# Patient Record
Sex: FEMALE | Race: BLACK OR AFRICAN AMERICAN | Employment: OTHER | URBAN - METROPOLITAN AREA
[De-identification: names, ages, dates, MRNs, and addresses within clinical notes are randomized per-mention and may not be internally consistent; named-entity substitution may affect disease eponyms.]

---

## 2017-04-04 ENCOUNTER — OFFICE VISIT (OUTPATIENT)
Dept: FAMILY MEDICINE CLINIC | Age: 72
End: 2017-04-04

## 2017-04-04 VITALS
SYSTOLIC BLOOD PRESSURE: 119 MMHG | OXYGEN SATURATION: 97 % | TEMPERATURE: 97.9 F | WEIGHT: 229 LBS | HEIGHT: 67 IN | HEART RATE: 65 BPM | BODY MASS INDEX: 35.94 KG/M2 | RESPIRATION RATE: 18 BRPM | DIASTOLIC BLOOD PRESSURE: 77 MMHG

## 2017-04-04 DIAGNOSIS — I25.2 HISTORY OF MI (MYOCARDIAL INFARCTION): ICD-10-CM

## 2017-04-04 DIAGNOSIS — Z87.891 SMOKING HISTORY: ICD-10-CM

## 2017-04-04 DIAGNOSIS — Z00.00 MEDICARE ANNUAL WELLNESS VISIT, SUBSEQUENT: ICD-10-CM

## 2017-04-04 DIAGNOSIS — I10 ESSENTIAL HYPERTENSION: Primary | ICD-10-CM

## 2017-04-04 DIAGNOSIS — E11.9 TYPE 2 DIABETES MELLITUS WITHOUT COMPLICATION, WITHOUT LONG-TERM CURRENT USE OF INSULIN (HCC): ICD-10-CM

## 2017-04-04 PROBLEM — Z71.89 ADVANCED CARE PLANNING/COUNSELING DISCUSSION: Status: ACTIVE | Noted: 2017-04-04

## 2017-04-04 NOTE — PROGRESS NOTES
[address]  [address] [facility] [address]  [phone]             Date of visit: 4/4/2017       This is an Subsequent Medicare Annual Wellness Visit (AWV), (Performed more than 12 months after effective date of Medicare Part B enrollment and 12 months after last preventive visit, Once in a lifetime)    I have reviewed the patient's medical history in detail and updated the computerized patient record. Morelia Coello is a 70 y.o. female   History obtained from: patient    Concerns today   (Patient understands that medical problems addressed today may incur additional cost as this is a preventive visit)  - Blood pressure. Patient has a history of HTN, for which she is on toprol 100mg daily and prinzide 20/12.5mg daily. Her BP today is at goal. Will continue meds as are. Consider cutting back in 6 months if BP still 442O/883M systolics. History     Patient Active Problem List   Diagnosis Code    HTN (hypertension) I10    DM type 2 (diabetes mellitus, type 2) ([address] E11.9    MI (myocardial infarction) ([address] I21.3    Chronic back pain M54.9, G89.29    Obstructive sleep apnea (adult) (pediatric) G47.33    Advanced care planning/counseling discussion Z71.89     Past Medical History:   Diagnosis Date    Chronic back pain 9/8/2010    DM type 2 (diabetes mellitus, type 2) ([address] 5/3/2010    HTN (hypertension) 5/3/2010    MI (myocardial infarction) ([address] 5/3/2010    Obstructive sleep apnea (adult) (pediatric) 12/12/2014      Past Surgical History:   Procedure Laterality Date    HX CORONARY STENT PLACEMENT      HX ORTHOPAEDIC      disc sx    HX TUBAL LIGATION       Allergies   Allergen Reactions    Tetanus Vaccines And Toxoid Swelling     Current Outpatient Prescriptions   Medication Sig Dispense Refill    metoprolol succinate (TOPROL XL) 100 mg tablet Take 1 Tab by mouth daily.  90 Tab 4    lisinopril-hydroCHLOROthiazide (PRINZIDE, ZESTORETIC) 20-12.5 mg per tablet Take 1 Tab by mouth daily. 90 Tab 3    pravastatin (PRAVACHOL) 40 mg tablet Take 1 Tab by mouth nightly. 90 Tab 3    cholecalciferol (VITAMIN D3) 1,000 unit tablet Take 1 Tab by mouth daily. 90 Tab 4    aspirin 81 mg chewable tablet Take 1 Tab by mouth daily. 100 Tab 3    exenatide microspheres (BYDUREON) 2 mg serr 2 mg by SubCUTAneous route every seven (7) days. 1 Each 1    insulin glargine (LANTUS SOLOSTAR) 100 unit/mL (3 mL) pen 52 units at night. 4 Each 3    glucose blood VI test strips (Anaphore BLOOD GLUCOSE SYSTEM) strip Use 3 times per day (fasting and before meals). (Patient taking differently: Once a day) 3 Package 11    Lancets (ONE TOUCH ULTRASOFT LANCETS) Misc 1 Package by Does Not Apply route. Test blood glucose 3-4 time daily 1 Package 11     Family History   Problem Relation Age of Onset    Hypertension Mother       58yo    Diabetes Sister     Breast Cancer Sister     Diabetes Sister     Diabetes Sister     Diabetes Sister      Social History   Substance Use Topics    Smoking status: Former Smoker     Quit date: 2004    Smokeless tobacco: Never Used    Alcohol use No       Specialists/Care Team   Alyx Farris has established care with the following healthcare providers:  Patient Care Team:  Radha Campbell MD as PCP - General (Family Practice)    Health Risk Assessment     Demographics   female  70 y.o. General Health Questions   -During the past 4 weeks:   -how would you rate your health in general? Fair   -how often have you been bothered by feeling dizzy when standing up? never   -how much have you been bothered by bodily pain? mildly   -Have you noticed any hearing difficulties? no   -has your physical and emotional health limited your social activities with family or friends? no    Emotional Health Questions   -Do you have a history of depression, anxiety, or emotional problems? no  -Over the past 2 weeks, have you felt down, depressed or hopeless? no  -Over the past 2 weeks, have you felt little interest or pleasure in doing things? no    Health Habits   Please describe your diet habits: Breakfast usually boiled egg and toast, no juice. Lunch no sandwich, usually soup. [employer] is usually fish and protein. Do you get 5 servings of fruits or vegetables daily? yes  Do you exercise regularly? Not currently, but is trying to incorporate exercising, riding the bike. Activities of Daily Living and Functional Status   -Do you need help with eating, walking, dressing, bathing, toileting, the phone, transportation, shopping, preparing meals, housework, laundry, medications or managing money? no  -In the past four weeks, was someone available to help you if you needed and wanted help with anything? yes  -Are you confident are you that you can control and manage most of your health problems? yes  -Have you been given information to help you keep track of your medications? no  -How often do you have trouble taking your medications as prescribed? never    Fall Risk and Home Safety   Have you fallen 2 or more times in the past year? no  Does your home have rugs in the hallway, lack grab bars in the bathroom, lack handrails on the stairs or have poor lighting? yes  Do you have smoke detectors and check them regularly? yes  Do you have difficulties driving a car? no  Do you always fasten your seat belt when you are in a car? yes    Review of Systems (if indicated for problems addressed today)   No chest pain, palpitations, nor shortness of breath    Physical Examination     Vitals:    04/04/17 1005   BP: 119/77   Pulse: 65   Resp: 18   Temp: 97.9 °F (36.6 °C)   TempSrc: Oral   SpO2: 97%   Weight: 229 lb (103.9 kg)   Height: 5' 7\" (1.702 m)     Body mass index is 35.87 kg/(m^2).    No exam data present  Was the patient's timed Up & Go test unsteady or longer than 30 seconds? no    Evaluation of Cognitive Function   Mood/affect:  happy  Orientation: Person, Place, Time and Situation  Appearance: age appropriate and casually dressed  Family member/caregiver input: none    Additional exam if indicated for problems addressed today:  Heart: Normal rate, regular rhythm. No m/r/r  Lungs: CTAB, no wheeze  Ext: no LE edema. Pulses 2+. Advice/Referrals/Counseling (as indicated)   Education and counseling provided for any problems identified above. Preventive Services     (Preventive care checklist to be included in patient instructions)  Discussed today Done Previously Not Needed     x  Pneumococcal vaccines   X- refused   Flu vaccine      Hepatitis B vaccine (if at risk)    x  Shingles vaccine    x  TDAP vaccine   X- will order   Mammogram     x Pap smear   X- says it was about 3 years ago. Normal.   Colorectal cancer screening   x   Low-dose CT for lung cancer screening   x   Bone density test     x Glaucoma screening   x   X- due september      Cholesterol test   X- due september   Diabetes screening test    x   Diabetes self-management class   x   Nutritionist referral for diabetes or renal disease     Discussion of Advance Directive   Discussed with Katarina Veloz her ability to prepare and advance directive in the case that an injury or illness causes her to be unable to make health care decisions. Patient has discussed with family to be DNR. Assessment/Plan   Z00.00    ICD-10-CM ICD-9-CM    1. Essential hypertension I10 401.9    2. Medicare annual wellness visit, subsequent Z00.00 V70.0 REFERRAL TO CARDIOLOGY      DEXA BONE DENSITY STUDY AXIAL      ANTWAN MAMMO BI SCREENING INCL CAD      CT LOW DOSE LUNG CANCER SCREENING      REFERRAL TO NUTRITION   3. History of MI (myocardial infarction) I25.2 12 REFERRAL TO CARDIOLOGY   4. Smoking history Z87.891 V15.82 CT LOW DOSE LUNG CANCER SCREENING   5.  Type 2 diabetes mellitus without complication, without long-term current use of insulin (HCC) E11.9 250.00 REFERRAL TO NUTRITION       Orders Placed This Encounter    DEXA BONE DENSITY STUDY AXIAL    ANTWAN MAMMO BI SCREENING INCL CAD    CT LOW DOSE LUNG CANCER SCREENING    REFERRAL TO CARDIOLOGY    REFERRAL TO Roldan Mckeon MD   PGY 2

## 2017-04-04 NOTE — PROGRESS NOTES
I saw and evaluated the patient, performing the key elements of the service. I discussed the findings, assessment and plan with the resident and agree with the resident's findings and plan as documented in the resident's note. She denies any lightheadedness with position change.

## 2017-04-04 NOTE — MR AVS SNAPSHOT
Visit Information Date & Time Provider Department Dept. Phone Encounter #  
 4/4/2017  9:55 AM Ny Hood MD [facility] [phone] [id number] Upcoming Health Maintenance Date Due Hepatitis C Screening 1945 MICROALBUMIN Q1 3/30/2016 INFLUENZA AGE 9 TO ADULT 8/1/2016 BREAST CANCER SCRN MAMMOGRAM 9/24/2016 HEMOGLOBIN A1C Q6M 3/1/2017 EYE EXAM RETINAL OR DILATED Q1 4/4/2018* FOOT EXAM Q1 9/1/2017 LIPID PANEL Q1 9/1/2017 MEDICARE YEARLY EXAM 4/5/2018 GLAUCOMA SCREENING Q2Y 9/8/2018 Pneumococcal 65+ Low/Medium Risk (2 of 2 - PPSV23) 9/1/2020 COLONOSCOPY 4/30/2021 DTaP/Tdap/Td series (2 - Td) 4/4/2027 *Topic was postponed. The date shown is not the original due date. Allergies as of 4/4/2017  Review Complete On: 4/4/2017 By: Fernando Jiménez LPN Severity Noted Reaction Type Reactions Tetanus Vaccines And Toxoid  05/04/2011    Swelling Current Immunizations  Reviewed on 4/4/2017 Name Date Pneumococcal Vaccine (Unspecified Type) 9/1/2015 Zoster Vaccine, Live 9/1/2015 Reviewed by Fernando Jiménez LPN on 3/5/0275 at 90:44 AM  
You Were Diagnosed With   
  
 Codes Comments Essential hypertension    -  Primary ICD-10-CM: I10 
ICD-9-CM: 401.9 Medicare annual wellness visit, subsequent     ICD-10-CM: Z00.00 ICD-9-CM: V70.0 History of MI (myocardial infarction)     ICD-10-CM: I25.2 ICD-9-CM: 117 Smoking history     ICD-10-CM: Z87.891 ICD-9-CM: V15.82 Type 2 diabetes mellitus without complication, without long-term current use of insulin (HCC)     ICD-10-CM: E11.9 ICD-9-CM: 250.00 Vitals BP Pulse Temp Resp Height(growth percentile) Weight(growth percentile) 119/77 65 97.9 °F (36.6 °C) (Oral) 18 5' 7\" (1.702 m) 229 lb (103.9 kg) SpO2 BMI OB Status Smoking Status 97% 35.87 kg/m2 Postmenopausal Former Smoker BMI and BSA Data Body Mass Index Body Surface Area  
 35.87 kg/m 2 2.22 m 2 Preferred Pharmacy Pharmacy Name Phone [facility] [address] [phone] Your Updated Medication List  
  
   
This list is accurate as of: 4/4/17 10:31 AM.  Always use your most recent med list.  
  
  
  
  
 aspirin 81 mg chewable tablet Take 1 Tab by mouth daily. cholecalciferol 1,000 unit tablet Commonly known as:  VITAMIN D3 Take 1 Tab by mouth daily. exenatide microspheres 2 mg Serr Commonly known as:  BYDUREON  
2 mg by SubCUTAneous route every seven (7) days. glucose blood VI test strips strip Commonly known as:  [address] Use 3 times per day (fasting and before meals). insulin glargine 100 unit/mL (3 mL) pen Commonly known as:  LANTUS SOLOSTAR  
52 units at night. Lancets Misc Commonly known as:  ONETOUCH ULTRASOFT LANCETS  
1 Package by Does Not Apply route. Test blood glucose 3-4 time daily  
  
 lisinopril-hydroCHLOROthiazide 20-12.5 mg per tablet Commonly known as:  Darcia Candy Take 1 Tab by mouth daily. metoprolol succinate 100 mg tablet Commonly known as:  TOPROL XL Take 1 Tab by mouth daily. pravastatin 40 mg tablet Commonly known as:  PRAVACHOL Take 1 Tab by mouth nightly. We Performed the Following REFERRAL TO CARDIOLOGY [JUQ14 Custom] Comments:  
 Please evaluate patient for hx of MI  
 REFERRAL TO NUTRITION [REF50 Custom] Comments:  
 Please evaluate patient for diet and exercise education. Has T2DM Please schedule and authorize patient for services. To-Do List   
 04/05/2017 Imaging:  CT LOW DOSE LUNG CANCER SCREENING   
  
 04/05/2017 Imaging:  DEXA BONE DENSITY STUDY AXIAL   
  
 09/04/2017 Imaging:  ANTWAN MAMMO BI SCREENING INCL CAD Referral Information Referral ID Referred By Referred To [facility] [address] [id number] [address] Phone: [phone] Fax: [phone] Visits Status Start Date End Date 1 New Request 4/4/17 4/4/18 If your referral has a status of pending review or denied, additional information will be sent to support the outcome of this decision. Referral ID Referred By Referred To  
 [id number] Mariann Cooper Not Available Visits Status Start Date End Date 1 New Request 4/4/17 4/4/18 If your referral has a status of pending review or denied, additional information will be sent to support the outcome of this decision. Introducing [facility]! Dear Dior Porter: Thank you for requesting a [address] account. Our records indicate that you have previously registered for a [address] account but its currently inactive. Please call our [address] support line at [phone]. Additional Information If you have questions, please visit the Frequently Asked Questions section of the [address] website at [email or web address] Remember, [address] is NOT to be used for urgent needs. For medical emergencies, dial 911. Now available from your iPhone and Android! Please provide this summary of care documentation to your next provider. Your primary care clinician is listed as Steffen Zurita. If you have any questions after today's visit, please call [phone].

## 2017-04-04 NOTE — PROGRESS NOTES
Chief Complaint   Patient presents with    Blood Pressure Check     and medicare follow up. . no other concerns. . no refills needed. . pt states had pneu and zoster at [facility] 2yrs ago. eyes checked in sept as well as feet exam.      1. Have you been to the ER, urgent care clinic since your last visit? Hospitalized since your last visit? No    2. Have you seen or consulted any other health care providers outside of the [address] [place] [address] since your last visit? Include any pap smears or colon screening.  No

## 2017-04-07 DIAGNOSIS — Z78.0 POST-MENOPAUSAL: Primary | ICD-10-CM

## 2017-04-07 DIAGNOSIS — Z91.89 AT RISK FOR DECREASED BONE DENSITY: ICD-10-CM

## 2017-04-11 ENCOUNTER — HOSPITAL ENCOUNTER (OUTPATIENT)
Dept: MAMMOGRAPHY | Age: 72
Discharge: HOME | End: 2017-04-11
Attending: FAMILY MEDICINE
Payer: MEDICARE

## 2017-04-11 DIAGNOSIS — Z78.0 POST-MENOPAUSAL: ICD-10-CM

## 2017-04-11 DIAGNOSIS — Z91.89 AT RISK FOR DECREASED BONE DENSITY: ICD-10-CM

## 2017-04-11 PROCEDURE — 77080 DXA BONE DENSITY AXIAL: CPT

## 2017-09-01 ENCOUNTER — OFFICE VISIT (OUTPATIENT)
Dept: FAMILY MEDICINE CLINIC | Age: 72
End: 2017-09-01

## 2017-09-01 ENCOUNTER — HOSPITAL ENCOUNTER (OUTPATIENT)
Dept: LAB | Age: 72
Discharge: HOME | End: 2017-09-01
Payer: MEDICARE

## 2017-09-01 ENCOUNTER — TELEPHONE (OUTPATIENT)
Dept: FAMILY MEDICINE CLINIC | Age: 72
End: 2017-09-01

## 2017-09-01 VITALS
TEMPERATURE: 98.1 F | OXYGEN SATURATION: 98 % | HEART RATE: 70 BPM | WEIGHT: 227 LBS | SYSTOLIC BLOOD PRESSURE: 144 MMHG | DIASTOLIC BLOOD PRESSURE: 84 MMHG | HEIGHT: 67 IN | RESPIRATION RATE: 16 BRPM | BODY MASS INDEX: 35.63 KG/M2

## 2017-09-01 DIAGNOSIS — Z11.1 SCREENING-PULMONARY TB: ICD-10-CM

## 2017-09-01 DIAGNOSIS — Z11.59 NEED FOR HEPATITIS B SCREENING TEST: Primary | ICD-10-CM

## 2017-09-01 PROCEDURE — 36415 COLL VENOUS BLD VENIPUNCTURE: CPT

## 2017-09-01 PROCEDURE — 87340 HEPATITIS B SURFACE AG IA: CPT

## 2017-09-01 NOTE — TELEPHONE ENCOUNTER
Notified patient that her letter for work will be at the front office for . Patient verbalized understanding.

## 2017-09-01 NOTE — PATIENT INSTRUCTIONS
RTC Sunday before between 2P-4P to have yor PPD read. Tuberculin Skin Test: Care Instructions  Your Care Instructions    Tuberculosis (TB) is a bacterial infection that can damage the lungs or other parts of the body. The TB skin test can tell if you have TB bacteria in your body. Many people are exposed to TB and test positive for TB bacteria in their bodies, but they don't get the disease. TB bacteria can stay in your body without making you sick. This is because your immune system can keep TB in check. Your doctor may want you to have a TB skin test if you have been in close contact with someone who has TB. Or you may need the test if you have symptoms that might be caused by TB, such as a cough that does not go away, a fever, or weight loss. You also may get the test if you are a health care worker. During the skin test, part of a TB bacterium is injected under your skin. The test will feel like a skin prick. If you have TB bacteria in your body, a firm red bump will form at the shot site within 2 days. If the test shows that you are infected with TB (positive), your doctor probably will order more tests. A TB-positive skin test can't tell when you became infected with TB. And it can't tell whether the infection can be passed to others. Follow-up care is a key part of your treatment and safety. Be sure to make and go to all appointments, and call your doctor if you are having problems. It's also a good idea to know your test results and keep a list of the medicines you take. How can you care for yourself at home? · Do not scratch the test site. Scratching it may cause redness or swelling. This could affect the test results. · To ease itching, put a cold washcloth on the site. Then pat the site dry. · Do not cover the test site with a bandage or other dressing. · Go back to your doctor's office or hospital to have the test read on the follow-up date.  This must be done between 48 and 72 hours after you get the shot. When should you call for help? Watch closely for changes in your health, and be sure to contact your doctor if:  · You did not have your TB skin test checked by your doctor. · You have a fever or swelling in your arm. · You do not get better as expected. Where can you learn more? Go to http://yfn-wally.info/. Enter ([phone] in the search box to learn more about \"Tuberculin Skin Test: Care Instructions. \"  Current as of: March 3, 2017  Content Version: 11.3  © 7407-8848 [employer]. Care instructions adapted under license by [employer] (which disclaims liability or warranty for this information). If you have questions about a medical condition or this instruction, always ask your healthcare professional. [address] any warranty or liability for your use of this information.

## 2017-09-01 NOTE — PROGRESS NOTES
Chief Complaint   Patient presents with    Complete Physical     1. Have you been to the ER, urgent care clinic since your last visit? Hospitalized since your last visit? No    2. Have you seen or consulted any other health care providers outside of the [address] [place] [address] since your last visit? Include any pap smears or colon screening.  No

## 2017-09-01 NOTE — MR AVS SNAPSHOT
Visit Information Date & Time Provider Department Dept. Phone Encounter #  
 9/1/2017  1:15 PM Tanner Spear MD [address] [place] [address] [phone] [id number] Upcoming Health Maintenance Date Due Hepatitis C Screening 1945 MICROALBUMIN Q1 3/30/2016 BREAST CANCER SCRN MAMMOGRAM 9/24/2016 HEMOGLOBIN A1C Q6M 3/1/2017 INFLUENZA AGE 9 TO ADULT 8/1/2017 FOOT EXAM Q1 9/1/2017 LIPID PANEL Q1 9/1/2017 EYE EXAM RETINAL OR DILATED Q1 4/4/2018* MEDICARE YEARLY EXAM 4/5/2018 GLAUCOMA SCREENING Q2Y 9/8/2018 Pneumococcal 65+ Low/Medium Risk (2 of 2 - PPSV23) 9/1/2020 COLONOSCOPY 4/30/2021 DTaP/Tdap/Td series (2 - Td) 4/4/2027 *Topic was postponed. The date shown is not the original due date. Allergies as of 9/1/2017  Review Complete On: 9/1/2017 By: Ade Foley LPN Severity Noted Reaction Type Reactions Tetanus Vaccines And Toxoid  05/04/2011    Swelling Current Immunizations  Reviewed on 4/4/2017 Name Date Pneumococcal Vaccine (Unspecified Type) 9/1/2015 TB Skin Test (PPD) Intradermal  Incomplete Zoster Vaccine, Live 9/1/2015 Not reviewed this visit You Were Diagnosed With   
  
 Codes Comments Need for hepatitis B screening test    -  Primary ICD-10-CM: Z11.59 
ICD-9-CM: V73.89 Screening-pulmonary TB     ICD-10-CM: Z11.1 ICD-9-CM: V74.1 Vitals BP Pulse Temp Resp Height(growth percentile) Weight(growth percentile) 144/84 70 98.1 °F (36.7 °C) (Oral) 16 5' 7\" (1.702 m) 227 lb (103 kg) SpO2 BMI OB Status Smoking Status 98% 35.55 kg/m2 Postmenopausal Former Smoker Vitals History BMI and BSA Data Body Mass Index Body Surface Area 35.55 kg/m 2 2.21 m 2 Preferred Pharmacy Pharmacy Name Phone [facility] [address] [place] [address] [phone] Your Updated Medication List  
  
   
 This list is accurate as of: 9/1/17  1:38 PM.  Always use your most recent med list.  
  
  
  
  
 aspirin 81 mg chewable tablet Take 1 Tab by mouth daily. cholecalciferol 1,000 unit tablet Commonly known as:  VITAMIN D3 Take 1 Tab by mouth daily. exenatide microspheres 2 mg Serr Commonly known as:  BYDUREON  
2 mg by SubCUTAneous route every seven (7) days. glucose blood VI test strips strip Commonly known as:  [address] Use 3 times per day (fasting and before meals). insulin glargine 100 unit/mL (3 mL) Inpn Commonly known as:  LANTUSBASAGLAR  
52 units at night. Lancets Misc Commonly known as:  ONETOUCH ULTRASOFT LANCETS  
1 Package by Does Not Apply route. Test blood glucose 3-4 time daily  
  
 lisinopril-hydroCHLOROthiazide 20-12.5 mg per tablet Commonly known as:  Pelon Frater Take 1 Tab by mouth daily. metoprolol succinate 100 mg tablet Commonly known as:  TOPROL XL Take 1 Tab by mouth daily. pravastatin 40 mg tablet Commonly known as:  PRAVACHOL Take 1 Tab by mouth nightly. We Performed the Following AMB POC TUBERCULOSIS, INTRADERMAL (SKIN TEST) [83253 CPT(R)] HEP B SURFACE AG [id number] CPT(R)] Patient Instructions RTC Sunday before between 2P-4P to have yor PPD read. Tuberculin Skin Test: Care Instructions Your Care Instructions Tuberculosis (TB) is a bacterial infection that can damage the lungs or other parts of the body. The TB skin test can tell if you have TB bacteria in your body. Many people are exposed to TB and test positive for TB bacteria in their bodies, but they don't get the disease. TB bacteria can stay in your body without making you sick. This is because your immune system can keep TB in check. Your doctor may want you to have a TB skin test if you have been in close contact with someone who has TB.  Or you may need the test if you have symptoms that might be caused by TB, such as a cough that does not go away, a fever, or weight loss. You also may get the test if you are a health care worker. During the skin test, part of a TB bacterium is injected under your skin. The test will feel like a skin prick. If you have TB bacteria in your body, a firm red bump will form at the shot site within 2 days. If the test shows that you are infected with TB (positive), your doctor probably will order more tests. A TB-positive skin test can't tell when you became infected with TB. And it can't tell whether the infection can be passed to others. Follow-up care is a key part of your treatment and safety. Be sure to make and go to all appointments, and call your doctor if you are having problems. It's also a good idea to know your test results and keep a list of the medicines you take. How can you care for yourself at home? · Do not scratch the test site. Scratching it may cause redness or swelling. This could affect the test results. · To ease itching, put a cold washcloth on the site. Then pat the site dry. · Do not cover the test site with a bandage or other dressing. · Go back to your doctor's office or hospital to have the test read on the follow-up date. This must be done between 48 and 72 hours after you get the shot. When should you call for help? Watch closely for changes in your health, and be sure to contact your doctor if: 
· You did not have your TB skin test checked by your doctor. · You have a fever or swelling in your arm. · You do not get better as expected. Where can you learn more? Go to http://yfn-wally.info/. Enter [phone] [phone] in the search box to learn more about \"Tuberculin Skin Test: Care Instructions. \" Current as of: March 3, 2017 Content Version: 11.3 © 4213-8527 [employer].  Care instructions adapted under license by [employer] (which disclaims liability or warranty for this information). If you have questions about a medical condition or this instruction, always ask your healthcare professional. [address] any warranty or liability for your use of this information. Introducing [facility] SERVICES! Blaire Santana introduces [email or web address] patient portal. Now you can access parts of your medical record, email your doctor's office, and request medication refills online. 1. In your internet browser, go to [email or web address] 2. Click on the First Time User? Click Here link in the Sign In box. You will see the New Member Sign Up page. 3. Enter your [email or web address] Access Code exactly as it appears below. You will not need to use this code after youve completed the sign-up process. If you do not sign up before the expiration date, you must request a new code. · [email or web address] Access Code: [email or web address] Expires: 11/30/2017  1:38 PM 
 
4. Enter the last four digits of your Social Security Number (xxxx) and Date of Birth (mm/dd/yyyy) as indicated and click Submit. You will be taken to the next sign-up page. 5. Create a [email or web address] ID. This will be your [email or web address] login ID and cannot be changed, so think of one that is secure and easy to remember. 6. Create a [email or web address] password. You can change your password at any time. 7. Enter your Password Reset Question and Answer. This can be used at a later time if you forget your password. 8. Enter your e-mail address. You will receive e-mail notification when new information is available in [address]. 9. Click Sign Up. You can now view and download portions of your medical record. 10. Click the Download Summary menu link to download a portable copy of your medical information. If you have questions, please visit the Frequently Asked Questions section of the [email or web address] website. Remember, [email or web address] is NOT to be used for urgent needs. For medical emergencies, dial 911. Now available from your iPhone and Android! Please provide this summary of care documentation to your next provider. Your primary care clinician is listed as Estefania Rosen. If you have any questions after today's visit, please call [phone].

## 2017-09-01 NOTE — LETTER
9/1/2017 1:37 PM 
 
Ms. Regina Pulido [address] [facility] [id number] To Whom It May Concern: 
 
Regina Pulido is currently under the care of [address]. She is in generally good health and feels up to continue home health care. If there are questions or concerns please have the patient contact our office.  
 
 
 
Sincerely, 
 
 
Berlin Mane MD

## 2017-09-01 NOTE — PROGRESS NOTES
Subjective  CC: Tonio Brown is an 67 y.o. female presents for Hep B screening. 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 She does not have fevers, chills, cough, hemoptysis. No recent travel or exposures. No drug use, no exposure to bodily fluids. Allergies - reviewed: Allergies   Allergen Reactions    Tetanus Vaccines And Toxoid Swelling         Medications - reviewed:   Current Outpatient Prescriptions   Medication Sig    metoprolol succinate (TOPROL XL) 100 mg tablet Take 1 Tab by mouth daily.  lisinopril-hydroCHLOROthiazide (PRINZIDE, ZESTORETIC) 20-12.5 mg per tablet Take 1 Tab by mouth daily.  pravastatin (PRAVACHOL) 40 mg tablet Take 1 Tab by mouth nightly.  cholecalciferol (VITAMIN D3) 1,000 unit tablet Take 1 Tab by mouth daily.  aspirin 81 mg chewable tablet Take 1 Tab by mouth daily.  exenatide microspheres (BYDUREON) 2 mg serr 2 mg by SubCUTAneous route every seven (7) days.  insulin glargine (LANTUS SOLOSTAR) 100 unit/mL (3 mL) pen 52 units at night.  glucose blood VI test strips ([employer] BLOOD GLUCOSE SYSTEM) strip Use 3 times per day (fasting and before meals). (Patient taking differently: Once a day)    Lancets (ONE TOUCH ULTRASOFT LANCETS) Misc 1 Package by Does Not Apply route. Test blood glucose 3-4 time daily     No current facility-administered medications for this visit.           Past Medical History - reviewed:  Past Medical History:   Diagnosis Date    Chronic back pain 9/8/2010    DM type 2 (diabetes mellitus, type 2) ([facility] 5/3/2010    HTN (hypertension) 5/3/2010    MI (myocardial infarction) ([facility] 5/3/2010    Obstructive sleep apnea (adult) (pediatric) 12/12/2014         Immunizations - reviewed:   Immunization History   Administered Date(s) Administered    Pneumococcal Vaccine (Unspecified Type) 09/01/2015    TB Skin Test (PPD) Intradermal 09/01/2017    Zoster Vaccine, Live 09/01/2015         ROS  Review of Systems : A complete review of systems was performed and is negative except for those mentioned in the HPI. Physical Exam  Visit Vitals    /84    Pulse 70    Temp 98.1 °F (36.7 °C) (Oral)    Resp 16    Ht 5' 7\" (1.702 m)    Wt 227 lb (103 kg)    SpO2 98%    BMI 35.55 kg/m2       General appearance - Alert, NAD. Head: Atraumatic. Normocephalic. No lymphadenopathy  Respiratory - LCTAB. No wheeze/rale/rhonchi  Heart - Normal rate, regular rhythm. No m/r/r  Extremities - No LE edema. Distal pulses intact  Skin - normal coloration and normal turgor. No cyanosis, no rash. Assessment/Plan  1. Need for hepatitis B screening test  - HEP B SURFACE AG    2. Screening-pulmonary TB  - AMB POC TUBERCULOSIS, INTRADERMAL (SKIN TEST)  - Will return Sunday after 1:45PM. Patient is aware that it must be read between 48-72 hours and we are closed Monday. I discussed the aforementioned diagnoses with the patient as well as the plan of care.      Celena Maki MD  Family Medicine Resident  PGY 3

## 2017-09-02 LAB — HBV SURFACE AG SERPL QL IA: NEGATIVE

## 2017-09-03 ENCOUNTER — CLINICAL SUPPORT (OUTPATIENT)
Dept: FAMILY MEDICINE CLINIC | Age: 72
End: 2017-09-03

## 2017-09-03 LAB
MM INDURATION POC: 0 MM (ref 0–5)
PPD POC: NEGATIVE NEGATIVE

## 2017-09-15 ENCOUNTER — OFFICE VISIT (OUTPATIENT)
Dept: CARDIOLOGY CLINIC | Age: 72
End: 2017-09-15

## 2017-09-15 VITALS
HEART RATE: 78 BPM | BODY MASS INDEX: 35.41 KG/M2 | SYSTOLIC BLOOD PRESSURE: 140 MMHG | OXYGEN SATURATION: 97 % | DIASTOLIC BLOOD PRESSURE: 82 MMHG | WEIGHT: 225.6 LBS | HEIGHT: 67 IN | RESPIRATION RATE: 16 BRPM

## 2017-09-15 DIAGNOSIS — R09.89 BRUIT: ICD-10-CM

## 2017-09-15 DIAGNOSIS — E13.9 OTHER SPECIFIED DIABETES MELLITUS: ICD-10-CM

## 2017-09-15 DIAGNOSIS — I25.119 CORONARY ARTERY DISEASE INVOLVING NATIVE HEART WITH ANGINA PECTORIS, UNSPECIFIED VESSEL OR LESION TYPE (HCC): ICD-10-CM

## 2017-09-15 DIAGNOSIS — I10 ESSENTIAL HYPERTENSION: Primary | ICD-10-CM

## 2017-09-15 DIAGNOSIS — R07.9 CHEST PAIN, UNSPECIFIED TYPE: ICD-10-CM

## 2017-09-15 PROBLEM — I20.8 STABLE ANGINA (HCC): Status: ACTIVE | Noted: 2017-09-15

## 2017-09-15 NOTE — PROGRESS NOTES
Reason for Consult: Chest pain. Referring: Sunita Gonzalez MD    HPI: Cesar Walsh is a 67 y.o. female with past medical history significant for CAD, MI, stent, hypertension, diabetes, obstructive sleep apnea. She is being referred here for symptoms of chest pain. The symptoms started a few days ago and have been mostly exertional.  On physical activities she will experience aching retrosternal chest pain nonradiating. No associated shortness of breath or fatigue. The pain improves upon resting. There is no symptoms of lightheadedness, dizziness, presyncope or syncope. EKG performed in the office demonstrated normal sinus rhythm normal EKG without ST-T changes. No prior records are available. 20 years ago the cardiac cath was done in [place] and since then patient has moved twice. Plan:    1. Stable angina/CAD: Symptoms are typical for angina. She is currently taking aspirin and beta-blocker which we will continue. Check a exercise stress nuclear to rule out significance of CAD. She also need a cardiac catheterization for further evaluation and treatment. Continue statins. May need to change Pravachol to Crestor however she has history of myalgias. Repeat a fasting lipid profile along with CBC, and CMP which will be done prior to cardiac catheterization. 2.  Hypertension: Blood pressure is controlled. Continue current medications. 3.  Dyslipidemia: LDL levels are high (2016 readings). Recheck fasting lipid profile. May need to change to a better statin. 4.  Preventive cardiovascular workup: We will check a bilateral carotid ultrasound as well as ABIs.         Past Medical History:   Diagnosis Date    Chronic back pain 9/8/2010    DM type 2 (diabetes mellitus, type 2) ([facility] 5/3/2010    HTN (hypertension) 5/3/2010    MI (myocardial infarction) ([facility] 5/3/2010    Obstructive sleep apnea (adult) (pediatric) 12/12/2014            Past Surgical History:   Procedure Laterality Date  HX CORONARY STENT PLACEMENT      HX ORTHOPAEDIC      disc sx    HX TUBAL LIGATION               Family History   Problem Relation Age of Onset    Hypertension Mother       60yo    Diabetes Sister     Breast Cancer Sister     Diabetes Sister     Diabetes Sister     Diabetes Sister            Social History     Social History    Marital status: SINGLE     Spouse name: N/A    Number of children: N/A    Years of education: N/A     Occupational History    Not on file. Social History Main Topics    Smoking status: Former Smoker     Quit date: 2004    Smokeless tobacco: Never Used    Alcohol use No    Drug use: No    Sexual activity: No     Other Topics Concern    Not on file     Social History Narrative         Allergies   Allergen Reactions    Tetanus Vaccines And Toxoid Swelling            Current Outpatient Prescriptions   Medication Sig Dispense Refill    metoprolol succinate (TOPROL XL) 100 mg tablet Take 1 Tab by mouth daily. 90 Tab 4    lisinopril-hydroCHLOROthiazide (PRINZIDE, ZESTORETIC) 20-12.5 mg per tablet Take 1 Tab by mouth daily. 90 Tab 3    aspirin 81 mg chewable tablet Take 1 Tab by mouth daily. 100 Tab 3    insulin glargine (LANTUS SOLOSTAR) 100 unit/mL (3 mL) pen 52 units at night. 4 Each 3    glucose blood VI test strips ([employer] BLOOD GLUCOSE SYSTEM) strip Use 3 times per day (fasting and before meals). (Patient taking differently: Once a day) 3 Package 11    Lancets (ONE TOUCH ULTRASOFT LANCETS) Misc 1 Package by Does Not Apply route. Test blood glucose 3-4 time daily 1 Package 11    pravastatin (PRAVACHOL) 40 mg tablet Take 1 Tab by mouth nightly. 90 Tab 3        ROS:  12 point review of systems was performed.  All negative except for HPI     Physical Exam:  Visit Vitals    /82 (BP 1 Location: Left arm, BP Patient Position: Sitting)    Pulse 78    Resp 16    Ht 5' 7\" (1.702 m)    Wt 225 lb 9.6 oz (102.3 kg)    SpO2 97%    BMI 35.33 kg/m2 Gen:  Well-developed, well-nourished, in no acute distress  HEENT:  Pink conjunctivae, PERRL, hearing intact to voice, moist mucous membranes  Neck:  Supple, without masses, thyroid non-tender  Resp:  No accessory muscle use, clear breath sounds without wheezes rales or rhonchi  Card:  No murmurs, normal S1, S2 without thrills, bruits or peripheral edema  Abd:  Soft, non-tender, non-distended, normoactive bowel sounds are present, no palpable organomegaly and no detectable hernias  Lymph:  No cervical or inguinal adenopathy  Musc:  No cyanosis or clubbing  Skin:  No rashes or ulcers, skin turgor is good  Neuro:  Cranial nerves are grossly intact, no focal motor weakness, follows commands appropriately  Psych:  Good insight, oriented to person, place and time, alert     Labs:     Lab Results   Component Value Date/Time    WBC 6.0 03/30/2015 12:05 PM    HGB 12.2 03/30/2015 12:05 PM    HCT 36.2 03/30/2015 12:05 PM    PLATELET 301 20/42/8375 12:05 PM    MCV 89 03/30/2015 12:05 PM     Lab Results   Component Value Date/Time    Hemoglobin A1c 9.0 09/01/2016 11:03 AM    Hemoglobin A1c 11.9 08/19/2013 09:30 AM    Hemoglobin A1c 11.3 04/30/2013 11:05 AM    Glucose 92 03/30/2015 12:05 PM    Microalbumin/Creat ratio (mg/g creat) 2 03/04/2010 10:53 AM    Microalb/Creat ratio (ug/mg creat.) 6.0 01/17/2013 03:25 PM    Microalbumin,urine random 0.58 03/04/2010 10:53 AM    LDL,Direct 158 01/17/2013 03:25 PM    LDL, calculated 145 09/01/2016 11:03 AM    Creatinine 1.24 03/30/2015 12:05 PM      Lab Results   Component Value Date/Time    Cholesterol, total 251 09/01/2016 11:03 AM    HDL Cholesterol 81 09/01/2016 11:03 AM    LDL,Direct 158 01/17/2013 03:25 PM    LDL, calculated 145 09/01/2016 11:03 AM    LDL-C, External 162 07/29/2015    Triglyceride 126 09/01/2016 11:03 AM    CHOL/HDL Ratio 2.5 09/08/2010 11:05 AM     Lab Results   Component Value Date/Time    ALT (SGPT) 9 03/30/2015 12:05 PM    AST (SGOT) 15 03/30/2015 12:05 PM Alk. phosphatase 69 03/30/2015 12:05 PM    Bilirubin, total 0.4 03/30/2015 12:05 PM    Albumin 4.2 03/30/2015 12:05 PM    Protein, total 6.7 03/30/2015 12:05 PM    PLATELET 492 46/29/3141 12:05 PM     No results found for: INR, PTMR, PTP, PT1, PT2   Lab Results   Component Value Date/Time    GFR est non-AA 44 03/30/2015 12:05 PM    GFR est AA 51 03/30/2015 12:05 PM    Creatinine 1.24 03/30/2015 12:05 PM    BUN 31 03/30/2015 12:05 PM    Sodium 140 03/30/2015 12:05 PM    Potassium 4.8 03/30/2015 12:05 PM    Chloride 102 03/30/2015 12:05 PM    CO2 23 03/30/2015 12:05 PM     No results found for: PSA, [place], PSAR3, [id number], [id number], PSALT  Lab Results   Component Value Date/Time    TSH 1.670 08/19/2013 09:30 AM      Lab Results   Component Value Date/Time    Glucose 92 03/30/2015 12:05 PM      No results found for: CPK, RCK1, RCK2, RCK3, RCK4, CKMB, CKNDX, CKND1, TROPT, TROIQ, BNPP, BNP   No results found for: BNP, BNPP, BNPPPOC, XBNPT, BNPNT   Lab Results   Component Value Date/Time    Sodium 140 03/30/2015 12:05 PM    Potassium 4.8 03/30/2015 12:05 PM    Chloride 102 03/30/2015 12:05 PM    CO2 23 03/30/2015 12:05 PM    Anion gap 9 09/08/2010 11:05 AM    Glucose 92 03/30/2015 12:05 PM    BUN 31 03/30/2015 12:05 PM    Creatinine 1.24 03/30/2015 12:05 PM    BUN/Creatinine ratio 25 03/30/2015 12:05 PM    GFR est AA 51 03/30/2015 12:05 PM    GFR est non-AA 44 03/30/2015 12:05 PM    Calcium 9.6 03/30/2015 12:05 PM      Lab Results   Component Value Date/Time    Sodium 140 03/30/2015 12:05 PM    Potassium 4.8 03/30/2015 12:05 PM    Chloride 102 03/30/2015 12:05 PM    CO2 23 03/30/2015 12:05 PM    Anion gap 9 09/08/2010 11:05 AM    Glucose 92 03/30/2015 12:05 PM    BUN 31 03/30/2015 12:05 PM    Creatinine 1.24 03/30/2015 12:05 PM    BUN/Creatinine ratio 25 03/30/2015 12:05 PM    GFR est AA 51 03/30/2015 12:05 PM    GFR est non-AA 44 03/30/2015 12:05 PM    Calcium 9.6 03/30/2015 12:05 PM Bilirubin, total 0.4 03/30/2015 12:05 PM    ALT (SGPT) 9 03/30/2015 12:05 PM    AST (SGOT) 15 03/30/2015 12:05 PM    Alk. phosphatase 69 03/30/2015 12:05 PM    Protein, total 6.7 03/30/2015 12:05 PM    Albumin 4.2 03/30/2015 12:05 PM    Globulin 3.6 09/08/2010 11:05 AM    A-G Ratio 1.7 03/30/2015 12:05 PM      Lab Results   Component Value Date/Time    Hemoglobin A1c 9.0 09/01/2016 11:03 AM    Hemoglobin A1c (POC) 7.5 03/30/2015 01:19 PM         No results for input(s): CPK, CKMB, TROIQ in the last 72 hours. No lab exists for component: CKQMB, CPKMB        Problem List:     Problem List  Date Reviewed: 9/15/2017          Codes Class Noted    Advanced care planning/counseling discussion ICD-10-CM: Z71.89  ICD-9-CM: V65.49  4/4/2017    Overview Signed 4/4/2017 10:31 AM by Lucy Cuevas MD     Patient states she is a DNR  04/04/17               Obstructive sleep apnea (adult) (pediatric) ICD-10-CM: G47.33  ICD-9-CM: 327.23  12/12/2014        Chronic back pain ICD-10-CM: M54.9, G89.29  ICD-9-CM: 724.5, 338.29  9/8/2010        HTN (hypertension) ICD-10-CM: I10  ICD-9-CM: 401.9  5/3/2010        DM type 2 (diabetes mellitus, type 2) ([facility] ICD-10-CM: E11.9  ICD-9-CM: 250.00  5/3/2010    Overview Signed 9/12/2014  9:50 AM by Gerson Block MD     On Lantus  FTF for DM supplies 8/26/2014  Forms for [employer] DM supplies 9/12/2014             MI (myocardial infarction) [facility]) ICD-10-CM: I21.3  ICD-9-CM: 410.90  5/3/2010                Doyle Bonilla MD, [facility]

## 2017-09-18 ENCOUNTER — TELEPHONE (OUTPATIENT)
Dept: CARDIOLOGY CLINIC | Age: 72
End: 2017-09-18

## 2017-09-18 ENCOUNTER — CLINICAL SUPPORT (OUTPATIENT)
Dept: CARDIOLOGY CLINIC | Age: 72
End: 2017-09-18

## 2017-09-18 DIAGNOSIS — I25.119 CORONARY ARTERY DISEASE INVOLVING NATIVE HEART WITH ANGINA PECTORIS, UNSPECIFIED VESSEL OR LESION TYPE (HCC): ICD-10-CM

## 2017-09-18 DIAGNOSIS — I10 ESSENTIAL HYPERTENSION: ICD-10-CM

## 2017-09-18 DIAGNOSIS — E11.9 TYPE 2 DIABETES MELLITUS WITHOUT COMPLICATION, WITHOUT LONG-TERM CURRENT USE OF INSULIN (HCC): ICD-10-CM

## 2017-09-18 DIAGNOSIS — I10 ESSENTIAL HYPERTENSION: Primary | ICD-10-CM

## 2017-09-18 DIAGNOSIS — I20.8 STABLE ANGINA (HCC): Primary | ICD-10-CM

## 2017-09-18 NOTE — TELEPHONE ENCOUNTER
Dr. Jessi Pablo called back and spoke to patient about procedure. Pt was given instructions in office today after her stress test. Verbal/Written given.

## 2017-09-18 NOTE — PROGRESS NOTES
Explained procedure to patient, Obtaining IV access, radiation exposure, risks and discomforts (for exercise stress test), waiting between injections and obtaining images. All concerns and questions addressed, prior to obtaining consent. See scanned report. Dr. Matheus Crandall ordered and Dr. Matheus Crandall read study. ID verified per protocol. Pt  reported no symptoms at completion of protocol.

## 2017-09-18 NOTE — TELEPHONE ENCOUNTER
Patient called regarding her upcoming procedure 9/20/2017, she would like to know what time she should arrive and any prep she should know. She can be reached at  [phone].  Thank you

## 2017-09-19 DIAGNOSIS — I25.118 CORONARY ARTERY DISEASE OF NATIVE HEART WITH STABLE ANGINA PECTORIS, UNSPECIFIED VESSEL OR LESION TYPE (HCC): Primary | ICD-10-CM

## 2017-09-19 DIAGNOSIS — R07.9 CHEST PAIN, UNSPECIFIED TYPE: ICD-10-CM

## 2017-09-19 LAB
ALBUMIN SERPL-MCNC: 4.2 G/DL (ref 3.5–4.8)
ALBUMIN/GLOB SERPL: 1.4 {RATIO} (ref 1.2–2.2)
ALP SERPL-CCNC: 89 IU/L (ref 39–117)
ALT SERPL-CCNC: 13 IU/L (ref 0–32)
AST SERPL-CCNC: 16 IU/L (ref 0–40)
BILIRUB SERPL-MCNC: 0.4 MG/DL (ref 0–1.2)
BUN SERPL-MCNC: 20 MG/DL (ref 8–27)
BUN/CREAT SERPL: 14 (ref 12–28)
CALCIUM SERPL-MCNC: 9.6 MG/DL (ref 8.7–10.3)
CHLORIDE SERPL-SCNC: 94 MMOL/L (ref 96–106)
CO2 SERPL-SCNC: 26 MMOL/L (ref 18–29)
CREAT SERPL-MCNC: 1.43 MG/DL (ref 0.57–1)
ERYTHROCYTE [DISTWIDTH] IN BLOOD BY AUTOMATED COUNT: 14.5 % (ref 12.3–15.4)
GLOBULIN SER CALC-MCNC: 3 G/DL (ref 1.5–4.5)
GLUCOSE SERPL-MCNC: 260 MG/DL (ref 65–99)
HCT VFR BLD AUTO: 37.4 % (ref 34–46.6)
HGB BLD-MCNC: 12.1 G/DL (ref 11.1–15.9)
INTERPRETATION: NORMAL
MCH RBC QN AUTO: 29.7 PG (ref 26.6–33)
MCHC RBC AUTO-ENTMCNC: 32.4 G/DL (ref 31.5–35.7)
MCV RBC AUTO: 92 FL (ref 79–97)
PLATELET # BLD AUTO: 231 X10E3/UL (ref 150–379)
POTASSIUM SERPL-SCNC: 5 MMOL/L (ref 3.5–5.2)
PROT SERPL-MCNC: 7.2 G/DL (ref 6–8.5)
RBC # BLD AUTO: 4.07 X10E6/UL (ref 3.77–5.28)
SODIUM SERPL-SCNC: 133 MMOL/L (ref 134–144)
WBC # BLD AUTO: 5.6 X10E3/UL (ref 3.4–10.8)

## 2017-09-19 RX ORDER — SODIUM CHLORIDE 0.9 % (FLUSH) 0.9 %
5-10 SYRINGE (ML) INJECTION EVERY 8 HOURS
Status: CANCELLED | OUTPATIENT
Start: 2017-09-20

## 2017-09-19 RX ORDER — SODIUM CHLORIDE 0.9 % (FLUSH) 0.9 %
5-10 SYRINGE (ML) INJECTION AS NEEDED
Status: CANCELLED | OUTPATIENT
Start: 2017-09-20

## 2017-09-19 NOTE — PROGRESS NOTES
Pt set up for cardiac cath procedure on 9/20/17 at [facility]. Pt instructions given to patient via office verbal/written. Admission orders entered per VO of Dr. Tommy Soto. All forms faxed to [employer].

## 2017-09-20 ENCOUNTER — HOSPITAL ENCOUNTER (OUTPATIENT)
Dept: CARDIAC CATH/INVASIVE PROCEDURES | Age: 72
Setting detail: OBSERVATION
Discharge: HOME | End: 2017-09-21
Attending: INTERNAL MEDICINE | Admitting: INTERNAL MEDICINE
Payer: MEDICARE

## 2017-09-20 DIAGNOSIS — I25.118 CORONARY ARTERY DISEASE OF NATIVE HEART WITH STABLE ANGINA PECTORIS, UNSPECIFIED VESSEL OR LESION TYPE (HCC): ICD-10-CM

## 2017-09-20 DIAGNOSIS — R07.9 CHEST PAIN, UNSPECIFIED TYPE: ICD-10-CM

## 2017-09-20 DIAGNOSIS — I25.10 CORONARY ARTERY DISEASE INVOLVING NATIVE CORONARY ARTERY WITHOUT ANGINA PECTORIS: ICD-10-CM

## 2017-09-20 LAB
GLUCOSE BLD STRIP.AUTO-MCNC: 145 MG/DL (ref 65–100)
SERVICE CMNT-IMP: ABNORMAL

## 2017-09-20 PROCEDURE — 74011000250 HC RX REV CODE- 250: Performed by: INTERNAL MEDICINE

## 2017-09-20 PROCEDURE — 74011250636 HC RX REV CODE- 250/636: Performed by: SPECIALIST

## 2017-09-20 PROCEDURE — C1874 STENT, COATED/COV W/DEL SYS: HCPCS

## 2017-09-20 PROCEDURE — 74011250636 HC RX REV CODE- 250/636: Performed by: INTERNAL MEDICINE

## 2017-09-20 PROCEDURE — 99218 HC RM OBSERVATION: CPT

## 2017-09-20 PROCEDURE — C1769 GUIDE WIRE: HCPCS

## 2017-09-20 PROCEDURE — 77030029065 HC DRSG HEMO QCLOT ZMED -B

## 2017-09-20 PROCEDURE — 74011000258 HC RX REV CODE- 258: Performed by: INTERNAL MEDICINE

## 2017-09-20 PROCEDURE — 77030004532 HC CATH ANGI DX IMP BSC -A

## 2017-09-20 PROCEDURE — 99153 MOD SED SAME PHYS/QHP EA: CPT

## 2017-09-20 PROCEDURE — C1725 CATH, TRANSLUMIN NON-LASER: HCPCS

## 2017-09-20 PROCEDURE — C1894 INTRO/SHEATH, NON-LASER: HCPCS

## 2017-09-20 PROCEDURE — 93005 ELECTROCARDIOGRAM TRACING: CPT

## 2017-09-20 PROCEDURE — 74011250637 HC RX REV CODE- 250/637: Performed by: INTERNAL MEDICINE

## 2017-09-20 PROCEDURE — C1887 CATHETER, GUIDING: HCPCS

## 2017-09-20 PROCEDURE — 77030008543 HC TBNG MON PRSS MRTM -A

## 2017-09-20 PROCEDURE — 74011250637 HC RX REV CODE- 250/637: Performed by: SPECIALIST

## 2017-09-20 PROCEDURE — 74011636320 HC RX REV CODE- 636/320: Performed by: INTERNAL MEDICINE

## 2017-09-20 PROCEDURE — 77030013521 HC DEV INFL BLN BSC -B

## 2017-09-20 PROCEDURE — 82962 GLUCOSE BLOOD TEST: CPT

## 2017-09-20 RX ORDER — CLOPIDOGREL BISULFATE 75 MG/1
75 TABLET ORAL DAILY
Status: DISCONTINUED | OUTPATIENT
Start: 2017-09-21 | End: 2017-09-21 | Stop reason: HOSPADM

## 2017-09-20 RX ORDER — MIDAZOLAM HYDROCHLORIDE 1 MG/ML
.5-1 INJECTION, SOLUTION INTRAMUSCULAR; INTRAVENOUS
Status: DISCONTINUED | OUTPATIENT
Start: 2017-09-20 | End: 2017-09-20 | Stop reason: HOSPADM

## 2017-09-20 RX ORDER — SODIUM CHLORIDE 0.9 % (FLUSH) 0.9 %
5-10 SYRINGE (ML) INJECTION EVERY 8 HOURS
Status: DISCONTINUED | OUTPATIENT
Start: 2017-09-20 | End: 2017-09-20 | Stop reason: HOSPADM

## 2017-09-20 RX ORDER — ATROPINE SULFATE 0.1 MG/ML
.4-1 INJECTION INTRAVENOUS
Status: DISCONTINUED | OUTPATIENT
Start: 2017-09-20 | End: 2017-09-20 | Stop reason: HOSPADM

## 2017-09-20 RX ORDER — HEPARIN SODIUM 200 [USP'U]/100ML
500 INJECTION, SOLUTION INTRAVENOUS ONCE
Status: COMPLETED | OUTPATIENT
Start: 2017-09-20 | End: 2017-09-20

## 2017-09-20 RX ORDER — LISINOPRIL 20 MG/1
20 TABLET ORAL DAILY
Status: DISCONTINUED | OUTPATIENT
Start: 2017-09-21 | End: 2017-09-21 | Stop reason: HOSPADM

## 2017-09-20 RX ORDER — ASPIRIN 325 MG
325 TABLET ORAL DAILY
Status: DISCONTINUED | OUTPATIENT
Start: 2017-09-21 | End: 2017-09-21 | Stop reason: HOSPADM

## 2017-09-20 RX ORDER — SODIUM CHLORIDE 0.9 % (FLUSH) 0.9 %
5-10 SYRINGE (ML) INJECTION AS NEEDED
Status: DISCONTINUED | OUTPATIENT
Start: 2017-09-20 | End: 2017-09-21 | Stop reason: HOSPADM

## 2017-09-20 RX ORDER — LIDOCAINE HYDROCHLORIDE 10 MG/ML
1-20 INJECTION INFILTRATION; PERINEURAL
Status: DISCONTINUED | OUTPATIENT
Start: 2017-09-20 | End: 2017-09-20 | Stop reason: HOSPADM

## 2017-09-20 RX ORDER — SODIUM CHLORIDE 0.9 % (FLUSH) 0.9 %
5-10 SYRINGE (ML) INJECTION AS NEEDED
Status: DISCONTINUED | OUTPATIENT
Start: 2017-09-20 | End: 2017-09-20 | Stop reason: HOSPADM

## 2017-09-20 RX ORDER — METOPROLOL SUCCINATE 50 MG/1
100 TABLET, EXTENDED RELEASE ORAL DAILY
Status: DISCONTINUED | OUTPATIENT
Start: 2017-09-21 | End: 2017-09-21 | Stop reason: HOSPADM

## 2017-09-20 RX ORDER — NITROGLYCERIN 20 MG/100ML
5-200 INJECTION INTRAVENOUS
Status: DISCONTINUED | OUTPATIENT
Start: 2017-09-20 | End: 2017-09-21 | Stop reason: HOSPADM

## 2017-09-20 RX ORDER — HYDROCHLOROTHIAZIDE 25 MG/1
12.5 TABLET ORAL DAILY
Status: DISCONTINUED | OUTPATIENT
Start: 2017-09-21 | End: 2017-09-21 | Stop reason: HOSPADM

## 2017-09-20 RX ORDER — SODIUM CHLORIDE 0.9 % (FLUSH) 0.9 %
5-10 SYRINGE (ML) INJECTION EVERY 8 HOURS
Status: DISCONTINUED | OUTPATIENT
Start: 2017-09-20 | End: 2017-09-21 | Stop reason: HOSPADM

## 2017-09-20 RX ORDER — NITROGLYCERIN 0.4 MG/1
0.4 TABLET SUBLINGUAL
Status: DISCONTINUED | OUTPATIENT
Start: 2017-09-20 | End: 2017-09-21 | Stop reason: HOSPADM

## 2017-09-20 RX ORDER — CLOPIDOGREL BISULFATE 75 MG/1
600 TABLET ORAL ONCE
Status: COMPLETED | OUTPATIENT
Start: 2017-09-20 | End: 2017-09-20

## 2017-09-20 RX ORDER — FENTANYL CITRATE 50 UG/ML
25-200 INJECTION, SOLUTION INTRAMUSCULAR; INTRAVENOUS
Status: DISCONTINUED | OUTPATIENT
Start: 2017-09-20 | End: 2017-09-20 | Stop reason: HOSPADM

## 2017-09-20 RX ORDER — FENTANYL CITRATE 50 UG/ML
25-200 INJECTION, SOLUTION INTRAMUSCULAR; INTRAVENOUS
Status: DISCONTINUED | OUTPATIENT
Start: 2017-09-20 | End: 2017-09-20

## 2017-09-20 RX ORDER — PRAVASTATIN SODIUM 20 MG/1
40 TABLET ORAL
Status: DISCONTINUED | OUTPATIENT
Start: 2017-09-20 | End: 2017-09-21

## 2017-09-20 RX ORDER — HEPARIN SODIUM 200 [USP'U]/100ML
500 INJECTION, SOLUTION INTRAVENOUS
Status: DISCONTINUED | OUTPATIENT
Start: 2017-09-20 | End: 2017-09-20 | Stop reason: HOSPADM

## 2017-09-20 RX ORDER — SODIUM CHLORIDE 9 MG/ML
75 INJECTION, SOLUTION INTRAVENOUS CONTINUOUS
Status: DISPENSED | OUTPATIENT
Start: 2017-09-20 | End: 2017-09-21

## 2017-09-20 RX ORDER — HYDROCORTISONE SODIUM SUCCINATE 100 MG/2ML
100 INJECTION, POWDER, FOR SOLUTION INTRAMUSCULAR; INTRAVENOUS
Status: DISCONTINUED | OUTPATIENT
Start: 2017-09-20 | End: 2017-09-21 | Stop reason: HOSPADM

## 2017-09-20 RX ADMIN — Medication 10 ML: at 21:38

## 2017-09-20 RX ADMIN — MIDAZOLAM HYDROCHLORIDE 1 MG: 1 INJECTION, SOLUTION INTRAMUSCULAR; INTRAVENOUS at 14:30

## 2017-09-20 RX ADMIN — SODIUM CHLORIDE 75 ML/HR: 900 INJECTION, SOLUTION INTRAVENOUS at 18:00

## 2017-09-20 RX ADMIN — MIDAZOLAM HYDROCHLORIDE 1 MG: 1 INJECTION, SOLUTION INTRAMUSCULAR; INTRAVENOUS at 15:19

## 2017-09-20 RX ADMIN — HEPARIN SODIUM IN SODIUM CHLORIDE 1000 UNITS: 200 INJECTION INTRAVENOUS at 14:18

## 2017-09-20 RX ADMIN — LIDOCAINE HYDROCHLORIDE 20 ML: 10 INJECTION, SOLUTION INFILTRATION; PERINEURAL at 14:19

## 2017-09-20 RX ADMIN — MIDAZOLAM HYDROCHLORIDE 1 MG: 1 INJECTION, SOLUTION INTRAMUSCULAR; INTRAVENOUS at 15:18

## 2017-09-20 RX ADMIN — BIVALIRUDIN 1.75 MG/KG/HR: 250 INJECTION, POWDER, LYOPHILIZED, FOR SOLUTION INTRAVENOUS at 14:51

## 2017-09-20 RX ADMIN — MIDAZOLAM HYDROCHLORIDE 2 MG: 1 INJECTION, SOLUTION INTRAMUSCULAR; INTRAVENOUS at 14:19

## 2017-09-20 RX ADMIN — FENTANYL CITRATE 25 MCG: 50 INJECTION, SOLUTION INTRAMUSCULAR; INTRAVENOUS at 14:30

## 2017-09-20 RX ADMIN — MIDAZOLAM HYDROCHLORIDE 1 MG: 1 INJECTION, SOLUTION INTRAMUSCULAR; INTRAVENOUS at 14:28

## 2017-09-20 RX ADMIN — HEPARIN SODIUM IN SODIUM CHLORIDE 1000 UNITS: 200 INJECTION INTRAVENOUS at 15:33

## 2017-09-20 RX ADMIN — FENTANYL CITRATE 25 MCG: 50 INJECTION, SOLUTION INTRAMUSCULAR; INTRAVENOUS at 14:21

## 2017-09-20 RX ADMIN — IOPAMIDOL 300 ML: 755 INJECTION, SOLUTION INTRAVENOUS at 15:37

## 2017-09-20 RX ADMIN — SODIUM CHLORIDE 75 ML/HR: 900 INJECTION, SOLUTION INTRAVENOUS at 17:02

## 2017-09-20 RX ADMIN — MIDAZOLAM HYDROCHLORIDE 1 MG: 1 INJECTION, SOLUTION INTRAMUSCULAR; INTRAVENOUS at 14:51

## 2017-09-20 RX ADMIN — CLOPIDOGREL 600 MG: 75 TABLET, FILM COATED ORAL at 15:28

## 2017-09-20 RX ADMIN — FENTANYL CITRATE 25 MCG: 50 INJECTION, SOLUTION INTRAMUSCULAR; INTRAVENOUS at 15:13

## 2017-09-20 RX ADMIN — IOPAMIDOL 50 ML: 755 INJECTION, SOLUTION INTRAVENOUS at 14:50

## 2017-09-20 RX ADMIN — FENTANYL CITRATE 25 MCG: 50 INJECTION, SOLUTION INTRAMUSCULAR; INTRAVENOUS at 14:19

## 2017-09-20 RX ADMIN — BIVALIRUDIN 1.75 MG/KG/HR: 250 INJECTION, POWDER, LYOPHILIZED, FOR SOLUTION INTRAVENOUS at 15:20

## 2017-09-20 RX ADMIN — NITROGLYCERIN 30 MCG/MIN: 20 INJECTION INTRAVENOUS at 15:13

## 2017-09-20 NOTE — PROGRESS NOTES
TRANSFER - IN REPORT:    Verbal report received from Cath lab RN(name) on Moises Diana  being received from Cath ab for routine progression of care      Report consisted of patients Situation, Background, Assessment and   Recommendations(SBAR). Information from the following report(s) SBAR, Kardex, Procedure Summary and Cardiac Rhythm SR was reviewed with the receiving nurse. Opportunity for questions and clarification was provided. Assessment completed upon patients arrival to unit and care assumed. 1600:Pt received placed on ICU monitor,  Pt  with angiomax infusing,   Sheath to right groin with pressure bag attached site is non tender no brusing or hematoma, plan to remove sheath 2 hours after angiomax is complete per MD order,  Nitroglycerin drip infusing at 60 mcg/min.   1700: anxiomax complete,  Will remove sheath in 2 hour  1900: report given to Wandy  1935: sheath removed by RN Wanda Jackson manual pressure held for 20 min, pulses and vital signs checked every 5 min please see flowsheets

## 2017-09-20 NOTE — LETTER
To Whom it may concern: 
 
Jose Roberto Fernandez has been under the care of Dr Josep Brown for a cardiac condition from 9/20/2017 Zully Sanchez Jose Roberto Fernandez may  return to work on October 2, 2017, as scheduled,  with no restrictions. Please feel free to call me if you have any questions. Thank you, Shahnaz Melvin RN, ACNP-BC, [facility] Josep Brown MD, [place] [address] [facility] [address], [address] 
 [phone]

## 2017-09-20 NOTE — PROGRESS NOTES
12:19 PM  Patient arrived. ID and allergies verified verbally with patient. Pt voices understanding of procedure to be performed. Consent obtained. Pt prepped for procedure. Patient and family oriented to fall prevention protocol. Understanding verbalized. Yellow band and socks applied    TRANSFER - OUT REPORT:    Verbal report given to Annette(name) on Chad Cost  being transferred to cath lab(unit) for ordered procedure       Report consisted of patients Situation, Background, Assessment and   Recommendations(SBAR). Information from the following report(s) SBAR was reviewed with the receiving nurse. Lines:   Peripheral IV 09/20/17 Right Hand (Active)        Opportunity for questions and clarification was provided.       Patient transported with:   Registered Nurse

## 2017-09-20 NOTE — IP AVS SNAPSHOT
[address] [address] 
[phone] Patient: Christi Morin MRN: [id number] You are allergic to the following Allergen Reactions Pravachol (Pravastatin) Myalgia Tetanus Vaccines And Toxoid Swelling Recent Documentation Height Weight Breastfeeding? BMI OB Status Smoking Status 1.702 m 102.7 kg No 35.44 kg/m2 Postmenopausal Former Smoker Unresulted Labs Order Current Status LIPID PANEL In process Emergency Contacts Name Discharge Info Relation Home Work Mobile Dior Hickey DISCHARGE CAREGIVER [3] Child [2] [phone] About your hospitalization You were admitted on:  2017 You last received care in the:  [facility] You were discharged on:  2017 Unit phone number:  [phone] Why you were hospitalized Your primary diagnosis was:  Not on File Your diagnoses also included:  Cad (Coronary Artery Disease), Stable Angina (Hcc), Htn (Hypertension) Providers Seen During Your Hospitalizations Provider Role Specialty Primary office phone Marina Victor MD Attending Provider Cardiology [phone] Your Primary Care Physician (PCP) Primary Care Physician Office Phone Office Fax Cindy Rios [phone] Follow-up Information Follow up With Details Comments Contact Info Marina Victor MD On 2017 4 PM  [address] [place] [address] [facility] 
[phone] Juan A Sweeney MD   [address] [place] [address] 1007 [facility] 
[phone] Your Appointments 2017  4:00 PM EDT HOSPITAL DISCHARGE with Marina Victor MD  
[facility] (Jules Sandhoff) [address] [facility] [place] 1007 [facility]  
[phone] Current Discharge Medication List  
  
START taking these medications Dose & Instructions Dispensing Information Comments Morning Noon Evening Bedtime  
 clopidogrel 75 mg Tab Commonly known as:  PLAVIX Your last dose was: Your next dose is:    
   
   
 Dose:  75 mg Take 1 Tab by mouth daily. Dr Renee Camejo to provide refills  Indications: Thrombosis Prevention after PCI Quantity:  30 Tab Refills:  3  
     
   
   
   
  
 nitroglycerin 0.4 mg SL tablet Commonly known as:  NITROSTAT Your last dose was: Your next dose is:    
   
   
 Dose:  0.4 mg  
1 Tab by SubLINGual route every five (5) minutes as needed for Chest Pain. Dr Renee Camejo to provide refills  Indications: ANGINA Quantity:  1 Bottle Refills:  3  
     
   
   
   
  
 rosuvastatin 5 mg tablet Commonly known as:  CRESTOR Your last dose was: Your next dose is:    
   
   
 Dose:  5 mg Take 1 Tab by mouth nightly. Dr Renee Camejo to provide refills  Indications: atherosclerotic cardiovascular disease, mixed hyperlipidemia Quantity:  30 Tab Refills:  1 CONTINUE these medications which have CHANGED Dose & Instructions Dispensing Information Comments Morning Noon Evening Bedtime  
 aspirin 81 mg chewable tablet What changed:  how much to take Your last dose was: Your next dose is:    
   
   
 Dose:  162 mg Take 2 Tabs by mouth daily. Quantity:  100 Tab Refills:  3  
     
   
   
   
  
 glucose blood VI test strips strip Commonly known as:  [address] What changed:  additional instructions Your last dose was: Your next dose is:    
   
   
 Use 3 times per day (fasting and before meals). Quantity:  3 Package Refills:  11 CONTINUE these medications which have NOT CHANGED Dose & Instructions Dispensing Information Comments Morning Noon Evening Bedtime CO Q-10 100 mg capsule Generic drug:  co-enzyme Q-10 Your last dose was: Your next dose is:    
   
   
 Dose:  100 mg Take 1 Cap by mouth daily. Refills:  0  
     
   
   
   
  
 insulin glargine 100 unit/mL (3 mL) Inpn Commonly known as:  Girma Regulus Your last dose was: Your next dose is:    
   
   
 52 units at night. Quantity:  4 Each Refills:  3 Lancets Misc Commonly known as:  ONETOUCH ULTRASOFT LANCETS Your last dose was: Your next dose is:    
   
   
 Dose:  1 Package 1 Package by Does Not Apply route. Test blood glucose 3-4 time daily Quantity:  1 Package Refills:  11  
     
   
   
   
  
 lisinopril-hydroCHLOROthiazide 20-12.5 mg per tablet Commonly known as:  [place] Your last dose was: Your next dose is:    
   
   
 Dose:  1 Tab Take 1 Tab by mouth daily. Quantity:  90 Tab Refills:  3  
     
   
   
   
  
 metoprolol succinate 100 mg tablet Commonly known as:  TOPROL XL Your last dose was: Your next dose is:    
   
   
 Dose:  100 mg Take 1 Tab by mouth daily. Quantity:  90 Tab Refills:  4 STOP taking these medications   
 pravastatin 40 mg tablet Commonly known as:  PRAVACHOL Where to Get Your Medications Information on where to get these meds will be given to you by the nurse or doctor. ! Ask your nurse or doctor about these medications  
  clopidogrel 75 mg Tab  
 nitroglycerin 0.4 mg SL tablet  
 rosuvastatin 5 mg tablet Discharge Instructions [facility] Office: [address] 
    [phone] [facility] office: [facility]  
                           [phone] Patient Discharge Instructions Cynthia Limon / [id number] : 1945 Admitted 9/20/2017 Discharged: 9/20/2017 Cardiologist: Dr Collin Glass      PCP:  Eamon Ornelas MD 
  
Diagnosis: coronary artery disease Procedures/Test: CATH: chronic blockage right coronary artery with collateral circulation, blockage on the left anterior descending 2 drug stent applied, 50% blockage to the left circumflex Lab Results Component Value Date/Time Hemoglobin A1c 9.0 09/01/2016 11:03 AM  
Hemoglobin A1c 11.9 08/19/2013 09:30 AM  
Hemoglobin A1c 11.3 04/30/2013 11:05 AM  
Glucose 152 09/21/2017 05:25 AM  
Glucose (POC) 254 09/21/2017 12:57 AM  
Microalbumin/Creat ratio (mg/g creat) 2 03/04/2010 10:53 AM  
Microalb/Creat ratio (ug/mg creat.) 6.0 01/17/2013 03:25 PM  
Microalbumin,urine random 0.58 03/04/2010 10:53 AM  
LDL,Direct 158 01/17/2013 03:25 PM  
LDL, calculated 145 09/01/2016 11:03 AM  
Creatinine 1.55 09/21/2017 05:25 AM  
  
Lab Results Component Value Date/Time Cholesterol, total 251 09/01/2016 11:03 AM  
HDL Cholesterol 81 09/01/2016 11:03 AM  
LDL,Direct 158 01/17/2013 03:25 PM  
LDL, calculated 145 09/01/2016 11:03 AM  
LDL-C, External 162 07/29/2015 Triglyceride 126 09/01/2016 11:03 AM  
CHOL/HDL Ratio 2.5 09/08/2010 11:05 AM  
 
Lab Results Component Value Date/Time ALT (SGPT) 18 09/21/2017 05:25 AM  
AST (SGOT) 21 09/21/2017 05:25 AM  
Alk. phosphatase 77 09/21/2017 05:25 AM  
Bilirubin, total 0.4 09/21/2017 05:25 AM  
Albumin 3.2 09/21/2017 05:25 AM  
Protein, total 6.9 09/21/2017 05:25 AM  
PLATELET 849 46/20/4035 12:03 PM  
 
 
 
· It is important that you take the medication exactly as they are prescribed. · Keep your medication in the bottles provided by the pharmacist and keep a list of the medication names, dosages, and times to be taken in your wallet. · Do not take other medications without consulting your doctor. BRING ALL of YOUR MEDICINES TO YOUR OFFICE VISIT with   
 
Cardiac Catheterization  Discharge Instructions *Check the puncture site frequently for swelling or bleeding. If you see any bleeding, lie down and apply pressure over the area with a clean town or washcloth. Notify your doctor for any redness, swelling, drainage or oozing from the puncture site. Notify your doctor for any fever or chills. *If the leg or arm with the puncture becomes cold, numb or painful, call Dr Adri Moore *Activity should be limited for the next 48 hours. Climb stairs as little as possible and avoid any stooping, bending or strenuous activity for 48 hours. No strenuous activity or heavy lifting over 10 lbs. for 7 days. · You may take a shower 24 hours after your cardiac catheterization. Be sure to get the dressing wet and then remove it; gently wash the area with warm soapy water. Pat dry and leave open to air. To help prevent infections, be sure to keep the cath site clean and dry. No lotions, creams, powders, ointments, etc. in the cath site for approximately 1 week. · Do not take a tub bath, get in a hot tub or swimming pool for approximately 5 days or until the cath site is completely healed. · Drink plenty of fluids for 24-48 hours after your cath to flush the contrast dye from your kidneys. No alcoholic beverages for 24 hours. You may resume your previous diet (low fat, low cholesterol) after your cath. · Do not drive or operate heavy machinery for 48 hours. · You may resume your usual diet. Drink more fluids than usual. 
· Have a responsible person drive you home and stay with you for at least 24 hours after your heart catheterization/angiography. · *After your cath, some bruising or discomfort is common during the healing process. Tylenol, 1-2 tablets every 6 hours as needed, is recommended if you experience any discomfort.   If you experience any signs or symptoms of infection such as fever, chills, or poorly healing incision, persistent tenderness or swelling in the groin, redness and/or warmth to the touch, numbness, significant tingling or pain at the groin site or affected extremity, rash, drainage from the cath site, or if the leg feels tight or swollen, call your physician right away. ? If bleeding at the cath site occurs, take a clean gauze pad and apply direct pressure to the groin just above the puncture site. Call 911 immediately, and continue to apply direct pressure until an ambulance gets to your location. ? You may return to work  2  days after your cardiac cath if no groin bleeding. Activity: Resume normal activity letting fatigue be the guide. Do not lift over 10 pounds for 7 days. Diet: American Heart association, low fat, 1500 mg sodium  Diabetic. Call for or return to ER for recurrent or prolonged chest pain, shortness of breath, lightheadedness, dizzy, palpitations, passing out, swelling in the legs or abdomen, pain or swelling  At the cath site. Lifestyle changes IF you smoke, Stop smoking go to : PrimeLetters.ca. aspx for text reminders Consider a Vegan diet- read Reversing and Preventing Heart Disease by Dr. Brandie Rivas. Watch [place] over NIKE a heart-healthy diet that is low in cholesterol, saturated fat, and salt, and is full of fruits, vegetables and whole-grains. Eat at least two servings of fish each week. You may get more details about how to eat healthy, but these tips can help you get started. Www.aha.com When should you call for help? Call 911 anytime you think you may need emergency care. For example, call if: 
You have signs of a heart attack. These may include: 
Chest pain or pressure. Sweating. Shortness of breath. Nausea or vomiting. Pain that spreads from the chest to the neck, jaw, or one or both shoulders or arms. Dizziness or lightheadedness. A fast or irregular pulse. After calling 911, chew 1 adult-strength aspirin. Wait for an ambulance. Do not try to drive yourself. You passed out (lost consciousness). You feel like you are having another heart attack. Call your doctor now or seek immediate medical care if: 
You have had any chest pain, even if it has gone away. You have new or increased shortness of breath. You are dizzy or lightheaded, or you feel like you may faint. Watch closely for changes in your health, and be sure to contact your doctor if you have any problem Information obtained by : 
I understand that if any problems occur once I am at home I am to contact my physician. I understand and acknowledge receipt of the instructions indicated above. R.N.'s Signature                                                                  Date/Time Patient or Representative Signature                                                          Date/Time Discharge Instructions Attachments/References MEFS - CLOPIDOGREL (PLAVIX) - (BY MOUTH) (ENGLISH) COENZYME Q10 (BY MOUTH) (ENGLISH) Discharge Orders None ACO Transitions of Care Introducing [facility] [address] Timi offers a voluntary care coordination program to provide high quality service and care to [facility] fee-for-service beneficiaries. Merry Bumpers was designed to help you enhance your health and well-being through the following services: ? Transitions of Care  support for individuals who are transitioning from one care setting to another (example: Hospital to home). ? Chronic and Complex Care Coordination  support for individuals and caregivers of those with serious or chronic illnesses or with more than one chronic (ongoing) condition and those who take a number of different medications. If you meet specific medical criteria, a [facility] [address] may call you directly to coordinate your care with your primary care physician and your other care providers. For questions about the [facility] programs, please, contact your physicians office. For general questions or additional information about Accountable Care Organizations: 
Please visit www.medicare.gov/acos. html or call 1-800-MEDICARE (6-638.580.4971) TTY users should call 9-360.751.8984. GreatCall Announcement We are excited to announce that we are making your provider's discharge notes available to you in GreatCall. You will see these notes when they are completed and signed by the physician that discharged you from your recent hospital stay. If you have any questions or concerns about any information you see in GreatCall, please call the Health Information Department where you were seen or reach out to your Primary Care Provider for more information about your plan of care. Introducing [facility]! Cesar Whitt introduces GreatCall patient portal. Now you can access parts of your medical record, email your doctor's office, and request medication refills online. 1. In your internet browser, go to [email or web address] 2. Click on the First Time User? Click Here link in the Sign In box. You will see the New Member Sign Up page. 3. Enter your GreatCall Access Code exactly as it appears below. You will not need to use this code after youve completed the sign-up process. If you do not sign up before the expiration date, you must request a new code. · GreatCall Access Code: [email or web address] Expires: 11/30/2017  1:38 PM 
 
 4. Enter the last four digits of your Social Security Number (xxxx) and Date of Birth (mm/dd/yyyy) as indicated and click Submit. You will be taken to the next sign-up page. 5. Create a [employer] ID. This will be your [employer] login ID and cannot be changed, so think of one that is secure and easy to remember. 6. Create a [employer] password. You can change your password at any time. 7. Enter your Password Reset Question and Answer. This can be used at a later time if you forget your password. 8. Enter your e-mail address. You will receive e-mail notification when new information is available in [address] 9. Click Sign Up. You can now view and download portions of your medical record. 10. Click the Download Summary menu link to download a portable copy of your medical information. If you have questions, please visit the Frequently Asked Questions section of the [employer] website. Remember, [employer] is NOT to be used for urgent needs. For medical emergencies, dial 911. Now available from your iPhone and Android! General Information Please provide this summary of care documentation to your next provider. Patient Signature:  ____________________________________________________________ Date:  ____________________________________________________________  
  
Loraine Finger Provider Signature:  ____________________________________________________________ Date:  ____________________________________________________________ More Information Clopidogrel (Plavix) - (By mouth) Why this medicine is used:  
Helps prevent stroke, heart attack, and other heart problems. Contact a nurse or doctor right away if you have: 
· Sudden or severe headache · Bloody vomit or vomit that looks like coffee grounds; bloody or black, tarry stools · Bleeding that does not stop or bruises that do not heal 
· Dark urine or pale stools, nausea, loss of appetite, stomach pain, 
 · Yellow skin or eyes Common side effects: · Minor bleeding or bruising © 2017 [address] Information is for End User's use only and may not be sold, redistributed or otherwise used for commercial purposes. Coenzyme Q10 (By mouth) Coenzyme Q10 (KO-en-zyme Q10) Used as a dietary supplement. Brand Name(s): Good Neighbor Coenzyme Q-10, [facility] Co Q10, Leader Co Q10, Divya Co Q-10, Luis Natural CO Q-10, Nature's Blend Coenzyme Q10, Nature's Blend HiSorb Co-Enzyme Q10, PharmAssure Coenzyme Q-10, PharmAssure Premium Coenzyme Q-10, [employer] Coenzyme Q-10, Sunmark Coenzyme Q-10 There may be other brand names for this medicine. When This Medicine Should Not Be Used: This medicine is not right for everyone. Do not use it if you had an allergic reaction to coenzyme Q10. How to Use This Medicine:  
Chewable Tablet, Capsule · If you are using this medicine without a doctor's order, follow the instructions on the medicine label. · Missed dose: Try not to miss any doses. However, this is a dietary supplement, so do not worry if you miss 1 or 2 doses. · Store the medicine in a closed container at room temperature, away from heat, moisture, and direct light. Drugs and Foods to Avoid: Ask your doctor or pharmacist before using any other medicine, including over-the-counter medicines, vitamins, and herbal products. Warnings While Using This Medicine: · Tell your doctor if you are pregnant or breastfeeding. · Keep all medicine out of the reach of children. Never share your medicine with anyone. Possible Side Effects While Using This Medicine:  
Call your doctor right away if you notice any of these side effects: · Allergic reaction: Itching or hives, swelling in your face or hands, swelling or tingling in your mouth or throat, chest tightness, trouble breathing If you notice other side effects that you think are caused by this medicine, tell your doctor. Call your doctor for medical advice about side effects. You may report side effects to FDA at 7-195-JZI-8010 © 2017 [facility] Information is for End User's use only and may not be sold, redistributed or otherwise used for commercial purposes. The above information is an  only. It is not intended as medical advice for individual conditions or treatments. Talk to your doctor, nurse or pharmacist before following any medical regimen to see if it is safe and effective for you.

## 2017-09-20 NOTE — IP AVS SNAPSHOT
[address] [address] 
[phone] Patient: Cynthia Fraction MRN: [id number] [facility]:[birth year] Current Discharge Medication List  
  
START taking these medications Dose & Instructions Dispensing Information Comments Morning Noon Evening Bedtime  
 clopidogrel 75 mg Tab Commonly known as:  PLAVIX Your last dose was: Your next dose is:    
   
   
 Dose:  75 mg Take 1 Tab by mouth daily. Dr Laury Grijalva to provide refills  Indications: Thrombosis Prevention after PCI Quantity:  30 Tab Refills:  3  
     
   
   
   
  
 nitroglycerin 0.4 mg SL tablet Commonly known as:  NITROSTAT Your last dose was: Your next dose is:    
   
   
 Dose:  0.4 mg  
1 Tab by SubLINGual route every five (5) minutes as needed for Chest Pain. Dr Laury Grijalva to provide refills  Indications: ANGINA Quantity:  1 Bottle Refills:  3  
     
   
   
   
  
 rosuvastatin 5 mg tablet Commonly known as:  CRESTOR Your last dose was: Your next dose is:    
   
   
 Dose:  5 mg Take 1 Tab by mouth nightly. Dr Laury Grijalva to provide refills  Indications: atherosclerotic cardiovascular disease, mixed hyperlipidemia Quantity:  30 Tab Refills:  1 CONTINUE these medications which have CHANGED Dose & Instructions Dispensing Information Comments Morning Noon Evening Bedtime  
 aspirin 81 mg chewable tablet What changed:  how much to take Your last dose was: Your next dose is:    
   
   
 Dose:  162 mg Take 2 Tabs by mouth daily. Quantity:  100 Tab Refills:  3  
     
   
   
   
  
 glucose blood VI test strips strip Commonly known as:  [address] What changed:  additional instructions Your last dose was: Your next dose is:    
   
   
 Use 3 times per day (fasting and before meals). Quantity:  3 Package Refills:  11 CONTINUE these medications which have NOT CHANGED Dose & Instructions Dispensing Information Comments Morning Noon Evening Bedtime CO Q-10 100 mg capsule Generic drug:  co-enzyme Q-10 Your last dose was: Your next dose is:    
   
   
 Dose:  100 mg Take 1 Cap by mouth daily. Refills:  0  
     
   
   
   
  
 insulin glargine 100 unit/mL (3 mL) Inpn Commonly known as:  Tomma Kashif Your last dose was: Your next dose is:    
   
   
 52 units at night. Quantity:  4 Each Refills:  3 Lancets Misc Commonly known as:  ONETOUCH ULTRASOFT LANCETS Your last dose was: Your next dose is:    
   
   
 Dose:  1 Package 1 Package by Does Not Apply route. Test blood glucose 3-4 time daily Quantity:  1 Package Refills:  11  
     
   
   
   
  
 lisinopril-hydroCHLOROthiazide 20-12.5 mg per tablet Commonly known as:  [place]iva [place] Your last dose was: Your next dose is:    
   
   
 Dose:  1 Tab Take 1 Tab by mouth daily. Quantity:  90 Tab Refills:  3  
     
   
   
   
  
 metoprolol succinate 100 mg tablet Commonly known as:  TOPROL XL Your last dose was: Your next dose is:    
   
   
 Dose:  100 mg Take 1 Tab by mouth daily. Quantity:  90 Tab Refills:  4 STOP taking these medications   
 pravastatin 40 mg tablet Commonly known as:  PRAVACHOL Where to Get Your Medications Information on where to get these meds will be given to you by the nurse or doctor. ! Ask your nurse or doctor about these medications  
  clopidogrel 75 mg Tab  
 nitroglycerin 0.4 mg SL tablet  
 rosuvastatin 5 mg tablet

## 2017-09-20 NOTE — PROCEDURES
PCI:  Unsuccessful PCI apical LAD:     Attempted POBA with various balloons without success. Successful PCI mLAD:     3.0x15 Resolute (ELE)     2.75x14 Resolute (ELE) overlapping. RFA manual    ASA/plavix, statin.   Further BP Rx.

## 2017-09-20 NOTE — H&P (VIEW-ONLY)
Reason for Consult: Chest pain. Referring: Rachel Curtis MD    HPI: Daisy Ruiz is a 67 y.o. female with past medical history significant for CAD, MI, stent, hypertension, diabetes, obstructive sleep apnea. She is being referred here for symptoms of chest pain. The symptoms started a few days ago and have been mostly exertional.  On physical activities she will experience aching retrosternal chest pain nonradiating. No associated shortness of breath or fatigue. The pain improves upon resting. There is no symptoms of lightheadedness, dizziness, presyncope or syncope. EKG performed in the office demonstrated normal sinus rhythm normal EKG without ST-T changes. No prior records are available. 20 years ago the cardiac cath was done in [place] and since then patient has moved twice. Plan:    1. Stable angina/CAD: Symptoms are typical for angina. She is currently taking aspirin and beta-blocker which we will continue. Check a exercise stress nuclear to rule out significance of CAD. She also need a cardiac catheterization for further evaluation and treatment. Continue statins. May need to change Pravachol to Crestor however she has history of myalgias. Repeat a fasting lipid profile along with CBC, and CMP which will be done prior to cardiac catheterization. 2.  Hypertension: Blood pressure is controlled. Continue current medications. 3.  Dyslipidemia: LDL levels are high (2016 readings). Recheck fasting lipid profile. May need to change to a better statin. 4.  Preventive cardiovascular workup: We will check a bilateral carotid ultrasound as well as ABIs.         Past Medical History:   Diagnosis Date    Chronic back pain 9/8/2010    DM type 2 (diabetes mellitus, type 2) ([facility] 5/3/2010    HTN (hypertension) 5/3/2010    MI (myocardial infarction) ([facility] [address] 5/3/2010    Obstructive sleep apnea (adult) (pediatric) 12/12/2014            Past Surgical History:   Procedure Laterality Date  HX CORONARY STENT PLACEMENT      HX ORTHOPAEDIC      disc sx    HX TUBAL LIGATION               Family History   Problem Relation Age of Onset    Hypertension Mother       60yo    Diabetes Sister     Breast Cancer Sister     Diabetes Sister     Diabetes Sister     Diabetes Sister            Social History     Social History    Marital status: SINGLE     Spouse name: N/A    Number of children: N/A    Years of education: N/A     Occupational History    Not on file. Social History Main Topics    Smoking status: Former Smoker     Quit date: 2004    Smokeless tobacco: Never Used    Alcohol use No    Drug use: No    Sexual activity: No     Other Topics Concern    Not on file     Social History Narrative         Allergies   Allergen Reactions    Tetanus Vaccines And Toxoid Swelling            Current Outpatient Prescriptions   Medication Sig Dispense Refill    metoprolol succinate (TOPROL XL) 100 mg tablet Take 1 Tab by mouth daily. 90 Tab 4    lisinopril-hydroCHLOROthiazide (PRINZIDE, ZESTORETIC) 20-12.5 mg per tablet Take 1 Tab by mouth daily. 90 Tab 3    aspirin 81 mg chewable tablet Take 1 Tab by mouth daily. 100 Tab 3    insulin glargine (LANTUS SOLOSTAR) 100 unit/mL (3 mL) pen 52 units at night. 4 Each 3    glucose blood VI test strips (Wavesat BLOOD GLUCOSE SYSTEM) strip Use 3 times per day (fasting and before meals). (Patient taking differently: Once a day) 3 Package 11    Lancets (ONE TOUCH ULTRASOFT LANCETS) Misc 1 Package by Does Not Apply route. Test blood glucose 3-4 time daily 1 Package 11    pravastatin (PRAVACHOL) 40 mg tablet Take 1 Tab by mouth nightly. 90 Tab 3        ROS:  12 point review of systems was performed.  All negative except for HPI     Physical Exam:  Visit Vitals    /82 (BP 1 Location: Left arm, BP Patient Position: Sitting)    Pulse 78    Resp 16    Ht 5' 7\" (1.702 m)    Wt 225 lb 9.6 oz (102.3 kg)    SpO2 97%    BMI 35.33 kg/m2 Gen:  Well-developed, well-nourished, in no acute distress  HEENT:  Pink conjunctivae, PERRL, hearing intact to voice, moist mucous membranes  Neck:  Supple, without masses, thyroid non-tender  Resp:  No accessory muscle use, clear breath sounds without wheezes rales or rhonchi  Card:  No murmurs, normal S1, S2 without thrills, bruits or peripheral edema  Abd:  Soft, non-tender, non-distended, normoactive bowel sounds are present, no palpable organomegaly and no detectable hernias  Lymph:  No cervical or inguinal adenopathy  Musc:  No cyanosis or clubbing  Skin:  No rashes or ulcers, skin turgor is good  Neuro:  Cranial nerves are grossly intact, no focal motor weakness, follows commands appropriately  Psych:  Good insight, oriented to person, place and time, alert     Labs:     Lab Results   Component Value Date/Time    WBC 6.0 03/30/2015 12:05 PM    HGB 12.2 03/30/2015 12:05 PM    HCT 36.2 03/30/2015 12:05 PM    PLATELET 671 65/53/7543 12:05 PM    MCV 89 03/30/2015 12:05 PM     Lab Results   Component Value Date/Time    Hemoglobin A1c 9.0 09/01/2016 11:03 AM    Hemoglobin A1c 11.9 08/19/2013 09:30 AM    Hemoglobin A1c 11.3 04/30/2013 11:05 AM    Glucose 92 03/30/2015 12:05 PM    Microalbumin/Creat ratio (mg/g creat) 2 03/04/2010 10:53 AM    Microalb/Creat ratio (ug/mg creat.) 6.0 01/17/2013 03:25 PM    Microalbumin,urine random 0.58 03/04/2010 10:53 AM    LDL,Direct 158 01/17/2013 03:25 PM    LDL, calculated 145 09/01/2016 11:03 AM    Creatinine 1.24 03/30/2015 12:05 PM      Lab Results   Component Value Date/Time    Cholesterol, total 251 09/01/2016 11:03 AM    HDL Cholesterol 81 09/01/2016 11:03 AM    LDL,Direct 158 01/17/2013 03:25 PM    LDL, calculated 145 09/01/2016 11:03 AM    LDL-C, External 162 07/29/2015    Triglyceride 126 09/01/2016 11:03 AM    CHOL/HDL Ratio 2.5 09/08/2010 11:05 AM     Lab Results   Component Value Date/Time    ALT (SGPT) 9 03/30/2015 12:05 PM    AST (SGOT) 15 03/30/2015 12:05 PM Alk. phosphatase 69 03/30/2015 12:05 PM    Bilirubin, total 0.4 03/30/2015 12:05 PM    Albumin 4.2 03/30/2015 12:05 PM    Protein, total 6.7 03/30/2015 12:05 PM    PLATELET 138 68/83/5026 12:05 PM     No results found for: INR, PTMR, PTP, PT1, PT2   Lab Results   Component Value Date/Time    GFR est non-AA 44 03/30/2015 12:05 PM    GFR est AA 51 03/30/2015 12:05 PM    Creatinine 1.24 03/30/2015 12:05 PM    BUN 31 03/30/2015 12:05 PM    Sodium 140 03/30/2015 12:05 PM    Potassium 4.8 03/30/2015 12:05 PM    Chloride 102 03/30/2015 12:05 PM    CO2 23 03/30/2015 12:05 PM     No results found for: PSA, Milinda Viveros, PSAR3, [id number], [id number], PSALT  Lab Results   Component Value Date/Time    TSH 1.670 08/19/2013 09:30 AM      Lab Results   Component Value Date/Time    Glucose 92 03/30/2015 12:05 PM      No results found for: CPK, RCK1, RCK2, RCK3, RCK4, CKMB, CKNDX, CKND1, TROPT, TROIQ, BNPP, BNP   No results found for: BNP, BNPP, BNPPPOC, XBNPT, BNPNT   Lab Results   Component Value Date/Time    Sodium 140 03/30/2015 12:05 PM    Potassium 4.8 03/30/2015 12:05 PM    Chloride 102 03/30/2015 12:05 PM    CO2 23 03/30/2015 12:05 PM    Anion gap 9 09/08/2010 11:05 AM    Glucose 92 03/30/2015 12:05 PM    BUN 31 03/30/2015 12:05 PM    Creatinine 1.24 03/30/2015 12:05 PM    BUN/Creatinine ratio 25 03/30/2015 12:05 PM    GFR est AA 51 03/30/2015 12:05 PM    GFR est non-AA 44 03/30/2015 12:05 PM    Calcium 9.6 03/30/2015 12:05 PM      Lab Results   Component Value Date/Time    Sodium 140 03/30/2015 12:05 PM    Potassium 4.8 03/30/2015 12:05 PM    Chloride 102 03/30/2015 12:05 PM    CO2 23 03/30/2015 12:05 PM    Anion gap 9 09/08/2010 11:05 AM    Glucose 92 03/30/2015 12:05 PM    BUN 31 03/30/2015 12:05 PM    Creatinine 1.24 03/30/2015 12:05 PM    BUN/Creatinine ratio 25 03/30/2015 12:05 PM    GFR est AA 51 03/30/2015 12:05 PM    GFR est non-AA 44 03/30/2015 12:05 PM    Calcium 9.6 03/30/2015 12:05 PM Bilirubin, total 0.4 03/30/2015 12:05 PM    ALT (SGPT) 9 03/30/2015 12:05 PM    AST (SGOT) 15 03/30/2015 12:05 PM    Alk. phosphatase 69 03/30/2015 12:05 PM    Protein, total 6.7 03/30/2015 12:05 PM    Albumin 4.2 03/30/2015 12:05 PM    Globulin 3.6 09/08/2010 11:05 AM    A-G Ratio 1.7 03/30/2015 12:05 PM      Lab Results   Component Value Date/Time    Hemoglobin A1c 9.0 09/01/2016 11:03 AM    Hemoglobin A1c (POC) 7.5 03/30/2015 01:19 PM         No results for input(s): CPK, CKMB, TROIQ in the last 72 hours. No lab exists for component: CKQMB, CPKMB        Problem List:     Problem List  Date Reviewed: 9/15/2017          Codes Class Noted    Advanced care planning/counseling discussion ICD-10-CM: Z71.89  ICD-9-CM: V65.49  4/4/2017    Overview Signed 4/4/2017 10:31 AM by Socorro Reed MD     Patient states she is a DNR  04/04/17               Obstructive sleep apnea (adult) (pediatric) ICD-10-CM: G47.33  ICD-9-CM: 327.23  12/12/2014        Chronic back pain ICD-10-CM: M54.9, G89.29  ICD-9-CM: 724.5, 338.29  9/8/2010        HTN (hypertension) ICD-10-CM: I10  ICD-9-CM: 401.9  5/3/2010        DM type 2 (diabetes mellitus, type 2) [facility] ICD-10-CM: E11.9  ICD-9-CM: 250.00  5/3/2010    Overview Signed 9/12/2014  9:50 AM by Hannah Monge MD     On Lantus  FTF for DM supplies 8/26/2014  Forms for [employer] DM supplies 9/12/2014             MI (myocardial infarction) [facility]) ICD-10-CM: I21.3  ICD-9-CM: 410.90  5/3/2010                Doyle Lozano MD, [facility]

## 2017-09-20 NOTE — PROGRESS NOTES
1:30 PM  TRANSFER - OUT REPORT:    Verbal report given to [facility] DAIANA MCCONNELL RN  on [employer]  being transferred to Angio (unit) for routine progression of care       Report consisted of patients Situation, Background, Assessment and   Recommendations(SBAR). Information from the following report(s) SBAR was reviewed with the receiving nurse. Lines:   Peripheral IV 09/20/17 Right Hand (Active)        Opportunity for questions and clarification was provided.       Patient transported with:   Registered Nurse

## 2017-09-20 NOTE — CARDIO/PULMONARY
Cardiac Rehab: 9/20/2017 @ 1710:  Received cardiac rehab consult for CAD/post stent education. Pt admitted same day for cardiac cath procedure following office visit with Dr. Shauna Swift for chest pain. Pt has cardiac history which includes HTN, CAD/MI/stent (1995). Pt has recently returned to [facility] from post-cath recovery and remains in conscious sedative state. Spoke with staff nurse Junaid Robledo) who reported sheath remains in right femoral and pt is sleeping. Education deferred at this time. Cardiac Meds:   ACE/ARB: lisinopril-HCTZ  BB: metoprolol succinate  Statin: pravastatin  ASA: 81 mg  Smoking history assessed: Former smoker, quit 2004. Cardiac rehab will follow. 9/21/2017 @ 0845:  Pt had cardiac cath yesterday with PCI/ELE to mLAD X2. Met with patient to review post-cath instructions. Explained educational role of Cardiac Rehab RN. CAD education packet given to [facility]. Discussed pt's understanding of procedure, treatment and plan of care. Pt reported awareness of 2 stents placed but not where they are. Explained cardiac cath results. Reviewed post cath instructions via right femoral site, with emphasis on temporary restrictions, signs/symptoms of infection, f/u with MD, what to do if bleeding occurs, and importance of taking all meds as prescribed. Discussed risk factors (HTN, female, DM, age, previous CAD/MI, obesity) and benefit of Outpatient Cardiac Rehab Program [facility]). Patient expressed interest in referral to outpatient cardiac rehab program @ [facility]. Pt signed authorization form. Discussed new meds: Plavix, and co-enzyme Q-10-purpose and side effects. Attached information on new PO meds to AVS.      Patient verbalized understanding of info provided, and all questions were answered. 9/21/2017 @ 4290:  Faxed referral to Dr. Shauna Swift for signature.   Faxed referral information for OPCR at [facility].

## 2017-09-20 NOTE — INTERVAL H&P NOTE
H&P Update:  Jose Roberto Fernandez was seen and examined. History and physical has been reviewed. The patient has been examined.  There have been no significant clinical changes since the completion of the originally dated History and Physical.    Signed By: Matheus Keen MD     September 20, 2017 2:16 PM

## 2017-09-21 VITALS
SYSTOLIC BLOOD PRESSURE: 154 MMHG | BODY MASS INDEX: 35.52 KG/M2 | RESPIRATION RATE: 16 BRPM | HEART RATE: 88 BPM | DIASTOLIC BLOOD PRESSURE: 82 MMHG | WEIGHT: 226.3 LBS | TEMPERATURE: 98.6 F | OXYGEN SATURATION: 95 % | HEIGHT: 67 IN

## 2017-09-21 LAB
ALBUMIN SERPL-MCNC: 3.2 G/DL (ref 3.5–5)
ALBUMIN/GLOB SERPL: 0.9 {RATIO} (ref 1.1–2.2)
ALP SERPL-CCNC: 77 U/L (ref 45–117)
ALT SERPL-CCNC: 18 U/L (ref 12–78)
ANION GAP SERPL CALC-SCNC: 8 MMOL/L (ref 5–15)
AST SERPL-CCNC: 21 U/L (ref 15–37)
ATRIAL RATE: 66 BPM
BILIRUB SERPL-MCNC: 0.4 MG/DL (ref 0.2–1)
BUN SERPL-MCNC: 16 MG/DL (ref 6–20)
BUN/CREAT SERPL: 10 (ref 12–20)
CALCIUM SERPL-MCNC: 8.4 MG/DL (ref 8.5–10.1)
CALCULATED P AXIS, ECG09: 50 DEGREES
CALCULATED R AXIS, ECG10: -31 DEGREES
CALCULATED T AXIS, ECG11: 11 DEGREES
CHLORIDE SERPL-SCNC: 102 MMOL/L (ref 97–108)
CHOLEST SERPL-MCNC: 223 MG/DL
CO2 SERPL-SCNC: 27 MMOL/L (ref 21–32)
CREAT SERPL-MCNC: 1.55 MG/DL (ref 0.55–1.02)
DIAGNOSIS, 93000: NORMAL
GLOBULIN SER CALC-MCNC: 3.7 G/DL (ref 2–4)
GLUCOSE BLD STRIP.AUTO-MCNC: 254 MG/DL (ref 65–100)
GLUCOSE SERPL-MCNC: 152 MG/DL (ref 65–100)
HDLC SERPL-MCNC: 65 MG/DL
HDLC SERPL: 3.4 {RATIO} (ref 0–5)
LDLC SERPL CALC-MCNC: 136 MG/DL (ref 0–100)
LIPID PROFILE,FLP: ABNORMAL
P-R INTERVAL, ECG05: 202 MS
POTASSIUM SERPL-SCNC: 4 MMOL/L (ref 3.5–5.1)
PROT SERPL-MCNC: 6.9 G/DL (ref 6.4–8.2)
Q-T INTERVAL, ECG07: 460 MS
QRS DURATION, ECG06: 88 MS
QTC CALCULATION (BEZET), ECG08: 482 MS
SERVICE CMNT-IMP: ABNORMAL
SODIUM SERPL-SCNC: 137 MMOL/L (ref 136–145)
TRIGL SERPL-MCNC: 110 MG/DL (ref ?–150)
TSH SERPL DL<=0.05 MIU/L-ACNC: 2.09 UIU/ML (ref 0.36–3.74)
VENTRICULAR RATE, ECG03: 66 BPM
VLDLC SERPL CALC-MCNC: 22 MG/DL

## 2017-09-21 PROCEDURE — 80061 LIPID PANEL: CPT | Performed by: NURSE PRACTITIONER

## 2017-09-21 PROCEDURE — 84443 ASSAY THYROID STIM HORMONE: CPT | Performed by: NURSE PRACTITIONER

## 2017-09-21 PROCEDURE — 99218 HC RM OBSERVATION: CPT

## 2017-09-21 PROCEDURE — 36415 COLL VENOUS BLD VENIPUNCTURE: CPT | Performed by: NURSE PRACTITIONER

## 2017-09-21 PROCEDURE — 80053 COMPREHEN METABOLIC PANEL: CPT | Performed by: NURSE PRACTITIONER

## 2017-09-21 PROCEDURE — 74011250637 HC RX REV CODE- 250/637: Performed by: INTERNAL MEDICINE

## 2017-09-21 PROCEDURE — 74011250637 HC RX REV CODE- 250/637: Performed by: SPECIALIST

## 2017-09-21 PROCEDURE — 82962 GLUCOSE BLOOD TEST: CPT

## 2017-09-21 PROCEDURE — 74011636637 HC RX REV CODE- 636/637: Performed by: SPECIALIST

## 2017-09-21 RX ORDER — CHOLECALCIFEROL (VITAMIN D3) 125 MCG
100 CAPSULE ORAL DAILY
Status: SHIPPED | COMMUNITY
Start: 2017-09-21

## 2017-09-21 RX ORDER — GUAIFENESIN 100 MG/5ML
81 LIQUID (ML) ORAL DAILY
Qty: 100 TAB | Refills: 3 | Status: SHIPPED | COMMUNITY
Start: 2017-09-21

## 2017-09-21 RX ORDER — NITROGLYCERIN 0.4 MG/1
0.4 TABLET SUBLINGUAL
Qty: 1 BOTTLE | Refills: 3 | Status: SHIPPED | OUTPATIENT
Start: 2017-09-21 | End: 2017-12-08 | Stop reason: SDUPTHER

## 2017-09-21 RX ORDER — NITROGLYCERIN 0.4 MG/1
0.4 TABLET SUBLINGUAL
Qty: 1 BOTTLE | Refills: 3 | Status: SHIPPED | OUTPATIENT
Start: 2017-09-21 | End: 2017-09-21

## 2017-09-21 RX ORDER — ROSUVASTATIN CALCIUM 5 MG/1
5 TABLET, COATED ORAL
Qty: 30 TAB | Refills: 1 | Status: SHIPPED | OUTPATIENT
Start: 2017-09-21 | End: 2017-10-17 | Stop reason: SDUPTHER

## 2017-09-21 RX ORDER — ROSUVASTATIN CALCIUM 10 MG/1
5 TABLET, COATED ORAL
Status: DISCONTINUED | OUTPATIENT
Start: 2017-09-21 | End: 2017-09-21 | Stop reason: HOSPADM

## 2017-09-21 RX ORDER — INSULIN GLARGINE 100 [IU]/ML
52 INJECTION, SOLUTION SUBCUTANEOUS
Status: DISCONTINUED | OUTPATIENT
Start: 2017-09-21 | End: 2017-09-21 | Stop reason: HOSPADM

## 2017-09-21 RX ORDER — ACETAMINOPHEN 325 MG/1
650 TABLET ORAL
Status: DISCONTINUED | OUTPATIENT
Start: 2017-09-21 | End: 2017-09-21 | Stop reason: HOSPADM

## 2017-09-21 RX ORDER — CLOPIDOGREL BISULFATE 75 MG/1
75 TABLET ORAL DAILY
Qty: 30 TAB | Refills: 3 | Status: SHIPPED | OUTPATIENT
Start: 2017-09-21 | End: 2017-10-17 | Stop reason: SDUPTHER

## 2017-09-21 RX ADMIN — METOPROLOL SUCCINATE 100 MG: 50 TABLET, FILM COATED, EXTENDED RELEASE ORAL at 09:51

## 2017-09-21 RX ADMIN — HYDROCHLOROTHIAZIDE 12.5 MG: 25 TABLET ORAL at 09:51

## 2017-09-21 RX ADMIN — ASPIRIN 325 MG: 325 TABLET, COATED ORAL at 09:52

## 2017-09-21 RX ADMIN — CLOPIDOGREL 75 MG: 75 TABLET, FILM COATED ORAL at 09:51

## 2017-09-21 RX ADMIN — Medication 10 ML: at 05:15

## 2017-09-21 RX ADMIN — LISINOPRIL 20 MG: 20 TABLET ORAL at 09:51

## 2017-09-21 RX ADMIN — ACETAMINOPHEN 650 MG: 325 TABLET ORAL at 00:45

## 2017-09-21 RX ADMIN — INSULIN GLARGINE 52 UNITS: 100 INJECTION, SOLUTION SUBCUTANEOUS at 02:10

## 2017-09-21 NOTE — PROGRESS NOTES
Bedside and Verbal shift change report given to Mirella Mccurdy (oncoming nurse) by Marisela Matthews RN (offgoing nurse).  Report included the following information SBAR, Kardex, Procedure Summary, Intake/Output and Cardiac Rhythm SR.   0815: called to lab as morning labs have not been resulted,  Said they are on the machine and should not be much longer  1121:spoke to Lisandro Díaz from [facility] lab,  Blood for lipid panel was never sent over to [facility] our lab will send it to [facility] lab with next  delivery Lazarus Blue aware,   1122: new order for crestor give to pt  1230:pt discharged to home,  All discharge instructions gone over with pt,  All new prescriptions given and questions answered,  #20 PIV removed, and pt brought to discharge area by RN  In wheelchair where she was picked up by her daughter

## 2017-09-21 NOTE — DISCHARGE INSTRUCTIONS
[facility] Office: [address] [facility], [address] [place] [address] office: [phone]                              [phone]    Patient Discharge Instructions    Lila Baptiste / [id number] : 1945    Admitted 2017 Discharged: 2017   Cardiologist: Dr Mariza Espinoza      PCP:  Tanner Spear MD     Diagnosis: coronary artery disease     Procedures/Test: CATH: chronic blockage right coronary artery with collateral circulation, blockage on the left anterior descending 2 drug stent applied, 50% blockage to the left circumflex   Lab Results  Component Value Date/Time   Hemoglobin A1c 9.0 2016 11:03 AM   Hemoglobin A1c 11.9 2013 09:30 AM   Hemoglobin A1c 11.3 2013 11:05 AM   Glucose 152 2017 05:25 AM   Glucose (POC) 254 2017 12:57 AM   Microalbumin/Creat ratio (mg/g creat) 2 2010 10:53 AM   Microalb/Creat ratio (ug/mg creat.) 6.0 2013 03:25 PM   Microalbumin,urine random 0.58 2010 10:53 AM   LDL,Direct 158 2013 03:25 PM   LDL, calculated 145 2016 11:03 AM   Creatinine 1.55 2017 05:25 AM      Lab Results  Component Value Date/Time   Cholesterol, total 251 2016 11:03 AM   HDL Cholesterol 81 2016 11:03 AM   LDL,Direct 158 2013 03:25 PM   LDL, calculated 145 2016 11:03 AM   LDL-C, External 162 2015   Triglyceride 126 2016 11:03 AM   CHOL/HDL Ratio 2.5 2010 11:05 AM     Lab Results  Component Value Date/Time   ALT (SGPT) 18 2017 05:25 AM   AST (SGOT) 21 2017 05:25 AM   Alk. phosphatase 77 2017 05:25 AM   Bilirubin, total 0.4 2017 05:25 AM   Albumin 3.2 2017 05:25 AM   Protein, total 6.9 2017 05:25 AM   PLATELET 372  12:03 PM         · It is important that you take the medication exactly as they are prescribed.    · Keep your medication in the bottles provided by the pharmacist and keep a list of the medication names, dosages, and times to be taken in your wallet. · Do not take other medications without consulting your doctor. BRING ALL of YOUR MEDICINES TO YOUR OFFICE VISIT with      Cardiac Catheterization  Discharge Instructions  *Check the puncture site frequently for swelling or bleeding. If you see any bleeding, lie down and apply pressure over the area with a clean town or washcloth. Notify your doctor for any redness, swelling, drainage or oozing from the puncture site. Notify your doctor for any fever or chills. *If the leg or arm with the puncture becomes cold, numb or painful, call Dr Akhil Bansal     *Activity should be limited for the next 48 hours. Climb stairs as little as possible and avoid any stooping, bending or strenuous activity for 48 hours. No strenuous activity or heavy lifting over 10 lbs. for 7 days. · You may take a shower 24 hours after your cardiac catheterization. Be sure to get the dressing wet and then remove it; gently wash the area with warm soapy water. Pat dry and leave open to air. To help prevent infections, be sure to keep the cath site clean and dry. No lotions, creams, powders, ointments, etc. in the cath site for approximately 1 week. · Do not take a tub bath, get in a hot tub or swimming pool for approximately 5 days or until the cath site is completely healed. · Drink plenty of fluids for 24-48 hours after your cath to flush the contrast dye from your kidneys. No alcoholic beverages for 24 hours. You may resume your previous diet (low fat, low cholesterol) after your cath. · Do not drive or operate heavy machinery for 48 hours. · You may resume your usual diet. Drink more fluids than usual.  · Have a responsible person drive you home and stay with you for at least 24 hours after your heart catheterization/angiography. · *After your cath, some bruising or discomfort is common during the healing process.   Tylenol, 1-2 tablets every 6 hours as needed, is recommended if you experience any discomfort. If you experience any signs or symptoms of infection such as fever, chills, or poorly healing incision, persistent tenderness or swelling in the groin, redness and/or warmth to the touch, numbness, significant tingling or pain at the groin site or affected extremity, rash, drainage from the cath site, or if the leg feels tight or swollen, call your physician right away.  If bleeding at the cath site occurs, take a clean gauze pad and apply direct pressure to the groin just above the puncture site. Call 911 immediately, and continue to apply direct pressure until an ambulance gets to your location.  You may return to work  2  days after your cardiac cath if no groin bleeding. Activity: Resume normal activity letting fatigue be the guide. Do not lift over 10 pounds for 7 days. Diet: American Heart association, low fat, 1500 mg sodium  Diabetic. Call for or return to ER for recurrent or prolonged chest pain, shortness of breath, lightheadedness, dizzy, palpitations, passing out, swelling in the legs or abdomen, pain or swelling  At the cath site. Lifestyle changes  IF you smoke, Stop smoking go to : [employer]. aspx for text reminders   Consider a Vegan diet- read Reversing and Preventing Heart Disease by Dr. Joey Whitt. Watch [place] over [employer] a heart-healthy diet that is low in cholesterol, saturated fat, and salt, and is full of fruits, vegetables and whole-grains. Eat at least two servings of fish each week. You may get more details about how to eat healthy, but these tips can help you get started. Www.[place].com  When should you call for help? Call 911 anytime you think you may need emergency care.  For example, call if:  You have signs of a heart attack. These may include:  Chest pain or pressure. Sweating. Shortness of breath. Nausea or vomiting. Pain that spreads from the chest to the neck, jaw, or one or both shoulders or arms. Dizziness or lightheadedness. A fast or irregular pulse. After calling 911, chew 1 adult-strength aspirin. Wait for an ambulance. Do not try to drive yourself. You passed out (lost consciousness). You feel like you are having another heart attack. Call your doctor now or seek immediate medical care if:  You have had any chest pain, even if it has gone away. You have new or increased shortness of breath. You are dizzy or lightheaded, or you feel like you may faint. Watch closely for changes in your health, and be sure to contact your doctor if you have any problem      Information obtained by :  I understand that if any problems occur once I am at home I am to contact my physician. I understand and acknowledge receipt of the instructions indicated above.                                                                                                                                            R.N.'s Signature                                                                  Date/Time                                                                                                                                              Patient or Representative Signature                                                          Date/Time

## 2017-09-21 NOTE — PROGRESS NOTES
Cardiology Progress Note         NAME:  Albert Gallego   :   1945   MRN:   [id number]     Assessment/Plan:   CAD- MV  RCA w/ collaterals; s/p POBA apical LAD, ELE x2-> mLAD      - DAPT      - statin, BB       - Refer patient to phase II outpatient cardiac rehab @ [facility]     HTN- BP overall OK       - cont BB and prinizide   XOL- lipids pending       - will change pravachol-> low dose crestor        - add coQ 10       - if unable to tolerate pravachol- would try crestor 3 days per week   DM- A1C pending  DVT px: ambulating  GI px:eating   Care Coordination: 35 minutes spent reviewing data, dx, treatment w/ pt and family, coordinating care with team including care management, and  arranging follow up. Home later today     Cardiology Attending:    Patient personally seen and examined. All the elements of history and examination were personally performed. Assessment and plan was discussed and agree as written above. - ok to DC home today. - Dischargetime >30 min    Doyle Graff MD, [facility] [place]     Future Appointments  Date Time Provider Lc Guajardo   2017 4:00 PM Diego Valdez MD [facility] SCHED       Subjective:   Albert Gallego is a 67y.o. year old female w/ hx:  CAD/MI prior stent,   RCA cx by HTN, DM, XOL, EMILY abnormal stress test now s/p cardaic cath and POBA/ PCI   She saw Dr Vanessa Graff recently for symptoms of chest pain. The symptoms started a few days ago and have been mostly exertional.  On physical activities she will experience aching retrosternal chest pain nonradiating. No associated shortness of breath or fatigue. The pain improves upon resting. There is no symptoms of lightheadedness, dizziness, presyncope or syncope.   EKG performed in the office demonstrated normal sinus rhythm normal EKG without ST-T changes.          Overnight activities reviewed:    - -140 w/ one up to 180 overnight    - Tele SR    - labs pending     - she refused pravachol last pm 2/2 myalgias     Cardiac ROS:Patient denies any exertional chest pain, dyspnea, palpitations, syncope, orthopnea, edema or paroxysmal nocturnal dyspnea. Review of Systems: No nausea, indigestion, vomiting, pain, cough, sputum. No bleeding. Taking po. Appetite good, up in room   CARDIAC EVALUATION   CATH: [address]- MI stents     CATH 2017: LAD: p70% @ D1, dLAD:70%; LCX:m50%, RCA: mRCA:  supplied w/ collaterals from LAD  ---Unsuccessful PCI apical LAD: Attempted POBA with various balloons without success. --- s/p ELE (3x15, 2.75 x14 ELE overlapping) ->mLAD        Objective:     Visit Vitals    /65 (BP 1 Location: Right arm, BP Patient Position: At rest)    Pulse 93    Temp 98.6 °F (37 °C)    Resp 23    Ht 5' 7\" (1.702 m)    Wt 226 lb 4.8 oz (102.7 kg)    SpO2 96%    Breastfeeding No    BMI 35.44 kg/m2      O2 Flow Rate (L/min): 2 l/min O2 Device: Nasal cannula    Temp (24hrs), Av.2 °F (36.8 °C), Min:97.7 °F (36.5 °C), Max:98.6 °F (37 °C)        Intake/Output Summary (Last 24 hours) at 17 0814  Last data filed at 17 0516   Gross per 24 hour   Intake          2117.55 ml   Output             1150 ml   Net           967.55 ml       TELE: SR     General: AAOx3 cooperative, no acute distress. HEENT: Atraumatic. Pink and moist.  Anicteric sclerae. Neck: Supple,     Lungs: CTA bilaterally. No wheezing/rhonchi/crackles. Heart: PMI: diminished  Regular rhythm, no murmur, no S3, no rubs, no gallops. No JVD. No carotid bruits. Abdomen: Soft, non-distended, non-tender. + Bowel sounds. No bruits. : voiding  Extremities: No edema, no clubbing, no cyanosis. No calf tenderness  Groin: soft, non tender, no hematoma, + pulse no bruit  Neurologic: Grossly intact. Alert and oriented X 3. No acute neurological distress. Psych: Good insight. Not anxious nor agitated.       Care Plan discussed with:    Comments   Patient y    Family      RN y    Care Manager                    Consultant:          Data Review:   CMP: No results found for: NA, K, CL, CO2, AGAP, GLU, BUN, CREA, GFRAA, GFRNA, CA, MG, PHOS, ALB, TBIL, TBILI, TP, ALB, GLOB, AGRAT, SGOT, ALT, GPT  CBC: No results found for: WBC, HGB, HCT, PLT, HGBEXT, HCTEXT, PLTEXT, HGBEXT, HCTEXT, PLTEXT  All Cardiac Markers in the last 24 hours: No results found for: CPK, CK, CKMMB, CKMB, RCK3, CKMBT, CKNDX, CKND1, LUCIA, TROPT, TROIQ, SAMIR, TROPT, TNIPOC, BNP, BNPP  Recent Glucose Results:   Lab Results   Component Value Date/Time    GLUCPOC 254 (H) 09/21/2017 12:57 AM    GLUCPOC 145 (H) 09/20/2017 12:54 PM     ABG: No results found for: PH, PHI, PCO2, PCO2I, PO2, PO2I, HCO3, HCO3I, FIO2, FIO2I  LIPIDS:  Cholesterol, total   Date Value Ref Range Status   09/01/2016 251 (H) 100 - 199 mg/dL Final     HDL Cholesterol   Date Value Ref Range Status   09/01/2016 81 >39 mg/dL Final     Comment:     According to ATP-III Guidelines, HDL-C >59 mg/dL is considered a  negative risk factor for CHD.        LDL, calculated   Date Value Ref Range Status   09/01/2016 145 (H) 0 - 99 mg/dL Final     VLDL, calculated   Date Value Ref Range Status   09/01/2016 25 5 - 40 mg/dL Final     CHOL/HDL Ratio   Date Value Ref Range Status   09/08/2010 2.5 0 - 5.0   Final       Medications reviewed  Current Facility-Administered Medications   Medication Dose Route Frequency    acetaminophen (TYLENOL) tablet 650 mg  650 mg Oral Q6H PRN    insulin glargine (LANTUS) injection 52 Units  52 Units SubCUTAneous QHS    nitroglycerin (Tridil) 200 mcg/ml infusion  5-200 mcg/min IntraVENous TITRATE    sodium chloride (NS) flush 5-10 mL  5-10 mL IntraVENous Q8H    sodium chloride (NS) flush 5-10 mL  5-10 mL IntraVENous PRN    hydrocortisone Sod Succ (PF) (SOLU-CORTEF) injection 100 mg  100 mg IntraVENous ONCE PRN    nitroglycerin (NITROSTAT) tablet 0.4 mg  0.4 mg SubLINGual Q5MIN PRN    aspirin (ASPIRIN) tablet 325 mg  325 mg Oral DAILY    clopidogrel (PLAVIX) tablet 75 mg  75 mg Oral DAILY    pravastatin (PRAVACHOL) tablet 40 mg  40 mg Oral QHS    metoprolol succinate (TOPROL-XL) XL tablet 100 mg  100 mg Oral DAILY    lisinopril (PRINIVIL, ZESTRIL) tablet 20 mg  20 mg Oral DAILY    And    hydroCHLOROthiazide (HYDRODIURIL) tablet 12.5 mg  12.5 mg Oral DAILY         Shahnaz Padilla RN, ACNP-BC, [facility]

## 2017-09-21 NOTE — PROGRESS NOTES
19:20 Received report from [facility]. 19:30 Assisted in right femoral sheath removal. Pt. Was doing fine. V/S stable. 20:30 Assessment done as documented. Diner given. 21:45 Due med given. Pt. Refused to take pravastatin, she wants to discussed with her attending in am. Assisted with bedpan, voids clear yellow. 22:00 Neurovascular check in progress, site looks good no bleeding, no hematoma. 00:05 Pt. Complaint of headache 5/10 Dr. Shauna Swift paged. Nitrol drip titration. 00:30 Dr. Shauna Swift called back, order obtained. 00:45 Given Tylenol 2 tablet. 00:52 Nitrol drip held IAX=448's, Pt. Denies chest pain. 01:00 Pt. voids large amount pads changed. 01:30 Follow up call done 2x to pharmacist re: lantus order. 02:15 Due med given. Sleeping. Bed rest and leg alignment completed. Pt. May turn to sides. 03:10 Assessment done. 04:00 Sleeping. 05:40 Blood specimen sent. Pt. Walked to bathroom with standby assist, denies light headed. Right groin site checked upon returning to bed, site remain clean dry and intact, palpable pulses. IVF completed and discontinued. BP trends stable. 07:15 Bedside report given to [facility].

## 2017-09-21 NOTE — PROGRESS NOTES
I met with pt as an introductory visit to discuss discharge needs. Pt is single but raised her granddaughter who is a senior @ [employer] in PT. Granddaughter plans to pursue her doctorate degree in PT in [place]. Pt states her daughter will transport her home when discharged. Pt does not need home health or the hospital to home program . 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 She has next week off but is requesting an okay from Dr Christopher Michelle to return to work on October 1,2017. Dr Christopher Michelle will provide pt with that note. I notified Shaneka Sanders navigator with Dr Jostin Lauren's office regarding pt.'s hospitalization. I reviewed the state & federal observation letters with pt who electronically signed both letters. Thank you.   Juan Jose Carvalho

## 2017-09-21 NOTE — PROGRESS NOTES
Problem: Falls - Risk of  Goal: *Absence of Falls  Document Lencho Fall Risk and appropriate interventions in the flowsheet.    Outcome: Progressing Towards Goal  Fall Risk Interventions:              Medication Interventions: Patient to call before getting OOB     Elimination Interventions: Call light in reach, Patient to call for help with toileting needs

## 2017-09-29 ENCOUNTER — OFFICE VISIT (OUTPATIENT)
Dept: CARDIOLOGY CLINIC | Age: 72
End: 2017-09-29

## 2017-09-29 VITALS
OXYGEN SATURATION: 99 % | HEIGHT: 67 IN | SYSTOLIC BLOOD PRESSURE: 110 MMHG | RESPIRATION RATE: 16 BRPM | DIASTOLIC BLOOD PRESSURE: 69 MMHG | BODY MASS INDEX: 35.47 KG/M2 | HEART RATE: 80 BPM | WEIGHT: 226 LBS

## 2017-09-29 DIAGNOSIS — I25.10 CORONARY ARTERY DISEASE INVOLVING NATIVE HEART WITHOUT ANGINA PECTORIS, UNSPECIFIED VESSEL OR LESION TYPE: Primary | ICD-10-CM

## 2017-09-29 DIAGNOSIS — I10 ESSENTIAL HYPERTENSION: ICD-10-CM

## 2017-09-29 DIAGNOSIS — E78.5 DYSLIPIDEMIA: ICD-10-CM

## 2017-09-29 DIAGNOSIS — I20.8 STABLE ANGINA (HCC): ICD-10-CM

## 2017-09-29 NOTE — PROGRESS NOTES
Office Follow-up    NAME: Cynthia Limon   :  1945  MRM:  [id number]    Date:  2017            Assessment:     Problem List  Date Reviewed: 2017          Codes Class Noted    CAD (coronary artery disease) ICD-10-CM: I25.10  ICD-9-CM: 414.00  2017        Stable angina ([facility] ICD-10-CM: I20.8  ICD-9-CM: 413.9  9/15/2017        Advanced care planning/counseling discussion ICD-10-CM: Z71.89  ICD-9-CM: V65.49  2017    Overview Signed 2017 10:31 AM by Maria Del Rosario Membreno MD     Patient states she is a DNR  17               Obstructive sleep apnea (adult) (pediatric) ICD-10-CM: O86.81  ICD-9-CM: 327.23  2014        Chronic back pain ICD-10-CM: M54.9, G89.29  ICD-9-CM: 724.5, 338.29  2010        HTN (hypertension) ICD-10-CM: I10  ICD-9-CM: 401.9  5/3/2010        DM type 2 (diabetes mellitus, type 2) ([facility] ICD-10-CM: E11.9  ICD-9-CM: 250.00  5/3/2010    Overview Signed 2014  9:50 AM by Lizette Schmid MD     On Lantus  FTF for DM supplies 2014  Forms for [employer] DM supplies 2014             MI (myocardial infarction) [facility]) ICD-10-CM: I21.3  ICD-9-CM: 410.90  5/3/2010                 Plan:     1. CAD: Status post PCI of the proximal LAD lesion. POB A of the distal LAD lesion. Continue aspirin and Plavix. RCA  lesion left alone. 2. Hypertension: Blood pressure controlled. Continue beta blockers and Prinzide. 3. Dyslipidemia: Continue Crestor. 4. Follow-up with me in 3-4 months. Subjective:     Cynthia Limon, a 67y.o. year-old who presents for followup of recent hospital discharge after cardiac catheterization. She underwent PCI of the LAD lesion. She had RCA lesion which was a . On follow-up today she denies any symptoms of chest pain or groin pain. She feels better than before. She will be starting her cardiac rehab on .     Exam:     Physical Exam:  Visit Vitals    /69 (BP 1 Location: Left arm, BP Patient Position: Sitting)    Pulse 80    Resp 16    Ht 5' 7\" (1.702 m)    Wt 226 lb (102.5 kg)    SpO2 99%    BMI 35.4 kg/m2     General appearance - alert, well appearing, and in no distress  Mental status - affect appropriate to mood  Eyes - sclera anicteric, moist mucous membranes  Neck - supple, no significant adenopathy  Chest - clear to auscultation, no wheezes, rales or rhonchi  Heart - normal rate, regular rhythm, normal S1, S2, no murmurs, rubs, clicks or gallops  Abdomen - soft, nontender, nondistended, no masses or organomegaly  Extremities - peripheral pulses normal, no pedal edema  Skin - normal coloration  no rashes    Medications:     Current Outpatient Prescriptions   Medication Sig    aspirin 81 mg chewable tablet Take 2 Tabs by mouth daily.  clopidogrel (PLAVIX) 75 mg tab Take 1 Tab by mouth daily. Dr Laury Grijalva to provide refills  Indications: Thrombosis Prevention after PCI    nitroglycerin (NITROSTAT) 0.4 mg SL tablet 1 Tab by SubLINGual route every five (5) minutes as needed for Chest Pain. Dr Laury Grijalva to provide refills  Indications: ANGINA    co-enzyme Q-10 (CO Q-10) 100 mg capsule Take 1 Cap by mouth daily.  rosuvastatin (CRESTOR) 5 mg tablet Take 1 Tab by mouth nightly. Dr Laury Grijalva to provide refills  Indications: atherosclerotic cardiovascular disease, mixed hyperlipidemia    metoprolol succinate (TOPROL XL) 100 mg tablet Take 1 Tab by mouth daily.  lisinopril-hydroCHLOROthiazide (PRINZIDE, ZESTORETIC) 20-12.5 mg per tablet Take 1 Tab by mouth daily.  insulin glargine (LANTUS SOLOSTAR) 100 unit/mL (3 mL) pen 52 units at night.  glucose blood VI test strips ([employer] BLOOD GLUCOSE SYSTEM) strip Use 3 times per day (fasting and before meals). (Patient taking differently: Once a day)    Lancets (ONE TOUCH ULTRASOFT LANCETS) Misc 1 Package by Does Not Apply route. Test blood glucose 3-4 time daily     No current facility-administered medications for this visit. Diagnostic Data Review:     EK2017: CATH- LAD: p70% @ D1, dLAD:70%; LCX:m50%, RCA: mRCA:  supplied w/ collaterals from LAD  ---Unsuccessful PCI apical LAD: Attempted POBA with various balloons without success. --- s/p ELE (3x15, 2.75 x14 ELE overlapping) ->mLAD    17: STRESS CARDIOLITE- Poor exercise tolerance. Nml stress. Abnormal Exercise gated SPECT MPS: Small apical defect, worse on stress, Likely small apical infarct with mild hamlet-infarct ischemia. LVEF 64%    CATH: [address]- MI stents      Lab Review:     Lab Results   Component Value Date/Time    Cholesterol, total 223 2017 05:25 AM    HDL Cholesterol 65 2017 05:25 AM    LDL,Direct 158 2013 03:25 PM    LDL, calculated 136 2017 05:25 AM    Triglyceride 110 2017 05:25 AM    CHOL/HDL Ratio 3.4 2017 05:25 AM     Lab Results   Component Value Date/Time    Creatinine 1.55 2017 05:25 AM     Lab Results   Component Value Date/Time    BUN 16 2017 05:25 AM     Lab Results   Component Value Date/Time    Potassium 4.0 2017 05:25 AM     Lab Results   Component Value Date/Time    Hemoglobin A1c 9.0 2016 11:03 AM     Lab Results   Component Value Date/Time    HGB 12.1 2017 12:03 PM     Lab Results   Component Value Date/Time    PLATELET 889  12:03 PM     No results for input(s): CPK, CKMB, TROIQ in the last 72 hours. No lab exists for component: CKQMB, CPKMB             ___________________________________________________    Quique Crenshaw.  Alcon Owens MD, [facility] [place]

## 2017-09-29 NOTE — MR AVS SNAPSHOT
Visit Information Date & Time Provider Department Dept. Phone Encounter #  
 9/29/2017  4:00 PM Suha Sinha MD [facility] [place] [phone] [id number] Your Appointments 1/12/2018  1:20 PM  
ESTABLISHED PATIENT with Suha Sinha MD  
[facility] ([address]) Appt Note: 4 mo fu  
 [address] [facility] [address] [facility]  
[address] [facility] [address] [address] Upcoming Health Maintenance Date Due Hepatitis C Screening 1945 MICROALBUMIN Q1 3/30/2016 BREAST CANCER SCRN MAMMOGRAM 9/24/2016 HEMOGLOBIN A1C Q6M 3/1/2017 INFLUENZA AGE 9 TO ADULT 8/1/2017 FOOT EXAM Q1 9/1/2017 EYE EXAM RETINAL OR DILATED Q1 4/4/2018* MEDICARE YEARLY EXAM 4/5/2018 GLAUCOMA SCREENING Q2Y 9/8/2018 LIPID PANEL Q1 9/21/2018 Pneumococcal 65+ Low/Medium Risk (2 of 2 - PPSV23) 9/1/2020 COLONOSCOPY 4/30/2021 DTaP/Tdap/Td series (2 - Td) 4/4/2027 *Topic was postponed. The date shown is not the original due date. Allergies as of 9/29/2017  Review Complete On: 9/29/2017 By: Kuldeep De Oliveira Severity Noted Reaction Type Reactions Pravachol [Pravastatin]  09/21/2017   Intolerance Myalgia Tetanus Vaccines And Toxoid  05/04/2011    Swelling Current Immunizations  Reviewed on 4/4/2017 Name Date Pneumococcal Vaccine (Unspecified Type) 9/1/2015 TB Skin Test (PPD) Intradermal 9/1/2017 Zoster Vaccine, Live 9/1/2015 Not reviewed this visit Vitals BP Pulse Resp Height(growth percentile) Weight(growth percentile) SpO2  
 110/69 (BP 1 Location: Left arm, BP Patient Position: Sitting) 80 16 5' 7\" (1.702 m) 226 lb (102.5 kg) 99% BMI OB Status Smoking Status 35.4 kg/m2 Postmenopausal Former Smoker BMI and BSA Data  Body Mass Index Body Surface Area  
 35.4 kg/m 2 2.2 m 2  
  
  
 Preferred Pharmacy Pharmacy Name Phone [facility] [address], [address] [place] [address] [phone] Your Updated Medication List  
  
   
This list is accurate as of: 9/29/17  4:40 PM.  Always use your most recent med list.  
  
  
  
  
 aspirin 81 mg chewable tablet Take 2 Tabs by mouth daily. clopidogrel 75 mg Tab Commonly known as:  PLAVIX Take 1 Tab by mouth daily. Dr Lupe Quintana to provide refills  Indications: Thrombosis Prevention after PCI  
  
 CO Q-10 100 mg capsule Generic drug:  co-enzyme Q-10 Take 1 Cap by mouth daily. glucose blood VI test strips strip Commonly known as:  [id number] Use 3 times per day (fasting and before meals). insulin glargine 100 unit/mL (3 mL) Inpn Commonly known as:  LANANAYELI LEWISAGLAR  
52 units at night. Lancets Misc Commonly known as:  ONETOUCH ULTRASOFT LANCETS  
1 Package by Does Not Apply route. Test blood glucose 3-4 time daily  
  
 lisinopril-hydroCHLOROthiazide 20-12.5 mg per tablet Commonly known as:  Jose Sinning Take 1 Tab by mouth daily. metoprolol succinate 100 mg tablet Commonly known as:  TOPROL XL Take 1 Tab by mouth daily. nitroglycerin 0.4 mg SL tablet Commonly known as:  NITROSTAT  
1 Tab by SubLINGual route every five (5) minutes as needed for Chest Pain. Dr Lupe Quintana to provide refills  Indications: ANGINA  
  
 rosuvastatin 5 mg tablet Commonly known as:  CRESTOR Take 1 Tab by mouth nightly. Dr Lupe Quintana to provide refills  Indications: atherosclerotic cardiovascular disease, mixed hyperlipidemia Patient Instructions Please schedule a 4 month follow up appointment with Dr. Timo Sorto. Introducing [facility]! Juventino Chamorro introduces sevenload patient portal. Now you can access parts of your medical record, email your doctor's office, and request medication refills online. 1. In your internet browser, go to [email or web address] 2. Click on the First Time User? Click Here link in the Sign In box. You will see the New Member Sign Up page. 3. Enter your [facility] Access Code exactly as it appears below. You will not need to use this code after youve completed the sign-up process. If you do not sign up before the expiration date, you must request a new code. · [facility] Access Code: [email or web address] Expires: 11/30/2017  1:38 PM 
 
4. Enter the last four digits of your Social Security Number (xxxx) and Date of Birth (mm/dd/yyyy) as indicated and click Submit. You will be taken to the next sign-up page. 5. Create a "[employer]t ID. This will be your [facility] login ID and cannot be changed, so think of one that is secure and easy to remember. 6. Create a [facility] password. You can change your password at any time. 7. Enter your Password Reset Question and Answer. This can be used at a later time if you forget your password. 8. Enter your e-mail address. You will receive e-mail notification when new information is available in [address] 9. Click Sign Up. You can now view and download portions of your medical record. 10. Click the Download Summary menu link to download a portable copy of your medical information. If you have questions, please visit the Frequently Asked Questions section of the [facility] website. Remember, [facility] is NOT to be used for urgent needs. For medical emergencies, dial 911. Now available from your iPhone and Android! Please provide this summary of care documentation to your next provider. Your primary care clinician is listed as Maria Del Rosario Membreno. If you have any questions after today's visit, please call [phone].

## 2017-10-17 DIAGNOSIS — I25.118 CORONARY ARTERY DISEASE OF NATIVE HEART WITH STABLE ANGINA PECTORIS, UNSPECIFIED VESSEL OR LESION TYPE (HCC): ICD-10-CM

## 2017-10-17 RX ORDER — CLOPIDOGREL BISULFATE 75 MG/1
75 TABLET ORAL DAILY
Qty: 90 TAB | Refills: 3 | Status: SHIPPED | OUTPATIENT
Start: 2017-10-17 | End: 2019-08-02

## 2017-10-17 RX ORDER — ROSUVASTATIN CALCIUM 5 MG/1
5 TABLET, COATED ORAL
Qty: 90 TAB | Refills: 3 | Status: SHIPPED | OUTPATIENT
Start: 2017-10-17 | End: 2018-09-25 | Stop reason: SDUPTHER

## 2017-10-17 NOTE — TELEPHONE ENCOUNTER
Patient said she needs a 90day supply so pharmacy said it needs to be re-written.  Pharmacy confirmed

## 2017-10-19 ENCOUNTER — TELEPHONE (OUTPATIENT)
Dept: CARDIOLOGY CLINIC | Age: 72
End: 2017-10-19

## 2017-10-19 ENCOUNTER — TELEPHONE (OUTPATIENT)
Dept: FAMILY MEDICINE CLINIC | Age: 72
End: 2017-10-19

## 2017-10-19 NOTE — TELEPHONE ENCOUNTER
Pt called into office concerned about her recent refills. She states that there are different-looking labels on the bottle and she wanted to call and confirm the generic names for her Plavix and Crestor. This nurse confirmed medications and dosing with patient and asked that she contact the pharmacy if the labeling looks different than usual.     Pt expressed understanding. Opportunities for questions, clarifications, and concerns provided.

## 2017-10-19 NOTE — TELEPHONE ENCOUNTER
--[phone]  Patient called to say [address], [phone], sent a fax here three weeks ago for her supplies and also they needed physician to state she is diabetic. She is out of her supplies and needs this faxed back to them. Please call.

## 2017-10-23 NOTE — TELEPHONE ENCOUNTER
/Telephone  Received: 3 days ago       [facility] Pt missed a call from the practice and would like for another attmept to be made.  Her best contact number is [phone]

## 2017-11-03 NOTE — TELEPHONE ENCOUNTER
Called pt and let her know the form is here in the office and I will let  [address] know. Pt waiting for a month already. Please fill ASAP. Thanks!

## 2017-11-03 NOTE — TELEPHONE ENCOUNTER
Patient asking to be contacted today with status of diabetic supply form. Patient states she spoke with [employer] and they told her they faxed form to our office this morning.

## 2017-11-30 DIAGNOSIS — I25.10 CORONARY ARTERY DISEASE INVOLVING NATIVE CORONARY ARTERY WITHOUT ANGINA PECTORIS, UNSPECIFIED WHETHER NATIVE OR TRANSPLANTED HEART: ICD-10-CM

## 2017-11-30 DIAGNOSIS — I10 ESSENTIAL HYPERTENSION: ICD-10-CM

## 2017-12-04 RX ORDER — LISINOPRIL AND HYDROCHLOROTHIAZIDE 12.5; 2 MG/1; MG/1
TABLET ORAL
Qty: 90 TAB | Refills: 3 | Status: SHIPPED | OUTPATIENT
Start: 2017-12-04 | End: 2018-11-21 | Stop reason: SDUPTHER

## 2017-12-08 DIAGNOSIS — I25.118 CORONARY ARTERY DISEASE OF NATIVE HEART WITH STABLE ANGINA PECTORIS, UNSPECIFIED VESSEL OR LESION TYPE (HCC): ICD-10-CM

## 2017-12-08 RX ORDER — NITROGLYCERIN 0.4 MG/1
0.4 TABLET SUBLINGUAL
Qty: 1 BOTTLE | Refills: 3 | Status: SHIPPED | OUTPATIENT
Start: 2017-12-08 | End: 2018-04-11 | Stop reason: SDUPTHER

## 2017-12-08 NOTE — TELEPHONE ENCOUNTER
Requested Prescriptions     Pending Prescriptions Disp Refills    nitroglycerin (NITROSTAT) 0.4 mg SL tablet 1 Bottle 3     Si Tab by SubLINGual route every five (5) minutes as needed for Chest Pain.  Dr Chandler Dandy to provide refills  Indications: Angina     Last OV 17  Next OV 18    Pharmacy verified  90 day supply per patient request    Thank you, AP

## 2017-12-15 RX ORDER — NITROGLYCERIN 0.4 MG/1
0.4 TABLET SUBLINGUAL
Qty: 1 BOTTLE | Refills: 3 | OUTPATIENT
Start: 2017-12-15

## 2018-01-12 ENCOUNTER — OFFICE VISIT (OUTPATIENT)
Dept: CARDIOLOGY CLINIC | Age: 73
End: 2018-01-12

## 2018-01-12 VITALS
WEIGHT: 221.6 LBS | SYSTOLIC BLOOD PRESSURE: 132 MMHG | HEART RATE: 76 BPM | DIASTOLIC BLOOD PRESSURE: 76 MMHG | OXYGEN SATURATION: 97 % | HEIGHT: 67 IN | RESPIRATION RATE: 16 BRPM | BODY MASS INDEX: 34.78 KG/M2

## 2018-01-12 DIAGNOSIS — I25.10 CORONARY ARTERY DISEASE INVOLVING NATIVE HEART WITHOUT ANGINA PECTORIS, UNSPECIFIED VESSEL OR LESION TYPE: Primary | ICD-10-CM

## 2018-01-12 DIAGNOSIS — I20.8 STABLE ANGINA (HCC): ICD-10-CM

## 2018-01-12 DIAGNOSIS — I10 ESSENTIAL HYPERTENSION: ICD-10-CM

## 2018-01-12 PROBLEM — E11.21 TYPE 2 DIABETES MELLITUS WITH NEPHROPATHY (HCC): Status: ACTIVE | Noted: 2018-01-12

## 2018-01-12 NOTE — PROGRESS NOTES
Office Follow-up    NAME: Israel Castano   :  1945  MRM:  [id number]    Date:  2018            Assessment:     Problem List  Date Reviewed: 2018          Codes Class Noted    CAD (coronary artery disease) ICD-10-CM: I25.10  ICD-9-CM: 414.00  2017        Type 2 diabetes mellitus with nephropathy ([facility] ICD-10-CM: E11.21  ICD-9-CM: 250.40, 583.81  2018        Stable angina ([facility] ICD-10-CM: I20.8  ICD-9-CM: 413.9  9/15/2017        Advanced care planning/counseling discussion ICD-10-CM: Z71.89  ICD-9-CM: V65.49  2017    Overview Signed 2017 10:31 AM by Angel Hernandez MD     Patient states she is a DNR  17               Obstructive sleep apnea (adult) (pediatric) ICD-10-CM: T85.50  ICD-9-CM: 327.23  2014        Chronic back pain ICD-10-CM: M54.9, G89.29  ICD-9-CM: 724.5, 338.29  2010        HTN (hypertension) ICD-10-CM: I10  ICD-9-CM: 401.9  5/3/2010        DM type 2 (diabetes mellitus, type 2) ([facility] ICD-10-CM: E11.9  ICD-9-CM: 250.00  5/3/2010    Overview Signed 2014  9:50 AM by Olga Mccauley MD     On Lantus  FTF for DM supplies 2014  Forms for [employer] DM supplies 2014             MI (myocardial infarction) ICD-10-CM: I21.9  ICD-9-CM: 410.90  5/3/2010                 Plan:     1. CAD: Continue current medications. She has a CT of the right coronary artery. Continue aspirin and Plavix. Plavix until 2017. 2. Hypertension: Blood pressure is better controlled. Post exercise blood pressure is also improving in the last few weeks. Increase to continue cardiac rehab exercises as well as per attention to salt and lose weight. 3. Dyslipidemia: Continue Crestor. 4. Follow-up with me in 6 months. Subjective:     Israel Casatno, a 67y.o. year-old who presents for followup. She has known history of hypertension, CAD, PCI of the LAD lesion in 2017. She is here for follow-up.   She denies any symptoms of chest pain or shortness of breath. She continues to go to cardiac rehab where she gets a very minimal retrosternal chest pain 2 out of 10 at peak exercise. This is improving. Her blood pressure post cardiac rehab exercises has also consistently improved. I was personally able to review all the records from [facility] cardiac rehab. Exam:     Physical Exam:  Visit Vitals    /76 (BP 1 Location: Left arm)    Pulse 76    Resp 16    Ht 5' 7\" (1.702 m)    Wt 221 lb 9.6 oz (100.5 kg)    SpO2 97%    BMI 34.71 kg/m2     General appearance - alert, well appearing, and in no distress  Mental status - affect appropriate to mood  Eyes - sclera anicteric, moist mucous membranes  Neck - supple, no significant adenopathy  Chest - clear to auscultation, no wheezes, rales or rhonchi  Heart - normal rate, regular rhythm, normal S1, S2, no murmurs, rubs, clicks or gallops  Abdomen - soft, nontender, nondistended, no masses or organomegaly      Medications:     Current Outpatient Prescriptions   Medication Sig    nitroglycerin (NITROSTAT) 0.4 mg SL tablet 1 Tab by SubLINGual route every five (5) minutes as needed for Chest Pain. Dr Corrine Lawrence to provide refills  Indications: Angina    lisinopril-hydroCHLOROthiazide (PRINZIDE, ZESTORETIC) 20-12.5 mg per tablet TAKE ONE TABLET BY MOUTH ONCE DAILY    clopidogrel (PLAVIX) 75 mg tab Take 1 Tab by mouth daily. Dr Corrine Lawrence to provide refills  Indications: Thrombosis Prevention after PCI    rosuvastatin (CRESTOR) 5 mg tablet Take 1 Tab by mouth nightly. Dr Corrine Lawrence to provide refills  Indications: atherosclerotic cardiovascular disease, mixed hyperlipidemia    aspirin 81 mg chewable tablet Take 81 mg by mouth daily.  co-enzyme Q-10 (CO Q-10) 100 mg capsule Take 1 Cap by mouth daily.  metoprolol succinate (TOPROL XL) 100 mg tablet Take 1 Tab by mouth daily.  insulin glargine (LANTUS SOLOSTAR) 100 unit/mL (3 mL) pen 52 units at night.     glucose blood VI test strips [facility] BLOOD GLUCOSE SYSTEM) strip Use 3 times per day (fasting and before meals). (Patient taking differently: Once a day)    Lancets (ONE TOUCH ULTRASOFT LANCETS) Misc 1 Package by Does Not Apply route. Test blood glucose 3-4 time daily     No current facility-administered medications for this visit. Diagnostic Data Review:       9/20/2017: CATH- LAD: p70% @ D1, dLAD:70%; LCX:m50%, RCA: mRCA:  supplied w/ collaterals from LAD  ---Unsuccessful PCI apical LAD: Attempted POBA with various balloons without success. --- s/p ELE (3x15, 2.75 x14 ELE overlapping) ->mLAD    9/18/17: STRESS CARDIOLITE- Poor exercise tolerance. Nml stress. Abnormal Exercise gated SPECT MPS: Small apical defect, worse on stress, Likely small apical infarct with mild hamlet-infarct ischemia. LVEF 64%    CATH: [address]- MI stents      Lab Review:     Lab Results   Component Value Date/Time    Cholesterol, total 223 09/21/2017 05:25 AM    HDL Cholesterol 65 09/21/2017 05:25 AM    LDL,Direct 158 01/17/2013 03:25 PM    LDL, calculated 136 09/21/2017 05:25 AM    Triglyceride 110 09/21/2017 05:25 AM    CHOL/HDL Ratio 3.4 09/21/2017 05:25 AM     Lab Results   Component Value Date/Time    Creatinine 1.55 09/21/2017 05:25 AM     Lab Results   Component Value Date/Time    BUN 16 09/21/2017 05:25 AM     Lab Results   Component Value Date/Time    Potassium 4.0 09/21/2017 05:25 AM     Lab Results   Component Value Date/Time    Hemoglobin A1c 9.0 09/01/2016 11:03 AM     Lab Results   Component Value Date/Time    HGB 12.1 09/18/2017 12:03 PM     Lab Results   Component Value Date/Time    PLATELET 508 11/75/5033 12:03 PM     No results for input(s): CPK, CKMB, TROIQ in the last 72 hours. No lab exists for component: CKQMB, CPKMB             ___________________________________________________    Brain Sake.  Sophia Morales MD, [facility] [place]

## 2018-02-28 DIAGNOSIS — I25.10 CORONARY ARTERY DISEASE INVOLVING NATIVE CORONARY ARTERY WITHOUT ANGINA PECTORIS, UNSPECIFIED WHETHER NATIVE OR TRANSPLANTED HEART: ICD-10-CM

## 2018-02-28 DIAGNOSIS — I10 ESSENTIAL HYPERTENSION: ICD-10-CM

## 2018-02-28 RX ORDER — METOPROLOL SUCCINATE 100 MG/1
TABLET, EXTENDED RELEASE ORAL
Qty: 90 TAB | Refills: 4 | Status: SHIPPED | OUTPATIENT
Start: 2018-02-28 | End: 2019-05-29 | Stop reason: SDUPTHER

## 2018-03-16 ENCOUNTER — TELEPHONE (OUTPATIENT)
Dept: FAMILY MEDICINE CLINIC | Age: 73
End: 2018-03-16

## 2018-03-16 NOTE — TELEPHONE ENCOUNTER
Dr. Song Sweeney please place an order for screening mammogram.    Called patient, she stated she is due for her mammo this year. Upon order being placed patient should receive a call from scheduling to set up her mammo appointment or she can walk into [facility] to have mammo done once the order is in the system.

## 2018-03-19 DIAGNOSIS — Z12.31 SCREENING MAMMOGRAM, ENCOUNTER FOR: Primary | ICD-10-CM

## 2018-04-06 ENCOUNTER — HOSPITAL ENCOUNTER (OUTPATIENT)
Dept: LAB | Age: 73
Discharge: HOME | End: 2018-04-06
Payer: MEDICARE

## 2018-04-06 ENCOUNTER — OFFICE VISIT (OUTPATIENT)
Dept: FAMILY MEDICINE CLINIC | Age: 73
End: 2018-04-06

## 2018-04-06 VITALS
WEIGHT: 227 LBS | RESPIRATION RATE: 17 BRPM | HEIGHT: 67 IN | DIASTOLIC BLOOD PRESSURE: 84 MMHG | TEMPERATURE: 97.6 F | BODY MASS INDEX: 35.63 KG/M2 | SYSTOLIC BLOOD PRESSURE: 129 MMHG | HEART RATE: 66 BPM | OXYGEN SATURATION: 96 %

## 2018-04-06 DIAGNOSIS — N18.30 CKD (CHRONIC KIDNEY DISEASE) STAGE 3, GFR 30-59 ML/MIN (HCC): ICD-10-CM

## 2018-04-06 DIAGNOSIS — I25.10 CORONARY ARTERY DISEASE INVOLVING NATIVE HEART WITHOUT ANGINA PECTORIS, UNSPECIFIED VESSEL OR LESION TYPE: ICD-10-CM

## 2018-04-06 DIAGNOSIS — E11.21 TYPE 2 DIABETES MELLITUS WITH NEPHROPATHY (HCC): Primary | ICD-10-CM

## 2018-04-06 PROCEDURE — 80061 LIPID PANEL: CPT

## 2018-04-06 PROCEDURE — 80048 BASIC METABOLIC PNL TOTAL CA: CPT

## 2018-04-06 PROCEDURE — 36415 COLL VENOUS BLD VENIPUNCTURE: CPT

## 2018-04-06 NOTE — PROGRESS NOTES
Here for HTN, diabetes, and CKD followup. Sees Dr. Mari Kocher for diabetes -- he recently switched her to trusiba. HTN -  BP controlled    CKD -- needs followup with nephrology. Today:  Needs BMP and lipid panel, non-fasting    I reviewed with the resident the medical history and the resident's findings on the physical examination. I discussed with the resident the patient's diagnosis and concur with the plan.

## 2018-04-06 NOTE — MR AVS SNAPSHOT
[address] [id number] Patient: Milly Solorio MRN: [id number] MLR:3/1/[birth year]96 Visit Information Date & Time Provider Department Dept. Phone Encounter #  
 4/6/2018  2:20 PM Wanda Hampton MD [address] [facility] [phone] [id number] Your Appointments 7/20/2018  1:40 PM  
ESTABLISHED PATIENT with Riri Abbott MD  
[facility] Appt Note: 6 month follow up  
 [address] [facility] [place] [address] [facility]  
[address] [place] [id number] [address] Upcoming Health Maintenance Date Due Hepatitis C Screening 1945 EYE EXAM RETINAL OR DILATED Q1 9/1/2015 MICROALBUMIN Q1 3/30/2016 BREAST CANCER SCRN MAMMOGRAM 9/24/2016 HEMOGLOBIN A1C Q6M 3/1/2017 Influenza Age 5 to Adult 8/1/2017 FOOT EXAM Q1 9/1/2017 MEDICARE YEARLY EXAM 4/5/2018 GLAUCOMA SCREENING Q2Y 9/8/2018 LIPID PANEL Q1 9/21/2018 Pneumococcal 65+ Low/Medium Risk (2 of 2 - PPSV23) 9/1/2020 COLONOSCOPY 4/30/2021 DTaP/Tdap/Td series (2 - Td) 4/4/2027 Allergies as of 4/6/2018  Review Complete On: 4/6/2018 By: Veronica Leiva LPN Severity Noted Reaction Type Reactions Pravachol [Pravastatin]  09/21/2017   Intolerance Myalgia Tetanus Vaccines And Toxoid  05/04/2011    Swelling Current Immunizations  Reviewed on 4/6/2018 Name Date Pneumococcal Vaccine (Unspecified Type) 9/1/2015 TB Skin Test (PPD) Intradermal 9/1/2017 Zoster Vaccine, Live 9/1/2015 Reviewed by Wanda Hampton MD on 4/6/2018 at  2:18 PM  
You Were Diagnosed With   
  
 Codes Comments Type 2 diabetes mellitus with nephropathy ([facility]    -  Primary ICD-10-CM: E11.21 
ICD-9-CM: 250.40, 583.81  Coronary artery disease involving native heart without angina pectoris, unspecified vessel or lesion type     ICD-10-CM: I25.10 ICD-9-CM: 414.01 9/20/2017: CATH- LAD: p70% @ D1, dLAD:70%; LCX:m50%, RCA: mRCA:  supplied w/ collaterals from LAD 
---Unsuccessful PCI apical LAD: Attempted POBA with variou CKD (chronic kidney disease) stage 3, GFR 30-59 ml/min     ICD-10-CM: N18.3 ICD-9-CM: 931. 3 Vitals BP Pulse Temp Resp Height(growth percentile) Weight(growth percentile) 129/84 66 97.6 °F (36.4 °C) (Oral) 17 5' 7\" (1.702 m) 227 lb (103 kg) SpO2 BMI OB Status Smoking Status 96% 35.55 kg/m2 Postmenopausal Former Smoker Vitals History BMI and BSA Data Body Mass Index Body Surface Area 35.55 kg/m 2 2.21 m 2 Preferred Pharmacy Pharmacy Name Phone [address] [phone] Your Updated Medication List  
  
   
This list is accurate as of 4/6/18  2:47 PM.  Always use your most recent med list.  
  
  
  
  
 aspirin 81 mg chewable tablet Take 81 mg by mouth daily. clopidogrel 75 mg Tab Commonly known as:  PLAVIX Take 1 Tab by mouth daily. Dr Jesus Cody to provide refills  Indications: Thrombosis Prevention after PCI  
  
 CO Q-10 100 mg capsule Generic drug:  co-enzyme Q-10 Take 1 Cap by mouth daily. glucose blood VI test strips strip Commonly known as:  [address] Use 3 times per day (fasting and before meals). Lancets Misc Commonly known as:  ONETOUCH ULTRASOFT LANCETS  
1 Package by Does Not Apply route. Test blood glucose 3-4 time daily  
  
 lisinopril-hydroCHLOROthiazide 20-12.5 mg per tablet Commonly known as:  PRINZIDE, ZESTORETIC  
TAKE ONE TABLET BY MOUTH ONCE DAILY  
  
 metoprolol succinate 100 mg tablet Commonly known as:  TOPROL-XL  
TAKE ONE TABLET BY MOUTH ONCE DAILY  
  
 nitroglycerin 0.4 mg SL tablet Commonly known as:  NITROSTAT 1 Tab by SubLINGual route every five (5) minutes as needed for Chest Pain. Dr Galina Gonzales to provide refills  Indications: Angina  
  
 rosuvastatin 5 mg tablet Commonly known as:  CRESTOR Take 1 Tab by mouth nightly. Dr Galina Gonzales to provide refills  Indications: atherosclerotic cardiovascular disease, mixed hyperlipidemia We Performed the Following LIPID PANEL [41888 CPT(R)] METABOLIC PANEL, BASIC [53052 CPT(R)] Patient Instructions Gelacio Field MD   
 
 
Phone: [phone] Chronic Kidney Disease: Care Instructions Your Care Instructions Chronic kidney disease happens when your kidneys don't work as well as they should. Your kidneys have a few important jobs. They remove waste from your blood. This waste leaves your body in your urine. They also balance your body's fluids and chemicals. When your kidneys don't work well, extra waste and fluid can build up. This can poison the body and sometimes cause death. The most common causes of this disease are diabetes and high blood pressure. In some cases, the disease develops in 2 to 3 months. But it usually develops over many years. If you take medicine and make healthy changes to your lifestyle, you may be able to prevent the disease from getting worse. But if your kidney damage gets worse, you may need dialysis or a kidney transplant. Dialysis uses a machine to filter waste from the blood. A transplant is surgery to give you a healthy kidney from another person. Follow-up care is a key part of your treatment and safety. Be sure to make and go to all appointments, and call your doctor if you are having problems. It's also a good idea to know your test results and keep a list of the medicines you take. How can you care for yourself at home? ? Treatments and appointments ? · Be safe with medicines. Take your medicines exactly as prescribed. Call your doctor if you have any problems with your medicine. You also may take medicine to control your blood pressure or to treat diabetes. Many people who have diabetes take blood pressure medicine. ? · If you have diabetes, do your best to keep your blood sugar in your target range. You may do this by eating healthy food and exercising. You may also take medicines. ? · Go to your dialysis appointments if you have this treatment. ? · Do not take ibuprofen (Advil, Motrin), naproxen (Aleve), or similar medicines, unless your doctor tells you to. These may make the disease worse. ? · Do not take any vitamins, over-the-counter medicines, or herbal products without talking to your doctor first.  
? · Do not smoke or use other tobacco products. Smoking can reduce blood flow to the kidneys. If you need help quitting, talk to your doctor about stop-smoking programs and medicines. These can increase your chances of quitting for good. ? · Do not drink alcohol or use illegal drugs. ? · Talk to your doctor about an exercise plan. Exercise helps lower your blood pressure. It also makes you feel better. ? · If you have an advance directive, let your doctor know. It may include a living will and a durable power of  for health care. If you don't have one, you may want to prepare one. It lets your doctor and loved ones know your health care wishes if you become unable to speak for yourself. Diet ? · Talk to a registered dietitian. He or she can help you make a meal plan that is right for you. Most people with kidney disease need to limit salt (sodium), fluids, and protein. Some also have to limit potassium and phosphorus. ? · You may have to give up many foods you like. But try to focus on the fact that this will help you stay healthy for as long as possible. ? · If you have a hard time eating enough, talk to your doctor or dietitian about ways to add calories to your diet. ? · Your diet may change as your disease changes. See your doctor for regular testing. And work with a dietitian to change your diet as needed. When should you call for help? Call 911 anytime you think you may need emergency care. For example, call if: 
? · You passed out (lost consciousness). ?Call your doctor now or seek immediate medical care if: 
? · You have less urine than normal or no urine. ? · You have trouble urinating or can urinate only very small amounts. ? · You are confused or have trouble thinking clearly. ? · You feel weaker or more tired than usual.  
? · You are very thirsty, lightheaded, or dizzy. ? · You have nausea and vomiting. ? · You have new swelling of your arms or feet, or your swelling is worse. ? · You have blood in your urine. ? · You have new or worse trouble breathing. ? Watch closely for changes in your health, and be sure to contact your doctor if: 
? · You have any problems with your medicine or other treatment. Where can you learn more? Go to http://yfn-wally.info/. Enter N276 in the search box to learn more about \"Chronic Kidney Disease: Care Instructions. \" Current as of: May 12, 2017 Content Version: 11.4 © 5379-2316 [employer]. Care instructions adapted under license by TM (which disclaims liability or warranty for this information). If you have questions about a medical condition or this instruction, always ask your healthcare professional. [place] [address] any warranty or liability for your use of this information. Introducing [facility]! Jodie Zuniga introduces Case[employer] patient portal. Now you can access parts of your medical record, email your doctor's office, and request medication refills online. 1. In your internet browser, go to [email or web address] MD 2. Click on the First Time User? Click Here link in the Sign In box.  You will see the New Member Sign Up page. 3. Enter your Arcivr Access Code exactly as it appears below. You will not need to use this code after youve completed the sign-up process. If you do not sign up before the expiration date, you must request a new code. · Arcivr Access Code: [email or web address] Expires: 6/19/2018  3:25 PM 
 
4. Enter the last four digits of your Social Security Number (xxxx) and Date of Birth (mm/dd/yyyy) as indicated and click Submit. You will be taken to the next sign-up page. 5. Create a Arcivr ID. This will be your Arcivr login ID and cannot be changed, so think of one that is secure and easy to remember. 6. Create a Arcivr password. You can change your password at any time. 7. Enter your Password Reset Question and Answer. This can be used at a later time if you forget your password. 8. Enter your e-mail address. You will receive e-mail notification when new information is available in [address] 9. Click Sign Up. You can now view and download portions of your medical record. 10. Click the Download Summary menu link to download a portable copy of your medical information. If you have questions, please visit the Frequently Asked Questions section of the Arcivr website. Remember, Arcivr is NOT to be used for urgent needs. For medical emergencies, dial 911. Now available from your iPhone and Android! Please provide this summary of care documentation to your next provider. Your primary care clinician is listed as Celena Maki. If you have any questions after today's visit, please call [phone].

## 2018-04-06 NOTE — PATIENT INSTRUCTIONS
Deneen Henderson MD        Phone: [phone]         Chronic Kidney Disease: Care Instructions  Your Care Instructions    Chronic kidney disease happens when your kidneys don't work as well as they should. Your kidneys have a few important jobs. They remove waste from your blood. This waste leaves your body in your urine. They also balance your body's fluids and chemicals. When your kidneys don't work well, extra waste and fluid can build up. This can poison the body and sometimes cause death. The most common causes of this disease are diabetes and high blood pressure. In some cases, the disease develops in 2 to 3 months. But it usually develops over many years. If you take medicine and make healthy changes to your lifestyle, you may be able to prevent the disease from getting worse. But if your kidney damage gets worse, you may need dialysis or a kidney transplant. Dialysis uses a machine to filter waste from the blood. A transplant is surgery to give you a healthy kidney from another person. Follow-up care is a key part of your treatment and safety. Be sure to make and go to all appointments, and call your doctor if you are having problems. It's also a good idea to know your test results and keep a list of the medicines you take. How can you care for yourself at home? ? Treatments and appointments  ? · Be safe with medicines. Take your medicines exactly as prescribed. Call your doctor if you have any problems with your medicine. You also may take medicine to control your blood pressure or to treat diabetes. Many people who have diabetes take blood pressure medicine. ? · If you have diabetes, do your best to keep your blood sugar in your target range. You may do this by eating healthy food and exercising. You may also take medicines. ? · Go to your dialysis appointments if you have this treatment.    ? · Do not take ibuprofen (Advil, Motrin), naproxen (Aleve), or similar medicines, unless your doctor tells you to. These may make the disease worse. ? · Do not take any vitamins, over-the-counter medicines, or herbal products without talking to your doctor first.   ? · Do not smoke or use other tobacco products. Smoking can reduce blood flow to the kidneys. If you need help quitting, talk to your doctor about stop-smoking programs and medicines. These can increase your chances of quitting for good. ? · Do not drink alcohol or use illegal drugs. ? · Talk to your doctor about an exercise plan. Exercise helps lower your blood pressure. It also makes you feel better. ? · If you have an advance directive, let your doctor know. It may include a living will and a durable power of  for health care. If you don't have one, you may want to prepare one. It lets your doctor and loved ones know your health care wishes if you become unable to speak for yourself. Diet  ? · Talk to a registered dietitian. He or she can help you make a meal plan that is right for you. Most people with kidney disease need to limit salt (sodium), fluids, and protein. Some also have to limit potassium and phosphorus. ? · You may have to give up many foods you like. But try to focus on the fact that this will help you stay healthy for as long as possible. ? · If you have a hard time eating enough, talk to your doctor or dietitian about ways to add calories to your diet. ? · Your diet may change as your disease changes. See your doctor for regular testing. And work with a dietitian to change your diet as needed. When should you call for help? Call 911 anytime you think you may need emergency care. For example, call if:  ? · You passed out (lost consciousness). ?Call your doctor now or seek immediate medical care if:  ? · You have less urine than normal or no urine. ? · You have trouble urinating or can urinate only very small amounts. ? · You are confused or have trouble thinking clearly.    ? · You feel weaker or more tired than usual.   ? · You are very thirsty, lightheaded, or dizzy. ? · You have nausea and vomiting. ? · You have new swelling of your arms or feet, or your swelling is worse. ? · You have blood in your urine. ? · You have new or worse trouble breathing. ? Watch closely for changes in your health, and be sure to contact your doctor if:  ? · You have any problems with your medicine or other treatment. Where can you learn more? Go to http://yfn-wally.info/. Enter N276 in the search box to learn more about \"Chronic Kidney Disease: Care Instructions. \"  Current as of: May 12, 2017  Content Version: 11.4  © 4877-7735 [employer]. Care instructions adapted under license by [employer] (which disclaims liability or warranty for this information). If you have questions about a medical condition or this instruction, always ask your healthcare professional. [place] [address] any warranty or liability for your use of this information.

## 2018-04-06 NOTE — PROGRESS NOTES
Subjective  CC: Christi Morin is an 67 y.o. female presents for follow up of HTN and Diabetes and CKD. Previous records reviewed. T2DM-insulin dependent    She is now on Trusiba 60ml at night  Her last A1c was 9.0% Sept 2016 with us, she states it was about the same with her endocrinologist. Also sees endocrinology for her diabetes inbetween visits with us,  Dr. Krissy Krishna. She has follow up with him. She had Pneumovax 2015. She is on an ACE. She takes a baby aspirin. HTN  She currently is prinzide 20mg/12.5mg daily and toprol XL 100mg daily. She tolerates this well. Checks BP at home sparingly    CKD stage 3- worsened in previous labs  Presumed due to diabetes. She was diagnosed with CAD in September, sees Dr. Gonzalez Randhawa. Just saw him in January. Now on rosuvastatin and plavix. Had a stent placed. Denies chest pain, palpitations, shortness of breath, no polyuria or polydipsia. Allergies - reviewed: Allergies   Allergen Reactions    Pravachol [Pravastatin] Myalgia    Tetanus Vaccines And Toxoid Swelling         Medications - reviewed:   Current Outpatient Prescriptions   Medication Sig    metoprolol succinate (TOPROL-XL) 100 mg tablet TAKE ONE TABLET BY MOUTH ONCE DAILY    nitroglycerin (NITROSTAT) 0.4 mg SL tablet 1 Tab by SubLINGual route every five (5) minutes as needed for Chest Pain. Dr Timo Layton to provide refills  Indications: Angina    lisinopril-hydroCHLOROthiazide (PRINZIDE, ZESTORETIC) 20-12.5 mg per tablet TAKE ONE TABLET BY MOUTH ONCE DAILY    rosuvastatin (CRESTOR) 5 mg tablet Take 1 Tab by mouth nightly. Dr Timo Layton to provide refills  Indications: atherosclerotic cardiovascular disease, mixed hyperlipidemia    aspirin 81 mg chewable tablet Take 81 mg by mouth daily.  co-enzyme Q-10 (CO Q-10) 100 mg capsule Take 1 Cap by mouth daily.  glucose blood VI test strips ([employer] BLOOD GLUCOSE SYSTEM) strip Use 3 times per day (fasting and before meals).  (Patient taking differently: Once a day)    Lancets (ONE TOUCH ULTRASOFT LANCETS) Misc 1 Package by Does Not Apply route. Test blood glucose 3-4 time daily    clopidogrel (PLAVIX) 75 mg tab Take 1 Tab by mouth daily. Dr Regi Spann to provide refills  Indications: Thrombosis Prevention after PCI     No current facility-administered medications for this visit. Past Medical History - reviewed:  Past Medical History:   Diagnosis Date    Chronic back pain 9/8/2010    DM type 2 (diabetes mellitus, type 2) ([facility] 5/3/2010    HTN (hypertension) 5/3/2010    [facility]    Obstructive sleep apnea (adult) (pediatric) 12/12/2014         Immunizations - reviewed:   Immunization History   Administered Date(s) Administered    Pneumococcal Vaccine (Unspecified Type) 09/01/2015    TB Skin Test (PPD) Intradermal 09/01/2017    Zoster Vaccine, Live 09/01/2015         ROS  Review of Systems : A complete review of systems was performed and is negative except for those mentioned in the HPI. Physical Exam  Visit Vitals    /84    Pulse 66    Temp 97.6 °F (36.4 °C) (Oral)    Resp 17    Ht 5' 7\" (1.702 m)    Wt 227 lb (103 kg)    SpO2 96%    BMI 35.55 kg/m2       General appearance - Alert, NAD. Head: Atraumatic. Normocephalic. Eyes: EOMI. Sclera white. Respiratory - LCTAB. No wheeze/rale/rhonchi  Heart - Normal rate, regular rhythm. No m/r/r  Extremities - No LE edema. Distal pulses intact  Skin - normal coloration and normal turgor. No cyanosis, no rash. Assessment/Plan    1. Type 2 diabetes mellitus with nephropathy ([facility]):  Patient's A1c uncontrolled on last check. Has seen endo in the mean time. Change in meds. Has followed up scheduled. Will monitor her reports to see how A1c improves on new medications  - continue trusiba  - Has had Pneumovax, on ACE-I, eye visit within last year    2.  Coronary artery disease involving native heart without angina pectoris, unspecified vessel or lesion type:   - LIPID PANEL- non fasting. Already on high intensity statin. 3. CKD (chronic kidney disease) stage 3, GFR 30-59 ml/min:- worsening in the past. I will refer for follow up with nephrology given chronicity and history of diabetes Seen Dr. Nj Iqbal before  - METABOLIC PANEL, BASIC      I have discussed the aforementioned diagnoses and plan with the patient in detail. I have provided information in person and/or in AVS. All questions answered prior to discharge.     Maria Del Rosario Membreno MD  Family Medicine Resident  PGY 3

## 2018-04-06 NOTE — PROGRESS NOTES
Chief Complaint   Patient presents with    Follow-up     blood pressure     1. Have you been to the ER, urgent care clinic since your last visit? Hospitalized since your last visit? No    2. Have you seen or consulted any other health care providers outside of the [facility] since your last visit? Include any pap smears or colon screening.  No

## 2018-04-07 LAB
BUN SERPL-MCNC: 31 MG/DL (ref 8–27)
BUN/CREAT SERPL: 20 (ref 12–28)
CALCIUM SERPL-MCNC: 8.8 MG/DL (ref 8.7–10.3)
CHLORIDE SERPL-SCNC: 99 MMOL/L (ref 96–106)
CHOLEST SERPL-MCNC: 176 MG/DL (ref 100–199)
CO2 SERPL-SCNC: 23 MMOL/L (ref 18–29)
CREAT SERPL-MCNC: 1.55 MG/DL (ref 0.57–1)
GFR SERPLBLD CREATININE-BSD FMLA CKD-EPI: 33 ML/MIN/1.73
GFR SERPLBLD CREATININE-BSD FMLA CKD-EPI: 38 ML/MIN/1.73
GLUCOSE SERPL-MCNC: 91 MG/DL (ref 65–99)
HDLC SERPL-MCNC: 76 MG/DL
INTERPRETATION, 910389: NORMAL
INTERPRETATION: NORMAL
LDLC SERPL CALC-MCNC: 82 MG/DL (ref 0–99)
PDF IMAGE, 910387: NORMAL
POTASSIUM SERPL-SCNC: 5 MMOL/L (ref 3.5–5.2)
SODIUM SERPL-SCNC: 138 MMOL/L (ref 134–144)
TRIGL SERPL-MCNC: 90 MG/DL (ref 0–149)
VLDLC SERPL CALC-MCNC: 18 MG/DL (ref 5–40)

## 2018-04-11 DIAGNOSIS — I25.118 CORONARY ARTERY DISEASE OF NATIVE HEART WITH STABLE ANGINA PECTORIS, UNSPECIFIED VESSEL OR LESION TYPE (HCC): ICD-10-CM

## 2018-04-11 RX ORDER — NITROGLYCERIN 0.4 MG/1
TABLET SUBLINGUAL
Qty: 1 BOTTLE | Refills: 1 | Status: SHIPPED | OUTPATIENT
Start: 2018-04-11 | End: 2022-09-02

## 2018-06-08 ENCOUNTER — TELEPHONE (OUTPATIENT)
Dept: FAMILY MEDICINE CLINIC | Age: 73
End: 2018-06-08

## 2018-06-13 ENCOUNTER — TELEPHONE (OUTPATIENT)
Dept: FAMILY MEDICINE CLINIC | Age: 73
End: 2018-06-13

## 2018-06-13 NOTE — TELEPHONE ENCOUNTER
Left a message for this patient to call our office. She is due for her mammogram screening. The order is in the system already. Please let her know to call Judy Johnson or [facility] [phone] to schedule her appointment as soon as possible, so Dr. Bonnie Lee can get the results to review. Please let Stormy Reyes know if she calls back.

## 2018-06-25 NOTE — TELEPHONE ENCOUNTER
I did the third outreach to this patient today. I left a message regarding the patient to call the [facility] an [facility] for her screening mammogram, which is due. The order is already in the system from Dr. Mandie Curtis back on 03/19/18.

## 2018-07-20 ENCOUNTER — OFFICE VISIT (OUTPATIENT)
Dept: CARDIOLOGY CLINIC | Age: 73
End: 2018-07-20

## 2018-07-20 VITALS
RESPIRATION RATE: 19 BRPM | SYSTOLIC BLOOD PRESSURE: 142 MMHG | HEART RATE: 79 BPM | HEIGHT: 67 IN | DIASTOLIC BLOOD PRESSURE: 72 MMHG | WEIGHT: 224.8 LBS | OXYGEN SATURATION: 97 % | BODY MASS INDEX: 35.28 KG/M2

## 2018-07-20 DIAGNOSIS — I25.119 CORONARY ARTERY DISEASE INVOLVING NATIVE HEART WITH ANGINA PECTORIS, UNSPECIFIED VESSEL OR LESION TYPE (HCC): Primary | ICD-10-CM

## 2018-07-20 PROBLEM — E66.01 SEVERE OBESITY (BMI 35.0-39.9): Status: ACTIVE | Noted: 2018-07-20

## 2018-07-20 NOTE — PROGRESS NOTES
Chief Complaint   Patient presents with    Follow-up     6 mths    Hypertension    Coronary Artery Disease     1. Have you been to the ER, urgent care clinic since your last visit? Hospitalized since your last visit? No    2. Have you seen or consulted any other health care providers outside of the [address] [place] [address] since your last visit? Include any pap smears or colon screening.  No    Visit Vitals    /72 (BP 1 Location: Left arm, BP Patient Position: Sitting)    Pulse 79    Resp 19    Ht 5' 7\" (1.702 m)    Wt 224 lb 12.8 oz (102 kg)    SpO2 97%    BMI 35.21 kg/m2

## 2018-07-27 ENCOUNTER — OFFICE VISIT (OUTPATIENT)
Dept: CARDIOLOGY CLINIC | Age: 73
End: 2018-07-27

## 2018-07-27 VITALS
OXYGEN SATURATION: 18 % | SYSTOLIC BLOOD PRESSURE: 130 MMHG | BODY MASS INDEX: 35.22 KG/M2 | DIASTOLIC BLOOD PRESSURE: 90 MMHG | HEIGHT: 67 IN | RESPIRATION RATE: 18 BRPM | WEIGHT: 224.4 LBS | HEART RATE: 83 BPM

## 2018-07-27 DIAGNOSIS — E78.5 DYSLIPIDEMIA: ICD-10-CM

## 2018-07-27 DIAGNOSIS — E11.9 TYPE 2 DIABETES MELLITUS WITHOUT COMPLICATION, WITHOUT LONG-TERM CURRENT USE OF INSULIN (HCC): ICD-10-CM

## 2018-07-27 DIAGNOSIS — I25.119 CORONARY ARTERY DISEASE INVOLVING NATIVE HEART WITH ANGINA PECTORIS, UNSPECIFIED VESSEL OR LESION TYPE (HCC): Primary | ICD-10-CM

## 2018-07-27 DIAGNOSIS — I10 ESSENTIAL HYPERTENSION: ICD-10-CM

## 2018-07-27 RX ORDER — INSULIN DEGLUDEC 100 U/ML
60 INJECTION, SOLUTION SUBCUTANEOUS
COMMUNITY

## 2018-07-27 NOTE — PROGRESS NOTES
Office Follow-up    NAME: Mansi Weinstein   :  1945  MRM:  [id number]    Date:  2018            Assessment:     Problem List  Date Reviewed: 2018          Codes Class Noted    CAD (coronary artery disease) ICD-10-CM: I25.10  ICD-9-CM: 414.00  2017        Severe obesity (BMI 35.0-39.9) ([facility] ICD-10-CM: E66.01  ICD-9-CM: 278.01  2018        Type 2 diabetes mellitus with nephropathy ([facility] ICD-10-CM: E11.21  ICD-9-CM: 250.40, 583.81  2018        Stable angina ([facility] ICD-10-CM: I20.8  ICD-9-CM: 413.9  9/15/2017        Advanced care planning/counseling discussion ICD-10-CM: Z71.89  ICD-9-CM: V65.49  2017    Overview Signed 2017 10:31 AM by Rashel Mcclure     Patient states she is a DNR  17               Obstructive sleep apnea (adult) (pediatric) ICD-10-CM: P17.44  ICD-9-CM: 327.23  2014        Chronic back pain ICD-10-CM: M54.9, G89.29  ICD-9-CM: 724.5, 338.29  2010        HTN (hypertension) ICD-10-CM: I10  ICD-9-CM: 401.9  5/3/2010        DM type 2 (diabetes mellitus, type 2) ([facility] ICD-10-CM: E11.9  ICD-9-CM: 250.00  5/3/2010    Overview Signed 2014  9:50 AM by Vandana Bingham MD     On Lantus  FTF for DM supplies 2014  Forms for [employer] DM supplies 2014             MI (myocardial infarction) [facility]) ICD-10-CM: I21.9  ICD-9-CM: 410.90  5/3/2010                 Plan:     1. CAD/LAD stent/RCA : Continue medical management. Plavix until 2018 thereafter she can discontinue the Plavix. Continue aspirin and Crestor along with metoprolol at current dosages. 2. Hypertension: Blood pressure is controlled. Continue metoprolol and Prinzide. 3. Dyslipidemia: LDL has significantly improved from 136 last year to now most recently in April her LDL was 82. Continue Crestor 5 mg p.o. daily. Strict control of blood sugars. 4. Diabetes: Most recent hemoglobin A1c 3 months ago was 9.5. She is being currently treated by Dr. Brandi Courtney.     5. See Dr. Lam Petties in 1 year. Stop Plavix after September 2018. Subjective:     Mansi Weinstein, a 68y.o. year-old who presents for followup. She has known history of coronary disease with PCI to LAD in September 2017. 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 She underwent cardiac rehab and has done very well since then. She denies any symptoms of chest pain. Her main symptom right now is nocturnal gastric gas and reflux. She is otherwise doing well and tolerating all her medications. Exam:     Physical Exam:  Visit Vitals    /90 (BP 1 Location: Left arm, BP Patient Position: Sitting)    Pulse 83    Resp 18    Ht 5' 7\" (1.702 m)    Wt 224 lb 6.4 oz (101.8 kg)    SpO2 (!) 18%    BMI 35.15 kg/m2     General appearance - alert, well appearing, and in no distress  Mental status - affect appropriate to mood  Eyes - sclera anicteric, moist mucous membranes  Neck - supple, no significant adenopathy  Chest - clear to auscultation, no wheezes, rales or rhonchi  Heart - normal rate, regular rhythm, normal S1, S2, no murmurs, rubs, clicks or gallops  Abdomen - soft, nontender, nondistended, no masses or organomegaly  Extremities - peripheral pulses normal, no pedal edema  Skin - normal coloration  no rashes    Medications:     Current Outpatient Prescriptions   Medication Sig    insulin degludec (TRESIBA FLEXTOUCH U-100) 100 unit/mL (3 mL) inpn by SubCUTAneous route daily.  semaglutide (OZEMPIC) 0.25 mg/0.2 mL (2 mg/1.5 mL) sub-q pen by SubCUTAneous route every seven (7) days.     nitroglycerin (NITROSTAT) 0.4 mg SL tablet PLACE ONE TABLET UNDER THE TONGUE EVERY 5 MINUTES AS NEEDED FOR CHEST PAIN    metoprolol succinate (TOPROL-XL) 100 mg tablet TAKE ONE TABLET BY MOUTH ONCE DAILY    lisinopril-hydroCHLOROthiazide (PRINZIDE, ZESTORETIC) 20-12.5 mg per tablet TAKE ONE TABLET BY MOUTH ONCE DAILY    clopidogrel (PLAVIX) 75 mg tab Take 1 Tab by mouth daily. Dr Roman Husain to provide refills  Indications: Thrombosis Prevention after PCI    rosuvastatin (CRESTOR) 5 mg tablet Take 1 Tab by mouth nightly. Dr Roman Husain to provide refills  Indications: atherosclerotic cardiovascular disease, mixed hyperlipidemia    aspirin 81 mg chewable tablet Take 81 mg by mouth daily.  co-enzyme Q-10 (CO Q-10) 100 mg capsule Take 1 Cap by mouth daily.  glucose blood VI test strips ([employer] BLOOD GLUCOSE SYSTEM) strip Use 3 times per day (fasting and before meals). (Patient taking differently: Once a day)    Lancets (ONE TOUCH ULTRASOFT LANCETS) Misc 1 Package by Does Not Apply route. Test blood glucose 3-4 time daily     No current facility-administered medications for this visit. Diagnostic Data Review:       9/20/17: CATH- LAD: p70% @ D1, dLAD:70%; LCX:m50%, RCA: mRCA:  supplied w/ collaterals from LAD  ---Unsuccessful PCI apical LAD: Attempted POBA with various balloons without success. --- s/p ELE (3x15, 2.75 x14 ELE overlapping) ->mLAD    9/18/17: STRESS CARDIOLITE- Poor exercise tolerance. Nml stress. Abnormal Exercise gated SPECT MPS: Small apical defect, worse on stress, Likely small apical infarct with mild hamlet-infarct ischemia.  LVEF 64%    1997: [address]- MI stents      Lab Review:     Lab Results   Component Value Date/Time    Cholesterol, total 176 04/06/2018 03:21 PM    HDL Cholesterol 76 04/06/2018 03:21 PM    LDL,Direct 158 (H) 01/17/2013 03:25 PM    LDL, calculated 82 04/06/2018 03:21 PM    Triglyceride 90 04/06/2018 03:21 PM    CHOL/HDL Ratio 3.4 09/21/2017 05:25 AM     Lab Results   Component Value Date/Time    Creatinine 1.55 (H) 04/06/2018 03:21 PM     Lab Results   Component Value Date/Time    BUN 31 (H) 04/06/2018 03:21 PM     Lab Results   Component Value Date/Time    Potassium 5.0 04/06/2018 03:21 PM     Lab Results   Component Value Date/Time    Hemoglobin A1c 9.0 (H) 09/01/2016 11:03 AM     Lab Results Component Value Date/Time    HGB 12.1 09/18/2017 12:03 PM     Lab Results   Component Value Date/Time    PLATELET 923 34/58/3389 12:03 PM     No results for input(s): CPK, CKMB, TROIQ in the last 72 hours. No lab exists for component: CKQMB, CPKMB             ___________________________________________________    Jacklyn Moon.  Jaxon Miles MD, [facility]

## 2018-07-27 NOTE — MR AVS SNAPSHOT
[address] [facility] [place] [address] [facility] 
[phone] Patient: Mansi Weinstein MRN: [id number] KMI:[birth year] Visit Information Date & Time Provider Department Dept. Phone Encounter #  
 7/27/2018  2:20 PM Victor Hugo Quiñones MD [facility] Lea Perez [phone] [id number] Follow-up Instructions Follow-up and Disposition History Your Appointments 8/2/2019  2:00 PM  
ESTABLISHED PATIENT with Victor Hugo Quiñones MD  
[address] ([address]) Appt Note: annual  
 [address] [facility] [address] [facility]  
[address] [facility] [place] [address] [address] Upcoming Health Maintenance Date Due Hepatitis C Screening 1945 EYE EXAM RETINAL OR DILATED Q1 9/1/2015 MICROALBUMIN Q1 3/30/2016 BREAST CANCER SCRN MAMMOGRAM 9/24/2016 HEMOGLOBIN A1C Q6M 3/1/2017 FOOT EXAM Q1 9/1/2017 MEDICARE YEARLY EXAM 4/5/2018 GLAUCOMA SCREENING Q2Y 9/8/2018 Influenza Age 5 to Adult 8/1/2018 LIPID PANEL Q1 4/6/2019 Pneumococcal 65+ Low/Medium Risk (2 of 2 - PPSV23) 9/1/2020 COLONOSCOPY 4/30/2021 DTaP/Tdap/Td series (2 - Td) 4/4/2027 Allergies as of 7/27/2018  Review Complete On: 7/27/2018 By: Victor Hugo Quiñones MD  
  
 Severity Noted Reaction Type Reactions Pravachol [Pravastatin]  09/21/2017   Intolerance Myalgia Tetanus Vaccines And Toxoid  05/04/2011    Swelling Current Immunizations  Reviewed on 7/20/2018 Name Date Pneumococcal Vaccine (Unspecified Type) 9/1/2015 TB Skin Test (PPD) Intradermal 9/1/2017 Zoster Vaccine, Live 9/1/2015 Not reviewed this visit You Were Diagnosed With   
  
 Codes Comments Coronary artery disease involving native heart with angina pectoris, unspecified vessel or lesion type ([facility]    -  Primary ICD-10-CM: I25.119 ICD-9-CM: 414.01, 413.9 Essential hypertension     ICD-10-CM: I10 
ICD-9-CM: 401.9 Type 2 diabetes mellitus without complication, without long-term current use of insulin (HCC)     ICD-10-CM: E11.9 ICD-9-CM: 250.00 Dyslipidemia     ICD-10-CM: E78.5 ICD-9-CM: 272.4 Vitals BP Pulse Resp Height(growth percentile) Weight(growth percentile) SpO2  
 130/90 (BP 1 Location: Left arm, BP Patient Position: Sitting) 83 18 5' 7\" (1.702 m) 224 lb 6.4 oz (101.8 kg) (!) 18% BMI OB Status Smoking Status 35.15 kg/m2 Postmenopausal Former Smoker Vitals History BMI and BSA Data Body Mass Index Body Surface Area  
 35.15 kg/m 2 2.19 m 2 Preferred Pharmacy Pharmacy Name Phone [address] [phone] Your Updated Medication List  
  
   
This list is accurate as of 7/27/18  2:44 PM.  Always use your most recent med list.  
  
  
  
  
 aspirin 81 mg chewable tablet Take 81 mg by mouth daily. clopidogrel 75 mg Tab Commonly known as:  PLAVIX Take 1 Tab by mouth daily. Dr Jo Ann Salomn to provide refills  Indications: Thrombosis Prevention after PCI  
  
 CO Q-10 100 mg capsule Generic drug:  co-enzyme Q-10 Take 1 Cap by mouth daily. glucose blood VI test strips strip Commonly known as:  [address] Use 3 times per day (fasting and before meals). Lancets Misc Commonly known as:  ONETOUCH ULTRASOFT LANCETS  
1 Package by Does Not Apply route. Test blood glucose 3-4 time daily  
  
 lisinopril-hydroCHLOROthiazide 20-12.5 mg per tablet Commonly known as:  PRINZIDE, ZESTORETIC  
TAKE ONE TABLET BY MOUTH ONCE DAILY  
  
 metoprolol succinate 100 mg tablet Commonly known as:  TOPROL-XL  
TAKE ONE TABLET BY MOUTH ONCE DAILY  
  
 nitroglycerin 0.4 mg SL tablet Commonly known as:  NITROSTAT PLACE ONE TABLET UNDER THE TONGUE EVERY 5 MINUTES AS NEEDED FOR CHEST PAIN  
  
 OZEMPIC 0.25 mg/0.2 mL (2 mg/1.5 mL) sub-q pen Generic drug:  semaglutide  
by SubCUTAneous route every seven (7) days. rosuvastatin 5 mg tablet Commonly known as:  CRESTOR Take 1 Tab by mouth nightly. Dr Esmer Rhodes to provide refills  Indications: atherosclerotic cardiovascular disease, mixed hyperlipidemia TRESIBA FLEXTOUCH U-100 100 unit/mL (3 mL) Inpn Generic drug:  insulin degludec  
by SubCUTAneous route daily. Patient Instructions See Dr. Lavelle Solis in 1 year. Stop Plavix after August 2018. Introducing [facility]! [employer] introduces [employer] patient portal. Now you can access parts of your medical record, email your doctor's office, and request medication refills online. 1. In your internet browser, go to [email or web address] 2. Click on the First Time User? Click Here link in the Sign In box. You will see the New Member Sign Up page. 3. Enter your [employer] Access Code exactly as it appears below. You will not need to use this code after youve completed the sign-up process. If you do not sign up before the expiration date, you must request a new code. · [employer] Access Code: [email or web address] Expires: 10/25/2018  2:44 PM 
 
4. Enter the last four digits of your Social Security Number (xxxx) and Date of Birth (mm/dd/yyyy) as indicated and click Submit. You will be taken to the next sign-up page. 5. Create a [employer] ID. This will be your [employer] login ID and cannot be changed, so think of one that is secure and easy to remember. 6. Create a [employer] password. You can change your password at any time. 7. Enter your Password Reset Question and Answer. This can be used at a later time if you forget your password. 8. Enter your e-mail address. You will receive e-mail notification when new information is available in [address] 9. Click Sign Up. You can now view and download portions of your medical record. 10. Click the Download Summary menu link to download a portable copy of your medical information. If you have questions, please visit the Frequently Asked Questions section of the [employer] website. Remember, [facility] [facility] is NOT to be used for urgent needs. For medical emergencies, dial 911. Now available from your iPhone and Android! Please provide this summary of care documentation to your next provider. Your primary care clinician is listed as Zenon Rodarte. If you have any questions after today's visit, please call [phone].

## 2018-07-27 NOTE — PROGRESS NOTES
Chief Complaint   Patient presents with    Follow-up     6 mths     1. Have you been to the ER, urgent care clinic since your last visit? Hospitalized since your last visit? No    2. Have you seen or consulted any other health care providers outside of the [address] [place] [address] since your last visit? Include any pap smears or colon screening.  No    Visit Vitals    /90 (BP 1 Location: Left arm, BP Patient Position: Sitting)    Pulse 83    Resp 18    Ht 5' 7\" (1.702 m)    Wt 224 lb 6.4 oz (101.8 kg)    SpO2 (!) 18%    BMI 35.15 kg/m2

## 2018-09-26 RX ORDER — ROSUVASTATIN CALCIUM 5 MG/1
TABLET, COATED ORAL
Qty: 90 TAB | Refills: 3 | Status: SHIPPED | OUTPATIENT
Start: 2018-09-26 | End: 2019-08-02 | Stop reason: SDUPTHER

## 2018-11-02 ENCOUNTER — OFFICE VISIT (OUTPATIENT)
Dept: FAMILY MEDICINE CLINIC | Age: 73
End: 2018-11-02

## 2018-11-02 VITALS
SYSTOLIC BLOOD PRESSURE: 115 MMHG | OXYGEN SATURATION: 97 % | BODY MASS INDEX: 34.21 KG/M2 | DIASTOLIC BLOOD PRESSURE: 73 MMHG | WEIGHT: 218 LBS | HEIGHT: 67 IN | TEMPERATURE: 98 F | HEART RATE: 84 BPM | RESPIRATION RATE: 17 BRPM

## 2018-11-02 DIAGNOSIS — I10 ESSENTIAL HYPERTENSION: ICD-10-CM

## 2018-11-02 DIAGNOSIS — E78.5 HYPERLIPIDEMIA, UNSPECIFIED HYPERLIPIDEMIA TYPE: ICD-10-CM

## 2018-11-02 DIAGNOSIS — E11.21 TYPE 2 DIABETES MELLITUS WITH NEPHROPATHY (HCC): Primary | ICD-10-CM

## 2018-11-02 DIAGNOSIS — N18.30 CKD (CHRONIC KIDNEY DISEASE) STAGE 3, GFR 30-59 ML/MIN (HCC): ICD-10-CM

## 2018-11-02 DIAGNOSIS — H61.23 BILATERAL IMPACTED CERUMEN: ICD-10-CM

## 2018-11-02 DIAGNOSIS — I25.10 CORONARY ARTERY DISEASE INVOLVING NATIVE HEART WITHOUT ANGINA PECTORIS, UNSPECIFIED VESSEL OR LESION TYPE: ICD-10-CM

## 2018-11-02 RX ORDER — DICLOFENAC SODIUM 10 MG/G
GEL TOPICAL 4 TIMES DAILY
Qty: 1 EACH | Refills: 0 | Status: SHIPPED | OUTPATIENT
Start: 2018-11-02 | End: 2020-09-04

## 2018-11-02 RX ORDER — LIDOCAINE 50 MG/G
PATCH TOPICAL
Qty: 1 EACH | Refills: 6 | Status: SHIPPED | OUTPATIENT
Start: 2018-11-02 | End: 2020-09-04

## 2018-11-02 NOTE — PROGRESS NOTES
Chief Complaint   Patient presents with    Blood Pressure Check     1. Have you been to the ER, urgent care clinic since your last visit? Hospitalized since your last visit? No    2. Have you seen or consulted any other health care providers outside of the [address] [place] [address] since your last visit? Include any pap smears or colon screening.  No      Mammogram--this year--normal    Eye exam--normal

## 2018-11-02 NOTE — PROGRESS NOTES
Subjective:   Mary Galvez is an 68 y.o. female who presents for follow up visit on chronic medical conditions. HPI  Chief Complaint   Patient presents with    Blood Pressure Check       1. Hypertension  Stopped Plavix 75 mg     2. Diabetes mellitus  She is followed by endocrinologist ( Dr. Fatmata Fan). She is currently on [place] and Ozempic. Fasting BG were in 220-250s, but since Ozempic was ordered -<200  DIABETIC CARE CHECKLIST   1. Patient on ASA - yes  2. Patient on Statin- yes  3. Patient on ACE- yes  4. Diet- Variable, not following diabetic diet, eats chicken, red meat, sometimes salads and veggies  5. Exercise -No  6. Blood pressure today - 115/73  7. Last eye check - 2017  8. Last cholesterol check - 4/2018  9 . Last HbA1c - 4/2018 - per patient was 9.0 ( at endocrine office)  10. Last urine microalbulmin- 4/2018  11. Last comprehensive foot exam -  4/2018  12. Did patient receive pneumococcal vaccine -yes  13. Does the patient have a nephrologist- no  14. Does the patient have a cardiologist-yes  15. Seasonal influenza vaccine - Declined       3.CAD,hx of MI, s/p Drug eluting stent placement in September 2017. Was of ASA and Plavix, Plavix stopped in September 2018. Followed by cardiologist ( Dr. Mini Torres). No chest pain, no SOB, no exertional dyspnea. 4. Hyperlipidemia. Stable. On statin ( Crestor 5mg). No side effects, no muscle aches. Allergies - reviewed: Allergies   Allergen Reactions    Pravachol [Pravastatin] Myalgia    Tetanus Vaccines And Toxoid Swelling         Medications - reviewed:  Current Outpatient Medications   Medication Sig    lidocaine (LIDODERM) 5 % Apply patch to the affected area for 12 hours a day and remove for 12 hours a day.  diclofenac (VOLTAREN) 1 % gel Apply  to affected area four (4) times daily.     rosuvastatin (CRESTOR) 5 mg tablet TAKE ONE TABLET BY MOUTH NIGHTLY    insulin degludec (TRESIBA FLEXTOUCH U-100) 100 unit/mL (3 mL) inpn by SubCUTAneous route daily.  semaglutide (OZEMPIC) 0.25 mg/0.2 mL (2 mg/1.5 mL) sub-q pen by SubCUTAneous route every seven (7) days.  nitroglycerin (NITROSTAT) 0.4 mg SL tablet PLACE ONE TABLET UNDER THE TONGUE EVERY 5 MINUTES AS NEEDED FOR CHEST PAIN    metoprolol succinate (TOPROL-XL) 100 mg tablet TAKE ONE TABLET BY MOUTH ONCE DAILY    lisinopril-hydroCHLOROthiazide (PRINZIDE, ZESTORETIC) 20-12.5 mg per tablet TAKE ONE TABLET BY MOUTH ONCE DAILY    clopidogrel (PLAVIX) 75 mg tab Take 1 Tab by mouth daily. Dr Rissa Whitehead to provide refills  Indications: Thrombosis Prevention after PCI    aspirin 81 mg chewable tablet Take 81 mg by mouth daily.  co-enzyme Q-10 (CO Q-10) 100 mg capsule Take 1 Cap by mouth daily.  glucose blood VI test strips (INTEGRATED BIOPHARMA BLOOD GLUCOSE SYSTEM) strip Use 3 times per day (fasting and before meals). (Patient taking differently: Once a day)    Lancets (ONE TOUCH ULTRASOFT LANCETS) Misc 1 Package by Does Not Apply route. Test blood glucose 3-4 time daily     No current facility-administered medications for this visit.           Past Medical History - reviewed:  Past Medical History:   Diagnosis Date    Chronic back pain 2010    DM type 2 (diabetes mellitus, type 2) ([facility] 5/3/2010    HTN (hypertension) 5/3/2010    MI (myocardial infarction) ([facility] 5/3/2010    Obstructive sleep apnea (adult) (pediatric) 2014         Past Surgical History - reviewed:  Past Surgical History:   Procedure Laterality Date    HX CORONARY STENT PLACEMENT      HX ORTHOPAEDIC      disc sx    HX TUBAL LIGATION           Family History - reviewed:  Family History   Problem Relation Age of Onset    Hypertension Mother          58yo    Diabetes Sister     Breast Cancer Sister     Diabetes Sister     Diabetes Sister     Diabetes Sister          Social History - reviewed:  Social History     Socioeconomic History    Marital status: SINGLE     Spouse name: Not on file    Number of children: Not on file    Years of education: Not on file    Highest education level: Not on file   Social Needs    Financial resource strain: Not on file    Food insecurity - worry: Not on file    Food insecurity - inability: Not on file    Transportation needs - medical: Not on file   [employer] needs - non-medical: Not on file   Occupational History    Not on file   Tobacco Use    Smoking status: Former Smoker     Last attempt to quit: 2004     Years since quittin.8    Smokeless tobacco: Never Used   Substance and Sexual Activity    Alcohol use: No    Drug use: No    Sexual activity: No   Other Topics Concern    Not on file   Social History Narrative    Not on file         Review of Systems   CONSTITUTIONAL: denies fever. Denies chills. CARDIOVASCULAR: denies chest pain. Denies palpitations  RESPIRATORY: denies shortness of breath  GI: denies abdominal pain. Denies change in stools. Denies hematochezia/melana  NEURO: denies dizziness, denies headaches  MUSCULOSKELETAL: denies joint pain. SKIN: denies rash. Denies easy bruising  PSYCH: denies anxiety.  Denies depression        Objective:     Visit Vitals  /73   Pulse 84   Temp 98 °F (36.7 °C) (Oral)   Resp 17   Ht 5' 7\" (1.702 m)   Wt 218 lb (98.9 kg)   SpO2 97%   BMI 34.14 kg/m²       General appearance - alert, well appearing, and in no distress  Neck - supple, no significant adenopathy  Chest - clear to auscultation, no wheezes, rales or rhonchi, symmetric air entry  Heart - normal rate, regular rhythm, normal S1, S2, no murmurs, rubs, clicks or gallops  Abdomen - soft, nontender, nondistended, no masses or organomegaly  Neurological - alert, oriented, normal speech, no focal findings or movement disorder noted  Musculoskeletal - no joint tenderness, deformity or swelling  Extremities - peripheral pulses normal, no pedal edema, no clubbing or cyanosis  Skin - normal coloration and turgor, no rashes, no suspicious skin lesions noted      Assessment:   69 yo female who is     ICD-10-CM ICD-9-CM    1. Type 2 diabetes mellitus with nephropathy (HCC) E11.21 250.40 CANCELED: HEMOGLOBIN A1C WITH EAG     583.81 CANCELED:  DIABETES FOOT EXAM      CANCELED:  DIABETES EDUCATION   2. Essential hypertension B50 123.6 METABOLIC PANEL, COMPREHENSIVE      CANCELED: CBC W/O DIFF      CANCELED: LIPID PANEL   3. CKD (chronic kidney disease) stage 3, GFR 30-59 ml/min ([facility]) N18.3 585.3    4. Hyperlipidemia, unspecified hyperlipidemia type E78.5 272.4    5. Bilateral impacted cerumen H61.23 380.4    6. Coronary artery disease involving native heart without angina pectoris, unspecified vessel or lesion type I25.10 414.01            Plan:   1. Hypertension. BP is well controlled, at goal. Continue current medication regimen. 2. Diabetes. Refused HgA1C as usually collected at Dr. Kg Gutierrez office. Continue current insulin regimen. F/u with endocrinologist as scheduled. 3. Hyperlipidemia. STtable. Continue statin. 4. CAD, s/p MI. Continue ASA. Plavix stopped in September,2018. F/u with cardiologist as scheduled. 5. Impacted cerumen. Cerumen removed w/o complications. Follow-up Disposition:  Return in about 6 months (around 5/2/2019). I have discussed the diagnosis with the patient and the intended plan as seen in the above orders. The patient has received an after-visit summary and questions were answered concerning future plans. I have discussed medication side effects and warnings with the patient as well. Informed pt to return to the office if new symptoms arise.     The patient was seen and discussed with Dr. Ellis Prado ( the attending physician)     Luis Alfredo Chowdhury MD  Family Medicine Resident, PGY-3

## 2018-11-07 RX ORDER — LIDOCAINE 50 MG/G
PATCH TOPICAL
Qty: 1 EACH | Refills: 6 | OUTPATIENT
Start: 2018-11-07

## 2018-11-14 NOTE — TELEPHONE ENCOUNTER
Vishal message of today at 11:56 AM.    Dr. Kavon Gutierrez telephone   Received: Today   Message Contents   Jose Ibrahim 79             Pt is requesting a call back that Doctor confirms with [address] [place] [address] Street and insurance for medication [place] patches. Pt still has not received.  Best contact [phone]

## 2018-11-21 DIAGNOSIS — I10 ESSENTIAL HYPERTENSION: ICD-10-CM

## 2018-11-21 DIAGNOSIS — I25.10 CORONARY ARTERY DISEASE INVOLVING NATIVE CORONARY ARTERY WITHOUT ANGINA PECTORIS, UNSPECIFIED WHETHER NATIVE OR TRANSPLANTED HEART: ICD-10-CM

## 2018-11-21 NOTE — TELEPHONE ENCOUNTER
Returned call to phone number provided in the mesage and the person who answered said it was the wrong phone number. Called the patient at phone number in chart and left voice mail.  [phone]

## 2018-11-22 RX ORDER — LISINOPRIL AND HYDROCHLOROTHIAZIDE 12.5; 2 MG/1; MG/1
TABLET ORAL
Qty: 90 TAB | Refills: 3 | Status: SHIPPED | OUTPATIENT
Start: 2018-11-22 | End: 2018-11-26 | Stop reason: SDUPTHER

## 2018-11-26 DIAGNOSIS — I25.10 CORONARY ARTERY DISEASE INVOLVING NATIVE CORONARY ARTERY WITHOUT ANGINA PECTORIS, UNSPECIFIED WHETHER NATIVE OR TRANSPLANTED HEART: ICD-10-CM

## 2018-11-26 DIAGNOSIS — I10 ESSENTIAL HYPERTENSION: ICD-10-CM

## 2018-11-27 ENCOUNTER — TELEPHONE (OUTPATIENT)
Dept: FAMILY MEDICINE CLINIC | Age: 73
End: 2018-11-27

## 2018-11-27 NOTE — TELEPHONE ENCOUNTER
----- Message from Leeanna Copeland sent at 11/27/2018  2:01 PM EST -----  Regarding: Duong/Maegan  Pt is requesting a preauthioirzation for Lidocaine patch. Pts number is [phone] and [place]

## 2018-11-28 RX ORDER — LISINOPRIL AND HYDROCHLOROTHIAZIDE 12.5; 2 MG/1; MG/1
TABLET ORAL
Qty: 90 TAB | Refills: 3 | Status: SHIPPED | OUTPATIENT
Start: 2018-11-28 | End: 2019-11-09 | Stop reason: SDUPTHER

## 2019-02-08 ENCOUNTER — CLINICAL SUPPORT (OUTPATIENT)
Dept: FAMILY MEDICINE CLINIC | Age: 74
End: 2019-02-08

## 2019-02-08 VITALS
TEMPERATURE: 98.5 F | DIASTOLIC BLOOD PRESSURE: 75 MMHG | SYSTOLIC BLOOD PRESSURE: 111 MMHG | HEART RATE: 80 BPM | BODY MASS INDEX: 34.21 KG/M2 | HEIGHT: 67 IN | WEIGHT: 218 LBS | RESPIRATION RATE: 16 BRPM | OXYGEN SATURATION: 97 %

## 2019-02-08 DIAGNOSIS — Z11.1 ENCOUNTER FOR PPD TEST: Primary | ICD-10-CM

## 2019-02-08 LAB
MM INDURATION POC: 0 MM (ref 0–5)
PPD POC: NEGATIVE NEGATIVE

## 2019-02-08 RX ORDER — INSULIN GLARGINE 100 [IU]/ML
INJECTION, SOLUTION SUBCUTANEOUS
COMMUNITY
End: 2019-11-19

## 2019-02-08 NOTE — PROGRESS NOTES
1. Have you been to the ER, urgent care clinic since your last visit? Hospitalized since your last visit? No    2. Have you seen or consulted any other health care providers outside of the [address] [place] [address] since your last visit? Include any pap smears or colon screening. No      Chief Complaint   Patient presents with    Other     PPD skin test     Blood pressure 111/75, pulse 80, temperature 98.5 °F (36.9 °C), temperature source Oral, resp. rate 16, height 5' 7\" (1.702 m), weight 218 lb (98.9 kg), SpO2 97 %.

## 2019-02-10 ENCOUNTER — CLINICAL SUPPORT (OUTPATIENT)
Dept: FAMILY MEDICINE CLINIC | Age: 74
End: 2019-02-10

## 2019-02-10 VITALS
RESPIRATION RATE: 16 BRPM | OXYGEN SATURATION: 98 % | HEART RATE: 85 BPM | WEIGHT: 213 LBS | BODY MASS INDEX: 33.43 KG/M2 | TEMPERATURE: 97.5 F | HEIGHT: 67 IN | SYSTOLIC BLOOD PRESSURE: 104 MMHG | DIASTOLIC BLOOD PRESSURE: 62 MMHG

## 2019-02-10 DIAGNOSIS — Z11.1 ENCOUNTER FOR PPD SKIN TEST READING: Primary | ICD-10-CM

## 2019-02-10 NOTE — PROGRESS NOTES
Chief Complaint   Patient presents with    PPD Reading     Blood pressure 104/62, pulse 85, temperature 97.5 °F (36.4 °C), temperature source Oral, resp. rate 16, height 5' 7\" (1.702 m), weight 213 lb (96.6 kg), SpO2 98 %. 1. Have you been to the ER, urgent care clinic since your last visit? Hospitalized since your last visit? No    2. Have you seen or consulted any other health care providers outside of the [address] [place] [address] since your last visit? Include any pap smears or colon screening.  No

## 2019-05-29 DIAGNOSIS — I25.10 CORONARY ARTERY DISEASE INVOLVING NATIVE CORONARY ARTERY WITHOUT ANGINA PECTORIS, UNSPECIFIED WHETHER NATIVE OR TRANSPLANTED HEART: ICD-10-CM

## 2019-05-29 DIAGNOSIS — I10 ESSENTIAL HYPERTENSION: ICD-10-CM

## 2019-05-29 RX ORDER — METOPROLOL SUCCINATE 100 MG/1
TABLET, EXTENDED RELEASE ORAL
Qty: 90 TAB | Refills: 4 | Status: SHIPPED | OUTPATIENT
Start: 2019-05-29 | End: 2019-11-19 | Stop reason: SDUPTHER

## 2019-08-02 ENCOUNTER — OFFICE VISIT (OUTPATIENT)
Dept: CARDIOLOGY CLINIC | Age: 74
End: 2019-08-02

## 2019-08-02 VITALS
HEIGHT: 67 IN | DIASTOLIC BLOOD PRESSURE: 60 MMHG | OXYGEN SATURATION: 97 % | RESPIRATION RATE: 16 BRPM | HEART RATE: 78 BPM | SYSTOLIC BLOOD PRESSURE: 120 MMHG | BODY MASS INDEX: 33.74 KG/M2 | WEIGHT: 215 LBS

## 2019-08-02 DIAGNOSIS — I25.119 CORONARY ARTERY DISEASE INVOLVING NATIVE HEART WITH ANGINA PECTORIS, UNSPECIFIED VESSEL OR LESION TYPE (HCC): Primary | ICD-10-CM

## 2019-08-02 DIAGNOSIS — G47.33 OBSTRUCTIVE SLEEP APNEA (ADULT) (PEDIATRIC): ICD-10-CM

## 2019-08-02 DIAGNOSIS — I10 ESSENTIAL HYPERTENSION: ICD-10-CM

## 2019-08-02 RX ORDER — ROSUVASTATIN CALCIUM 5 MG/1
TABLET, COATED ORAL
Qty: 90 TAB | Refills: 3 | Status: SHIPPED | OUTPATIENT
Start: 2019-08-02 | End: 2019-11-19 | Stop reason: SDUPTHER

## 2019-08-02 NOTE — PROGRESS NOTES
Refill of Crestor 5 mg daily completed per VO of Dr. Casimiro Morris. STOP Plavix as stated in last office note.

## 2019-08-02 NOTE — PROGRESS NOTES
Office Follow-up    NAME: Jyoti Carter   :  1945  MRM:  [id number]    Date:  2019            Assessment:     Problem List  Date Reviewed: 2019          Codes Class Noted    CAD (coronary artery disease) ICD-10-CM: I25.10  ICD-9-CM: 414.00  2017        Severe obesity (BMI 35.0-39. 9) ICD-10-CM: E66.01  ICD-9-CM: 278.01  2018        Type 2 diabetes mellitus with nephropathy ([facility] ICD-10-CM: E11.21  ICD-9-CM: 250.40, 583.81  2018        Stable angina ([facility] ICD-10-CM: I20.8  ICD-9-CM: 413.9  9/15/2017        Advanced care planning/counseling discussion ICD-10-CM: Z71.89  ICD-9-CM: V65.49  2017    Overview Signed 2017 10:31 AM by Anika Choi     Patient states she is a DNR  17               Obstructive sleep apnea (adult) (pediatric) ICD-10-CM: N88.28  ICD-9-CM: 327.23  2014        Chronic back pain ICD-10-CM: M54.9, G89.29  ICD-9-CM: 724.5, 338.29  2010        HTN (hypertension) ICD-10-CM: I10  ICD-9-CM: 401.9  5/3/2010        DM type 2 (diabetes mellitus, type 2) ([facility] ICD-10-CM: E11.9  ICD-9-CM: 250.00  5/3/2010    Overview Signed 2014  9:50 AM by Sharla Poe     On Lantus  FTF for DM supplies 2014  Forms for [employer] DM supplies 2014             MI (myocardial infarction) [facility]) ICD-10-CM: I21.9  ICD-9-CM: 410.90  5/3/2010                 Plan:     1. CAD/LAD stent/RCA : Continue medical management. Continue aspirin and Crestor along with metoprolol at current dosages. 2. Hypertension: Blood pressure is controlled. Continue metoprolol and Prinzide. 3. Dyslipidemia: LDL has significantly improved from 136 last year to now most recently in April her LDL was 82. Continue Crestor 5 mg p.o. daily. Strict control of blood sugars. 4. Diabetes: Most recent hemoglobin A1c is 6.0. She is being currently treated by Dr. Josh Schumacher. 5. EMILY: CPAP. 6. See Dr. Sherry Carlson in 1 year. Subjective:     Jyoti Carter, a 76 y.o. year-old who presents for followup. She has known history of coronary disease with PCI to LAD in September 2017. 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 She underwent cardiac rehab and has done very well since then. Does not have any symptoms of chest pain, shortness of breath, lightheadedness or dizziness. EKG in our office today demonstrated normal sinus rhythm, normal axis, normal intervals, normal ST segment. Exam:     Physical Exam:  Visit Vitals  /60 (BP 1 Location: Left arm, BP Patient Position: Sitting)   Pulse 78   Resp 16   Ht 5' 7\" (1.702 m)   Wt 215 lb (97.5 kg)   SpO2 97%   BMI 33.67 kg/m²     General appearance - alert, well appearing, and in no distress  Mental status - affect appropriate to mood  Eyes - sclera anicteric, moist mucous membranes  Neck - supple, no significant adenopathy  Chest - clear to auscultation, no wheezes, rales or rhonchi  Heart - normal rate, regular rhythm, normal S1, S2, no murmurs, rubs, clicks or gallops  Abdomen - soft, nontender, nondistended, no masses or organomegaly  Extremities - peripheral pulses normal, no pedal edema  Skin - normal coloration  no rashes    Medications:     Current Outpatient Medications   Medication Sig    metoprolol succinate (TOPROL-XL) 100 mg tablet TAKE ONE TABLET BY MOUTH ONCE DAILY    insulin glargine (LANTUS SOLOSTAR U-100 INSULIN) 100 unit/mL (3 mL) inpn by SubCUTAneous route.  lisinopril-hydroCHLOROthiazide (PRINZIDE, ZESTORETIC) 20-12.5 mg per tablet TAKE ONE TABLET BY MOUTH ONCE DAILY    rosuvastatin (CRESTOR) 5 mg tablet TAKE ONE TABLET BY MOUTH NIGHTLY    insulin degludec (TRESIBA FLEXTOUCH U-100) 100 unit/mL (3 mL) inpn by SubCUTAneous route daily.  semaglutide (OZEMPIC) 0.25 mg/0.2 mL (2 mg/1.5 mL) sub-q pen by SubCUTAneous route every seven (7) days.     nitroglycerin (NITROSTAT) 0.4 mg SL tablet PLACE ONE TABLET UNDER THE TONGUE EVERY 5 MINUTES AS NEEDED FOR CHEST PAIN    aspirin 81 mg chewable tablet Take 81 mg by mouth daily.  co-enzyme Q-10 (CO Q-10) 100 mg capsule Take 1 Cap by mouth daily.  glucose blood VI test strips ([employer] BLOOD GLUCOSE SYSTEM) strip Use 3 times per day (fasting and before meals). (Patient taking differently: Once a day)    Lancets (ONE TOUCH ULTRASOFT LANCETS) Misc 1 Package by Does Not Apply route. Test blood glucose 3-4 time daily    lidocaine (LIDODERM) 5 % Apply patch to the affected area for 12 hours a day and remove for 12 hours a day.  diclofenac (VOLTAREN) 1 % gel Apply  to affected area four (4) times daily.  clopidogrel (PLAVIX) 75 mg tab Take 1 Tab by mouth daily. Dr Blane Kc to provide refills  Indications: Thrombosis Prevention after PCI     No current facility-administered medications for this visit. Diagnostic Data Review:       9/20/17: CATH- LAD: p70% @ D1, dLAD:70%; LCX:m50%, RCA: mRCA:  supplied w/ collaterals from LAD  ---Unsuccessful PCI apical LAD: Attempted POBA with various balloons without success. --- s/p ELE (3x15, 2.75 x14 ELE overlapping) ->mLAD    9/18/17: STRESS CARDIOLITE- Poor exercise tolerance. Nml stress. Abnormal Exercise gated SPECT MPS: Small apical defect, worse on stress, Likely small apical infarct with mild hamlet-infarct ischemia.  LVEF 64%    1997: [address]- [place] stents      Lab Review:     Lab Results   Component Value Date/Time    Cholesterol, total 176 04/06/2018 03:21 PM    HDL Cholesterol 76 04/06/2018 03:21 PM    LDL,Direct 158 (H) 01/17/2013 03:25 PM    LDL, calculated 82 04/06/2018 03:21 PM    Triglyceride 90 04/06/2018 03:21 PM    CHOL/HDL Ratio 3.4 09/21/2017 05:25 AM     Lab Results   Component Value Date/Time    Creatinine 1.55 (H) 04/06/2018 03:21 PM     Lab Results   Component Value Date/Time    BUN 31 (H) 04/06/2018 03:21 PM     Lab Results   Component Value Date/Time    Potassium 5.0 04/06/2018 03:21 PM     Lab Results   Component Value Date/Time    Hemoglobin A1c 9.0 (H) 09/01/2016 11:03 AM     Lab Results   Component Value Date/Time    HGB 12.1 09/18/2017 12:03 PM     Lab Results   Component Value Date/Time    PLATELET 507 87/00/8583 12:03 PM     No results for input(s): CPK, CKMB, TROIQ in the last 72 hours. No lab exists for component: CKQMB, CPKMB             ___________________________________________________    Devika Arce.  Lluvia Amezquita MD, [facility]

## 2019-08-02 NOTE — PROGRESS NOTES
Visit Vitals  /60 (BP 1 Location: Left arm, BP Patient Position: Sitting)   Pulse 78   Resp 16   Ht 5' 7\" (1.702 m)   Wt 215 lb (97.5 kg)   SpO2 97%   BMI 33.67 kg/m²

## 2019-08-06 NOTE — PROGRESS NOTES
I reviewed with the resident the medical history and the resident's findings on the physical examination. I discussed with the resident the patient's diagnosis and concur with the plan. Patient states she feels much better after a liter of fluid and states she feels good to go home.

## 2019-11-19 ENCOUNTER — OFFICE VISIT (OUTPATIENT)
Dept: FAMILY MEDICINE CLINIC | Age: 74
End: 2019-11-19

## 2019-11-19 VITALS
DIASTOLIC BLOOD PRESSURE: 76 MMHG | WEIGHT: 215.8 LBS | OXYGEN SATURATION: 98 % | HEIGHT: 67 IN | BODY MASS INDEX: 33.87 KG/M2 | TEMPERATURE: 97.8 F | RESPIRATION RATE: 18 BRPM | SYSTOLIC BLOOD PRESSURE: 138 MMHG | HEART RATE: 79 BPM

## 2019-11-19 DIAGNOSIS — E11.21 TYPE 2 DIABETES MELLITUS WITH NEPHROPATHY (HCC): ICD-10-CM

## 2019-11-19 DIAGNOSIS — E78.5 HYPERLIPIDEMIA, UNSPECIFIED HYPERLIPIDEMIA TYPE: ICD-10-CM

## 2019-11-19 DIAGNOSIS — I10 ESSENTIAL HYPERTENSION: Primary | ICD-10-CM

## 2019-11-19 DIAGNOSIS — I25.10 CORONARY ARTERY DISEASE INVOLVING NATIVE CORONARY ARTERY WITHOUT ANGINA PECTORIS, UNSPECIFIED WHETHER NATIVE OR TRANSPLANTED HEART: ICD-10-CM

## 2019-11-19 DIAGNOSIS — Z12.39 SCREENING FOR BREAST CANCER: ICD-10-CM

## 2019-11-19 RX ORDER — METOPROLOL SUCCINATE 100 MG/1
TABLET, EXTENDED RELEASE ORAL
Qty: 90 TAB | Refills: 3 | Status: SHIPPED | OUTPATIENT
Start: 2019-11-19 | End: 2020-12-01 | Stop reason: SDUPTHER

## 2019-11-19 RX ORDER — LISINOPRIL AND HYDROCHLOROTHIAZIDE 12.5; 2 MG/1; MG/1
TABLET ORAL
Qty: 90 TAB | Refills: 3 | Status: SHIPPED | OUTPATIENT
Start: 2019-11-19 | End: 2020-12-02 | Stop reason: SDUPTHER

## 2019-11-19 RX ORDER — ROSUVASTATIN CALCIUM 5 MG/1
TABLET, COATED ORAL
Qty: 90 TAB | Refills: 3 | Status: SHIPPED | OUTPATIENT
Start: 2019-11-19 | End: 2022-07-22 | Stop reason: SDUPTHER

## 2019-11-19 NOTE — PROGRESS NOTES
Identified pt with two pt identifiers(name and ). Reviewed record in preparation for visit and have obtained necessary documentation. Chief Complaint   Patient presents with    Follow Up Chronic Condition        Health Maintenance Due   Topic    Hepatitis C Screening     Shingrix Vaccine Age 50> (1 of 2)    MICROALBUMIN Q1     EYE EXAM RETINAL OR DILATED     BREAST CANCER SCRN MAMMOGRAM     HEMOGLOBIN A1C Q6M     FOOT EXAM Q1     MEDICARE YEARLY EXAM     GLAUCOMA SCREENING Q2Y     LIPID PANEL Q1     Influenza Age 5 to Adult        Visit Vitals  /76 (BP 1 Location: Right arm, BP Patient Position: Sitting)   Pulse 79   Temp 97.8 °F (36.6 °C) (Oral)   Resp 18   Ht 5' 7\" (1.702 m)   Wt 215 lb 12.8 oz (97.9 kg)   SpO2 98%   BMI 33.80 kg/m²         Coordination of Care Questionnaire:  :   1) Have you been to an emergency room, urgent care, or hospitalized since your last visit? If yes, where when, and reason for visit? no       2. Have seen or consulted any other health care provider since your last visit? If yes, where when, and reason for visit? NO      3) Do you have an Advanced Directive/ Living Will in place? NO  If no, would you like information NO    Patient is accompanied by self I have received verbal consent from Autumn Encarnacion to discuss any/all medical information while they are present in the room.

## 2019-11-19 NOTE — PROGRESS NOTES
Subjective:   Eli Ogden is an 76 y.o. female who presents for follow up on chronic medical conditions. Chief Complaint   Patient presents with    Follow Up Chronic Condition     HPI  1. Hypertension  BP well controlled with current medication regimen. Home medication regimen: Metoprolol 100 mg daily, Prinzide 20-12. 5. Compliant with medications. Side effects:None  Home BP readings: 120-130s/70s  BP today:138/70  Diet:Variable    2. DM   She is followed by endocrinologist (Dr. Clari Espinosa) who usually does labwork. Currently on Tresiba and Oempic, did no tolerate Metformin in the past   DIABETIC CARE CHECKLIST   1. Patient on ASA - Yes  2. Patient on Statin- Yes  3. Patient on ACE- Yes  4. Diet- Well balanced, trying to cut on carbs  5. Exercise -Walking only  6. Blood pressure today - 138/70  7. Last eye check - April, 2019  8. Last cholesterol check - November, 2019 ( at endocrinologist office)  9 . Last HbA1c - 7.0 (per patient, checked at endocrinologist office in November, 2019)  10. Last urine microalbulmin- November, 2019 ( at endocrinologist office)  11. Last comprehensive foot exam -  Does not remember  12. Did patient receive pneumococcal vaccine -Yes  13. Does the patient have a nephrologist- No  14. Does the patient have a cardiologist-Yes  15. Seasonal influenza vaccine - Refused    3. CAD, hx of MI, s/p drug elluting stent placement in September 2017. S/p DAPT, currently on ASA only. Stable. Followed by Dr. Hitesh Donaldson, last visit in August, 2019. 4. Hyperlipidemia. Stable. On statin (Crestor mg). No side effects. Allergies - reviewed:    Allergies   Allergen Reactions    Pravachol [Pravastatin] Myalgia    Tetanus Vaccines And Toxoid Swelling         Medications - reviewed:  Current Outpatient Medications   Medication Sig    lisinopril-hydroCHLOROthiazide (PRINZIDE, ZESTORETIC) 20-12.5 mg per tablet TAKE ONE TABLET BY MOUTH ONCE DAILY    rosuvastatin (CRESTOR) 5 mg tablet TAKE ONE TABLET BY MOUTH NIGHTLY    metoprolol succinate (TOPROL-XL) 100 mg tablet TAKE ONE TABLET BY MOUTH ONCE DAILY    insulin degludec (TRESIBA FLEXTOUCH U-100) 100 unit/mL (3 mL) inpn 60 Units by SubCUTAneous route daily.  semaglutide (OZEMPIC) 0.25 mg/0.2 mL (2 mg/1.5 mL) sub-q pen 0.25 mg by SubCUTAneous route every seven (7) days.  aspirin 81 mg chewable tablet Take 81 mg by mouth daily.  co-enzyme Q-10 (CO Q-10) 100 mg capsule Take 1 Cap by mouth daily.  glucose blood VI test strips ([employer] BLOOD GLUCOSE SYSTEM) strip Use 3 times per day (fasting and before meals). (Patient taking differently: Once a day)    Lancets (ONE TOUCH ULTRASOFT LANCETS) Misc 1 Package by Does Not Apply route. Test blood glucose 3-4 time daily    insulin glargine (LANTUS SOLOSTAR U-100 INSULIN) 100 unit/mL (3 mL) inpn by SubCUTAneous route.  lidocaine (LIDODERM) 5 % Apply patch to the affected area for 12 hours a day and remove for 12 hours a day.  diclofenac (VOLTAREN) 1 % gel Apply  to affected area four (4) times daily.  nitroglycerin (NITROSTAT) 0.4 mg SL tablet PLACE ONE TABLET UNDER THE TONGUE EVERY 5 MINUTES AS NEEDED FOR CHEST PAIN     No current facility-administered medications for this visit. Past Medical History - reviewed:  Past Medical History:   Diagnosis Date    Chronic back pain 9/8/2010    DM type 2 (diabetes mellitus, type 2) ([facility] 5/3/2010    HTN (hypertension) 5/3/2010    MI (myocardial infarction) ([facility] 5/3/2010    Obstructive sleep apnea (adult) (pediatric) 12/12/2014           Review of Systems   CONSTITUTIONAL: denies fever. Denies chills. CARDIOVASCULAR: denies chest pain. Denies palpitations  RESPIRATORY: denies shortness of breath  NEURO: denies dizziness, denies headaches.       Objective:     Visit Vitals  /76 (BP 1 Location: Right arm, BP Patient Position: Sitting)   Pulse 79   Temp 97.8 °F (36.6 °C) (Oral)   Resp 18   Ht 5' 7\" (1.702 m)   Wt 215 lb 12.8 oz (97.9 kg)   SpO2 98%   BMI 33.80 kg/m²       General appearance - alert, well appearing, and in no distress  Chest - clear to auscultation, no wheezes, rales or rhonchi, symmetric air entry  Heart - normal rate, regular rhythm, normal S1, S2, no murmurs, rubs, clicks or gallops  Neurological - alert, oriented, normal speech, no focal findings or movement disorder noted  Extremities - peripheral pulses normal, no pedal edema, no clubbing or cyanosis  Diabetic foot exam, including monofilament sensation and pulses are normal bilaterally. No fungal toenail infection, corns or calluses. Assessment:   Deborah Jeronimo is a 76 y.o. female who was seen today for:    ICD-10-CM ICD-9-CM    1. Essential hypertension I10 401.9 lisinopril-hydroCHLOROthiazide (PRINZIDE, ZESTORETIC) 20-12.5 mg per tablet      metoprolol succinate (TOPROL-XL) 100 mg tablet   2. Type 2 diabetes mellitus with nephropathy (HCC) E11.21 250.40 HM DIABETES FOOT EXAM     583.81 CANCELED: AMB POC URINE, MICROALBUMIN, SEMIQUANT (3 RESULTS)   3. Hyperlipidemia, unspecified hyperlipidemia type E78.5 272.4 CANCELED: LIPID PANEL   4. Screening for breast cancer Z12.39 V76.10    5. Coronary artery disease involving native coronary artery without angina pectoris, unspecified whether native or transplanted heart I25.10 414.01 lisinopril-hydroCHLOROthiazide (PRINZIDE, ZESTORETIC) 20-12.5 mg per tablet      metoprolol succinate (TOPROL-XL) 100 mg tablet         Plan:   1. Hypertension. BP is at goal. Continue current regimen. 2. Diabetes. Well controlled. Continue current regiment. 3. Hyperlipidemia. Stable. Continue Crestor 5mg. 4. CAD. No chest pain. Continue current regimen. F/u with cards as scheduled. Will request the labwork from endocrinologist office. The patient reports having extensive labwork there. If anything missing, the pt will come back to clinic for the labs. Refused shingles and Influenza vaccines. Refused HepC screening.        Follow-up and Dispositions    · Return in about 6 months (around 5/19/2020) for F/u on chornic medical conditions. I have discussed the diagnosis with the patient and the intended plan as seen in the above orders. The patient has received an after-visit summary and questions were answered concerning future plans. I have discussed medication side effects and warnings with the patient as well. Informed pt to return to the office if new symptoms arise.       Evert Foreman MD  Family Medicine Physician

## 2019-12-06 ENCOUNTER — DOCUMENTATION ONLY (OUTPATIENT)
Dept: FAMILY MEDICINE CLINIC | Age: 74
End: 2019-12-06

## 2019-12-06 NOTE — PROGRESS NOTES
Received the paperwork with labwork form patient's endocrinology.  Labs from 11/7/2019.  -HgA1C 6.7  -CBC: Hg 12.4, PLt 239  -CMP with  Na 139, K 4.2, Cr 1.2, ALT 9, AST 15.  -Lipid panel: , , HDL 65,   -Urine/microalbumin 12 (WNL)

## 2020-01-31 ENCOUNTER — APPOINTMENT (OUTPATIENT)
Dept: GENERAL RADIOLOGY | Age: 75
End: 2020-01-31
Attending: STUDENT IN AN ORGANIZED HEALTH CARE EDUCATION/TRAINING PROGRAM
Payer: MEDICARE

## 2020-01-31 ENCOUNTER — HOSPITAL ENCOUNTER (EMERGENCY)
Age: 75
Discharge: HOME | End: 2020-01-31
Attending: STUDENT IN AN ORGANIZED HEALTH CARE EDUCATION/TRAINING PROGRAM
Payer: MEDICARE

## 2020-01-31 VITALS
SYSTOLIC BLOOD PRESSURE: 129 MMHG | OXYGEN SATURATION: 98 % | BODY MASS INDEX: 34.07 KG/M2 | HEIGHT: 66 IN | RESPIRATION RATE: 19 BRPM | WEIGHT: 212 LBS | HEART RATE: 105 BPM | DIASTOLIC BLOOD PRESSURE: 65 MMHG

## 2020-01-31 DIAGNOSIS — J06.9 UPPER RESPIRATORY TRACT INFECTION, UNSPECIFIED TYPE: Primary | ICD-10-CM

## 2020-01-31 LAB
ALBUMIN SERPL-MCNC: 3.7 G/DL (ref 3.5–5)
ALBUMIN/GLOB SERPL: 0.8 {RATIO} (ref 1.1–2.2)
ALP SERPL-CCNC: 83 U/L (ref 45–117)
ALT SERPL-CCNC: 16 U/L (ref 12–78)
ANION GAP SERPL CALC-SCNC: 9 MMOL/L (ref 5–15)
AST SERPL-CCNC: 23 U/L (ref 15–37)
BASOPHILS # BLD: 0 K/UL (ref 0–0.1)
BASOPHILS NFR BLD: 0 % (ref 0–1)
BILIRUB SERPL-MCNC: 0.4 MG/DL (ref 0.2–1)
BUN SERPL-MCNC: 27 MG/DL (ref 6–20)
BUN/CREAT SERPL: 14 (ref 12–20)
CALCIUM SERPL-MCNC: 8.9 MG/DL (ref 8.5–10.1)
CHLORIDE SERPL-SCNC: 100 MMOL/L (ref 97–108)
CO2 SERPL-SCNC: 26 MMOL/L (ref 21–32)
CREAT SERPL-MCNC: 1.9 MG/DL (ref 0.55–1.02)
DIFFERENTIAL METHOD BLD: ABNORMAL
EOSINOPHIL # BLD: 0 K/UL (ref 0–0.4)
EOSINOPHIL NFR BLD: 1 % (ref 0–7)
ERYTHROCYTE [DISTWIDTH] IN BLOOD BY AUTOMATED COUNT: 13.2 % (ref 11.5–14.5)
GLOBULIN SER CALC-MCNC: 4.7 G/DL (ref 2–4)
GLUCOSE SERPL-MCNC: 156 MG/DL (ref 65–100)
HCT VFR BLD AUTO: 38.5 % (ref 35–47)
HGB BLD-MCNC: 12.6 G/DL (ref 11.5–16)
IMM GRANULOCYTES # BLD AUTO: 0 K/UL (ref 0–0.04)
IMM GRANULOCYTES NFR BLD AUTO: 0 % (ref 0–0.5)
LACTATE SERPL-SCNC: 1.8 MMOL/L (ref 0.4–2)
LYMPHOCYTES # BLD: 0.9 K/UL (ref 0.8–3.5)
LYMPHOCYTES NFR BLD: 14 % (ref 12–49)
MCH RBC QN AUTO: 29.7 PG (ref 26–34)
MCHC RBC AUTO-ENTMCNC: 32.7 G/DL (ref 30–36.5)
MCV RBC AUTO: 90.8 FL (ref 80–99)
MONOCYTES # BLD: 0.4 K/UL (ref 0–1)
MONOCYTES NFR BLD: 6 % (ref 5–13)
NEUTS SEG # BLD: 5.2 K/UL (ref 1.8–8)
NEUTS SEG NFR BLD: 79 % (ref 32–75)
NRBC # BLD: 0 K/UL (ref 0–0.01)
NRBC BLD-RTO: 0 PER 100 WBC
PLATELET # BLD AUTO: 204 K/UL (ref 150–400)
PMV BLD AUTO: 11.4 FL (ref 8.9–12.9)
POTASSIUM SERPL-SCNC: 4.1 MMOL/L (ref 3.5–5.1)
PROT SERPL-MCNC: 8.4 G/DL (ref 6.4–8.2)
RBC # BLD AUTO: 4.24 M/UL (ref 3.8–5.2)
SODIUM SERPL-SCNC: 135 MMOL/L (ref 136–145)
TROPONIN I SERPL-MCNC: <0.05 NG/ML
WBC # BLD AUTO: 6.6 K/UL (ref 3.6–11)

## 2020-01-31 PROCEDURE — 80053 COMPREHEN METABOLIC PANEL: CPT

## 2020-01-31 PROCEDURE — 83605 ASSAY OF LACTIC ACID: CPT

## 2020-01-31 PROCEDURE — 85025 COMPLETE CBC W/AUTO DIFF WBC: CPT

## 2020-01-31 PROCEDURE — 99282 EMERGENCY DEPT VISIT SF MDM: CPT

## 2020-01-31 PROCEDURE — 71046 X-RAY EXAM CHEST 2 VIEWS: CPT

## 2020-01-31 PROCEDURE — 87040 BLOOD CULTURE FOR BACTERIA: CPT

## 2020-01-31 PROCEDURE — 84484 ASSAY OF TROPONIN QUANT: CPT

## 2020-01-31 PROCEDURE — 36415 COLL VENOUS BLD VENIPUNCTURE: CPT

## 2020-01-31 NOTE — DISCHARGE INSTRUCTIONS
Patient Education        Upper Respiratory Infection (Cold): Care Instructions  Your Care Instructions    An upper respiratory infection, or URI, is an infection of the nose, sinuses, or throat. URIs are spread by coughs, sneezes, and direct contact. The common cold is the most frequent kind of URI. The flu and sinus infections are other kinds of URIs. Almost all URIs are caused by viruses. Antibiotics won't cure them. But you can treat most infections with home care. This may include drinking lots of fluids and taking over-the-counter pain medicine. You will probably feel better in 4 to 10 days. The doctor has checked you carefully, but problems can develop later. If you notice any problems or new symptoms, get medical treatment right away. Follow-up care is a key part of your treatment and safety. Be sure to make and go to all appointments, and call your doctor if you are having problems. It's also a good idea to know your test results and keep a list of the medicines you take. How can you care for yourself at home? · To prevent dehydration, drink plenty of fluids, enough so that your urine is light yellow or clear like water. Choose water and other caffeine-free clear liquids until you feel better. If you have kidney, heart, or liver disease and have to limit fluids, talk with your doctor before you increase the amount of fluids you drink. · Take an over-the-counter pain medicine, such as acetaminophen (Tylenol), ibuprofen (Advil, Motrin), or naproxen (Aleve). Read and follow all instructions on the label. · Before you use cough and cold medicines, check the label. These medicines may not be safe for young children or for people with certain health problems. · Be careful when taking over-the-counter cold or flu medicines and Tylenol at the same time. Many of these medicines have acetaminophen, which is Tylenol. Read the labels to make sure that you are not taking more than the recommended dose.  Too much acetaminophen (Tylenol) can be harmful. · Get plenty of rest.  · Do not smoke or allow others to smoke around you. If you need help quitting, talk to your doctor about stop-smoking programs and medicines. These can increase your chances of quitting for good. When should you call for help? Call 911 anytime you think you may need emergency care. For example, call if:    · You have severe trouble breathing.    Call your doctor now or seek immediate medical care if:    · You seem to be getting much sicker.     · You have new or worse trouble breathing.     · You have a new or higher fever.     · You have a new rash.    Watch closely for changes in your health, and be sure to contact your doctor if:    · You have a new symptom, such as a sore throat, an earache, or sinus pain.     · You cough more deeply or more often, especially if you notice more mucus or a change in the color of your mucus.     · You do not get better as expected. Where can you learn more? Go to http://yfn-wally.info/. Enter E613 in the search box to learn more about \"Upper Respiratory Infection (Cold): Care Instructions. \"  Current as of: June 9, 2019  Content Version: 12.2  © 2008-6953 [employer]. Care instructions adapted under license by [employer] (which disclaims liability or warranty for this information). If you have questions about a medical condition or this instruction, always ask your healthcare professional. [place] [address] any warranty or liability for your use of this information.

## 2020-01-31 NOTE — ED TRIAGE NOTES
Patient co cough x 3 days states [facility] to [facility] and [facility] blood work and [facility] xray. Patient sent to ER for pneumonia work up.    Patient has paperwork but no disc from [facility]

## 2020-01-31 NOTE — ED PROVIDER NOTES
76 y.o. female with past medical history significant for chronic back pain, T2 DM, HTN, MI, and EMILY who presents via POV, from [facility], with chief complaint of cough. Pt complains of cough, fatigue, sore throat, and body aches for the past 2 and a half days. She was seen at [facility] and diagnosed with pneumonia, but advised to come to ED for further evaluation. She did have a flu vaccine this season, and had a negative flu test at [facility]. She was not prescribed any abx at [facility], and pt was not given a copy of her chest X-ray. There are no other acute medical concerns at this time. Social hx: former tobacco smoker (quit ); denies EtOH use; denies illicit drug use. PCP: Teetee Crowell MD      Note written by Adrian Graham, as dictated by Maru Haley MD 2:17 PM      The history is provided by the patient. No  was used.         Past Medical History:   Diagnosis Date    Chronic back pain 2010    DM type 2 (diabetes mellitus, type 2) ([facility] 5/3/2010    HTN (hypertension) 5/3/2010    MI (myocardial infarction) ([facility] 5/3/2010    Obstructive sleep apnea (adult) (pediatric) 2014       Past Surgical History:   Procedure Laterality Date    HX CORONARY STENT PLACEMENT      HX ORTHOPAEDIC      disc sx    HX TUBAL LIGATION           Family History:   Problem Relation Age of Onset    Hypertension Mother          58yo    Diabetes Sister     Breast Cancer Sister     Diabetes Sister     Diabetes Sister     Diabetes Sister        Social History     Socioeconomic History    Marital status: SINGLE     Spouse name: Not on file    Number of children: Not on file    Years of education: Not on file    Highest education level: Not on file   Occupational History    Not on file   Social Needs    Financial resource strain: Not on file    Food insecurity:     Worry: Not on file     Inability: Not on file    Transportation needs: Medical: Not on file     Non-medical: Not on file   Tobacco Use    Smoking status: Former Smoker     Last attempt to quit: 12/29/2004     Years since quitting: 15.0    Smokeless tobacco: Never Used   Substance and Sexual Activity    Alcohol use: No    Drug use: No    Sexual activity: Never   Lifestyle    Physical activity:     Days per week: Not on file     Minutes per session: Not on file    Stress: Not on file   Relationships    Social connections:     Talks on phone: Not on file     Gets together: Not on file     Attends Protestant service: Not on file     Active member of club or organization: Not on file     Attends meetings of clubs or organizations: Not on file     Relationship status: Not on file    Intimate partner violence:     Fear of current or ex partner: Not on file     Emotionally abused: Not on file     Physically abused: Not on file     Forced sexual activity: Not on file   Other Topics Concern    Not on file   Social History Narrative    Not on file         ALLERGIES: Pravachol [pravastatin] and Tetanus vaccines and toxoid    Review of Systems   Constitutional: Positive for fatigue. Negative for activity change, diaphoresis and fever. HENT: Positive for sore throat. Negative for congestion. Eyes: Negative for photophobia and visual disturbance. Respiratory: Positive for cough. Negative for chest tightness and shortness of breath. Cardiovascular: Negative for chest pain, palpitations and leg swelling. Gastrointestinal: Negative for abdominal pain, blood in stool, constipation, diarrhea, nausea and vomiting. Genitourinary: Negative for difficulty urinating, dysuria, flank pain, frequency and hematuria. Musculoskeletal: Positive for myalgias. Negative for back pain. Neurological: Negative for dizziness, syncope, numbness and headaches. All other systems reviewed and are negative.       Vitals:    01/31/20 1341   BP: 129/65   Pulse: (!) 105   Resp: 19   SpO2: 98% Weight: 96.2 kg (212 lb)   Height: 5' 6\" (1.676 m)            Physical Exam  Vitals signs and nursing note reviewed. Constitutional:       General: She is not in acute distress. Appearance: She is well-developed. She is not diaphoretic. HENT:      Head: Normocephalic and atraumatic. Nose: Nose normal.      Mouth/Throat:      Pharynx: No oropharyngeal exudate. Eyes:      General: No scleral icterus. Right eye: No discharge. Left eye: No discharge. Conjunctiva/sclera: Conjunctivae normal.   Neck:      Musculoskeletal: Normal range of motion and neck supple. Thyroid: No thyromegaly. Vascular: No JVD. Trachea: No tracheal deviation. Cardiovascular:      Rate and Rhythm: Normal rate and regular rhythm. Heart sounds: Normal heart sounds. No murmur. No friction rub. No gallop. Pulmonary:      Effort: Pulmonary effort is normal. No respiratory distress. Breath sounds: Normal breath sounds. No stridor. No wheezing or rales. Chest:      Chest wall: No tenderness. Abdominal:      General: Bowel sounds are normal. There is no distension. Palpations: There is no mass. Tenderness: There is no abdominal tenderness. There is no rebound. Musculoskeletal: Normal range of motion. General: No tenderness. Lymphadenopathy:      Cervical: No cervical adenopathy. Skin:     General: Skin is warm and dry. Coloration: Skin is not pale. Findings: No erythema or rash. Neurological:      Mental Status: She is alert and oriented to person, place, and time. Cranial Nerves: No cranial nerve deficit. Coordination: Coordination normal.   Psychiatric:         Behavior: Behavior normal.         Thought Content:  Thought content normal.         Judgment: Judgment normal.      Note written by Adrian Avila, as dictated by Perry Brar MD 2:22 PM    MDM  Number of Diagnoses or Management Options  Upper respiratory tract infection, unspecified type:   Diagnosis management comments: A/P: URI. 14-year-old female presenting with cough, congestion, body aches [facility] with pneumonia states that that is what showed on her chest x-ray unfortunately patient does not have a chest x-ray with her here labs as well as vital signs were unremarkable at [facility].  Patient also has unremarkable labs and vitals here. Will repeat chest x-ray. Chest x-ray negative for pneumonia discussed with patient patient to be discharged with supportive therapy.        Amount and/or Complexity of Data Reviewed  Clinical lab tests: ordered and reviewed  Tests in the radiology section of CPT®: ordered and reviewed  Independent visualization of images, tracings, or specimens: yes    Risk of Complications, Morbidity, and/or Mortality  Presenting problems: moderate  Diagnostic procedures: moderate  Management options: moderate    Patient Progress  Patient progress: improved         Procedures

## 2020-02-06 LAB
BACTERIA SPEC CULT: NORMAL
SERVICE CMNT-IMP: NORMAL

## 2020-04-20 ENCOUNTER — TELEPHONE (OUTPATIENT)
Dept: FAMILY MEDICINE CLINIC | Age: 75
End: 2020-04-20

## 2020-04-20 NOTE — TELEPHONE ENCOUNTER
I called [facility] to touch base with them in regards to any needs they may have. I left a VM. Alisson Sheth MD

## 2020-04-20 NOTE — TELEPHONE ENCOUNTER
I called patient back and ID x2. She has no need for telemedicine now and would wait until the end of the pandemy to schedule an appointment if she needed one.

## 2020-04-20 NOTE — TELEPHONE ENCOUNTER
Pt is returning a call and is stating she did not understand anything that was left on her voicemail. Best contact number [phone].

## 2020-07-22 ENCOUNTER — TELEPHONE (OUTPATIENT)
Dept: CARDIOLOGY CLINIC | Age: 75
End: 2020-07-22

## 2020-09-04 ENCOUNTER — OFFICE VISIT (OUTPATIENT)
Dept: CARDIOLOGY CLINIC | Age: 75
End: 2020-09-04
Payer: MEDICARE

## 2020-09-04 VITALS
DIASTOLIC BLOOD PRESSURE: 64 MMHG | WEIGHT: 214 LBS | BODY MASS INDEX: 34.39 KG/M2 | SYSTOLIC BLOOD PRESSURE: 118 MMHG | HEIGHT: 66 IN | OXYGEN SATURATION: 98 % | HEART RATE: 80 BPM

## 2020-09-04 DIAGNOSIS — I10 ESSENTIAL HYPERTENSION: ICD-10-CM

## 2020-09-04 DIAGNOSIS — I25.119 CORONARY ARTERY DISEASE INVOLVING NATIVE HEART WITH ANGINA PECTORIS, UNSPECIFIED VESSEL OR LESION TYPE (HCC): Primary | ICD-10-CM

## 2020-09-04 DIAGNOSIS — E11.9 TYPE 2 DIABETES MELLITUS WITHOUT COMPLICATION, WITHOUT LONG-TERM CURRENT USE OF INSULIN (HCC): ICD-10-CM

## 2020-09-04 PROCEDURE — 93005 ELECTROCARDIOGRAM TRACING: CPT | Performed by: INTERNAL MEDICINE

## 2020-09-04 PROCEDURE — 3046F HEMOGLOBIN A1C LEVEL >9.0%: CPT | Performed by: INTERNAL MEDICINE

## 2020-09-04 PROCEDURE — 2022F DILAT RTA XM EVC RTNOPTHY: CPT | Performed by: INTERNAL MEDICINE

## 2020-09-04 PROCEDURE — G8752 SYS BP LESS 140: HCPCS | Performed by: INTERNAL MEDICINE

## 2020-09-04 PROCEDURE — 1101F PT FALLS ASSESS-DOCD LE1/YR: CPT | Performed by: INTERNAL MEDICINE

## 2020-09-04 PROCEDURE — 3017F COLORECTAL CA SCREEN DOC REV: CPT | Performed by: INTERNAL MEDICINE

## 2020-09-04 PROCEDURE — G8510 SCR DEP NEG, NO PLAN REQD: HCPCS | Performed by: INTERNAL MEDICINE

## 2020-09-04 PROCEDURE — G8399 PT W/DXA RESULTS DOCUMENT: HCPCS | Performed by: INTERNAL MEDICINE

## 2020-09-04 PROCEDURE — G8536 NO DOC ELDER MAL SCRN: HCPCS | Performed by: INTERNAL MEDICINE

## 2020-09-04 PROCEDURE — G0463 HOSPITAL OUTPT CLINIC VISIT: HCPCS | Performed by: INTERNAL MEDICINE

## 2020-09-04 PROCEDURE — G8754 DIAS BP LESS 90: HCPCS | Performed by: INTERNAL MEDICINE

## 2020-09-04 PROCEDURE — G0444 DEPRESSION SCREEN ANNUAL: HCPCS | Performed by: INTERNAL MEDICINE

## 2020-09-04 PROCEDURE — G8419 CALC BMI OUT NRM PARAM NOF/U: HCPCS | Performed by: INTERNAL MEDICINE

## 2020-09-04 PROCEDURE — 99214 OFFICE O/P EST MOD 30 MIN: CPT | Performed by: INTERNAL MEDICINE

## 2020-09-04 PROCEDURE — G8427 DOCREV CUR MEDS BY ELIG CLIN: HCPCS | Performed by: INTERNAL MEDICINE

## 2020-09-04 PROCEDURE — 93010 ELECTROCARDIOGRAM REPORT: CPT | Performed by: INTERNAL MEDICINE

## 2020-09-04 PROCEDURE — 1090F PRES/ABSN URINE INCON ASSESS: CPT | Performed by: INTERNAL MEDICINE

## 2020-09-04 NOTE — PROGRESS NOTES
Room 6    Chest pain: no  Shortness of breath: no  Edema: no  Palpitations: no  Dizziness: no    New diagnosis/Surgeries: no    ER/Hospitalizations: no    Refills: NO

## 2020-09-09 ENCOUNTER — TELEPHONE (OUTPATIENT)
Dept: CARDIOLOGY CLINIC | Age: 75
End: 2020-09-09

## 2020-12-01 DIAGNOSIS — I25.10 CORONARY ARTERY DISEASE INVOLVING NATIVE CORONARY ARTERY WITHOUT ANGINA PECTORIS, UNSPECIFIED WHETHER NATIVE OR TRANSPLANTED HEART: ICD-10-CM

## 2020-12-01 DIAGNOSIS — I10 ESSENTIAL HYPERTENSION: ICD-10-CM

## 2020-12-01 RX ORDER — METOPROLOL SUCCINATE 100 MG/1
TABLET, EXTENDED RELEASE ORAL
Qty: 30 TAB | Refills: 0 | Status: SHIPPED | OUTPATIENT
Start: 2020-12-01 | End: 2021-01-08 | Stop reason: SDUPTHER

## 2020-12-01 NOTE — TELEPHONE ENCOUNTER
Will refill the medication for one time only. The patient needs to be seen (in person or virtually ) for future refills as she has not been seen for a year.    Mai Wright MD

## 2020-12-01 NOTE — TELEPHONE ENCOUNTER
I called patient to let her know that  sent her a refill for 1 moth but she needs to follow up virtually or in- person. She is aware but it was hard to hear her due to her background noise. She states she was going to callback to schedule appointment.

## 2020-12-01 NOTE — TELEPHONE ENCOUNTER
Good afternoon ,  I received a message stating that the patient is requesting a refill for:    metoprolol succinate (TOPROL-XL) 100 mg tablet   To be sent to the pharmacy. Can you help me with this?     Thank you

## 2021-01-08 ENCOUNTER — VIRTUAL VISIT (OUTPATIENT)
Dept: FAMILY MEDICINE CLINIC | Age: 76
End: 2021-01-08
Payer: MEDICARE

## 2021-01-08 DIAGNOSIS — H25.9 SENILE CATARACT OF RIGHT EYE, UNSPECIFIED AGE-RELATED CATARACT TYPE: Primary | ICD-10-CM

## 2021-01-08 DIAGNOSIS — Z01.818 PRE-OPERATIVE GENERAL PHYSICAL EXAMINATION: ICD-10-CM

## 2021-01-08 DIAGNOSIS — I10 ESSENTIAL HYPERTENSION: ICD-10-CM

## 2021-01-08 DIAGNOSIS — I25.10 CORONARY ARTERY DISEASE INVOLVING NATIVE CORONARY ARTERY WITHOUT ANGINA PECTORIS, UNSPECIFIED WHETHER NATIVE OR TRANSPLANTED HEART: ICD-10-CM

## 2021-01-08 PROCEDURE — 99213 OFFICE O/P EST LOW 20 MIN: CPT | Performed by: STUDENT IN AN ORGANIZED HEALTH CARE EDUCATION/TRAINING PROGRAM

## 2021-01-08 PROCEDURE — 1101F PT FALLS ASSESS-DOCD LE1/YR: CPT | Performed by: STUDENT IN AN ORGANIZED HEALTH CARE EDUCATION/TRAINING PROGRAM

## 2021-01-08 PROCEDURE — G8432 DEP SCR NOT DOC, RNG: HCPCS | Performed by: STUDENT IN AN ORGANIZED HEALTH CARE EDUCATION/TRAINING PROGRAM

## 2021-01-08 PROCEDURE — G8427 DOCREV CUR MEDS BY ELIG CLIN: HCPCS | Performed by: STUDENT IN AN ORGANIZED HEALTH CARE EDUCATION/TRAINING PROGRAM

## 2021-01-08 PROCEDURE — 1090F PRES/ABSN URINE INCON ASSESS: CPT | Performed by: STUDENT IN AN ORGANIZED HEALTH CARE EDUCATION/TRAINING PROGRAM

## 2021-01-08 PROCEDURE — G8756 NO BP MEASURE DOC: HCPCS | Performed by: STUDENT IN AN ORGANIZED HEALTH CARE EDUCATION/TRAINING PROGRAM

## 2021-01-08 PROCEDURE — G0463 HOSPITAL OUTPT CLINIC VISIT: HCPCS | Performed by: STUDENT IN AN ORGANIZED HEALTH CARE EDUCATION/TRAINING PROGRAM

## 2021-01-08 PROCEDURE — 3017F COLORECTAL CA SCREEN DOC REV: CPT | Performed by: STUDENT IN AN ORGANIZED HEALTH CARE EDUCATION/TRAINING PROGRAM

## 2021-01-08 PROCEDURE — G8399 PT W/DXA RESULTS DOCUMENT: HCPCS | Performed by: STUDENT IN AN ORGANIZED HEALTH CARE EDUCATION/TRAINING PROGRAM

## 2021-01-08 RX ORDER — METOPROLOL SUCCINATE 100 MG/1
100 TABLET, EXTENDED RELEASE ORAL DAILY
Qty: 90 TAB | Refills: 3 | Status: SHIPPED | OUTPATIENT
Start: 2021-01-08 | End: 2021-04-08

## 2021-01-08 RX ORDER — LISINOPRIL AND HYDROCHLOROTHIAZIDE 12.5; 2 MG/1; MG/1
1 TABLET ORAL DAILY
Qty: 90 TAB | Refills: 3 | Status: SHIPPED | OUTPATIENT
Start: 2021-01-08 | End: 2021-12-09

## 2021-01-08 NOTE — PROGRESS NOTES
Cuong Campbell  76 y.o. female  1945  [address] [facility]  [id number]   [address]:    Telemedicine Progress Note  [place] [place]       Encounter Date and Time: January 8, 2021 at 9:05 AM    Consent: Cuong Campbell, who was seen by synchronous (real-time) audio-video technology, and/or her healthcare decision maker, is aware that this patient-initiated, Telehealth encounter on 1/8/2021 is a billable service, with coverage as determined by her insurance carrier. She is aware that she may receive a bill and has provided verbal consent to proceed: Yes. Chief Complaint   Patient presents with    Pre-op Exam     cataract surgery      History of Present Illness   Cuong Campbell is a 76 y.o. female was evaluated by synchronous (real-time) audio-video technology from Pt home, through a secure patient portal.    Pre-Op Physical   Pt with a  known history of coronary disease with PCI to LAD in September 2017. 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 Shey Webber, cardiology), HTN, DM, HLD, EMILY on CPAP. Here for a virtual pre op physical.  Planning for RIGHT EYE Cataract Surgery on 2/4/21 with Dr. Mercedez Jolly at [facility]. Procedure will be done with regional anesthesia. Side effects from anestheia: no  History of malignant hyperthermia: no  Latex allergy: no    Screening for ETOH use:  Does not drink- Done and low risk  Smoking status:  No (quit 15 years ago)     Personal or FH of bleeding problems:  no  Personal or FH of blood clots:  no  Personal or FH of anesthesia problems:  no    Asthma or COPD: no  Yes Obesity:  34.54 BMI   Surgery close to diaphragm:  no  Known EMILY: YES - uses CPAP machine- well controlled       Follows with Dr. Shey Webber- Cardiology - last saw in 9/2020- EKG wnl follow up 1 year.     Follows with Dr. Tammi Renteria- States last visit was 9/2020 and A1c was 6.0. Review of Systems   Review of Systems   Constitutional: Negative for chills, fever, malaise/fatigue and weight loss. HENT: Negative for congestion and sinus pain. Eyes: Negative for double vision. Poor vision at night- known cataracts    Respiratory: Negative for cough, shortness of breath and wheezing. Cardiovascular: Negative for chest pain and palpitations. Gastrointestinal: Negative for abdominal pain, constipation, diarrhea, nausea and vomiting. Genitourinary: Negative for dysuria. Musculoskeletal: Negative for myalgias. Skin: Negative for rash. Neurological: Negative for dizziness and headaches. Endo/Heme/Allergies: Does not bruise/bleed easily. Vitals/Objective:     General: alert, cooperative, no distress   Mental  status: mental status: alert, oriented to person, place, and time, normal mood, behavior, speech, dress, motor activity, and thought processes   Resp: resp: normal effort and no respiratory distress   Neuro: neuro: no gross deficits   Skin: skin: no discoloration or lesions of concern on visible areas   Due to this being a TeleHealth evaluation, many elements of the physical examination are unable to be assessed. Assessment and Plan:   Time-based coding, delete if not needed: I spent at least 15 minutes with this established patient, and >50% of the time was spent counseling and/or coordinating care regarding Pre-op Physical for cataract surgery     1. Senile cataract of right eye, unspecified age-related cataract type  RIGHT EYE Cataract Surgery with Dr. Kiesha Ibarra, [facility]   2/4/21, under regional anesthesia. 2. Pre-operative general physical examination  Jessica Jones is a 76 y.o. female  is scheduled to have noncardiac surgery and has been evaluated for the risk of a cardiovascular perioperative cardiac event.  Her evaluation was limited as it was over a virtual audio-visual encounter so some of the exam findings are limited. This 76year old  Female is stable for a low risk procedure. May proceed with above planned procedure. Revised Cardiac Risk index- Class II risk, 6.0%.    -preop form faxed to Dr. Lenin Verma office and copy mailed to patient.   -see scanned in document in media     Time spent in direct conversation with the patient to include medical condition(s) discussed, assessment and treatment plan: 15 minutes        We discussed the expected course, resolution and complications of the diagnosis(es) in detail. Medication risks, benefits, costs, interactions, and alternatives were discussed as indicated. I advised her to contact the office if her condition worsens, changes or fails to improve as anticipated. She expressed understanding with the diagnosis(es) and plan. Patient understands that this encounter was a temporary measure, and the importance of further follow up and examination was emphasized. Patient verbalized understanding. Patient informed to follow up: After cataract surgery for physical  .  Follow-up and Dispositions  ·   Return in about 2 months (around 3/8/2021) for CPE and labs. Electronically Signed: Ash Reid DO Yariel Quintanilla is a 76 y.o. female who was evaluated by an audio-video encounter for concerns as above. Patient identification was verified prior to start of the visit. A caregiver was present when appropriate. Due to this being a TeleHealth encounter (During [facility] public health emergency), evaluation of the following organ systems was limited: Vitals/Constitutional/EENT/Resp/CV/GI//MS/Neuro/Skin/Heme-Lymph-Imm.   Pursuant to the emergency declaration under the [address] [place] [address], [address] [place] [address] authority and the [employer] Act, this Virtual Visit was conducted, with patient's (and/or legal guardian's) consent, to reduce the patient's risk of exposure to COVID-19 and provide necessary medical care. Services were provided through a synchronous discussion virtually to substitute for in-person clinic visit. I was at home. The patient was in the office. History   Patients past medical, surgical and family histories were reviewed and updated.       Past Medical History:   Diagnosis Date    Chronic back pain 2010    DM type 2 (diabetes mellitus, type 2) ([facility] 5/3/2010    HTN (hypertension) 5/3/2010    [facility] 5/3/2010    Obstructive sleep apnea (adult) (pediatric) 2014     Past Surgical History:   Procedure Laterality Date    HX CORONARY STENT PLACEMENT  2017    Followed by Dr. Luis Smalls      disc sx    HX TUBAL LIGATION       Family History   Problem Relation Age of Onset    Hypertension Mother          58yo    Diabetes Sister     Breast Cancer Sister     Diabetes Sister     Diabetes Sister     Diabetes Sister      Social History     Socioeconomic History    Marital status: SINGLE     Spouse name: Not on file    Number of children: Not on file    Years of education: Not on file    Highest education level: Not on file   Occupational History    Not on file   Social Needs    Financial resource strain: Not on file    Food insecurity     Worry: Not on file     Inability: Not on file    Transportation needs     Medical: Not on file     Non-medical: Not on file   Tobacco Use    Smoking status: Former Smoker     Quit date: 2004     Years since quittin.0    Smokeless tobacco: Never Used   Substance and Sexual Activity    Alcohol use: No    Drug use: No    Sexual activity: Never   Lifestyle    Physical activity     Days per week: Not on file     Minutes per session: Not on file    Stress: Not on file   Relationships    Social connections     Talks on phone: Not on file     Gets together: Not on file     Attends Islam service: Not on file     Active member of club or organization: Not on file     Attends meetings of clubs or organizations: Not on file     Relationship status: Not on file    Intimate partner violence     Fear of current or ex partner: Not on file     Emotionally abused: Not on file     Physically abused: Not on file     Forced sexual activity: Not on file   Other Topics Concern    Not on file   Social History Narrative    Not on file     Patient Active Problem List   Diagnosis Code    HTN (hypertension) I10    DM type 2 (diabetes mellitus, type 2) ([facility] E11.9    MI (myocardial infarction) ([facility] I21.9    Chronic back pain M54.9, G89.29    Obstructive sleep apnea (adult) (pediatric) G47.33    Advanced care planning/counseling discussion Z71.89    Stable angina ([facility] I20.8    CAD (coronary artery disease) I25.10    Type 2 diabetes mellitus with nephropathy ([facility] E11.21    Severe obesity (BMI 35.0-39. 9) E66.01          Current Medications/Allergies   Medications and Allergies reviewed:    Current Outpatient Medications   Medication Sig Dispense Refill    lisinopril-hydroCHLOROthiazide (PRINZIDE, ZESTORETIC) 20-12.5 mg per tablet Take 1 Tab by mouth daily for 90 days. TAKE ONE TABLET BY MOUTH ONCE DAILY 90 Tab 3    metoprolol succinate (TOPROL-XL) 100 mg tablet Take 1 Tab by mouth daily for 90 days. TAKE ONE TABLET BY MOUTH ONCE DAILY 90 Tab 3    rosuvastatin (CRESTOR) 5 mg tablet TAKE ONE TABLET BY MOUTH NIGHTLY 90 Tab 3    insulin degludec (TRESIBA FLEXTOUCH U-100) 100 unit/mL (3 mL) inpn 60 Units by SubCUTAneous route nightly.  semaglutide (OZEMPIC) 0.25 mg/0.2 mL (2 mg/1.5 mL) sub-q pen 0.25 mg by SubCUTAneous route every seven (7) days.  nitroglycerin (NITROSTAT) 0.4 mg SL tablet PLACE ONE TABLET UNDER THE TONGUE EVERY 5 MINUTES AS NEEDED FOR CHEST PAIN 1 Bottle 1    aspirin 81 mg chewable tablet Take 81 mg by mouth daily. 100 Tab 3    co-enzyme Q-10 (CO Q-10) 100 mg capsule Take 1 Cap by mouth daily.       glucose blood VI test strips [facility] BLOOD GLUCOSE SYSTEM) strip Use 3 times per day (fasting and before meals). (Patient taking differently: Once a day) 3 Package 11    Lancets (ONE TOUCH ULTRASOFT LANCETS) Misc 1 Package by Does Not Apply route.  Test blood glucose 3-4 time daily 1 Package 11     Allergies   Allergen Reactions    Pravachol [Pravastatin] Myalgia    Tetanus Vaccines And Toxoid Swelling

## 2021-01-12 NOTE — PROGRESS NOTES
[address] Medicine Residency Attending Addendum:  Dr. Cristobal Munoz DO,  the patient and I were not physically present during this encounter. The resident and I are concurrently monitoring the patient care through appropriate telecommunication technology. I discussed the findings, assessment and plan with the resident and agree with the resident's findings and plan as documented in the resident's note.       Fani Poe MD

## 2021-02-05 ENCOUNTER — TELEPHONE (OUTPATIENT)
Dept: FAMILY MEDICINE CLINIC | Age: 76
End: 2021-02-05

## 2021-02-05 NOTE — TELEPHONE ENCOUNTER
----- Message from Davie Franklin sent at 1/29/2021  5:43 PM EST -----  Regarding: Dr. Amanda Weinberg first and last name:  pt    Reason for call:  Requesting to     Callback required yes/no and why:  yes    Best contact number(s):  [phone]    Details to clarify the request:  Requesting to be added to the list for COVID vaccine    Davie Franklin

## 2021-02-05 NOTE — TELEPHONE ENCOUNTER
Left voice mail.    [facility] facility number given of [phone], to check with all the health departments, and the pharmacies.

## 2021-08-13 ENCOUNTER — VIRTUAL VISIT (OUTPATIENT)
Dept: FAMILY MEDICINE CLINIC | Age: 76
End: 2021-08-13
Payer: MEDICARE

## 2021-08-13 DIAGNOSIS — I10 ESSENTIAL HYPERTENSION: ICD-10-CM

## 2021-08-13 DIAGNOSIS — E78.5 HYPERLIPIDEMIA, UNSPECIFIED HYPERLIPIDEMIA TYPE: ICD-10-CM

## 2021-08-13 DIAGNOSIS — I25.10 CORONARY ARTERY DISEASE INVOLVING NATIVE HEART WITHOUT ANGINA PECTORIS, UNSPECIFIED VESSEL OR LESION TYPE: ICD-10-CM

## 2021-08-13 DIAGNOSIS — G47.33 OBSTRUCTIVE SLEEP APNEA (ADULT) (PEDIATRIC): ICD-10-CM

## 2021-08-13 DIAGNOSIS — L91.8 SKIN TAG: ICD-10-CM

## 2021-08-13 DIAGNOSIS — E11.21 TYPE 2 DIABETES MELLITUS WITH NEPHROPATHY (HCC): Primary | ICD-10-CM

## 2021-08-13 PROCEDURE — 99443 PR PHYS/QHP TELEPHONE EVALUATION 21-30 MIN: CPT | Performed by: STUDENT IN AN ORGANIZED HEALTH CARE EDUCATION/TRAINING PROGRAM

## 2021-08-13 RX ORDER — METOPROLOL SUCCINATE 100 MG/1
TABLET, EXTENDED RELEASE ORAL
COMMUNITY
Start: 2021-06-27 | End: 2021-12-09

## 2021-08-13 RX ORDER — GABAPENTIN 300 MG/1
CAPSULE ORAL
COMMUNITY
Start: 2021-02-19 | End: 2021-08-13
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
## 2021-08-20 NOTE — PROGRESS NOTES
[address] Medicine Residency Attending Addendum:  Dr. Maryellen Rucker, DO,  the patient and I were not physically present during this encounter. The resident and I are concurrently monitoring the patient care through appropriate telecommunication technology. I discussed the findings, assessment and plan with the resident and agree with the resident's findings and plan as documented in the resident's note.       Kathy Hartley MD

## 2021-11-10 ENCOUNTER — TELEPHONE (OUTPATIENT)
Dept: FAMILY MEDICINE CLINIC | Age: 76
End: 2021-11-10

## 2021-11-10 NOTE — TELEPHONE ENCOUNTER
----- Message from Dionne Simmons sent at 11/9/2021  3:45 PM EST -----  Subject: Message to Provider    QUESTIONS  Information for Provider? Patient requests to speak with Dr Penny Sawyer. Patient would like to know who she recommends her to see for an ENT doctor  ---------------------------------------------------------------------------  --------------  [address]  What is the best way for the office to contact you? [place] to leave message on   voicemail  Preferred Call Back Phone Number? [phone]  ---------------------------------------------------------------------------  --------------  SCRIPT ANSWERS  Relationship to Patient?  Self

## 2021-11-11 NOTE — TELEPHONE ENCOUNTER
Received: Robb Vaughn DO Hoggood, April M, LPN  Caller: Unspecified Tomterrence Alvarez,  8:16 AM)  Dr. Jennifer Garcia at [facility]. [facility] and office is right behind [facility] patient and left her a detailed message with Dr. Ambrocio Granados recommendation and gave her his office info so she could call and make her appt.

## 2021-11-16 NOTE — TELEPHONE ENCOUNTER
Spoke with patient who said she does not need the information as she already found a doctor. Message--11/11-2:30 pm    Chelo Ricci [facility] Office  Subject: Message to Provider     QUESTIONS   Information for Provider? pt was returning call we tried contacting the   office but no on answered   ---------------------------------------------------------------------------   --------------   [address]   What is the best way for the office to contact you? [place] to leave message on   voicemail   Preferred Call Back Phone Number? [phone]   ---------------------------------------------------------------------------   --------------   SCRIPT ANSWERS   Relationship to Patient?  Self

## 2022-03-19 PROBLEM — E78.5 HYPERLIPIDEMIA: Status: ACTIVE | Noted: 2021-08-13

## 2022-03-19 PROBLEM — E11.21 TYPE 2 DIABETES MELLITUS WITH NEPHROPATHY (HCC): Status: ACTIVE | Noted: 2018-01-12

## 2022-03-19 PROBLEM — I25.10 CAD (CORONARY ARTERY DISEASE): Status: ACTIVE | Noted: 2017-09-20

## 2022-03-20 PROBLEM — Z71.89 ADVANCED CARE PLANNING/COUNSELING DISCUSSION: Status: ACTIVE | Noted: 2017-04-04

## 2022-03-20 PROBLEM — I20.8 STABLE ANGINA (HCC): Status: ACTIVE | Noted: 2017-09-15

## 2022-03-20 PROBLEM — I20.89 STABLE ANGINA: Status: ACTIVE | Noted: 2017-09-15

## 2022-06-10 DIAGNOSIS — I25.10 CORONARY ARTERY DISEASE INVOLVING NATIVE CORONARY ARTERY WITHOUT ANGINA PECTORIS, UNSPECIFIED WHETHER NATIVE OR TRANSPLANTED HEART: ICD-10-CM

## 2022-06-10 DIAGNOSIS — I10 ESSENTIAL HYPERTENSION: ICD-10-CM

## 2022-06-12 RX ORDER — METOPROLOL SUCCINATE 100 MG/1
TABLET, EXTENDED RELEASE ORAL
Qty: 90 TABLET | Refills: 0 | Status: SHIPPED | OUTPATIENT
Start: 2022-06-12 | End: 2022-09-08

## 2022-06-12 RX ORDER — LISINOPRIL AND HYDROCHLOROTHIAZIDE 12.5; 2 MG/1; MG/1
TABLET ORAL
Qty: 90 TABLET | Refills: 0 | Status: SHIPPED | OUTPATIENT
Start: 2022-06-12 | End: 2022-09-08

## 2022-07-22 ENCOUNTER — OFFICE VISIT (OUTPATIENT)
Dept: CARDIOLOGY CLINIC | Age: 77
End: 2022-07-22
Payer: MEDICARE

## 2022-07-22 VITALS
HEART RATE: 71 BPM | DIASTOLIC BLOOD PRESSURE: 88 MMHG | HEIGHT: 66 IN | OXYGEN SATURATION: 99 % | BODY MASS INDEX: 33.75 KG/M2 | WEIGHT: 210 LBS | SYSTOLIC BLOOD PRESSURE: 138 MMHG

## 2022-07-22 DIAGNOSIS — I25.119 CORONARY ARTERY DISEASE INVOLVING NATIVE HEART WITH ANGINA PECTORIS, UNSPECIFIED VESSEL OR LESION TYPE (HCC): Primary | ICD-10-CM

## 2022-07-22 DIAGNOSIS — I10 ESSENTIAL HYPERTENSION: ICD-10-CM

## 2022-07-22 DIAGNOSIS — E11.9 TYPE 2 DIABETES MELLITUS WITHOUT COMPLICATION, WITHOUT LONG-TERM CURRENT USE OF INSULIN (HCC): ICD-10-CM

## 2022-07-22 PROBLEM — E11.22 TYPE 2 DIABETES MELLITUS WITH CHRONIC KIDNEY DISEASE (HCC): Status: ACTIVE | Noted: 2022-07-22

## 2022-07-22 PROCEDURE — 99214 OFFICE O/P EST MOD 30 MIN: CPT | Performed by: INTERNAL MEDICINE

## 2022-07-22 PROCEDURE — G8536 NO DOC ELDER MAL SCRN: HCPCS | Performed by: INTERNAL MEDICINE

## 2022-07-22 PROCEDURE — 1090F PRES/ABSN URINE INCON ASSESS: CPT | Performed by: INTERNAL MEDICINE

## 2022-07-22 PROCEDURE — 93010 ELECTROCARDIOGRAM REPORT: CPT | Performed by: INTERNAL MEDICINE

## 2022-07-22 PROCEDURE — 93005 ELECTROCARDIOGRAM TRACING: CPT | Performed by: INTERNAL MEDICINE

## 2022-07-22 PROCEDURE — G0463 HOSPITAL OUTPT CLINIC VISIT: HCPCS | Performed by: INTERNAL MEDICINE

## 2022-07-22 PROCEDURE — G8428 CUR MEDS NOT DOCUMENT: HCPCS | Performed by: INTERNAL MEDICINE

## 2022-07-22 PROCEDURE — G8432 DEP SCR NOT DOC, RNG: HCPCS | Performed by: INTERNAL MEDICINE

## 2022-07-22 PROCEDURE — G8754 DIAS BP LESS 90: HCPCS | Performed by: INTERNAL MEDICINE

## 2022-07-22 PROCEDURE — G8417 CALC BMI ABV UP PARAM F/U: HCPCS | Performed by: INTERNAL MEDICINE

## 2022-07-22 PROCEDURE — 1101F PT FALLS ASSESS-DOCD LE1/YR: CPT | Performed by: INTERNAL MEDICINE

## 2022-07-22 PROCEDURE — 1123F ACP DISCUSS/DSCN MKR DOCD: CPT | Performed by: INTERNAL MEDICINE

## 2022-07-22 PROCEDURE — G8752 SYS BP LESS 140: HCPCS | Performed by: INTERNAL MEDICINE

## 2022-07-22 PROCEDURE — G8399 PT W/DXA RESULTS DOCUMENT: HCPCS | Performed by: INTERNAL MEDICINE

## 2022-07-22 RX ORDER — ROSUVASTATIN CALCIUM 5 MG/1
TABLET, COATED ORAL
Qty: 90 TABLET | Refills: 4 | Status: SHIPPED | OUTPATIENT
Start: 2022-07-22

## 2022-07-22 NOTE — PROGRESS NOTES
Office Follow-up    NAME: Beth Ontiveros   :  1945  MRM:  [id number]    Date:  2022            Assessment:     Problem List  Date Reviewed: 2021            Codes Class Noted    CAD (coronary artery disease) ICD-10-CM: I25.10  ICD-9-CM: 414.00  2017        Hyperlipidemia ICD-10-CM: E78.5  ICD-9-CM: 272.4  2021        Severe obesity (BMI 35.0-39. 9) ICD-10-CM: E66.01  ICD-9-CM: 278.01  2018        Type 2 diabetes mellitus with nephropathy ([facility] ICD-10-CM: E11.21  ICD-9-CM: 250.40, 583.81  2018        Stable angina (HCC) ICD-10-CM: I20.8  ICD-9-CM: 413.9  9/15/2017        Advanced care planning/counseling discussion ICD-10-CM: Z71.89  ICD-9-CM: V65.49  2017    Overview Signed 2017 10:31 AM by Alexandra Anglin MD     Patient states she is a DNR  17               Obstructive sleep apnea (adult) (pediatric) ICD-10-CM: E68.08  ICD-9-CM: 327.23  2014        Chronic back pain ICD-10-CM: M54.9, G89.29  ICD-9-CM: 724.5, 338.29  2010        HTN (hypertension) ICD-10-CM: I10  ICD-9-CM: 401.9  5/3/2010        DM type 2 (diabetes mellitus, type 2) ([facility] ICD-10-CM: E11.9  ICD-9-CM: 250.00  5/3/2010    Overview Addendum 2019  9:48 AM by Lisseth Shipman MD     On Lantus  FTF for DM supplies 2014  Forms for [employer] DM supplies 2014  Last HgA1C 6.7 (19) at endocrinology              MI (myocardial infarction) [facility]) ICD-10-CM: I21.9  ICD-9-CM: 410.90  5/3/2010              Plan:     CAD/LAD stent/RCA  (2017): No symptoms of angina. Continue medical management. Continue aspirin and Crestor along with metoprolol at current dosages. Started on Jardiance. Hypertension: Blood pressure is controlled. Continue metoprolol and Prinzide. Dyslipidemia: Most recent fasting lipid profile is not available. Continue Crestor 5 mg p.o. daily. Strict control of blood sugars. Diabetes: Most recent hemoglobin A1c is 6.0.   She is being currently treated by Dr. Dhruv Marinelli. EMILY: CPAP. See Kevin Hyde in 6months. Subjective:     Candice Martin, a 68y.o. year-old who presents for followup. She has known history of coronary disease with PCI to LAD in September 2017. 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 On today's visit she is denying any symptoms of chest pain, shortness of breath, lightheadedness or dizziness. She is active and works. EKG in my office today demonstrated normal sinus rhythm, normal axis, normal intervals, normal ST segment. Exam:     Physical Exam:  Visit Vitals  /88 (BP 1 Location: Left upper arm, BP Patient Position: Sitting, BP Cuff Size: Adult)   Pulse 71   Ht 5' 6\" (1.676 m)   Wt 210 lb (95.3 kg)   SpO2 99%   BMI 33.89 kg/m²     General appearance - alert, well appearing, and in no distress  Mental status - affect appropriate to mood  Eyes - sclera anicteric, moist mucous membranes  Neck - supple, no significant adenopathy  Chest - clear to auscultation, no wheezes, rales or rhonchi  Heart - normal rate, regular rhythm, normal S1, S2, no murmurs, rubs, clicks or gallops  Abdomen - soft, nontender, nondistended, no masses or organomegaly  Extremities - peripheral pulses normal, no pedal edema  Skin - normal coloration  no rashes    Medications:     Current Outpatient Medications   Medication Sig    metoprolol succinate (TOPROL-XL) 100 mg tablet Take 1 tablet by mouth once daily for 90 days    lisinopril-hydroCHLOROthiazide (PRINZIDE, ZESTORETIC) 20-12.5 mg per tablet Take 1 tablet by mouth once daily for 90 days    rosuvastatin (CRESTOR) 5 mg tablet TAKE ONE TABLET BY MOUTH NIGHTLY    insulin degludec 100 unit/mL (3 mL) inpn 60 Units by SubCUTAneous route nightly. semaglutide (OZEMPIC) 0.25 mg or 0.5 mg/dose (2 mg/1.5 ml) subq pen 0.25 mg by SubCUTAneous route every seven (7) days. aspirin 81 mg chewable tablet Take 81 mg by mouth daily. co-enzyme Q-10 (CO Q-10) 100 mg capsule Take 1 Cap by mouth daily. glucose blood VI test strips ([employer] BLOOD GLUCOSE SYSTEM) strip Use 3 times per day (fasting and before meals). (Patient taking differently: Once a day)    Lancets (ONE TOUCH ULTRASOFT LANCETS) Misc 1 Package by Does Not Apply route. Test blood glucose 3-4 time daily    nitroglycerin (NITROSTAT) 0.4 mg SL tablet PLACE ONE TABLET UNDER THE TONGUE EVERY 5 MINUTES AS NEEDED FOR CHEST PAIN (Patient not taking: Reported on 7/22/2022)     No current facility-administered medications for this visit. Diagnostic Data Review:       9/20/17: CATH- LAD: p70% @ D1, dLAD:70%; LCX:m50%, RCA: mRCA:  supplied w/ collaterals from LAD  ---Unsuccessful PCI apical LAD: Attempted POBA with various balloons without success. --- s/p ELE (3x15, 2.75 x14 ELE overlapping) ->mLAD    9/18/17: STRESS CARDIOLITE- Poor exercise tolerance. Nml stress. Abnormal Exercise gated SPECT MPS: Small apical defect, worse on stress, Likely small apical infarct with mild hamlet-infarct ischemia.  LVEF 64%    1997: [address]- [place]      Lab Review:     Lab Results   Component Value Date/Time    Cholesterol, total 176 04/06/2018 03:21 PM    HDL Cholesterol 76 04/06/2018 03:21 PM    LDL,Direct 158 (H) 01/17/2013 03:25 PM    LDL, calculated 82 04/06/2018 03:21 PM    Triglyceride 90 04/06/2018 03:21 PM    CHOL/HDL Ratio 3.4 09/21/2017 05:25 AM     Lab Results   Component Value Date/Time    Creatinine 1.90 (H) 01/31/2020 01:50 PM     Lab Results   Component Value Date/Time    BUN 27 (H) 01/31/2020 01:50 PM     Lab Results   Component Value Date/Time    Potassium 4.1 01/31/2020 01:50 PM     Lab Results   Component Value Date/Time    Hemoglobin A1c 9.0 (H) 09/01/2016 11:03 AM     Lab Results   Component Value Date/Time    HGB 12.6 01/31/2020 01:50 PM     Lab Results   Component Value Date/Time    PLATELET 681 48/92/8649 01:50 PM     No results for input(s): CPK, MITCH, SHAHRIAR in the last 72 hours. No lab exists for component: CKQMB, CPKMB             ___________________________________________________    Figueroa Tavares.  Haley Edward MD, [facility] [place]

## 2022-07-22 NOTE — PROGRESS NOTES
Trip Tian is a 68 y.o. female    Visit Vitals  /88 (BP 1 Location: Left upper arm, BP Patient Position: Sitting, BP Cuff Size: Adult)   Pulse 71   Ht 5' 6\" (1.676 m)   Wt 210 lb (95.3 kg)   SpO2 99%   BMI 33.89 kg/m²       Chief Complaint   Patient presents with    Hypertension    Cholesterol Problem    Coronary Artery Disease       Chest pain NO  SOB NO  Dizziness NO  Swelling NO  Recent hospital visit NO  Refills ROSUVASTATIN  COVID VACCINE STATUS YES  HAD COVID?  NO

## 2022-07-22 NOTE — PROGRESS NOTES
All orders entered per verbal orders of Dr. Donovan Belle. MD [place]      Refill Crestor. See Dr. Manuel Batista in 6 months    Refill per VO of Dr. Bains Proud:    Last appt: 2/14/2022    No future appointments.     Requested Prescriptions     Pending Prescriptions Disp Refills    rosuvastatin (CRESTOR) 5 mg tablet 90 Tablet 4     Sig: TAKE ONE TABLET BY MOUTH NIGHTLY

## 2022-08-11 ENCOUNTER — TELEPHONE (OUTPATIENT)
Dept: FAMILY MEDICINE CLINIC | Age: 77
End: 2022-08-11

## 2022-08-11 NOTE — TELEPHONE ENCOUNTER
----- Message from [employer] sent at 8/9/2022 11:56 AM EDT -----  Subject: Message to Provider    QUESTIONS  Information for Provider? Pt would like a recommendation for a   dermatologist from Dr. Guerita Fuentes. She advised she does not need a referral   for her insurance. She has several moles she would like looked at and just   an overall skin check. Please contact pt to advise.   ---------------------------------------------------------------------------  --------------  Stewart FERNANDO  [phone]; OK to leave message on voicemail  ---------------------------------------------------------------------------  --------------  SCRIPT ANSWERS  Relationship to Patient?  Self

## 2022-08-17 ENCOUNTER — TELEPHONE (OUTPATIENT)
Dept: FAMILY MEDICINE CLINIC | Age: 77
End: 2022-08-17

## 2022-08-17 NOTE — TELEPHONE ENCOUNTER
Attempted to call patient to schedule an appointment so she can get a referral. Unable to leave voicemail. do not raise arms above head or more than 90 degrees. No repetitive motions with your arms. You may shower Friday afternoon. Remove gauze dressings to shower, keep steri strips on, replace abdominal binder and surgical bra once done. Wear surgical bra and abdominal binder at all times. You may take tylenol for mild to moderate pain and percocet for severe pain.

## 2022-08-17 NOTE — TELEPHONE ENCOUNTER
----- Message from Jarad Escalante sent at 8/16/2022  3:35 PM EDT -----  Subject: Referral Request    Reason for referral request? PT would like referral to dermatology for   moles. Provider patient wants to be referred to(if known):     Provider Phone Number(if known):     Additional Information for Provider?   ---------------------------------------------------------------------------  --------------  [address] [facility] [employer] [facility]    [phone]; OK to leave message on voicemail  ---------------------------------------------------------------------------  --------------

## 2022-09-02 ENCOUNTER — OFFICE VISIT (OUTPATIENT)
Dept: FAMILY MEDICINE CLINIC | Age: 77
End: 2022-09-02
Payer: MEDICARE

## 2022-09-02 VITALS
RESPIRATION RATE: 16 BRPM | OXYGEN SATURATION: 97 % | SYSTOLIC BLOOD PRESSURE: 111 MMHG | HEART RATE: 67 BPM | TEMPERATURE: 97.5 F | HEIGHT: 66 IN | BODY MASS INDEX: 34.23 KG/M2 | WEIGHT: 213 LBS | DIASTOLIC BLOOD PRESSURE: 63 MMHG

## 2022-09-02 DIAGNOSIS — L81.9 HYPERPIGMENTATION OF SKIN: Primary | ICD-10-CM

## 2022-09-02 PROCEDURE — G8536 NO DOC ELDER MAL SCRN: HCPCS | Performed by: STUDENT IN AN ORGANIZED HEALTH CARE EDUCATION/TRAINING PROGRAM

## 2022-09-02 PROCEDURE — 1123F ACP DISCUSS/DSCN MKR DOCD: CPT | Performed by: STUDENT IN AN ORGANIZED HEALTH CARE EDUCATION/TRAINING PROGRAM

## 2022-09-02 PROCEDURE — 1090F PRES/ABSN URINE INCON ASSESS: CPT | Performed by: STUDENT IN AN ORGANIZED HEALTH CARE EDUCATION/TRAINING PROGRAM

## 2022-09-02 PROCEDURE — G8432 DEP SCR NOT DOC, RNG: HCPCS | Performed by: STUDENT IN AN ORGANIZED HEALTH CARE EDUCATION/TRAINING PROGRAM

## 2022-09-02 PROCEDURE — 1101F PT FALLS ASSESS-DOCD LE1/YR: CPT | Performed by: STUDENT IN AN ORGANIZED HEALTH CARE EDUCATION/TRAINING PROGRAM

## 2022-09-02 PROCEDURE — G8752 SYS BP LESS 140: HCPCS | Performed by: STUDENT IN AN ORGANIZED HEALTH CARE EDUCATION/TRAINING PROGRAM

## 2022-09-02 PROCEDURE — G8417 CALC BMI ABV UP PARAM F/U: HCPCS | Performed by: STUDENT IN AN ORGANIZED HEALTH CARE EDUCATION/TRAINING PROGRAM

## 2022-09-02 PROCEDURE — G8754 DIAS BP LESS 90: HCPCS | Performed by: STUDENT IN AN ORGANIZED HEALTH CARE EDUCATION/TRAINING PROGRAM

## 2022-09-02 PROCEDURE — G0463 HOSPITAL OUTPT CLINIC VISIT: HCPCS | Performed by: STUDENT IN AN ORGANIZED HEALTH CARE EDUCATION/TRAINING PROGRAM

## 2022-09-02 PROCEDURE — G8427 DOCREV CUR MEDS BY ELIG CLIN: HCPCS | Performed by: STUDENT IN AN ORGANIZED HEALTH CARE EDUCATION/TRAINING PROGRAM

## 2022-09-02 PROCEDURE — 99213 OFFICE O/P EST LOW 20 MIN: CPT | Performed by: STUDENT IN AN ORGANIZED HEALTH CARE EDUCATION/TRAINING PROGRAM

## 2022-09-02 PROCEDURE — G8399 PT W/DXA RESULTS DOCUMENT: HCPCS | Performed by: STUDENT IN AN ORGANIZED HEALTH CARE EDUCATION/TRAINING PROGRAM

## 2022-09-02 NOTE — PROGRESS NOTES
Yariel Quintanilla is a 68 y.o. female   Chief Complaint   Patient presents with    [facility]     Patient states that she is coming in to [facility]. No other concerns. ASSESSMENT AND PLAN:    1. Hyperpigmentation of skin  May be melasma vs solar lentigo. Will refer for further evaluation per patient request.   - REFERRAL TO DERMATOLOGY    Discussed follow up for annual physical and lab evaluation. Reports has labs monitored by endocrinologist and will have labs faxed over for records. SUBJECTIVE:    HPI:  Guzmán Lamp. Yasmani Gomez is a 68 y.o. female who presents for skin problem. Skin hyperpigmentation:   - Onset this year. Primarily notices on the face  - exacerbated with sun exposure  - has trialed skin tone cream without improvement   - no h/o headaches, dizziness, fevers/chills, joint pains, itching, rashes  - no personal or family h/o autoimmune disease   - would like to see a dermatologist for this problem    ROS   General: No fevers. No chills. HENT: No congestion. No rhinorrhea. No sore throat. Eyes: No eye pain. No eye redness. Respiratory: No cough. No shortness of breath. Cardio: No chest pain. No leg swelling. No palpitations. GI: No abd pain. No n/v. No diarrhea. No constipation. : No frequency. No dysuria. No urgency. MSK: No back pain. No neck pain. Neuro: No headaches. No dizziness. Current Outpatient Medications:     empagliflozin (JARDIANCE) 25 mg tablet, Take  by mouth daily. , Disp: , Rfl:     rosuvastatin (CRESTOR) 5 mg tablet, TAKE ONE TABLET BY MOUTH NIGHTLY, Disp: 90 Tablet, Rfl: 4    metoprolol succinate (TOPROL-XL) 100 mg tablet, Take 1 tablet by mouth once daily for 90 days, Disp: 90 Tablet, Rfl: 0    lisinopril-hydroCHLOROthiazide (PRINZIDE, ZESTORETIC) 20-12.5 mg per tablet, Take 1 tablet by mouth once daily for 90 days, Disp: 90 Tablet, Rfl: 0    insulin degludec 100 unit/mL (3 mL) inpn, 60 Units by SubCUTAneous route nightly., Disp: , Rfl: semaglutide (OZEMPIC) 0.25 mg or 0.5 mg/dose (2 mg/1.5 ml) subq pen, 0.25 mg by SubCUTAneous route every seven (7) days. , Disp: , Rfl:     aspirin 81 mg chewable tablet, Take 81 mg by mouth daily. , Disp: 100 Tab, Rfl: 3    co-enzyme Q-10 (CO Q-10) 100 mg capsule, Take 1 Cap by mouth daily. , Disp: , Rfl:     glucose blood VI test strips ([employer] BLOOD GLUCOSE SYSTEM) strip, Use 3 times per day (fasting and before meals). (Patient taking differently: Once a day), Disp: 3 Package, Rfl: 11    Lancets (ONE TOUCH ULTRASOFT LANCETS) Misc, 1 Package by Does Not Apply route.  Test blood glucose 3-4 time daily, Disp: 1 Package, Rfl: 11    nitroglycerin (NITROSTAT) 0.4 mg SL tablet, PLACE ONE TABLET UNDER THE TONGUE EVERY 5 MINUTES AS NEEDED FOR CHEST PAIN (Patient not taking: No sig reported), Disp: 1 Bottle, Rfl: 1    Past Medical History:   Diagnosis Date    Chronic back pain 2010    DM type 2 (diabetes mellitus, type 2) ([facility] 5/3/2010    HTN (hypertension) 5/3/2010    MI (myocardial infarction) ([facility] 5/3/2010    Obstructive sleep apnea (adult) (pediatric) 2014       Past Surgical History:   Procedure Laterality Date    HX CATARACT REMOVAL  2021    bilateral     HX CORONARY STENT PLACEMENT  2017    Followed by Dr. Dionna Fuentes ORTHOPAEDIC      disc sx    HX TUBAL LIGATION         Social History     Socioeconomic History    Marital status: SINGLE     Spouse name: Not on file    Number of children: Not on file    Years of education: Not on file    Highest education level: Not on file   Occupational History    Not on file   Tobacco Use    Smoking status: Former     Packs/day: 0.50     Years: 30.00     Pack years: 15.00     Types: Cigarettes     Quit date: 2004     Years since quittin.6    Smokeless tobacco: Never   Substance and Sexual Activity    Alcohol use: No    Drug use: No    Sexual activity: Never   Other Topics Concern    Not on file   Social History Narrative    Not on file     Social Determinants of Health     Financial Resource Strain: Not on file   Food Insecurity: Not on file   Transportation Needs: Not on file   Physical Activity: Not on file   Stress: Not on file   Social Connections: Not on file   Intimate Partner Violence: Not on file   Housing Stability: Not on file       OBJECTIVE:  /63 (BP 1 Location: Right upper arm, BP Patient Position: Sitting)   Pulse 67   Temp 97.5 °F (36.4 °C) (Oral)   Resp 16   Ht 5' 6\" (1.676 m)   Wt 213 lb (96.6 kg)   SpO2 97%   BMI 34.38 kg/m²     Physical Exam  Vitals and nursing note reviewed. Constitutional:       General: She is not in acute distress. HENT:      Head: Normocephalic and atraumatic. Eyes:      Conjunctiva/sclera: Conjunctivae normal.      Pupils: Pupils are equal, round, and reactive to light. Cardiovascular:      Rate and Rhythm: Normal rate and regular rhythm. Heart sounds: No murmur heard. No friction rub. No gallop. Pulmonary:      Effort: Pulmonary effort is normal. No respiratory distress. Breath sounds: Normal breath sounds. No wheezing, rhonchi or rales. Skin:     General: Skin is warm and dry. Capillary Refill: Capillary refill takes less than 2 seconds. Comments: Areas of hyperpigmentation noted over cheeks bilaterally. Neurological:      General: No focal deficit present. Mental Status: She is alert.

## 2022-09-02 NOTE — PROGRESS NOTES
Nain Reeves is a 68 y.o. female    Chief Complaint   Patient presents with    [facility]     Patient states that she is coming in to [facility]. No other concerns. 1. Have you been to the ER, urgent care clinic since your last visit? Hospitalized since your last visit? No    2. Have you seen or consulted any other health care providers outside of the [address] [place] [address] since your last visit? Include any pap smears or colon screening. No      Visit Vitals  /63 (BP 1 Location: Right upper arm, BP Patient Position: Sitting)   Pulse 67   Temp 97.5 °F (36.4 °C) (Oral)   Resp 16   Ht 5' 6\" (1.676 m)   Wt 213 lb (96.6 kg)   SpO2 97%   BMI 34.38 kg/m²           Health Maintenance Due   Topic Date Due    Hepatitis C Screening  Never done    Depression Screen  Never done    Shingrix Vaccine Age 49> (1 of 2) Never done    Eye Exam Retinal or Dilated  09/01/2016    Medicare Yearly Exam  04/05/2018    A1C test (Diabetic or Prediabetic)  05/04/2020    MICROALBUMIN Q1  11/04/2020    Lipid Screen  11/04/2020    COVID-19 Vaccine (3 - Booster for Pfizer series) 10/14/2021    Foot Exam Q1  07/01/2022    Flu Vaccine (1) Never done         Medication Reconciliation completed, changes noted.   Please  Update medication list.

## 2022-09-02 NOTE — PATIENT INSTRUCTIONS
Please call to schedule your appointment with Dr. Angel Correa at [phone] then call our office back @ #[phone] to leave a message for our referral coordinator with the appointment information (date/time/providers full name) for whom you will be seeing for this referral order so that we can authorize your visit(s) with your insurance if needed and referral tracking purposes of this order.

## 2022-09-07 DIAGNOSIS — I25.10 CORONARY ARTERY DISEASE INVOLVING NATIVE CORONARY ARTERY WITHOUT ANGINA PECTORIS, UNSPECIFIED WHETHER NATIVE OR TRANSPLANTED HEART: ICD-10-CM

## 2022-09-07 DIAGNOSIS — I10 ESSENTIAL HYPERTENSION: ICD-10-CM

## 2022-09-08 RX ORDER — METOPROLOL SUCCINATE 100 MG/1
TABLET, EXTENDED RELEASE ORAL
Qty: 90 TABLET | Refills: 0 | Status: SHIPPED | OUTPATIENT
Start: 2022-09-08

## 2022-09-08 RX ORDER — LISINOPRIL AND HYDROCHLOROTHIAZIDE 12.5; 2 MG/1; MG/1
TABLET ORAL
Qty: 90 TABLET | Refills: 0 | Status: SHIPPED | OUTPATIENT
Start: 2022-09-08

## 2022-12-06 DIAGNOSIS — I10 ESSENTIAL HYPERTENSION: ICD-10-CM

## 2022-12-06 DIAGNOSIS — I25.10 CORONARY ARTERY DISEASE INVOLVING NATIVE CORONARY ARTERY WITHOUT ANGINA PECTORIS, UNSPECIFIED WHETHER NATIVE OR TRANSPLANTED HEART: ICD-10-CM

## 2022-12-06 RX ORDER — LISINOPRIL AND HYDROCHLOROTHIAZIDE 12.5; 2 MG/1; MG/1
TABLET ORAL
Qty: 90 TABLET | Refills: 0 | Status: SHIPPED | OUTPATIENT
Start: 2022-12-06

## 2022-12-06 RX ORDER — METOPROLOL SUCCINATE 100 MG/1
TABLET, EXTENDED RELEASE ORAL
Qty: 90 TABLET | Refills: 0 | Status: SHIPPED | OUTPATIENT
Start: 2022-12-06

## 2023-01-20 ENCOUNTER — OFFICE VISIT (OUTPATIENT)
Dept: CARDIOLOGY CLINIC | Age: 78
End: 2023-01-20
Payer: MEDICARE

## 2023-01-20 VITALS
HEART RATE: 83 BPM | BODY MASS INDEX: 32.47 KG/M2 | DIASTOLIC BLOOD PRESSURE: 68 MMHG | WEIGHT: 202 LBS | OXYGEN SATURATION: 96 % | SYSTOLIC BLOOD PRESSURE: 118 MMHG | HEIGHT: 66 IN

## 2023-01-20 DIAGNOSIS — I25.119 CORONARY ARTERY DISEASE INVOLVING NATIVE HEART WITH ANGINA PECTORIS, UNSPECIFIED VESSEL OR LESION TYPE (HCC): Primary | ICD-10-CM

## 2023-01-20 DIAGNOSIS — I10 ESSENTIAL HYPERTENSION: ICD-10-CM

## 2023-01-20 DIAGNOSIS — E11.9 TYPE 2 DIABETES MELLITUS WITHOUT COMPLICATION, WITHOUT LONG-TERM CURRENT USE OF INSULIN (HCC): ICD-10-CM

## 2023-01-20 NOTE — PROGRESS NOTES
Office Follow-up    NAME: Vanessa Villavicencio   :  1945  MRM:  [id number]    Date:  2023            Assessment:     Problem List  Date Reviewed: 9/3/2022            Codes Class Noted    CAD (coronary artery disease) ICD-10-CM: I25.10  ICD-9-CM: 414.00  2017        Type 2 diabetes mellitus with chronic kidney disease ([facility] ICD-10-CM: E11.22  ICD-9-CM: 250.40, 585.9  2022        Hyperlipidemia ICD-10-CM: E78.5  ICD-9-CM: 272.4  2021        Severe obesity (BMI 35.0-39. 9) ICD-10-CM: E66.01  ICD-9-CM: 278.01  2018        Type 2 diabetes mellitus with nephropathy ([facility] ICD-10-CM: E11.21  ICD-9-CM: 250.40, 583.81  2018        Stable angina (HCC) ICD-10-CM: I20.8  ICD-9-CM: 413.9  9/15/2017        Advanced care planning/counseling discussion ICD-10-CM: Z71.89  ICD-9-CM: V65.49  2017    Overview Signed 2017 10:31 AM by Alberto Crabtree MD     Patient states she is a DNR  17               Obstructive sleep apnea (adult) (pediatric) ICD-10-CM: W07.65  ICD-9-CM: 327.23  2014        Chronic back pain ICD-10-CM: M54.9, G89.29  ICD-9-CM: 724.5, 338.29  2010        HTN (hypertension) ICD-10-CM: I10  ICD-9-CM: 401.9  5/3/2010        DM type 2 (diabetes mellitus, type 2) ([facility] ICD-10-CM: E11.9  ICD-9-CM: 250.00  5/3/2010    Overview Addendum 2019  9:48 AM by Anil Amaya MD     On Lantus  FTF for DM supplies 2014  Forms for [employer] DM supplies 2014  Last HgA1C 6.7 (19) at endocrinology              MI (myocardial infarction) [facility]) ICD-10-CM: I21.9  ICD-9-CM: 410.90  5/3/2010              Plan:     CAD/LAD stent/RCA  (): No symptoms of angina. Continue medical management. Continue aspirin and Crestor along with metoprolol at current dosages. Started on Jardiance. Hypertension: Blood pressure is controlled. Continue metoprolol and Prinzide. Dyslipidemia: Most recent fasting lipid profile is not available. Continue Crestor 5 mg p.o. daily.  Strict control of blood sugars. Diabetes: Most recent hemoglobin A1c is 6.0. She is being currently treated by Dr. Lakhwinder Pro. EMILY: CPAP. See Dr. Brian Armenta in 1 year. Subjective:     Lorre Hatchet, a 68y.o. year-old who presents for followup. She has known history of coronary disease with PCI to LAD in September 2017. 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 On today's visit she is denying any symptoms of chest pain, shortness of breath, lightheadedness or dizziness. She is active and works. Exam:     Physical Exam:  Visit Vitals  /68 (BP 1 Location: Right upper arm, BP Patient Position: Sitting)   Pulse 83   Ht 5' 6\" (1.676 m)   Wt 202 lb (91.6 kg)   SpO2 96%   BMI 32.60 kg/m²     General appearance - alert, well appearing, and in no distress  Mental status - affect appropriate to mood  Eyes - sclera anicteric, moist mucous membranes  Neck - supple, no significant adenopathy  Chest - clear to auscultation, no wheezes, rales or rhonchi  Heart - normal rate, regular rhythm, normal S1, S2, no murmurs, rubs, clicks or gallops  Abdomen - soft, nontender, nondistended, no masses or organomegaly  Extremities - peripheral pulses normal, no pedal edema  Skin - normal coloration  no rashes    Medications:     Current Outpatient Medications   Medication Sig    metoprolol succinate (TOPROL-XL) 100 mg tablet Take 1 tablet by mouth once daily    lisinopril-hydroCHLOROthiazide (PRINZIDE, ZESTORETIC) 20-12.5 mg per tablet Take 1 tablet by mouth once daily    rosuvastatin (CRESTOR) 5 mg tablet TAKE ONE TABLET BY MOUTH NIGHTLY    insulin degludec 100 unit/mL (3 mL) inpn 60 Units by SubCUTAneous route nightly. semaglutide (OZEMPIC) 0.25 mg or 0.5 mg/dose (2 mg/1.5 ml) subq pen 0.25 mg by SubCUTAneous route every seven (7) days. aspirin 81 mg chewable tablet Take 81 mg by mouth daily.     co-enzyme Q-10 (CO Q-10) 100 mg capsule Take 1 Cap by mouth daily. glucose blood VI test strips ([employer] BLOOD GLUCOSE SYSTEM) strip Use 3 times per day (fasting and before meals). (Patient taking differently: Once a day)    Lancets (ONE TOUCH ULTRASOFT LANCETS) Misc 1 Package by Does Not Apply route. Test blood glucose 3-4 time daily    empagliflozin (JARDIANCE) 25 mg tablet Take  by mouth daily. No current facility-administered medications for this visit. Diagnostic Data Review:       9/20/17: CATH- LAD: p70% @ D1, dLAD:70%; LCX:m50%, RCA: mRCA:  supplied w/ collaterals from LAD  ---Unsuccessful PCI apical LAD: Attempted POBA with various balloons without success. --- s/p ELE (3x15, 2.75 x14 ELE overlapping) ->mLAD    9/18/17: STRESS CARDIOLITE- Poor exercise tolerance. Nml stress. Abnormal Exercise gated SPECT MPS: Small apical defect, worse on stress, Likely small apical infarct with mild hamlet-infarct ischemia. LVEF 64%    1997: [address]- [place]      Lab Review:     Lab Results   Component Value Date/Time    Cholesterol, total 176 04/06/2018 03:21 PM    HDL Cholesterol 76 04/06/2018 03:21 PM    LDL,Direct 158 (H) 01/17/2013 03:25 PM    LDL, calculated 82 04/06/2018 03:21 PM    Triglyceride 90 04/06/2018 03:21 PM    CHOL/HDL Ratio 3.4 09/21/2017 05:25 AM     Lab Results   Component Value Date/Time    Creatinine 1.90 (H) 01/31/2020 01:50 PM     Lab Results   Component Value Date/Time    BUN 27 (H) 01/31/2020 01:50 PM     Lab Results   Component Value Date/Time    Potassium 4.1 01/31/2020 01:50 PM     Lab Results   Component Value Date/Time    Hemoglobin A1c 9.0 (H) 09/01/2016 11:03 AM     Lab Results   Component Value Date/Time    HGB 12.6 01/31/2020 01:50 PM     Lab Results   Component Value Date/Time    PLATELET 526 05/87/6768 01:50 PM     No results for input(s): CPK, CKMB, TROIQ in the last 72 hours. No lab exists for component: CKQMB, CPKMB             ___________________________________________________    Elaina Lacy MD [facility] [place]

## 2023-01-20 NOTE — PROGRESS NOTES
Chief Complaint   Patient presents with    Follow-up     6 mo     Coronary Artery Disease    Cholesterol Problem    Hypertension     Vitals:    01/20/23 1027   BP: 118/68   BP 1 Location: Right upper arm   BP Patient Position: Sitting   Pulse: 83   Height: 5' 6\" (1.676 m)   Weight: 202 lb (91.6 kg)   SpO2: 96%       Chest pain denied     SOB denied     Palpitations denied     Swelling in hands/feet denied     Dizziness denied     Recent hospital stays denied     Refills denied

## 2023-03-02 ENCOUNTER — OFFICE VISIT (OUTPATIENT)
Dept: FAMILY MEDICINE CLINIC | Age: 78
End: 2023-03-02
Payer: MEDICARE

## 2023-03-02 VITALS
BODY MASS INDEX: 32.08 KG/M2 | TEMPERATURE: 97.7 F | SYSTOLIC BLOOD PRESSURE: 105 MMHG | OXYGEN SATURATION: 97 % | HEIGHT: 66 IN | RESPIRATION RATE: 18 BRPM | DIASTOLIC BLOOD PRESSURE: 68 MMHG | HEART RATE: 90 BPM | WEIGHT: 199.6 LBS

## 2023-03-02 DIAGNOSIS — E86.0 DEHYDRATION: ICD-10-CM

## 2023-03-02 DIAGNOSIS — I10 PRIMARY HYPERTENSION: ICD-10-CM

## 2023-03-02 DIAGNOSIS — N18.32 TYPE 2 DIABETES MELLITUS WITH STAGE 3B CHRONIC KIDNEY DISEASE, WITH LONG-TERM CURRENT USE OF INSULIN (HCC): ICD-10-CM

## 2023-03-02 DIAGNOSIS — I25.10 CORONARY ARTERY DISEASE INVOLVING NATIVE HEART WITHOUT ANGINA PECTORIS, UNSPECIFIED VESSEL OR LESION TYPE: Chronic | ICD-10-CM

## 2023-03-02 DIAGNOSIS — R55 SYNCOPE, UNSPECIFIED SYNCOPE TYPE: Primary | ICD-10-CM

## 2023-03-02 DIAGNOSIS — Z79.4 TYPE 2 DIABETES MELLITUS WITH STAGE 3B CHRONIC KIDNEY DISEASE, WITH LONG-TERM CURRENT USE OF INSULIN (HCC): ICD-10-CM

## 2023-03-02 DIAGNOSIS — R55 SYNCOPE, UNSPECIFIED SYNCOPE TYPE: ICD-10-CM

## 2023-03-02 DIAGNOSIS — G47.33 OBSTRUCTIVE SLEEP APNEA (ADULT) (PEDIATRIC): Chronic | ICD-10-CM

## 2023-03-02 DIAGNOSIS — E11.22 TYPE 2 DIABETES MELLITUS WITH STAGE 3B CHRONIC KIDNEY DISEASE, WITH LONG-TERM CURRENT USE OF INSULIN (HCC): ICD-10-CM

## 2023-03-02 DIAGNOSIS — G62.9 NEUROPATHY: ICD-10-CM

## 2023-03-02 PROBLEM — E78.2 MIXED HYPERLIPIDEMIA: Status: ACTIVE | Noted: 2021-08-13

## 2023-03-02 PROBLEM — E11.21 TYPE 2 DIABETES MELLITUS WITH NEPHROPATHY (HCC): Status: RESOLVED | Noted: 2018-01-12 | Resolved: 2023-03-02

## 2023-03-02 PROBLEM — E78.5 HYPERLIPIDEMIA: Chronic | Status: ACTIVE | Noted: 2021-08-13

## 2023-03-02 PROBLEM — E78.2 MIXED HYPERLIPIDEMIA: Chronic | Status: ACTIVE | Noted: 2021-08-13

## 2023-03-02 LAB
BILIRUB UR QL STRIP: NEGATIVE
GLUCOSE UR-MCNC: NEGATIVE MG/DL
KETONES P FAST UR STRIP-MCNC: NEGATIVE MG/DL
PH UR STRIP: 5 [PH] (ref 4.6–8)
PROT UR QL STRIP: NEGATIVE
SP GR UR STRIP: 1.01 (ref 1–1.03)
UA UROBILINOGEN AMB POC: NORMAL (ref 0.2–1)
URINALYSIS CLARITY POC: NORMAL
URINALYSIS COLOR POC: YELLOW
URINE BLOOD POC: NEGATIVE
URINE LEUKOCYTES POC: NEGATIVE
URINE NITRITES POC: NEGATIVE

## 2023-03-02 PROCEDURE — G0463 HOSPITAL OUTPT CLINIC VISIT: HCPCS | Performed by: FAMILY MEDICINE

## 2023-03-02 PROCEDURE — 81003 URINALYSIS AUTO W/O SCOPE: CPT | Performed by: FAMILY MEDICINE

## 2023-03-02 PROCEDURE — 93005 ELECTROCARDIOGRAM TRACING: CPT | Performed by: FAMILY MEDICINE

## 2023-03-02 RX ORDER — DULOXETIN HYDROCHLORIDE 30 MG/1
CAPSULE, DELAYED RELEASE ORAL
COMMUNITY
Start: 2023-03-02

## 2023-03-02 NOTE — PROGRESS NOTES
Identified pt with two pt identifiers(name and ). Reviewed record in preparation for visit and have obtained necessary documentation. Chief Complaint   Patient presents with    Follow Up Chronic Condition        Health Maintenance Due   Topic    Hepatitis C Screening     Shingles Vaccine (1 of 2)    Eye Exam Retinal or Dilated     Medicare Yearly Exam     A1C test (Diabetic or Prediabetic)     COVID-19 Vaccine (3 - Booster for [employer] series)    Foot Exam Q1     Flu Vaccine (1)       Visit Vitals  /68 (BP 1 Location: Left upper arm, BP Patient Position: Sitting, BP Cuff Size: Adult)   Pulse 90   Temp 97.7 °F (36.5 °C) (Oral)   Resp 18   Ht 5' 6\" (1.676 m)   Wt 199 lb 9.6 oz (90.5 kg)   SpO2 97%   BMI 32.22 kg/m²         Coordination of Care Questionnaire:  :   1) Have you been to an emergency room, urgent care, or hospitalized since your last visit? If yes, where when, and reason for visit? no       2. Have seen or consulted any other health care provider since your last visit? If yes, where when, and reason for visit? NO      3) Do you have an Advanced Directive/ Living Will in place? NO  If yes, do we have a copy on file NO  If no, would you like information NO    Patient is accompanied by self I have received verbal consent from Shannon Billings to discuss any/all medical information while they are present in the room.

## 2023-03-02 NOTE — PROGRESS NOTES
Ginny Olvera  68 y.o. female  1945  [address] [facility]  [id number]   [address]: Progress Note  David Ballesteros MD       Encounter Date: 3/2/2023    Chief Complaint   Patient presents with    Follow Up Chronic Condition     History of Present Illness   Ginny Olvera is a 68 y.o. female who presents to clinic today for follow-up on her chronic conditions. Is in a lot of pain. Just started treatment for her neuropathy. CAD/HTN: Managed by Dr. Lissy Molina, Cardiology    Diabetes:  Managed by Dr. Sangita Puente, Endocrinology. Dr. Fely Gonzales sent over cymbalta this AM.  Has a podiatrist at [facility]    Neuropathy: Going to [facility] for neuropathy which uses Combined Electro-chemical Treatment (CET). Dehydration:  Low blood pressure yesterday. Felt like she is dehydrated. Results reviewed from Endocrinology (Drawn 1/5/2023)  LFTs normal  Na: 139  Potassium 4.2  Creatinine 1.43  BUN: 28  eGFR 38  A1c% 9.7%  (7.3% in June 2021)  Urine microalb/cr ratio: 6  HDL 67        Had previously been on gabapentin. Dr. Fely Gonzales increased to 300mg BID. Feels like she needs a new CPAP that she can travel with. New problem:   Syncope: Was recently in the kitchen preparing food when she began to feel dizzy. She squatted down and then blacked out. Daughter was in the next room and came immediately. She reports she may have been out for about a minute. No post-ictal symptoms. Denies palpitations, chest pain, dyspnea, focal neurologic deficits or changes in thinking/speaking. Was not hypo- or hyperglycemia. Has been trying to drink more G2 Gatorade to help with hydration which seems to have helped some.     Review of Systems   Review of Systems - Negative except as noted above  Vitals/Objective:     Vitals:    03/02/23 0925   BP: 105/68   Pulse: 90   Resp: 18   Temp: 97.7 °F (36.5 °C)   TempSrc: Oral   SpO2: 97%   Weight: 199 lb 9.6 oz (90.5 kg)   Height: 5' 6\" (1.676 m)   Body mass index is 32.22 kg/m². Vitals:    03/02/23 0925 03/02/23 1000   BP: 105/68    BP 2:  134/80   BP 1 Location: Left upper arm    BP Patient Position: Sitting    BP Cuff Size: Adult    Pulse: 90    Pulse 2:  80   Temp: 97.7 °F (36.5 °C)    TempSrc: Oral    Resp: 18    Height: 5' 6\" (1.676 m)    Weight: 199 lb 9.6 oz (90.5 kg)    SpO2: 97%        General: Patient alert and oriented and in NAD  HEENT: PER/EOMI, no conjunctival pallor or scleral icterus. No thyromegaly or cervical lymphadenopathy  Heart: Regular rate and rhythm, No murmurs, rubs or gallops. 2+ peripheral pulses  Lungs: Clear to auscultation bilaterally, no wheezing, rales or rhonchi  Abd: +BS, non-tender, non-distended  Ext: No edema  Skin: No rashes or lesions noted on exposed skin,  Psych: Appropriate mood and affect    EKG: normal EKG, normal sinus rhythm, unchanged from previous tracings. Assessment and Plan:   1. Syncope, unspecified syncope type  Orthostatic on exam.  EKG NSR. Most likely dehydration. Has been improving with PO hydration. Encouraged continued efforts to hydrate. Getting labs to rule out metabolic causes. - AMB POC EKG ROUTINE W/ 12 LEADS, INTER & REP  - CBC W/O DIFF; Future  - METABOLIC PANEL, BASIC; Future  - LIPID PANEL; Future  - MAGNESIUM; Future  - AMB POC URINALYSIS DIP STICK AUTO W/O MICRO    2. Primary hypertension    - CBC W/O DIFF; Future  - METABOLIC PANEL, BASIC; Future  - LIPID PANEL; Future  - MAGNESIUM; Future    3. Type 2 diabetes mellitus with stage 3b chronic kidney disease, with long-term current use of insulin (HCC)  Currently takes lisinopril-HCTZ and Metoprolol. Continue PO hydration and monitor BP at home. May need to d/c HCTZ if hypotension or dehydration persists. - CBC W/O DIFF; Future  - METABOLIC PANEL, BASIC; Future  - LIPID PANEL; Future  - MAGNESIUM; Future  - HM DIABETES FOOT EXAM    4.  Coronary artery disease involving native heart without angina pectoris, unspecified vessel or lesion type  On ASA, Toprol, Crestor. EKG reassuring today. - CBC W/O DIFF; Future  - METABOLIC PANEL, BASIC; Future  - LIPID PANEL; Future  - MAGNESIUM; Future    5. Obstructive sleep apnea (adult) (pediatric)  Would like evaluation for new CPAP. Referral placed. - SLEEP MEDICINE REFERRAL    6. Neuropathy  Getting treatment as noted in HPI. Had been on gabapentin. I do not see a b12 level in chart. Would consider checking this if neuropathy fails to improve. 7. Dehydration  Push PO fluids.   - METABOLIC PANEL, BASIC; Future  - MAGNESIUM; Future  - AMB POC URINALYSIS DIP STICK AUTO W/O MICRO    I have discussed the diagnosis with the patient and the intended plan as seen in the above orders. she has expressed understanding. The patient has received an after-visit summary and questions were answered concerning future plans. I have discussed medication side effects and warnings with the patient as well. Follow-up and Dispositions    Return in about 6 months (around 9/2/2023) for Follow-up on Chronic Conditions. Patient encounter was at > 40 minutes of total time spend on the date of the encounter: preparing to see the patient(reviewing prior notes and tests), obtaining and/or reviewing separately obtained history, performing a medially appropriate examination and/or evaluation, counseling and educating the patient/family/caregiver, ordering medications, tests or procedures, documenting clinical information in the electronic or other health record, independently interpreting results(not separately reported) and communicating results to the patient/family/caregiver and care coordination(not separately reported     Electronically Signed: Cyndy Topete MD     History   Patients past medical, surgical and family histories were reviewed and updated.     Past Medical History:   Diagnosis Date    Chronic back pain 9/8/2010    DM type 2 (diabetes mellitus, type 2) [facility] 5/3/2010    HTN (hypertension) 5/3/2010    MI (myocardial infarction) ([facility] 5/3/2010    Obstructive sleep apnea (adult) (pediatric) 2014     Past Surgical History:   Procedure Laterality Date    HX CATARACT REMOVAL  2021    bilateral     HX CORONARY STENT PLACEMENT  2017    Followed by Dr. Guy Galvan      disc sx    HX TUBAL LIGATION       Family History   Problem Relation Age of Onset    Hypertension Mother          60yo    Diabetes Sister     Breast Cancer Sister     Diabetes Sister     Diabetes Sister     Diabetes Sister      Social History     Tobacco Use    Smoking status: Former     Packs/day: 0.50     Years: 30.00     Pack years: 15.00     Types: Cigarettes     Quit date: 2004     Years since quittin.1    Smokeless tobacco: Never   Vaping Use    Vaping Use: Never used   Substance Use Topics    Alcohol use: No    Drug use: No              Current Medications/Allergies     Current Outpatient Medications   Medication Sig Dispense Refill    DULoxetine (CYMBALTA) 30 mg capsule       metoprolol succinate (TOPROL-XL) 100 mg tablet Take 1 tablet by mouth once daily 90 Tablet 0    lisinopril-hydroCHLOROthiazide (PRINZIDE, ZESTORETIC) 20-12.5 mg per tablet Take 1 tablet by mouth once daily 90 Tablet 0    empagliflozin (JARDIANCE) 25 mg tablet Take  by mouth daily. rosuvastatin (CRESTOR) 5 mg tablet TAKE ONE TABLET BY MOUTH NIGHTLY 90 Tablet 4    insulin degludec 100 unit/mL (3 mL) inpn 60 Units by SubCUTAneous route nightly. semaglutide (OZEMPIC) 0.25 mg or 0.5 mg/dose (2 mg/1.5 ml) subq pen 0.25 mg by SubCUTAneous route every seven (7) days. aspirin 81 mg chewable tablet Take 81 mg by mouth daily. 100 Tab 3    co-enzyme Q-10 (CO Q-10) 100 mg capsule Take 1 Cap by mouth daily. glucose blood VI test strips (Gravie BLOOD GLUCOSE SYSTEM) strip Use 3 times per day (fasting and before meals).  (Patient taking differently: Once a day) 3 Package 11    Lancets (ONE TOUCH ULTRASOFT LANCETS) Misc 1 Package by Does Not Apply route.  Test blood glucose 3-4 time daily 1 Package 11     Allergies   Allergen Reactions    Pravachol [Pravastatin] Myalgia    Tetanus Vaccines And Toxoid Swelling

## 2023-03-03 LAB
ANION GAP SERPL CALC-SCNC: 7 MMOL/L (ref 5–15)
BUN SERPL-MCNC: 61 MG/DL (ref 6–20)
BUN/CREAT SERPL: 28 (ref 12–20)
CALCIUM SERPL-MCNC: 9.3 MG/DL (ref 8.5–10.1)
CHLORIDE SERPL-SCNC: 99 MMOL/L (ref 97–108)
CHOLEST SERPL-MCNC: 213 MG/DL
CO2 SERPL-SCNC: 28 MMOL/L (ref 21–32)
CREAT SERPL-MCNC: 2.2 MG/DL (ref 0.55–1.02)
ERYTHROCYTE [DISTWIDTH] IN BLOOD BY AUTOMATED COUNT: 12.8 % (ref 11.5–14.5)
GLUCOSE SERPL-MCNC: 184 MG/DL (ref 65–100)
HCT VFR BLD AUTO: 40.5 % (ref 35–47)
HDLC SERPL-MCNC: 58 MG/DL
HDLC SERPL: 3.7 (ref 0–5)
HGB BLD-MCNC: 12.7 G/DL (ref 11.5–16)
LDLC SERPL CALC-MCNC: 122.8 MG/DL (ref 0–100)
MAGNESIUM SERPL-MCNC: 2.3 MG/DL (ref 1.6–2.4)
MCH RBC QN AUTO: 29.3 PG (ref 26–34)
MCHC RBC AUTO-ENTMCNC: 31.4 G/DL (ref 30–36.5)
MCV RBC AUTO: 93.5 FL (ref 80–99)
NRBC # BLD: 0 K/UL (ref 0–0.01)
NRBC BLD-RTO: 0 PER 100 WBC
PLATELET # BLD AUTO: 231 K/UL (ref 150–400)
PMV BLD AUTO: 12 FL (ref 8.9–12.9)
POTASSIUM SERPL-SCNC: 5 MMOL/L (ref 3.5–5.1)
RBC # BLD AUTO: 4.33 M/UL (ref 3.8–5.2)
SODIUM SERPL-SCNC: 134 MMOL/L (ref 136–145)
TRIGL SERPL-MCNC: 161 MG/DL (ref ?–150)
VLDLC SERPL CALC-MCNC: 32.2 MG/DL
WBC # BLD AUTO: 6.7 K/UL (ref 3.6–11)

## 2023-03-06 DIAGNOSIS — I10 ESSENTIAL HYPERTENSION: ICD-10-CM

## 2023-03-06 DIAGNOSIS — I25.10 CORONARY ARTERY DISEASE INVOLVING NATIVE CORONARY ARTERY WITHOUT ANGINA PECTORIS, UNSPECIFIED WHETHER NATIVE OR TRANSPLANTED HEART: ICD-10-CM

## 2023-03-06 RX ORDER — LISINOPRIL AND HYDROCHLOROTHIAZIDE 12.5; 2 MG/1; MG/1
TABLET ORAL
Qty: 90 TABLET | Refills: 0 | Status: ON HOLD | OUTPATIENT
Start: 2023-03-06

## 2023-03-06 RX ORDER — METOPROLOL SUCCINATE 100 MG/1
TABLET, EXTENDED RELEASE ORAL
Qty: 90 TABLET | Refills: 0 | Status: ON HOLD | OUTPATIENT
Start: 2023-03-06

## 2023-03-07 ENCOUNTER — TELEPHONE (OUTPATIENT)
Dept: FAMILY MEDICINE CLINIC | Age: 78
End: 2023-03-07

## 2023-03-07 NOTE — TELEPHONE ENCOUNTER
Patient called wanting to know the results from the labs that she recently had done. Would like to be contacted at your earliest convenience. Thanks!

## 2023-03-09 NOTE — TELEPHONE ENCOUNTER
Pt stated that no one has returned her call. She is requesting that someone returns her call to discuss her results. Thank you.

## 2023-03-10 NOTE — TELEPHONE ENCOUNTER
Pt called again to obtain the results of her lab work. She stated that she also spoke with doctor regarding a Rx for Tylenol with codeine. She wanted to check the status of the Rx as well. Thank you.

## 2023-03-11 ENCOUNTER — APPOINTMENT (OUTPATIENT)
Dept: GENERAL RADIOLOGY | Age: 78
DRG: 312 | End: 2023-03-11
Attending: STUDENT IN AN ORGANIZED HEALTH CARE EDUCATION/TRAINING PROGRAM
Payer: MEDICARE

## 2023-03-11 ENCOUNTER — APPOINTMENT (OUTPATIENT)
Dept: CT IMAGING | Age: 78
DRG: 312 | End: 2023-03-11
Payer: MEDICARE

## 2023-03-11 ENCOUNTER — HOSPITAL ENCOUNTER (INPATIENT)
Age: 78
LOS: 3 days | Discharge: HOME | DRG: 312 | End: 2023-03-14
Attending: STUDENT IN AN ORGANIZED HEALTH CARE EDUCATION/TRAINING PROGRAM | Admitting: FAMILY MEDICINE
Payer: MEDICARE

## 2023-03-11 DIAGNOSIS — R79.89 ELEVATED SERUM CREATININE: ICD-10-CM

## 2023-03-11 DIAGNOSIS — R55 SYNCOPE AND COLLAPSE: Primary | ICD-10-CM

## 2023-03-11 DIAGNOSIS — I95.9 HYPOTENSION, UNSPECIFIED HYPOTENSION TYPE: ICD-10-CM

## 2023-03-11 PROBLEM — N17.9 AKI (ACUTE KIDNEY INJURY) (HCC): Status: ACTIVE | Noted: 2023-03-11

## 2023-03-11 PROBLEM — E66.9 OBESITY: Status: ACTIVE | Noted: 2018-07-20

## 2023-03-11 PROBLEM — R11.10 VOMITING: Status: ACTIVE | Noted: 2023-03-11

## 2023-03-11 PROBLEM — R91.8 OPACITY OF LUNG ON IMAGING STUDY: Status: ACTIVE | Noted: 2023-03-11

## 2023-03-11 LAB
ALBUMIN SERPL-MCNC: 3.8 G/DL (ref 3.5–5)
ALBUMIN/GLOB SERPL: 0.9 (ref 1.1–2.2)
ALP SERPL-CCNC: 86 U/L (ref 45–117)
ALT SERPL-CCNC: 17 U/L (ref 12–78)
ANION GAP SERPL CALC-SCNC: 8 MMOL/L (ref 5–15)
AST SERPL-CCNC: 21 U/L (ref 15–37)
BASOPHILS # BLD: 0 K/UL (ref 0–0.1)
BASOPHILS NFR BLD: 0 % (ref 0–1)
BILIRUB SERPL-MCNC: 0.4 MG/DL (ref 0.2–1)
BUN SERPL-MCNC: 48 MG/DL (ref 6–20)
BUN/CREAT SERPL: 18 (ref 12–20)
CALCIUM SERPL-MCNC: 9.4 MG/DL (ref 8.5–10.1)
CHLORIDE SERPL-SCNC: 100 MMOL/L (ref 97–108)
CO2 SERPL-SCNC: 25 MMOL/L (ref 21–32)
COMMENT, HOLDF: NORMAL
CREAT SERPL-MCNC: 2.61 MG/DL (ref 0.55–1.02)
DIFFERENTIAL METHOD BLD: ABNORMAL
EOSINOPHIL # BLD: 0.1 K/UL (ref 0–0.4)
EOSINOPHIL NFR BLD: 1 % (ref 0–7)
ERYTHROCYTE [DISTWIDTH] IN BLOOD BY AUTOMATED COUNT: 13 % (ref 11.5–14.5)
GLOBULIN SER CALC-MCNC: 4.4 G/DL (ref 2–4)
GLUCOSE BLD STRIP.AUTO-MCNC: 71 MG/DL (ref 65–117)
GLUCOSE SERPL-MCNC: 178 MG/DL (ref 65–100)
HCT VFR BLD AUTO: 41.1 % (ref 35–47)
HGB BLD-MCNC: 13.6 G/DL (ref 11.5–16)
IMM GRANULOCYTES # BLD AUTO: 0 K/UL (ref 0–0.04)
IMM GRANULOCYTES NFR BLD AUTO: 0 % (ref 0–0.5)
LIPASE SERPL-CCNC: 398 U/L (ref 73–393)
LYMPHOCYTES # BLD: 2.1 K/UL (ref 0.8–3.5)
LYMPHOCYTES NFR BLD: 18 % (ref 12–49)
MAGNESIUM SERPL-MCNC: 2.2 MG/DL (ref 1.6–2.4)
MCH RBC QN AUTO: 29.8 PG (ref 26–34)
MCHC RBC AUTO-ENTMCNC: 33.1 G/DL (ref 30–36.5)
MCV RBC AUTO: 89.9 FL (ref 80–99)
MONOCYTES # BLD: 0.6 K/UL (ref 0–1)
MONOCYTES NFR BLD: 5 % (ref 5–13)
NEUTS SEG # BLD: 8.4 K/UL (ref 1.8–8)
NEUTS SEG NFR BLD: 76 % (ref 32–75)
NRBC # BLD: 0 K/UL (ref 0–0.01)
NRBC BLD-RTO: 0 PER 100 WBC
PHOSPHATE SERPL-MCNC: 4.9 MG/DL (ref 2.6–4.7)
PLATELET # BLD AUTO: 317 K/UL (ref 150–400)
PMV BLD AUTO: 11.4 FL (ref 8.9–12.9)
POTASSIUM SERPL-SCNC: 4.3 MMOL/L (ref 3.5–5.1)
PROT SERPL-MCNC: 8.2 G/DL (ref 6.4–8.2)
RBC # BLD AUTO: 4.57 M/UL (ref 3.8–5.2)
SAMPLES BEING HELD,HOLD: NORMAL
SERVICE CMNT-IMP: NORMAL
SODIUM SERPL-SCNC: 133 MMOL/L (ref 136–145)
TSH SERPL DL<=0.05 MIU/L-ACNC: 1.16 UIU/ML (ref 0.36–3.74)
WBC # BLD AUTO: 11.2 K/UL (ref 3.6–11)

## 2023-03-11 PROCEDURE — 83690 ASSAY OF LIPASE: CPT

## 2023-03-11 PROCEDURE — 74011250636 HC RX REV CODE- 250/636

## 2023-03-11 PROCEDURE — 84443 ASSAY THYROID STIM HORMONE: CPT

## 2023-03-11 PROCEDURE — 99285 EMERGENCY DEPT VISIT HI MDM: CPT

## 2023-03-11 PROCEDURE — 71046 X-RAY EXAM CHEST 2 VIEWS: CPT

## 2023-03-11 PROCEDURE — 82962 GLUCOSE BLOOD TEST: CPT

## 2023-03-11 PROCEDURE — 71250 CT THORAX DX C-: CPT

## 2023-03-11 PROCEDURE — 74011250636 HC RX REV CODE- 250/636: Performed by: STUDENT IN AN ORGANIZED HEALTH CARE EDUCATION/TRAINING PROGRAM

## 2023-03-11 PROCEDURE — 83735 ASSAY OF MAGNESIUM: CPT

## 2023-03-11 PROCEDURE — 74011000250 HC RX REV CODE- 250: Performed by: STUDENT IN AN ORGANIZED HEALTH CARE EDUCATION/TRAINING PROGRAM

## 2023-03-11 PROCEDURE — 65270000029 HC RM PRIVATE

## 2023-03-11 PROCEDURE — 65270000046 HC RM TELEMETRY

## 2023-03-11 PROCEDURE — 93005 ELECTROCARDIOGRAM TRACING: CPT

## 2023-03-11 PROCEDURE — C9113 INJ PANTOPRAZOLE SODIUM, VIA: HCPCS | Performed by: STUDENT IN AN ORGANIZED HEALTH CARE EDUCATION/TRAINING PROGRAM

## 2023-03-11 PROCEDURE — 80053 COMPREHEN METABOLIC PANEL: CPT

## 2023-03-11 PROCEDURE — 74011250637 HC RX REV CODE- 250/637

## 2023-03-11 PROCEDURE — 84100 ASSAY OF PHOSPHORUS: CPT

## 2023-03-11 PROCEDURE — 36415 COLL VENOUS BLD VENIPUNCTURE: CPT

## 2023-03-11 PROCEDURE — 85025 COMPLETE CBC W/AUTO DIFF WBC: CPT

## 2023-03-11 RX ORDER — ENOXAPARIN SODIUM 100 MG/ML
30 INJECTION SUBCUTANEOUS DAILY
Status: DISCONTINUED | OUTPATIENT
Start: 2023-03-12 | End: 2023-03-14 | Stop reason: HOSPADM

## 2023-03-11 RX ORDER — IBUPROFEN 200 MG
4 TABLET ORAL AS NEEDED
Status: DISCONTINUED | OUTPATIENT
Start: 2023-03-11 | End: 2023-03-14 | Stop reason: HOSPADM

## 2023-03-11 RX ORDER — DEXTROSE MONOHYDRATE 100 MG/ML
0-250 INJECTION, SOLUTION INTRAVENOUS AS NEEDED
Status: DISCONTINUED | OUTPATIENT
Start: 2023-03-11 | End: 2023-03-14 | Stop reason: HOSPADM

## 2023-03-11 RX ORDER — SODIUM CHLORIDE 0.9 % (FLUSH) 0.9 %
5-40 SYRINGE (ML) INJECTION EVERY 8 HOURS
Status: DISCONTINUED | OUTPATIENT
Start: 2023-03-11 | End: 2023-03-14 | Stop reason: HOSPADM

## 2023-03-11 RX ORDER — ACETAMINOPHEN 650 MG/1
650 SUPPOSITORY RECTAL
Status: DISCONTINUED | OUTPATIENT
Start: 2023-03-11 | End: 2023-03-14 | Stop reason: HOSPADM

## 2023-03-11 RX ORDER — LIDOCAINE 4 G/100G
1 PATCH TOPICAL EVERY 24 HOURS
Status: DISCONTINUED | OUTPATIENT
Start: 2023-03-11 | End: 2023-03-13

## 2023-03-11 RX ORDER — ROSUVASTATIN CALCIUM 5 MG/1
5 TABLET, COATED ORAL
Status: DISCONTINUED | OUTPATIENT
Start: 2023-03-11 | End: 2023-03-14 | Stop reason: HOSPADM

## 2023-03-11 RX ORDER — METOPROLOL SUCCINATE 50 MG/1
100 TABLET, EXTENDED RELEASE ORAL DAILY
Status: DISCONTINUED | OUTPATIENT
Start: 2023-03-11 | End: 2023-03-14 | Stop reason: HOSPADM

## 2023-03-11 RX ORDER — ACETAMINOPHEN 325 MG/1
650 TABLET ORAL
Status: DISCONTINUED | OUTPATIENT
Start: 2023-03-11 | End: 2023-03-14 | Stop reason: HOSPADM

## 2023-03-11 RX ORDER — SODIUM CHLORIDE 9 MG/ML
100 INJECTION, SOLUTION INTRAVENOUS CONTINUOUS
Status: DISCONTINUED | OUTPATIENT
Start: 2023-03-11 | End: 2023-03-14 | Stop reason: HOSPADM

## 2023-03-11 RX ORDER — INSULIN GLARGINE 100 [IU]/ML
30 INJECTION, SOLUTION SUBCUTANEOUS
Status: DISCONTINUED | OUTPATIENT
Start: 2023-03-11 | End: 2023-03-14 | Stop reason: HOSPADM

## 2023-03-11 RX ORDER — GUAIFENESIN 100 MG/5ML
81 LIQUID (ML) ORAL DAILY
Status: DISCONTINUED | OUTPATIENT
Start: 2023-03-11 | End: 2023-03-14 | Stop reason: HOSPADM

## 2023-03-11 RX ORDER — ONDANSETRON 2 MG/ML
4 INJECTION INTRAMUSCULAR; INTRAVENOUS
Status: DISCONTINUED | OUTPATIENT
Start: 2023-03-11 | End: 2023-03-14 | Stop reason: HOSPADM

## 2023-03-11 RX ORDER — INSULIN LISPRO 100 [IU]/ML
INJECTION, SOLUTION INTRAVENOUS; SUBCUTANEOUS
Status: DISCONTINUED | OUTPATIENT
Start: 2023-03-11 | End: 2023-03-14 | Stop reason: HOSPADM

## 2023-03-11 RX ORDER — SODIUM CHLORIDE 0.9 % (FLUSH) 0.9 %
5-40 SYRINGE (ML) INJECTION AS NEEDED
Status: DISCONTINUED | OUTPATIENT
Start: 2023-03-11 | End: 2023-03-14 | Stop reason: HOSPADM

## 2023-03-11 RX ORDER — ONDANSETRON 4 MG/1
4 TABLET, ORALLY DISINTEGRATING ORAL
Status: DISCONTINUED | OUTPATIENT
Start: 2023-03-11 | End: 2023-03-14 | Stop reason: HOSPADM

## 2023-03-11 RX ADMIN — SODIUM CHLORIDE, PRESERVATIVE FREE 40 MG: 5 INJECTION INTRAVENOUS at 21:52

## 2023-03-11 RX ADMIN — SODIUM CHLORIDE 1000 ML: 9 INJECTION, SOLUTION INTRAVENOUS at 19:35

## 2023-03-11 RX ADMIN — ROSUVASTATIN CALCIUM 5 MG: 5 TABLET, FILM COATED ORAL at 21:51

## 2023-03-11 RX ADMIN — ASPIRIN 81 MG: 81 TABLET, CHEWABLE ORAL at 21:51

## 2023-03-11 RX ADMIN — SODIUM CHLORIDE 100 ML/HR: 9 INJECTION, SOLUTION INTRAVENOUS at 20:00

## 2023-03-11 NOTE — H&P
[address] [facility] [address] [place] [address]   Office [phone]  Fax [phone]       Admission H&P     Name: Kaylyn Carlos MRN: [id number]  Sex: Female   YOB: 1945  Age: 68 y.o. PCP: Gerber Crespo MD     Source of Information: patient, medical records    Chief complaint: syncope    History of Present Illness  Kaylyn Carlos is a 68 y.o. female with PMH CAD s/p PCI in 2017, HTN, T2DM w neuropathy, CKD3b, EMILY who presents to the ER after 2nd syncopal episode in about a week and a half. She states the first episode was on 2/28. She had associated dizziness and endorses LOC with episode but did not hit her head. She then began to have episodes of NBNB emesis, about twice daily after meals, which resolved on 3/9. She has had loose stools but not watery diarrhea. She also endorses chills and weight loss, but denies fevers and LAD. She went to her PCP on 3/2 for the same, stating she has had low BP at home with SBP readings in the 70s. She has felt dehydrated and was noted by PCP to be orthostatic. She has been drinking 24oz of gatorade, 1 large pedialyte, and at least 24oz of water daily. She notes decreased urine output. On 3/11 at about 12:30PM, she again felt dizzy and passed out with LOC but did not hit her head. This is what prompted her to come to the ED. These episodes were unwitnessed, but she denies any seizure-like activity, tongue-biting, jerking movements. She denies abdominal pain, dysuria, cough, URI sx, sick contacts, recent travel, new foods. In the ER:      Vitals: Temp 97.7   /55      RR 16   SatO2  98% on RA  Labs: WBC 11.2.  Cr 2.61 (BL ~1.43), BUN 48,   Imaging: CXR with new L suprahilar spiculated opacity (rads rec dedicated CT)  EKG: NSR rate 85, LAD, no ST changes  Treatment: 1L NS    Vitals:  Patient Vitals for the past 8 hrs:   Temp Pulse Resp BP SpO2   03/11/23 1704 97.7 °F (36.5 °C) (!) 102 16 (!) 104/55 98 % Labs:   Recent Labs     03/11/23  1708   WBC 11.2*   HGB 13.6   HCT 41.1        Recent Labs     03/11/23  1708   *   K 4.3      CO2 25   BUN 48*   CREA 2.61*   *   CA 9.4   MG 2.2     Recent Labs     03/11/23  1708   AP 86   TP 8.2   ALB 3.8   GLOB 4.4*     No results for input(s): INR, PTP, APTT, INREXT in the last 72 hours. No results for input(s): PHI, PCO2I, PO2I, FIO2I in the last 72 hours. No results for input(s): CPK, CKMB, TROIQ, BNPP in the last 72 hours. Imaging:   CXR:  CXR Results  (Last 48 hours)                 03/11/23 1716  XR CHEST PA LAT Final result    Impression:  New, left suprahilar spiculated opacity. Recommend dedicated CT of   the chest for further evaluation. 23x. Narrative: Indication:  syncope        Exam: PA and lateral views of the chest.       Direct comparison is made to prior CXR dated January 2020. Findings: There is a new left suprahilar rounded density with peripheral   spiculation. Right lung is clear. There is no pleural fluid. There is no   pneumothorax. Osseous structures are diffusely demineralized. CT:   CT Results  (Last 48 hours)      None            Review of Systems  Review of Systems   Constitutional:  Positive for chills. Negative for fever. HENT:  Negative for congestion and rhinorrhea. Eyes:  Negative for photophobia and visual disturbance. Respiratory:  Negative for cough and shortness of breath. No hemoptysis   Cardiovascular:  Negative for chest pain and palpitations. Gastrointestinal:  Positive for diarrhea and vomiting. Negative for abdominal pain and blood in stool. Endocrine: Negative for polydipsia and polyphagia. Genitourinary:  Positive for decreased urine volume. Negative for difficulty urinating and dysuria. Musculoskeletal:  Negative for myalgias. Skin:  Negative for rash and wound. Neurological:  Positive for dizziness and syncope.    Hematological: Negative for adenopathy. Psychiatric/Behavioral:  Positive for sleep disturbance. Negative for confusion. Home Medications   Prior to Admission medications    Medication Sig Start Date End Date Taking? Authorizing Provider   metoprolol succinate (TOPROL-XL) 100 mg tablet Take 1 tablet by mouth once daily 3/6/23   Nuzhat Soni MD   lisinopril-hydroCHLOROthiazide Itz Morrow, ZESTORETIC) 20-12.5 mg per tablet Take 1 tablet by mouth once daily 3/6/23   Nuzhat Soni MD   DULoxetine (CYMBALTA) 30 mg capsule  3/2/23   Provider, Historical   empagliflozin (JARDIANCE) 25 mg tablet Take  by mouth daily. Provider, Historical   rosuvastatin (CRESTOR) 5 mg tablet TAKE ONE TABLET BY MOUTH NIGHTLY 7/22/22   Doyle Mascorro MD   insulin degludec 100 unit/mL (3 mL) inpn 60 Units by SubCUTAneous route nightly. Provider, Historical   semaglutide (OZEMPIC) 0.25 mg or 0.5 mg/dose (2 mg/1.5 ml) subq pen 0.25 mg by SubCUTAneous route every seven (7) days. Provider, Historical   aspirin 81 mg chewable tablet Take 81 mg by mouth daily. 9/21/17   PINA Arevalo   co-enzyme Q-10 (CO Q-10) 100 mg capsule Take 1 Cap by mouth daily. 9/21/17   PINA Arevalo   glucose blood VI test strips [facility] BLOOD GLUCOSE SYSTEM) strip Use 3 times per day (fasting and before meals). Patient taking differently: Once a day 10/15/14   Jesus Crigler, MD   Lancets (ONE TOUCH ULTRASOFT LANCETS) Misc 1 Package by Does Not Apply route.  Test blood glucose 3-4 time daily 12/29/10   Francisco Sanchez MD       Allergies  Allergies   Allergen Reactions    Tetanus Vaccines And Toxoid Swelling       Past Medical History  Past Medical History:   Diagnosis Date    Chronic back pain 9/8/2010    DM type 2 (diabetes mellitus, type 2) ([facility] 5/3/2010    HTN (hypertension) 5/3/2010    MI (myocardial infarction) ([facility] 5/3/2010    Obstructive sleep apnea (adult) (pediatric) 12/12/2014        Previous Hospitalization(s)  Past Surgical History:   Procedure Laterality Date    HX CATARACT REMOVAL  2021    bilateral     HX CORONARY STENT PLACEMENT  2017    Followed by Dr. Beth Erps      disc sx    HX TUBAL LIGATION         Family History  Family History   Problem Relation Age of Onset    Hypertension Mother          58yo    Diabetes Sister     Breast Cancer Sister     Diabetes Sister     Diabetes Sister     Diabetes Sister         Social History  Social History     Socioeconomic History    Marital status: SINGLE     Spouse name: Not on file    Number of children: Not on file    Years of education: Not on file    Highest education level: Not on file   Occupational History    Not on file   Tobacco Use    Smoking status: Former     Packs/day: 0.50     Years: 30.00     Pack years: 15.00     Types: Cigarettes     Quit date: 2004     Years since quittin.2    Smokeless tobacco: Never   Vaping Use    Vaping Use: Never used   Substance and Sexual Activity    Alcohol use: No    Drug use: No    Sexual activity: Never   Other Topics Concern    Not on file   Social History Narrative    Not on file     Social Determinants of Health     Financial Resource Strain: Low Risk     Difficulty of Paying Living Expenses: Not hard at all   Food Insecurity: No Food Insecurity    Worried About Running Out of Food in the Last Year: Never true    Ran Out of Food in the Last Year: Never true   Transportation Needs: Not on file   Physical Activity: Not on file   Stress: Not on file   Social Connections: Not on file   Intimate Partner Violence: Not on file   Housing Stability: Not on file       Alcohol history: Not at all  Smoking history: Former smoker, smoked 0.5 ppd x 40 years, quit 15 years ago  Illicit drug history: Not at all  Living arrangement: patient lives with their daughter. Ambulates: With cane (x1.5 weeks, was independent prior)    Code status: Full Code    Code status discussed with the patient/caregivers.       Physical Exam  General: No acute distress. Alert. Cooperative. Head: Normocephalic. Atraumatic. Mouth: Mucosa pink. Dry mucous membranes. Respiratory: CTAB. No w/r/r/c.  Cardiovascular: RRR. No m/r/g.   GI: Nondistended.+ bowel sounds. Nontender. No rebound tenderness or guarding. Extremities: No LE edema. Moving all extremities spontaneously. Musculoskeletal: Full ROM in all extremities, no obvious deformities. Skin: Warm, dry. No rashes. Neuro: Alert, normal behavior. No focal deficit. CN II-XII intact; No nystagmus, face symmetric, tongue/palate midline. Strength 5/5 all four extremities. Sensation intact in all extremities. Assessment and Plan     Kaylyn Carlos is a 68 y.o. female with PMH of CAD s/p PCI in 2017, HTN, T2DM w neuropathy, CKD3b, EMILY who presented with syncopal episodes and is admitted for syncope and HARJIT. Syncope in s/o Vomiting: Most likely etiology IVVD with resultant orthostasis. Possible gastroenteritis. Low BP and tachycardia 2/2 vomiting, decreased PO intake. Very low suspicion for infectious etiology given lack of sx and source, but will evaluate for UTI. EKG reassuring. Echo 9/2017 with EF 64%. - Admit to telemetry, VS per unit  - Cardiac monitoring  - mIVF with IBW: 100mL/hr NS  - CBC, CMP, Mag, Phos daily  - Echo  - Carotid dopplers  - TSH  - UA  - Consider GES if vomiting persists  - Hold home BP meds  - CLD, ADAT  - Fall precautions  - PT/OT/CM    HARJIT on CKD3b: BL from 1/5/23 of 1.43 with eGFR 38. POA Cr 2.61 with eGFR 18. Likely 2/2 IVVD. Pt has decreased urine output as well.  - mIVF  - Ulytes (urea, Na, K, Cr)  - UA  - Strict I/O  - Avoid nephrotoxics  - Can consider renal U/S if refractory to tx    New Lung Opacity: Has developed since 2020. L suprahilar spiculated opacity on CXR. Pt has significant smoking hx (0.5 ppd x 40yrs, quit 15 yrs ago). Concern for possible lung cancer.  - CT Chest    T2DM with Neuropathy: A1c 9.7% on 1/5/23.  On home regimen of jardiance 25mg daily, insulin degludec 60U qhs. F/w endo Dr. Ciaran Rendon, and podiatrist at [address] [place] [address]. She receives Combined Electro-chemical Treatment (CET) for her neuropathy.  - Hold home jardiance  - Insulin Sliding Scale normal sensitivity with AC&HS glucose checks. - Lantus 30u QHS, monitor for adjustments as necessary  - Hypoglycemia protocols ordered    Hypotension in s/o Chronic Hypertension: POA BP was 104/55  On home lisinopril-hctz 20-12.5mg daily, toprol XL 100mg daily.    - Hold home meds in s/o hypotension  - Will continue to monitor at this time and readjust as BP's trend    CAD: s/p PCI in 2017. F/w cardiology, Dr. Merrill Montanez. On home ASA 81mg daily, crestor 5mg daily, toprol XL 100mg daily. - Continue home ASA, crestor  - Hold home toprol pending improvement in BP  - Daily CMP, Mg, Phos    Hyperlipidemia: On home crestor. Lipids 1/5/23 were HDL 67, , .  - Continue home crestor    EMILY: Previously on CPAP, but not for 1 year. Has appt 3/21 for sleep study. Obesity: Body mass index is 31.64 kg/m². - Encourage lifestyle modifications and further follow up outpatient      FEN/GI - Clear liquid diet, ADAT to Diabetic diet. NS at 100 mL/hr. Activity - Ambulate with assistance  DVT prophylaxis - Lovenox  GI prophylaxis - Not indicated at this time  Fall prophylaxis - Fall precautions ordered. Disposition - Admit to Telemetry. Plan to d/c to Home. Consulting PT/OT/CM  Code Status - Full, discussed with patient / caregivers. Next of Myron 69 Name and Trudi All (Child)   [phone]       Patient Yanique Gonzalez will be discussed with Dr. Heidi Nagel.     6:55 PM, 03/11/23  Will Belle MD  Family Medicine Resident       For Billing    Chief Complaint   Patient presents with    Syncope       Hospital Problems  Date Reviewed: 3/2/2023            Codes Class Noted POA    CAD (coronary artery disease) (Chronic) ICD-10-CM: I25.10  ICD-9-CM: 414.00  9/20/2017 Yes        * (Principal) Syncope ICD-10-CM: V24  ICD-9-CM: 780.2  3/11/2023 Unknown        HARJIT (acute kidney injury) ([facility] ICD-10-CM: N17.9  ICD-9-CM: 584.9  3/11/2023 Unknown        Opacity of lung on imaging study ICD-10-CM: R91.8  ICD-9-CM: 793.19  3/11/2023 Unknown        Vomiting ICD-10-CM: R11.10  ICD-9-CM: 787.03  3/11/2023 Unknown        Hypotension ICD-10-CM: I95.9  ICD-9-CM: 458.9  3/11/2023 Unknown        Neuropathy ICD-10-CM: G62.9  ICD-9-CM: 355.9  3/2/2023 Yes        Type 2 diabetes mellitus with stage 3b chronic kidney disease, with long-term current use of insulin (HCC) (Chronic) ICD-10-CM: E11.22, N18.32, Z79.4  ICD-9-CM: 250.40, 585.3, V58.67  7/22/2022 Yes        Mixed hyperlipidemia (Chronic) ICD-10-CM: E78.2  ICD-9-CM: 272.2  8/13/2021 Yes        Obesity ICD-10-CM: E66.9  ICD-9-CM: 278.00  7/20/2018 Unknown        Primary hypertension (Chronic) ICD-10-CM: I10  ICD-9-CM: 401.9  5/3/2010 Yes

## 2023-03-11 NOTE — ED TRIAGE NOTES
Pt report \"passing out\" and woke up on floor. Denies blood thinners. Pt reports she was voiding at the time of episode. States she was feeling dizzy a lowered self to ground without hitting head. Pt denies chest pain and sob.      Pt reports low blood pressures on home machine of 08'H systolic

## 2023-03-11 NOTE — ED PROVIDER NOTES
Patient is a 69 yo female presenting after passing out while urinating. Patient denies Head trauma or Blood thinners. Passes out last week and went to PCP and diagnosed with dehydration. BP at home was in the 98P systolic. Blood pressure here in the /55. ABCs intact. Patient awake and alert. I discussed likely need for admission after recurrent syncope and patient was comfortable with this plan.          Past Medical History:   Diagnosis Date    Chronic back pain 2010    DM type 2 (diabetes mellitus, type 2) ([facility] 5/3/2010    HTN (hypertension) 5/3/2010    MI (myocardial infarction) ([facility] 5/3/2010    Obstructive sleep apnea (adult) (pediatric) 2014       Past Surgical History:   Procedure Laterality Date    HX CATARACT REMOVAL  2021    bilateral     HX CORONARY STENT PLACEMENT  2017    Followed by Dr. Deborah Sher      disc sx    HX TUBAL LIGATION           Family History:   Problem Relation Age of Onset    Hypertension Mother          58yo    Diabetes Sister     Breast Cancer Sister     Diabetes Sister     Diabetes Sister     Diabetes Sister        Social History     Socioeconomic History    Marital status: SINGLE     Spouse name: Not on file    Number of children: Not on file    Years of education: Not on file    Highest education level: Not on file   Occupational History    Not on file   Tobacco Use    Smoking status: Former     Packs/day: 0.50     Years: 30.00     Pack years: 15.00     Types: Cigarettes     Quit date: 2004     Years since quittin.2    Smokeless tobacco: Never   Vaping Use    Vaping Use: Never used   Substance and Sexual Activity    Alcohol use: No    Drug use: No    Sexual activity: Never   Other Topics Concern    Not on file   Social History Narrative    Not on file     Social Determinants of Health     Financial Resource Strain: Low Risk     Difficulty of Paying Living Expenses: Not hard at all   Food Insecurity: No Food Insecurity Worried About Running Out of Food in the Last Year: Never true    Ran Out of Food in the Last Year: Never true   Transportation Needs: Not on file   Physical Activity: Not on file   Stress: Not on file   Social Connections: Not on file   Intimate Partner Violence: Not on file   Housing Stability: Not on file         ALLERGIES: Tetanus vaccines and toxoid    Review of Systems   Constitutional:  Positive for activity change and fatigue. Respiratory:  Negative for shortness of breath. Cardiovascular:  Negative for chest pain. Neurological:  Positive for syncope. Vitals:    03/11/23 1704   BP: (!) 104/55   Pulse: (!) 102   Resp: 16   Temp: 97.7 °F (36.5 °C)   SpO2: 98%   Weight: 88.9 kg (196 lb)   Height: 5' 6\" (1.676 m)            Physical Exam  Vitals and nursing note reviewed. Constitutional:       Appearance: Normal appearance. HENT:      Head: Normocephalic and atraumatic. Right Ear: External ear normal.      Left Ear: External ear normal.   Eyes:      Conjunctiva/sclera: Conjunctivae normal.   Cardiovascular:      Rate and Rhythm: Normal rate and regular rhythm. Pulses: Normal pulses. Heart sounds: Normal heart sounds. Pulmonary:      Effort: Pulmonary effort is normal.      Breath sounds: Normal breath sounds. Chest:      Chest wall: No deformity or tenderness. Abdominal:      Palpations: Abdomen is soft. Tenderness: There is no abdominal tenderness. Musculoskeletal:         General: No deformity or signs of injury. Normal range of motion. Skin:     General: Skin is warm and dry. Coloration: Skin is not jaundiced. Neurological:      General: No focal deficit present. Mental Status: She is alert and oriented to person, place, and time. Psychiatric:         Mood and Affect: Mood normal.         Behavior: Behavior normal.        Medical Decision Making  Amount and/or Complexity of Data Reviewed  Labs: ordered.  Decision-making details documented in ED Course. Radiology: ordered. ECG/medicine tests: ordered and independent interpretation performed. Decision-making details documented in ED Course. Risk  Prescription drug management. Decision regarding hospitalization. ED Course as of 03/11/23 1836   Sat Mar 11, 2023   1814 Creatinine(!): 2.61  Worsening creatinine [AL]   1814 Sodium(!): 133  Mild hyponatremia [AL]   1814 Glucose(!): 178 [AL]   1817 EKG interpretation:   Rhythm: normal sinus rhythm; and regular . Rate (approx.): 85; Axis: left axis deviation; Intervals: prolonged; ST/T wave: normal; EKG documented and interpreted by Drea Crow MD, Emergency Medicine.   [AL]      ED Course User Index  [AL] Giovanni Ruiz MD     LABORATORY RESULTS:  Labs Reviewed   CBC WITH AUTOMATED DIFF - Abnormal; Notable for the following components:       Result Value    WBC 11.2 (*)     NEUTROPHILS 76 (*)     ABS. NEUTROPHILS 8.4 (*)     All other components within normal limits   METABOLIC PANEL, COMPREHENSIVE - Abnormal; Notable for the following components:    Sodium 133 (*)     Glucose 178 (*)     BUN 48 (*)     Creatinine 2.61 (*)     eGFR 18 (*)     Globulin 4.4 (*)     A-G Ratio 0.9 (*)     All other components within normal limits   MAGNESIUM   SAMPLES BEING HELD       IMAGING RESULTS:  XR CHEST PA LAT   Final Result   New, left suprahilar spiculated opacity. Recommend dedicated CT of   the chest for further evaluation. 23x. MEDICATIONS GIVEN:  Medications   sodium chloride 0.9 % bolus infusion 1,000 mL (has no administration in time range)       Differential diagnosis: Syncope and collapse, hypotension, HARJIT, dehydration, CKD    ED physician interpretation of imaging: Chest X without focal pneumonia or pneumothorax  ED physician interpretation of EKG: No STEMI. See my interpretation EKG in ED course above. ED physician interpretation of laboratory results: Lab work with worsening serum creatinine.   Hyperglycemia without SERAA.    MDM: Patient is a 80-year-old female presented ED after a syncopal episode after going to the bathroom. Patient went out for an unknown amount of time but was brief. No head trauma. Patient had similar episode of syncope about 1 week ago and was worked up by her primary care doctor with no concerning findings. As patient has worsening symptoms as well as some intermittent hypotension IV fluid was given and hospitalization is indicated for high risk syncope. Hospital service contacted and patient admitted. DISPOSITION: Admitted    Perfect Serve Consult for Admission  6:36 PM    ED Room Number: ER12/12  Patient Name and age:  Dalila Tyler 68 y.o.  female  Working Diagnosis:   1. Syncope and collapse    2. Elevated serum creatinine    3. Hypotension, unspecified hypotension type        COVID-19 Suspicion:  no  Sepsis present:  no  Reassessment needed: yes  Code Status:  Full Code  Readmission: no  Isolation Requirements:  no  Recommended Level of Care:  telemetry  Department: [facility] - [phone]    Other: Patient presenting with second syncopal episode in 1 week. Had work-up at PCP earlier in the week that was unremarkable. However patient repeat episode today. Patient also have intermittent low blood pressures. IV fluids being run. Patient also has worsening kidney function of unknown etiology. Quita Seals.  Dior Go MD      Procedures

## 2023-03-12 ENCOUNTER — APPOINTMENT (OUTPATIENT)
Dept: CT IMAGING | Age: 78
DRG: 312 | End: 2023-03-12
Attending: PHYSICIAN ASSISTANT
Payer: MEDICARE

## 2023-03-12 ENCOUNTER — APPOINTMENT (OUTPATIENT)
Dept: VASCULAR SURGERY | Age: 78
DRG: 312 | End: 2023-03-12
Payer: MEDICARE

## 2023-03-12 ENCOUNTER — APPOINTMENT (OUTPATIENT)
Dept: NON INVASIVE DIAGNOSTICS | Age: 78
DRG: 312 | End: 2023-03-12
Payer: MEDICARE

## 2023-03-12 LAB
ALBUMIN SERPL-MCNC: 3.2 G/DL (ref 3.5–5)
ALBUMIN/GLOB SERPL: 0.9 (ref 1.1–2.2)
ALP SERPL-CCNC: 75 U/L (ref 45–117)
ALT SERPL-CCNC: 16 U/L (ref 12–78)
ANION GAP SERPL CALC-SCNC: 4 MMOL/L (ref 5–15)
ANION GAP SERPL CALC-SCNC: 5 MMOL/L (ref 5–15)
APPEARANCE UR: CLEAR
AST SERPL-CCNC: 21 U/L (ref 15–37)
BACTERIA URNS QL MICRO: NEGATIVE /HPF
BASOPHILS # BLD: 0 K/UL (ref 0–0.1)
BASOPHILS NFR BLD: 1 % (ref 0–1)
BILIRUB SERPL-MCNC: 0.5 MG/DL (ref 0.2–1)
BILIRUB UR QL: NEGATIVE
BUN SERPL-MCNC: 48 MG/DL (ref 6–20)
BUN SERPL-MCNC: 49 MG/DL (ref 6–20)
BUN/CREAT SERPL: 23 (ref 12–20)
BUN/CREAT SERPL: 25 (ref 12–20)
CALCIUM SERPL-MCNC: 8.6 MG/DL (ref 8.5–10.1)
CALCIUM SERPL-MCNC: 8.6 MG/DL (ref 8.5–10.1)
CHLORIDE SERPL-SCNC: 105 MMOL/L (ref 97–108)
CHLORIDE SERPL-SCNC: 107 MMOL/L (ref 97–108)
CO2 SERPL-SCNC: 26 MMOL/L (ref 21–32)
CO2 SERPL-SCNC: 26 MMOL/L (ref 21–32)
COLOR UR: ABNORMAL
CREAT SERPL-MCNC: 1.91 MG/DL (ref 0.55–1.02)
CREAT SERPL-MCNC: 2.16 MG/DL (ref 0.55–1.02)
CREAT UR-MCNC: 151 MG/DL
DIFFERENTIAL METHOD BLD: NORMAL
ECHO AV AREA PEAK VELOCITY: 1.3 CM2
ECHO AV AREA/BSA PEAK VELOCITY: 0.7 CM2/M2
ECHO AV PEAK GRADIENT: 7 MMHG
ECHO AV PEAK VELOCITY: 1.3 M/S
ECHO AV VELOCITY RATIO: 0.85
ECHO EST RA PRESSURE: 3 MMHG
ECHO LA DIAMETER INDEX: 2.02 CM/M2
ECHO LA DIAMETER: 4 CM
ECHO LV INTERNAL DIMENSION SYSTOLIC INDEX: 1.26 CM/M2
ECHO LV INTERNAL DIMENSION SYSTOLIC: 2.5 CM
ECHO LV IVSD: 1.2 CM (ref 0.6–0.9)
ECHO LVOT AREA: 1.5 CM2
ECHO LVOT DIAM: 1.4 CM
ECHO LVOT PEAK GRADIENT: 5 MMHG
ECHO LVOT PEAK VELOCITY: 1.1 M/S
ECHO MV A VELOCITY: 1 M/S
ECHO MV E VELOCITY: 0.57 M/S
ECHO MV E/A RATIO: 0.57
ECHO RIGHT VENTRICULAR SYSTOLIC PRESSURE (RVSP): 17 MMHG
ECHO TV REGURGITANT MAX VELOCITY: 1.89 M/S
ECHO TV REGURGITANT PEAK GRADIENT: 14 MMHG
EOSINOPHIL # BLD: 0.1 K/UL (ref 0–0.4)
EOSINOPHIL NFR BLD: 2 % (ref 0–7)
EPITH CASTS URNS QL MICRO: ABNORMAL /LPF
ERYTHROCYTE [DISTWIDTH] IN BLOOD BY AUTOMATED COUNT: 13.1 % (ref 11.5–14.5)
GLOBULIN SER CALC-MCNC: 3.6 G/DL (ref 2–4)
GLUCOSE BLD STRIP.AUTO-MCNC: 111 MG/DL (ref 65–117)
GLUCOSE BLD STRIP.AUTO-MCNC: 128 MG/DL (ref 65–117)
GLUCOSE BLD STRIP.AUTO-MCNC: 68 MG/DL (ref 65–117)
GLUCOSE BLD STRIP.AUTO-MCNC: 72 MG/DL (ref 65–117)
GLUCOSE BLD STRIP.AUTO-MCNC: 85 MG/DL (ref 65–117)
GLUCOSE SERPL-MCNC: 236 MG/DL (ref 65–100)
GLUCOSE SERPL-MCNC: 98 MG/DL (ref 65–100)
GLUCOSE UR STRIP.AUTO-MCNC: >1000 MG/DL
HCT VFR BLD AUTO: 35.7 % (ref 35–47)
HGB BLD-MCNC: 11.8 G/DL (ref 11.5–16)
HGB UR QL STRIP: NEGATIVE
HYALINE CASTS URNS QL MICRO: ABNORMAL /LPF (ref 0–5)
IMM GRANULOCYTES # BLD AUTO: 0 K/UL (ref 0–0.04)
IMM GRANULOCYTES NFR BLD AUTO: 0 % (ref 0–0.5)
KETONES UR QL STRIP.AUTO: NEGATIVE MG/DL
LEFT CCA DIST DIAS: 12.1 CM/S
LEFT CCA DIST SYS: 54.9 CM/S
LEFT CCA PROX DIAS: 21.7 CM/S
LEFT CCA PROX SYS: 127 CM/S
LEFT ECA DIAS: 6 CM/S
LEFT ECA SYS: 62.7 CM/S
LEFT ICA DIST DIAS: 21.5 CM/S
LEFT ICA DIST SYS: 67.4 CM/S
LEFT ICA MID DIAS: 22 CM/S
LEFT ICA MID SYS: 82.1 CM/S
LEFT ICA PROX DIAS: 18.3 CM/S
LEFT ICA PROX SYS: 71.1 CM/S
LEFT ICA/CCA SYS: 1.5 NO UNITS
LEFT VERTEBRAL DIAS: 6.7 CM/S
LEFT VERTEBRAL SYS: 25.2 CM/S
LEUKOCYTE ESTERASE UR QL STRIP.AUTO: NEGATIVE
LYMPHOCYTES # BLD: 1.7 K/UL (ref 0.8–3.5)
LYMPHOCYTES NFR BLD: 26 % (ref 12–49)
MAGNESIUM SERPL-MCNC: 2.1 MG/DL (ref 1.6–2.4)
MCH RBC QN AUTO: 29.9 PG (ref 26–34)
MCHC RBC AUTO-ENTMCNC: 33.1 G/DL (ref 30–36.5)
MCV RBC AUTO: 90.6 FL (ref 80–99)
MONOCYTES # BLD: 0.4 K/UL (ref 0–1)
MONOCYTES NFR BLD: 6 % (ref 5–13)
NEUTS SEG # BLD: 4.3 K/UL (ref 1.8–8)
NEUTS SEG NFR BLD: 65 % (ref 32–75)
NITRITE UR QL STRIP.AUTO: NEGATIVE
NRBC # BLD: 0 K/UL (ref 0–0.01)
NRBC BLD-RTO: 0 PER 100 WBC
OSMOLALITY UR: 522 MOSM/KG H2O
PH UR STRIP: 5 (ref 5–8)
PHOSPHATE SERPL-MCNC: 2.9 MG/DL (ref 2.6–4.7)
PLATELET # BLD AUTO: 259 K/UL (ref 150–400)
PMV BLD AUTO: 11.9 FL (ref 8.9–12.9)
POTASSIUM SERPL-SCNC: 3.9 MMOL/L (ref 3.5–5.1)
POTASSIUM SERPL-SCNC: 3.9 MMOL/L (ref 3.5–5.1)
POTASSIUM UR-SCNC: 29 MMOL/L
PROT SERPL-MCNC: 6.8 G/DL (ref 6.4–8.2)
PROT UR STRIP-MCNC: NEGATIVE MG/DL
RBC # BLD AUTO: 3.94 M/UL (ref 3.8–5.2)
RBC #/AREA URNS HPF: ABNORMAL /HPF (ref 0–5)
RIGHT CCA DIST DIAS: 13.8 CM/S
RIGHT CCA DIST SYS: 72.5 CM/S
RIGHT CCA PROX DIAS: 16.4 CM/S
RIGHT CCA PROX SYS: 93.4 CM/S
RIGHT ECA DIAS: 7.3 CM/S
RIGHT ECA SYS: 105.1 CM/S
RIGHT ICA DIST DIAS: 19 CM/S
RIGHT ICA DIST SYS: 68.6 CM/S
RIGHT ICA MID DIAS: 25.6 CM/S
RIGHT ICA MID SYS: 75.1 CM/S
RIGHT ICA PROX DIAS: 24.3 CM/S
RIGHT ICA PROX SYS: 76.4 CM/S
RIGHT ICA/CCA SYS: 1.1 NO UNITS
RIGHT VERTEBRAL DIAS: 7.5 CM/S
RIGHT VERTEBRAL SYS: 47.4 CM/S
SERVICE CMNT-IMP: ABNORMAL
SERVICE CMNT-IMP: NORMAL
SODIUM SERPL-SCNC: 136 MMOL/L (ref 136–145)
SODIUM SERPL-SCNC: 137 MMOL/L (ref 136–145)
SODIUM UR-SCNC: 46 MMOL/L
SP GR UR REFRACTOMETRY: 1.02 (ref 1–1.03)
UA: UC IF INDICATED,UAUC: ABNORMAL
UROBILINOGEN UR QL STRIP.AUTO: 0.2 EU/DL (ref 0.2–1)
UUN UR-MCNC: 667 MG/DL
VAS LEFT SUBCLAVIAN PROX EDV: 0 CM/S
VAS LEFT SUBCLAVIAN PROX PSV: 118.3 CM/S
VAS RIGHT SUBCLAVIAN PROX EDV: 0 CM/S
VAS RIGHT SUBCLAVIAN PROX PSV: 93.4 CM/S
WBC # BLD AUTO: 6.5 K/UL (ref 3.6–11)
WBC URNS QL MICRO: ABNORMAL /HPF (ref 0–4)

## 2023-03-12 PROCEDURE — 93306 TTE W/DOPPLER COMPLETE: CPT | Performed by: SPECIALIST

## 2023-03-12 PROCEDURE — 74011250636 HC RX REV CODE- 250/636: Performed by: STUDENT IN AN ORGANIZED HEALTH CARE EDUCATION/TRAINING PROGRAM

## 2023-03-12 PROCEDURE — 74176 CT ABD & PELVIS W/O CONTRAST: CPT

## 2023-03-12 PROCEDURE — 84100 ASSAY OF PHOSPHORUS: CPT

## 2023-03-12 PROCEDURE — 84540 ASSAY OF URINE/UREA-N: CPT

## 2023-03-12 PROCEDURE — 74011250636 HC RX REV CODE- 250/636

## 2023-03-12 PROCEDURE — 84300 ASSAY OF URINE SODIUM: CPT

## 2023-03-12 PROCEDURE — 84133 ASSAY OF URINE POTASSIUM: CPT

## 2023-03-12 PROCEDURE — 83935 ASSAY OF URINE OSMOLALITY: CPT

## 2023-03-12 PROCEDURE — 80053 COMPREHEN METABOLIC PANEL: CPT

## 2023-03-12 PROCEDURE — 82570 ASSAY OF URINE CREATININE: CPT

## 2023-03-12 PROCEDURE — 36415 COLL VENOUS BLD VENIPUNCTURE: CPT

## 2023-03-12 PROCEDURE — 81001 URINALYSIS AUTO W/SCOPE: CPT

## 2023-03-12 PROCEDURE — 74011000250 HC RX REV CODE- 250: Performed by: STUDENT IN AN ORGANIZED HEALTH CARE EDUCATION/TRAINING PROGRAM

## 2023-03-12 PROCEDURE — 74011000250 HC RX REV CODE- 250

## 2023-03-12 PROCEDURE — C9113 INJ PANTOPRAZOLE SODIUM, VIA: HCPCS | Performed by: STUDENT IN AN ORGANIZED HEALTH CARE EDUCATION/TRAINING PROGRAM

## 2023-03-12 PROCEDURE — 93306 TTE W/DOPPLER COMPLETE: CPT

## 2023-03-12 PROCEDURE — 83735 ASSAY OF MAGNESIUM: CPT

## 2023-03-12 PROCEDURE — 74011000636 HC RX REV CODE- 636: Performed by: PHYSICIAN ASSISTANT

## 2023-03-12 PROCEDURE — 65270000046 HC RM TELEMETRY

## 2023-03-12 PROCEDURE — 65270000029 HC RM PRIVATE

## 2023-03-12 PROCEDURE — 93880 EXTRACRANIAL BILAT STUDY: CPT

## 2023-03-12 PROCEDURE — 85025 COMPLETE CBC W/AUTO DIFF WBC: CPT

## 2023-03-12 PROCEDURE — 74011250637 HC RX REV CODE- 250/637

## 2023-03-12 PROCEDURE — 82962 GLUCOSE BLOOD TEST: CPT

## 2023-03-12 RX ADMIN — SODIUM CHLORIDE 100 ML/HR: 9 INJECTION, SOLUTION INTRAVENOUS at 09:13

## 2023-03-12 RX ADMIN — SODIUM CHLORIDE, PRESERVATIVE FREE 10 ML: 5 INJECTION INTRAVENOUS at 15:59

## 2023-03-12 RX ADMIN — DIATRIZOATE MEGLUMINE AND DIATRIZOATE SODIUM 30 ML: 660; 100 LIQUID ORAL; RECTAL at 09:45

## 2023-03-12 RX ADMIN — SODIUM CHLORIDE, PRESERVATIVE FREE 10 ML: 5 INJECTION INTRAVENOUS at 22:15

## 2023-03-12 RX ADMIN — ROSUVASTATIN CALCIUM 5 MG: 5 TABLET, FILM COATED ORAL at 22:15

## 2023-03-12 RX ADMIN — ENOXAPARIN SODIUM 30 MG: 100 INJECTION SUBCUTANEOUS at 09:13

## 2023-03-12 RX ADMIN — SODIUM CHLORIDE, PRESERVATIVE FREE 10 ML: 5 INJECTION INTRAVENOUS at 09:14

## 2023-03-12 RX ADMIN — SODIUM CHLORIDE, PRESERVATIVE FREE 40 MG: 5 INJECTION INTRAVENOUS at 17:57

## 2023-03-12 NOTE — ED NOTES
TRANSFER - OUT REPORT:    Verbal report given to 5th floor RN (name) on Kasey Schumacher  being transferred to  (unit) for routine progression of care       Report consisted of patients Situation, Background, Assessment and   Recommendations(SBAR). Information from the following report(s) SBAR, ED Summary, [facility] and Recent Results was reviewed with the receiving nurse. Lines:   Peripheral IV 03/11/23 Right Antecubital (Active)   Site Assessment Clean, dry, & intact 03/11/23 1935   Phlebitis Assessment 0 03/11/23 1935   Infiltration Assessment 0 03/11/23 1935   Dressing Status Clean, dry, & intact 03/11/23 1935        Opportunity for questions and clarification was provided.       Patient transported with:   [employer]

## 2023-03-12 NOTE — ED NOTES
CT called at this time pt EGFR over threshold for IV contrast; spoke to patient denies an nausea and will be able to tolerate oral contrast.

## 2023-03-12 NOTE — ED NOTES
System downtime was enacted for one hour to accomodate the ending of Daylight Saving Time at 2:00 a.m. Clinical documentation has been captured on paper to be scanned into the system. Medications administered during this time have been entered when the system became available.   \

## 2023-03-12 NOTE — ED NOTES
Bedside and Verbal shift change report given to Avery Montanez (oncoming nurse) by Nati Najera  (offgoing nurse). Report included the following information SBAR, Kardex, ED Summary and Recent Results.

## 2023-03-12 NOTE — PROGRESS NOTES
Physical Therapy    Orders received, chart reviewed. Echo in process, will defer eval at this time.      Thank you,     Chiquita Steiner, PT, DPT

## 2023-03-12 NOTE — PROGRESS NOTES
Subjective / Objective     Subjective: Patient seen and examined at bedside. Denies CP, SOB, n/v, abdominal pain. She ate some dinner and tolerated it well without nausea or vomiting. No complaints at this time. Temp (24hrs), Av.7 °F (36.5 °C), Min:97.7 °F (36.5 °C), Max:97.7 °F (36.5 °C)       Physical Exam  General: No acute distress. Alert. Cooperative. Head: Normocephalic. Atraumatic. Mouth: Mucosa pink. Moist mucous membranes. Respiratory: No increased work of breathing. Symmetric chest rise b/l. Cardiovascular: RRR. No clubbing or cyanosis  GI: Nondistended. No masses. Extremities: No LE edema. Moving all extremities spontaneously. Musculoskeletal: Full ROM in all extremities, no obvious deformities. Skin: Warm, dry. No rashes. Neuro: Alert, normal behavior. No focal deficit.      Respiratory:   O2 Device: None (Room air)     I/O:      Inpatient Medications  Current Facility-Administered Medications   Medication Dose Route Frequency    sodium chloride (NS) flush 5-40 mL  5-40 mL IntraVENous Q8H    sodium chloride (NS) flush 5-40 mL  5-40 mL IntraVENous PRN    acetaminophen (TYLENOL) tablet 650 mg  650 mg Oral Q6H PRN    Or    acetaminophen (TYLENOL) suppository 650 mg  650 mg Rectal Q6H PRN    ondansetron (ZOFRAN ODT) tablet 4 mg  4 mg Oral Q8H PRN    Or    ondansetron (ZOFRAN) injection 4 mg  4 mg IntraVENous Q6H PRN    enoxaparin (LOVENOX) injection 30 mg  30 mg SubCUTAneous DAILY    lidocaine 4 % patch 1 Patch  1 Patch TransDERmal Q24H    0.9% sodium chloride infusion  100 mL/hr IntraVENous CONTINUOUS    glucose chewable tablet 16 g  4 Tablet Oral PRN    glucagon (GLUCAGEN) injection 1 mg  1 mg IntraMUSCular PRN    dextrose 10% infusion 0-250 mL  0-250 mL IntraVENous PRN    insulin lispro (HUMALOG) injection   SubCUTAneous QID WITH MEALS    aspirin chewable tablet 81 mg  81 mg Oral DAILY    insulin glargine (LANTUS) injection 30 Units  30 Units SubCUTAneous QHS    [Held by provider] metoprolol succinate (TOPROL-XL) XL tablet 100 mg  100 mg Oral DAILY    rosuvastatin (CRESTOR) tablet 5 mg  5 mg Oral QHS    pantoprazole (PROTONIX) 40 mg in 0.9% sodium chloride 10 mL injection  40 mg IntraVENous DAILY     Current Outpatient Medications   Medication Sig    metoprolol succinate (TOPROL-XL) 100 mg tablet Take 1 tablet by mouth once daily    lisinopril-hydroCHLOROthiazide (PRINZIDE, ZESTORETIC) 20-12.5 mg per tablet Take 1 tablet by mouth once daily    empagliflozin (JARDIANCE) 25 mg tablet Take  by mouth daily. rosuvastatin (CRESTOR) 5 mg tablet TAKE ONE TABLET BY MOUTH NIGHTLY    insulin degludec 100 unit/mL (3 mL) inpn 60 Units by SubCUTAneous route nightly. semaglutide (OZEMPIC) 0.25 mg or 0.5 mg/dose (2 mg/1.5 ml) subq pen 0.25 mg by SubCUTAneous route every seven (7) days. aspirin 81 mg chewable tablet Take 81 mg by mouth daily. co-enzyme Q-10 (CO Q-10) 100 mg capsule Take 1 Cap by mouth daily. glucose blood VI test strips ([employer] BLOOD GLUCOSE SYSTEM) strip Use 3 times per day (fasting and before meals). (Patient taking differently: Once a day)    Lancets (ONE TOUCH ULTRASOFT LANCETS) Misc 1 Package by Does Not Apply route. Test blood glucose 3-4 time daily         Allergies  Allergies   Allergen Reactions    Tetanus Vaccines And Toxoid Swelling         CBC:  Recent Labs     03/11/23  1708   WBC 11.2*   HGB 13.6   HCT 41.1          Metabolic Panel:  Recent Labs     03/11/23  1708   *   K 4.3      CO2 25   BUN 48*   CREA 2.61*   *   CA 9.4   MG 2.2   PHOS 4.9*   ALB 3.8   ALT 17            Assessment and Plan   Maggie Das is a 68 y.o. female with PMH of CAD s/p PCI in 2017, HTN, T2DM w neuropathy, CKD3b, EMILY who presented with syncopal episodes and is admitted for syncope and HARJIT. Syncope in s/o Vomiting: Most likely etiology IVVD with resultant orthostasis. Low BP and tachycardia 2/2 vomiting, decreased PO intake.  Very low suspicion for infectious etiology given lack of sx and source, but will evaluate for UTI. EKG reassuring. TSH normal. Echo 9/2017 with EF 64%. - Cardiac monitoring  - mIVF with IBW: 100mL/hr NS  - CBC, CMP, Mag, Phos daily  - Echo  - Carotid dopplers  - UA  - Consider GES if vomiting persists  - Hold home BP meds  - CLD, ADAT  - Protonix 40mg IV daily     HARJIT on CKD3b: BL from 1/5/23 of 1.43 with eGFR 38. POA Cr 2.61 with eGFR 18. Likely 2/2 IVVD. Pt has decreased urine output as well.  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 L suprahilar spiculated opacity on CXR. Pt has significant smoking hx (0.5 ppd x 40yrs, quit 15 yrs ago). CT Chest with 27 x 24 mm cavitary lesion in MJ w mild pleural thickening, w spiculated margin, lesion is infectious vs neoplastic. 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 Neg PPD in 2019.  - Pulmonology consulted  - Consider TB workup      T2DM with Neuropathy: A1c 9.7% on 1/5/23. On home regimen of jardiance 25mg daily, insulin degludec 60U qhs. F/w endo Dr. Keisha Hunt, and podiatrist at [facility]. She receives Combined Electro-chemical Treatment (CET) for her neuropathy.  - Hold home jardiance  - Insulin Sliding Scale normal sensitivity with AC&HS glucose checks. - Lantus 30u QHS, monitor for adjustments as necessary  - Hypoglycemia protocols ordered     Hypotension in s/o Chronic Hypertension: POA BP was 104/55. On home lisinopril-hctz 20-12.5mg daily, toprol XL 100mg daily.    - Hold home meds in s/o hypotension  - Will continue to monitor at this time and readjust as BP's trend     CAD: F/w cardiology, Dr. Uriel Pal. On home ASA 81mg daily, crestor 5mg daily, toprol XL 100mg daily. - Continue home ASA, crestor  - Hold home toprol pending improvement in BP  - Daily CMP, Mg, Phos     Hyperlipidemia: On home crestor.  Lipids 1/5/23 were HDL 67, , .  - Continue home crestor     EMILY: Previously on CPAP, but not for 1 year. Has appt 3/21 for sleep study. Obesity: Body mass index is 31.64 kg/m². - Encourage lifestyle modifications and further follow up outpatient        FEN/GI - Clear liquid diet, ADAT to Diabetic diet. NS at 100 mL/hr. Activity - Ambulate with assistance  DVT prophylaxis - Lovenox  GI prophylaxis - Not indicated at this time  Fall prophylaxis - Fall precautions ordered. Disposition - Plan to d/c to Home. Consulting PT/OT/CM  Code Status - Full, discussed with patient / caregivers.   Next of [address])   [phone]     I appreciate the opportunity to participate in the care of this patient,  Maryam Velarde MD  Family Medicine Resident       For Billing    Chief Complaint   Patient presents with    Syncope       Hospital Problems  Date Reviewed: 3/2/2023            Codes Class Noted POA    CAD (coronary artery disease) (Chronic) ICD-10-CM: I25.10  ICD-9-CM: 414.00  9/20/2017 Yes        * (Principal) Syncope ICD-10-CM: R55  ICD-9-CM: 780.2  3/11/2023 Unknown        HARJIT (acute kidney injury) ([facility] ICD-10-CM: N17.9  ICD-9-CM: 584.9  3/11/2023 Unknown        Opacity of lung on imaging study ICD-10-CM: R91.8  ICD-9-CM: 793.19  3/11/2023 Unknown        Vomiting ICD-10-CM: R11.10  ICD-9-CM: 787.03  3/11/2023 Unknown        Hypotension ICD-10-CM: I95.9  ICD-9-CM: 458.9  3/11/2023 Unknown        Neuropathy ICD-10-CM: G62.9  ICD-9-CM: 355.9  3/2/2023 Yes        Type 2 diabetes mellitus with stage 3b chronic kidney disease, with long-term current use of insulin (HCC) (Chronic) ICD-10-CM: E11.22, N18.32, Z79.4  ICD-9-CM: 250.40, 585.3, V58.67  7/22/2022 Yes        Mixed hyperlipidemia (Chronic) ICD-10-CM: E78.2  ICD-9-CM: 272.2  8/13/2021 Yes        Obesity ICD-10-CM: E66.9  ICD-9-CM: 278.00  7/20/2018 Unknown        Primary hypertension (Chronic) ICD-10-CM: I10  ICD-9-CM: 401.9  5/3/2010 Yes

## 2023-03-12 NOTE — ED NOTES
"[facility]  Prenatal Visit    MARTI Loyd reports she has been doing well this pregnancy. She has questions today about her history of pre-eclampsia and how it affects this pregnancy. No contractions, no vaginal bleeding, no leakage of fluid, normal fetal movement. No headache, no vision changes, no RUQ pain. She has decided to transfer to MD care given her history of pre-eclampsia.    OBJECTIVE  /68   Pulse 97   Ht 1.778 m (5' 10\")   Wt 70.3 kg (155 lb)   LMP 2021 (Exact Date)   BMI 22.24 kg/m      See OB Flowsheet    ASSESSMENT/PLAN  Rach Peterson is a 29 year old  who is 22w0d, here for CHAPARRO.    Problem   Gbs Bacteriuria    GBS in urine. PCN for prophylaxis at time of labor.     High-Risk Pregnancy, Unspecified Trimester    [facility] MD Patient  Partner: Tim  Due date: 21 by LMP c/w 10wk US  ABO/Rh: A POS  Antibody screen: NEG  H/H: 13.0/38.7  Last Pap Smear: NIL , repeat   Rubella: immune  HepB Surface Antigen: NR  HepB Antibody: immune  HepC Antibody: NR  HIV: NR  Treponemal Antibody: NR  GC/CT: neg/neg  Urine Culture: 10-50K CFU GBS  GCT: ____  Tdap:____  GBS: ____  Flu vaccine: ____  Aneuploidy screening: normal NT, normal NIPT  Anatomy scan: normal, posterior placenta  It's a: GIRL! Baby's name: ____  Pediatrician: ____  Infant feeding plan: ____  Contraceptive plan: ____  Birthplan Notes: ____  Utox in labor: no  Over-the-counter postpartum meds: ____     History of Severe Pre-Eclampsia    Taking baby aspirin daily. Baseline labs normal (UPC < 0.05, AST/ALT , plts 265K). Will check baseline creatinine with glucola. Symptoms of pre-eclampsia and risk given history in this pregnancy reviewed. Consider 39wk IOL.      Hsv (Herpes Simplex Virus) Infection    Taking daily suppression with valtrex. Will need bright light exam at time of labor.        Return to clinic in 4 weeks for EOB visit.    Cele Dennis MD  OB/GYN Resident, PGY-1    OBGYN " Bedside and Verbal shift change report given to Bertin Metz RN (oncoming nurse) by Abena Parker RN (offgoing nurse). Report included the following information SBAR, ED Summary, and MAR. Attending Addendum     I, Nikkie Hernandez, saw Rach Jessica Peterson with the resident, Dr. Dennis, and agree with their findings and plan of care as documented in the above note.    I personally reviewed vitals, meds. I was present to supervise the physical exam.     Key findings: [id number] 22w0d here for prenatal care. Previous pregnancy c/b pre-e with severe features.      I agree with the plan for return visit in 4 weeks with glucola, CBC, and creatinine at that visit.    Nikkie Hernandez MD, MSCI  Date of Service: 2021

## 2023-03-12 NOTE — CONSULTS
Name: [facility]: [address]   : 1945 Admit Date: 3/11/2023   Phone: [phone]  Room: [place] [address]   PCP: Nelda Sue MD  MRN: [id number]   Date: 3/12/2023  Code: Full Code          Chart and notes reviewed. Data reviewed. I review the patient's current medications in the medical record at each encounter. I have evaluated and examined the patient. HPI:    8:35 AM       History was obtained from patient and chart. I was asked by Susanna Blackman MD to see Gwendolyn Hi in consultation for a chief complaint of cavitary lung lesion. Ms. Lorna Edwards is a pleasant 68year old female who presented to the [address] [place] [address] ED with recurrent recurrent syncopal episodes with associated dizziness over the last couple weeks. There was an associated vomiting episode, decreased po intake, and she reports loose stools. This she has lost a few pounds. Denies fever or chills. Denies coughing or hemoptysis. Denies SOB or CP. Denies known sick contacts. 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 Denies any occupational or environmental exposure. Denies any other TB exposure risk factors. She is a 1/2 PPD smoker for 30 years - quitting in . Denies personal history of cancer. Does have family history of malignancy - one sister with breast cancer and one sister with lung cancer. Images reviewed:    WBC 6.5  Hgb 11.8  Na 137  Creat 1.91 - has CKD at baseline  TSH 1.16    Chest CT and CXR personally visualized and report reviewed. There is 27 x 24 mm cavitary lesion in the left upper lobe is associated with mild pleural thickening. Cavitary lesion with a spiculated margin. No lymphadenopathy. Centrilobular emphysematous change also noted. Lesion not present on 2020 CXR. Patient denies any other recent chest imaging.       Past Medical History:   Diagnosis Date Chronic back pain 2010    DM type 2 (diabetes mellitus, type 2) ([facility] 5/3/2010    HTN (hypertension) 5/3/2010    MI (myocardial infarction) ([facility] 5/3/2010    Obstructive sleep apnea (adult) (pediatric) 2014       Past Surgical History:   Procedure Laterality Date    HX CATARACT REMOVAL  2021    bilateral     HX CORONARY STENT PLACEMENT  2017    Followed by Dr. Paresh Gloria      disc sx    HX TUBAL LIGATION         Family History   Problem Relation Age of Onset    Hypertension Mother          58yo    Diabetes Sister     Breast Cancer Sister     Diabetes Sister     Diabetes Sister     Diabetes Sister        Social History     Tobacco Use    Smoking status: Former     Packs/day: 0.50     Years: 30.00     Pack years: 15.00     Types: Cigarettes     Quit date: 2004     Years since quittin.2    Smokeless tobacco: Never   Substance Use Topics    Alcohol use: No       Allergies   Allergen Reactions    Tetanus Vaccines And Toxoid Swelling       Current Facility-Administered Medications   Medication Dose Route Frequency    sodium chloride (NS) flush 5-40 mL  5-40 mL IntraVENous Q8H    sodium chloride (NS) flush 5-40 mL  5-40 mL IntraVENous PRN    acetaminophen (TYLENOL) tablet 650 mg  650 mg Oral Q6H PRN    Or    acetaminophen (TYLENOL) suppository 650 mg  650 mg Rectal Q6H PRN    ondansetron (ZOFRAN ODT) tablet 4 mg  4 mg Oral Q8H PRN    Or    ondansetron (ZOFRAN) injection 4 mg  4 mg IntraVENous Q6H PRN    enoxaparin (LOVENOX) injection 30 mg  30 mg SubCUTAneous DAILY    lidocaine 4 % patch 1 Patch  1 Patch TransDERmal Q24H    0.9% sodium chloride infusion  100 mL/hr IntraVENous CONTINUOUS    glucose chewable tablet 16 g  4 Tablet Oral PRN    glucagon (GLUCAGEN) injection 1 mg  1 mg IntraMUSCular PRN    dextrose 10% infusion 0-250 mL  0-250 mL IntraVENous PRN    insulin lispro (HUMALOG) injection   SubCUTAneous QID WITH MEALS    aspirin chewable tablet 81 mg  81 mg Oral DAILY insulin glargine (LANTUS) injection 30 Units  30 Units SubCUTAneous QHS    [Held by provider] metoprolol succinate (TOPROL-XL) XL tablet 100 mg  100 mg Oral DAILY    rosuvastatin (CRESTOR) tablet 5 mg  5 mg Oral QHS    pantoprazole (PROTONIX) 40 mg in 0.9% sodium chloride 10 mL injection  40 mg IntraVENous DAILY     Current Outpatient Medications   Medication Sig    metoprolol succinate (TOPROL-XL) 100 mg tablet Take 1 tablet by mouth once daily    lisinopril-hydroCHLOROthiazide (PRINZIDE, ZESTORETIC) 20-12.5 mg per tablet Take 1 tablet by mouth once daily    empagliflozin (JARDIANCE) 25 mg tablet Take  by mouth daily. rosuvastatin (CRESTOR) 5 mg tablet TAKE ONE TABLET BY MOUTH NIGHTLY    insulin degludec 100 unit/mL (3 mL) inpn 60 Units by SubCUTAneous route nightly. semaglutide (OZEMPIC) 0.25 mg or 0.5 mg/dose (2 mg/1.5 ml) subq pen 0.25 mg by SubCUTAneous route every seven (7) days. aspirin 81 mg chewable tablet Take 81 mg by mouth daily. co-enzyme Q-10 (CO Q-10) 100 mg capsule Take 1 Cap by mouth daily. glucose blood VI test strips ([employer] BLOOD GLUCOSE SYSTEM) strip Use 3 times per day (fasting and before meals). (Patient taking differently: Once a day)    Lancets (ONE TOUCH ULTRASOFT LANCETS) Misc 1 Package by Does Not Apply route. Test blood glucose 3-4 time daily         REVIEW OF SYSTEMS   12 point ROS negative except as stated in the HPI. Physical Exam:   Visit Vitals  /62   Pulse 75   Temp 98 °F (36.7 °C)   Resp 16   Ht 5' 6\" (1.676 m)   Wt 88.9 kg (196 lb)   SpO2 100%   BMI 31.64 kg/m²       General:  Alert, cooperative, no distress, appears stated age. Head:  Normocephalic, without obvious abnormality, atraumatic. Eyes:  Conjunctivae/corneas clear. Nose: Nares normal. Septum midline. Mucosa normal.    Throat: Lips, mucosa, and tongue normal.    Neck: Supple, symmetrical, trachea midline, no adenopathy. Lungs:   Clear to auscultation bilaterally.    Chest wall:  No tenderness or deformity. Heart:  Regular rate and rhythm, S1, S2 normal, no murmur, click, rub or gallop. Abdomen:   Soft, non-tender. Bowel sounds normal. No masses,  No organomegaly. Extremities: Extremities normal, atraumatic, no cyanosis or edema. Pulses: 2+ and symmetric all extremities. Skin: Skin color, texture, turgor normal. No rashes or lesions   Lymph nodes: Cervical, supraclavicular nodes normal.   Neurologic: Grossly nonfocal       Lab Results   Component Value Date/Time    Sodium 137 03/12/2023 06:26 AM    Potassium 3.9 03/12/2023 06:26 AM    Chloride 107 03/12/2023 06:26 AM    CO2 26 03/12/2023 06:26 AM    BUN 48 (H) 03/12/2023 06:26 AM    Creatinine 1.91 (H) 03/12/2023 06:26 AM    Glucose 98 03/12/2023 06:26 AM    Calcium 8.6 03/12/2023 06:26 AM    Magnesium 2.1 03/12/2023 06:26 AM    Phosphorus 2.9 03/12/2023 06:26 AM    Lactic acid 1.8 01/31/2020 01:50 PM       Lab Results   Component Value Date/Time    WBC 6.5 03/12/2023 06:26 AM    HGB 11.8 03/12/2023 06:26 AM    PLATELET 903 78/64/7760 06:26 AM    MCV 90.6 03/12/2023 06:26 AM       Lab Results   Component Value Date/Time    Alk.  phosphatase 75 03/12/2023 06:26 AM    Protein, total 6.8 03/12/2023 06:26 AM    Albumin 3.2 (L) 03/12/2023 06:26 AM    Globulin 3.6 03/12/2023 06:26 AM       No results found for: IRON, FE, TIBC, IBCT, PSAT, FERR    Lab Results   Component Value Date/Time    TSH 1.16 03/11/2023 05:08 PM        No results found for: PH, PHI, PCO2, PCO2I, PO2, PO2I, HCO3, HCO3I, FIO2, FIO2I    Lab Results   Component Value Date/Time    Troponin-I, Qt. <0.05 01/31/2020 01:50 PM        Lab Results   Component Value Date/Time    Culture result: NO GROWTH 6 DAYS 01/31/2020 01:50 PM       No results found for: TOXA1, RPR, HBCM, HBSAG, HAAB, HCAB1, HAAT, G6PD, CRYAC, HIVGT, HIVR, HIV1, HIV12, HIVPC, HIVRPI    No results found for: VANCT, CPK    Lab Results   Component Value Date/Time    Color YELLOW/STRAW 03/12/2023 06:26 AM Appearance CLEAR 03/12/2023 06:26 AM    pH (UA) 5.0 03/12/2023 06:26 AM    Protein Negative 03/12/2023 06:26 AM    Glucose >1,000 (A) 03/12/2023 06:26 AM    Ketone Negative 03/12/2023 06:26 AM    Bilirubin Negative 03/12/2023 06:26 AM    Blood Negative 03/12/2023 06:26 AM    Urobilinogen 0.2 03/12/2023 06:26 AM    Nitrites Negative 03/12/2023 06:26 AM    Leukocyte Esterase Negative 03/12/2023 06:26 AM    WBC 0-4 03/12/2023 06:26 AM    RBC 0-5 03/12/2023 06:26 AM    Bacteria Negative 03/12/2023 06:26 AM       IMPRESSION  MJ cavitary lung lesion  Syncopal episode  N/V/loose stools and decreased appetite with associated weight loss  HARJIT/CKD  Hx of HTN - was hypotensive at presentation  DM  Hx of CAD  Former smoker    PLAN  Obtain abd/pelvis CT  Discussed with radiology. Will plan for image guided bx with IR, potentially tomorrow. Hold off on abx at this time. Low index of suspicion for infectious process given afebrile, normal WBC, and no respiratory symptoms. Syncopal work up per primary team - fu dopplers and ECHO  Gentle IVF      Thank you for allowing us to participate in the care of this patient. We will be happy to follow along in her progress with you.     Tadeo Moore

## 2023-03-13 ENCOUNTER — APPOINTMENT (OUTPATIENT)
Dept: CT IMAGING | Age: 78
DRG: 312 | End: 2023-03-13
Attending: PHYSICIAN ASSISTANT
Payer: MEDICARE

## 2023-03-13 LAB
ALBUMIN SERPL-MCNC: 3.1 G/DL (ref 3.5–5)
ALBUMIN/GLOB SERPL: 0.9 (ref 1.1–2.2)
ALP SERPL-CCNC: 73 U/L (ref 45–117)
ALT SERPL-CCNC: 16 U/L (ref 12–78)
ANION GAP SERPL CALC-SCNC: 3 MMOL/L (ref 5–15)
AST SERPL-CCNC: 17 U/L (ref 15–37)
ATRIAL RATE: 85 BPM
BASOPHILS # BLD: 0 K/UL (ref 0–0.1)
BASOPHILS NFR BLD: 1 % (ref 0–1)
BILIRUB SERPL-MCNC: 0.7 MG/DL (ref 0.2–1)
BUN SERPL-MCNC: 32 MG/DL (ref 6–20)
BUN/CREAT SERPL: 25 (ref 12–20)
CALCIUM SERPL-MCNC: 8.7 MG/DL (ref 8.5–10.1)
CALCULATED P AXIS, ECG09: 28 DEGREES
CALCULATED R AXIS, ECG10: -43 DEGREES
CALCULATED T AXIS, ECG11: 20 DEGREES
CHLORIDE SERPL-SCNC: 110 MMOL/L (ref 97–108)
CO2 SERPL-SCNC: 25 MMOL/L (ref 21–32)
CREAT SERPL-MCNC: 1.28 MG/DL (ref 0.55–1.02)
DIAGNOSIS, 93000: NORMAL
DIFFERENTIAL METHOD BLD: ABNORMAL
EOSINOPHIL # BLD: 0.2 K/UL (ref 0–0.4)
EOSINOPHIL NFR BLD: 3 % (ref 0–7)
ERYTHROCYTE [DISTWIDTH] IN BLOOD BY AUTOMATED COUNT: 12.9 % (ref 11.5–14.5)
GLOBULIN SER CALC-MCNC: 3.4 G/DL (ref 2–4)
GLUCOSE BLD STRIP.AUTO-MCNC: 101 MG/DL (ref 65–117)
GLUCOSE BLD STRIP.AUTO-MCNC: 134 MG/DL (ref 65–117)
GLUCOSE BLD STRIP.AUTO-MCNC: 146 MG/DL (ref 65–117)
GLUCOSE BLD STRIP.AUTO-MCNC: 77 MG/DL (ref 65–117)
GLUCOSE BLD STRIP.AUTO-MCNC: 90 MG/DL (ref 65–117)
GLUCOSE SERPL-MCNC: 92 MG/DL (ref 65–100)
HCT VFR BLD AUTO: 34.3 % (ref 35–47)
HGB BLD-MCNC: 11.3 G/DL (ref 11.5–16)
IMM GRANULOCYTES # BLD AUTO: 0 K/UL (ref 0–0.04)
IMM GRANULOCYTES NFR BLD AUTO: 0 % (ref 0–0.5)
LYMPHOCYTES # BLD: 1.4 K/UL (ref 0.8–3.5)
LYMPHOCYTES NFR BLD: 25 % (ref 12–49)
MAGNESIUM SERPL-MCNC: 2 MG/DL (ref 1.6–2.4)
MCH RBC QN AUTO: 29.5 PG (ref 26–34)
MCHC RBC AUTO-ENTMCNC: 32.9 G/DL (ref 30–36.5)
MCV RBC AUTO: 89.6 FL (ref 80–99)
MONOCYTES # BLD: 0.4 K/UL (ref 0–1)
MONOCYTES NFR BLD: 7 % (ref 5–13)
NEUTS SEG # BLD: 3.7 K/UL (ref 1.8–8)
NEUTS SEG NFR BLD: 64 % (ref 32–75)
NRBC # BLD: 0 K/UL (ref 0–0.01)
NRBC BLD-RTO: 0 PER 100 WBC
P-R INTERVAL, ECG05: 164 MS
PHOSPHATE SERPL-MCNC: 2.2 MG/DL (ref 2.6–4.7)
PLATELET # BLD AUTO: 198 K/UL (ref 150–400)
PMV BLD AUTO: 11.9 FL (ref 8.9–12.9)
POTASSIUM SERPL-SCNC: 4 MMOL/L (ref 3.5–5.1)
PROT SERPL-MCNC: 6.5 G/DL (ref 6.4–8.2)
Q-T INTERVAL, ECG07: 430 MS
QRS DURATION, ECG06: 82 MS
QTC CALCULATION (BEZET), ECG08: 511 MS
RBC # BLD AUTO: 3.83 M/UL (ref 3.8–5.2)
SERVICE CMNT-IMP: ABNORMAL
SERVICE CMNT-IMP: ABNORMAL
SERVICE CMNT-IMP: NORMAL
SODIUM SERPL-SCNC: 138 MMOL/L (ref 136–145)
VENTRICULAR RATE, ECG03: 85 BPM
WBC # BLD AUTO: 5.8 K/UL (ref 3.6–11)

## 2023-03-13 PROCEDURE — 80053 COMPREHEN METABOLIC PANEL: CPT

## 2023-03-13 PROCEDURE — 83735 ASSAY OF MAGNESIUM: CPT

## 2023-03-13 PROCEDURE — 74011250637 HC RX REV CODE- 250/637: Performed by: STUDENT IN AN ORGANIZED HEALTH CARE EDUCATION/TRAINING PROGRAM

## 2023-03-13 PROCEDURE — 36415 COLL VENOUS BLD VENIPUNCTURE: CPT

## 2023-03-13 PROCEDURE — 74011000250 HC RX REV CODE- 250: Performed by: STUDENT IN AN ORGANIZED HEALTH CARE EDUCATION/TRAINING PROGRAM

## 2023-03-13 PROCEDURE — 65270000046 HC RM TELEMETRY

## 2023-03-13 PROCEDURE — 74011250636 HC RX REV CODE- 250/636: Performed by: STUDENT IN AN ORGANIZED HEALTH CARE EDUCATION/TRAINING PROGRAM

## 2023-03-13 PROCEDURE — 97116 GAIT TRAINING THERAPY: CPT

## 2023-03-13 PROCEDURE — 85025 COMPLETE CBC W/AUTO DIFF WBC: CPT

## 2023-03-13 PROCEDURE — 74011636637 HC RX REV CODE- 636/637

## 2023-03-13 PROCEDURE — 82962 GLUCOSE BLOOD TEST: CPT

## 2023-03-13 PROCEDURE — 74011250637 HC RX REV CODE- 250/637

## 2023-03-13 PROCEDURE — 84100 ASSAY OF PHOSPHORUS: CPT

## 2023-03-13 PROCEDURE — 74011250636 HC RX REV CODE- 250/636

## 2023-03-13 PROCEDURE — 65270000029 HC RM PRIVATE

## 2023-03-13 PROCEDURE — C9113 INJ PANTOPRAZOLE SODIUM, VIA: HCPCS | Performed by: STUDENT IN AN ORGANIZED HEALTH CARE EDUCATION/TRAINING PROGRAM

## 2023-03-13 PROCEDURE — 97161 PT EVAL LOW COMPLEX 20 MIN: CPT

## 2023-03-13 PROCEDURE — 74011000250 HC RX REV CODE- 250

## 2023-03-13 RX ORDER — FENTANYL CITRATE 50 UG/ML
100 INJECTION, SOLUTION INTRAMUSCULAR; INTRAVENOUS
Status: DISCONTINUED | OUTPATIENT
Start: 2023-03-13 | End: 2023-03-13

## 2023-03-13 RX ORDER — LIDOCAINE 4 G/100G
1 PATCH TOPICAL DAILY PRN
Status: DISCONTINUED | OUTPATIENT
Start: 2023-03-13 | End: 2023-03-14 | Stop reason: HOSPADM

## 2023-03-13 RX ORDER — LISINOPRIL 20 MG/1
20 TABLET ORAL DAILY
Status: DISCONTINUED | OUTPATIENT
Start: 2023-03-13 | End: 2023-03-14 | Stop reason: HOSPADM

## 2023-03-13 RX ORDER — HYDROCHLOROTHIAZIDE 25 MG/1
12.5 TABLET ORAL DAILY
Status: DISCONTINUED | OUTPATIENT
Start: 2023-03-13 | End: 2023-03-14 | Stop reason: HOSPADM

## 2023-03-13 RX ORDER — MIDAZOLAM HYDROCHLORIDE 1 MG/ML
5 INJECTION, SOLUTION INTRAMUSCULAR; INTRAVENOUS
Status: DISCONTINUED | OUTPATIENT
Start: 2023-03-13 | End: 2023-03-13

## 2023-03-13 RX ADMIN — SODIUM CHLORIDE, PRESERVATIVE FREE 10 ML: 5 INJECTION INTRAVENOUS at 06:55

## 2023-03-13 RX ADMIN — SODIUM CHLORIDE, PRESERVATIVE FREE 10 ML: 5 INJECTION INTRAVENOUS at 13:35

## 2023-03-13 RX ADMIN — SODIUM CHLORIDE 100 ML/HR: 9 INJECTION, SOLUTION INTRAVENOUS at 13:35

## 2023-03-13 RX ADMIN — SODIUM CHLORIDE 100 ML/HR: 9 INJECTION, SOLUTION INTRAVENOUS at 01:50

## 2023-03-13 RX ADMIN — INSULIN GLARGINE 30 UNITS: 100 INJECTION, SOLUTION SUBCUTANEOUS at 21:05

## 2023-03-13 RX ADMIN — ROSUVASTATIN CALCIUM 5 MG: 5 TABLET, FILM COATED ORAL at 21:05

## 2023-03-13 RX ADMIN — HYDROCHLOROTHIAZIDE 12.5 MG: 25 TABLET ORAL at 10:19

## 2023-03-13 RX ADMIN — SODIUM CHLORIDE, PRESERVATIVE FREE 10 ML: 5 INJECTION INTRAVENOUS at 21:05

## 2023-03-13 RX ADMIN — LISINOPRIL 20 MG: 20 TABLET ORAL at 10:19

## 2023-03-13 RX ADMIN — METOPROLOL SUCCINATE 100 MG: 50 TABLET, EXTENDED RELEASE ORAL at 10:19

## 2023-03-13 RX ADMIN — SODIUM CHLORIDE, PRESERVATIVE FREE 40 MG: 5 INJECTION INTRAVENOUS at 17:10

## 2023-03-13 NOTE — PROGRESS NOTES
PHYSICAL THERAPY EVALUATION/DISCHARGE  Patient: Anna Odonnell (2 y.o. female)  Date: 3/13/2023  Primary Diagnosis: Syncope [R55]       Precautions: universal         ASSESSMENT  Based on the objective data described below, the patient presents with independent with ambulation without assistive device and independent with all functional mobility. Patient ad ciro in the room occasionally holding on the IV pole just waiting for biopsy procedure. Reviewed all safety precaution and home exercise program with the patient, verbalized understanding, clear to go home per Physical Therapy perspective. Skilled physical therapy is not indicated at this time. .    Functional Outcome Measure: The patient scored 24/24 on the AM-PAC outcome measure    Other factors to consider for discharge: none     Further skilled acute physical therapy is not indicated at this time. PLAN :  Recommendation for discharge: (in order for the patient to meet his/her long term goals)  No skilled physical therapy/ follow up rehabilitation needs identified at this time. This discharge recommendation:  Has been made in collaboration with the attending provider and/or case management    IF patient discharges home will need the following DME: none       SUBJECTIVE:   Patient stated I have been able to walk to the bathroom holding on the IV pole.     OBJECTIVE DATA SUMMARY:   HISTORY:    Past Medical History:   Diagnosis Date    Chronic back pain 9/8/2010    DM type 2 (diabetes mellitus, type 2) ([facility] 5/3/2010    HTN (hypertension) 5/3/2010    MI (myocardial infarction) ([facility] 5/3/2010    Obstructive sleep apnea (adult) (pediatric) 12/12/2014     Past Surgical History:   Procedure Laterality Date    HX CATARACT REMOVAL  01/2021    bilateral     HX CORONARY STENT PLACEMENT  09/17/2017    Followed by Dr. Warnell Castleman      disc sx    HX TUBAL LIGATION         Prior level of function: Independent community ambulator without assistive device. Personal factors and/or comorbidities impacting plan of care:     Home Situation  Support Systems: Child(mo)  Patient Expects to be Discharged to[de-identified] Home with family assistance    EXAMINATION/PRESENTATION/DECISION MAKING:   Critical Behavior:  Neurologic State: Alert, Appropriate for age  Orientation Level: Appropriate for age, Oriented X4  Cognition: Appropriate decision making, Appropriate for age attention/concentration, Appropriate safety awareness, Follows commands     Hearing:       Range Of Motion:  AROM: Within functional limits           PROM: Within functional limits           Strength:    Strength: Within functional limits                    Tone & Sensation:                                  Coordination:  Coordination: Within functional limits  Vision:      Functional Mobility:  Bed Mobility:  Rolling: Independent  Supine to Sit: Independent  Sit to Supine: Independent  Scooting: Independent  Transfers:  Sit to Stand: Independent  Stand to Sit: Independent  Stand Pivot Transfers: Independent     Bed to Chair: Independent              Balance:   Sitting: Intact  Standing: Intact; Without support  Ambulation/Gait Training:  Distance (ft): 50 Feet (ft) (ad ciro holding on the IV pole)     Ambulation - Level of Assistance: Modified independent     Gait Description (WDL): Within defined limits                                            Therapeutic Exercises:   Educate and instructed patient to continue active range of motion exercise on both legs while up on chair or on bed multiple times. Recommend patient to be up on the chair at least 3 times a day every meal times as tolerated. Functional Measure:  [employer] AM-PAC®      Basic Mobility Inpatient Short Form (6-Clicks) Version 2  How much HELP from another person do you currently need. .. (If the patient hasn't done an activity recently, how much help from another person do you think they would need if they tried?) Total A Lot A Little None   1. Turning from your back to your side while in a flat bed without using bedrails? []  1 []  2 []  3  [x]  4   2. Moving from lying on your back to sitting on the side of a flat bed without using bedrails? []  1 []  2 []  3  [x]  4   3. Moving to and from a bed to a chair (including a wheelchair)? []  1 []  2 []  3  [x]  4   4. Standing up from a chair using your arms (e.g. wheelchair or bedside chair)? []  1 []  2 []  3  [x]  4   5. Walking in hospital room? []  1 []  2 []  3  [x]  4   6. Climbing 3-5 steps with a railing? []  1 []  2 []  3  [x]  4     Raw Score: 24/24                            Cutoff score ?171,2,3 had higher odds of discharging home with home health or need of SNF/IPR. [address] Wanda Carr [address] Buster Kohut Salena Baston. Validity of the AM-PAC 6-Clicks Inpatient Daily Activity and Basic Mobility Short Forms. Physical Therapy Mar 2014, [phone]) 379-391; DOI: 10.2522/ptj.51240813  2. Mervat Miller. Association of AM-PAC \"6-Clicks\" Basic Mobility and Daily Activity Scores With Discharge Destination. Phys Ther. 2021 Apr 4;101(4):svsr285. doi: 10.1093/ptj/fyli919. PMID: 53462580. V Alisha Avelar, [place] PEARSON, Andrew Castillo, Linda S. Activity Measure for Post-Acute Care \"6-Clicks\" Basic Mobility Scores Predict Discharge Destination After Acute Care Hospitalization in Select Patient Groups: A Retrospective, Observational Study. Arch Rehabil Res Clin Transl. 2022 Jul 16;4(3):151080. doi: 10.1016/j.arrct. 9628.911067. PMID: 64886459; PMCID: [id number]. 4. Светлана Sarmiento, Princess W, Kelsea P. AM-PAC Short Forms Manual 4.0. Revised 2/2020.         Physical Therapy Evaluation Charge Determination   History Examination Presentation Decision-Making   LOW Complexity : Zero comorbidities / personal factors that will impact the outcome / POC LOW Complexity : 1-2 Standardized tests and measures addressing body structure, function, activity limitation and / or participation in recreation  LOW Complexity : Stable, uncomplicated  Other outcome measures AM-PAC  LOW       Based on the above components, the patient evaluation is determined to be of the following complexity level: LOW     Pain Ratin/10    Activity Tolerance:   Good      After treatment patient left in no apparent distress:   Supine in bed, Heels elevated for pressure relief, and Call bell within reach    COMMUNICATION/EDUCATION:   The patients plan of care was discussed with: Registered nurse. Fall prevention education was provided and the patient/caregiver indicated understanding. Thank you for this referral.  Taryn Sales, PT,WCC.    Time Calculation: 28 mins

## 2023-03-13 NOTE — DISCHARGE INSTRUCTIONS
HOME DISCHARGE INSTRUCTIONS    Sandra Jensen / [id number] : 1945    Admission date: 3/11/2023 Discharge date: 3/14/2023     Please bring this form with you to show your care provider at your follow-up appointment. Primary care provider:  Starla Robles    Discharging provider:  Ben Birch DO  - Family Medicine Resident  Ishaan Sanches MD - Attending, Family Medicine     You have been admitted to the hospital with the following diagnoses:    ACUTE DIAGNOSES:  Syncope [R55]      . . . . . . . . . . . . . . . . . . . . . . . . . . . . . . . . . . . . . . . . . . . . . . . . . . . . . . . . . . . . . . . . . . . . . . . Madiosn Victor FOLLOW-UP CARE RECOMMENDATIONS:  - Follow up with your PCP about the mass in your thyroid. You may need a biopsy. Also have your labs checked to monitor your kidney function and electrolytes. - Follow up with Pulmonology about the mass in your lung. Discuss biopsy results. Appointments  Future Appointments   Date Time Provider Lc Guajardo   3/21/2023  2:30 PM Jannie Prather NP [employer] [facility] BS AMB   3/31/2023  3:00 PM Ishaan Connors MD [facility] BS AMB   2024  2:40 PM Emanuel Mascorro MD [facility] BS AMB        Follow-up tests needed: BMP, possible thyroid biopsy    Pending test results: At the time of your discharge the following test results are still pending: lung biopsy results. Please make sure you review these results with your outpatient follow-up provider(s). Specific symptoms to watch for: chest pain, shortness of breath, fever, chills, nausea, vomiting, diarrhea, change in mentation, falling, weakness, bleeding. DIET/what to eat:  Diabetic Diet    ACTIVITY:  Activity as tolerated    What to do if new or unexpected symptoms occur? If you experience any of the above symptoms (or should other concerns or questions arise after discharge) please call your primary care physician.  Return to the emergency room if you cannot get hold of your doctor. It is very important that you keep your follow-up appointment(s). Please bring discharge papers, medication list (and/or medication bottles) to your follow-up appointments for review by your outpatient provider(s). Please check the list of medications and be sure it includes every medication (even non-prescription medications) that your provider wants you to take. It is important that you take the medication exactly as they are prescribed. Keep your medication in the bottles provided by the pharmacist and keep a list of the medication names, dosages, and times to be taken in your wallet. Do not take other medications without consulting your doctor. If you have any questions about your medications or other instructions, please talk to your nurse or care provider before you leave the hospital.     Information obtained by:     I understand that if any problems occur once I am at home I am to contact my physician. These instructions were explained to me and I had the opportunity to ask questions. I understand and acknowledge receipt of the instructions indicated above.                                                                                                                                                Physician's or R.N.'s Signature                                                                  Date/Time                                                                                                                                              Patient or Representative Signature                                                          Date/Time

## 2023-03-13 NOTE — PROGRESS NOTES
1300 Reviewed patient blood thinner status with Dr Faby Thompson, noted last dose on ASA 3/11/23 at 2151, last dose of Lovenox 3/12/23 at 0913. Dr Faby Thompson confirmed George Dewey to perform procedure, no additional blood test ordered. Contacted 5th floor to inform patient will be pulled to Radiology holding.

## 2023-03-13 NOTE — PROGRESS NOTES
Problem: Falls - Risk of  Goal: *Absence of Falls  Description: Document Topher Christians Fall Risk and appropriate interventions in the flowsheet.   Outcome: Progressing Towards Goal  Note: Fall Risk Interventions:                                Problem: Patient Education: Go to Patient Education Activity  Goal: Patient/Family Education  Outcome: Progressing Towards Goal

## 2023-03-13 NOTE — PROGRESS NOTES
1430 patient arrived via stretcher from 511. Confirmed patient identity with two identifiers. Patient denies any distress, VSS, S1, S2, LCTA, PIV in R hand has great blood return, and flushed with out pain. Reviewed procedure with patient, Debarah Goodpasture PA present at the bedside to consent for CT guided lung BX, all patient questions reviewed. 1508 Procedure delayed due to Code Stroke. Patient advised. 1 is Red Rico present at the bedside to inform patient procedure will be postponed until tomorrow. Patient verbalized understanding. Called reports to Santiago RN informed patient returning to the floor will post Lung BX for tomorrow ok for patient to eat dinner tonight. NPO post midnight 3/14/23.    1525 patient transported back to room via stretcher with transported.

## 2023-03-13 NOTE — PROGRESS NOTES
Bedside and Verbal shift change report given to TIMUR Henderson (oncoming nurse) by Iftikhar Westbrook LPN (offgoing nurse). Report included the following information SBAR, Kardex, Intake/Output, MAR, and Recent Results.

## 2023-03-13 NOTE — PROGRESS NOTES
Name: [facility]: [address]   : 1945 Admit Date: 3/11/2023   Phone: [phone]  Room: [facility]   PCP: Elizabeth Langley MD  MRN: [id number]   Date: 3/13/2023  Code: Full Code          Chart and notes reviewed. Data reviewed. I review the patient's current medications in the medical record at each encounter. I have evaluated and examined the patient. Ms. Ailyn Velasquez is a pleasant 68year old female who presented to the [facility] ED with recurrent recurrent syncopal episodes with associated dizziness over the last couple weeks. There was an associated vomiting episode, decreased po intake, and she reports loose stools. This she has lost a few pounds. Denies fever or chills. Denies coughing or hemoptysis. Denies SOB or CP. Denies known sick contacts. 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 Denies any occupational or environmental exposure. Denies any other TB exposure risk factors. She is a 1/2 PPD smoker for 30 years - quitting in . Denies personal history of cancer. Does have family history of malignancy - one sister with breast cancer and one sister with lung cancer. Images reviewed:    WBC 6.5  Hgb 11.8  Na 137  Creat 1.91 - has CKD at baseline  TSH 1.16    Chest CT and CXR personally visualized and report reviewed. There is 27 x 24 mm cavitary lesion in the left upper lobe is associated with mild pleural thickening. Cavitary lesion with a spiculated margin. No lymphadenopathy. Centrilobular emphysematous change also noted. Lesion not present on 2020 CXR. Patient denies any other recent chest imaging.     Interval history  Afebrile  SBP elevated  Sats 94% on RA  Hgb 11.3  Creat 1.28 - better    CT abd/pelvis: no acute process    ECHO: EF 55-60%; tricuspid AV; trace MR; mild NC    Carotid dopplers: unremarkable; 0-49% stenosis; incidental findings of vascularized mass in right lobe of thyroid measuring 1.51cm x 1.18cm    ROS: Denies SOB. Denies fever, chills, or cough. Denies CP. Only complaint is LE neuropathy.     Past Medical History:   Diagnosis Date    Chronic back pain 2010    DM type 2 (diabetes mellitus, type 2) ([facility] [address] 5/3/2010    HTN (hypertension) 5/3/2010    MI (myocardial infarction) ([facility] 5/3/2010    Obstructive sleep apnea (adult) (pediatric) 2014       Past Surgical History:   Procedure Laterality Date    HX CATARACT REMOVAL  2021    bilateral     HX CORONARY STENT PLACEMENT  2017    Followed by Dr. Slaughter Hair      disc sx    HX TUBAL LIGATION         Family History   Problem Relation Age of Onset    Hypertension Mother          60yo    Diabetes Sister     Breast Cancer Sister     Diabetes Sister     Diabetes Sister     Diabetes Sister        Social History     Tobacco Use    Smoking status: Former     Packs/day: 0.50     Years: 30.00     Pack years: 15.00     Types: Cigarettes     Quit date: 2004     Years since quittin.2    Smokeless tobacco: Never   Substance Use Topics    Alcohol use: No       Allergies   Allergen Reactions    Tetanus Vaccines And Toxoid Swelling       Current Facility-Administered Medications   Medication Dose Route Frequency    lidocaine 4 % patch 1 Patch  1 Patch TransDERmal DAILY PRN    lisinopriL (PRINIVIL, ZESTRIL) tablet 20 mg  20 mg Oral DAILY    hydroCHLOROthiazide (HYDRODIURIL) tablet 12.5 mg  12.5 mg Oral DAILY    sodium chloride (NS) flush 5-40 mL  5-40 mL IntraVENous Q8H    sodium chloride (NS) flush 5-40 mL  5-40 mL IntraVENous PRN    acetaminophen (TYLENOL) tablet 650 mg  650 mg Oral Q6H PRN    Or    acetaminophen (TYLENOL) suppository 650 mg  650 mg Rectal Q6H PRN    ondansetron (ZOFRAN ODT) tablet 4 mg  4 mg Oral Q8H PRN    Or    ondansetron (ZOFRAN) injection 4 mg  4 mg IntraVENous Q6H PRN    [Held by provider] enoxaparin (LOVENOX) injection 30 mg 30 mg SubCUTAneous DAILY    0.9% sodium chloride infusion  100 mL/hr IntraVENous CONTINUOUS    glucose chewable tablet 16 g  4 Tablet Oral PRN    glucagon (GLUCAGEN) injection 1 mg  1 mg IntraMUSCular PRN    dextrose 10% infusion 0-250 mL  0-250 mL IntraVENous PRN    insulin lispro (HUMALOG) injection   SubCUTAneous QID WITH MEALS    [Held by provider] aspirin chewable tablet 81 mg  81 mg Oral DAILY    insulin glargine (LANTUS) injection 30 Units  30 Units SubCUTAneous QHS    metoprolol succinate (TOPROL-XL) XL tablet 100 mg  100 mg Oral DAILY    rosuvastatin (CRESTOR) tablet 5 mg  5 mg Oral QHS    pantoprazole (PROTONIX) 40 mg in 0.9% sodium chloride 10 mL injection  40 mg IntraVENous DAILY         REVIEW OF SYSTEMS   12 point ROS negative except as stated in the HPI. Physical Exam:   Visit Vitals  BP (!) 174/72 (BP 1 Location: Left upper arm, BP Patient Position: Lying)   Pulse 85   Temp 98.5 °F (36.9 °C)   Resp 14   Ht 5' 6\" (1.676 m)   Wt 88.9 kg (195 lb 15.8 oz)   SpO2 94%   BMI 31.63 kg/m²       General:  Alert, cooperative, no distress, appears stated age. Head:  Normocephalic, without obvious abnormality, atraumatic. Eyes:  Conjunctivae/corneas clear. Nose: Nares normal. Septum midline. Mucosa normal.    Throat: Lips, mucosa, and tongue normal.    Neck: Supple, symmetrical, trachea midline, no adenopathy. Lungs:   Clear to auscultation bilaterally. Chest wall:  No tenderness or deformity. Heart:  Regular rate and rhythm, S1, S2 normal, no murmur, click, rub or gallop. Abdomen:   Soft, non-tender. Bowel sounds normal. No masses,  No organomegaly. Extremities: Extremities normal, atraumatic, no cyanosis or edema. Pulses: 2+ and symmetric all extremities.    Skin: Skin color, texture, turgor normal. No rashes or lesions   Lymph nodes: Cervical, supraclavicular nodes normal.   Neurologic: Grossly nonfocal       Lab Results   Component Value Date/Time    Sodium 138 03/13/2023 02:19 AM Potassium 4.0 03/13/2023 02:19 AM    Chloride 110 (H) 03/13/2023 02:19 AM    CO2 25 03/13/2023 02:19 AM    BUN 32 (H) 03/13/2023 02:19 AM    Creatinine 1.28 (H) 03/13/2023 02:19 AM    Glucose 92 03/13/2023 02:19 AM    Calcium 8.7 03/13/2023 02:19 AM    Magnesium 2.0 03/13/2023 02:19 AM    Phosphorus 2.2 (L) 03/13/2023 02:19 AM    Lactic acid 1.8 01/31/2020 01:50 PM       Lab Results   Component Value Date/Time    WBC 5.8 03/13/2023 02:19 AM    HGB 11.3 (L) 03/13/2023 02:19 AM    PLATELET 419 64/69/4611 02:19 AM    MCV 89.6 03/13/2023 02:19 AM       Lab Results   Component Value Date/Time    Alk.  phosphatase 73 03/13/2023 02:19 AM    Protein, total 6.5 03/13/2023 02:19 AM    Albumin 3.1 (L) 03/13/2023 02:19 AM    Globulin 3.4 03/13/2023 02:19 AM       No results found for: IRON, FE, TIBC, IBCT, PSAT, FERR    Lab Results   Component Value Date/Time    TSH 1.16 03/11/2023 05:08 PM        No results found for: PH, PHI, PCO2, PCO2I, PO2, PO2I, HCO3, HCO3I, FIO2, FIO2I    Lab Results   Component Value Date/Time    Troponin-I, Qt. <0.05 01/31/2020 01:50 PM        Lab Results   Component Value Date/Time    Culture result: NO GROWTH 6 DAYS 01/31/2020 01:50 PM       No results found for: TOXA1, RPR, HBCM, HBSAG, HAAB, HCAB1, HAAT, G6PD, CRYAC, HIVGT, HIVR, HIV1, HIV12, HIVPC, HIVRPI    No results found for: VANCT, CPK    Lab Results   Component Value Date/Time    Color YELLOW/STRAW 03/12/2023 06:26 AM    Appearance CLEAR 03/12/2023 06:26 AM    pH (UA) 5.0 03/12/2023 06:26 AM    Protein Negative 03/12/2023 06:26 AM    Glucose >1,000 (A) 03/12/2023 06:26 AM    Ketone Negative 03/12/2023 06:26 AM    Bilirubin Negative 03/12/2023 06:26 AM    Blood Negative 03/12/2023 06:26 AM    Urobilinogen 0.2 03/12/2023 06:26 AM    Nitrites Negative 03/12/2023 06:26 AM    Leukocyte Esterase Negative 03/12/2023 06:26 AM    WBC 0-4 03/12/2023 06:26 AM    RBC 0-5 03/12/2023 06:26 AM    Bacteria Negative 03/12/2023 06:26 AM       IMPRESSION  MJ cavitary lung lesion  Syncopal episode  RLL thyroid mass noted on carotid dopplers  N/V/loose stools and decreased appetite with associated weight loss  HARJIT/CKD  Hx of HTN - was hypotensive at presentation  DM  Hx of CAD  Former smoker    PLAN  Discussed with radiology. Will plan for image guided bx with IR today. Remain NPO and holding Lovenox and ASA. Hold off on abx at this time. Low index of suspicion for infectious process given afebrile, normal WBC, and no respiratory symptoms.   Work up of thyroid mass per primary team  Gentle IVF      Tadeo Cazares

## 2023-03-13 NOTE — PROGRESS NOTES
Problem: Falls - Risk of  Goal: *Absence of Falls  Description: Document Enma Matos Fall Risk and appropriate interventions in the flowsheet.   Outcome: Progressing Towards Goal  Note: Fall Risk Interventions:                                Problem: Patient Education: Go to Patient Education Activity  Goal: Patient/Family Education  Outcome: Progressing Towards Goal

## 2023-03-13 NOTE — PROGRESS NOTES
Subjective / Objective     Subjective: Patient seen and examined at bedside. Denies CP, SOB, n/v, abdominal pain. No complaints at this time. Temp (24hrs), Av.2 °F (36.8 °C), Min:97.8 °F (36.6 °C), Max:98.7 °F (37.1 °C)     Physical Exam  General: No acute distress. Cooperative. Head: Normocephalic. Atraumatic. Respiratory: No increased work of breathing. Symmetric chest rise b/l. Cardiovascular: RRR. No clubbing or cyanosis  GI: Nondistended. Extremities: No LE edema. Moving all extremities spontaneously. Skin: Warm, dry. No rashes. Neuro: Alert, normal behavior. No focal deficit. Respiratory:   O2 Device: None (Room air)     I/O:  Date 23 07 - 23 0659 23 07 - 23 0659   Shift 2389-9744 7965-8345 24 Hour Total 8728-3087 8593-1214 24 Hour Total   INTAKE   P.O.  0 0        P. O.  0 0      I. V.(mL/kg/hr)  0632.6(6.0) 2983.3(1.4)        Volume (0.9% sodium chloride infusion)  2983. 3 2983. 3      Shift Total(mL/kg)  2983. 3(33.6) 2983. 3(33.6)      OUTPUT   Urine(mL/kg/hr)           Urine Occurrence(s)  1 x 1 x      Shift Total(mL/kg)         NET  2983. 3 2983. 3      Weight (kg) 88.9 88.9 88.9 88.9 88.9 88.9       Inpatient Medications  Current Facility-Administered Medications   Medication Dose Route Frequency    lidocaine 4 % patch 1 Patch  1 Patch TransDERmal DAILY PRN    lisinopriL (PRINIVIL, ZESTRIL) tablet 20 mg  20 mg Oral DAILY    hydroCHLOROthiazide (HYDRODIURIL) tablet 12.5 mg  12.5 mg Oral DAILY    sodium chloride (NS) flush 5-40 mL  5-40 mL IntraVENous Q8H    sodium chloride (NS) flush 5-40 mL  5-40 mL IntraVENous PRN    acetaminophen (TYLENOL) tablet 650 mg  650 mg Oral Q6H PRN    Or    acetaminophen (TYLENOL) suppository 650 mg  650 mg Rectal Q6H PRN    ondansetron (ZOFRAN ODT) tablet 4 mg  4 mg Oral Q8H PRN    Or    ondansetron (ZOFRAN) injection 4 mg  4 mg IntraVENous Q6H PRN    enoxaparin (LOVENOX) injection 30 mg  30 mg SubCUTAneous DAILY    0.9% sodium chloride infusion  100 mL/hr IntraVENous CONTINUOUS    glucose chewable tablet 16 g  4 Tablet Oral PRN    glucagon (GLUCAGEN) injection 1 mg  1 mg IntraMUSCular PRN    dextrose 10% infusion 0-250 mL  0-250 mL IntraVENous PRN    insulin lispro (HUMALOG) injection   SubCUTAneous QID WITH MEALS    aspirin chewable tablet 81 mg  81 mg Oral DAILY    insulin glargine (LANTUS) injection 30 Units  30 Units SubCUTAneous QHS    metoprolol succinate (TOPROL-XL) XL tablet 100 mg  100 mg Oral DAILY    rosuvastatin (CRESTOR) tablet 5 mg  5 mg Oral QHS    pantoprazole (PROTONIX) 40 mg in 0.9% sodium chloride 10 mL injection  40 mg IntraVENous DAILY     Allergies  Allergies   Allergen Reactions    Tetanus Vaccines And Toxoid Swelling     CBC:  Recent Labs     03/13/23 0219 03/12/23 0626 03/11/23  1708   WBC 5.8 6.5 11.2*   HGB 11.3* 11.8 13.6   HCT 34.3* 35.7 41.1    564 910     Metabolic Panel:  Recent Labs     03/13/23 0219 03/12/23 0626 03/12/23  0038 03/11/23  1708    137 136 133*   K 4.0 3.9 3.9 4.3   * 107 105 100   CO2 25 26 26 25   BUN 32* 48* 49* 48*   CREA 1.28* 1.91* 2.16* 2.61*   GLU 92 98 236* 178*   CA 8.7 8.6 8.6 9.4   MG 2.0 2.1  --  2.2   PHOS 2.2* 2.9  --  4.9*   ALB 3.1* 3.2*  --  3.8   ALT 16 16  --  17            Assessment and Plan   Gwendolyn Hi is a 68 y.o. female with PMH of CAD s/p PCI in 2017, HTN, T2DM w neuropathy, CKD3b, EMILY who presented with syncopal episodes and is admitted for syncope and HARJIT. Syncope in s/o Vomiting: Etiology IVVD with resultant orthostasis. Low BP and tachycardia 2/2 vomiting, decreased PO intake. EKG reassuring. TSH normal. Echo EF55-60%, carotid dopplers wnl.   - Cardiac monitoring  - mIVF with IBW: 100mL/hr NS  - Protonix 40mg IV daily     HARJIT on CKD3b: Improved back to baseline with fluids.  - Continue to monitor BMP    Thyroid mass: Vascularized mass noted in the right lobe of the thyroid measuring 1.51 cm x 1.18 cm.  TSH 1.16.  - Follow up outpatient for likely FNA      New Lung Opacity: Has developed since 2020. L suprahilar spiculated opacity on CXR. Pt has significant smoking hx (0.5 ppd x 40yrs, quit 15 yrs ago). CT Chest with 27 x 24 mm cavitary lesion in MJ w mild pleural thickening, w spiculated margin, lesion is infectious vs neoplastic. 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 Neg PPD in 2019.  - Pulmonology consulted, plan for biopsy     T2DM with Neuropathy: A1c 9.7% on 1/5/23. On home regimen of jardiance 25mg daily, insulin degludec 60U qhs. F/w endo Dr. Minor Olson, and podiatrist at [facility]. She receives Combined Electro-chemical Treatment (CET) for her neuropathy.  - Hold home jardiance  - Insulin Sliding Scale normal sensitivity with AC&HS glucose checks. - Lantus 30u QHS, monitor for adjustments as necessary  - Hypoglycemia protocols ordered     Hypertension: POA BP was 104/55. On home lisinopril-hctz 20-12.5mg daily, toprol XL 100mg daily. - Continue home meds  - Will continue to monitor at this time and readjust as BP's trend     CAD: F/w cardiology, Dr. Iwona Jasso. On home ASA 81mg daily, crestor 5mg daily, toprol XL 100mg daily. - Continue home meds  - Daily CMP, Mg, Phos     Hyperlipidemia: On home crestor. Lipids 1/5/23 were HDL 67, , .  - Continue home crestor     EMILY: Previously on CPAP, but not for 1 year. Has appt 3/21 for sleep study. Obesity: Body mass index is 31.64 kg/m². - Encourage lifestyle modifications and further follow up outpatient     FEN/GI - Diabetic diet. NS at 100 mL/hr. Activity - Ambulate with assistance  DVT prophylaxis - Lovenox  GI prophylaxis - Not indicated at this time  Fall prophylaxis - Fall precautions ordered. Disposition - Plan to d/c to Home. Consulting PT/OT/CM  Code Status - Full, discussed with patient / caregivers.   Next of Kin Name and [address] (Child)   [address] [facility] Resident       For Billing    Chief Complaint   Patient presents with    Syncope       Hospital Problems  Date Reviewed: 3/2/2023            Codes Class Noted POA    CAD (coronary artery disease) (Chronic) ICD-10-CM: I25.10  ICD-9-CM: 414.00  9/20/2017 Yes        * (Principal) Syncope ICD-10-CM: R55  ICD-9-CM: 780.2  3/11/2023 Unknown        HARJIT (acute kidney injury) ([facility] [address] ICD-10-CM: N17.9  ICD-9-CM: 584.9  3/11/2023 Unknown        Opacity of lung on imaging study ICD-10-CM: R91.8  ICD-9-CM: 793.19  3/11/2023 Unknown        Vomiting ICD-10-CM: R11.10  ICD-9-CM: 787.03  3/11/2023 Unknown        Hypotension ICD-10-CM: I95.9  ICD-9-CM: 458.9  3/11/2023 Unknown        Neuropathy ICD-10-CM: G62.9  ICD-9-CM: 355.9  3/2/2023 Yes        Type 2 diabetes mellitus with stage 3b chronic kidney disease, with long-term current use of insulin (HCC) (Chronic) ICD-10-CM: E11.22, N18.32, Z79.4  ICD-9-CM: 250.40, 585.3, V58.67  7/22/2022 Yes        Mixed hyperlipidemia (Chronic) ICD-10-CM: E78.2  ICD-9-CM: 272.2  8/13/2021 Yes        Obesity ICD-10-CM: E66.9  ICD-9-CM: 278.00  7/20/2018 Unknown        Primary hypertension (Chronic) ICD-10-CM: I10  ICD-9-CM: 401.9  5/3/2010 Yes

## 2023-03-13 NOTE — PROGRESS NOTES
Occupational Therapy Note  3/13/2023    OT eval order received and acknowledged. Patient stating no OT needs at this time as patient reports independently transferring to and from the bathroom for self care tasks and demonstrating ability to safely manage own lines (patient exiting bathroom as OT entered room). Pt with no acute OT needs at this time thus will D/C from skilled OT services after screen. Recommend discharge to home independently when medically appropriate Please re-consult if pt status changes.      Thank you,  Bhavani Cosby OTR/L

## 2023-03-13 NOTE — H&P
Radiology History and Physical    Patient: Derick May 68 y.o. female       Chief Complaint: Syncope      History of Present Illness: 68year old woman with a left lung mass presents for biopsy    History:    Past Medical History:   Diagnosis Date    Chronic back pain 2010    DM type 2 (diabetes mellitus, type 2) ([facility] 5/3/2010    HTN (hypertension) 5/3/2010    MI (myocardial infarction) ([facility] 5/3/2010    Obstructive sleep apnea (adult) (pediatric) 2014     Family History   Problem Relation Age of Onset    Hypertension Mother          58yo    Diabetes Sister     Breast Cancer Sister     Diabetes Sister     Diabetes Sister     Diabetes Sister      Social History     Socioeconomic History    Marital status: SINGLE     Spouse name: Not on file    Number of children: Not on file    Years of education: Not on file    Highest education level: Not on file   Occupational History    Not on file   Tobacco Use    Smoking status: Former     Packs/day: 0.50     Years: 30.00     Pack years: 15.00     Types: Cigarettes     Quit date: 2004     Years since quittin.2    Smokeless tobacco: Never   Vaping Use    Vaping Use: Never used   Substance and Sexual Activity    Alcohol use: No    Drug use: No    Sexual activity: Never   Other Topics Concern    Not on file   Social History Narrative    Not on file     Social Determinants of Health     Financial Resource Strain: Low Risk     Difficulty of Paying Living Expenses: Not hard at all   Food Insecurity: No Food Insecurity    Worried About Running Out of Food in the Last Year: Never true    Ran Out of Food in the Last Year: Never true   Transportation Needs: Not on file   Physical Activity: Not on file   Stress: Not on file   Social Connections: Not on file   Intimate Partner Violence: Not on file   Housing Stability: Not on file       Allergies:    Allergies   Allergen Reactions    Tetanus Vaccines And Toxoid Swelling       Current Medications:  Current Facility-Administered Medications   Medication Dose Route Frequency    lidocaine 4 % patch 1 Patch  1 Patch TransDERmal DAILY PRN    lisinopriL (PRINIVIL, ZESTRIL) tablet 20 mg  20 mg Oral DAILY    hydroCHLOROthiazide (HYDRODIURIL) tablet 12.5 mg  12.5 mg Oral DAILY    fentaNYL citrate (PF) injection 100 mcg  100 mcg IntraVENous Rad Multiple    midazolam (VERSED) injection 5 mg  5 mg IntraVENous Rad Multiple    sodium chloride (NS) flush 5-40 mL  5-40 mL IntraVENous Q8H    sodium chloride (NS) flush 5-40 mL  5-40 mL IntraVENous PRN    acetaminophen (TYLENOL) tablet 650 mg  650 mg Oral Q6H PRN    Or    acetaminophen (TYLENOL) suppository 650 mg  650 mg Rectal Q6H PRN    ondansetron (ZOFRAN ODT) tablet 4 mg  4 mg Oral Q8H PRN    Or    ondansetron (ZOFRAN) injection 4 mg  4 mg IntraVENous Q6H PRN    [Held by provider] enoxaparin (LOVENOX) injection 30 mg  30 mg SubCUTAneous DAILY    0.9% sodium chloride infusion  100 mL/hr IntraVENous CONTINUOUS    glucose chewable tablet 16 g  4 Tablet Oral PRN    glucagon (GLUCAGEN) injection 1 mg  1 mg IntraMUSCular PRN    dextrose 10% infusion 0-250 mL  0-250 mL IntraVENous PRN    insulin lispro (HUMALOG) injection   SubCUTAneous QID WITH MEALS    [Held by provider] aspirin chewable tablet 81 mg  81 mg Oral DAILY    insulin glargine (LANTUS) injection 30 Units  30 Units SubCUTAneous QHS    metoprolol succinate (TOPROL-XL) XL tablet 100 mg  100 mg Oral DAILY    rosuvastatin (CRESTOR) tablet 5 mg  5 mg Oral QHS    pantoprazole (PROTONIX) 40 mg in 0.9% sodium chloride 10 mL injection  40 mg IntraVENous DAILY        Physical Exam:  Blood pressure (!) 138/53, pulse 78, temperature 98.4 °F (36.9 °C), resp. rate 15, height 5' 6\" (1.676 m), weight 88.9 kg (195 lb 15.8 oz), SpO2 95 %.   GENERAL: alert, cooperative, no distress, appears stated age,   LUNG: Nonlabored respiration on room air  HEART: regular rate and rhythm    Alerts:    Hospital Problems  Date Reviewed: 3/2/2023 Codes Class Noted POA    CAD (coronary artery disease) (Chronic) ICD-10-CM: I25.10  ICD-9-CM: 414.00  9/20/2017 Yes        * (Principal) Syncope ICD-10-CM: R55  ICD-9-CM: 780.2  3/11/2023 Unknown        HARJIT (acute kidney injury) ([facility] [address] ICD-10-CM: N17.9  ICD-9-CM: 584.9  3/11/2023 Unknown        Opacity of lung on imaging study ICD-10-CM: R91.8  ICD-9-CM: 793.19  3/11/2023 Unknown        Vomiting ICD-10-CM: R11.10  ICD-9-CM: 787.03  3/11/2023 Unknown        Hypotension ICD-10-CM: I95.9  ICD-9-CM: 458.9  3/11/2023 Unknown        Neuropathy ICD-10-CM: G62.9  ICD-9-CM: 355.9  3/2/2023 Yes        Type 2 diabetes mellitus with stage 3b chronic kidney disease, with long-term current use of insulin (HCC) (Chronic) ICD-10-CM: E11.22, N18.32, Z79.4  ICD-9-CM: 250.40, 585.3, V58.67  7/22/2022 Yes        Mixed hyperlipidemia (Chronic) ICD-10-CM: E78.2  ICD-9-CM: 272.2  8/13/2021 Yes        Obesity ICD-10-CM: E66.9  ICD-9-CM: 278.00  7/20/2018 Unknown        Primary hypertension (Chronic) ICD-10-CM: I10  ICD-9-CM: 401.9  5/3/2010 Yes           Laboratory:      Recent Labs     03/13/23  0219   HGB 11.3*   HCT 34.3*   WBC 5.8      BUN 32*   CREA 1.28*   K 4.0         Plan of Care/Planned Procedure:  Risks, benefits, and alternatives reviewed with patient and she agrees to proceed with the procedure. CT guided biopsy of left upper lobe mass    Deemed appropriate for moderate sedation with versed and fentanyl.     Sarabjit Garcia MD  Interventional Radiology  [facility]  2:38 PM, 3/13/2023

## 2023-03-13 NOTE — PROGRESS NOTES
3/13/2023  9:35 AM  CM completed assessment w/ pt in person, charted demographics verified. Pt lives w/ her daughter Sha Reagan (RENE) [phone] in a 2 story home , there are 4 ANJALI and 14 interior steps to her bedroom.  At baseline pt reports to be ambulatory, independent w/ her ADLs, and drives,  pt had 2 recent events where she passed out briefly and\"slid\" down to the floor but denies frequent falls    Reason for Admission:  Emergent for Syncope  Pt is a 67 yo female who presents to [facility] c/o 2 syncopal episodes in the last 10 days with low BP  Past Medical History:   Diagnosis Date    Chronic back pain 9/8/2010    DM type 2 (diabetes mellitus, type 2) ([facility] 5/3/2010    HTN (hypertension) 5/3/2010    MI (myocardial infarction) ([facility] 5/3/2010    Obstructive sleep apnea (adult) (pediatric) 12/12/2014                              RUR Score:     7% Low Risk of Readmission/Green              Plan for utilizing home health:      PT, OT paul pending, pt states she has not had HH or Rehab in the past, currently goes to  [facility] for [facility] neuropathy but is not receiving PT  DME: None, pt has old CPaP, sleep study scheduled 3/21  Rx: [employer], pt uses [facility] and is usually covered fo r her medications  PCP: First and Last name:  Mita Thomas MD     Name of Practice: [facility]   Are you a current patient: Yes/No: Yes    Approximate date of last visit: 3/2/23   Can you participate in a virtual visit with your PCP: yes                     Current Advanced Directive/Advance Care Plan: Full Code  HCDM/NOK daughter Sha Reagan [phone]    Healthcare Decision Maker:   Click here to complete [address] including selection of the Healthcare Decision Maker Relationship (ie \"Primary\")                             Transition of Care Plan:       RUR 7 % Low Risk of Readmission/Green    LOS 2 Days           Hospital admission for medical management  Pulmonary consult  PT, OT evals pending to determine skilled needs at DC   CM to follow through for treatment/response  DC when stable to home w/ family assistance and HH vs None  Outpatient follow up PCP, specialists  Daughter Agustina Borja will transport at [address] Management Interventions  PCP Verified by CM:  Yes Alberto Khan MD )  Palliative Care Criteria Met (RRAT>21 & CHF Dx)?: No  Mode of Transport at Discharge: Self (daughter)  Transition of Care Consult (CM Consult): Discharge Planning  Physical Therapy Consult: Yes  Occupational Therapy Consult: Yes  Speech Therapy Consult: No  Support Systems: Child(mo)  Confirm Follow Up Transport: Family  Discharge Location  Patient Expects to be Discharged to[de-identified] Home with family assistance  CALISTA Oconnell

## 2023-03-14 ENCOUNTER — APPOINTMENT (OUTPATIENT)
Dept: GENERAL RADIOLOGY | Age: 78
DRG: 312 | End: 2023-03-14
Attending: PHYSICIAN ASSISTANT
Payer: MEDICARE

## 2023-03-14 ENCOUNTER — APPOINTMENT (OUTPATIENT)
Dept: CT IMAGING | Age: 78
DRG: 312 | End: 2023-03-14
Attending: PHYSICIAN ASSISTANT
Payer: MEDICARE

## 2023-03-14 VITALS
OXYGEN SATURATION: 100 % | HEIGHT: 66 IN | TEMPERATURE: 97.8 F | WEIGHT: 195.99 LBS | HEART RATE: 67 BPM | DIASTOLIC BLOOD PRESSURE: 89 MMHG | SYSTOLIC BLOOD PRESSURE: 147 MMHG | BODY MASS INDEX: 31.5 KG/M2 | RESPIRATION RATE: 18 BRPM

## 2023-03-14 LAB
ALBUMIN SERPL-MCNC: 3 G/DL (ref 3.5–5)
ALBUMIN/GLOB SERPL: 0.9 (ref 1.1–2.2)
ALP SERPL-CCNC: 69 U/L (ref 45–117)
ALT SERPL-CCNC: 12 U/L (ref 12–78)
ANION GAP SERPL CALC-SCNC: 6 MMOL/L (ref 5–15)
AST SERPL-CCNC: 14 U/L (ref 15–37)
BASOPHILS # BLD: 0 K/UL (ref 0–0.1)
BASOPHILS NFR BLD: 1 % (ref 0–1)
BILIRUB SERPL-MCNC: 0.4 MG/DL (ref 0.2–1)
BUN SERPL-MCNC: 23 MG/DL (ref 6–20)
BUN/CREAT SERPL: 19 (ref 12–20)
CALCIUM SERPL-MCNC: 8.9 MG/DL (ref 8.5–10.1)
CHLORIDE SERPL-SCNC: 111 MMOL/L (ref 97–108)
CO2 SERPL-SCNC: 20 MMOL/L (ref 21–32)
CREAT SERPL-MCNC: 1.18 MG/DL (ref 0.55–1.02)
DIFFERENTIAL METHOD BLD: NORMAL
EOSINOPHIL # BLD: 0.2 K/UL (ref 0–0.4)
EOSINOPHIL NFR BLD: 3 % (ref 0–7)
ERYTHROCYTE [DISTWIDTH] IN BLOOD BY AUTOMATED COUNT: 12.8 % (ref 11.5–14.5)
GLOBULIN SER CALC-MCNC: 3.4 G/DL (ref 2–4)
GLUCOSE BLD STRIP.AUTO-MCNC: 102 MG/DL (ref 65–117)
GLUCOSE BLD STRIP.AUTO-MCNC: 107 MG/DL (ref 65–117)
GLUCOSE BLD STRIP.AUTO-MCNC: 50 MG/DL (ref 65–117)
GLUCOSE BLD STRIP.AUTO-MCNC: 58 MG/DL (ref 65–117)
GLUCOSE BLD STRIP.AUTO-MCNC: 63 MG/DL (ref 65–117)
GLUCOSE SERPL-MCNC: 90 MG/DL (ref 65–100)
HCT VFR BLD AUTO: 37.4 % (ref 35–47)
HGB BLD-MCNC: 11.5 G/DL (ref 11.5–16)
IMM GRANULOCYTES # BLD AUTO: 0 K/UL (ref 0–0.04)
IMM GRANULOCYTES NFR BLD AUTO: 0 % (ref 0–0.5)
LYMPHOCYTES # BLD: 1.4 K/UL (ref 0.8–3.5)
LYMPHOCYTES NFR BLD: 26 % (ref 12–49)
MAGNESIUM SERPL-MCNC: 1.8 MG/DL (ref 1.6–2.4)
MCH RBC QN AUTO: 29.6 PG (ref 26–34)
MCHC RBC AUTO-ENTMCNC: 30.7 G/DL (ref 30–36.5)
MCV RBC AUTO: 96.1 FL (ref 80–99)
MONOCYTES # BLD: 0.3 K/UL (ref 0–1)
MONOCYTES NFR BLD: 6 % (ref 5–13)
NEUTS SEG # BLD: 3.3 K/UL (ref 1.8–8)
NEUTS SEG NFR BLD: 63 % (ref 32–75)
NRBC # BLD: 0 K/UL (ref 0–0.01)
NRBC BLD-RTO: 0 PER 100 WBC
PHOSPHATE SERPL-MCNC: 2.5 MG/DL (ref 2.6–4.7)
PLATELET # BLD AUTO: 190 K/UL (ref 150–400)
PMV BLD AUTO: 12 FL (ref 8.9–12.9)
POTASSIUM SERPL-SCNC: 3.9 MMOL/L (ref 3.5–5.1)
PROT SERPL-MCNC: 6.4 G/DL (ref 6.4–8.2)
RBC # BLD AUTO: 3.89 M/UL (ref 3.8–5.2)
SERVICE CMNT-IMP: ABNORMAL
SERVICE CMNT-IMP: NORMAL
SERVICE CMNT-IMP: NORMAL
SODIUM SERPL-SCNC: 137 MMOL/L (ref 136–145)
WBC # BLD AUTO: 5.2 K/UL (ref 3.6–11)

## 2023-03-14 PROCEDURE — 77012 CT SCAN FOR NEEDLE BIOPSY: CPT

## 2023-03-14 PROCEDURE — 99153 MOD SED SAME PHYS/QHP EA: CPT

## 2023-03-14 PROCEDURE — 71045 X-RAY EXAM CHEST 1 VIEW: CPT

## 2023-03-14 PROCEDURE — 74011250637 HC RX REV CODE- 250/637

## 2023-03-14 PROCEDURE — 88341 IMHCHEM/IMCYTCHM EA ADD ANTB: CPT

## 2023-03-14 PROCEDURE — 99152 MOD SED SAME PHYS/QHP 5/>YRS: CPT

## 2023-03-14 PROCEDURE — 74011250637 HC RX REV CODE- 250/637: Performed by: STUDENT IN AN ORGANIZED HEALTH CARE EDUCATION/TRAINING PROGRAM

## 2023-03-14 PROCEDURE — 88342 IMHCHEM/IMCYTCHM 1ST ANTB: CPT

## 2023-03-14 PROCEDURE — 74011000250 HC RX REV CODE- 250

## 2023-03-14 PROCEDURE — 82962 GLUCOSE BLOOD TEST: CPT

## 2023-03-14 PROCEDURE — 83735 ASSAY OF MAGNESIUM: CPT

## 2023-03-14 PROCEDURE — 88305 TISSUE EXAM BY PATHOLOGIST: CPT

## 2023-03-14 PROCEDURE — 88333 PATH CONSLTJ SURG CYTO XM 1: CPT

## 2023-03-14 PROCEDURE — 74011250636 HC RX REV CODE- 250/636

## 2023-03-14 PROCEDURE — 84100 ASSAY OF PHOSPHORUS: CPT

## 2023-03-14 PROCEDURE — 85025 COMPLETE CBC W/AUTO DIFF WBC: CPT

## 2023-03-14 PROCEDURE — 36415 COLL VENOUS BLD VENIPUNCTURE: CPT

## 2023-03-14 PROCEDURE — 0BBG3ZX EXCISION OF LEFT UPPER LUNG LOBE, PERCUTANEOUS APPROACH, DIAGNOSTIC: ICD-10-PCS | Performed by: RADIOLOGY

## 2023-03-14 PROCEDURE — 74011250636 HC RX REV CODE- 250/636: Performed by: RADIOLOGY

## 2023-03-14 PROCEDURE — 80053 COMPREHEN METABOLIC PANEL: CPT

## 2023-03-14 RX ORDER — MIDAZOLAM HYDROCHLORIDE 1 MG/ML
.5-5 INJECTION, SOLUTION INTRAMUSCULAR; INTRAVENOUS
Status: DISCONTINUED | OUTPATIENT
Start: 2023-03-14 | End: 2023-03-14

## 2023-03-14 RX ORDER — FENTANYL CITRATE 50 UG/ML
25-100 INJECTION, SOLUTION INTRAMUSCULAR; INTRAVENOUS
Status: DISCONTINUED | OUTPATIENT
Start: 2023-03-14 | End: 2023-03-14

## 2023-03-14 RX ADMIN — FENTANYL CITRATE 25 MCG: 50 INJECTION, SOLUTION INTRAMUSCULAR; INTRAVENOUS at 10:11

## 2023-03-14 RX ADMIN — SODIUM CHLORIDE, PRESERVATIVE FREE 10 ML: 5 INJECTION INTRAVENOUS at 14:19

## 2023-03-14 RX ADMIN — Medication 16 G: at 13:49

## 2023-03-14 RX ADMIN — HYDROCHLOROTHIAZIDE 12.5 MG: 25 TABLET ORAL at 08:43

## 2023-03-14 RX ADMIN — MIDAZOLAM 1 MG: 1 INJECTION INTRAMUSCULAR; INTRAVENOUS at 10:14

## 2023-03-14 RX ADMIN — SODIUM CHLORIDE, PRESERVATIVE FREE 10 ML: 5 INJECTION INTRAVENOUS at 05:53

## 2023-03-14 RX ADMIN — METOPROLOL SUCCINATE 100 MG: 50 TABLET, EXTENDED RELEASE ORAL at 08:44

## 2023-03-14 RX ADMIN — MIDAZOLAM 1 MG: 1 INJECTION INTRAMUSCULAR; INTRAVENOUS at 10:11

## 2023-03-14 RX ADMIN — FENTANYL CITRATE 25 MCG: 50 INJECTION, SOLUTION INTRAMUSCULAR; INTRAVENOUS at 10:14

## 2023-03-14 RX ADMIN — DEXTROSE MONOHYDRATE 125 ML: 100 INJECTION, SOLUTION INTRAVENOUS at 08:01

## 2023-03-14 RX ADMIN — SODIUM CHLORIDE 100 ML/HR: 9 INJECTION, SOLUTION INTRAVENOUS at 02:52

## 2023-03-14 NOTE — PROGRESS NOTES
Subjective / Objective     Subjective: Patient seen and examined at bedside. Denies CP, SOB, n/v, abdominal pain. No complaints at this time. Temp (24hrs), Av.9 °F (36.6 °C), Min:97.4 °F (36.3 °C), Max:98.5 °F (36.9 °C)     Physical Exam  General: No acute distress. Cooperative. Head: Normocephalic. Atraumatic. Respiratory: No increased work of breathing. Symmetric chest rise b/l. Cardiovascular: RRR. No clubbing or cyanosis  GI: Nondistended. Extremities: No LE edema. Moving all extremities spontaneously. Skin: Warm, dry. No rashes. Neuro: Alert, normal behavior. No focal deficit. Respiratory:   O2 Device: None (Room air)     I/O:  Date 23 0700 - 23 0659 23 0700 - 03/15/23 0659   Shift 1158-5609 9554-7080 24 Hour Total 3260-8804 1562-0487 24 Hour Total   INTAKE   P.O. 240  240        P. O. 240  240      I. V.(mL/kg/hr)  828(0.8) 828(0.4) 606.7  606.7     Volume (0.9% sodium chloride infusion)  828 828 481.7  481.7     Volume (dextrose 10% infusion 0-250 mL)    125  125   Shift Total(mL/kg) 240(2.7) 828(9.3) 7539(32) 606. 7(6.8)  606. 7(6.8)   OUTPUT   Urine(mL/kg/hr)           Urine Occurrence(s)  3 x 3 x 1 x  1 x   Emesis/NG output  0 0        Emesis  0 0        Emesis Occurrence(s)  0 x 0 x 0 x  0 x   Other  0 0        Other Output  0 0      Stool  0 0        Stool Occurrence(s)  0 x 0 x 0 x  0 x     Stool  0 0      Blood  0 0        Quantitative Blood Loss  0 0        Blood  0 0      Shift Total(mL/kg)  0(0) 0(0)       733 4197 606.7  606.7   Weight (kg) 88.9 88.9 88.9 88.9 88.9 88.9         Inpatient Medications  Current Facility-Administered Medications   Medication Dose Route Frequency    lidocaine 4 % patch 1 Patch  1 Patch TransDERmal DAILY PRN    lisinopriL (PRINIVIL, ZESTRIL) tablet 20 mg  20 mg Oral DAILY    hydroCHLOROthiazide (HYDRODIURIL) tablet 12.5 mg  12.5 mg Oral DAILY    sodium chloride (NS) flush 5-40 mL  5-40 mL IntraVENous Q8H    sodium chloride (NS) flush 5-40 mL  5-40 mL IntraVENous PRN    acetaminophen (TYLENOL) tablet 650 mg  650 mg Oral Q6H PRN    Or    acetaminophen (TYLENOL) suppository 650 mg  650 mg Rectal Q6H PRN    ondansetron (ZOFRAN ODT) tablet 4 mg  4 mg Oral Q8H PRN    Or    ondansetron (ZOFRAN) injection 4 mg  4 mg IntraVENous Q6H PRN    [Held by provider] enoxaparin (LOVENOX) injection 30 mg  30 mg SubCUTAneous DAILY    0.9% sodium chloride infusion  100 mL/hr IntraVENous CONTINUOUS    glucose chewable tablet 16 g  4 Tablet Oral PRN    glucagon (GLUCAGEN) injection 1 mg  1 mg IntraMUSCular PRN    dextrose 10% infusion 0-250 mL  0-250 mL IntraVENous PRN    insulin lispro (HUMALOG) injection   SubCUTAneous QID WITH MEALS    [Held by provider] aspirin chewable tablet 81 mg  81 mg Oral DAILY    insulin glargine (LANTUS) injection 30 Units  30 Units SubCUTAneous QHS    metoprolol succinate (TOPROL-XL) XL tablet 100 mg  100 mg Oral DAILY    rosuvastatin (CRESTOR) tablet 5 mg  5 mg Oral QHS    pantoprazole (PROTONIX) 40 mg in 0.9% sodium chloride 10 mL injection  40 mg IntraVENous DAILY     Allergies  Allergies   Allergen Reactions    Tetanus Vaccines And Toxoid Swelling     CBC:  Recent Labs     03/14/23  0204 03/13/23  0219 03/12/23  0626   WBC 5.2 5.8 6.5   HGB 11.5 11.3* 11.8   HCT 37.4 34.3* 35.7    198 328       Metabolic Panel:  Recent Labs     03/14/23  0204 03/13/23  0219 03/12/23  0626    138 137   K 3.9 4.0 3.9   * 110* 107   CO2 20* 25 26   BUN 23* 32* 48*   CREA 1.18* 1.28* 1.91*   GLU 90 92 98   CA 8.9 8.7 8.6   MG 1.8 2.0 2.1   PHOS 2.5* 2.2* 2.9   ALB 3.0* 3.1* 3.2*   ALT 12 16 16              Assessment and Plan   Sabrina Ocasio is a 68 y.o. female with PMH of CAD s/p PCI in 2017, HTN, T2DM w neuropathy, CKD3b, EMILY who presented with syncopal episodes and is admitted for syncope and HARJIT. Syncope in s/o Vomiting: Etiology IVVD with resultant orthostasis.  Low BP and tachycardia 2/2 vomiting, decreased PO intake. EKG reassuring. TSH normal. Echo EF55-60%, carotid dopplers wnl.   - Cardiac monitoring  - mIVF with IBW: 100mL/hr NS  - Protonix 40mg IV daily     HARJIT on CKD3b: Improved back to baseline with fluids.  - Continue to monitor BMP    Thyroid mass: Vascularized mass noted in the right lobe of the thyroid measuring 1.51 cm x 1.18 cm. TSH 1.16.  - Follow up outpatient for likely FNA      New Lung Opacity: Has developed since 2020. L suprahilar spiculated opacity on CXR. Pt has significant smoking hx (0.5 ppd x 40yrs, quit 15 yrs ago). CT Chest with 27 x 24 mm cavitary lesion in MJ w mild pleural thickening, w spiculated margin.  - Pulmonology consulted, plan for biopsy today     T2DM with Neuropathy: A1c 9.7% on 1/5/23. On home regimen of jardiance 25mg daily, insulin degludec 60U qhs. F/w endo Dr. Tara Morrow, and podiatrist at [facility]. She receives Combined Electro-chemical Treatment (CET) for her neuropathy.  - Hold home jardiance  - Insulin Sliding Scale normal sensitivity with AC&HS glucose checks. - Lantus 30u QHS, monitor for adjustments as necessary     Hypertension: POA BP was 104/55. On home lisinopril-hctz 20-12.5mg daily, toprol XL 100mg daily. - Continue home meds  - Will continue to monitor at this time and readjust as BP's trend     CAD: F/w cardiology, Dr. Angela Herman. On home ASA 81mg daily, crestor 5mg daily, toprol XL 100mg daily. - Continue home meds  - Daily CMP, Mg, Phos     Hyperlipidemia: On home crestor. Lipids 1/5/23 were HDL 67, , .  - Continue home crestor     EMILY: Previously on CPAP, but not for 1 year. Has appt 3/21 for sleep study. Obesity: Body mass index is 31.64 kg/m². - Encourage lifestyle modifications and further follow up outpatient     FEN/GI - Diabetic diet. NS at 100 mL/hr. Activity - Ambulate with assistance  DVT prophylaxis - Lovenox  GI prophylaxis - Not indicated at this time  Fall prophylaxis - Fall precautions ordered.   Disposition - Plan to d/c to Home. Consulting PT/OT/CM  Code Status - Full, discussed with patient / caregivers.   Next of Kin Name and Lolly Gamboa (Child)   [phone]     Deandre Lazar DO  Family Medicine Resident       For Billing    Chief Complaint   Patient presents with    Syncope       Hospital Problems  Date Reviewed: 3/2/2023            Codes Class Noted POA    CAD (coronary artery disease) (Chronic) ICD-10-CM: I25.10  ICD-9-CM: 414.00  9/20/2017 Yes        * (Principal) Syncope ICD-10-CM: R55  ICD-9-CM: 780.2  3/11/2023 Unknown        HARJIT (acute kidney injury) ([facility] [address] ICD-10-CM: N17.9  ICD-9-CM: 584.9  3/11/2023 Unknown        Opacity of lung on imaging study ICD-10-CM: R91.8  ICD-9-CM: 793.19  3/11/2023 Unknown        Vomiting ICD-10-CM: R11.10  ICD-9-CM: 787.03  3/11/2023 Unknown        Hypotension ICD-10-CM: I95.9  ICD-9-CM: 458.9  3/11/2023 Unknown        Neuropathy ICD-10-CM: G62.9  ICD-9-CM: 355.9  3/2/2023 Yes        Type 2 diabetes mellitus with stage 3b chronic kidney disease, with long-term current use of insulin (HCC) (Chronic) ICD-10-CM: E11.22, N18.32, Z79.4  ICD-9-CM: 250.40, 585.3, V58.67  7/22/2022 Yes        Mixed hyperlipidemia (Chronic) ICD-10-CM: E78.2  ICD-9-CM: 272.2  8/13/2021 Yes        Obesity ICD-10-CM: E66.9  ICD-9-CM: 278.00  7/20/2018 Unknown        Primary hypertension (Chronic) ICD-10-CM: I10  ICD-9-CM: 401.9  5/3/2010 Yes

## 2023-03-14 NOTE — PROGRESS NOTES
0899 patient CXR read by Roldan DAMON, she OK for patient to return to the floor. Patient transported back to room via stretcher with transporter mara Leyva

## 2023-03-14 NOTE — DISCHARGE SUMMARY
[address] [facility] [address] [place] [address]   Office [phone]  Fax [phone]       Discharge / Transfer / Off-Service Note     Name: Elaina Gutierrez MRN: [id number]  Sex: Female   YOB: 1945  Age: 68 y.o. PCP: Cyril Trujillo MD     Date of admission: 3/11/2023  Date of discharge/transfer: 3/14/2023    Attending physician at admission: Darryl Healy MD     Attending physician at discharge/transfer: Darryl Healy MD     Resident physician at discharge/transfer: Amadeo Fox DO     Consultants during hospitalization  IP CONSULT TO PULMONOLOGY     Admission diagnoses   Syncope [R55]    Recommended follow-up after discharge  1. PCP: Cyril Trujillo MD    Things to follow up on with PCP:  - Vascularized thyroid mass noted on carotid dopplers. TSH 1.16. Consider FNA outpatient. - Cavitary, spiculated MJ lung lesion noted on imaging. Had CT guided biopsy with Pulmonology, follow up biopsy results. - Check BMP to monitor renal function.   - Ozempic refilled. Consider discontinuation pending thyroid mass evaluation. History of Present Illness  Per admitting provider, Aixa Penn is a 68 y.o. female with PMH CAD s/p PCI in 2017, HTN, T2DM w neuropathy, CKD3b, EMILY who presents to the ER after 2nd syncopal episode in about a week and a half. She states the first episode was on 2/28. She had associated dizziness and endorses LOC with episode but did not hit her head. She then began to have episodes of NBNB emesis, about twice daily after meals, which resolved on 3/9. She has had loose stools but not watery diarrhea. She also endorses chills and weight loss, but denies fevers and LAD. She went to her PCP on 3/2 for the same, stating she has had low BP at home with SBP readings in the 70s. She has felt dehydrated and was noted by PCP to be orthostatic. She has been drinking 24oz of gatorade, 1 large pedialyte, and at least 24oz of water daily.  She notes decreased urine output. On 3/11 at about 12:30PM, she again felt dizzy and passed out with LOC but did not hit her head. This is what prompted her to come to the ED. These episodes were unwitnessed, but she denies any seizure-like activity, tongue-biting, jerking movements. She denies abdominal pain, dysuria, cough, URI sx, sick contacts, recent travel, new foods. \"    [address] Frist Eula is a 68 y.o. female with PMH of CAD s/p PCI in 2017, HTN, T2DM w neuropathy, CKD3b, EMILY who presented with syncopal episodes and was admitted for syncope and HARJIT. Incidentally found cavitary, spiculated MJ lung lesion and vascularized thyroid mass. Syncope in s/o Vomiting: Etiology IVVD with resultant orthostasis. Low BP and tachycardia due to vomiting, decreased PO intake. EKG reassuring. TSH normal. Echo EF55-60%, carotid dopplers wnl. New Thyroid mass: Vascularized mass noted in the right lobe of the thyroid measuring 1.51 cm x 1.18 cm. TSH 1.16. Follow up outpatient for likely FNA. New Lung Opacity: Significant smoking history (0.5 ppd x 40yrs, quit 15 yrs ago). CT Chest with 27 x 24 mm cavitary lesion in MJ w mild pleural thickening, w spiculated margin. S/p CT guided biopsy with Pulmonology. HARJIT on CKD3b: Improved back to baseline with fluids. T2DM w/ Neuropathy: On home regimen of jardiance 25mg daily, insulin degludec 60U qhs. F/w endo Dr. Daniela Modi, and podiatrist at [facility]. She receives Combined Electro-chemical Treatment (CET) for her neuropathy. Hypertension: On home lisinopril-hctz 20-12.5mg daily, toprol XL 100mg daily. CAD: F/w cardiology, Dr. Ceasar Santamaria. On home ASA 81mg daily, crestor 5mg daily, toprol XL 100mg daily. Hyperlipidemia: On home crestor. EMILY: Previously on CPAP, but not for 1 year. Has appt 3/21 for sleep study. Obesity: Body mass index is 31.64 kg/m². Follow up PCP. Physical exam at discharge:    Vitals Reviewed.   Visit Vitals  BP (!) 147/89 (BP 1 Location: Left upper arm, BP Patient Position: At rest)   Pulse 67   Temp 97.8 °F (36.6 °C)   Resp 18   Ht 5' 6\" (1.676 m)   Wt 195 lb 15.8 oz (88.9 kg)   SpO2 100%   BMI 31.63 kg/m²        General Oriented to person, place, and time and well-developed. Appears well-nourished, no distress. Not diaphoretic. Cardio Normal rate, regular rhythm. Pulmonary Effort normal and breath sounds normal. No respiratory distress. No wheezes, no rales. Abdominal Soft. Bowel sounds normal. No distension. No tenderness. Extremities No edema of lower extremities. No tenderness. Distal pulses intact. Neurological Alert and oriented to person, place, and time. Dermatology Skin is warm and dry. No rash noted. No erythema or pallor. Psychiatric Affect and judgment normal.        Condition at discharge: Stable    Labs  Recent Labs     03/14/23  0204 03/13/23 0219   WBC 5.2 5.8   HGB 11.5 11.3*   HCT 37.4 34.3*    198     Recent Labs     03/14/23  0204 03/13/23  0219    138   K 3.9 4.0   * 110*   CO2 20* 25   BUN 23* 32*   CREA 1.18* 1.28*   GLU 90 92   CA 8.9 8.7   MG 1.8 2.0   PHOS 2.5* 2.2*     Recent Labs     03/14/23  0204 03/13/23  0219   ALT 12 16   AP 69 73   TBILI 0.4 0.7   TP 6.4 6.5   ALB 3.0* 3.1*   GLOB 3.4 3.4     Recent Labs     03/14/23  1438 03/14/23  1400 03/14/23  1345 03/14/23  0836 03/14/23  0753   GLUCPOC 102 58* 50* 107 63*       03/11/23    ECHO ADULT COMPLETE 03/12/2023 3/12/2023    Interpretation Summary    Left Ventricle: Normal left ventricular systolic function with a visually estimated EF of 55 - 60%. Left ventricle size is normal. Normal wall thickness. Normal wall motion. Aortic Valve: Tricuspid valve. Signed by: Teressa Driver MD on 3/12/2023 12:48 PM    Imaging  XR CHEST PA LAT    Result Date: 3/11/2023  New, left suprahilar spiculated opacity. Recommend dedicated CT of the chest for further evaluation. 23x.       CT BX LUNG NDL PERC W IMAGING    Result Date: 3/14/2023  Technically successful CT-guided MJ lung mass biopsy     CT CHEST WO CONT    Result Date: 3/11/2023  27 x 24 mm cavitary lesion in the left upper lobe is associated with mild pleural thickening. Cavitary lesion is a spiculated margin. Lesion is infectious or neoplastic etiology. Coronary artery disease. Small hiatal hernia. Age indeterminate T11 compression fracture. CT ABD PELV WO CONT    Result Date: 3/12/2023  No acute process.      XR CHEST PORT    Result Date: 3/14/2023  No pneumothorax following CT       Chronic diagnoses   Problem List as of 3/14/2023 Date Reviewed: 3/2/2023            Codes Class Noted - Resolved    * (Principal) Syncope ICD-10-CM: R55  ICD-9-CM: 780.2  3/11/2023 - Present        HARJIT (acute kidney injury) ([facility] ICD-10-CM: N17.9  ICD-9-CM: 584.9  3/11/2023 - Present        Opacity of lung on imaging study ICD-10-CM: R91.8  ICD-9-CM: 793.19  3/11/2023 - Present        Vomiting ICD-10-CM: R11.10  ICD-9-CM: 787.03  3/11/2023 - Present        Hypotension ICD-10-CM: I95.9  ICD-9-CM: 458.9  3/11/2023 - Present        Neuropathy ICD-10-CM: G62.9  ICD-9-CM: 355.9  3/2/2023 - Present        Type 2 diabetes mellitus with stage 3b chronic kidney disease, with long-term current use of insulin (HCC) (Chronic) ICD-10-CM: E11.22, N18.32, Z79.4  ICD-9-CM: 250.40, 585.3, V58.67  7/22/2022 - Present        Mixed hyperlipidemia (Chronic) ICD-10-CM: E78.2  ICD-9-CM: 272.2  8/13/2021 - Present        Obesity ICD-10-CM: E66.9  ICD-9-CM: 278.00  7/20/2018 - Present        Stable angina ([facility] ICD-10-CM: I20.8  ICD-9-CM: 413.9  9/15/2017 - Present        Advanced care planning/counseling discussion ICD-10-CM: Z71.89  ICD-9-CM: V65.49  4/4/2017 - Present    Overview Signed 4/4/2017 10:31 AM by Mathew Burnett MD     Patient states she is a DNR  04/04/17               Obstructive sleep apnea (adult) (pediatric) (Chronic) ICD-10-CM: H37.16  ICD-9-CM: 327.23  12/12/2014 - Present        Chronic back pain ICD-10-CM: M54.9, G89.29  ICD-9-CM: 724.5, 338.29  9/8/2010 - Present        Primary hypertension (Chronic) ICD-10-CM: I10  ICD-9-CM: 401.9  5/3/2010 - Present        RESOLVED: Type 2 diabetes mellitus with nephropathy ([facility] ICD-10-CM: E11.21  ICD-9-CM: 250.40, 583.81  1/12/2018 - 3/2/2023        RESOLVED: DM type 2 (diabetes mellitus, type 2) ([facility] ICD-10-CM: E11.9  ICD-9-CM: 250.00  5/3/2010 - 3/2/2023    Overview Addendum 12/6/2019  9:48 AM by Marian Cuevas MD     On [employer]  FTF for DM supplies 8/26/2014  Forms for [employer] DM supplies 9/12/2014  Last HgA1C 6.7 (11/7/19) at endocrinology              RESOLVED: MI (myocardial infarction) [facility]) ICD-10-CM: I21.9  ICD-9-CM: 410.90  5/3/2010 - 3/2/2023        CAD (coronary artery disease) (Chronic) ICD-10-CM: I25.10  ICD-9-CM: 414.00  9/20/2017 - Present           Discharge/Transfer Medications  Discharge Medication List as of 3/14/2023  3:47 PM        CONTINUE these medications which have CHANGED    Details   semaglutide (OZEMPIC) 0.25 mg or 0.5 mg/dose (2 mg/1.5 ml) subq pen 0.25 mg by SubCUTAneous route every seven (7) days. , Normal, Disp-1 Box, R-0           CONTINUE these medications which have NOT CHANGED    Details   metoprolol succinate (TOPROL-XL) 100 mg tablet Take 1 tablet by mouth once daily, Normal, Disp-90 Tablet, R-0      empagliflozin (JARDIANCE) 25 mg tablet Take  by mouth daily. , Historical Med      rosuvastatin (CRESTOR) 5 mg tablet TAKE ONE TABLET BY MOUTH NIGHTLY, Normal, Disp-90 Tablet, R-4      insulin degludec 100 unit/mL (3 mL) inpn 60 Units by SubCUTAneous route nightly., Historical Med      aspirin 81 mg chewable tablet Take 81 mg by mouth daily. , OTC, Disp-100 Tab, R-3      co-enzyme Q-10 (CO Q-10) 100 mg capsule Take 1 Cap by mouth daily. , OTC      glucose blood VI test strips (One-Song BLOOD GLUCOSE SYSTEM) strip Use 3 times per day (fasting and before meals). , Normal, Disp-3 Package, R-11      Lancets (ONE TOUCH ULTRASOFT LANCETS) Misc 1 Package by Does Not Apply route. Test blood glucose 3-4 time daily, Normal, Disp-1 Package, R-11      lisinopril-hydroCHLOROthiazide (PRINZIDE, ZESTORETIC) 20-12.5 mg per tablet Take 1 tablet by mouth once daily, Normal, Disp-90 Tablet, R-0              Diet:  Diabetic diet. Activity:  As tolerated    Disposition: Stable for discharge home with outpatient follow up. Discharge instructions to patient/family  Please seek medical attention for any new or worsening symptoms particularly fever, chest pain, shortness of breath, abdominal pain, nausea, vomiting    Follow up plans/appointments  Follow-up Information       Follow up With Specialties Details Why Contact Info    Bonny Jaffe MD Family Medicine Go on 3/31/2023 To follow up on your hospital admission at 3:00pm. [address]  [phone]      [facility] [place] [address].  Schedule an appointment as soon as possible for a visit To follow up on your hospital admission.  [address] [facility]    [facility] [address] [place] [address]  [phone]               Tremayne Juarez DO  Family Medicine Resident       For Billing    Chief Complaint   Patient presents with    Syncope       Hospital Problems  Date Reviewed: 3/2/2023            Codes Class Noted POA    CAD (coronary artery disease) (Chronic) ICD-10-CM: I25.10  ICD-9-CM: 414.00  9/20/2017 Yes        * (Principal) Syncope ICD-10-CM: R55  ICD-9-CM: 780.2  3/11/2023 Unknown        HARJIT (acute kidney injury) ([facility] ICD-10-CM: N17.9  ICD-9-CM: 584.9  3/11/2023 Unknown        Opacity of lung on imaging study ICD-10-CM: R91.8  ICD-9-CM: 793.19  3/11/2023 Unknown        Vomiting ICD-10-CM: R11.10  ICD-9-CM: 787.03  3/11/2023 Unknown        Hypotension ICD-10-CM: I95.9  ICD-9-CM: 458.9  3/11/2023 Unknown        Neuropathy ICD-10-CM: G62.9  ICD-9-CM: 355.9  3/2/2023 Yes Type 2 diabetes mellitus with stage 3b chronic kidney disease, with long-term current use of insulin (HCC) (Chronic) ICD-10-CM: E11.22, N18.32, Z79.4  ICD-9-CM: 250.40, 585.3, V58.67  7/22/2022 Yes        Mixed hyperlipidemia (Chronic) ICD-10-CM: E78.2  ICD-9-CM: 272.2  8/13/2021 Yes        Obesity ICD-10-CM: E66.9  ICD-9-CM: 278.00  7/20/2018 Unknown        Primary hypertension (Chronic) ICD-10-CM: I10  ICD-9-CM: 401.9  5/3/2010 Yes

## 2023-03-14 NOTE — PROGRESS NOTES
Bedside and Verbal shift change report given to Ervin Alicia (oncoming nurse) by Lesia ZAVALA RN (offgoing nurse). Report included the following information SBAR, Kardex, Intake/Output, MAR, and Recent Results.

## 2023-03-14 NOTE — PROGRESS NOTES
0930 Assumed patient care from [facility], Confirmed patient identity with two identifiers. Reviewed with patient procedure she was consented for on 03/13/23. Same providers will perform procedure today. Patient denies any questions. 1005 Patient to CT via stretcher, placed on CT table applied O2, IVF's and heart monitor, NIBP, SaO2, and Co2 monitoring. Time out 1011, Start time 1011, Stop time 1055. Gave Fentanyl 50 mcg and Versed 2 mg total, patient tolerated procedure well. Clean dry dressing applied to Left upper chest.    1100 VORB post procedure PCXR at 4394, sIamar Fuller NP.     1105 Patient transported back to [facility], patient denies any distress conversing with staff, taking fluids, VSS. Dressing to upper left Chest clean and dry. [address] 5th floor gave reports to Nataly Ortega, updated on patient status,  patient will return to floor post PCXR and review by MD.    1195 [facility] to be performed, patient OOB without difficulty to bathroom. .    6627 PCXR performed, patient denies any distress, dressing clean and dry. VSS.

## 2023-03-14 NOTE — PROGRESS NOTES
Patient's BG is 63. MD notified. Per Dr. Reina Alberto initiate the IV dextrose 10% and recheck BG after 15 minutes. Dextrose 10% started at 0801. Per order: If BG is between 60 and 70 mg/dL: give 125 mL D10% IV over 10 minutes. Patient's BG now 107    Patient's  BP is 117/70 HR 72 she has metoprolol, lisinopril and Hydrochlorothiazide this morning,     Per MD, ok to give patient's PO metoprolol and HCTZ this morning and HOLD lisinopril.    0912  Patient went down to procedure holding area. 1345  Patient's BG is 50 2 cups of orange juice given to patient as well as 16 g of glucose tablets. Lunch tray on its way to patient's room per dietary services. 1400  Rechecked BG 58. Still waiting on lunch tray. Offered boxed dinner to patient, patient refused and would rather wait for her tray. Regular ginger ale given to patient. Patient not in distress at this time still A&O x 4 denies pain, dizziness. 1406  Patient's lunch tray just got in the room. Will let patient eat. 1438  Patient's BG is 102.    1608  I have reviewed discharge instructions with the patient. The patient verbalized understanding. Peripheral IV removed, catheter tip intact. Opportunities to ask questions given. Patient not in distress. Family to transport patient home. Provided gauze and tegaderm and cleanser to patient. Educated patient on how to take care of biopsy site to prevent infection. Patient verbalized understanding.

## 2023-03-14 NOTE — PROGRESS NOTES
09:50 AM Arrives via stretcher from 511 to radiology department for CT guided lung biopsy with conscious sedation by Tc Navarro NP/ Noel Brown MD. NPO status confirmed. Assessment and pre-procedural assessment completed. Allergies and medications reviewed. ASA and lovenox on hold 72 and 48 hours. Consent reviewed for correctness. Procedure, sedation plan and medication education provided. Patient voices good understanding of all. Preprocedural care complete. 09:55 AM Bedside handoff to Jevon Powell RN for intra-procedural and post procedural  care. Juan M Rendon

## 2023-03-14 NOTE — PROGRESS NOTES
Spiritual Care Partner Volunteer visited patient at [address] [place] [address] in [address] [place] [address] 1 on 3/14/2023  Documented by:  Cuba Baca 87, Dana [address], [facility]  Staff [address]  Paging service: [phone] (DESIRAE)

## 2023-03-14 NOTE — PROGRESS NOTES
TRANSFER - IN REPORT:    Verbal report called for from [address] [place] [address] RN(name) on Quintero Pages  being received from med surg  (unit) for ordered procedure      Report request consisted of patients Situation, Background, Assessment and   Recommendations(SBAR). MAR review shows ASA and lovenox remain on hold. Patient NPO. Information from the following report(s) SBAR, Intake/Output, and MAR was reviewed with the receiving nurse. Opportunity for questions and clarification was provided. Patient placed in transport to be received in radiology holding.

## 2023-03-14 NOTE — PROGRESS NOTES
Problem: Falls - Risk of  Goal: *Absence of Falls  Description: Document Guido Hy Fall Risk and appropriate interventions in the flowsheet.   Outcome: Progressing Towards Goal  Note: Fall Risk Interventions:                                Problem: Patient Education: Go to Patient Education Activity  Goal: Patient/Family Education  Outcome: Progressing Towards Goal

## 2023-03-14 NOTE — PROGRESS NOTES
3/14/2023  Case Management Progress Note    1:11 PM  Patient is 68year old female admitted 3/11 with syncope  Patient's RUR is 9% green/low risk for readmission  Covid test: none this admission  Chart reviewed--patient discussed at IDR rounds  Per rounds patient is having lung biopsy today. She is not quite ready for discharge at this time but did clear therapy yesterday. No identified needs at this time; plan remains for patient to return home with family assistance. CM will continue to follow and assist with discharge planning.      Transition of Care Plan   Continue medical management/treatment  Home with family assistance when ready   Follow up outpatient as indicated  Family will transport at discharge  CM will continue to follow    EDGARDO Lopez

## 2023-03-14 NOTE — PROGRESS NOTES
Name: [facility]: [address]   : 1945 Admit Date: 3/11/2023   Phone: [phone]  Room: [facility]   PCP: Mariya Egan MD  MRN: [id number]   Date: 3/14/2023  Code: Full Code          Chart and notes reviewed. Data reviewed. I review the patient's current medications in the medical record at each encounter. I have evaluated and examined the patient. Ms. Fallon Godinez is a pleasant 68year old female who presented to the [facility] ED with recurrent recurrent syncopal episodes with associated dizziness over the last couple weeks. There was an associated vomiting episode, decreased po intake, and she reports loose stools. This she has lost a few pounds. Denies fever or chills. Denies coughing or hemoptysis. Denies SOB or CP. Denies known sick contacts. 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 Denies any occupational or environmental exposure. Denies any other TB exposure risk factors. She is a 1/2 PPD smoker for 30 years - quitting in . Denies personal history of cancer. Does have family history of malignancy - one sister with breast cancer and one sister with lung cancer. WBC 6.5  Hgb 11.8  Na 137  Creat 1.91 - has CKD at baseline  TSH 1.16    Chest CT and CXR personally visualized and report reviewed. There is 27 x 24 mm cavitary lesion in the left upper lobe is associated with mild pleural thickening. Cavitary lesion with a spiculated margin. No lymphadenopathy. Centrilobular emphysematous change also noted. Lesion not present on 2020 CXR. Patient denies any other recent chest imaging.     Interval history  Afebrile  BP stable  Sats 98% on RA  Hgb 11.5  Creat 1.18 - better    CT abd/pelvis: no acute process    ECHO: EF 55-60%; tricuspid AV; trace MR; mild TN    Carotid dopplers: unremarkable; 0-49% stenosis; incidental findings of vascularized mass in right lobe of thyroid measuring 1.51cm x 1.18cm    ROS: Denies SOB. Denies fever, chills, or cough. Denies CP. Had some dizziness/lightheadedness this morning which she attributes to low blood glucose.     Past Medical History:   Diagnosis Date    Chronic back pain 2010    DM type 2 (diabetes mellitus, type 2) ([facility] [address] 5/3/2010    HTN (hypertension) 5/3/2010    MI (myocardial infarction) ([facility] 5/3/2010    Obstructive sleep apnea (adult) (pediatric) 2014       Past Surgical History:   Procedure Laterality Date    HX CATARACT REMOVAL  2021    bilateral     HX CORONARY STENT PLACEMENT  2017    Followed by Dr. Robe Collins      disc sx    HX TUBAL LIGATION         Family History   Problem Relation Age of Onset    Hypertension Mother          60yo    Diabetes Sister     Breast Cancer Sister     Diabetes Sister     Diabetes Sister     Diabetes Sister        Social History     Tobacco Use    Smoking status: Former     Packs/day: 0.50     Years: 30.00     Pack years: 15.00     Types: Cigarettes     Quit date: 2004     Years since quittin.2    Smokeless tobacco: Never   Substance Use Topics    Alcohol use: No       Allergies   Allergen Reactions    Tetanus Vaccines And Toxoid Swelling       Current Facility-Administered Medications   Medication Dose Route Frequency    lidocaine 4 % patch 1 Patch  1 Patch TransDERmal DAILY PRN    [Held by provider] lisinopriL (PRINIVIL, ZESTRIL) tablet 20 mg  20 mg Oral DAILY    hydroCHLOROthiazide (HYDRODIURIL) tablet 12.5 mg  12.5 mg Oral DAILY    sodium chloride (NS) flush 5-40 mL  5-40 mL IntraVENous Q8H    sodium chloride (NS) flush 5-40 mL  5-40 mL IntraVENous PRN    acetaminophen (TYLENOL) tablet 650 mg  650 mg Oral Q6H PRN    Or    acetaminophen (TYLENOL) suppository 650 mg  650 mg Rectal Q6H PRN    ondansetron (ZOFRAN ODT) tablet 4 mg  4 mg Oral Q8H PRN    Or    ondansetron (ZOFRAN) injection 4 mg  4 mg IntraVENous Q6H PRN    [Held by provider] enoxaparin (LOVENOX) injection 30 mg  30 mg SubCUTAneous DAILY    0.9% sodium chloride infusion  100 mL/hr IntraVENous CONTINUOUS    glucose chewable tablet 16 g  4 Tablet Oral PRN    glucagon (GLUCAGEN) injection 1 mg  1 mg IntraMUSCular PRN    dextrose 10% infusion 0-250 mL  0-250 mL IntraVENous PRN    insulin lispro (HUMALOG) injection   SubCUTAneous QID WITH MEALS    [Held by provider] aspirin chewable tablet 81 mg  81 mg Oral DAILY    insulin glargine (LANTUS) injection 30 Units  30 Units SubCUTAneous QHS    metoprolol succinate (TOPROL-XL) XL tablet 100 mg  100 mg Oral DAILY    rosuvastatin (CRESTOR) tablet 5 mg  5 mg Oral QHS    pantoprazole (PROTONIX) 40 mg in 0.9% sodium chloride 10 mL injection  40 mg IntraVENous DAILY         REVIEW OF SYSTEMS   12 point ROS negative except as stated in the HPI. Physical Exam:   Visit Vitals  /70 (BP 1 Location: Left upper arm, BP Patient Position: At rest)   Pulse 72   Temp 97.5 °F (36.4 °C)   Resp 16   Ht 5' 6\" (1.676 m)   Wt 88.9 kg (195 lb 15.8 oz)   SpO2 98%   BMI 31.63 kg/m²       General:  Alert, cooperative, no distress, appears stated age. Head:  Normocephalic, without obvious abnormality, atraumatic. Eyes:  Conjunctivae/corneas clear. Nose: Nares normal. Septum midline. Mucosa normal.    Throat: Lips, mucosa, and tongue normal.    Neck: Supple, symmetrical, trachea midline, no adenopathy. Lungs:   Clear to auscultation bilaterally. Chest wall:  No tenderness or deformity. Heart:  Regular rate and rhythm, S1, S2 normal, no murmur, click, rub or gallop. Abdomen:   Soft, non-tender. Bowel sounds normal. No masses,  No organomegaly. Extremities: Extremities normal, atraumatic, no cyanosis or edema. Pulses: 2+ and symmetric all extremities.    Skin: Skin color, texture, turgor normal. No rashes or lesions   Lymph nodes: Cervical, supraclavicular nodes normal.   Neurologic: Grossly nonfocal       Lab Results   Component Value Date/Time    Sodium 137 03/14/2023 02:04 AM    Potassium 3.9 03/14/2023 02:04 AM    Chloride 111 (H) 03/14/2023 02:04 AM    CO2 20 (L) 03/14/2023 02:04 AM    BUN 23 (H) 03/14/2023 02:04 AM    Creatinine 1.18 (H) 03/14/2023 02:04 AM    Glucose 90 03/14/2023 02:04 AM    Calcium 8.9 03/14/2023 02:04 AM    Magnesium 1.8 03/14/2023 02:04 AM    Phosphorus 2.5 (L) 03/14/2023 02:04 AM    Lactic acid 1.8 01/31/2020 01:50 PM       Lab Results   Component Value Date/Time    WBC 5.2 03/14/2023 02:04 AM    HGB 11.5 03/14/2023 02:04 AM    PLATELET 042 43/11/6126 02:04 AM    MCV 96.1 03/14/2023 02:04 AM       Lab Results   Component Value Date/Time    Alk.  phosphatase 69 03/14/2023 02:04 AM    Protein, total 6.4 03/14/2023 02:04 AM    Albumin 3.0 (L) 03/14/2023 02:04 AM    Globulin 3.4 03/14/2023 02:04 AM       No results found for: IRON, FE, TIBC, IBCT, PSAT, FERR    Lab Results   Component Value Date/Time    TSH 1.16 03/11/2023 05:08 PM        No results found for: PH, PHI, PCO2, PCO2I, PO2, PO2I, HCO3, HCO3I, FIO2, FIO2I    Lab Results   Component Value Date/Time    Troponin-I, Qt. <0.05 01/31/2020 01:50 PM        Lab Results   Component Value Date/Time    Culture result: NO GROWTH 6 DAYS 01/31/2020 01:50 PM       No results found for: TOXA1, RPR, HBCM, HBSAG, HAAB, HCAB1, HAAT, G6PD, CRYAC, HIVGT, HIVR, HIV1, HIV12, HIVPC, HIVRPI    No results found for: VANCT, CPK    Lab Results   Component Value Date/Time    Color YELLOW/STRAW 03/12/2023 06:26 AM    Appearance CLEAR 03/12/2023 06:26 AM    pH (UA) 5.0 03/12/2023 06:26 AM    Protein Negative 03/12/2023 06:26 AM    Glucose >1,000 (A) 03/12/2023 06:26 AM    Ketone Negative 03/12/2023 06:26 AM    Bilirubin Negative 03/12/2023 06:26 AM    Blood Negative 03/12/2023 06:26 AM    Urobilinogen 0.2 03/12/2023 06:26 AM    Nitrites Negative 03/12/2023 06:26 AM    Leukocyte Esterase Negative 03/12/2023 06:26 AM    WBC 0-4 03/12/2023 06:26 AM    RBC 0-5 03/12/2023 06:26 AM    Bacteria Negative 03/12/2023 06:26 AM       IMPRESSION  MJ cavitary lung lesion  Syncopal episode  RLL thyroid mass noted on carotid dopplers  N/V/loose stools and decreased appetite with associated weight loss  HARJIT/CKD  Hx of HTN - was hypotensive at presentation  DM  Hx of CAD  Former smoker    PLAN  Discussed with radiology. Image guided bx with IR postponed yesterday due to code stroke. Plan for for procedure this morning. Remain NPO and holding Lovenox and ASA. Hold off on abx at this time. Low index of suspicion for infectious process given afebrile, normal WBC, and no respiratory symptoms.   Work up of thyroid mass per primary team  Gentle MARIYA      [place], [place] Dose / Tx In Cgy (Optional): 599.76

## 2023-03-14 NOTE — PROGRESS NOTES
End of Shift Note    Bedside shift change report given to Javi Correa (oncoming nurse) by Cassy Tobar RN (offgoing nurse). Report included the following information SBAR, Kardex, Intake/Output, MAR, Recent Results, Med Rec Status, and Cardiac Rhythm normal sinus rhythm. Shift worked:  [id number]     Shift summary and any significant changes:     Patient went down for biopsy at 1330, pt returned at 1600 unable to have procedure done due to emergency patient in interventional radiology. Pt will be NPO after midnight to attempt biopsy again tomorrow. Pt and night nurse aware of plan.       Concerns for physician to address:  none     Zone phone for oncoming shift:   N/A         Cassy Tobar RN

## 2023-03-15 ENCOUNTER — PATIENT OUTREACH (OUTPATIENT)
Dept: CASE MANAGEMENT | Age: 78
End: 2023-03-15

## 2023-03-15 NOTE — Clinical Note
Good Morning Norbert Pérez,   I spoke to Ms. Anaya regarding [facility] follow up, she states,will contact [facility] to scheduled follow up appointment. No other concerns voiced.    Thank you,  Jesus Garner, [address] [phone]

## 2023-03-16 NOTE — PROGRESS NOTES
Care Transitions Initial Call    Call within 2 business days of discharge: Yes     Patient Current Location: [place]    Patient: Lolly Jamison Patient : 1945 MRN: [id number]    Last Discharge [address]       Date Complaint Diagnosis Description Type Department Provider    3/11/23 Syncope Syncope and collapse . .. ED to Hosp-Admission (Discharged) (ADMIT) [id number] Solis Nails MD; Wali Alvarez, ... Was this an external facility discharge? No Discharge Facility: N/A    Challenges to be reviewed by the provider   Additional needs identified to be addressed with provider: yes  Patient states she is attempting to schedule follow up appointment w/ [facility]. Method of communication with provider : staff message        Advance Care Planning:   Does patient have an Advance Directive: patient declined education. Inpatient Readmission Risk score: Unplanned Readmit Risk Score: 8.9    Was this a readmission? no   Patient stated reason for the admission: n/a     Patients top risk factors for readmission: medical condition-lesion noted in left upper lobe    Interventions to address risk factors: Obtained and reviewed discharge summary and/or continuity of care documents and contact information to Vivek ([facility]) contacted the patient by telephone to perform post hospital discharge assessment. Verified name and  with patient as identifiers. Provided introduction to self, and explanation of the LPN CC role. LPN CC reviewed discharge instructions, medical action plan and red flags with patient who verbalized understanding. Were discharge instructions available to patient? yes. Reviewed appropriate site of care based on symptoms and resources available to patient including: PCP, Specialist, and [facility] . Patient given an opportunity to ask questions and does not have any further questions or concerns at this time. The patient agrees to contact the PCP office for questions related to their healthcare. Medication reconciliation was performed with patient, who verbalizes understanding of administration of home medications. Advised obtaining a 90-day supply of all daily and as-needed medications. Referral to Pharm D needed: yes     Home Health/Outpatient orders at discharge: [address]: n/a   Date of initial visit: [facility] ordered at discharge: None  [address] received: n/a    Patient reports she has a cane    Was patient discharged with a pulse oximeter? no    Discussed follow-up appointments. If no appointment was previously scheduled, appointment scheduling offered:  n/a . Is follow up appointment scheduled within 7 days of discharge? no.   [address] follow up appointment(s):   Future Appointments   Date Time Provider Lc Guajardo   3/21/2023  2:30 PM IGNACIO HighTL BS AMB   3/31/2023  3:00 PM Blessing Haque MD SF BS AMB   1/22/2024  2:40 PM Bharath Mascorro MD CAVSF BS [facility]     Non-[facility] follow up appointment(s): Nga ALEXANDER NP (pulmonology) [facility] TBD    Plan for follow-up call in 5-7 days based on severity of symptoms and risk factors. Plan for next call: follow up appointment-scheduled w/ pulmonology   LPN CC  provided contact information for future needs. Goals Addressed                   This Visit's Progress       Post Hospitalization     Prevent complications post hospitalization. 3/16/2023  Patient to schedule follow up appointment with [facility].   Patient to attend follow up appointment  Dr. Jesika Dhillon 3/16/2023.-SGP

## 2023-03-17 ENCOUNTER — TRANSCRIBE ORDER (OUTPATIENT)
Dept: SCHEDULING | Age: 78
End: 2023-03-17

## 2023-03-17 DIAGNOSIS — C34.90 MALIGNANT NEOPLASM OF BRONCHUS AND LUNG (HCC): Primary | ICD-10-CM

## 2023-03-19 NOTE — PROGRESS NOTES
[address] [facility] Oncology at [address] [place] [address]  [phone]    Hematology / Oncology Consult    Reason for Visit:   Petty Cuba is a 68 y.o. female who is seen in consultation at the request of NELSON Armstrong or evaluation of lung cancer. Hematology Oncology Treatment History:     Diagnosis: Squamous cell carcinoma of lung    Stage: Pending    Pathology:   3/14/23 Lung, left upper lobe, Core biopsy: Invasive poorly differentiated squamous cell carcinoma. Comment: Immunohistochemical stains show the malignant cells are positive   for cytokeratin 5/6 and negative for cytokeratin 7, cytokeratin 20 and   TTF-1 supporting the diagnosis. PD-L1 by immunohistochemistry pending. Prior Treatment: None    Current Treatment: pending  Treatment duration pending  Frequency of visits pending    History of Present Illness:   Petty Cuba is a 68 y.o. female with CAD, CKD, HTN, DM II, EMILY who is referred for evaluation and management of lung cancer. Pt was recently hospitalized in early March 2023 after 2 syncopal episodes at home. She also noted some loose stools and vomiting. She was diagnosed with IVVD and HARJIT, treated with IVF. She was found to have a new 27 x 24mm cavitary lesion in MJ and underwent CT-guided biopsy. She was also found to have a vascularized mass in R light of thyroid, 1.5cm and referred for outpatient evaluation. Denies any chronic SOB, JUAREZ. Walks slow on stairs. No h/o VTE. She lives with her daughter, bathes herself, cooks and cleans for herself, drives.      Past Medical History:   Diagnosis Date    Chronic back pain 9/8/2010    DM type 2 (diabetes mellitus, type 2) ([facility] 5/3/2010    HTN (hypertension) 5/3/2010    MI (myocardial infarction) ([facility] 5/3/2010    Obstructive sleep apnea (adult) (pediatric) 12/12/2014      Past Surgical History:   Procedure Laterality Date    HX CATARACT REMOVAL  01/2021    bilateral     HX CORONARY STENT PLACEMENT  09/17/2017 Followed by Dr. Qasim Romano ORTHOPAEDIC      disc sx    HX TUBAL LIGATION        Social History     Tobacco Use    Smoking status: Former     Packs/day: 0.50     Years: 30.00     Pack years: 15.00     Types: Cigarettes     Quit date: 2004     Years since quittin.2    Smokeless tobacco: Never   Substance Use Topics    Alcohol use: No      Family History   Problem Relation Age of Onset    Hypertension Mother          58yo    Diabetes Sister     Breast Cancer Sister     Diabetes Sister     Diabetes Sister     Diabetes Sister    1 sister had breast cancer, another sister had lung cancer. Current Outpatient Medications   Medication Sig    semaglutide (OZEMPIC) 0.25 mg or 0.5 mg/dose (2 mg/1.5 ml) subq pen 0.25 mg by SubCUTAneous route every seven (7) days. metoprolol succinate (TOPROL-XL) 100 mg tablet Take 1 tablet by mouth once daily    lisinopril-hydroCHLOROthiazide (PRINZIDE, ZESTORETIC) 20-12.5 mg per tablet Take 1 tablet by mouth once daily    empagliflozin (JARDIANCE) 25 mg tablet Take  by mouth daily. rosuvastatin (CRESTOR) 5 mg tablet TAKE ONE TABLET BY MOUTH NIGHTLY    insulin degludec 100 unit/mL (3 mL) inpn 60 Units by SubCUTAneous route nightly. aspirin 81 mg chewable tablet Take 81 mg by mouth daily. co-enzyme Q-10 (CO Q-10) 100 mg capsule Take 1 Cap by mouth daily. glucose blood VI test strips (Tap.Me BLOOD GLUCOSE SYSTEM) strip Use 3 times per day (fasting and before meals). (Patient taking differently: Once a day)    Lancets (ONE TOUCH ULTRASOFT LANCETS) Misc 1 Package by Does Not Apply route. Test blood glucose 3-4 time daily     No current facility-administered medications for this visit. Allergies   Allergen Reactions    Tetanus Vaccines And Toxoid Swelling        Review of Systems: A complete review of systems was obtained, negative except as described above.     Physical Exam:   Blood pressure 131/69, pulse 78, temperature 97.2 °F (36.2 °C), temperature source Temporal, height 5' 6\" (1.676 m), weight 202 lb 9.6 oz (91.9 kg), SpO2 98 %. ECOG PS: 2  General: Well developed, no acute distress  Eyes: PERRLA, EOMI, anicteric sclerae  HENT: Atraumatic, OP clear  Neck: Supple, no JVD or thyromegaly  Lymphatic: No cervical, supraclavicular, axillary adenopathy  Respiratory: CTAB, normal respiratory effort  CV: Normal rate, regular rhythm, no murmurs, no peripheral edema  GI: Soft, nontender, nondistended, no masses, no hepatomegaly, no splenomegaly  MS: Normal gait and station. Digits without clubbing or cyanosis. Skin: No rashes, ecchymoses, or petechiae. Normal temperature, turgor, and texture. Neuro/Psych: Alert, oriented. 5/5 strength in all 4 extremities. Appropriate affect, normal judgment/insight. Results:     Lab Results   Component Value Date/Time    WBC 5.2 03/14/2023 02:04 AM    HGB 11.5 03/14/2023 02:04 AM    HCT 37.4 03/14/2023 02:04 AM    PLATELET 880 89/96/9800 02:04 AM    MCV 96.1 03/14/2023 02:04 AM    ABS. NEUTROPHILS 3.3 03/14/2023 02:04 AM     Lab Results   Component Value Date/Time    Sodium 137 03/14/2023 02:04 AM    Potassium 3.9 03/14/2023 02:04 AM    Chloride 111 (H) 03/14/2023 02:04 AM    CO2 20 (L) 03/14/2023 02:04 AM    Glucose 90 03/14/2023 02:04 AM    BUN 23 (H) 03/14/2023 02:04 AM    Creatinine 1.18 (H) 03/14/2023 02:04 AM    GFR est AA 31 (L) 01/31/2020 01:50 PM    GFR est non-AA 26 (L) 01/31/2020 01:50 PM    Calcium 8.9 03/14/2023 02:04 AM    Glucose (POC) 102 03/14/2023 02:38 PM     Lab Results   Component Value Date/Time    Bilirubin, total 0.4 03/14/2023 02:04 AM    ALT (SGPT) 12 03/14/2023 02:04 AM    Alk.  phosphatase 69 03/14/2023 02:04 AM    Protein, total 6.4 03/14/2023 02:04 AM    Albumin 3.0 (L) 03/14/2023 02:04 AM    Globulin 3.4 03/14/2023 02:04 AM     No results found for: IRON, FE, TIBC, IBCT, PSAT, FERR    No results found for: B12LT, FOL, RBCF  Lab Results   Component Value Date/Time    TSH 1.16 03/11/2023 05:08 PM     Lab Results   Component Value Date/Time    Hep B surface Ag screen Negative 09/01/2017 01:58 PM         Imaging:     3/11/23 CT chest:  FINDINGS:   SUPRACLAVICULAR REGION: No supraclavicular adenopathy. MEDIASTINUM: Aortic atherosclerotic change and coronary artery disease. Small  hiatal hernia. No hilar or mediastinal lymphadenopathy. No endotracheal or endobronchial mass. Coronary artery calcifications: present. PLEURA/LUNGS[de-identified] Left upper lobe cavitary focus 27 x 24 mm in size with associated  pleural thickening there is centrilobular emphysematous change. There is no  right lung nodule. SOFT TISSUE/ AXILLA: No axillary adenopathy. No chest wall mass. UPPER ABDOMEN: There is no intrahepatic duct dilatation. The CBD is not dilated. BONES: T11 compression fracture is age indeterminate. . No lytic or blastic lesions. IMPRESSION  27 x 24 mm cavitary lesion in the left upper lobe is associated with mild  pleural thickening. Cavitary lesion is a spiculated margin. Lesion is infectious  or neoplastic etiology. Coronary artery disease. Small hiatal hernia. Age indeterminate T11 compression fracture. 3/12/23 CT abd/pelvis:  IMPRESSION  No acute process. 3/20/23 Brain MRI:  pending    Assessment & Plan:   Du Nava is a 68 y.o. female is seen for lung cancer. 1. Squamous cell carcinoma of left upper lobe:  No mediastinal adenopathy or distant metastases noted on CT imaging, but pleural thickening is present. I recommend PET scan for further evaluation as well as evaluation by CT surgery. Patient will need PFTs given mention of centrilobular emphysema on CT imaging. I discussed with patient that if she has localized disease, she may be a candidate for surgery or radiation. Supportive care medications include: pending  -- PET scan for staging  -- PFTs  -- Refer to Dr. Jacob Purvis at [facility]  -- Will plan to present to thoracic tumor board after PET results available  -- Return in 6 weeks    2.  Type II DM with neuropathy:  On Jardiance, Insulin and followed with Dr. Tomeka Balderas in Endocrinology. 3. Stage III CKD:  Likely 2/2 diabetes. 4. CAD / HTN:  On ASA, BB, statin as well as Lisinopril-HCTZ and follows with Dr. Deepali Costello. Emotional well being: Pt is coping well with his/her disease and has excellent support. I appreciate the opportunity to participate in Ms. Ho tuttle.     Signed By: Lamar Diaz MD     March 21, 2023

## 2023-03-20 ENCOUNTER — HOSPITAL ENCOUNTER (OUTPATIENT)
Dept: MRI IMAGING | Age: 78
Discharge: HOME | End: 2023-03-20
Attending: PHYSICIAN ASSISTANT
Payer: MEDICARE

## 2023-03-20 DIAGNOSIS — C34.90 MALIGNANT NEOPLASM OF BRONCHUS AND LUNG (HCC): ICD-10-CM

## 2023-03-20 PROCEDURE — 70551 MRI BRAIN STEM W/O DYE: CPT

## 2023-03-21 ENCOUNTER — OFFICE VISIT (OUTPATIENT)
Dept: ONCOLOGY | Age: 78
End: 2023-03-21
Payer: MEDICARE

## 2023-03-21 VITALS
DIASTOLIC BLOOD PRESSURE: 69 MMHG | TEMPERATURE: 97.2 F | HEART RATE: 78 BPM | WEIGHT: 202.6 LBS | HEIGHT: 66 IN | SYSTOLIC BLOOD PRESSURE: 131 MMHG | BODY MASS INDEX: 32.56 KG/M2 | OXYGEN SATURATION: 98 %

## 2023-03-21 DIAGNOSIS — Z79.4 TYPE 2 DIABETES MELLITUS WITH STAGE 3A CHRONIC KIDNEY DISEASE, WITH LONG-TERM CURRENT USE OF INSULIN (HCC): ICD-10-CM

## 2023-03-21 DIAGNOSIS — C34.12 MALIGNANT NEOPLASM OF UPPER LOBE, LEFT BRONCHUS OR LUNG (HCC): ICD-10-CM

## 2023-03-21 DIAGNOSIS — R93.89 ABNORMAL CT OF THE CHEST: ICD-10-CM

## 2023-03-21 DIAGNOSIS — J43.2 CENTRILOBULAR EMPHYSEMA (HCC): ICD-10-CM

## 2023-03-21 DIAGNOSIS — J98.4 CAVITATING MASS OF UPPER LOBE OF LEFT LUNG: ICD-10-CM

## 2023-03-21 DIAGNOSIS — N18.31 STAGE 3A CHRONIC KIDNEY DISEASE (HCC): ICD-10-CM

## 2023-03-21 DIAGNOSIS — N18.31 TYPE 2 DIABETES MELLITUS WITH STAGE 3A CHRONIC KIDNEY DISEASE, WITH LONG-TERM CURRENT USE OF INSULIN (HCC): ICD-10-CM

## 2023-03-21 DIAGNOSIS — E11.22 TYPE 2 DIABETES MELLITUS WITH STAGE 3A CHRONIC KIDNEY DISEASE, WITH LONG-TERM CURRENT USE OF INSULIN (HCC): ICD-10-CM

## 2023-03-21 DIAGNOSIS — C34.92 SQUAMOUS CELL CARCINOMA LUNG, LEFT (HCC): Primary | ICD-10-CM

## 2023-03-21 PROCEDURE — G8417 CALC BMI ABV UP PARAM F/U: HCPCS | Performed by: INTERNAL MEDICINE

## 2023-03-21 PROCEDURE — 3078F DIAST BP <80 MM HG: CPT | Performed by: INTERNAL MEDICINE

## 2023-03-21 PROCEDURE — 1101F PT FALLS ASSESS-DOCD LE1/YR: CPT | Performed by: INTERNAL MEDICINE

## 2023-03-21 PROCEDURE — 1090F PRES/ABSN URINE INCON ASSESS: CPT | Performed by: INTERNAL MEDICINE

## 2023-03-21 PROCEDURE — 99204 OFFICE O/P NEW MOD 45 MIN: CPT | Performed by: INTERNAL MEDICINE

## 2023-03-21 PROCEDURE — G0463 HOSPITAL OUTPT CLINIC VISIT: HCPCS | Performed by: INTERNAL MEDICINE

## 2023-03-21 PROCEDURE — G8399 PT W/DXA RESULTS DOCUMENT: HCPCS | Performed by: INTERNAL MEDICINE

## 2023-03-21 PROCEDURE — 1123F ACP DISCUSS/DSCN MKR DOCD: CPT | Performed by: INTERNAL MEDICINE

## 2023-03-21 PROCEDURE — 3075F SYST BP GE 130 - 139MM HG: CPT | Performed by: INTERNAL MEDICINE

## 2023-03-21 PROCEDURE — G8427 DOCREV CUR MEDS BY ELIG CLIN: HCPCS | Performed by: INTERNAL MEDICINE

## 2023-03-21 PROCEDURE — G8536 NO DOC ELDER MAL SCRN: HCPCS | Performed by: INTERNAL MEDICINE

## 2023-03-21 PROCEDURE — 1111F DSCHRG MED/CURRENT MED MERGE: CPT | Performed by: INTERNAL MEDICINE

## 2023-03-21 PROCEDURE — G8432 DEP SCR NOT DOC, RNG: HCPCS | Performed by: INTERNAL MEDICINE

## 2023-03-21 NOTE — LETTER
3/21/2023    Patient: Tessie Blanco   YOB: 1945   Date of Visit: 3/21/2023     [address]  [address] [facility] [address]    Dear Kaya Walker MD,      Thank you for referring Ms. Burt Tse to [employer] for evaluation. My notes for this consultation are attached. If you have questions, please do not hesitate to call me. I look forward to following your patient along with you.       Sincerely,    Dow Felty, MD

## 2023-03-21 NOTE — PROGRESS NOTES
Joanne Mathews is a 68 y.o. female here for evaluation of lung cancer. Patient with no complaints of pain at this time.

## 2023-03-22 ENCOUNTER — PATIENT OUTREACH (OUTPATIENT)
Dept: CASE MANAGEMENT | Age: 78
End: 2023-03-22

## 2023-03-23 NOTE — PROGRESS NOTES
Care Transitions Outreach Attempt    Call within 2 business days of discharge: Yes   Attempted to reach patient for transitions of care follow up. Unable to reach patient. Patient: Che Modi Patient : 1945 MRN: [id number]    Last Discharge  Street       Date Complaint Diagnosis Description Type Department Provider    3/11/23 Syncope Syncope and collapse . .. ED to Hosp-Admission (Discharged) (ADMIT) [id number] Bowen Colon MD; Shyanne Espino, . .. Was this an external facility discharge? No Discharge Facility: n/a      Noted following upcoming appointments from discharge chart review:   [facility] follow up appointment(s):   Future Appointments   Date Time Provider Lc Guajardo   3/28/2023  1:00 PM PULMONARY LAB [facility]   3/31/2023  3:00 PM Vivian Oliveira MD SF BS AMB   2023 10:00 AM [facility] RCR PET DOSE [address] LU   2023 11:00 AM [facility] RCR PET 1 [address] LU   2023  9:30 AM IGNACIO [address] BS AMB   2023  1:45 PM Tiera MCPHERSON MD ONCSF BS AMB   2024  2:40 PM Alisa Mascorro MD CAVSF BS AMB     Non-[facility] follow up appointment(s): n/a    Will attempt contact next week.

## 2023-03-28 ENCOUNTER — HOSPITAL ENCOUNTER (OUTPATIENT)
Dept: PULMONOLOGY | Age: 78
Discharge: HOME | End: 2023-03-28
Attending: INTERNAL MEDICINE
Payer: MEDICARE

## 2023-03-28 VITALS — OXYGEN SATURATION: 96 %

## 2023-03-28 DIAGNOSIS — J43.2 CENTRILOBULAR EMPHYSEMA (HCC): ICD-10-CM

## 2023-03-28 DIAGNOSIS — C34.92 SQUAMOUS CELL CARCINOMA LUNG, LEFT (HCC): ICD-10-CM

## 2023-03-28 PROCEDURE — 94726 PLETHYSMOGRAPHY LUNG VOLUMES: CPT

## 2023-03-28 PROCEDURE — 94375 RESPIRATORY FLOW VOLUME LOOP: CPT

## 2023-03-28 PROCEDURE — 94729 DIFFUSING CAPACITY: CPT

## 2023-03-29 ENCOUNTER — PATIENT OUTREACH (OUTPATIENT)
Dept: CASE MANAGEMENT | Age: 78
End: 2023-03-29

## 2023-03-29 NOTE — PROGRESS NOTES
Care Transitions Follow Up Call    Patient Current Location: [place]    Challenges to be reviewed by the provider   Additional needs identified to be addressed with provider: no  none           Method of communication with provider : none    LPN Care Coordinator (LPN CC)  contacted the patient by telephone to follow up after admission on 3/11/2023. Verified name and  with patient as identifiers. Patient stated she would not be able to speak with this writer at this time. Will attempt to contact patient 3/30/2023    [facility] follow up appointment(s):   Future Appointments   Date Time Provider Lc Guajardo   3/31/2023  3:00 PM Alan Edmondson MD SFFP BS AMB   2023 10:00 AM [facility] RCR PET DOSE 1 SMHRCHPET POST LU   2023 11:00 AM [facility] RCR PET 1 SMHRCHPET POST LU   2023  9:30 AM IGNACIO [address]ilar 39 BS AMB   2023  1:45 PM Angelique MCPHERSON MD ONCSF BS AMB   2024  2:40 PM Camilo Mascorro MD CAVSF BS AMB     Non-[facility] follow up appointment(s): n/a    LPN CCprovided contact information for future needs. Will attempt to contact patient week 2 follow up.   Plan for next call: follow up appointment-PCP

## 2023-03-30 ENCOUNTER — PATIENT OUTREACH (OUTPATIENT)
Dept: CASE MANAGEMENT | Age: 78
End: 2023-03-30

## 2023-03-30 NOTE — PROGRESS NOTES
Care Transitions Follow Up Call    Patient Current Location: [place]    Challenges to be reviewed by the provider   Additional needs identified to be addressed with provider: no  none           Method of communication with provider : none    LPN Care Coordinator (LPN CC) contacted the patient by telephone to follow up after admission on 3/11/2023. Verified name and  with patient as identifiers. Addressed changes since last contact:  Patient attend  Pulmona Function Test on 3/28/2023  Follow up appointment completed? yes. Was follow up appointment scheduled within 7 days of discharge? yes. Patients top risk factors for readmission: medical condition-Squamous cell carcinoma lung. Interventions to address risk factors:  patient to follow up with her providers    [address] follow up appointment(s):   Future Appointments   Date Time Provider Lc Guajardo   3/31/2023  3:00 PM Albania Wolff MD SFFP BS AMB   2023 10:00 AM [address] [place] [address] RCR PET DOSE 1 [address] LU   2023 11:00 AM [address] [place] [address] RCR PET 1 [address] POST LU   2023  9:30 AM Victor Huog Cates NP [address] 39 BS AMB   2023  1:45 PM Kusum Hodge MD ONCSF BS AMB   2024  2:40 PM Jose Daniel Mascorro MD CAVSF BS AMB     Non-[facility] follow up appointment(s): n/a    LPN CC provided contact information for future needs. Plan for follow-up call in 7-10 days based on severity of symptoms and risk factors. Plan for next call: symptom management-any episodes       Goals Addressed                   This Visit's Progress       Post Hospitalization     Prevent complications post hospitalization. 3/30/2023  Patient to attend PET scan 2023  Will follow up with patient 7-10 days-[facility]  3/16/2023  Patient to schedule follow up appointment with [facility].   Patient to attend follow up appointment  Dr. Analy Maldonado 3/16/2023.-[facility]

## 2023-03-31 ENCOUNTER — OFFICE VISIT (OUTPATIENT)
Dept: FAMILY MEDICINE CLINIC | Age: 78
End: 2023-03-31

## 2023-03-31 VITALS
SYSTOLIC BLOOD PRESSURE: 145 MMHG | BODY MASS INDEX: 33.11 KG/M2 | RESPIRATION RATE: 16 BRPM | DIASTOLIC BLOOD PRESSURE: 73 MMHG | HEIGHT: 66 IN | OXYGEN SATURATION: 99 % | WEIGHT: 206 LBS | TEMPERATURE: 97.7 F | HEART RATE: 69 BPM

## 2023-03-31 DIAGNOSIS — I95.1 SYNCOPE DUE TO ORTHOSTATIC HYPOTENSION: Primary | ICD-10-CM

## 2023-03-31 DIAGNOSIS — N18.32 TYPE 2 DIABETES MELLITUS WITH STAGE 3B CHRONIC KIDNEY DISEASE, WITH LONG-TERM CURRENT USE OF INSULIN (HCC): ICD-10-CM

## 2023-03-31 DIAGNOSIS — Z79.4 TYPE 2 DIABETES MELLITUS WITH STAGE 3B CHRONIC KIDNEY DISEASE, WITH LONG-TERM CURRENT USE OF INSULIN (HCC): ICD-10-CM

## 2023-03-31 DIAGNOSIS — Z09 HOSPITAL DISCHARGE FOLLOW-UP: ICD-10-CM

## 2023-03-31 DIAGNOSIS — E07.89 THYROID MASS OF UNCLEAR ETIOLOGY: ICD-10-CM

## 2023-03-31 DIAGNOSIS — E11.22 TYPE 2 DIABETES MELLITUS WITH STAGE 3B CHRONIC KIDNEY DISEASE, WITH LONG-TERM CURRENT USE OF INSULIN (HCC): ICD-10-CM

## 2023-03-31 DIAGNOSIS — N18.9 ACUTE KIDNEY INJURY SUPERIMPOSED ON CHRONIC KIDNEY DISEASE (HCC): ICD-10-CM

## 2023-03-31 DIAGNOSIS — C34.92 SQUAMOUS CELL CARCINOMA OF LUNG, LEFT (HCC): ICD-10-CM

## 2023-03-31 DIAGNOSIS — E78.5 HYPERLIPIDEMIA LDL GOAL <70: ICD-10-CM

## 2023-03-31 DIAGNOSIS — N17.9 ACUTE KIDNEY INJURY SUPERIMPOSED ON CHRONIC KIDNEY DISEASE (HCC): ICD-10-CM

## 2023-03-31 PROBLEM — I95.9 HYPOTENSION: Status: RESOLVED | Noted: 2023-03-11 | Resolved: 2023-03-31

## 2023-03-31 PROBLEM — R55 SYNCOPE: Status: RESOLVED | Noted: 2023-03-11 | Resolved: 2023-03-31

## 2023-03-31 PROBLEM — R91.8 OPACITY OF LUNG ON IMAGING STUDY: Status: RESOLVED | Noted: 2023-03-11 | Resolved: 2023-03-31

## 2023-03-31 PROBLEM — R11.10 VOMITING: Status: RESOLVED | Noted: 2023-03-11 | Resolved: 2023-03-31

## 2023-03-31 NOTE — PROGRESS NOTES
Identified Patient with two Patient identifiers (Name and ). Two Patient Identifiers confirmed. Reviewed record in preparation for visit and have obtained necessary documentation. Chief Complaint   Patient presents with    Hospital Follow Up     Patient admitted to [address] [place] [address] 3/11/23 for syncope and found to have cancer. Vitals:    23 1502 23 1506   BP: (!) 153/74 (!) 145/73   BP 1 Location: Right arm Right arm   BP Patient Position: Sitting Sitting   BP Cuff Size: Large adult Large adult   Pulse: 69    Temp: 97.7 °F (36.5 °C)    TempSrc: Oral    Resp: 16    Height: 5' 6\" (1.676 m)    Weight: 206 lb (93.4 kg)    SpO2: 99%      1. Have you been to the ER, urgent care clinic since your last visit? Hospitalized since your last visit? Yes, see CC    2. Have you seen or consulted any other health care providers outside of the [address] [place] [address] since your last visit? Include any pap smears or colon screening.  No

## 2023-03-31 NOTE — PROGRESS NOTES
[address] [facility] [address] [place] [address]   Office [phone]  Fax [phone]     Subjective   CC:  Saul Berman is a 68 y.o. female with hx of HTN, HLD, DM2, CAD, EMILY, CKD3b, obesity who presents for hospital follow up. Admitted at [facility] from 3/11-3/14/2023 for syncope and HARJIT. Incidental findings of 27 x 24mm cavitary, spiculated MJ lung lesion and 1.5 cm vascularized thyroid mass were found during admission. Has since followed up with oncology and pathology was positive for squamous cell carcinoma of the lung. Had MRI brain and is scheduled for PET/CT scan for staging. Patient states that her mood is \"fine\" and she has been handling her new diagnosis okay. She denies feeling depressed or anxious. She lives with her daughter. She has been eating/drinking normally since her discharge and denies any vomiting or diarrhea. No lightheadedness, dizziness, or further syncopal episodes since discharge. Followed by Dr. Erika Mane (endocrinology) for diabetes and reports he manages her medications. Per patient, last A1c was ~9 in January. Next appointment is scheduled for June. States her fasting BG have been in the high 80s to 120s. Followed by Dr. Aaron Salvador for hx of CAD. Next appointment is in 1 year. Last seen in February and denies any changes to her medications at that time. ROS otherwise negative except as documented in HPI. Past Medical History  Past Medical History:   Diagnosis Date    CAD (coronary artery disease)     Chronic back pain 09/08/2010    Chronic kidney disease (CKD)     DM type 2 (diabetes mellitus, type 2) ([facility] 05/03/2010    HTN (hypertension) 05/03/2010    [facility]     MI (myocardial infarction) ([facility] 05/03/2010    Obesity (BMI 30-39. 9)     Obstructive sleep apnea (adult) (pediatric) 12/12/2014    Thyroid mass        Current Medications  Current Outpatient Medications   Medication Sig Dispense Refill    semaglutide (OZEMPIC) 0.25 mg or 0.5 mg/dose (2 mg/1.5 ml) subq pen 0.25 mg by SubCUTAneous route every seven (7) days. 1 Box 0    metoprolol succinate (TOPROL-XL) 100 mg tablet Take 1 tablet by mouth once daily 90 Tablet 0    lisinopril-hydroCHLOROthiazide (PRINZIDE, ZESTORETIC) 20-12.5 mg per tablet Take 1 tablet by mouth once daily 90 Tablet 0    empagliflozin (JARDIANCE) 25 mg tablet Take  by mouth daily. rosuvastatin (CRESTOR) 5 mg tablet TAKE ONE TABLET BY MOUTH NIGHTLY 90 Tablet 4    insulin degludec 100 unit/mL (3 mL) inpn 60 Units by SubCUTAneous route nightly. aspirin 81 mg chewable tablet Take 81 mg by mouth daily. 100 Tab 3    co-enzyme Q-10 (CO Q-10) 100 mg capsule Take 1 Cap by mouth daily.          Allergies  Allergies   Allergen Reactions    Tetanus Vaccines And Toxoid Swelling       Social History   Social History     Socioeconomic History    Marital status: SINGLE     Spouse name: Not on file    Number of children: Not on file    Years of education: Not on file    Highest education level: Not on file   Occupational History    Not on file   Tobacco Use    Smoking status: Former     Packs/day: 0.50     Years: 30.00     Pack years: 15.00     Types: Cigarettes     Quit date: 2004     Years since quittin.2    Smokeless tobacco: Never   Vaping Use    Vaping Use: Never used   Substance and Sexual Activity    Alcohol use: No    Drug use: No    Sexual activity: Never   Other Topics Concern    Not on file   Social History Narrative    Not on file     Social Determinants of Health     Financial Resource Strain: Low Risk     Difficulty of Paying Living Expenses: Not hard at all   Food Insecurity: No Food Insecurity    Worried About Running Out of Food in the Last Year: Never true    Ran Out of Food in the Last Year: Never true   Transportation Needs: Not on file   Physical Activity: Not on file   Stress: Not on file   Social Connections: Not on file   Intimate Partner Violence: Not on file   Housing Stability: Not on file       Family History  Family History   Problem Relation Age of Onset    Hypertension Mother          58yo    Diabetes Sister     Breast Cancer Sister     Diabetes Sister     Diabetes Sister     Diabetes Sister        Objective   Vital Signs  Visit Vitals  BP (!) 145/73 (BP 1 Location: Right arm, BP Patient Position: Sitting, BP Cuff Size: Large adult)   Pulse 69   Temp 97.7 °F (36.5 °C) (Oral)   Resp 16   Ht 5' 6\" (1.676 m)   Wt 206 lb (93.4 kg)   SpO2 99%   BMI 33.25 kg/m²       Physical Exam  Vitals reviewed. Constitutional:       General: She is not in acute distress. Appearance: She is obese. Cardiovascular:      Rate and Rhythm: Normal rate and regular rhythm. Heart sounds: Normal heart sounds. No murmur heard. Pulmonary:      Effort: Pulmonary effort is normal. No respiratory distress. Breath sounds: Normal breath sounds. No wheezing, rhonchi or rales. Musculoskeletal:      Right lower leg: No edema. Left lower leg: No edema. Skin:     General: Skin is warm and dry. Neurological:      General: No focal deficit present. Mental Status: She is alert and oriented to person, place, and time. Psychiatric:         Mood and Affect: Affect is blunt and flat. Assessment and Plan   Linh Blum is a 68 y.o. who is here for hospital follow up. I have personally reviewed the following encounter notes, labs, and/or imaging/diagnostic studies: hospital encounter summary and available imaging/lab results, oncology office visit on 3/21. 1. Syncope due to orthostatic hypotension - Admitted at [facility] from 3/11-3/14 for syncope in the setting of HARJIT. Etiology IVVD with resultant orthostasis. Low BP and tachycardia due to vomiting, decreased PO intake. EKG reassuring. TSH normal. Echo EF55-60%, carotid dopplers wnl. Symptoms have resolved. 2. Acute kidney injury superimposed on chronic kidney disease ([facility] - Cr 2.61 on arrival (BL~1.3), improved to 1.18 with IV hydration.  Patient refused repeat labs today. Lab Results   Component Value Date/Time    Sodium 137 03/14/2023 02:04 AM    Potassium 3.9 03/14/2023 02:04 AM    Chloride 111 (H) 03/14/2023 02:04 AM    CO2 20 (L) 03/14/2023 02:04 AM    Anion gap 6 03/14/2023 02:04 AM    Glucose 90 03/14/2023 02:04 AM    BUN 23 (H) 03/14/2023 02:04 AM    Creatinine 1.18 (H) 03/14/2023 02:04 AM    BUN/Creatinine ratio 19 03/14/2023 02:04 AM    GFR est AA 31 (L) 01/31/2020 01:50 PM    GFR est non-AA 26 (L) 01/31/2020 01:50 PM    eGFR 48 (L) 03/14/2023 02:04 AM    Calcium 8.9 03/14/2023 02:04 AM     3. Squamous cell carcinoma of lung, left (HCC) - Incidental finding of 27 x 24mm cavitary, spiculated MJ lung lesion during admission. Pathology positive for squamous cell carcinoma. Has established with Dr. Jimmie Rosario and is undergoing work up outpatient. Scheduled for PET scan on 4/20. Patient has very flat affect on exam today but reports she has been coping fine and has support at home. 4. Thyroid mass of unclear etiology - Incidental finding of 1.51 cm x 1.18 cm vascularized mass in right thyroid lobe found during admission. Patient wants to defer management to her oncologist.     5. Type 2 diabetes mellitus with stage 3b chronic kidney disease, with long-term current use of insulin ([facility] - Followed by Dr. Renetta Brown (endocrinology). Last A1c reportedly ~9 in January 2023 per patient, however she reports recent fasting BG ~80s-120s. Currently managed with Degludec 60 U daily, Ozempic 0.25 mg SQ weekly, Jardiance 25 mg daily, May want to stop Ozempic until thyroid mass is further evaluated, however patient does not want to change any medications today, so will defer to endocrinology. 6. Hyperlipidemia LDL goal <70 - Lipid panel collected while admitted with .8. Discussed with patient that goal LDL is <70 given hx of CAD and DM2. Offered changing medication to help lower this level and she refused.  She only wants her cardiologist (Dr. Rupa Salas) to make changes to her medications. Lab Results   Component Value Date/Time    Cholesterol, total 213 (H) 03/02/2023 10:45 AM    HDL Cholesterol 58 03/02/2023 10:45 AM    LDL,Direct 158 (H) 01/17/2013 03:25 PM    LDL-C, External 162 07/29/2015 12:00 AM    LDL, calculated 122.8 (H) 03/02/2023 10:45 AM    VLDL, calculated 32.2 03/02/2023 10:45 AM    Triglyceride 161 (H) 03/02/2023 10:45 AM    CHOL/HDL Ratio 3.7 03/02/2023 10:45 AM     7. Hospital discharge follow-up - Medication reconciliation performed today. Follow up as scheduled with oncology for further evaluation/management of lung cancer and thyroid mass. Follow up in 3 months for chronic conditions or sooner if needed. Patient is counseled to return to the office if symptoms do not improve as expected. Urgent consultation with the nearest Emergency Department is strongly recommended if condition worsens. Patient is counseled to follow up as recommended and to inform the office if any changes in treatment are recommended.       I discussed this patient with Dr. Benigno Burks (Attending Physician)       Signed By:  Felice Crump DO  Family Medicine Resident

## 2023-03-31 NOTE — PROCEDURES
[facility] 79  PULMONARY FUNCTION TEST    Name:  Enoc Chacon  MR#:  [id number]  :  1945  ACCOUNT #:  [de-identified]  DATE OF SERVICE:  2023    FEV1-to-FVC ratio of 64 with an FEV1 of 101% predicted. Total lung capacity of 69%. The patient is meeting criteria for obstructive as well as restrictive lung disease. They have a mixed pattern of COPD and diminished lung volumes combined.       Prince Loredo MD CB/SASCHA_JOSE_I/SASCHA_SHYAM_P  D:  2023 9:31  T:  2023 14:33  JOB #:  [id number]

## 2023-04-07 ENCOUNTER — PATIENT OUTREACH (OUTPATIENT)
Dept: CASE MANAGEMENT | Age: 78
End: 2023-04-07

## 2023-04-20 ENCOUNTER — HOSPITAL ENCOUNTER (OUTPATIENT)
Dept: PET IMAGING | Age: 78
Discharge: HOME | End: 2023-04-20
Attending: INTERNAL MEDICINE
Payer: MEDICARE

## 2023-04-20 VITALS — BODY MASS INDEX: 33.25 KG/M2 | WEIGHT: 206 LBS

## 2023-04-20 DIAGNOSIS — J98.4 CAVITATING MASS OF UPPER LOBE OF LEFT LUNG: ICD-10-CM

## 2023-04-20 DIAGNOSIS — C34.12 MALIGNANT NEOPLASM OF UPPER LOBE, LEFT BRONCHUS OR LUNG (HCC): ICD-10-CM

## 2023-04-20 DIAGNOSIS — C34.92 SQUAMOUS CELL CARCINOMA LUNG, LEFT (HCC): ICD-10-CM

## 2023-04-20 DIAGNOSIS — R93.89 ABNORMAL CT OF THE CHEST: ICD-10-CM

## 2023-04-20 DIAGNOSIS — N18.31 STAGE 3A CHRONIC KIDNEY DISEASE (HCC): ICD-10-CM

## 2023-04-20 DIAGNOSIS — J43.2 CENTRILOBULAR EMPHYSEMA (HCC): ICD-10-CM

## 2023-04-20 LAB
GLUCOSE BLD STRIP.AUTO-MCNC: 78 MG/DL (ref 65–117)
SERVICE CMNT-IMP: NORMAL

## 2023-04-20 PROCEDURE — A9552 F18 FDG: HCPCS

## 2023-04-20 RX ORDER — FLUDEOXYGLUCOSE F-18 200 MCI/ML
10 INJECTION INTRAVENOUS ONCE
Status: COMPLETED | OUTPATIENT
Start: 2023-04-20 | End: 2023-04-20

## 2023-04-20 RX ADMIN — FLUDEOXYGLUCOSE F-18 10 MILLICURIE: 200 INJECTION INTRAVENOUS at 09:48

## 2023-04-25 ENCOUNTER — VIRTUAL VISIT (OUTPATIENT)
Dept: SLEEP MEDICINE | Age: 78
End: 2023-04-25
Payer: MEDICARE

## 2023-04-25 DIAGNOSIS — C34.92 SQUAMOUS CELL CARCINOMA OF LUNG, LEFT (HCC): ICD-10-CM

## 2023-04-25 DIAGNOSIS — G47.33 OSA (OBSTRUCTIVE SLEEP APNEA): Primary | ICD-10-CM

## 2023-04-25 PROCEDURE — 1101F PT FALLS ASSESS-DOCD LE1/YR: CPT | Performed by: NURSE PRACTITIONER

## 2023-04-25 PROCEDURE — G8417 CALC BMI ABV UP PARAM F/U: HCPCS | Performed by: NURSE PRACTITIONER

## 2023-04-25 PROCEDURE — 99203 OFFICE O/P NEW LOW 30 MIN: CPT | Performed by: NURSE PRACTITIONER

## 2023-04-25 PROCEDURE — G8536 NO DOC ELDER MAL SCRN: HCPCS | Performed by: NURSE PRACTITIONER

## 2023-04-25 PROCEDURE — 1090F PRES/ABSN URINE INCON ASSESS: CPT | Performed by: NURSE PRACTITIONER

## 2023-04-25 PROCEDURE — G8432 DEP SCR NOT DOC, RNG: HCPCS | Performed by: NURSE PRACTITIONER

## 2023-04-25 PROCEDURE — 1123F ACP DISCUSS/DSCN MKR DOCD: CPT | Performed by: NURSE PRACTITIONER

## 2023-04-25 PROCEDURE — G8427 DOCREV CUR MEDS BY ELIG CLIN: HCPCS | Performed by: NURSE PRACTITIONER

## 2023-04-25 PROCEDURE — G8399 PT W/DXA RESULTS DOCUMENT: HCPCS | Performed by: NURSE PRACTITIONER

## 2023-04-25 NOTE — PATIENT INSTRUCTIONS
[address] [facility] [place], [address]  Tel.  [phone]  Fax. 100 [facility] 60  [place], [address] [facility]  Tel.  [phone]  Fax. [phone] [address]  Tel.  [phone]  Fax. [phone]     Learning About CPAP for Sleep Apnea  What is CPAP? CPAP is a small machine that you use at home every night while you sleep. It increases air pressure in your throat to keep your airway open. When you have sleep apnea, this can help you sleep better so you feel much better. CPAP stands for \"continuous positive airway pressure. \"  The CPAP machine will have one of the following:  A mask that covers your nose and mouth  Prongs that fit into your nose  A mask that covers your nose only, the most common type. This type is called NCPAP. The N stands for \"nasal.\"  Why is it done? CPAP is usually the best treatment for obstructive sleep apnea. It is the first treatment choice and the most widely used. Your doctor may suggest CPAP if you have: Moderate to severe sleep apnea. Sleep apnea and coronary artery disease (CAD) or heart failure. How does it help? CPAP can help you have more normal sleep, so you feel less sleepy and more alert during the daytime. CPAP may help keep heart failure or other heart problems from getting worse. NCPAP may help lower your blood pressure. If you use CPAP, your bed partner may also sleep better because you are not snoring or restless. What are the side effects? Some people who use CPAP have:  A dry or stuffy nose and a sore throat. Irritated skin on the face. Sore eyes. Bloating. If you have any of these problems, work with your doctor to fix them. Here are some things you can try:  Be sure the mask or nasal prongs fit well. See if your doctor can adjust the pressure of your CPAP. If your nose is dry, try a humidifier.   If your nose is runny or stuffy, try decongestant medicine or a steroid nasal spray. If these things do not help, you might try a different type of machine. Some machines have air pressure that adjusts on its own. Others have air pressures that are different when you breathe in than when you breathe out. This may reduce discomfort caused by too much pressure in your nose. Where can you learn more? Go to [employer]  Enter Aldair Chavarria in the search box to learn more about \"Learning About CPAP for Sleep Apnea. \"   © 9231-6077 Healthwise, Incorporated. Care instructions adapted under license by [facility] (which disclaims liability or warranty for this information). This care instruction is for use with your licensed healthcare professional. If you have questions about a medical condition or this instruction, always ask your healthcare professional. [address] any warranty or liability for your use of this information. Content Version: 1.6.82336; Last Revised: January 11, 2010  PROPER SLEEP HYGIENE    What to avoid  Do not have drinks with caffeine, such as coffee or black tea, for 8 hours before bed. Do not smoke or use other types of tobacco near bedtime. Nicotine is a stimulant and can keep you awake. Avoid drinking alcohol late in the evening, because it can cause you to wake in the middle of the night. Do not eat a big meal close to bedtime. If you are hungry, eat a light snack. Do not drink a lot of water close to bedtime, because the need to urinate may wake you up during the night. Do not read or watch TV in bed. Use the bed only for sleeping and sexual activity. What to try  Go to bed at the same time every night, and wake up at the same time every morning. Do not take naps during the day. Keep your bedroom quiet, dark, and cool. Get regular exercise, but not within 3 to 4 hours of your bedtime. .  Sleep on a comfortable pillow and mattress.   If watching the clock makes you anxious, turn it facing away from you so you cannot see the time. If you worry when you lie down, start a worry book. Well before bedtime, write down your worries, and then set the book and your concerns aside. Try meditation or other relaxation techniques before you go to bed. If you cannot fall asleep, get up and go to another room until you feel sleepy. Do something relaxing. Repeat your bedtime routine before you go to bed again. Make your house quiet and calm about an hour before bedtime. Turn down the lights, turn off the TV, log off the computer, and turn down the volume on music. This can help you relax after a busy day. Drowsy Driving: The [facility] 54 cites drowsiness as a causing factor in more than 474,260 police reported crashes annually, resulting in 76,000 injuries and 1,500 deaths. Other surveys suggest 55% of people polled have driven while drowsy in the past year, 23% had fallen asleep but not crashed, 3% crashed, and 2% had and accident due to drowsy driving. Who is at risk? Young Drivers: One study of drowsy driving accidents states that 55% of the drivers were under 25 years. Of those, 75% were male. Shift Workers and Travelers: People who work overnight or travel across time zones frequently are at higher risk of experiencing Circadian Rhythm Disorders. They are trying to work and function when their body is programed to sleep. Sleep Deprived: Lack of sleep has a serious impact on your ability to pay attention or focus on a task. Consistently getting less than the average of 8 hours your body needs creates partial or cumulative sleep deprivation. Untreated Sleep Disorders: Sleep Apnea, Narcolepsy, R.L.S., and other sleep disorders (untreated) prevent a person from getting enough restful sleep. This leads to excessive daytime sleepiness and increases the risk for drowsy driving accidents by up to 7 times.   Medications / Alcohol: Even over the counter medications can cause drowsiness. Medications that impair a drivers attention should have a warning label. Alcohol naturally makes you sleepy and on its own can cause accidents. Combined with excessive drowsiness its effects are amplified. Signs of Drowsy Driving:   * You don't remember driving the last few miles   * You may drift out of your vijay   * You are unable to focus and your thoughts wander   * You may yawn more often than normal   * You have difficulty keeping your eyes open / nodding off   * Missing traffic signs, speeding, or tailgating  Prevention-   Good sleep hygiene, lifestyle and behavioral choices have the most impact on drowsy driving. There is no substitute for sleep and the average person requires 8 hours nightly. If you find yourself driving drowsy, stop and sleep. Consider the sleep hygiene tips provided during your visit as well. Medication Refill Policy: Refills for all medications require 1 week advance notice. Please have your pharmacy fax a refill request. We are unable to fax, or call in \"controled substance\" medications and you will need to pick these prescriptions up from our office. [employer] Activation    Thank you for requesting access to [employer]. Please follow the instructions below to securely access and download your online medical record. [employer] allows you to send messages to your doctor, view your test results, renew your prescriptions, schedule appointments, and more. How Do I Sign Up? In your internet browser, go to [email or web address]. Click on the First Time User? Click Here link in the Sign In box. You will see the New Member Sign Up page. Enter your [employer] Access Code exactly as it appears below. You will not need to use this code after youve completed the sign-up process. If you do not sign up before the expiration date, you must request a new code. [employer] Access Code:  Activation code not generated  Current [employer] Vari[employer] Status: Active (This is the date your [employer] access code will )    Enter the last four digits of your Social Security Number (xxxx) and Date of Birth (mm/dd/yyyy) as indicated and click Submit. You will be taken to the next sign-up page. Create a [employer] ID. This will be your [employer] login ID and cannot be changed, so think of one that is secure and easy to remember. Create a [employer] password. You can change your password at any time. Enter your Password Reset Question and Answer. This can be used at a later time if you forget your password. Enter your e-mail address. You will receive e-mail notification when new information is available in [address] Click Sign Up. You can now view and download portions of your medical record. Click the [employer] link to download a portable copy of your medical information. Additional Information    If you have questions, please call [phone]. Remember, [employer] is NOT to be used for urgent needs. For medical emergencies, dial 911.

## 2023-04-25 NOTE — PROGRESS NOTES
Rosenda Sadler (: 1945) is a 68 y.o. female, new patient, seen for positive airway pressure follow-up, she was last seen by Dr. eZus Salas on 2014, prior notes and sleep testing reviewed in detail. Home sleep test 2014 showed AHI of 33.2/hr with a lowest SpO2 of 77%, duration of SpO2 < 88% 16 min. Weight at time of sleep testing 226 pounds. ASSESSMENT/PLAN:    ICD-10-CM ICD-9-CM    1. EMILY (obstructive sleep apnea)  G47.33 327.23 SLEEP LAB (PAP TITRATION)      2. Squamous cell carcinoma of lung, left (HCC)  C34.92 162.9       3. Adult BMI 33.0-33.9 kg/sq m  Z68.33 V85.33           AHI = 33.2(2014). On ResMed APAP :  5-12 cmH2O. Set up 2014. She is adherent with PAP therapy and PAP continues to benefit patient and remains necessary for control of her sleep apnea. Her device is eligible for replacement based on age, she is scheduled for lung resection, in lab titration post surgery to determine optimal PAP pressures. Follow-up and Dispositions    Return in about 3 months (around 2023), or if symptoms worsen or fail to improve. Sleep Apnea -     In lab sleep study titration to determine optimal PAP device pressure - CPAP to BiPAP as needed for tolerance -patient is undergoing lung cancer treatment with surgery scheduled 5/3/2023. Orders Placed This Encounter    SLEEP LAB (PAP TITRATION)     Lung cancer - lobectomy scheduled for 5/3/23      CPAP/BiPAP titration dependant on lung capacity and patient comfort. Standing Status:   Future     Standing Expiration Date:   10/25/2023     Scheduling Instructions:      Please route to Dr. Jamil Cedeño for interp. Order Specific Question:   Reason for Exam     Answer:   EMILY       * Counseling was provided regarding the importance of regular PAP use with emphasis on ensuring sufficient total sleep time, proper sleep hygiene, and safe driving.     * She was asked to contact our office for any problems regarding PAP therapy. 2. Squamous cell carcinoma of lung, left - she is scheduled for removal of lung nodule 5/3/2023 and will follow up with oncology    3. Encouraged continued weight management loss program through appropriate diet and exercise regimen as significant weight reduction has been shown to reduce severity of obstructive sleep apnea. SUBJECTIVE/OBJECTIVE:    She  is seen today for follow up on PAP device and reports no problems using the device. The following concerns reviewed:    Drowsiness no Problems exhaling no   Snoring no Forget to put on no   Mask Comfortable yes Can't fall asleep no   Dry Mouth no Mask falls off no   Air Leaking no Frequent awakenings no       She admits that her sleep has improved on PAP therapy using nasal pillows mask and heated tubing. She reports she has lung cancer and is scheduled for surgery next week. She would like to continue with PAP therapy and to get a new device, current device was set up in 2014. Pulmonary function test 3/2023 results:  FEV1-to-FVC ratio of 64 with an FEV1 of 101% predicted. Total lung capacity of 69%. The patient is meeting criteria for obstructive as well as restrictive lung disease. They have a mixed pattern of COPD and diminished lung volumes combined. Review of device download not available. Most recent data from 7/2020 shows APAP running at pressure of 12 cm H2O, AHI 1.1/hr. Patient report she is still using device and it is helping with her breathing at night and daytime sleepiness. [place] Sleepiness Score: (P) 3 and Modified F.O.S.Q. Score Total / 2: (P) 18 which reflects improved sleep quality over therapy time    Sleep Review of Systems: notable for Negative difficulty falling asleep; Positive awakenings at night; Negative early morning headaches; Negative memory problems; Negative concentration issues;  Negative chest pain; Negative shortness of breath; Negative significant joint pain at night; Negative rashes or itching; Negative heartburn; Negative significant mood issues; occasional afternoon naps     Review of Systems:  Constitutional:  No significant weight loss or weight gain. Eyes:  No blurred vision, recent cataract surgery, she still has floaters. CVS:  No significant chest pain  Pulm:  + significant shortness of breath  GI:  No significant nausea or vomiting  :  No significant nocturia  Musculoskeletal:  No significant joint pain at night  Skin:  No significant rashes  Neuro:  No significant dizziness   Psych:  No active mood issues    Vitals reported by patient   Patient-Reported Vitals 3/21/2023   Patient-Reported Weight 201   Patient-Reported Pulse 88   Patient-Reported Temperature 98.5   Patient-Reported Systolic  622   Patient-Reported Diastolic 75      Calculated BMI 33    Physical Exam completed by visual and auditory observation of patient with verbal input from patient. General:   Alert, oriented, not in acute distress   Eyes:  Anicteric Sclerae; no obvious strabismus   Nose:  No obvious nasal septum deviation    Neck:   Midline trachea, no visible mass   Chest/Lungs:  Respiratory effort normal, no visualized signs of difficulty breathing or respiratory distress   CVS:  No JVD   Extremities:  No obvious rashes noted on face, neck, or hands   Neuro:  No facial asymmetry, no focal deficits; no obvious tremor    Psych:  Normal affect,  normal countenance       Jose Canales, was evaluated through a synchronous (real-time) audio-video encounter. The patient (or guardian if applicable) is aware that this is a billable service, which includes applicable co-pays. This Virtual Visit was conducted with patient's (and/or legal guardian's) consent. The visit was conducted pursuant to the emergency declaration under the [address] [place] [address] authority and the [employer] and [employer] General Act.   Patient identification was verified, and a caregiver was present when appropriate. The patient was located at: Home: [address] [facility] [address]  The provider was located at: Facility (Appt Department): [address] [place] [address]  [address] [id number]      Patient's phone number [phone] (home)  was confirmed for accuracy. She gives permission for messages regarding results and appointments to be left at that number. An electronic signature was used to authenticate this note.     -- Rosalinda Fatima NP, [facility]  04/25/23

## 2023-04-26 ENCOUNTER — TELEPHONE (OUTPATIENT)
Dept: ONCOLOGY | Age: 78
End: 2023-04-26

## 2023-04-26 NOTE — TELEPHONE ENCOUNTER
[address] at [facility]  [phone]        04/26/23 12:08 PM Faxed PFT report per request.

## 2023-04-26 NOTE — TELEPHONE ENCOUNTER
Latisha young and NICK from dr. Giovanna Farnsworth office they need us to fax over the pulmonary test with all the numbers  Fax [phone]  Cb [phone]

## 2023-06-06 ENCOUNTER — CLINICAL DOCUMENTATION (OUTPATIENT)
Facility: HOSPITAL | Age: 78
End: 2023-06-06

## 2023-06-06 ENCOUNTER — INITIAL CONSULT (OUTPATIENT)
Age: 78
End: 2023-06-06
Payer: MEDICARE

## 2023-06-06 VITALS
HEIGHT: 66 IN | SYSTOLIC BLOOD PRESSURE: 128 MMHG | HEART RATE: 86 BPM | OXYGEN SATURATION: 98 % | TEMPERATURE: 98.1 F | RESPIRATION RATE: 16 BRPM | DIASTOLIC BLOOD PRESSURE: 79 MMHG | BODY MASS INDEX: 32.08 KG/M2 | WEIGHT: 199.6 LBS

## 2023-06-06 DIAGNOSIS — Z79.4 TYPE 2 DIABETES MELLITUS WITH STAGE 3A CHRONIC KIDNEY DISEASE, WITH LONG-TERM CURRENT USE OF INSULIN (HCC): ICD-10-CM

## 2023-06-06 DIAGNOSIS — C34.92 MALIGNANT NEOPLASM OF UNSPECIFIED PART OF LEFT BRONCHUS OR LUNG (HCC): ICD-10-CM

## 2023-06-06 DIAGNOSIS — N18.31 TYPE 2 DIABETES MELLITUS WITH STAGE 3A CHRONIC KIDNEY DISEASE, WITH LONG-TERM CURRENT USE OF INSULIN (HCC): ICD-10-CM

## 2023-06-06 DIAGNOSIS — E11.22 TYPE 2 DIABETES MELLITUS WITH STAGE 3A CHRONIC KIDNEY DISEASE, WITH LONG-TERM CURRENT USE OF INSULIN (HCC): ICD-10-CM

## 2023-06-06 DIAGNOSIS — N18.31 STAGE 3A CHRONIC KIDNEY DISEASE (HCC): ICD-10-CM

## 2023-06-06 DIAGNOSIS — C34.92 SQUAMOUS CELL CARCINOMA OF LUNG, LEFT (HCC): Primary | ICD-10-CM

## 2023-06-06 PROCEDURE — G8417 CALC BMI ABV UP PARAM F/U: HCPCS | Performed by: INTERNAL MEDICINE

## 2023-06-06 PROCEDURE — 99214 OFFICE O/P EST MOD 30 MIN: CPT | Performed by: INTERNAL MEDICINE

## 2023-06-06 PROCEDURE — 3074F SYST BP LT 130 MM HG: CPT | Performed by: INTERNAL MEDICINE

## 2023-06-06 PROCEDURE — G8399 PT W/DXA RESULTS DOCUMENT: HCPCS | Performed by: INTERNAL MEDICINE

## 2023-06-06 PROCEDURE — 1123F ACP DISCUSS/DSCN MKR DOCD: CPT | Performed by: INTERNAL MEDICINE

## 2023-06-06 PROCEDURE — 1036F TOBACCO NON-USER: CPT | Performed by: INTERNAL MEDICINE

## 2023-06-06 PROCEDURE — G8427 DOCREV CUR MEDS BY ELIG CLIN: HCPCS | Performed by: INTERNAL MEDICINE

## 2023-06-06 PROCEDURE — 1090F PRES/ABSN URINE INCON ASSESS: CPT | Performed by: INTERNAL MEDICINE

## 2023-06-06 PROCEDURE — 3078F DIAST BP <80 MM HG: CPT | Performed by: INTERNAL MEDICINE

## 2023-06-06 RX ORDER — ALBUTEROL SULFATE 90 UG/1
4 AEROSOL, METERED RESPIRATORY (INHALATION) PRN
OUTPATIENT
Start: 2023-06-20

## 2023-06-06 RX ORDER — HEPARIN SODIUM (PORCINE) LOCK FLUSH IV SOLN 100 UNIT/ML 100 UNIT/ML
500 SOLUTION INTRAVENOUS PRN
Status: CANCELLED | OUTPATIENT
Start: 2023-06-27

## 2023-06-06 RX ORDER — SODIUM CHLORIDE 9 MG/ML
5-250 INJECTION, SOLUTION INTRAVENOUS PRN
OUTPATIENT
Start: 2023-06-27

## 2023-06-06 RX ORDER — ONDANSETRON 2 MG/ML
8 INJECTION INTRAMUSCULAR; INTRAVENOUS ONCE
OUTPATIENT
Start: 2023-06-27 | End: 2023-06-27

## 2023-06-06 RX ORDER — ACETAMINOPHEN 325 MG/1
650 TABLET ORAL
OUTPATIENT
Start: 2023-06-20

## 2023-06-06 RX ORDER — ALBUTEROL SULFATE 90 UG/1
4 AEROSOL, METERED RESPIRATORY (INHALATION) PRN
OUTPATIENT
Start: 2023-06-27

## 2023-06-06 RX ORDER — PALONOSETRON 0.05 MG/ML
0.25 INJECTION, SOLUTION INTRAVENOUS ONCE
OUTPATIENT
Start: 2023-06-20 | End: 2023-06-20

## 2023-06-06 RX ORDER — SODIUM CHLORIDE 0.9 % (FLUSH) 0.9 %
5-40 SYRINGE (ML) INJECTION PRN
OUTPATIENT
Start: 2023-06-27

## 2023-06-06 RX ORDER — DIPHENHYDRAMINE HYDROCHLORIDE 50 MG/ML
50 INJECTION INTRAMUSCULAR; INTRAVENOUS
OUTPATIENT
Start: 2023-06-20

## 2023-06-06 RX ORDER — SODIUM CHLORIDE 9 MG/ML
5-250 INJECTION, SOLUTION INTRAVENOUS PRN
OUTPATIENT
Start: 2023-06-20

## 2023-06-06 RX ORDER — SODIUM CHLORIDE 0.9 % (FLUSH) 0.9 %
5-40 SYRINGE (ML) INJECTION PRN
OUTPATIENT
Start: 2023-06-20

## 2023-06-06 RX ORDER — ONDANSETRON 2 MG/ML
8 INJECTION INTRAMUSCULAR; INTRAVENOUS
OUTPATIENT
Start: 2023-06-20

## 2023-06-06 RX ORDER — ACETAMINOPHEN 325 MG/1
650 TABLET ORAL
OUTPATIENT
Start: 2023-06-27

## 2023-06-06 RX ORDER — DIPHENHYDRAMINE HYDROCHLORIDE 50 MG/ML
50 INJECTION INTRAMUSCULAR; INTRAVENOUS
OUTPATIENT
Start: 2023-06-27

## 2023-06-06 RX ORDER — SODIUM CHLORIDE 9 MG/ML
INJECTION, SOLUTION INTRAVENOUS CONTINUOUS
OUTPATIENT
Start: 2023-06-20

## 2023-06-06 RX ORDER — EPINEPHRINE 1 MG/ML
0.3 INJECTION, SOLUTION, CONCENTRATE INTRAVENOUS PRN
OUTPATIENT
Start: 2023-06-27

## 2023-06-06 RX ORDER — MEPERIDINE HYDROCHLORIDE 50 MG/ML
12.5 INJECTION INTRAMUSCULAR; INTRAVENOUS; SUBCUTANEOUS PRN
OUTPATIENT
Start: 2023-06-27

## 2023-06-06 RX ORDER — MEPERIDINE HYDROCHLORIDE 50 MG/ML
12.5 INJECTION INTRAMUSCULAR; INTRAVENOUS; SUBCUTANEOUS PRN
OUTPATIENT
Start: 2023-06-20

## 2023-06-06 RX ORDER — EPINEPHRINE 1 MG/ML
0.3 INJECTION, SOLUTION, CONCENTRATE INTRAVENOUS PRN
OUTPATIENT
Start: 2023-06-20

## 2023-06-06 RX ORDER — HEPARIN SODIUM (PORCINE) LOCK FLUSH IV SOLN 100 UNIT/ML 100 UNIT/ML
500 SOLUTION INTRAVENOUS PRN
Status: CANCELLED | OUTPATIENT
Start: 2023-06-20

## 2023-06-06 RX ORDER — ONDANSETRON 2 MG/ML
8 INJECTION INTRAMUSCULAR; INTRAVENOUS
OUTPATIENT
Start: 2023-06-27

## 2023-06-06 RX ORDER — FAMOTIDINE 10 MG/ML
20 INJECTION, SOLUTION INTRAVENOUS
OUTPATIENT
Start: 2023-06-20

## 2023-06-06 RX ORDER — SODIUM CHLORIDE 9 MG/ML
INJECTION, SOLUTION INTRAVENOUS CONTINUOUS
OUTPATIENT
Start: 2023-06-27

## 2023-06-06 RX ORDER — FAMOTIDINE 10 MG/ML
20 INJECTION, SOLUTION INTRAVENOUS
OUTPATIENT
Start: 2023-06-27

## 2023-06-06 ASSESSMENT — PATIENT HEALTH QUESTIONNAIRE - PHQ9
SUM OF ALL RESPONSES TO PHQ QUESTIONS 1-9: 0
SUM OF ALL RESPONSES TO PHQ QUESTIONS 1-9: 0
2. FEELING DOWN, DEPRESSED OR HOPELESS: 0
SUM OF ALL RESPONSES TO PHQ9 QUESTIONS 1 & 2: 0
1. LITTLE INTEREST OR PLEASURE IN DOING THINGS: 0
SUM OF ALL RESPONSES TO PHQ QUESTIONS 1-9: 0
SUM OF ALL RESPONSES TO PHQ QUESTIONS 1-9: 0

## 2023-06-06 NOTE — PROGRESS NOTES
Pharmacy Note- Chemotherapy Education    Sarah Morejon is a  68 y. o.female  diagnosed with lung cancer here today for chemotherapy counseling. Ms. Angeles Meeks is being treated with carboplatin, gemcitabine and possibly followed by atezolizumab. Provided education on side effects and premedications. Side effects of chemotherapy reviewed included s/s infection, anemia, appetite changes, thrombocytopenia, nausea/vomiting, fatigue, hair loss/alopecia, skin and nail changes, diarrhea/constipation, rash, sore mouth, peripheral neuropathy and rare but serious side effect of lung changes. Patient given ways to manage these side effects and when to contact office. Drug interactions  - Hyperglycemia with corticosteroids which may exacerbate diabetes management. Patient was encouraged to reach out to managing provider now to include them in the plan for more intensive diabetes management. We reviewed taking BG measurements more frequently in the days following each infusion and sending those results to the managing provider. [employer] Handout of medications provided to patient. Ms. Angeles Meeks verbalized understanding of the information presented and all of the patient's questions were answered. Chemotherapy/Immunotherapy education and consent discussed with the patient and the patient denied any questions or concerns. A hard copy of the chemotherapy consent was offered to the patient and the patient accepted.     Antonia Leal, PharmD, BCPS    For Pharmacy Admin Tracking Only    Program: Medical Group  CPA in place:  Yes  Recommendation Provided To: Patient/Caregiver: 1 via In person  Intervention Detail: New Rx: 3, reason: Improve Adherence, Interaction, Needs Additional Therapy  Intervention Accepted By: Patient/Caregiver: 1  Time Spent (min): 60

## 2023-06-06 NOTE — PROGRESS NOTES
Chief Complaint   Patient presents with    Follow-up           Vitals:    06/06/23 1404   BP: 128/79   Pulse: 86   Resp: 16   Temp: 98.1 °F (36.7 °C)   SpO2: 98%            1. Have you been to the ER, urgent care clinic since your last visit? Hospitalized since your last visit? Yes [facility] for surgery   2. Have you seen or consulted any other health care providers outside of the [address] [place] [address] since your last visit? Include any pap smears or colon screening.  No
artery disease. Small  hiatal hernia. No hilar or mediastinal lymphadenopathy. No endotracheal or endobronchial mass. Coronary artery calcifications: present. PLEURA/LUNGS[de-identified] Left upper lobe cavitary focus 27 x 24 mm in size with associated  pleural thickening there is centrilobular emphysematous change. There is no  right lung nodule. SOFT TISSUE/ AXILLA: No axillary adenopathy. No chest wall mass. UPPER ABDOMEN: There is no intrahepatic duct dilatation. The CBD is not dilated. BONES: T11 compression fracture is age indeterminate. . No lytic or blastic lesions. IMPRESSION  27 x 24 mm cavitary lesion in the left upper lobe is associated with mild  pleural thickening. Cavitary lesion is a spiculated margin. Lesion is infectious  or neoplastic etiology. Coronary artery disease. Small hiatal hernia. Age indeterminate T11 compression fracture. 3/12/23 CT abd/pelvis:  IMPRESSION  No acute process. 3/20/23 Brain MRI:  IMPRESSION:  No acute infarct or intracranial hemorrhage. Chronic right gangliocapsular lacunar infarcts. Mild to moderate chronic  microangiopathic white matter disease. No obvious intracranial mass lesion aligned for lack of contrast administration. A brain MRI with and without IV contrast is advised for subsequent follow-up  evaluations as clinically indicated. 3/28/23 PFTs:  FEV1-to-FVC ratio of 64 with an FEV1 of 101% predicted. Total lung capacity of 69%. The patient is meeting criteria for obstructive as well as restrictive lung disease. They have a mixed pattern of COPD and diminished lung volumes combined. 4/20/23 PET:  FINDINGS:  HEAD/NECK: No apparent foci of abnormal hypermetabolism. Cerebral evaluation is  limited by normal intense activity. CHEST: Mass lesion left upper lobe is hypermetabolic, maximum SUV 9.1. Central  photopenia may represent underlying necrosis. ABDOMEN/PELVIS: No foci of abnormal hypermetabolism.   SKELETON: No foci of abnormal hypermetabolism

## 2023-06-07 RX ORDER — PROCHLORPERAZINE MALEATE 10 MG
10 TABLET ORAL EVERY 6 HOURS PRN
Qty: 120 TABLET | Refills: 3 | Status: SHIPPED | OUTPATIENT
Start: 2023-06-07

## 2023-06-07 RX ORDER — ONDANSETRON HYDROCHLORIDE 8 MG/1
8 TABLET, FILM COATED ORAL EVERY 8 HOURS PRN
Qty: 21 TABLET | Refills: 3 | Status: SHIPPED | OUTPATIENT
Start: 2023-06-07

## 2023-06-07 RX ORDER — LIDOCAINE AND PRILOCAINE 25; 25 MG/G; MG/G
CREAM TOPICAL
Qty: 1 EACH | Refills: 3 | Status: SHIPPED | OUTPATIENT
Start: 2023-06-07

## 2023-06-08 ENCOUNTER — APPOINTMENT (OUTPATIENT)
Facility: HOSPITAL | Age: 78
End: 2023-06-08
Attending: INTERNAL MEDICINE
Payer: MEDICARE

## 2023-06-08 ENCOUNTER — HOSPITAL ENCOUNTER (OUTPATIENT)
Facility: HOSPITAL | Age: 78
Discharge: HOME | End: 2023-06-10
Attending: INTERNAL MEDICINE
Payer: MEDICARE

## 2023-06-08 VITALS
SYSTOLIC BLOOD PRESSURE: 117 MMHG | RESPIRATION RATE: 14 BRPM | BODY MASS INDEX: 31.72 KG/M2 | HEART RATE: 81 BPM | HEIGHT: 66 IN | TEMPERATURE: 97.5 F | DIASTOLIC BLOOD PRESSURE: 89 MMHG | OXYGEN SATURATION: 99 % | WEIGHT: 197.4 LBS

## 2023-06-08 DIAGNOSIS — C34.92 MALIGNANT NEOPLASM OF UNSPECIFIED PART OF LEFT BRONCHUS OR LUNG (HCC): ICD-10-CM

## 2023-06-08 LAB
ECHO BSA: 2.04 M2
GLUCOSE BLD STRIP.AUTO-MCNC: 85 MG/DL (ref 65–117)
GLUCOSE BLD STRIP.AUTO-MCNC: 92 MG/DL (ref 65–117)
SERVICE CMNT-IMP: NORMAL
SERVICE CMNT-IMP: NORMAL

## 2023-06-08 PROCEDURE — 76937 US GUIDE VASCULAR ACCESS: CPT

## 2023-06-08 PROCEDURE — 6360000002 HC RX W HCPCS: Performed by: NURSE PRACTITIONER

## 2023-06-08 PROCEDURE — 2709999900 HC NON-CHARGEABLE SUPPLY

## 2023-06-08 PROCEDURE — 82962 GLUCOSE BLOOD TEST: CPT

## 2023-06-08 PROCEDURE — 2500000003 HC RX 250 WO HCPCS: Performed by: NURSE PRACTITIONER

## 2023-06-08 PROCEDURE — 2580000003 HC RX 258: Performed by: NURSE PRACTITIONER

## 2023-06-08 PROCEDURE — 99152 MOD SED SAME PHYS/QHP 5/>YRS: CPT

## 2023-06-08 PROCEDURE — C1788 PORT, INDWELLING, IMP: HCPCS

## 2023-06-08 RX ORDER — FENTANYL CITRATE 50 UG/ML
INJECTION, SOLUTION INTRAMUSCULAR; INTRAVENOUS PRN
Status: DISCONTINUED | OUTPATIENT
Start: 2023-06-08 | End: 2023-06-30 | Stop reason: HOSPADM

## 2023-06-08 RX ORDER — LIDOCAINE HYDROCHLORIDE AND EPINEPHRINE BITARTRATE 20; .01 MG/ML; MG/ML
INJECTION, SOLUTION SUBCUTANEOUS PRN
Status: DISCONTINUED | OUTPATIENT
Start: 2023-06-08 | End: 2023-06-30 | Stop reason: HOSPADM

## 2023-06-08 RX ORDER — MIDAZOLAM HYDROCHLORIDE 5 MG/ML
INJECTION INTRAMUSCULAR; INTRAVENOUS PRN
Status: DISCONTINUED | OUTPATIENT
Start: 2023-06-08 | End: 2023-06-30 | Stop reason: HOSPADM

## 2023-06-08 RX ORDER — LIDOCAINE HYDROCHLORIDE 10 MG/ML
INJECTION, SOLUTION EPIDURAL; INFILTRATION; INTRACAUDAL; PERINEURAL PRN
Status: DISCONTINUED | OUTPATIENT
Start: 2023-06-08 | End: 2023-06-30 | Stop reason: HOSPADM

## 2023-06-08 RX ORDER — HEPARIN SODIUM 100 [USP'U]/ML
INJECTION, SOLUTION INTRAVENOUS PRN
Status: DISCONTINUED | OUTPATIENT
Start: 2023-06-08 | End: 2023-06-30 | Stop reason: HOSPADM

## 2023-06-08 RX ADMIN — LIDOCAINE HYDROCHLORIDE,EPINEPHRINE BITARTRATE 18 ML: 20; .01 INJECTION, SOLUTION INFILTRATION; PERINEURAL at 14:25

## 2023-06-08 RX ADMIN — FENTANYL CITRATE 50 MCG: 50 INJECTION, SOLUTION INTRAMUSCULAR; INTRAVENOUS at 14:21

## 2023-06-08 RX ADMIN — LIDOCAINE HYDROCHLORIDE 3 ML: 10 INJECTION, SOLUTION EPIDURAL; INFILTRATION; INTRACAUDAL; PERINEURAL at 14:24

## 2023-06-08 RX ADMIN — HEPARIN 500 UNITS: 100 SYRINGE at 14:31

## 2023-06-08 RX ADMIN — CEFAZOLIN SODIUM 2000 MG: 1 POWDER, FOR SOLUTION INTRAMUSCULAR; INTRAVENOUS at 14:06

## 2023-06-08 RX ADMIN — MIDAZOLAM HYDROCHLORIDE 2 MG: 5 INJECTION INTRAMUSCULAR; INTRAVENOUS at 14:21

## 2023-06-20 ENCOUNTER — HOSPITAL ENCOUNTER (OUTPATIENT)
Facility: HOSPITAL | Age: 78
Setting detail: INFUSION SERIES
End: 2023-06-20
Payer: MEDICARE

## 2023-06-20 VITALS
HEIGHT: 66 IN | WEIGHT: 200.5 LBS | TEMPERATURE: 97.2 F | BODY MASS INDEX: 32.22 KG/M2 | SYSTOLIC BLOOD PRESSURE: 139 MMHG | HEART RATE: 82 BPM | RESPIRATION RATE: 16 BRPM | DIASTOLIC BLOOD PRESSURE: 73 MMHG | OXYGEN SATURATION: 96 %

## 2023-06-20 DIAGNOSIS — C34.92 SQUAMOUS CELL CARCINOMA OF LUNG, LEFT (HCC): Primary | ICD-10-CM

## 2023-06-20 LAB
ALBUMIN SERPL-MCNC: 3.5 G/DL (ref 3.5–5)
ALBUMIN/GLOB SERPL: 0.9 (ref 1.1–2.2)
ALP SERPL-CCNC: 85 U/L (ref 45–117)
ALT SERPL-CCNC: 18 U/L (ref 12–78)
ANION GAP SERPL CALC-SCNC: 6 MMOL/L (ref 5–15)
AST SERPL-CCNC: 13 U/L (ref 15–37)
BASOPHILS # BLD: 0 K/UL (ref 0–0.1)
BASOPHILS NFR BLD: 0 % (ref 0–1)
BILIRUB SERPL-MCNC: 0.4 MG/DL (ref 0.2–1)
BUN SERPL-MCNC: 25 MG/DL (ref 6–20)
BUN/CREAT SERPL: 17 (ref 12–20)
CALCIUM SERPL-MCNC: 9.2 MG/DL (ref 8.5–10.1)
CHLORIDE SERPL-SCNC: 108 MMOL/L (ref 97–108)
CO2 SERPL-SCNC: 24 MMOL/L (ref 21–32)
CREAT SERPL-MCNC: 1.46 MG/DL (ref 0.55–1.02)
DIFFERENTIAL METHOD BLD: ABNORMAL
EOSINOPHIL # BLD: 0.2 K/UL (ref 0–0.4)
EOSINOPHIL NFR BLD: 3 % (ref 0–7)
ERYTHROCYTE [DISTWIDTH] IN BLOOD BY AUTOMATED COUNT: 13 % (ref 11.5–14.5)
GLOBULIN SER CALC-MCNC: 3.8 G/DL (ref 2–4)
GLUCOSE SERPL-MCNC: 188 MG/DL (ref 65–100)
HBV SURFACE AB SER QL: NONREACTIVE
HBV SURFACE AB SER-ACNC: <3.1 MIU/ML
HBV SURFACE AG SER QL: <0.1 INDEX
HBV SURFACE AG SER QL: NEGATIVE
HCT VFR BLD AUTO: 33.8 % (ref 35–47)
HGB BLD-MCNC: 10.9 G/DL (ref 11.5–16)
IMM GRANULOCYTES # BLD AUTO: 0 K/UL (ref 0–0.04)
IMM GRANULOCYTES NFR BLD AUTO: 0 % (ref 0–0.5)
LYMPHOCYTES # BLD: 1 K/UL (ref 0.8–3.5)
LYMPHOCYTES NFR BLD: 14 % (ref 12–49)
MCH RBC QN AUTO: 29.5 PG (ref 26–34)
MCHC RBC AUTO-ENTMCNC: 32.2 G/DL (ref 30–36.5)
MCV RBC AUTO: 91.4 FL (ref 80–99)
MONOCYTES # BLD: 0.3 K/UL (ref 0–1)
MONOCYTES NFR BLD: 4 % (ref 5–13)
NEUTS SEG # BLD: 5.3 K/UL (ref 1.8–8)
NEUTS SEG NFR BLD: 79 % (ref 32–75)
NRBC # BLD: 0 K/UL (ref 0–0.01)
NRBC BLD-RTO: 0 PER 100 WBC
PLATELET # BLD AUTO: 224 K/UL (ref 150–400)
PMV BLD AUTO: 11.1 FL (ref 8.9–12.9)
POTASSIUM SERPL-SCNC: 4 MMOL/L (ref 3.5–5.1)
PROT SERPL-MCNC: 7.3 G/DL (ref 6.4–8.2)
RBC # BLD AUTO: 3.7 M/UL (ref 3.8–5.2)
SODIUM SERPL-SCNC: 138 MMOL/L (ref 136–145)
WBC # BLD AUTO: 6.8 K/UL (ref 3.6–11)

## 2023-06-20 PROCEDURE — 96366 THER/PROPH/DIAG IV INF ADDON: CPT

## 2023-06-20 PROCEDURE — 86704 HEP B CORE ANTIBODY TOTAL: CPT

## 2023-06-20 PROCEDURE — 96413 CHEMO IV INFUSION 1 HR: CPT

## 2023-06-20 PROCEDURE — 36415 COLL VENOUS BLD VENIPUNCTURE: CPT

## 2023-06-20 PROCEDURE — 86706 HEP B SURFACE ANTIBODY: CPT

## 2023-06-20 PROCEDURE — 96375 TX/PRO/DX INJ NEW DRUG ADDON: CPT

## 2023-06-20 PROCEDURE — 2580000003 HC RX 258: Performed by: INTERNAL MEDICINE

## 2023-06-20 PROCEDURE — 85025 COMPLETE CBC W/AUTO DIFF WBC: CPT

## 2023-06-20 PROCEDURE — 80053 COMPREHEN METABOLIC PANEL: CPT

## 2023-06-20 PROCEDURE — 6360000002 HC RX W HCPCS: Performed by: INTERNAL MEDICINE

## 2023-06-20 PROCEDURE — 96417 CHEMO IV INFUS EACH ADDL SEQ: CPT

## 2023-06-20 PROCEDURE — 87340 HEPATITIS B SURFACE AG IA: CPT

## 2023-06-20 RX ORDER — EPINEPHRINE 1 MG/ML
0.3 INJECTION, SOLUTION, CONCENTRATE INTRAVENOUS PRN
Status: DISCONTINUED | OUTPATIENT
Start: 2023-06-20 | End: 2023-10-26 | Stop reason: HOSPADM

## 2023-06-20 RX ORDER — PALONOSETRON 0.05 MG/ML
0.25 INJECTION, SOLUTION INTRAVENOUS ONCE
Status: COMPLETED | OUTPATIENT
Start: 2023-06-20 | End: 2023-06-20

## 2023-06-20 RX ORDER — ONDANSETRON 2 MG/ML
8 INJECTION INTRAMUSCULAR; INTRAVENOUS
Status: DISCONTINUED | OUTPATIENT
Start: 2023-06-20 | End: 2023-06-21 | Stop reason: HOSPADM

## 2023-06-20 RX ORDER — DIPHENHYDRAMINE HYDROCHLORIDE 50 MG/ML
50 INJECTION INTRAMUSCULAR; INTRAVENOUS
Status: DISCONTINUED | OUTPATIENT
Start: 2023-06-20 | End: 2023-06-21 | Stop reason: HOSPADM

## 2023-06-20 RX ORDER — HEPARIN 100 UNIT/ML
500 SYRINGE INTRAVENOUS PRN
Status: DISCONTINUED | OUTPATIENT
Start: 2023-06-20 | End: 2023-06-21 | Stop reason: HOSPADM

## 2023-06-20 RX ORDER — ALBUTEROL SULFATE 90 UG/1
4 AEROSOL, METERED RESPIRATORY (INHALATION) PRN
Status: DISCONTINUED | OUTPATIENT
Start: 2023-06-20 | End: 2023-06-21 | Stop reason: HOSPADM

## 2023-06-20 RX ORDER — SODIUM CHLORIDE 9 MG/ML
INJECTION, SOLUTION INTRAVENOUS CONTINUOUS
Status: DISCONTINUED | OUTPATIENT
Start: 2023-06-20 | End: 2023-06-21 | Stop reason: HOSPADM

## 2023-06-20 RX ORDER — SODIUM CHLORIDE 9 MG/ML
5-250 INJECTION, SOLUTION INTRAVENOUS PRN
Status: DISCONTINUED | OUTPATIENT
Start: 2023-06-20 | End: 2023-06-21 | Stop reason: HOSPADM

## 2023-06-20 RX ORDER — MEPERIDINE HYDROCHLORIDE 25 MG/ML
12.5 INJECTION INTRAMUSCULAR; INTRAVENOUS; SUBCUTANEOUS PRN
Status: DISCONTINUED | OUTPATIENT
Start: 2023-06-20 | End: 2023-10-26 | Stop reason: HOSPADM

## 2023-06-20 RX ORDER — ACETAMINOPHEN 325 MG/1
650 TABLET ORAL
Status: DISCONTINUED | OUTPATIENT
Start: 2023-06-20 | End: 2023-06-21 | Stop reason: HOSPADM

## 2023-06-20 RX ORDER — SODIUM CHLORIDE 0.9 % (FLUSH) 0.9 %
5-40 SYRINGE (ML) INJECTION PRN
Status: DISCONTINUED | OUTPATIENT
Start: 2023-06-20 | End: 2023-06-21 | Stop reason: HOSPADM

## 2023-06-20 RX ADMIN — FOSAPREPITANT 150 MG: 150 INJECTION, POWDER, LYOPHILIZED, FOR SOLUTION INTRAVENOUS at 11:20

## 2023-06-20 RX ADMIN — GEMCITABINE 2050 MG: 38 INJECTION, SOLUTION INTRAVENOUS at 12:13

## 2023-06-20 RX ADMIN — DEXAMETHASONE SODIUM PHOSPHATE 12 MG: 4 INJECTION, SOLUTION INTRAMUSCULAR; INTRAVENOUS at 10:56

## 2023-06-20 RX ADMIN — SODIUM CHLORIDE 25 ML/HR: 9 INJECTION, SOLUTION INTRAVENOUS at 10:51

## 2023-06-20 RX ADMIN — SODIUM CHLORIDE, PRESERVATIVE FREE 20 ML: 5 INJECTION INTRAVENOUS at 13:53

## 2023-06-20 RX ADMIN — PALONOSETRON HYDROCHLORIDE 0.25 MG: 0.25 INJECTION INTRAVENOUS at 10:54

## 2023-06-20 RX ADMIN — CARBOPLATIN 308.5 MG: 10 INJECTION INTRAVENOUS at 12:51

## 2023-06-20 ASSESSMENT — PAIN SCALES - GENERAL: PAINLEVEL_OUTOF10: 0

## 2023-06-20 NOTE — PROGRESS NOTES
[facility] Progress Note    Date: 2023    Name: Angelica Fierro    MRN: [id number]         : 1945    Ms. Tabares Arrived ambulatory and in no distress for C 1D 1 of Regimen. Assessment was completed, no acute issues at this time, no new complaints voiced. R chest wall port accessed without difficulty, labs drawn & sent for processing. Ms. Tete Freeman vitals were reviewed. Vitals:    23 0900   Pulse: 79   Resp: 18   Temp: 97.2 °F (36.2 °C)   SpO2: 96%   RN reviewed medications, signs and symptoms of infection, when to call the MD. Chemotherapy discharge instructions reviewed with patient. Lab results were obtained and reviewed.   Medications:        Medications Administered         0.9 % sodium chloride infusion Admin Date  2023 Action  New Bag Dose  25 mL/hr Rate  25 mL/hr Route  IntraVENous Administered By  Sandro Mehta RN        CARBOplatin (PARAPLATIN) 308.5 mg in sodium chloride 0.9 % 250 mL chemo IVPB Admin Date  2023 Action  New Bag Dose  308.5 mg Rate  611.8 mL/hr Route  IntraVENous Administered By  Sandro Mehta RN        dexamethasone (DECADRON) 12 mg in sodium chloride 0.9 % 50 mL IVPB Admin Date  2023 Action  New Bag Dose  12 mg Rate  200 mL/hr Route  IntraVENous Administered By  Sandro Mehta RN        fosaprepitant (EMEND) 150 mg in sodium chloride 0.9 % 150 mL IVPB Admin Date  2023 Action  New Bag Dose  150 mg Rate  450 mL/hr Route  IntraVENous Administered By  Sandro Mehta RN        gemcitabine HCl (GEMZAR) 2,050 mg in sodium chloride 0.9 % 250 mL chemo IVPB Admin Date  2023 Action  New Bag Dose  2,050 mg Rate  657.8 mL/hr Route  IntraVENous Administered By  Sandro Mehta RN        palonosetron (ALOXI) injection 0.25 mg Admin Date  2023 Action  Given Dose  0.25 mg Rate   Route  IntraVENous Administered By  Sandro Mehta RN        sodium chloride flush 0.9 % injection 5-40 mL Admin Date  2023 Action  Given Dose  20 mL Rate
itchy, swollen, or watery eyes;   Stomach  pain, nausea, vomiting, diarrhea, or bloody stools;  Mouth sores        Your physician should also be aware of the following symptoms:    Persistent and unresolved nausea and/or vomiting;   Persistent and unresolved diarrhea or constipation;   Numbness/tingling/burning of the extremities, including the fingers and toes; Bleeding or unexplained bruising; Unexplained redness/swelling/pain in the arms or legs; Shortness of breath or fatigue that worsens;   Pain with urination or blood in the urine; Chills;  Cough, especially a productive cough;  Mouth sores or a white coating of the tongue; Redness, swelling, pain or drainage at the port-a-cath or IV site; Increased feeling of bloating or water retention; Excessive weight loss or gain;  Ringing in the ears; Difficulty swallowing;  Dizziness, vertigo, lightheadedness or fainting. For 48 hours after receiving chemotherapy, patients and caregivers should follow these precautions:    -Flush toilets twice each time they are used. If possible, patients should use a separate toilet from others in the home. Always wash hands with soap and water after using the toilet.  -Caregivers must wear gloves when handling the patients blood, urine, stool, or emesis. Dispose of the gloves after each use and wash your hands. -After using any devices for bodily waste, patients should thoroughly wash their hands and the devices with soap and water. Dry the devices with paper towels, and discard the towels. -Any sheets or clothes soiled with bodily fluids should be machine-washed twice in hot water with regular laundry detergent. Do not hand wash.  If you cannot wash them right away, place them in a sealed plastic bag.  -Absorbable undergarments should be placed in sealed plastic bags for disposal.  -If caregivers accidentally come in contact with bodily fluids, they should wash the area of exposure several times with soapy water and

## 2023-06-21 LAB — HBV CORE AB SERPL QL IA: NEGATIVE

## 2023-06-26 RX ORDER — LISINOPRIL AND HYDROCHLOROTHIAZIDE 20; 12.5 MG/1; MG/1
TABLET ORAL
Qty: 90 TABLET | Refills: 0 | Status: SHIPPED | OUTPATIENT
Start: 2023-06-26

## 2023-06-26 RX ORDER — METOPROLOL SUCCINATE 100 MG/1
TABLET, EXTENDED RELEASE ORAL
Qty: 90 TABLET | Refills: 0 | Status: SHIPPED | OUTPATIENT
Start: 2023-06-26

## 2023-06-27 ENCOUNTER — HOSPITAL ENCOUNTER (OUTPATIENT)
Facility: HOSPITAL | Age: 78
Setting detail: INFUSION SERIES
End: 2023-06-27
Payer: MEDICARE

## 2023-06-27 ENCOUNTER — TELEPHONE (OUTPATIENT)
Age: 78
End: 2023-06-27

## 2023-06-27 VITALS
HEIGHT: 66 IN | DIASTOLIC BLOOD PRESSURE: 60 MMHG | HEART RATE: 75 BPM | TEMPERATURE: 97.9 F | WEIGHT: 200 LBS | SYSTOLIC BLOOD PRESSURE: 127 MMHG | RESPIRATION RATE: 16 BRPM | BODY MASS INDEX: 32.14 KG/M2

## 2023-06-27 DIAGNOSIS — C34.92 SQUAMOUS CELL CARCINOMA OF LUNG, LEFT (HCC): Primary | ICD-10-CM

## 2023-06-27 LAB
BASO+EOS+MONOS # BLD AUTO: 0.1 K/UL (ref 0.2–1.2)
BASO+EOS+MONOS NFR BLD AUTO: 3 % (ref 3.2–16.9)
DIFFERENTIAL METHOD BLD: ABNORMAL
ERYTHROCYTE [DISTWIDTH] IN BLOOD BY AUTOMATED COUNT: 13.3 % (ref 11.8–15.8)
HCT VFR BLD AUTO: 32.1 % (ref 35–47)
HGB BLD-MCNC: 10.4 G/DL (ref 11.5–16)
LYMPHOCYTES # BLD: 0.8 K/UL (ref 0.8–3.5)
LYMPHOCYTES NFR BLD: 41 % (ref 12–49)
MCH RBC QN AUTO: 29.1 PG (ref 26–34)
MCHC RBC AUTO-ENTMCNC: 32.4 G/DL (ref 30–36.5)
MCV RBC AUTO: 89.7 FL (ref 80–99)
NEUTS SEG # BLD: 1.1 K/UL (ref 1.8–8)
NEUTS SEG NFR BLD: 56 % (ref 32–75)
PLATELET # BLD AUTO: 150 K/UL (ref 150–400)
RBC # BLD AUTO: 3.58 M/UL (ref 3.8–5.2)
WBC # BLD AUTO: 2 K/UL (ref 3.6–11)

## 2023-06-27 PROCEDURE — 2580000003 HC RX 258: Performed by: INTERNAL MEDICINE

## 2023-06-27 PROCEDURE — 85025 COMPLETE CBC W/AUTO DIFF WBC: CPT

## 2023-06-27 PROCEDURE — 6360000002 HC RX W HCPCS: Performed by: INTERNAL MEDICINE

## 2023-06-27 PROCEDURE — 96413 CHEMO IV INFUSION 1 HR: CPT

## 2023-06-27 PROCEDURE — 36415 COLL VENOUS BLD VENIPUNCTURE: CPT

## 2023-06-27 PROCEDURE — 96375 TX/PRO/DX INJ NEW DRUG ADDON: CPT

## 2023-06-27 RX ORDER — SODIUM CHLORIDE 0.9 % (FLUSH) 0.9 %
5-40 SYRINGE (ML) INJECTION PRN
Status: DISCONTINUED | OUTPATIENT
Start: 2023-06-27 | End: 2023-06-28 | Stop reason: HOSPADM

## 2023-06-27 RX ORDER — SODIUM CHLORIDE 9 MG/ML
5-250 INJECTION, SOLUTION INTRAVENOUS PRN
Status: DISCONTINUED | OUTPATIENT
Start: 2023-06-27 | End: 2023-06-28 | Stop reason: HOSPADM

## 2023-06-27 RX ORDER — ONDANSETRON 2 MG/ML
8 INJECTION INTRAMUSCULAR; INTRAVENOUS ONCE
Status: COMPLETED | OUTPATIENT
Start: 2023-06-27 | End: 2023-06-27

## 2023-06-27 RX ADMIN — SODIUM CHLORIDE 25 ML/HR: 9 INJECTION, SOLUTION INTRAVENOUS at 12:13

## 2023-06-27 RX ADMIN — ONDANSETRON 8 MG: 2 INJECTION INTRAMUSCULAR; INTRAVENOUS at 12:13

## 2023-06-27 RX ADMIN — GEMCITABINE 2050 MG: 38 INJECTION, SOLUTION INTRAVENOUS at 13:02

## 2023-06-27 ASSESSMENT — PAIN SCALES - GENERAL: PAINLEVEL_OUTOF10: 0

## 2023-07-03 RX ORDER — DIPHENHYDRAMINE HYDROCHLORIDE 50 MG/ML
50 INJECTION INTRAMUSCULAR; INTRAVENOUS
Status: CANCELLED | OUTPATIENT
Start: 2023-07-11

## 2023-07-03 RX ORDER — ONDANSETRON 2 MG/ML
8 INJECTION INTRAMUSCULAR; INTRAVENOUS
Status: CANCELLED | OUTPATIENT
Start: 2023-07-11

## 2023-07-03 RX ORDER — ONDANSETRON 2 MG/ML
8 INJECTION INTRAMUSCULAR; INTRAVENOUS
Status: CANCELLED | OUTPATIENT
Start: 2023-07-26

## 2023-07-03 RX ORDER — SODIUM CHLORIDE 9 MG/ML
5-250 INJECTION, SOLUTION INTRAVENOUS PRN
Status: CANCELLED | OUTPATIENT
Start: 2023-07-11

## 2023-07-03 RX ORDER — SODIUM CHLORIDE 9 MG/ML
5-250 INJECTION, SOLUTION INTRAVENOUS PRN
Status: CANCELLED | OUTPATIENT
Start: 2023-07-26

## 2023-07-03 RX ORDER — MEPERIDINE HYDROCHLORIDE 25 MG/ML
12.5 INJECTION INTRAMUSCULAR; INTRAVENOUS; SUBCUTANEOUS PRN
Status: CANCELLED | OUTPATIENT
Start: 2023-07-26

## 2023-07-03 RX ORDER — DIPHENHYDRAMINE HYDROCHLORIDE 50 MG/ML
50 INJECTION INTRAMUSCULAR; INTRAVENOUS
Status: CANCELLED | OUTPATIENT
Start: 2023-07-26

## 2023-07-03 RX ORDER — ALBUTEROL SULFATE 90 UG/1
4 AEROSOL, METERED RESPIRATORY (INHALATION) PRN
Status: CANCELLED | OUTPATIENT
Start: 2023-07-11

## 2023-07-03 RX ORDER — ACETAMINOPHEN 325 MG/1
650 TABLET ORAL
Status: CANCELLED | OUTPATIENT
Start: 2023-07-11

## 2023-07-03 RX ORDER — SODIUM CHLORIDE 0.9 % (FLUSH) 0.9 %
5-40 SYRINGE (ML) INJECTION PRN
Status: CANCELLED | OUTPATIENT
Start: 2023-07-26

## 2023-07-03 RX ORDER — HEPARIN 100 UNIT/ML
500 SYRINGE INTRAVENOUS PRN
Status: CANCELLED | OUTPATIENT
Start: 2023-07-11

## 2023-07-03 RX ORDER — SODIUM CHLORIDE 9 MG/ML
INJECTION, SOLUTION INTRAVENOUS CONTINUOUS
Status: CANCELLED | OUTPATIENT
Start: 2023-07-11

## 2023-07-03 RX ORDER — MEPERIDINE HYDROCHLORIDE 25 MG/ML
12.5 INJECTION INTRAMUSCULAR; INTRAVENOUS; SUBCUTANEOUS PRN
Status: CANCELLED | OUTPATIENT
Start: 2023-07-11

## 2023-07-03 RX ORDER — ALBUTEROL SULFATE 90 UG/1
4 AEROSOL, METERED RESPIRATORY (INHALATION) PRN
Status: CANCELLED | OUTPATIENT
Start: 2023-07-26

## 2023-07-03 RX ORDER — EPINEPHRINE 1 MG/ML
0.3 INJECTION, SOLUTION, CONCENTRATE INTRAVENOUS PRN
Status: CANCELLED | OUTPATIENT
Start: 2023-07-26

## 2023-07-03 RX ORDER — HEPARIN 100 UNIT/ML
500 SYRINGE INTRAVENOUS PRN
Status: CANCELLED | OUTPATIENT
Start: 2023-07-26

## 2023-07-03 RX ORDER — SODIUM CHLORIDE 9 MG/ML
INJECTION, SOLUTION INTRAVENOUS CONTINUOUS
Status: CANCELLED | OUTPATIENT
Start: 2023-07-26

## 2023-07-03 RX ORDER — EPINEPHRINE 1 MG/ML
0.3 INJECTION, SOLUTION, CONCENTRATE INTRAVENOUS PRN
Status: CANCELLED | OUTPATIENT
Start: 2023-07-11

## 2023-07-03 RX ORDER — ACETAMINOPHEN 325 MG/1
650 TABLET ORAL
Status: CANCELLED | OUTPATIENT
Start: 2023-07-26

## 2023-07-03 RX ORDER — ONDANSETRON 2 MG/ML
8 INJECTION INTRAMUSCULAR; INTRAVENOUS ONCE
Status: CANCELLED | OUTPATIENT
Start: 2023-07-26 | End: 2023-07-18

## 2023-07-11 ENCOUNTER — OFFICE VISIT (OUTPATIENT)
Age: 78
End: 2023-07-11
Payer: MEDICARE

## 2023-07-11 ENCOUNTER — HOSPITAL ENCOUNTER (OUTPATIENT)
Facility: HOSPITAL | Age: 78
Setting detail: INFUSION SERIES
End: 2023-07-11
Payer: MEDICARE

## 2023-07-11 VITALS
BODY MASS INDEX: 32.09 KG/M2 | TEMPERATURE: 98.2 F | DIASTOLIC BLOOD PRESSURE: 85 MMHG | OXYGEN SATURATION: 100 % | HEART RATE: 88 BPM | WEIGHT: 199.7 LBS | SYSTOLIC BLOOD PRESSURE: 135 MMHG | RESPIRATION RATE: 18 BRPM | HEIGHT: 66 IN

## 2023-07-11 VITALS
SYSTOLIC BLOOD PRESSURE: 125 MMHG | HEART RATE: 86 BPM | WEIGHT: 199.7 LBS | HEIGHT: 66 IN | TEMPERATURE: 98.2 F | BODY MASS INDEX: 32.09 KG/M2 | OXYGEN SATURATION: 99 % | RESPIRATION RATE: 18 BRPM | DIASTOLIC BLOOD PRESSURE: 64 MMHG

## 2023-07-11 DIAGNOSIS — C34.92 SQUAMOUS CELL CARCINOMA OF LUNG, LEFT (HCC): Primary | ICD-10-CM

## 2023-07-11 DIAGNOSIS — E11.42 TYPE 2 DIABETES MELLITUS WITH DIABETIC POLYNEUROPATHY, WITH LONG-TERM CURRENT USE OF INSULIN (HCC): ICD-10-CM

## 2023-07-11 DIAGNOSIS — E11.22 TYPE 2 DIABETES MELLITUS WITH STAGE 3A CHRONIC KIDNEY DISEASE, WITH LONG-TERM CURRENT USE OF INSULIN (HCC): ICD-10-CM

## 2023-07-11 DIAGNOSIS — C34.92 MALIGNANT NEOPLASM OF UNSPECIFIED PART OF LEFT BRONCHUS OR LUNG (HCC): ICD-10-CM

## 2023-07-11 DIAGNOSIS — Z79.4 TYPE 2 DIABETES MELLITUS WITH DIABETIC POLYNEUROPATHY, WITH LONG-TERM CURRENT USE OF INSULIN (HCC): ICD-10-CM

## 2023-07-11 DIAGNOSIS — N18.31 STAGE 3A CHRONIC KIDNEY DISEASE (HCC): ICD-10-CM

## 2023-07-11 DIAGNOSIS — N18.31 TYPE 2 DIABETES MELLITUS WITH STAGE 3A CHRONIC KIDNEY DISEASE, WITH LONG-TERM CURRENT USE OF INSULIN (HCC): ICD-10-CM

## 2023-07-11 DIAGNOSIS — Z79.4 TYPE 2 DIABETES MELLITUS WITH STAGE 3A CHRONIC KIDNEY DISEASE, WITH LONG-TERM CURRENT USE OF INSULIN (HCC): ICD-10-CM

## 2023-07-11 PROBLEM — E11.40 TYPE 2 DIABETES MELLITUS WITH DIABETIC NEUROPATHY (HCC): Status: ACTIVE | Noted: 2023-07-11

## 2023-07-11 PROBLEM — D70.9 NEUTROPENIA (HCC): Status: ACTIVE | Noted: 2023-07-11

## 2023-07-11 LAB
ALBUMIN SERPL-MCNC: 3.5 G/DL (ref 3.5–5)
ALBUMIN/GLOB SERPL: 0.9 (ref 1.1–2.2)
ALP SERPL-CCNC: 96 U/L (ref 45–117)
ALT SERPL-CCNC: 20 U/L (ref 12–78)
ANION GAP SERPL CALC-SCNC: 6 MMOL/L (ref 5–15)
AST SERPL-CCNC: 17 U/L (ref 15–37)
BASOPHILS # BLD: 0 K/UL (ref 0–0.1)
BASOPHILS NFR BLD: 1 % (ref 0–1)
BILIRUB SERPL-MCNC: 0.5 MG/DL (ref 0.2–1)
BUN SERPL-MCNC: 19 MG/DL (ref 6–20)
BUN/CREAT SERPL: 12 (ref 12–20)
CALCIUM SERPL-MCNC: 9 MG/DL (ref 8.5–10.1)
CHLORIDE SERPL-SCNC: 108 MMOL/L (ref 97–108)
CO2 SERPL-SCNC: 25 MMOL/L (ref 21–32)
CREAT SERPL-MCNC: 1.55 MG/DL (ref 0.55–1.02)
DIFFERENTIAL METHOD BLD: ABNORMAL
EOSINOPHIL # BLD: 0.1 K/UL (ref 0–0.4)
EOSINOPHIL NFR BLD: 3 % (ref 0–7)
ERYTHROCYTE [DISTWIDTH] IN BLOOD BY AUTOMATED COUNT: 12.5 % (ref 11.5–14.5)
GLOBULIN SER CALC-MCNC: 3.8 G/DL (ref 2–4)
GLUCOSE SERPL-MCNC: 170 MG/DL (ref 65–100)
HCT VFR BLD AUTO: 29 % (ref 35–47)
HGB BLD-MCNC: 9.5 G/DL (ref 11.5–16)
IMM GRANULOCYTES # BLD AUTO: 0 K/UL (ref 0–0.04)
IMM GRANULOCYTES NFR BLD AUTO: 1 % (ref 0–0.5)
LYMPHOCYTES # BLD: 0.8 K/UL (ref 0.8–3.5)
LYMPHOCYTES NFR BLD: 25 % (ref 12–49)
MCH RBC QN AUTO: 29.3 PG (ref 26–34)
MCHC RBC AUTO-ENTMCNC: 32.8 G/DL (ref 30–36.5)
MCV RBC AUTO: 89.5 FL (ref 80–99)
MONOCYTES # BLD: 0.4 K/UL (ref 0–1)
MONOCYTES NFR BLD: 12 % (ref 5–13)
NEUTS SEG # BLD: 1.8 K/UL (ref 1.8–8)
NEUTS SEG NFR BLD: 58 % (ref 32–75)
NRBC # BLD: 0 K/UL (ref 0–0.01)
NRBC BLD-RTO: 0 PER 100 WBC
PLATELET # BLD AUTO: 414 K/UL (ref 150–400)
PMV BLD AUTO: 10.3 FL (ref 8.9–12.9)
POTASSIUM SERPL-SCNC: 3.9 MMOL/L (ref 3.5–5.1)
PROT SERPL-MCNC: 7.3 G/DL (ref 6.4–8.2)
RBC # BLD AUTO: 3.24 M/UL (ref 3.8–5.2)
RBC MORPH BLD: ABNORMAL
SODIUM SERPL-SCNC: 139 MMOL/L (ref 136–145)
WBC # BLD AUTO: 3.1 K/UL (ref 3.6–11)

## 2023-07-11 PROCEDURE — 1090F PRES/ABSN URINE INCON ASSESS: CPT | Performed by: INTERNAL MEDICINE

## 2023-07-11 PROCEDURE — 6360000002 HC RX W HCPCS: Performed by: INTERNAL MEDICINE

## 2023-07-11 PROCEDURE — 1036F TOBACCO NON-USER: CPT | Performed by: INTERNAL MEDICINE

## 2023-07-11 PROCEDURE — 96417 CHEMO IV INFUS EACH ADDL SEQ: CPT

## 2023-07-11 PROCEDURE — 99215 OFFICE O/P EST HI 40 MIN: CPT | Performed by: INTERNAL MEDICINE

## 2023-07-11 PROCEDURE — 96367 TX/PROPH/DG ADDL SEQ IV INF: CPT

## 2023-07-11 PROCEDURE — 85025 COMPLETE CBC W/AUTO DIFF WBC: CPT

## 2023-07-11 PROCEDURE — G8399 PT W/DXA RESULTS DOCUMENT: HCPCS | Performed by: INTERNAL MEDICINE

## 2023-07-11 PROCEDURE — 2580000003 HC RX 258: Performed by: INTERNAL MEDICINE

## 2023-07-11 PROCEDURE — 96413 CHEMO IV INFUSION 1 HR: CPT

## 2023-07-11 PROCEDURE — G8417 CALC BMI ABV UP PARAM F/U: HCPCS | Performed by: INTERNAL MEDICINE

## 2023-07-11 PROCEDURE — 3074F SYST BP LT 130 MM HG: CPT | Performed by: INTERNAL MEDICINE

## 2023-07-11 PROCEDURE — G8427 DOCREV CUR MEDS BY ELIG CLIN: HCPCS | Performed by: INTERNAL MEDICINE

## 2023-07-11 PROCEDURE — 36415 COLL VENOUS BLD VENIPUNCTURE: CPT

## 2023-07-11 PROCEDURE — 1123F ACP DISCUSS/DSCN MKR DOCD: CPT | Performed by: INTERNAL MEDICINE

## 2023-07-11 PROCEDURE — 96375 TX/PRO/DX INJ NEW DRUG ADDON: CPT

## 2023-07-11 PROCEDURE — 80053 COMPREHEN METABOLIC PANEL: CPT

## 2023-07-11 PROCEDURE — 3078F DIAST BP <80 MM HG: CPT | Performed by: INTERNAL MEDICINE

## 2023-07-11 RX ORDER — SODIUM CHLORIDE 9 MG/ML
5-250 INJECTION, SOLUTION INTRAVENOUS PRN
OUTPATIENT
Start: 2023-08-01

## 2023-07-11 RX ORDER — MEPERIDINE HYDROCHLORIDE 50 MG/ML
12.5 INJECTION INTRAMUSCULAR; INTRAVENOUS; SUBCUTANEOUS PRN
OUTPATIENT
Start: 2023-08-01

## 2023-07-11 RX ORDER — SODIUM CHLORIDE 9 MG/ML
INJECTION, SOLUTION INTRAVENOUS CONTINUOUS
OUTPATIENT
Start: 2023-08-08

## 2023-07-11 RX ORDER — SODIUM CHLORIDE 9 MG/ML
INJECTION, SOLUTION INTRAVENOUS CONTINUOUS
OUTPATIENT
Start: 2023-08-01

## 2023-07-11 RX ORDER — ALBUTEROL SULFATE 90 UG/1
4 AEROSOL, METERED RESPIRATORY (INHALATION) PRN
OUTPATIENT
Start: 2023-08-08

## 2023-07-11 RX ORDER — ONDANSETRON 2 MG/ML
8 INJECTION INTRAMUSCULAR; INTRAVENOUS ONCE
OUTPATIENT
Start: 2023-08-08 | End: 2023-08-08

## 2023-07-11 RX ORDER — MEPERIDINE HYDROCHLORIDE 50 MG/ML
12.5 INJECTION INTRAMUSCULAR; INTRAVENOUS; SUBCUTANEOUS PRN
OUTPATIENT
Start: 2023-08-08

## 2023-07-11 RX ORDER — SODIUM CHLORIDE 9 MG/ML
5-250 INJECTION, SOLUTION INTRAVENOUS PRN
OUTPATIENT
Start: 2023-08-08

## 2023-07-11 RX ORDER — PALONOSETRON 0.05 MG/ML
0.25 INJECTION, SOLUTION INTRAVENOUS ONCE
Status: COMPLETED | OUTPATIENT
Start: 2023-07-11 | End: 2023-07-11

## 2023-07-11 RX ORDER — EPINEPHRINE 1 MG/ML
0.3 INJECTION, SOLUTION, CONCENTRATE INTRAVENOUS PRN
OUTPATIENT
Start: 2023-08-08

## 2023-07-11 RX ORDER — SODIUM CHLORIDE 0.9 % (FLUSH) 0.9 %
5-40 SYRINGE (ML) INJECTION PRN
OUTPATIENT
Start: 2023-08-08

## 2023-07-11 RX ORDER — DIPHENHYDRAMINE HYDROCHLORIDE 50 MG/ML
50 INJECTION INTRAMUSCULAR; INTRAVENOUS
OUTPATIENT
Start: 2023-08-01

## 2023-07-11 RX ORDER — PALONOSETRON 0.05 MG/ML
0.25 INJECTION, SOLUTION INTRAVENOUS ONCE
OUTPATIENT
Start: 2023-08-01 | End: 2023-08-01

## 2023-07-11 RX ORDER — ACETAMINOPHEN 325 MG/1
650 TABLET ORAL
OUTPATIENT
Start: 2023-08-08

## 2023-07-11 RX ORDER — ONDANSETRON 2 MG/ML
8 INJECTION INTRAMUSCULAR; INTRAVENOUS
OUTPATIENT
Start: 2023-08-01

## 2023-07-11 RX ORDER — HEPARIN SODIUM (PORCINE) LOCK FLUSH IV SOLN 100 UNIT/ML 100 UNIT/ML
500 SOLUTION INTRAVENOUS PRN
OUTPATIENT
Start: 2023-08-01

## 2023-07-11 RX ORDER — FAMOTIDINE 10 MG/ML
20 INJECTION, SOLUTION INTRAVENOUS
OUTPATIENT
Start: 2023-08-01

## 2023-07-11 RX ORDER — HEPARIN SODIUM (PORCINE) LOCK FLUSH IV SOLN 100 UNIT/ML 100 UNIT/ML
500 SOLUTION INTRAVENOUS PRN
OUTPATIENT
Start: 2023-08-08

## 2023-07-11 RX ORDER — LIDOCAINE AND PRILOCAINE 25; 25 MG/G; MG/G
CREAM TOPICAL
Qty: 1 EACH | Refills: 3 | Status: SHIPPED | OUTPATIENT
Start: 2023-07-11

## 2023-07-11 RX ORDER — SODIUM CHLORIDE 9 MG/ML
5-250 INJECTION, SOLUTION INTRAVENOUS PRN
Status: DISCONTINUED | OUTPATIENT
Start: 2023-07-11 | End: 2023-07-12 | Stop reason: HOSPADM

## 2023-07-11 RX ORDER — DIPHENHYDRAMINE HYDROCHLORIDE 50 MG/ML
50 INJECTION INTRAMUSCULAR; INTRAVENOUS
OUTPATIENT
Start: 2023-08-08

## 2023-07-11 RX ORDER — SODIUM CHLORIDE 0.9 % (FLUSH) 0.9 %
5-40 SYRINGE (ML) INJECTION PRN
Status: DISCONTINUED | OUTPATIENT
Start: 2023-07-11 | End: 2023-07-12 | Stop reason: HOSPADM

## 2023-07-11 RX ORDER — ALBUTEROL SULFATE 90 UG/1
4 AEROSOL, METERED RESPIRATORY (INHALATION) PRN
OUTPATIENT
Start: 2023-08-01

## 2023-07-11 RX ORDER — FAMOTIDINE 10 MG/ML
20 INJECTION, SOLUTION INTRAVENOUS
OUTPATIENT
Start: 2023-08-08

## 2023-07-11 RX ORDER — EPINEPHRINE 1 MG/ML
0.3 INJECTION, SOLUTION, CONCENTRATE INTRAVENOUS PRN
OUTPATIENT
Start: 2023-08-01

## 2023-07-11 RX ORDER — ACETAMINOPHEN 325 MG/1
650 TABLET ORAL
OUTPATIENT
Start: 2023-08-01

## 2023-07-11 RX ORDER — SODIUM CHLORIDE 0.9 % (FLUSH) 0.9 %
5-40 SYRINGE (ML) INJECTION PRN
OUTPATIENT
Start: 2023-08-01

## 2023-07-11 RX ORDER — ONDANSETRON 2 MG/ML
8 INJECTION INTRAMUSCULAR; INTRAVENOUS
OUTPATIENT
Start: 2023-08-08

## 2023-07-11 RX ADMIN — FOSAPREPITANT 150 MG: 150 INJECTION, POWDER, LYOPHILIZED, FOR SOLUTION INTRAVENOUS at 11:14

## 2023-07-11 RX ADMIN — PALONOSETRON HYDROCHLORIDE 0.25 MG: 0.25 INJECTION INTRAVENOUS at 10:54

## 2023-07-11 RX ADMIN — SODIUM CHLORIDE 25 ML/HR: 9 INJECTION, SOLUTION INTRAVENOUS at 10:51

## 2023-07-11 RX ADMIN — DEXAMETHASONE SODIUM PHOSPHATE 12 MG: 4 INJECTION, SOLUTION INTRAMUSCULAR; INTRAVENOUS at 10:55

## 2023-07-11 RX ADMIN — CARBOPLATIN 294.5 MG: 10 INJECTION, SOLUTION INTRAVENOUS at 12:47

## 2023-07-11 RX ADMIN — GEMCITABINE 2050 MG: 38 INJECTION, SOLUTION INTRAVENOUS at 12:12

## 2023-07-11 ASSESSMENT — PAIN SCALES - GENERAL: PAINLEVEL_OUTOF10: 0

## 2023-07-12 ENCOUNTER — TELEPHONE (OUTPATIENT)
Age: 78
End: 2023-07-12

## 2023-07-18 ENCOUNTER — HOSPITAL ENCOUNTER (OUTPATIENT)
Facility: HOSPITAL | Age: 78
Setting detail: INFUSION SERIES
End: 2023-07-18
Payer: MEDICARE

## 2023-07-18 VITALS
WEIGHT: 197.2 LBS | RESPIRATION RATE: 17 BRPM | TEMPERATURE: 96.9 F | HEART RATE: 92 BPM | HEIGHT: 66 IN | SYSTOLIC BLOOD PRESSURE: 115 MMHG | DIASTOLIC BLOOD PRESSURE: 68 MMHG | OXYGEN SATURATION: 95 % | BODY MASS INDEX: 31.69 KG/M2

## 2023-07-18 DIAGNOSIS — C34.92 MALIGNANT NEOPLASM OF UNSPECIFIED PART OF LEFT BRONCHUS OR LUNG (HCC): ICD-10-CM

## 2023-07-18 LAB
BASOPHILS # BLD: 0 K/UL (ref 0–0.1)
BASOPHILS NFR BLD: 0 % (ref 0–1)
DIFFERENTIAL METHOD BLD: ABNORMAL
EOSINOPHIL # BLD: 0 K/UL (ref 0–0.4)
EOSINOPHIL NFR BLD: 1 % (ref 0–7)
ERYTHROCYTE [DISTWIDTH] IN BLOOD BY AUTOMATED COUNT: 13.2 % (ref 11.5–14.5)
HCT VFR BLD AUTO: 28 % (ref 35–47)
HGB BLD-MCNC: 9.3 G/DL (ref 11.5–16)
IMM GRANULOCYTES # BLD AUTO: 0 K/UL
IMM GRANULOCYTES NFR BLD AUTO: 0 %
LYMPHOCYTES # BLD: 0.8 K/UL (ref 0.8–3.5)
LYMPHOCYTES NFR BLD: 54 % (ref 12–49)
MCH RBC QN AUTO: 30 PG (ref 26–34)
MCHC RBC AUTO-ENTMCNC: 33.2 G/DL (ref 30–36.5)
MCV RBC AUTO: 90.3 FL (ref 80–99)
METAMYELOCYTES NFR BLD MANUAL: 1 %
MONOCYTES # BLD: 0.1 K/UL (ref 0–1)
MONOCYTES NFR BLD: 4 % (ref 5–13)
NEUTS BAND NFR BLD MANUAL: 1 % (ref 0–6)
NEUTS SEG # BLD: 0.6 K/UL (ref 1.8–8)
NEUTS SEG NFR BLD: 39 % (ref 32–75)
NRBC # BLD: 0 K/UL (ref 0–0.01)
NRBC BLD-RTO: 0 PER 100 WBC
PLATELET # BLD AUTO: 378 K/UL (ref 150–400)
PMV BLD AUTO: 10.1 FL (ref 8.9–12.9)
RBC # BLD AUTO: 3.1 M/UL (ref 3.8–5.2)
RBC MORPH BLD: ABNORMAL
WBC # BLD AUTO: 1.4 K/UL (ref 3.6–11)

## 2023-07-18 PROCEDURE — 36591 DRAW BLOOD OFF VENOUS DEVICE: CPT

## 2023-07-18 PROCEDURE — 85025 COMPLETE CBC W/AUTO DIFF WBC: CPT

## 2023-07-18 ASSESSMENT — PAIN SCALES - GENERAL: PAINLEVEL_OUTOF10: 0

## 2023-07-26 DIAGNOSIS — C34.92 MALIGNANT NEOPLASM OF UNSPECIFIED PART OF LEFT BRONCHUS OR LUNG (HCC): Primary | ICD-10-CM

## 2023-08-01 ENCOUNTER — HOSPITAL ENCOUNTER (OUTPATIENT)
Facility: HOSPITAL | Age: 78
Setting detail: INFUSION SERIES
End: 2023-08-01
Payer: MEDICARE

## 2023-08-01 ENCOUNTER — OFFICE VISIT (OUTPATIENT)
Age: 78
End: 2023-08-01
Payer: MEDICARE

## 2023-08-01 VITALS
SYSTOLIC BLOOD PRESSURE: 136 MMHG | RESPIRATION RATE: 18 BRPM | WEIGHT: 199 LBS | DIASTOLIC BLOOD PRESSURE: 74 MMHG | TEMPERATURE: 97.4 F | HEART RATE: 82 BPM | HEIGHT: 66 IN | BODY MASS INDEX: 31.98 KG/M2 | OXYGEN SATURATION: 97 %

## 2023-08-01 VITALS
BODY MASS INDEX: 32.11 KG/M2 | SYSTOLIC BLOOD PRESSURE: 143 MMHG | RESPIRATION RATE: 18 BRPM | HEIGHT: 66 IN | OXYGEN SATURATION: 97 % | TEMPERATURE: 97.2 F | HEART RATE: 83 BPM | DIASTOLIC BLOOD PRESSURE: 71 MMHG | WEIGHT: 199.8 LBS

## 2023-08-01 DIAGNOSIS — C34.92 MALIGNANT NEOPLASM OF UNSPECIFIED PART OF LEFT BRONCHUS OR LUNG (HCC): Primary | ICD-10-CM

## 2023-08-01 DIAGNOSIS — C34.92 SQUAMOUS CELL CARCINOMA OF LUNG, LEFT (HCC): Primary | ICD-10-CM

## 2023-08-01 DIAGNOSIS — G62.9 NEUROPATHY: ICD-10-CM

## 2023-08-01 DIAGNOSIS — T45.1X5A CHEMOTHERAPY INDUCED NEUTROPENIA (HCC): ICD-10-CM

## 2023-08-01 DIAGNOSIS — N18.31 STAGE 3A CHRONIC KIDNEY DISEASE (HCC): ICD-10-CM

## 2023-08-01 DIAGNOSIS — Z79.4 TYPE 2 DIABETES MELLITUS WITH DIABETIC POLYNEUROPATHY, WITH LONG-TERM CURRENT USE OF INSULIN (HCC): ICD-10-CM

## 2023-08-01 DIAGNOSIS — E11.42 TYPE 2 DIABETES MELLITUS WITH DIABETIC POLYNEUROPATHY, WITH LONG-TERM CURRENT USE OF INSULIN (HCC): ICD-10-CM

## 2023-08-01 DIAGNOSIS — Z51.11 CHEMOTHERAPY MANAGEMENT, ENCOUNTER FOR: ICD-10-CM

## 2023-08-01 DIAGNOSIS — D70.1 CHEMOTHERAPY INDUCED NEUTROPENIA (HCC): ICD-10-CM

## 2023-08-01 DIAGNOSIS — K59.03 DRUG-INDUCED CONSTIPATION: ICD-10-CM

## 2023-08-01 LAB
ALBUMIN SERPL-MCNC: 3.6 G/DL (ref 3.5–5)
ALBUMIN/GLOB SERPL: 1 (ref 1.1–2.2)
ALP SERPL-CCNC: 88 U/L (ref 45–117)
ALT SERPL-CCNC: 21 U/L (ref 12–78)
ANION GAP SERPL CALC-SCNC: 5 MMOL/L (ref 5–15)
AST SERPL-CCNC: 17 U/L (ref 15–37)
BASOPHILS # BLD: 0 K/UL (ref 0–0.1)
BASOPHILS NFR BLD: 1 % (ref 0–1)
BILIRUB SERPL-MCNC: 0.3 MG/DL (ref 0.2–1)
BUN SERPL-MCNC: 24 MG/DL (ref 6–20)
BUN/CREAT SERPL: 18 (ref 12–20)
CALCIUM SERPL-MCNC: 9.3 MG/DL (ref 8.5–10.1)
CHLORIDE SERPL-SCNC: 109 MMOL/L (ref 97–108)
CO2 SERPL-SCNC: 25 MMOL/L (ref 21–32)
CREAT SERPL-MCNC: 1.36 MG/DL (ref 0.55–1.02)
DIFFERENTIAL METHOD BLD: ABNORMAL
EOSINOPHIL # BLD: 0.2 K/UL (ref 0–0.4)
EOSINOPHIL NFR BLD: 6 % (ref 0–7)
ERYTHROCYTE [DISTWIDTH] IN BLOOD BY AUTOMATED COUNT: 15.6 % (ref 11.5–14.5)
GLOBULIN SER CALC-MCNC: 3.5 G/DL (ref 2–4)
GLUCOSE SERPL-MCNC: 188 MG/DL (ref 65–100)
HCT VFR BLD AUTO: 29.2 % (ref 35–47)
HGB BLD-MCNC: 9.6 G/DL (ref 11.5–16)
IMM GRANULOCYTES # BLD AUTO: 0 K/UL (ref 0–0.04)
IMM GRANULOCYTES NFR BLD AUTO: 1 % (ref 0–0.5)
LYMPHOCYTES # BLD: 0.9 K/UL (ref 0.8–3.5)
LYMPHOCYTES NFR BLD: 32 % (ref 12–49)
MCH RBC QN AUTO: 29.7 PG (ref 26–34)
MCHC RBC AUTO-ENTMCNC: 32.9 G/DL (ref 30–36.5)
MCV RBC AUTO: 90.4 FL (ref 80–99)
MONOCYTES # BLD: 0.6 K/UL (ref 0–1)
MONOCYTES NFR BLD: 19 % (ref 5–13)
NEUTS SEG # BLD: 1.2 K/UL (ref 1.8–8)
NEUTS SEG NFR BLD: 41 % (ref 32–75)
NRBC # BLD: 0 K/UL (ref 0–0.01)
NRBC BLD-RTO: 0 PER 100 WBC
PLATELET # BLD AUTO: 201 K/UL (ref 150–400)
PMV BLD AUTO: 10.9 FL (ref 8.9–12.9)
POTASSIUM SERPL-SCNC: 4 MMOL/L (ref 3.5–5.1)
PROT SERPL-MCNC: 7.1 G/DL (ref 6.4–8.2)
RBC # BLD AUTO: 3.23 M/UL (ref 3.8–5.2)
SODIUM SERPL-SCNC: 139 MMOL/L (ref 136–145)
WBC # BLD AUTO: 2.9 K/UL (ref 3.6–11)

## 2023-08-01 PROCEDURE — 1090F PRES/ABSN URINE INCON ASSESS: CPT | Performed by: INTERNAL MEDICINE

## 2023-08-01 PROCEDURE — 96417 CHEMO IV INFUS EACH ADDL SEQ: CPT

## 2023-08-01 PROCEDURE — 96413 CHEMO IV INFUSION 1 HR: CPT

## 2023-08-01 PROCEDURE — 99215 OFFICE O/P EST HI 40 MIN: CPT | Performed by: INTERNAL MEDICINE

## 2023-08-01 PROCEDURE — 3078F DIAST BP <80 MM HG: CPT | Performed by: INTERNAL MEDICINE

## 2023-08-01 PROCEDURE — 96366 THER/PROPH/DIAG IV INF ADDON: CPT

## 2023-08-01 PROCEDURE — 96374 THER/PROPH/DIAG INJ IV PUSH: CPT

## 2023-08-01 PROCEDURE — 80053 COMPREHEN METABOLIC PANEL: CPT

## 2023-08-01 PROCEDURE — 96375 TX/PRO/DX INJ NEW DRUG ADDON: CPT

## 2023-08-01 PROCEDURE — G8427 DOCREV CUR MEDS BY ELIG CLIN: HCPCS | Performed by: INTERNAL MEDICINE

## 2023-08-01 PROCEDURE — 96367 TX/PROPH/DG ADDL SEQ IV INF: CPT

## 2023-08-01 PROCEDURE — 1036F TOBACCO NON-USER: CPT | Performed by: INTERNAL MEDICINE

## 2023-08-01 PROCEDURE — 1123F ACP DISCUSS/DSCN MKR DOCD: CPT | Performed by: INTERNAL MEDICINE

## 2023-08-01 PROCEDURE — 6360000002 HC RX W HCPCS: Performed by: INTERNAL MEDICINE

## 2023-08-01 PROCEDURE — G8417 CALC BMI ABV UP PARAM F/U: HCPCS | Performed by: INTERNAL MEDICINE

## 2023-08-01 PROCEDURE — 85025 COMPLETE CBC W/AUTO DIFF WBC: CPT

## 2023-08-01 PROCEDURE — 3075F SYST BP GE 130 - 139MM HG: CPT | Performed by: INTERNAL MEDICINE

## 2023-08-01 PROCEDURE — 2580000003 HC RX 258: Performed by: INTERNAL MEDICINE

## 2023-08-01 PROCEDURE — G8399 PT W/DXA RESULTS DOCUMENT: HCPCS | Performed by: INTERNAL MEDICINE

## 2023-08-01 RX ORDER — SODIUM CHLORIDE 9 MG/ML
5-250 INJECTION, SOLUTION INTRAVENOUS PRN
Status: DISCONTINUED | OUTPATIENT
Start: 2023-08-01 | End: 2023-08-02 | Stop reason: HOSPADM

## 2023-08-01 RX ORDER — PALONOSETRON 0.05 MG/ML
0.25 INJECTION, SOLUTION INTRAVENOUS ONCE
Status: COMPLETED | OUTPATIENT
Start: 2023-08-01 | End: 2023-08-01

## 2023-08-01 RX ORDER — SODIUM CHLORIDE 0.9 % (FLUSH) 0.9 %
5-40 SYRINGE (ML) INJECTION PRN
Status: DISCONTINUED | OUTPATIENT
Start: 2023-08-01 | End: 2023-08-02 | Stop reason: HOSPADM

## 2023-08-01 RX ORDER — DOCUSATE SODIUM 100 MG/1
100 CAPSULE, LIQUID FILLED ORAL 2 TIMES DAILY PRN
Qty: 60 CAPSULE | Refills: 0 | Status: SHIPPED | OUTPATIENT
Start: 2023-08-01

## 2023-08-01 RX ADMIN — FOSAPREPITANT 150 MG: 150 INJECTION, POWDER, LYOPHILIZED, FOR SOLUTION INTRAVENOUS at 10:51

## 2023-08-01 RX ADMIN — DEXAMETHASONE SODIUM PHOSPHATE 12 MG: 4 INJECTION, SOLUTION INTRAMUSCULAR; INTRAVENOUS at 11:15

## 2023-08-01 RX ADMIN — GEMCITABINE HYDROCHLORIDE 2050 MG: 200 INJECTION, SOLUTION INTRAVENOUS at 12:01

## 2023-08-01 RX ADMIN — CARBOPLATIN 318 MG: 450 INJECTION, SOLUTION INTRAVENOUS at 12:34

## 2023-08-01 RX ADMIN — SODIUM CHLORIDE 25 ML/HR: 9 INJECTION, SOLUTION INTRAVENOUS at 10:45

## 2023-08-01 RX ADMIN — SODIUM CHLORIDE, PRESERVATIVE FREE 20 ML: 5 INJECTION INTRAVENOUS at 13:10

## 2023-08-01 RX ADMIN — PALONOSETRON HYDROCHLORIDE 0.25 MG: 0.25 INJECTION INTRAVENOUS at 10:48

## 2023-08-01 ASSESSMENT — PAIN SCALES - GENERAL: PAINLEVEL_OUTOF10: 0

## 2023-08-08 ENCOUNTER — HOSPITAL ENCOUNTER (OUTPATIENT)
Facility: HOSPITAL | Age: 78
Setting detail: INFUSION SERIES
End: 2023-08-08
Payer: MEDICARE

## 2023-08-08 VITALS
OXYGEN SATURATION: 96 % | HEART RATE: 85 BPM | TEMPERATURE: 97.3 F | RESPIRATION RATE: 18 BRPM | HEIGHT: 66 IN | DIASTOLIC BLOOD PRESSURE: 72 MMHG | BODY MASS INDEX: 31.9 KG/M2 | SYSTOLIC BLOOD PRESSURE: 143 MMHG | WEIGHT: 198.5 LBS

## 2023-08-08 DIAGNOSIS — C34.92 MALIGNANT NEOPLASM OF UNSPECIFIED PART OF LEFT BRONCHUS OR LUNG (HCC): Primary | ICD-10-CM

## 2023-08-08 LAB
BASOPHILS # BLD: 0 K/UL (ref 0–0.1)
BASOPHILS NFR BLD: 1 % (ref 0–1)
DIFFERENTIAL METHOD BLD: ABNORMAL
EOSINOPHIL # BLD: 0 K/UL (ref 0–0.4)
EOSINOPHIL NFR BLD: 0 % (ref 0–7)
ERYTHROCYTE [DISTWIDTH] IN BLOOD BY AUTOMATED COUNT: 15.3 % (ref 11.5–14.5)
HCT VFR BLD AUTO: 28.1 % (ref 35–47)
HGB BLD-MCNC: 9.3 G/DL (ref 11.5–16)
IMM GRANULOCYTES # BLD AUTO: 0 K/UL
IMM GRANULOCYTES NFR BLD AUTO: 0 %
LYMPHOCYTES # BLD: 0.8 K/UL (ref 0.8–3.5)
LYMPHOCYTES NFR BLD: 41 % (ref 12–49)
MCH RBC QN AUTO: 30 PG (ref 26–34)
MCHC RBC AUTO-ENTMCNC: 33.1 G/DL (ref 30–36.5)
MCV RBC AUTO: 90.6 FL (ref 80–99)
MONOCYTES # BLD: 0 K/UL (ref 0–1)
MONOCYTES NFR BLD: 0 % (ref 5–13)
NEUTS SEG # BLD: 1.1 K/UL (ref 1.8–8)
NEUTS SEG NFR BLD: 58 % (ref 32–75)
NRBC # BLD: 0 K/UL (ref 0–0.01)
NRBC BLD-RTO: 0 PER 100 WBC
PLATELET # BLD AUTO: 125 K/UL (ref 150–400)
PMV BLD AUTO: 10.9 FL (ref 8.9–12.9)
RBC # BLD AUTO: 3.1 M/UL (ref 3.8–5.2)
RBC MORPH BLD: ABNORMAL
WBC # BLD AUTO: 1.9 K/UL (ref 3.6–11)

## 2023-08-08 PROCEDURE — 6360000002 HC RX W HCPCS: Performed by: INTERNAL MEDICINE

## 2023-08-08 PROCEDURE — 2580000003 HC RX 258: Performed by: INTERNAL MEDICINE

## 2023-08-08 PROCEDURE — 96375 TX/PRO/DX INJ NEW DRUG ADDON: CPT

## 2023-08-08 PROCEDURE — 85025 COMPLETE CBC W/AUTO DIFF WBC: CPT

## 2023-08-08 PROCEDURE — 96413 CHEMO IV INFUSION 1 HR: CPT

## 2023-08-08 RX ORDER — SODIUM CHLORIDE 0.9 % (FLUSH) 0.9 %
5-40 SYRINGE (ML) INJECTION PRN
Status: DISCONTINUED | OUTPATIENT
Start: 2023-08-08 | End: 2023-08-09 | Stop reason: HOSPADM

## 2023-08-08 RX ORDER — SODIUM CHLORIDE 9 MG/ML
5-250 INJECTION, SOLUTION INTRAVENOUS PRN
Status: DISCONTINUED | OUTPATIENT
Start: 2023-08-08 | End: 2023-08-09 | Stop reason: HOSPADM

## 2023-08-08 RX ORDER — ONDANSETRON 2 MG/ML
8 INJECTION INTRAMUSCULAR; INTRAVENOUS ONCE
Status: COMPLETED | OUTPATIENT
Start: 2023-08-08 | End: 2023-08-08

## 2023-08-08 RX ADMIN — SODIUM CHLORIDE, PRESERVATIVE FREE 10 ML: 5 INJECTION INTRAVENOUS at 12:14

## 2023-08-08 RX ADMIN — SODIUM CHLORIDE, PRESERVATIVE FREE 10 ML: 5 INJECTION INTRAVENOUS at 12:15

## 2023-08-08 RX ADMIN — GEMCITABINE 2050 MG: 38 INJECTION, SOLUTION INTRAVENOUS at 11:40

## 2023-08-08 RX ADMIN — ONDANSETRON 8 MG: 2 INJECTION INTRAMUSCULAR; INTRAVENOUS at 10:57

## 2023-08-08 RX ADMIN — SODIUM CHLORIDE 25 ML/HR: 9 INJECTION, SOLUTION INTRAVENOUS at 10:55

## 2023-08-08 ASSESSMENT — PAIN DESCRIPTION - LOCATION: LOCATION: GROIN

## 2023-08-08 ASSESSMENT — PAIN DESCRIPTION - ORIENTATION: ORIENTATION: RIGHT

## 2023-08-08 ASSESSMENT — PAIN SCALES - GENERAL: PAINLEVEL_OUTOF10: 3

## 2023-08-08 ASSESSMENT — PAIN DESCRIPTION - DESCRIPTORS: DESCRIPTORS: ACHING

## 2023-08-10 ENCOUNTER — TELEPHONE (OUTPATIENT)
Age: 78
End: 2023-08-10

## 2023-08-10 DIAGNOSIS — M79.604 ACUTE LEG PAIN, RIGHT: Primary | ICD-10-CM

## 2023-08-10 NOTE — TELEPHONE ENCOUNTER
08/10/23 11:29 AM Return call placed to patient. Verified patient ID x 2. Patient with complaints of right leg pain, between thigh and knee, x 4 days. Patient reports that pain is getting a little worse. Patient has attempted heating pad and taking Tylenol  mg every 4 hours, no relief of symptoms. Patient states she just feels tired, but also not sleeping well at night. Patient initially thought pain was related to neuropathy, but describes sensation as aching. Denies numbness/tingling. Also denies swelling in extremity as well as denies erythema. Advised this nurse would forward above to MADDIE Paz, and call back with further recommendations. Patient voiced understanding. 12:45 PM Called patient. Advised of recommendations per MADDIE Paz. Patient voiced understanding. She prefers to attempt increased dose of Tylenol, patient stated that Tramadol has not helped in the past. Advised that patient should take Tylenol extra strength, rather than Tylenol PM. Also provided her with contact number for central scheduling to proceed with scheduling doppler ultrasound. No further questions or concerns at this time.

## 2023-08-10 NOTE — TELEPHONE ENCOUNTER
Patient called and stated that she is still in a lot of pain and would like to know if she can get something.   #[phone]

## 2023-08-11 ENCOUNTER — HOSPITAL ENCOUNTER (OUTPATIENT)
Facility: HOSPITAL | Age: 78
End: 2023-08-11
Payer: MEDICARE

## 2023-08-11 DIAGNOSIS — M79.604 ACUTE LEG PAIN, RIGHT: ICD-10-CM

## 2023-08-11 PROCEDURE — 93971 EXTREMITY STUDY: CPT

## 2023-08-14 ENCOUNTER — HOSPITAL ENCOUNTER (OUTPATIENT)
Facility: HOSPITAL | Age: 78
Discharge: HOME | End: 2023-08-17
Payer: MEDICARE

## 2023-08-14 ENCOUNTER — OFFICE VISIT (OUTPATIENT)
Age: 78
End: 2023-08-14
Payer: MEDICARE

## 2023-08-14 VITALS
SYSTOLIC BLOOD PRESSURE: 100 MMHG | HEIGHT: 66 IN | BODY MASS INDEX: 31.76 KG/M2 | RESPIRATION RATE: 16 BRPM | OXYGEN SATURATION: 99 % | HEART RATE: 89 BPM | TEMPERATURE: 98.6 F | WEIGHT: 197.6 LBS | DIASTOLIC BLOOD PRESSURE: 61 MMHG

## 2023-08-14 DIAGNOSIS — C34.92 SQUAMOUS CELL CARCINOMA OF LUNG, LEFT (HCC): ICD-10-CM

## 2023-08-14 DIAGNOSIS — M79.651 RIGHT THIGH PAIN: ICD-10-CM

## 2023-08-14 DIAGNOSIS — G62.0 CHEMOTHERAPY-INDUCED NEUROPATHY (HCC): ICD-10-CM

## 2023-08-14 DIAGNOSIS — D70.1 CHEMOTHERAPY INDUCED NEUTROPENIA (HCC): ICD-10-CM

## 2023-08-14 DIAGNOSIS — R10.31 RIGHT GROIN PAIN: ICD-10-CM

## 2023-08-14 DIAGNOSIS — R10.31 RIGHT GROIN PAIN: Primary | ICD-10-CM

## 2023-08-14 DIAGNOSIS — T45.1X5A CHEMOTHERAPY-INDUCED NEUROPATHY (HCC): ICD-10-CM

## 2023-08-14 DIAGNOSIS — T45.1X5A CHEMOTHERAPY INDUCED NEUTROPENIA (HCC): ICD-10-CM

## 2023-08-14 PROCEDURE — 1036F TOBACCO NON-USER: CPT | Performed by: INTERNAL MEDICINE

## 2023-08-14 PROCEDURE — 3078F DIAST BP <80 MM HG: CPT | Performed by: INTERNAL MEDICINE

## 2023-08-14 PROCEDURE — 99214 OFFICE O/P EST MOD 30 MIN: CPT | Performed by: INTERNAL MEDICINE

## 2023-08-14 PROCEDURE — 3074F SYST BP LT 130 MM HG: CPT | Performed by: INTERNAL MEDICINE

## 2023-08-14 PROCEDURE — G8427 DOCREV CUR MEDS BY ELIG CLIN: HCPCS | Performed by: INTERNAL MEDICINE

## 2023-08-14 PROCEDURE — 1123F ACP DISCUSS/DSCN MKR DOCD: CPT | Performed by: INTERNAL MEDICINE

## 2023-08-14 PROCEDURE — 73502 X-RAY EXAM HIP UNI 2-3 VIEWS: CPT

## 2023-08-14 PROCEDURE — G8399 PT W/DXA RESULTS DOCUMENT: HCPCS | Performed by: INTERNAL MEDICINE

## 2023-08-14 PROCEDURE — 73552 X-RAY EXAM OF FEMUR 2/>: CPT

## 2023-08-14 PROCEDURE — G8417 CALC BMI ABV UP PARAM F/U: HCPCS | Performed by: INTERNAL MEDICINE

## 2023-08-14 PROCEDURE — 1090F PRES/ABSN URINE INCON ASSESS: CPT | Performed by: INTERNAL MEDICINE

## 2023-08-14 RX ORDER — LIDOCAINE 50 MG/G
1 PATCH TOPICAL DAILY
Qty: 10 PATCH | Refills: 0 | Status: SHIPPED | OUTPATIENT
Start: 2023-08-14 | End: 2023-08-24

## 2023-08-14 RX ORDER — DULOXETIN HYDROCHLORIDE 30 MG/1
30 CAPSULE, DELAYED RELEASE ORAL DAILY
Qty: 30 CAPSULE | Refills: 1 | Status: SHIPPED | OUTPATIENT
Start: 2023-08-14 | End: 2023-08-18 | Stop reason: ALTCHOICE

## 2023-08-15 RX ORDER — MEPERIDINE HYDROCHLORIDE 25 MG/ML
12.5 INJECTION INTRAMUSCULAR; INTRAVENOUS; SUBCUTANEOUS PRN
Status: CANCELLED | OUTPATIENT
Start: 2023-08-22

## 2023-08-15 RX ORDER — DIPHENHYDRAMINE HYDROCHLORIDE 50 MG/ML
50 INJECTION INTRAMUSCULAR; INTRAVENOUS
Status: CANCELLED | OUTPATIENT
Start: 2023-08-29

## 2023-08-15 RX ORDER — MEPERIDINE HYDROCHLORIDE 25 MG/ML
12.5 INJECTION INTRAMUSCULAR; INTRAVENOUS; SUBCUTANEOUS PRN
Status: CANCELLED | OUTPATIENT
Start: 2023-08-29

## 2023-08-15 RX ORDER — ALBUTEROL SULFATE 90 UG/1
4 AEROSOL, METERED RESPIRATORY (INHALATION) PRN
Status: CANCELLED | OUTPATIENT
Start: 2023-08-29

## 2023-08-15 RX ORDER — ONDANSETRON 2 MG/ML
8 INJECTION INTRAMUSCULAR; INTRAVENOUS ONCE
Status: CANCELLED | OUTPATIENT
Start: 2023-08-29 | End: 2023-08-29

## 2023-08-15 RX ORDER — ACETAMINOPHEN 325 MG/1
650 TABLET ORAL
Status: CANCELLED | OUTPATIENT
Start: 2023-08-29

## 2023-08-15 RX ORDER — SODIUM CHLORIDE 9 MG/ML
5-250 INJECTION, SOLUTION INTRAVENOUS PRN
Status: CANCELLED | OUTPATIENT
Start: 2023-08-29

## 2023-08-15 RX ORDER — ALBUTEROL SULFATE 90 UG/1
4 AEROSOL, METERED RESPIRATORY (INHALATION) PRN
Status: CANCELLED | OUTPATIENT
Start: 2023-08-22

## 2023-08-15 RX ORDER — SODIUM CHLORIDE 9 MG/ML
INJECTION, SOLUTION INTRAVENOUS CONTINUOUS
Status: CANCELLED | OUTPATIENT
Start: 2023-08-22

## 2023-08-15 RX ORDER — EPINEPHRINE 1 MG/ML
0.3 INJECTION, SOLUTION, CONCENTRATE INTRAVENOUS PRN
Status: CANCELLED | OUTPATIENT
Start: 2023-08-22

## 2023-08-15 RX ORDER — SODIUM CHLORIDE 9 MG/ML
INJECTION, SOLUTION INTRAVENOUS CONTINUOUS
Status: CANCELLED | OUTPATIENT
Start: 2023-08-29

## 2023-08-15 RX ORDER — ONDANSETRON 2 MG/ML
8 INJECTION INTRAMUSCULAR; INTRAVENOUS
Status: CANCELLED | OUTPATIENT
Start: 2023-08-22

## 2023-08-15 RX ORDER — HEPARIN 100 UNIT/ML
500 SYRINGE INTRAVENOUS PRN
Status: CANCELLED | OUTPATIENT
Start: 2023-08-29

## 2023-08-15 RX ORDER — HEPARIN 100 UNIT/ML
500 SYRINGE INTRAVENOUS PRN
Status: CANCELLED | OUTPATIENT
Start: 2023-08-22

## 2023-08-15 RX ORDER — ACETAMINOPHEN 325 MG/1
650 TABLET ORAL
Status: CANCELLED | OUTPATIENT
Start: 2023-08-22

## 2023-08-15 RX ORDER — EPINEPHRINE 1 MG/ML
0.3 INJECTION, SOLUTION, CONCENTRATE INTRAVENOUS PRN
Status: CANCELLED | OUTPATIENT
Start: 2023-08-29

## 2023-08-15 RX ORDER — SODIUM CHLORIDE 9 MG/ML
5-250 INJECTION, SOLUTION INTRAVENOUS PRN
Status: CANCELLED | OUTPATIENT
Start: 2023-08-22

## 2023-08-15 RX ORDER — SODIUM CHLORIDE 0.9 % (FLUSH) 0.9 %
5-40 SYRINGE (ML) INJECTION PRN
Status: CANCELLED | OUTPATIENT
Start: 2023-08-29

## 2023-08-15 RX ORDER — DIPHENHYDRAMINE HYDROCHLORIDE 50 MG/ML
50 INJECTION INTRAMUSCULAR; INTRAVENOUS
Status: CANCELLED | OUTPATIENT
Start: 2023-08-22

## 2023-08-15 RX ORDER — ONDANSETRON 2 MG/ML
8 INJECTION INTRAMUSCULAR; INTRAVENOUS
Status: CANCELLED | OUTPATIENT
Start: 2023-08-29

## 2023-08-18 ENCOUNTER — TELEPHONE (OUTPATIENT)
Age: 78
End: 2023-08-18

## 2023-08-18 DIAGNOSIS — M79.651 RIGHT THIGH PAIN: ICD-10-CM

## 2023-08-18 DIAGNOSIS — R10.31 RIGHT GROIN PAIN: Primary | ICD-10-CM

## 2023-08-18 RX ORDER — TRAMADOL HYDROCHLORIDE 50 MG/1
50 TABLET ORAL EVERY 4 HOURS PRN
Qty: 18 TABLET | Refills: 0 | Status: SHIPPED | OUTPATIENT
Start: 2023-08-18 | End: 2023-08-21

## 2023-08-18 NOTE — TELEPHONE ENCOUNTER
Rolly Rosario at [facility]  [phone]    08/18/23 3:53 PM EDT - Called patient back verified ID x2. Patient reports that the Duloxetine is making her sick with vomiting and diarrhea. Patient states that she has taken 3 doses with the same results. Pt is requesting a prescription for Tylenol with codeine as she states that this is the only pain medication that works for her. Patient would like a referral for pain management as well.  Pt voiced no other questions or concerns at this time

## 2023-08-21 NOTE — PROGRESS NOTES
[id number] [facility] Oncology at [facility]  [phone]    Hematology / Oncology Established Visit    Reason for Visit:   Lazaro Junior is a 66 y.o. female who is seen for follow up of lung cancer. Hematology Oncology Treatment History:     Diagnosis: Squamous cell carcinoma of lung    Stage: IIB [[id number]]    Pathology:   3/14/23 Lung, left upper lobe, Core biopsy: Invasive poorly differentiated squamous cell carcinoma. Comment: Immunohistochemical stains show the malignant cells are positive  for cytokeratin 5/6 and negative for cytokeratin 7, cytokeratin 20 and  TTF-1 supporting the diagnosis. PD-L1 by immunohistochemistry pending. 5/3/23 Left upper lobe wedge resection:  Invasive poorly differentiated squamous cell carcinoma with areas of extensive necrosis. Tumor size 5.5cm, G3  Visceral pleural invasion present. Vascular invasion present. Parenchymal resection margin negative for tumor. Background lung parenchyma with moderate to severe architectural distortion associated with prominent peribronchiolar metaplasia. 0/6 LN positive [Station 5, 6, 7 x 2, 8, 10]  -PDL1 TPS 2%  - G[place]dant    Prior Treatment: Sublobar resection of JANELL by Dr. Regla Ramachandran    Current Treatment: adjuvant Carbo-[place] x 4 on D1, 8 of q21d cycles, followed by Atezolizumab x 1 year, started 6/20/23 - current   Treatment duration: As detailed above  Frequency of visits: Every 3 weeks    History of Present Illness:   Lazaro Junior is a 66 y.o. female with CAD, CKD, HTN, DM II, MAX who is referred for evaluation and management of lung cancer. Pt was recently hospitalized in early March 2023 after 2 syncopal episodes at home. She also noted some loose stools and vomiting. She was diagnosed with IVVD and VAIBHAV, treated with IVF. She was found to have a new 27 x 24mm cavitary lesion in JANELL and underwent CT-guided biopsy.  She was also found to have a vascularized mass in R light of thyroid, 1.5cm and referred

## 2023-08-22 ENCOUNTER — TELEPHONE (OUTPATIENT)
Age: 78
End: 2023-08-22

## 2023-08-22 ENCOUNTER — HOSPITAL ENCOUNTER (OUTPATIENT)
Facility: HOSPITAL | Age: 78
Setting detail: INFUSION SERIES
End: 2023-08-22
Payer: MEDICARE

## 2023-08-22 ENCOUNTER — OFFICE VISIT (OUTPATIENT)
Age: 78
End: 2023-08-22
Payer: MEDICARE

## 2023-08-22 VITALS
WEIGHT: 192 LBS | RESPIRATION RATE: 18 BRPM | TEMPERATURE: 97.9 F | SYSTOLIC BLOOD PRESSURE: 115 MMHG | BODY MASS INDEX: 30.86 KG/M2 | HEIGHT: 66 IN | DIASTOLIC BLOOD PRESSURE: 75 MMHG | OXYGEN SATURATION: 94 % | HEART RATE: 88 BPM

## 2023-08-22 VITALS
WEIGHT: 192 LBS | HEART RATE: 86 BPM | DIASTOLIC BLOOD PRESSURE: 66 MMHG | OXYGEN SATURATION: 94 % | TEMPERATURE: 97.9 F | BODY MASS INDEX: 30.86 KG/M2 | HEIGHT: 66 IN | RESPIRATION RATE: 18 BRPM | SYSTOLIC BLOOD PRESSURE: 105 MMHG

## 2023-08-22 DIAGNOSIS — R11.2 NAUSEA AND VOMITING, UNSPECIFIED VOMITING TYPE: ICD-10-CM

## 2023-08-22 DIAGNOSIS — D64.9 NORMOCYTIC ANEMIA: ICD-10-CM

## 2023-08-22 DIAGNOSIS — C34.92 MALIGNANT NEOPLASM OF UNSPECIFIED PART OF LEFT BRONCHUS OR LUNG (HCC): Primary | ICD-10-CM

## 2023-08-22 DIAGNOSIS — Z51.11 CHEMOTHERAPY MANAGEMENT, ENCOUNTER FOR: ICD-10-CM

## 2023-08-22 DIAGNOSIS — M22.41 CHONDROMALACIA OF RIGHT PATELLA: ICD-10-CM

## 2023-08-22 DIAGNOSIS — C34.92 SQUAMOUS CELL CARCINOMA OF LUNG, LEFT (HCC): Primary | ICD-10-CM

## 2023-08-22 DIAGNOSIS — D75.839 THROMBOCYTOSIS: ICD-10-CM

## 2023-08-22 DIAGNOSIS — M79.604 RIGHT LEG PAIN: ICD-10-CM

## 2023-08-22 LAB
ALBUMIN SERPL-MCNC: 3.7 G/DL (ref 3.5–5)
ALBUMIN/GLOB SERPL: 1.1 (ref 1.1–2.2)
ALP SERPL-CCNC: 97 U/L (ref 45–117)
ALT SERPL-CCNC: 26 U/L (ref 12–78)
ANION GAP SERPL CALC-SCNC: 7 MMOL/L (ref 5–15)
AST SERPL-CCNC: 21 U/L (ref 15–37)
BASOPHILS # BLD: 0 K/UL (ref 0–0.1)
BASOPHILS NFR BLD: 0 % (ref 0–1)
BILIRUB SERPL-MCNC: 0.5 MG/DL (ref 0.2–1)
BUN SERPL-MCNC: 46 MG/DL (ref 6–20)
BUN/CREAT SERPL: 22 (ref 12–20)
CALCIUM SERPL-MCNC: 8.7 MG/DL (ref 8.5–10.1)
CHLORIDE SERPL-SCNC: 105 MMOL/L (ref 97–108)
CO2 SERPL-SCNC: 24 MMOL/L (ref 21–32)
CREAT SERPL-MCNC: 2.08 MG/DL (ref 0.55–1.02)
DIFFERENTIAL METHOD BLD: ABNORMAL
EOSINOPHIL # BLD: 0.1 K/UL (ref 0–0.4)
EOSINOPHIL NFR BLD: 2 % (ref 0–7)
ERYTHROCYTE [DISTWIDTH] IN BLOOD BY AUTOMATED COUNT: 16.4 % (ref 11.5–14.5)
FERRITIN SERPL-MCNC: 331 NG/ML (ref 8–252)
GLOBULIN SER CALC-MCNC: 3.5 G/DL (ref 2–4)
GLUCOSE SERPL-MCNC: 323 MG/DL (ref 65–100)
HCT VFR BLD AUTO: 24.5 % (ref 35–47)
HGB BLD-MCNC: 8.3 G/DL (ref 11.5–16)
IMM GRANULOCYTES # BLD AUTO: 0.1 K/UL (ref 0–0.04)
IMM GRANULOCYTES NFR BLD AUTO: 1 % (ref 0–0.5)
IRON SATN MFR SERPL: 21 % (ref 20–50)
IRON SERPL-MCNC: 64 UG/DL (ref 35–150)
LYMPHOCYTES # BLD: 0.9 K/UL (ref 0.8–3.5)
LYMPHOCYTES NFR BLD: 21 % (ref 12–49)
MCH RBC QN AUTO: 31.1 PG (ref 26–34)
MCHC RBC AUTO-ENTMCNC: 33.9 G/DL (ref 30–36.5)
MCV RBC AUTO: 91.8 FL (ref 80–99)
MONOCYTES # BLD: 0.5 K/UL (ref 0–1)
MONOCYTES NFR BLD: 13 % (ref 5–13)
NEUTS SEG # BLD: 2.6 K/UL (ref 1.8–8)
NEUTS SEG NFR BLD: 63 % (ref 32–75)
NRBC # BLD: 0.03 K/UL (ref 0–0.01)
NRBC BLD-RTO: 0.7 PER 100 WBC
PLATELET # BLD AUTO: 539 K/UL (ref 150–400)
PMV BLD AUTO: 10.4 FL (ref 8.9–12.9)
POTASSIUM SERPL-SCNC: 3.9 MMOL/L (ref 3.5–5.1)
PROT SERPL-MCNC: 7.2 G/DL (ref 6.4–8.2)
RBC # BLD AUTO: 2.67 M/UL (ref 3.8–5.2)
SODIUM SERPL-SCNC: 136 MMOL/L (ref 136–145)
TIBC SERPL-MCNC: 306 UG/DL (ref 250–450)
WBC # BLD AUTO: 4.2 K/UL (ref 3.6–11)

## 2023-08-22 PROCEDURE — 80053 COMPREHEN METABOLIC PANEL: CPT

## 2023-08-22 PROCEDURE — G8427 DOCREV CUR MEDS BY ELIG CLIN: HCPCS | Performed by: INTERNAL MEDICINE

## 2023-08-22 PROCEDURE — 2580000003 HC RX 258: Performed by: INTERNAL MEDICINE

## 2023-08-22 PROCEDURE — 1090F PRES/ABSN URINE INCON ASSESS: CPT | Performed by: INTERNAL MEDICINE

## 2023-08-22 PROCEDURE — 96361 HYDRATE IV INFUSION ADD-ON: CPT

## 2023-08-22 PROCEDURE — 6360000002 HC RX W HCPCS: Performed by: INTERNAL MEDICINE

## 2023-08-22 PROCEDURE — 99215 OFFICE O/P EST HI 40 MIN: CPT | Performed by: INTERNAL MEDICINE

## 2023-08-22 PROCEDURE — 96417 CHEMO IV INFUS EACH ADDL SEQ: CPT

## 2023-08-22 PROCEDURE — 96413 CHEMO IV INFUSION 1 HR: CPT

## 2023-08-22 PROCEDURE — 2580000003 HC RX 258: Performed by: NURSE PRACTITIONER

## 2023-08-22 PROCEDURE — 85025 COMPLETE CBC W/AUTO DIFF WBC: CPT

## 2023-08-22 PROCEDURE — 36415 COLL VENOUS BLD VENIPUNCTURE: CPT

## 2023-08-22 PROCEDURE — 82728 ASSAY OF FERRITIN: CPT

## 2023-08-22 PROCEDURE — 83540 ASSAY OF IRON: CPT

## 2023-08-22 PROCEDURE — 1036F TOBACCO NON-USER: CPT | Performed by: INTERNAL MEDICINE

## 2023-08-22 PROCEDURE — 83550 IRON BINDING TEST: CPT

## 2023-08-22 PROCEDURE — 96375 TX/PRO/DX INJ NEW DRUG ADDON: CPT

## 2023-08-22 PROCEDURE — 3078F DIAST BP <80 MM HG: CPT | Performed by: INTERNAL MEDICINE

## 2023-08-22 PROCEDURE — G8399 PT W/DXA RESULTS DOCUMENT: HCPCS | Performed by: INTERNAL MEDICINE

## 2023-08-22 PROCEDURE — G8417 CALC BMI ABV UP PARAM F/U: HCPCS | Performed by: INTERNAL MEDICINE

## 2023-08-22 PROCEDURE — 3074F SYST BP LT 130 MM HG: CPT | Performed by: INTERNAL MEDICINE

## 2023-08-22 PROCEDURE — 1123F ACP DISCUSS/DSCN MKR DOCD: CPT | Performed by: INTERNAL MEDICINE

## 2023-08-22 PROCEDURE — 96367 TX/PROPH/DG ADDL SEQ IV INF: CPT

## 2023-08-22 RX ORDER — SODIUM CHLORIDE 9 MG/ML
5-250 INJECTION, SOLUTION INTRAVENOUS PRN
Status: DISCONTINUED | OUTPATIENT
Start: 2023-08-22 | End: 2023-08-23 | Stop reason: HOSPADM

## 2023-08-22 RX ORDER — 0.9 % SODIUM CHLORIDE 0.9 %
500 INTRAVENOUS SOLUTION INTRAVENOUS ONCE
Status: COMPLETED | OUTPATIENT
Start: 2023-08-22 | End: 2023-08-22

## 2023-08-22 RX ORDER — SODIUM CHLORIDE 0.9 % (FLUSH) 0.9 %
5-40 SYRINGE (ML) INJECTION PRN
Status: DISCONTINUED | OUTPATIENT
Start: 2023-08-22 | End: 2023-08-23 | Stop reason: HOSPADM

## 2023-08-22 RX ORDER — PALONOSETRON 0.05 MG/ML
0.25 INJECTION, SOLUTION INTRAVENOUS ONCE
Status: COMPLETED | OUTPATIENT
Start: 2023-08-22 | End: 2023-08-22

## 2023-08-22 RX ORDER — ONDANSETRON 4 MG/1
4 TABLET, ORALLY DISINTEGRATING ORAL EVERY 8 HOURS PRN
Qty: 30 TABLET | Refills: 1 | Status: SHIPPED | OUTPATIENT
Start: 2023-08-22

## 2023-08-22 RX ADMIN — SODIUM CHLORIDE 500 ML: 9 INJECTION, SOLUTION INTRAVENOUS at 12:07

## 2023-08-22 RX ADMIN — CARBOPLATIN 249 MG: 10 INJECTION, SOLUTION INTRAVENOUS at 13:26

## 2023-08-22 RX ADMIN — GEMCITABINE 2050 MG: 38 INJECTION, SOLUTION INTRAVENOUS at 12:49

## 2023-08-22 RX ADMIN — SODIUM CHLORIDE 25 ML/HR: 9 INJECTION, SOLUTION INTRAVENOUS at 11:17

## 2023-08-22 RX ADMIN — DEXAMETHASONE SODIUM PHOSPHATE 12 MG: 4 INJECTION, SOLUTION INTRAMUSCULAR; INTRAVENOUS at 11:22

## 2023-08-22 RX ADMIN — PALONOSETRON HYDROCHLORIDE 0.25 MG: 0.25 INJECTION, SOLUTION INTRAVENOUS at 11:20

## 2023-08-22 RX ADMIN — FOSAPREPITANT 150 MG: 150 INJECTION, POWDER, LYOPHILIZED, FOR SOLUTION INTRAVENOUS at 11:43

## 2023-08-22 RX ADMIN — SODIUM CHLORIDE, PRESERVATIVE FREE 20 ML: 5 INJECTION INTRAVENOUS at 14:00

## 2023-08-22 ASSESSMENT — PAIN DESCRIPTION - ORIENTATION: ORIENTATION: UPPER

## 2023-08-22 ASSESSMENT — PAIN DESCRIPTION - LOCATION: LOCATION: LEG

## 2023-08-22 ASSESSMENT — PAIN SCALES - GENERAL: PAINLEVEL_OUTOF10: 3

## 2023-08-22 NOTE — PROGRESS NOTES
0.03 (H) 0.00 - 0.01 K/uL    Neutrophils % 63 32 - 75 %    Lymphocytes % 21 12 - 49 %    Monocytes % 13 5 - 13 %    Eosinophils % 2 0 - 7 %    Basophils % 0 0 - 1 %    Immature Granulocytes 1 (H) 0.0 - 0.5 %    Neutrophils Absolute 2.6 1.8 - 8.0 K/UL    Lymphocytes Absolute 0.9 0.8 - 3.5 K/UL    Monocytes Absolute 0.5 0.0 - 1.0 K/UL    Eosinophils Absolute 0.1 0.0 - 0.4 K/UL    Basophils Absolute 0.0 0.0 - 0.1 K/UL    Absolute Immature Granulocyte 0.1 (H) 0.00 - 0.04 K/UL    Differential Type AUTOMATED

## 2023-08-22 NOTE — TELEPHONE ENCOUNTER
08/22/23 4:26 PM called pt and advised crt is elevated. Encouraged 60 oz water daily, but if she is unable to keep fluids down, then she needs to call and I will set her up for additional IVF. She verbalized understanding.

## 2023-08-22 NOTE — TELEPHONE ENCOUNTER
8/22/23- Attempted a prior authorization for patients Lidocaine patches 5% via cover my meds but per patients insurance, [employer] a prior authorization is not required for this quantity. Called patients insurance to verify why the pharmacy cannot get the claim to go thru. Per the representative the pharmacy has not ran any claims for this medication for this patient. [address] [employer] and per the pharmacist patient has a Medicare Part D prescription plan thru [address] that is primary and that is the plan that is rejecting for prior authorization required and that the pharmacy is not allowed to run a primary and secondary. Per the pharmacist a prior authorization can be attempted for the 5% Lidocaine patches thru patients [employer] but usually Medicare part D denies this because there are over the counter 4% Lidocaine patches available. Submitted prior authorization thru patients [employer] Medicare Part D and awaiting determination that states 1-3 business days for a determination. Received an approval for the Lidocaine 5% patches. [address] [employer] and the pharmacist was able to run the claim thru with a cost of care $0 copay and [facility] will get the order ready and notify the patient now.

## 2023-08-23 ENCOUNTER — TELEPHONE (OUTPATIENT)
Age: 78
End: 2023-08-23

## 2023-08-23 DIAGNOSIS — C34.92 MALIGNANT NEOPLASM OF UNSPECIFIED PART OF LEFT BRONCHUS OR LUNG (HCC): Primary | ICD-10-CM

## 2023-08-23 NOTE — TELEPHONE ENCOUNTER
Spoke with the pt,, identified the pt with name and . I inquired about her \"slight chest pain\"  Pt stated it's nothing new she had it before, but it's been a little while. She denied the pain radiating anywhere, it's just in the center of her sternum. Pt denied SOB, HA, dizziness, palps, fatigue ( but stated she is taking chemo). Pt has tried taking anti acids to see if it was indigestion, they did not help. I advised her that I will ask Dr. Brissa Ospina, but since [facility] was not on her med list he may want her to be seen first. The pt expressed understanding and agreement. Pt has no further questions or concerns at this time.

## 2023-08-23 NOTE — TELEPHONE ENCOUNTER
Pt. Calling asking for a RX to be called in for her slight chest pain,didn't know the name, just knows that it was a RX that dissolved under her tongue and helped with her chest pain.       [address] [place] [address]   [phone]

## 2023-08-23 NOTE — TELEPHONE ENCOUNTER
Patient called and stated that she would like a call back to discuss her water intake.        #[phone]

## 2023-08-23 NOTE — TELEPHONE ENCOUNTER
08/23/23 1:19 PM Return call placed to patient. Verified patient ID x 2. Patient states that she was able to drink 60 oz of water yesterday and plans to continue doing so. She notes that she does not feel as weak and no longer vomiting. Patient states that Suly Nazaninricco has helped to manage nausea/vomiting. Patient available to come in on Friday if needed for IV fluids. Advised that since patient has noticed improvement in symptoms and is able to tolerate PO fluids, will likely not need IV fluids at this time, but will confirm with NP as well. Patient voiced understanding. While on phone, patient stated she has been experiencing intermittent chest tightness that occurs when ambulating up the stairs in her home--patient has 13 steps in house. Symptoms started yesterday. Patient denies SOB, headache, and nausea. Symptoms resolve after resting for 10 minutes. Patient stated symptoms feel similar to what she had experienced prior to having cardiac stent placed in 09/2022. Patient currently established with cardiology and states she has seen them recently. Advised that patient contact cardiology office to update them of symptoms since chest pain just started yesterday, after recent follow up appointment. Will also forward above to MADDIE Paz, to clarify if any additional recommendations. Patient voiced understanding. 3:17 PM Attempted to call patient via contact number provided. No answer, left message requesting return call. Provided office phone number as well for call back. 3:40 PM Received return call from patient. Advised of response per MADDIE Paz. Patient voiced understanding. She stated she contacted cardiology office and is awaiting a return call with provider's recommendations. Patient also shared that the Lidocaine patch is helping her leg pain and she was able to sleep last night. No further questions or concerns at this time.

## 2023-08-24 DIAGNOSIS — Z51.81 ENCOUNTER FOR MONITORING CARDIOTOXIC DRUG THERAPY: ICD-10-CM

## 2023-08-24 DIAGNOSIS — Z79.899 ENCOUNTER FOR MONITORING CARDIOTOXIC DRUG THERAPY: ICD-10-CM

## 2023-08-24 DIAGNOSIS — R07.9 CHEST PAIN: Primary | ICD-10-CM

## 2023-08-24 RX ORDER — NITROGLYCERIN 0.4 MG/1
0.4 TABLET SUBLINGUAL EVERY 5 MIN PRN
Qty: 25 TABLET | Refills: 3 | Status: SHIPPED | OUTPATIENT
Start: 2023-08-24

## 2023-08-28 ENCOUNTER — PATIENT MESSAGE (OUTPATIENT)
Age: 78
End: 2023-08-28

## 2023-08-29 ENCOUNTER — TELEPHONE (OUTPATIENT)
Age: 78
End: 2023-08-29

## 2023-08-29 ENCOUNTER — HOSPITAL ENCOUNTER (OUTPATIENT)
Facility: HOSPITAL | Age: 78
Setting detail: INFUSION SERIES
End: 2023-08-29
Payer: MEDICARE

## 2023-08-29 ENCOUNTER — APPOINTMENT (OUTPATIENT)
Facility: HOSPITAL | Age: 78
End: 2023-08-29
Payer: MEDICARE

## 2023-08-29 VITALS
HEART RATE: 82 BPM | HEIGHT: 66 IN | RESPIRATION RATE: 18 BRPM | OXYGEN SATURATION: 98 % | TEMPERATURE: 97.9 F | WEIGHT: 193.1 LBS | DIASTOLIC BLOOD PRESSURE: 57 MMHG | BODY MASS INDEX: 31.03 KG/M2 | SYSTOLIC BLOOD PRESSURE: 125 MMHG

## 2023-08-29 DIAGNOSIS — M79.671 RIGHT FOOT PAIN: ICD-10-CM

## 2023-08-29 DIAGNOSIS — C34.92 MALIGNANT NEOPLASM OF UNSPECIFIED PART OF LEFT BRONCHUS OR LUNG (HCC): Primary | ICD-10-CM

## 2023-08-29 DIAGNOSIS — C34.92 SQUAMOUS CELL CARCINOMA OF LUNG, LEFT (HCC): Primary | ICD-10-CM

## 2023-08-29 LAB
ANION GAP SERPL CALC-SCNC: 6 MMOL/L (ref 5–15)
BASOPHILS # BLD: 0 K/UL (ref 0–0.1)
BASOPHILS NFR BLD: 2 % (ref 0–1)
BUN SERPL-MCNC: 31 MG/DL (ref 6–20)
BUN/CREAT SERPL: 15 (ref 12–20)
CALCIUM SERPL-MCNC: 8.7 MG/DL (ref 8.5–10.1)
CHLORIDE SERPL-SCNC: 108 MMOL/L (ref 97–108)
CO2 SERPL-SCNC: 25 MMOL/L (ref 21–32)
CREAT SERPL-MCNC: 2.07 MG/DL (ref 0.55–1.02)
DIFFERENTIAL METHOD BLD: ABNORMAL
EOSINOPHIL # BLD: 0 K/UL (ref 0–0.4)
EOSINOPHIL NFR BLD: 2 % (ref 0–7)
ERYTHROCYTE [DISTWIDTH] IN BLOOD BY AUTOMATED COUNT: 16.9 % (ref 11.5–14.5)
GLUCOSE SERPL-MCNC: 127 MG/DL (ref 65–100)
HCT VFR BLD AUTO: 23.4 % (ref 35–47)
HGB BLD-MCNC: 7.7 G/DL (ref 11.5–16)
IMM GRANULOCYTES # BLD AUTO: 0 K/UL (ref 0–0.04)
IMM GRANULOCYTES NFR BLD AUTO: 0 % (ref 0–0.5)
LYMPHOCYTES # BLD: 0.5 K/UL (ref 0.8–3.5)
LYMPHOCYTES NFR BLD: 42 % (ref 12–49)
MCH RBC QN AUTO: 30.4 PG (ref 26–34)
MCHC RBC AUTO-ENTMCNC: 32.9 G/DL (ref 30–36.5)
MCV RBC AUTO: 92.5 FL (ref 80–99)
MONOCYTES # BLD: 0 K/UL (ref 0–1)
MONOCYTES NFR BLD: 3 % (ref 5–13)
NEUTS SEG # BLD: 0.8 K/UL (ref 1.8–8)
NEUTS SEG NFR BLD: 51 % (ref 32–75)
NRBC # BLD: 0 K/UL (ref 0–0.01)
NRBC BLD-RTO: 0 PER 100 WBC
PLATELET # BLD AUTO: 411 K/UL (ref 150–400)
PLATELET COMMENT: ABNORMAL
PMV BLD AUTO: 10.2 FL (ref 8.9–12.9)
POTASSIUM SERPL-SCNC: 3.6 MMOL/L (ref 3.5–5.1)
RBC # BLD AUTO: 2.53 M/UL (ref 3.8–5.2)
RBC MORPH BLD: ABNORMAL
RBC MORPH BLD: ABNORMAL
SODIUM SERPL-SCNC: 139 MMOL/L (ref 136–145)
WBC # BLD AUTO: 1.3 K/UL (ref 3.6–11)

## 2023-08-29 PROCEDURE — 36415 COLL VENOUS BLD VENIPUNCTURE: CPT

## 2023-08-29 PROCEDURE — 6360000002 HC RX W HCPCS: Performed by: NURSE PRACTITIONER

## 2023-08-29 PROCEDURE — 2580000003 HC RX 258: Performed by: NURSE PRACTITIONER

## 2023-08-29 PROCEDURE — 96413 CHEMO IV INFUSION 1 HR: CPT

## 2023-08-29 PROCEDURE — 6360000002 HC RX W HCPCS: Performed by: INTERNAL MEDICINE

## 2023-08-29 PROCEDURE — 2580000003 HC RX 258: Performed by: INTERNAL MEDICINE

## 2023-08-29 PROCEDURE — 85025 COMPLETE CBC W/AUTO DIFF WBC: CPT

## 2023-08-29 PROCEDURE — 96360 HYDRATION IV INFUSION INIT: CPT

## 2023-08-29 PROCEDURE — 80048 BASIC METABOLIC PNL TOTAL CA: CPT

## 2023-08-29 RX ORDER — SODIUM CHLORIDE 9 MG/ML
5-250 INJECTION, SOLUTION INTRAVENOUS PRN
Status: DISCONTINUED | OUTPATIENT
Start: 2023-08-29 | End: 2023-08-30 | Stop reason: HOSPADM

## 2023-08-29 RX ORDER — SODIUM CHLORIDE 9 MG/ML
5-250 INJECTION, SOLUTION INTRAVENOUS PRN
Status: CANCELLED | OUTPATIENT
Start: 2023-09-01

## 2023-08-29 RX ORDER — 0.9 % SODIUM CHLORIDE 0.9 %
1000 INTRAVENOUS SOLUTION INTRAVENOUS ONCE
Status: CANCELLED | OUTPATIENT
Start: 2023-09-01 | End: 2023-09-01

## 2023-08-29 RX ORDER — SODIUM CHLORIDE 0.9 % (FLUSH) 0.9 %
5-40 SYRINGE (ML) INJECTION PRN
Status: CANCELLED | OUTPATIENT
Start: 2023-09-01

## 2023-08-29 RX ORDER — HEPARIN SODIUM (PORCINE) LOCK FLUSH IV SOLN 100 UNIT/ML 100 UNIT/ML
500 SOLUTION INTRAVENOUS PRN
Status: CANCELLED | OUTPATIENT
Start: 2023-09-01

## 2023-08-29 RX ORDER — 0.9 % SODIUM CHLORIDE 0.9 %
1000 INTRAVENOUS SOLUTION INTRAVENOUS ONCE
Status: COMPLETED | OUTPATIENT
Start: 2023-08-29 | End: 2023-08-29

## 2023-08-29 RX ORDER — ONDANSETRON 2 MG/ML
8 INJECTION INTRAMUSCULAR; INTRAVENOUS ONCE
Status: COMPLETED | OUTPATIENT
Start: 2023-08-29 | End: 2023-08-29

## 2023-08-29 RX ORDER — SODIUM CHLORIDE 0.9 % (FLUSH) 0.9 %
5-40 SYRINGE (ML) INJECTION PRN
Status: DISCONTINUED | OUTPATIENT
Start: 2023-08-29 | End: 2023-08-30 | Stop reason: HOSPADM

## 2023-08-29 RX ORDER — PREDNISONE 10 MG/1
TABLET ORAL
Qty: 8 TABLET | Refills: 0 | Status: SHIPPED | OUTPATIENT
Start: 2023-08-29

## 2023-08-29 RX ADMIN — ONDANSETRON 8 MG: 2 INJECTION INTRAMUSCULAR; INTRAVENOUS at 12:34

## 2023-08-29 RX ADMIN — GEMCITABINE 1538 MG: 38 INJECTION, SOLUTION INTRAVENOUS at 13:08

## 2023-08-29 RX ADMIN — SODIUM CHLORIDE 1000 ML: 9 INJECTION, SOLUTION INTRAVENOUS at 12:00

## 2023-08-29 RX ADMIN — SODIUM CHLORIDE, PRESERVATIVE FREE 10 ML: 5 INJECTION INTRAVENOUS at 13:56

## 2023-08-29 ASSESSMENT — PAIN SCALES - GENERAL: PAINLEVEL_OUTOF10: 10

## 2023-08-29 ASSESSMENT — PAIN DESCRIPTION - ORIENTATION: ORIENTATION: RIGHT

## 2023-08-29 ASSESSMENT — PAIN DESCRIPTION - DESCRIPTORS: DESCRIPTORS: ACHING

## 2023-08-29 ASSESSMENT — PAIN DESCRIPTION - LOCATION: LOCATION: FOOT

## 2023-08-31 ENCOUNTER — TELEPHONE (OUTPATIENT)
Age: 78
End: 2023-08-31

## 2023-08-31 NOTE — TELEPHONE ENCOUNTER
LM with patient to move her infusion time tomorrow to 8 or 830. Gave her office number to call back.

## 2023-09-01 ENCOUNTER — HOSPITAL ENCOUNTER (OUTPATIENT)
Facility: HOSPITAL | Age: 78
Setting detail: INFUSION SERIES
End: 2023-09-01
Payer: MEDICARE

## 2023-09-01 ENCOUNTER — TELEPHONE (OUTPATIENT)
Age: 78
End: 2023-09-01

## 2023-09-01 ENCOUNTER — HOSPITAL ENCOUNTER (OUTPATIENT)
Facility: HOSPITAL | Age: 78
End: 2023-09-01
Payer: MEDICARE

## 2023-09-01 VITALS
OXYGEN SATURATION: 98 % | DIASTOLIC BLOOD PRESSURE: 79 MMHG | TEMPERATURE: 97.2 F | SYSTOLIC BLOOD PRESSURE: 143 MMHG | HEART RATE: 87 BPM | RESPIRATION RATE: 18 BRPM

## 2023-09-01 DIAGNOSIS — C34.92 SQUAMOUS CELL CARCINOMA OF LUNG, LEFT (HCC): ICD-10-CM

## 2023-09-01 DIAGNOSIS — C34.92 SQUAMOUS CELL CARCINOMA OF LUNG, LEFT (HCC): Primary | ICD-10-CM

## 2023-09-01 LAB
ANION GAP SERPL CALC-SCNC: 6 MMOL/L (ref 5–15)
BUN SERPL-MCNC: 35 MG/DL (ref 6–20)
BUN/CREAT SERPL: 17 (ref 12–20)
CALCIUM SERPL-MCNC: 8.4 MG/DL (ref 8.5–10.1)
CHLORIDE SERPL-SCNC: 110 MMOL/L (ref 97–108)
CO2 SERPL-SCNC: 25 MMOL/L (ref 21–32)
CREAT SERPL-MCNC: 2.04 MG/DL (ref 0.55–1.02)
GLUCOSE SERPL-MCNC: 138 MG/DL (ref 65–100)
POTASSIUM SERPL-SCNC: 3.5 MMOL/L (ref 3.5–5.1)
SODIUM SERPL-SCNC: 141 MMOL/L (ref 136–145)

## 2023-09-01 PROCEDURE — 96360 HYDRATION IV INFUSION INIT: CPT

## 2023-09-01 PROCEDURE — 2580000003 HC RX 258: Performed by: NURSE PRACTITIONER

## 2023-09-01 PROCEDURE — 80048 BASIC METABOLIC PNL TOTAL CA: CPT

## 2023-09-01 PROCEDURE — 71250 CT THORAX DX C-: CPT

## 2023-09-01 RX ORDER — SODIUM CHLORIDE 0.9 % (FLUSH) 0.9 %
5-40 SYRINGE (ML) INJECTION PRN
Status: DISCONTINUED | OUTPATIENT
Start: 2023-09-01 | End: 2023-09-02 | Stop reason: HOSPADM

## 2023-09-01 RX ORDER — 0.9 % SODIUM CHLORIDE 0.9 %
1000 INTRAVENOUS SOLUTION INTRAVENOUS ONCE
Status: COMPLETED | OUTPATIENT
Start: 2023-09-01 | End: 2023-09-01

## 2023-09-01 RX ORDER — SODIUM CHLORIDE 9 MG/ML
5-250 INJECTION, SOLUTION INTRAVENOUS PRN
Status: CANCELLED | OUTPATIENT
Start: 2023-09-08

## 2023-09-01 RX ORDER — 0.9 % SODIUM CHLORIDE 0.9 %
1000 INTRAVENOUS SOLUTION INTRAVENOUS ONCE
Status: CANCELLED | OUTPATIENT
Start: 2023-09-08 | End: 2023-09-08

## 2023-09-01 RX ORDER — SODIUM CHLORIDE 0.9 % (FLUSH) 0.9 %
5-40 SYRINGE (ML) INJECTION PRN
Status: CANCELLED | OUTPATIENT
Start: 2023-09-08

## 2023-09-01 RX ORDER — HEPARIN 100 UNIT/ML
500 SYRINGE INTRAVENOUS PRN
Status: CANCELLED | OUTPATIENT
Start: 2023-09-08

## 2023-09-01 RX ADMIN — SODIUM CHLORIDE 1000 ML: 9 INJECTION, SOLUTION INTRAVENOUS at 09:06

## 2023-09-01 ASSESSMENT — PAIN DESCRIPTION - LOCATION: LOCATION: FOOT

## 2023-09-01 ASSESSMENT — PAIN DESCRIPTION - DESCRIPTORS: DESCRIPTORS: ACHING

## 2023-09-01 ASSESSMENT — PAIN SCALES - GENERAL: PAINLEVEL_OUTOF10: 6

## 2023-09-01 ASSESSMENT — PAIN DESCRIPTION - ORIENTATION: ORIENTATION: RIGHT

## 2023-09-01 NOTE — PROGRESS NOTES
[facility] Progress Note    Date: 2023    Name: Aman Nava    MRN: [id number]         : 1945    Ms. Tabares Arrived ambulatory and in no distress for Hydration. Assessment was completed, no acute issues at this time, no new complaints voiced. Right chest wall port accessed without difficulty, labs drawn & sent for processing. Ms. Krystal Bermeo vitals were reviewed. Vitals:    23 1017   BP: (!) 143/79   Pulse: 87   Resp: 18   Temp:    SpO2:        Lab results were obtained and reviewed. Recent Results (from the past 12 hour(s))   Basic Metabolic Panel    Collection Time: 23  8:55 AM   Result Value Ref Range    Sodium 141 136 - 145 mmol/L    Potassium 3.5 3.5 - 5.1 mmol/L    Chloride 110 (H) 97 - 108 mmol/L    CO2 25 21 - 32 mmol/L    Anion Gap 6 5 - 15 mmol/L    Glucose 138 (H) 65 - 100 mg/dL    BUN 35 (H) 6 - 20 MG/DL    Creatinine 2.04 (H) 0.55 - 1.02 MG/DL    Bun/Cre Ratio 17 12 - 20      Est, Glom Filt Rate 25 (L) >60 ml/min/1.73m2    Calcium 8.4 (L) 8.5 - 10.1 MG/DL       Medications:  1 L Bolus     Ms. Tabares tolerated treatment well and was discharged from [address] [place] [address] in stable condition. Port de-accessed, and flushed . She is to return on  23 at 1430 for her next appointment.     Heidy Cooper RN  2023

## 2023-09-05 ENCOUNTER — TELEPHONE (OUTPATIENT)
Age: 78
End: 2023-09-05

## 2023-09-05 ENCOUNTER — APPOINTMENT (OUTPATIENT)
Facility: HOSPITAL | Age: 78
End: 2023-09-05
Payer: MEDICARE

## 2023-09-05 DIAGNOSIS — N18.9 CHRONIC KIDNEY DISEASE, UNSPECIFIED CKD STAGE: Primary | ICD-10-CM

## 2023-09-05 DIAGNOSIS — N26.1 ATROPHY OF RIGHT KIDNEY: ICD-10-CM

## 2023-09-05 NOTE — PROGRESS NOTES
[address] [address] [facility] Oncology at [facility]  [phone]    Hematology / Oncology Established Visit    Reason for Visit:   Lazaro Junior is a 66 y.o. female who is seen for follow up of lung cancer. Hematology Oncology Treatment History:     Diagnosis: Squamous cell carcinoma of lung    Stage: IIB [[id number]]    Pathology:   3/14/23 Lung, left upper lobe, Core biopsy: Invasive poorly differentiated squamous cell carcinoma. Comment: Immunohistochemical stains show the malignant cells are positive  for cytokeratin 5/6 and negative for cytokeratin 7, cytokeratin 20 and  TTF-1 supporting the diagnosis. PD-L1 by immunohistochemistry pending. 5/3/23 Left upper lobe wedge resection:  Invasive poorly differentiated squamous cell carcinoma with areas of extensive necrosis. Tumor size 5.5cm, G3  Visceral pleural invasion present. Vascular invasion present. Parenchymal resection margin negative for tumor. Background lung parenchyma with moderate to severe architectural distortion associated with prominent peribronchiolar metaplasia. 0/6 LN positive [Station 5, 6, 7 x 2, 8, 10]  -PDL1 TPS 2%  - [place]    Prior Treatment:   1. Sublobar resection of JANELL by Dr. Regla Ramachandran  2. Adjuvant Carbo-[place] x 4 on D1, 8 of q21d cycles, 6/20/23- 8/29/23    Current Treatment:  Atezolizumab x 1 year every 3 weeks, started 9/19/23 - current  Treatment duration: As detailed above  Frequency of visits: Every 3 weeks    History of Present Illness:   Lazaro Junior is a 66 y.o. female with CAD, CKD, HTN, DM II, MAX who is referred for evaluation and management of lung cancer. Pt was recently hospitalized in early March 2023 after 2 syncopal episodes at home. She also noted some loose stools and vomiting. She was diagnosed with IVVD and VAIBHAV, treated with IVF. She was found to have a new 27 x 24mm cavitary lesion in JANELL and underwent CT-guided biopsy.  She was also found to have a vascularized mass in R light of

## 2023-09-05 NOTE — TELEPHONE ENCOUNTER
Patient is calling in to request a refill on Ondansetron, also mentioned lankin pads    # [phone]    Thank you

## 2023-09-05 NOTE — TELEPHONE ENCOUNTER
09/05/23 1:48 PM Attempted to place return call to patient via contact number provided. No answer, left message requesting return call. Provided office phone number as well. Would like to clarify if patient needs a refill of Lidocaine patches. Per chart review, Zofran last prescribed 08/22 for # 30 with 1 refill. Patient should still have refill remaining at pharmacy. 09/06/23 9:26 AM Attempted to call patient via contact number provided. No answer, left message requesting return call. Provided office phone number as well.      11:22 AM Received return call from patient. Verified patient ID x 2. Confirmed that patient is requesting refill of Lidocaine patches. Advised that patient should have additional refill of Zofran available at pharmacy. Patient states she will call pharmacy to verify. Encouraged patient to call back if any issues or advised that pharmacy can contact office if refill is needed. She voiced understanding. No further questions or concerns at this time.

## 2023-09-06 ENCOUNTER — HOSPITAL ENCOUNTER (OUTPATIENT)
Facility: HOSPITAL | Age: 78
Setting detail: INFUSION SERIES
End: 2023-09-06
Payer: MEDICARE

## 2023-09-06 VITALS
RESPIRATION RATE: 18 BRPM | HEART RATE: 89 BPM | SYSTOLIC BLOOD PRESSURE: 106 MMHG | DIASTOLIC BLOOD PRESSURE: 64 MMHG | TEMPERATURE: 97.3 F

## 2023-09-06 DIAGNOSIS — M79.604 RIGHT LEG PAIN: Primary | ICD-10-CM

## 2023-09-06 DIAGNOSIS — C34.92 SQUAMOUS CELL CARCINOMA OF LUNG, LEFT (HCC): Primary | ICD-10-CM

## 2023-09-06 LAB
ANION GAP SERPL CALC-SCNC: 7 MMOL/L (ref 5–15)
BUN SERPL-MCNC: 29 MG/DL (ref 6–20)
BUN/CREAT SERPL: 16 (ref 12–20)
CALCIUM SERPL-MCNC: 8.6 MG/DL (ref 8.5–10.1)
CHLORIDE SERPL-SCNC: 104 MMOL/L (ref 97–108)
CO2 SERPL-SCNC: 26 MMOL/L (ref 21–32)
CREAT SERPL-MCNC: 1.81 MG/DL (ref 0.55–1.02)
GLUCOSE SERPL-MCNC: 240 MG/DL (ref 65–100)
POTASSIUM SERPL-SCNC: 4 MMOL/L (ref 3.5–5.1)
SODIUM SERPL-SCNC: 137 MMOL/L (ref 136–145)

## 2023-09-06 PROCEDURE — 36415 COLL VENOUS BLD VENIPUNCTURE: CPT

## 2023-09-06 PROCEDURE — 96360 HYDRATION IV INFUSION INIT: CPT

## 2023-09-06 PROCEDURE — 80048 BASIC METABOLIC PNL TOTAL CA: CPT

## 2023-09-06 PROCEDURE — 2580000003 HC RX 258: Performed by: NURSE PRACTITIONER

## 2023-09-06 RX ORDER — SODIUM CHLORIDE 0.9 % (FLUSH) 0.9 %
5-40 SYRINGE (ML) INJECTION PRN
Status: DISCONTINUED | OUTPATIENT
Start: 2023-09-06 | End: 2023-09-07 | Stop reason: HOSPADM

## 2023-09-06 RX ORDER — SODIUM CHLORIDE 0.9 % (FLUSH) 0.9 %
5-40 SYRINGE (ML) INJECTION PRN
Status: CANCELLED | OUTPATIENT
Start: 2023-09-08

## 2023-09-06 RX ORDER — 0.9 % SODIUM CHLORIDE 0.9 %
1000 INTRAVENOUS SOLUTION INTRAVENOUS ONCE
Status: COMPLETED | OUTPATIENT
Start: 2023-09-06 | End: 2023-09-06

## 2023-09-06 RX ORDER — LIDOCAINE 50 MG/G
1 PATCH TOPICAL DAILY
Qty: 10 PATCH | Refills: 0 | Status: SHIPPED | OUTPATIENT
Start: 2023-09-06 | End: 2023-09-16

## 2023-09-06 RX ORDER — HEPARIN 100 UNIT/ML
500 SYRINGE INTRAVENOUS PRN
Status: CANCELLED | OUTPATIENT
Start: 2023-09-08

## 2023-09-06 RX ORDER — SODIUM CHLORIDE 9 MG/ML
5-250 INJECTION, SOLUTION INTRAVENOUS PRN
Status: CANCELLED | OUTPATIENT
Start: 2023-09-08

## 2023-09-06 RX ORDER — 0.9 % SODIUM CHLORIDE 0.9 %
1000 INTRAVENOUS SOLUTION INTRAVENOUS ONCE
Status: CANCELLED | OUTPATIENT
Start: 2023-09-08 | End: 2023-09-08

## 2023-09-06 RX ADMIN — SODIUM CHLORIDE, PRESERVATIVE FREE 20 ML: 5 INJECTION INTRAVENOUS at 15:54

## 2023-09-06 RX ADMIN — SODIUM CHLORIDE 1000 ML: 9 INJECTION, SOLUTION INTRAVENOUS at 14:43

## 2023-09-06 ASSESSMENT — PAIN SCALES - GENERAL: PAINLEVEL_OUTOF10: 0

## 2023-09-07 ENCOUNTER — TELEPHONE (OUTPATIENT)
Age: 78
End: 2023-09-07

## 2023-09-07 DIAGNOSIS — K59.03 DRUG-INDUCED CONSTIPATION: Primary | ICD-10-CM

## 2023-09-07 DIAGNOSIS — C34.92 SQUAMOUS CELL CARCINOMA OF LUNG, LEFT (HCC): ICD-10-CM

## 2023-09-07 RX ORDER — 0.9 % SODIUM CHLORIDE 0.9 %
1000 INTRAVENOUS SOLUTION INTRAVENOUS ONCE
OUTPATIENT
Start: 2023-09-07 | End: 2023-09-07

## 2023-09-07 RX ORDER — POLYETHYLENE GLYCOL 3350 17 G/17G
17 POWDER, FOR SOLUTION ORAL DAILY PRN
Qty: 510 G | Refills: 0 | Status: SHIPPED | OUTPATIENT
Start: 2023-09-07 | End: 2023-10-07

## 2023-09-11 ENCOUNTER — HOSPITAL ENCOUNTER (OUTPATIENT)
Facility: HOSPITAL | Age: 78
Setting detail: INFUSION SERIES
End: 2023-09-11
Payer: MEDICARE

## 2023-09-11 VITALS
DIASTOLIC BLOOD PRESSURE: 47 MMHG | TEMPERATURE: 97.4 F | SYSTOLIC BLOOD PRESSURE: 142 MMHG | OXYGEN SATURATION: 99 % | RESPIRATION RATE: 18 BRPM | HEART RATE: 72 BPM

## 2023-09-11 DIAGNOSIS — C34.92 SQUAMOUS CELL CARCINOMA OF LUNG, LEFT (HCC): Primary | ICD-10-CM

## 2023-09-11 LAB
ANION GAP SERPL CALC-SCNC: 6 MMOL/L (ref 5–15)
BUN SERPL-MCNC: 15 MG/DL (ref 6–20)
BUN/CREAT SERPL: 9 (ref 12–20)
CALCIUM SERPL-MCNC: 9.1 MG/DL (ref 8.5–10.1)
CHLORIDE SERPL-SCNC: 106 MMOL/L (ref 97–108)
CO2 SERPL-SCNC: 27 MMOL/L (ref 21–32)
CREAT SERPL-MCNC: 1.72 MG/DL (ref 0.55–1.02)
GLUCOSE SERPL-MCNC: 302 MG/DL (ref 65–100)
POTASSIUM SERPL-SCNC: 3.8 MMOL/L (ref 3.5–5.1)
SODIUM SERPL-SCNC: 139 MMOL/L (ref 136–145)

## 2023-09-11 PROCEDURE — 96360 HYDRATION IV INFUSION INIT: CPT

## 2023-09-11 PROCEDURE — 2580000003 HC RX 258: Performed by: NURSE PRACTITIONER

## 2023-09-11 PROCEDURE — 80048 BASIC METABOLIC PNL TOTAL CA: CPT

## 2023-09-11 RX ORDER — 0.9 % SODIUM CHLORIDE 0.9 %
1000 INTRAVENOUS SOLUTION INTRAVENOUS ONCE
Status: COMPLETED | OUTPATIENT
Start: 2023-09-11 | End: 2023-09-11

## 2023-09-11 RX ORDER — 0.9 % SODIUM CHLORIDE 0.9 %
1000 INTRAVENOUS SOLUTION INTRAVENOUS ONCE
OUTPATIENT
Start: 2023-09-14 | End: 2023-09-14

## 2023-09-11 RX ORDER — SODIUM CHLORIDE 9 MG/ML
5-250 INJECTION, SOLUTION INTRAVENOUS PRN
OUTPATIENT
Start: 2023-09-14

## 2023-09-11 RX ORDER — HEPARIN 100 UNIT/ML
500 SYRINGE INTRAVENOUS PRN
OUTPATIENT
Start: 2023-09-14

## 2023-09-11 RX ORDER — SODIUM CHLORIDE 0.9 % (FLUSH) 0.9 %
5-40 SYRINGE (ML) INJECTION PRN
OUTPATIENT
Start: 2023-09-14

## 2023-09-11 RX ADMIN — SODIUM CHLORIDE 1000 ML: 9 INJECTION, SOLUTION INTRAVENOUS at 15:15

## 2023-09-11 ASSESSMENT — PAIN SCALES - GENERAL: PAINLEVEL_OUTOF10: 0

## 2023-09-12 ENCOUNTER — TELEPHONE (OUTPATIENT)
Age: 78
End: 2023-09-12

## 2023-09-12 NOTE — TELEPHONE ENCOUNTER
09/12/23 10:59 AM Attempted to call patient via home number listed to check on her and ensure her nausea/vomiting and constipation have resolved. No answer, left message requesting return call. Provided office number as well for call back.

## 2023-09-13 ENCOUNTER — ANCILLARY PROCEDURE (OUTPATIENT)
Age: 78
End: 2023-09-13
Payer: MEDICARE

## 2023-09-13 ENCOUNTER — TELEPHONE (OUTPATIENT)
Age: 78
End: 2023-09-13

## 2023-09-13 VITALS
DIASTOLIC BLOOD PRESSURE: 74 MMHG | HEIGHT: 66 IN | BODY MASS INDEX: 31.02 KG/M2 | HEART RATE: 85 BPM | WEIGHT: 193 LBS | SYSTOLIC BLOOD PRESSURE: 120 MMHG

## 2023-09-13 DIAGNOSIS — Z79.899 ENCOUNTER FOR MONITORING CARDIOTOXIC DRUG THERAPY: ICD-10-CM

## 2023-09-13 DIAGNOSIS — C34.92 MALIGNANT NEOPLASM OF UNSPECIFIED PART OF LEFT BRONCHUS OR LUNG (HCC): Primary | ICD-10-CM

## 2023-09-13 DIAGNOSIS — Z51.81 ENCOUNTER FOR MONITORING CARDIOTOXIC DRUG THERAPY: ICD-10-CM

## 2023-09-13 DIAGNOSIS — R07.9 CHEST PAIN: ICD-10-CM

## 2023-09-13 LAB
ECHO BSA: 2.02 M2
NUC STRESS EJECTION FRACTION: 55 %
STRESS BASELINE DIAS BP: 74 MMHG
STRESS BASELINE HR: 85 BPM
STRESS BASELINE SYS BP: 120 MMHG
STRESS ESTIMATED WORKLOAD: 1 METS
STRESS EXERCISE DUR MIN: 0 MIN
STRESS EXERCISE DUR SEC: 0 SEC
STRESS O2 SAT PEAK: 93 %
STRESS O2 SAT REST: 94 %
STRESS PEAK DIAS BP: 78 MMHG
STRESS PEAK SYS BP: 140 MMHG
STRESS PERCENT HR ACHIEVED: 68 %
STRESS POST PEAK HR: 96 BPM
STRESS RATE PRESSURE PRODUCT: NORMAL BPM*MMHG
STRESS ST DEPRESSION: 0 MM
STRESS TARGET HR: 142 BPM
TID: 1.04

## 2023-09-13 PROCEDURE — 78452 HT MUSCLE IMAGE SPECT MULT: CPT

## 2023-09-13 PROCEDURE — A9500 TC99M SESTAMIBI: HCPCS | Performed by: INTERNAL MEDICINE

## 2023-09-13 RX ORDER — SODIUM CHLORIDE 9 MG/ML
5-250 INJECTION, SOLUTION INTRAVENOUS PRN
OUTPATIENT
Start: 2023-09-19

## 2023-09-13 RX ORDER — DIPHENHYDRAMINE HYDROCHLORIDE 50 MG/ML
50 INJECTION INTRAMUSCULAR; INTRAVENOUS
OUTPATIENT
Start: 2023-09-19

## 2023-09-13 RX ORDER — ONDANSETRON 2 MG/ML
8 INJECTION INTRAMUSCULAR; INTRAVENOUS
OUTPATIENT
Start: 2023-09-19

## 2023-09-13 RX ORDER — FAMOTIDINE 10 MG/ML
20 INJECTION, SOLUTION INTRAVENOUS
OUTPATIENT
Start: 2023-09-19

## 2023-09-13 RX ORDER — ACETAMINOPHEN 325 MG/1
650 TABLET ORAL
OUTPATIENT
Start: 2023-09-19

## 2023-09-13 RX ORDER — ALBUTEROL SULFATE 90 UG/1
4 AEROSOL, METERED RESPIRATORY (INHALATION) PRN
OUTPATIENT
Start: 2023-09-19

## 2023-09-13 RX ORDER — HEPARIN SODIUM (PORCINE) LOCK FLUSH IV SOLN 100 UNIT/ML 100 UNIT/ML
500 SOLUTION INTRAVENOUS PRN
OUTPATIENT
Start: 2023-09-19

## 2023-09-13 RX ORDER — TETRAKIS(2-METHOXYISOBUTYLISOCYANIDE)COPPER(I) TETRAFLUOROBORATE 1 MG/ML
8.3 INJECTION, POWDER, LYOPHILIZED, FOR SOLUTION INTRAVENOUS
Status: COMPLETED | OUTPATIENT
Start: 2023-09-13 | End: 2023-09-13

## 2023-09-13 RX ORDER — MEPERIDINE HYDROCHLORIDE 50 MG/ML
12.5 INJECTION INTRAMUSCULAR; INTRAVENOUS; SUBCUTANEOUS PRN
OUTPATIENT
Start: 2023-09-19

## 2023-09-13 RX ORDER — REGADENOSON 0.08 MG/ML
0.4 INJECTION, SOLUTION INTRAVENOUS
Status: COMPLETED | OUTPATIENT
Start: 2023-09-13 | End: 2023-09-13

## 2023-09-13 RX ORDER — EPINEPHRINE 1 MG/ML
0.3 INJECTION, SOLUTION, CONCENTRATE INTRAVENOUS PRN
OUTPATIENT
Start: 2023-09-19

## 2023-09-13 RX ORDER — SODIUM CHLORIDE 0.9 % (FLUSH) 0.9 %
5-40 SYRINGE (ML) INJECTION PRN
OUTPATIENT
Start: 2023-09-19

## 2023-09-13 RX ORDER — TETRAKIS(2-METHOXYISOBUTYLISOCYANIDE)COPPER(I) TETRAFLUOROBORATE 1 MG/ML
26.1 INJECTION, POWDER, LYOPHILIZED, FOR SOLUTION INTRAVENOUS
Status: SHIPPED | OUTPATIENT
Start: 2023-09-13

## 2023-09-13 RX ORDER — SODIUM CHLORIDE 9 MG/ML
INJECTION, SOLUTION INTRAVENOUS CONTINUOUS
OUTPATIENT
Start: 2023-09-19

## 2023-09-13 RX ADMIN — REGADENOSON 0.4 MG: 0.08 INJECTION, SOLUTION INTRAVENOUS at 09:58

## 2023-09-13 RX ADMIN — TECHNETIUM TC-99M SESTAMIBI 8.3 MILLICURIE: 1 INJECTION INTRAVENOUS at 08:56

## 2023-09-13 NOTE — TELEPHONE ENCOUNTER
09/13/23 2:07 PM Return call placed to patient. Advised this nurse had called to check on her. Patient reports nausea/vomiting after IV fluids on 09/11. Symptoms are now resolved. She was able to tolerate small amount of salmon, mashed potatoes, and greens last night. Patient was later NPO at midnight for stress test today. Patient has complaints of decreased appetite and states she can only eat small amounts at a time. Discussed that it is common to experience decreased appetite while undergoing treatment. Encouraged patient to continue attempting small, frequent meals. Patient also notes experiencing aching chest pain when she feels like she has to vomit. Symptoms resolve after vomiting. Discussed that stress test would likely better evaluate if there is a cardiac concern/issue; however, will also update Jill Joel NP, of above. Inquired if patient's constipation has resolved. Patient states she has not had a bowel movement in two days, but seems to average a bowel movement every 3 days. Patient not currently passing gas. Denies abdominal pain. Patient states she is taking a stool softener, 2 pills twice daily. She states she did not  Miralax from pharmacy as she did not receive a call that it was ready for . Reviewed chart and advised that NP had called in prescription for Miralax on 09/07. Confirmed pharmacy information with patient. Also advised that medication is available over the counter. Patient voiced understanding. Patient currently scheduled to follow up on 09/19. Advised this nurse will call back if any additional changes or recommendations, otherwise will follow up with patient as scheduled. Patient voiced understanding.

## 2023-09-13 NOTE — TELEPHONE ENCOUNTER
Subjective:       Patient ID: Shantell Pérez is a 20 y.o. female.    Chief Complaint:  Contraception      History of Present Illness  HPI  c/o breakthrough bleeding on nexplanon. has been in place for 2-2 1/2 years ([facility] BTB occurred past few months. daily, bright red    GYN & OB History  Patient's last menstrual period was 2019.   Date of Last Pap: No result found    OB History    Para Term  AB Living   0 0 0 0 0 0   SAB TAB Ectopic Multiple Live Births   0 0 0 0 0             Review of Systems  Review of Systems   All other systems reviewed and are negative.            Objective:     Physical Exam   Constitutional: She is oriented to person, place, and time. She appears well-developed and well-nourished.   Pulmonary/Chest: Effort normal.   Abdominal: Soft. She exhibits no distension. There is no tenderness.   Genitourinary: Vagina normal and uterus normal.   Genitourinary Comments: Normal external genitalia/cervix/vaginal mucosa. Menstrual blood   Musculoskeletal: Normal range of motion.   nexplanon well palpated left medial arm   Neurological: She is alert and oriented to person, place, and time.   Skin: Skin is warm and dry.   Psychiatric: She has a normal mood and affect. Her behavior is normal.        Assessment:        1. Breakthrough bleeding on Nexplanon          Plan:      1. Trial of estrogen add back       Patient was returning missed call.     # [phone]

## 2023-09-13 NOTE — RESULT ENCOUNTER NOTE
Stress nuc abnormal with new perfusion defect in the LAD territory. Has CP on exertion. Known  of RCA. Plan for LHC. She is in agreement. Discussed on phone.

## 2023-09-14 ENCOUNTER — TELEPHONE (OUTPATIENT)
Age: 78
End: 2023-09-14

## 2023-09-14 DIAGNOSIS — I25.119 ATHEROSCLEROTIC HEART DISEASE OF NATIVE CORONARY ARTERY WITH UNSPECIFIED ANGINA PECTORIS (HCC): ICD-10-CM

## 2023-09-14 DIAGNOSIS — I10 ESSENTIAL (PRIMARY) HYPERTENSION: ICD-10-CM

## 2023-09-14 DIAGNOSIS — Z51.81 ENCOUNTER FOR MONITORING CARDIOTOXIC DRUG THERAPY: ICD-10-CM

## 2023-09-14 DIAGNOSIS — Z79.899 ENCOUNTER FOR MONITORING CARDIOTOXIC DRUG THERAPY: ICD-10-CM

## 2023-09-14 DIAGNOSIS — Z01.818 PREOP TESTING: Primary | ICD-10-CM

## 2023-09-14 DIAGNOSIS — E11.9 TYPE 2 DIABETES MELLITUS WITHOUT COMPLICATIONS (HCC): ICD-10-CM

## 2023-09-14 DIAGNOSIS — R07.9 CHEST PAIN: ICD-10-CM

## 2023-09-14 DIAGNOSIS — C34.92 MALIGNANT NEOPLASM OF UNSPECIFIED PART OF LEFT BRONCHUS OR LUNG (HCC): ICD-10-CM

## 2023-09-14 NOTE — TELEPHONE ENCOUNTER
Patient called stating she would like to speak with someone about having a procedure done before her infusions. Request a call back.     # [phone]

## 2023-09-14 NOTE — TELEPHONE ENCOUNTER
Spoke with Pt of [facility]  schd. For 9/25/23 At 1pm arrive at 11:30am Pt aware that they need a  NPO from [address] the night before. Check in at the second floor Outpt. Reg. Desk. Pt is to have Labs done between 9/14/23-09/21/23. Medications:   Take 1/2 unit of Insulin evening before the procedure   VO by /Dr. Dontae Espinoza.

## 2023-09-14 NOTE — TELEPHONE ENCOUNTER
Rolly Rosario [facility]  [phone]    09/14/23 11:22 AM EDT - Called patient LM for patient to CB.  CB # provided

## 2023-09-15 NOTE — TELEPHONE ENCOUNTER
09/15/23 11:45 AM Return call placed to patient. Verified patient ID x 2. Patient stated that she wanted to update clinical team that Dr. Brissa Ospina advised that patient undergo a procedure to evaluate for blockage, scheduled for 09/25. Patient states that cardiology recommended that it was best to do procedure after chemotherapy treatment on 09/19. Patient also recently underwent cardiac stress test, report viewable in Epic. Advised this nurse would update Dr. Camille Acevedo and Phillip Duron, NP, of above. No further questions or concerns at this time.

## 2023-09-19 ENCOUNTER — OFFICE VISIT (OUTPATIENT)
Age: 78
End: 2023-09-19
Payer: MEDICARE

## 2023-09-19 ENCOUNTER — HOSPITAL ENCOUNTER (OUTPATIENT)
Facility: HOSPITAL | Age: 78
Setting detail: INFUSION SERIES
End: 2023-09-19
Payer: MEDICARE

## 2023-09-19 VITALS
BODY MASS INDEX: 31.42 KG/M2 | TEMPERATURE: 97.9 F | DIASTOLIC BLOOD PRESSURE: 80 MMHG | WEIGHT: 195.5 LBS | HEIGHT: 66 IN | RESPIRATION RATE: 18 BRPM | SYSTOLIC BLOOD PRESSURE: 158 MMHG | HEART RATE: 76 BPM

## 2023-09-19 VITALS
HEIGHT: 66 IN | SYSTOLIC BLOOD PRESSURE: 158 MMHG | OXYGEN SATURATION: 100 % | BODY MASS INDEX: 31.42 KG/M2 | HEART RATE: 79 BPM | WEIGHT: 195.5 LBS | TEMPERATURE: 97.9 F | DIASTOLIC BLOOD PRESSURE: 88 MMHG

## 2023-09-19 DIAGNOSIS — K59.03 DRUG-INDUCED CONSTIPATION: ICD-10-CM

## 2023-09-19 DIAGNOSIS — Z01.818 PREOP TESTING: ICD-10-CM

## 2023-09-19 DIAGNOSIS — I10 ESSENTIAL (PRIMARY) HYPERTENSION: ICD-10-CM

## 2023-09-19 DIAGNOSIS — Z79.899 ENCOUNTER FOR MONITORING CARDIOTOXIC DRUG THERAPY: ICD-10-CM

## 2023-09-19 DIAGNOSIS — C34.92 MALIGNANT NEOPLASM OF UNSPECIFIED PART OF LEFT BRONCHUS OR LUNG (HCC): ICD-10-CM

## 2023-09-19 DIAGNOSIS — N26.1 ATROPHY OF RIGHT KIDNEY: ICD-10-CM

## 2023-09-19 DIAGNOSIS — D53.9 MACROCYTIC ANEMIA: ICD-10-CM

## 2023-09-19 DIAGNOSIS — Z51.81 ENCOUNTER FOR MONITORING CARDIOTOXIC DRUG THERAPY: ICD-10-CM

## 2023-09-19 DIAGNOSIS — C34.92 SQUAMOUS CELL CARCINOMA OF LUNG, LEFT (HCC): Primary | ICD-10-CM

## 2023-09-19 DIAGNOSIS — R11.2 NAUSEA AND VOMITING, UNSPECIFIED VOMITING TYPE: ICD-10-CM

## 2023-09-19 DIAGNOSIS — E11.9 TYPE 2 DIABETES MELLITUS WITHOUT COMPLICATIONS (HCC): ICD-10-CM

## 2023-09-19 DIAGNOSIS — R73.9 HYPERGLYCEMIA: ICD-10-CM

## 2023-09-19 DIAGNOSIS — R07.9 CHEST PAIN: ICD-10-CM

## 2023-09-19 DIAGNOSIS — C34.92 MALIGNANT NEOPLASM OF UNSPECIFIED PART OF LEFT BRONCHUS OR LUNG (HCC): Primary | ICD-10-CM

## 2023-09-19 DIAGNOSIS — I25.119 ATHEROSCLEROTIC HEART DISEASE OF NATIVE CORONARY ARTERY WITH UNSPECIFIED ANGINA PECTORIS (HCC): ICD-10-CM

## 2023-09-19 DIAGNOSIS — Z51.12 ENCOUNTER FOR ANTINEOPLASTIC IMMUNOTHERAPY: ICD-10-CM

## 2023-09-19 LAB
ALBUMIN SERPL-MCNC: 3.7 G/DL (ref 3.5–5)
ALBUMIN/GLOB SERPL: 1 (ref 1.1–2.2)
ALP SERPL-CCNC: 92 U/L (ref 45–117)
ALT SERPL-CCNC: 17 U/L (ref 12–78)
ANION GAP SERPL CALC-SCNC: 7 MMOL/L (ref 5–15)
AST SERPL-CCNC: 16 U/L (ref 15–37)
BASOPHILS # BLD: 0.1 K/UL (ref 0–0.1)
BASOPHILS NFR BLD: 1 % (ref 0–1)
BILIRUB SERPL-MCNC: 0.6 MG/DL (ref 0.2–1)
BUN SERPL-MCNC: 17 MG/DL (ref 6–20)
BUN/CREAT SERPL: 10 (ref 12–20)
CALCIUM SERPL-MCNC: 9.1 MG/DL (ref 8.5–10.1)
CHLORIDE SERPL-SCNC: 103 MMOL/L (ref 97–108)
CO2 SERPL-SCNC: 25 MMOL/L (ref 21–32)
CORTIS SERPL-MCNC: 22.2 UG/DL
CREAT SERPL-MCNC: 1.64 MG/DL (ref 0.55–1.02)
DIFFERENTIAL METHOD BLD: ABNORMAL
EOSINOPHIL # BLD: 0.1 K/UL (ref 0–0.4)
EOSINOPHIL NFR BLD: 2 % (ref 0–7)
ERYTHROCYTE [DISTWIDTH] IN BLOOD BY AUTOMATED COUNT: 21.7 % (ref 11.5–14.5)
GLOBULIN SER CALC-MCNC: 3.6 G/DL (ref 2–4)
GLUCOSE SERPL-MCNC: 356 MG/DL (ref 65–100)
HBV SURFACE AB SER QL: NONREACTIVE
HBV SURFACE AB SER-ACNC: 8.53 MIU/ML
HBV SURFACE AG SER QL: <0.1 INDEX
HBV SURFACE AG SER QL: NEGATIVE
HCT VFR BLD AUTO: 24.3 % (ref 35–47)
HGB BLD-MCNC: 7.7 G/DL (ref 11.5–16)
IMM GRANULOCYTES # BLD AUTO: 0.1 K/UL (ref 0–0.04)
IMM GRANULOCYTES NFR BLD AUTO: 1 % (ref 0–0.5)
LYMPHOCYTES # BLD: 0.7 K/UL (ref 0.8–3.5)
LYMPHOCYTES NFR BLD: 12 % (ref 12–49)
MCH RBC QN AUTO: 31.4 PG (ref 26–34)
MCHC RBC AUTO-ENTMCNC: 31.7 G/DL (ref 30–36.5)
MCV RBC AUTO: 99.2 FL (ref 80–99)
MONOCYTES # BLD: 0.7 K/UL (ref 0–1)
MONOCYTES NFR BLD: 13 % (ref 5–13)
NEUTS SEG # BLD: 3.8 K/UL (ref 1.8–8)
NEUTS SEG NFR BLD: 71 % (ref 32–75)
NRBC # BLD: 0.07 K/UL (ref 0–0.01)
NRBC BLD-RTO: 1.3 PER 100 WBC
PLATELET # BLD AUTO: 297 K/UL (ref 150–400)
PMV BLD AUTO: 10.8 FL (ref 8.9–12.9)
POTASSIUM SERPL-SCNC: 3.4 MMOL/L (ref 3.5–5.1)
PROT SERPL-MCNC: 7.3 G/DL (ref 6.4–8.2)
RBC # BLD AUTO: 2.45 M/UL (ref 3.8–5.2)
RBC MORPH BLD: ABNORMAL
RBC MORPH BLD: ABNORMAL
SODIUM SERPL-SCNC: 135 MMOL/L (ref 136–145)
TSH SERPL DL<=0.05 MIU/L-ACNC: 1.73 UIU/ML (ref 0.36–3.74)
WBC # BLD AUTO: 5.5 K/UL (ref 3.6–11)

## 2023-09-19 PROCEDURE — 1036F TOBACCO NON-USER: CPT | Performed by: INTERNAL MEDICINE

## 2023-09-19 PROCEDURE — 85025 COMPLETE CBC W/AUTO DIFF WBC: CPT

## 2023-09-19 PROCEDURE — 96360 HYDRATION IV INFUSION INIT: CPT

## 2023-09-19 PROCEDURE — 3078F DIAST BP <80 MM HG: CPT | Performed by: INTERNAL MEDICINE

## 2023-09-19 PROCEDURE — 1090F PRES/ABSN URINE INCON ASSESS: CPT | Performed by: INTERNAL MEDICINE

## 2023-09-19 PROCEDURE — 82607 VITAMIN B-12: CPT

## 2023-09-19 PROCEDURE — 82746 ASSAY OF FOLIC ACID SERUM: CPT

## 2023-09-19 PROCEDURE — 82533 TOTAL CORTISOL: CPT

## 2023-09-19 PROCEDURE — 6370000000 HC RX 637 (ALT 250 FOR IP): Performed by: NURSE PRACTITIONER

## 2023-09-19 PROCEDURE — G8417 CALC BMI ABV UP PARAM F/U: HCPCS | Performed by: INTERNAL MEDICINE

## 2023-09-19 PROCEDURE — 86704 HEP B CORE ANTIBODY TOTAL: CPT

## 2023-09-19 PROCEDURE — 96413 CHEMO IV INFUSION 1 HR: CPT

## 2023-09-19 PROCEDURE — 84443 ASSAY THYROID STIM HORMONE: CPT

## 2023-09-19 PROCEDURE — 80053 COMPREHEN METABOLIC PANEL: CPT

## 2023-09-19 PROCEDURE — 1123F ACP DISCUSS/DSCN MKR DOCD: CPT | Performed by: INTERNAL MEDICINE

## 2023-09-19 PROCEDURE — 99215 OFFICE O/P EST HI 40 MIN: CPT | Performed by: INTERNAL MEDICINE

## 2023-09-19 PROCEDURE — 86706 HEP B SURFACE ANTIBODY: CPT

## 2023-09-19 PROCEDURE — 3077F SYST BP >= 140 MM HG: CPT | Performed by: INTERNAL MEDICINE

## 2023-09-19 PROCEDURE — 2580000003 HC RX 258: Performed by: NURSE PRACTITIONER

## 2023-09-19 PROCEDURE — 87340 HEPATITIS B SURFACE AG IA: CPT

## 2023-09-19 PROCEDURE — 2580000003 HC RX 258: Performed by: INTERNAL MEDICINE

## 2023-09-19 PROCEDURE — G8427 DOCREV CUR MEDS BY ELIG CLIN: HCPCS | Performed by: INTERNAL MEDICINE

## 2023-09-19 PROCEDURE — 6360000002 HC RX W HCPCS: Performed by: INTERNAL MEDICINE

## 2023-09-19 PROCEDURE — G8399 PT W/DXA RESULTS DOCUMENT: HCPCS | Performed by: INTERNAL MEDICINE

## 2023-09-19 RX ORDER — 0.9 % SODIUM CHLORIDE 0.9 %
500 INTRAVENOUS SOLUTION INTRAVENOUS ONCE
Status: COMPLETED | OUTPATIENT
Start: 2023-09-19 | End: 2023-09-19

## 2023-09-19 RX ORDER — SODIUM CHLORIDE 9 MG/ML
5-250 INJECTION, SOLUTION INTRAVENOUS PRN
Status: DISCONTINUED | OUTPATIENT
Start: 2023-09-19 | End: 2023-09-20 | Stop reason: HOSPADM

## 2023-09-19 RX ORDER — SENNA AND DOCUSATE SODIUM 50; 8.6 MG/1; MG/1
1 TABLET, FILM COATED ORAL 2 TIMES DAILY PRN
Qty: 60 TABLET | Refills: 2 | Status: SHIPPED | OUTPATIENT
Start: 2023-09-19

## 2023-09-19 RX ORDER — SODIUM CHLORIDE 0.9 % (FLUSH) 0.9 %
5-40 SYRINGE (ML) INJECTION PRN
Status: DISCONTINUED | OUTPATIENT
Start: 2023-09-19 | End: 2023-09-20 | Stop reason: HOSPADM

## 2023-09-19 RX ORDER — POTASSIUM CHLORIDE 750 MG/1
40 TABLET, EXTENDED RELEASE ORAL ONCE
Status: COMPLETED | OUTPATIENT
Start: 2023-09-19 | End: 2023-09-19

## 2023-09-19 RX ADMIN — POTASSIUM CHLORIDE 40 MEQ: 750 TABLET, EXTENDED RELEASE ORAL at 13:09

## 2023-09-19 RX ADMIN — SODIUM CHLORIDE 500 ML: 9 INJECTION, SOLUTION INTRAVENOUS at 13:12

## 2023-09-19 RX ADMIN — ATEZOLIZUMAB 1200 MG: 1200 INJECTION, SOLUTION INTRAVENOUS at 13:59

## 2023-09-19 RX ADMIN — SODIUM CHLORIDE, PRESERVATIVE FREE 20 ML: 5 INJECTION INTRAVENOUS at 15:10

## 2023-09-19 RX ADMIN — SODIUM CHLORIDE 25 ML/HR: 9 INJECTION, SOLUTION INTRAVENOUS at 13:07

## 2023-09-19 ASSESSMENT — PAIN DESCRIPTION - LOCATION: LOCATION: LEG

## 2023-09-19 ASSESSMENT — PAIN DESCRIPTION - ORIENTATION: ORIENTATION: RIGHT

## 2023-09-19 ASSESSMENT — PAIN SCALES - GENERAL: PAINLEVEL_OUTOF10: 3

## 2023-09-19 NOTE — PROGRESS NOTES
Glen Rodriguez is a 66 y.o. female  here for follow up of lung cancer. Patient with complaints of right leg pain, rates as a 3 out of 10.

## 2023-09-19 NOTE — PATIENT INSTRUCTIONS
At your earliest convenience, please call Dr. Ava Bales office (nephrologist/kidney doctor) to schedule your new patient appointment. The office number is [phone].      Please call Prisca Rivera MD  NPI: [id number]  [phone] (Work)  [phone] (Fax)  [address] Jose Miguel Sheikh [address]  [facility]  Orthopedic Surgery

## 2023-09-20 ENCOUNTER — DIRECT ADMIT ORDERS (OUTPATIENT)
Age: 78
End: 2023-09-20

## 2023-09-20 DIAGNOSIS — R94.39 ABNORMAL CARDIOVASCULAR STRESS TEST: Primary | ICD-10-CM

## 2023-09-20 LAB
ANION GAP SERPL CALC-SCNC: 8 MMOL/L (ref 5–15)
BUN SERPL-MCNC: 17 MG/DL (ref 6–20)
BUN/CREAT SERPL: 10 (ref 12–20)
CALCIUM SERPL-MCNC: 9.2 MG/DL (ref 8.5–10.1)
CHLORIDE SERPL-SCNC: 102 MMOL/L (ref 97–108)
CO2 SERPL-SCNC: 26 MMOL/L (ref 21–32)
CREAT SERPL-MCNC: 1.68 MG/DL (ref 0.55–1.02)
ERYTHROCYTE [DISTWIDTH] IN BLOOD BY AUTOMATED COUNT: 21.3 % (ref 11.5–14.5)
FOLATE SERPL-MCNC: 7 NG/ML (ref 5–21)
GLUCOSE SERPL-MCNC: 319 MG/DL (ref 65–100)
HCT VFR BLD AUTO: 25.9 % (ref 35–47)
HGB BLD-MCNC: 8.1 G/DL (ref 11.5–16)
MCH RBC QN AUTO: 31.4 PG (ref 26–34)
MCHC RBC AUTO-ENTMCNC: 31.3 G/DL (ref 30–36.5)
MCV RBC AUTO: 100.4 FL (ref 80–99)
NRBC # BLD: 0.08 K/UL (ref 0–0.01)
NRBC BLD-RTO: 1.3 PER 100 WBC
PLATELET # BLD AUTO: 311 K/UL (ref 150–400)
PMV BLD AUTO: 11.1 FL (ref 8.9–12.9)
POTASSIUM SERPL-SCNC: 4.6 MMOL/L (ref 3.5–5.1)
RBC # BLD AUTO: 2.58 M/UL (ref 3.8–5.2)
SODIUM SERPL-SCNC: 136 MMOL/L (ref 136–145)
VIT B12 SERPL-MCNC: 974 PG/ML (ref 193–986)
WBC # BLD AUTO: 6 K/UL (ref 3.6–11)

## 2023-09-20 RX ORDER — SODIUM CHLORIDE 0.9 % (FLUSH) 0.9 %
5-40 SYRINGE (ML) INJECTION EVERY 12 HOURS SCHEDULED
OUTPATIENT
Start: 2023-09-25

## 2023-09-20 RX ORDER — SODIUM CHLORIDE 0.9 % (FLUSH) 0.9 %
5-40 SYRINGE (ML) INJECTION PRN
OUTPATIENT
Start: 2023-09-25

## 2023-09-20 RX ORDER — SODIUM CHLORIDE 9 MG/ML
INJECTION, SOLUTION INTRAVENOUS PRN
OUTPATIENT
Start: 2023-09-25

## 2023-09-21 LAB — HBV CORE AB SERPL QL IA: NEGATIVE

## 2023-09-25 RX ORDER — METOPROLOL SUCCINATE 100 MG/1
100 TABLET, EXTENDED RELEASE ORAL DAILY
Qty: 90 TABLET | Refills: 0 | OUTPATIENT
Start: 2023-09-25

## 2023-09-25 NOTE — TELEPHONE ENCOUNTER
Pt called and Cx her procedure for today she has been up all night getting sick.  Pt stated that she will call back to reschedule  Once she is feeling better

## 2023-09-29 ENCOUNTER — TELEPHONE (OUTPATIENT)
Age: 78
End: 2023-09-29

## 2023-09-29 NOTE — TELEPHONE ENCOUNTER
Previous pt of Dr. Jean Guest     She is requesting refills of Metropolol Succinate 100 mg ER and lisinopril-hydroCHLOROthiazide (PRINZIDE;ZESTORETIC) 20-12.5 MG per tablet to be sent to [address] [phone] [facility] [phone]     Thank you

## 2023-10-02 ENCOUNTER — TELEPHONE (OUTPATIENT)
Age: 78
End: 2023-10-02

## 2023-10-02 DIAGNOSIS — N18.32 CHRONIC KIDNEY DISEASE, STAGE 3B (HCC): Primary | ICD-10-CM

## 2023-10-02 DIAGNOSIS — I10 ESSENTIAL (PRIMARY) HYPERTENSION: ICD-10-CM

## 2023-10-02 RX ORDER — METOPROLOL SUCCINATE 100 MG/1
100 TABLET, EXTENDED RELEASE ORAL DAILY
Qty: 90 TABLET | Refills: 0 | Status: SHIPPED | OUTPATIENT
Start: 2023-10-02

## 2023-10-02 NOTE — TELEPHONE ENCOUNTER
Pt is requesting a refill for lisinopril to [facility] at [facility] she is out of medication as of today.

## 2023-10-03 RX ORDER — ALBUTEROL SULFATE 90 UG/1
4 AEROSOL, METERED RESPIRATORY (INHALATION) PRN
Status: CANCELLED | OUTPATIENT
Start: 2023-10-10

## 2023-10-03 RX ORDER — ONDANSETRON 2 MG/ML
8 INJECTION INTRAMUSCULAR; INTRAVENOUS
Status: CANCELLED | OUTPATIENT
Start: 2023-10-10

## 2023-10-03 RX ORDER — LISINOPRIL AND HYDROCHLOROTHIAZIDE 20; 12.5 MG/1; MG/1
1 TABLET ORAL DAILY
Qty: 90 TABLET | Refills: 0 | Status: SHIPPED | OUTPATIENT
Start: 2023-10-03

## 2023-10-03 RX ORDER — DIPHENHYDRAMINE HYDROCHLORIDE 50 MG/ML
50 INJECTION INTRAMUSCULAR; INTRAVENOUS
Status: CANCELLED | OUTPATIENT
Start: 2023-10-10

## 2023-10-03 RX ORDER — MEPERIDINE HYDROCHLORIDE 25 MG/ML
12.5 INJECTION INTRAMUSCULAR; INTRAVENOUS; SUBCUTANEOUS PRN
Status: CANCELLED | OUTPATIENT
Start: 2023-10-10

## 2023-10-03 RX ORDER — SODIUM CHLORIDE 9 MG/ML
5-250 INJECTION, SOLUTION INTRAVENOUS PRN
Status: CANCELLED | OUTPATIENT
Start: 2023-10-10

## 2023-10-03 RX ORDER — SODIUM CHLORIDE 9 MG/ML
INJECTION, SOLUTION INTRAVENOUS CONTINUOUS
Status: CANCELLED | OUTPATIENT
Start: 2023-10-10

## 2023-10-03 RX ORDER — HEPARIN 100 UNIT/ML
500 SYRINGE INTRAVENOUS PRN
Status: CANCELLED | OUTPATIENT
Start: 2023-10-10

## 2023-10-03 RX ORDER — ACETAMINOPHEN 325 MG/1
650 TABLET ORAL
Status: CANCELLED | OUTPATIENT
Start: 2023-10-10

## 2023-10-03 RX ORDER — ACETAMINOPHEN 325 MG/1
650 TABLET ORAL
Status: CANCELLED
Start: 2023-10-10

## 2023-10-03 RX ORDER — EPINEPHRINE 1 MG/ML
0.3 INJECTION, SOLUTION, CONCENTRATE INTRAVENOUS PRN
Status: CANCELLED | OUTPATIENT
Start: 2023-10-10

## 2023-10-04 ENCOUNTER — TELEPHONE (OUTPATIENT)
Age: 78
End: 2023-10-04

## 2023-10-05 ENCOUNTER — HOSPITAL ENCOUNTER (OUTPATIENT)
Facility: HOSPITAL | Age: 78
Setting detail: OUTPATIENT SURGERY
Discharge: HOME | End: 2023-10-05
Attending: STUDENT IN AN ORGANIZED HEALTH CARE EDUCATION/TRAINING PROGRAM | Admitting: STUDENT IN AN ORGANIZED HEALTH CARE EDUCATION/TRAINING PROGRAM
Payer: MEDICARE

## 2023-10-05 VITALS
SYSTOLIC BLOOD PRESSURE: 118 MMHG | RESPIRATION RATE: 21 BRPM | DIASTOLIC BLOOD PRESSURE: 80 MMHG | HEART RATE: 109 BPM | WEIGHT: 180 LBS | BODY MASS INDEX: 28.93 KG/M2 | HEIGHT: 66 IN | OXYGEN SATURATION: 99 % | TEMPERATURE: 98 F

## 2023-10-05 DIAGNOSIS — R94.39 ABNORMAL STRESS TEST: ICD-10-CM

## 2023-10-05 LAB
ACT BLD: 287 SECS (ref 79–138)
ACT BLD: 305 SECS (ref 79–138)
ECHO BSA: 1.95 M2
EKG ATRIAL RATE: 85 BPM
EKG DIAGNOSIS: NORMAL
EKG P AXIS: 44 DEGREES
EKG P-R INTERVAL: 176 MS
EKG Q-T INTERVAL: 416 MS
EKG QRS DURATION: 84 MS
EKG QTC CALCULATION (BAZETT): 495 MS
EKG R AXIS: -36 DEGREES
EKG T AXIS: 13 DEGREES
EKG VENTRICULAR RATE: 85 BPM
GLUCOSE BLD STRIP.AUTO-MCNC: 179 MG/DL (ref 65–117)
GLUCOSE BLD STRIP.AUTO-MCNC: 196 MG/DL (ref 65–117)
SERVICE CMNT-IMP: ABNORMAL
SERVICE CMNT-IMP: ABNORMAL

## 2023-10-05 PROCEDURE — C1725 CATH, TRANSLUMIN NON-LASER: HCPCS | Performed by: STUDENT IN AN ORGANIZED HEALTH CARE EDUCATION/TRAINING PROGRAM

## 2023-10-05 PROCEDURE — C1894 INTRO/SHEATH, NON-LASER: HCPCS | Performed by: STUDENT IN AN ORGANIZED HEALTH CARE EDUCATION/TRAINING PROGRAM

## 2023-10-05 PROCEDURE — 2709999900 HC NON-CHARGEABLE SUPPLY: Performed by: STUDENT IN AN ORGANIZED HEALTH CARE EDUCATION/TRAINING PROGRAM

## 2023-10-05 PROCEDURE — C1761 HC CATH TRANSLUM INTRAVASCULAR LITHOTRIPSY CORONARY: HCPCS | Performed by: STUDENT IN AN ORGANIZED HEALTH CARE EDUCATION/TRAINING PROGRAM

## 2023-10-05 PROCEDURE — 0715T PR PERCUTANEOUS TRANSLUMINAL CORONARY LITHOTRIPSY: CPT | Performed by: STUDENT IN AN ORGANIZED HEALTH CARE EDUCATION/TRAINING PROGRAM

## 2023-10-05 PROCEDURE — 6360000004 HC RX CONTRAST MEDICATION: Performed by: STUDENT IN AN ORGANIZED HEALTH CARE EDUCATION/TRAINING PROGRAM

## 2023-10-05 PROCEDURE — 92978 ENDOLUMINL IVUS OCT C 1ST: CPT | Performed by: STUDENT IN AN ORGANIZED HEALTH CARE EDUCATION/TRAINING PROGRAM

## 2023-10-05 PROCEDURE — 2500000003 HC RX 250 WO HCPCS: Performed by: STUDENT IN AN ORGANIZED HEALTH CARE EDUCATION/TRAINING PROGRAM

## 2023-10-05 PROCEDURE — 2580000003 HC RX 258: Performed by: STUDENT IN AN ORGANIZED HEALTH CARE EDUCATION/TRAINING PROGRAM

## 2023-10-05 PROCEDURE — C1887 CATHETER, GUIDING: HCPCS | Performed by: STUDENT IN AN ORGANIZED HEALTH CARE EDUCATION/TRAINING PROGRAM

## 2023-10-05 PROCEDURE — 0715T HC PERQ TRLUML CORONRY LITHOTRP: CPT | Performed by: STUDENT IN AN ORGANIZED HEALTH CARE EDUCATION/TRAINING PROGRAM

## 2023-10-05 PROCEDURE — C1753 CATH, INTRAVAS ULTRASOUND: HCPCS | Performed by: STUDENT IN AN ORGANIZED HEALTH CARE EDUCATION/TRAINING PROGRAM

## 2023-10-05 PROCEDURE — 99152 MOD SED SAME PHYS/QHP 5/>YRS: CPT | Performed by: STUDENT IN AN ORGANIZED HEALTH CARE EDUCATION/TRAINING PROGRAM

## 2023-10-05 PROCEDURE — C1874 STENT, COATED/COV W/DEL SYS: HCPCS | Performed by: STUDENT IN AN ORGANIZED HEALTH CARE EDUCATION/TRAINING PROGRAM

## 2023-10-05 PROCEDURE — 93010 ELECTROCARDIOGRAM REPORT: CPT | Performed by: SPECIALIST

## 2023-10-05 PROCEDURE — 99153 MOD SED SAME PHYS/QHP EA: CPT | Performed by: STUDENT IN AN ORGANIZED HEALTH CARE EDUCATION/TRAINING PROGRAM

## 2023-10-05 PROCEDURE — 93005 ELECTROCARDIOGRAM TRACING: CPT | Performed by: STUDENT IN AN ORGANIZED HEALTH CARE EDUCATION/TRAINING PROGRAM

## 2023-10-05 PROCEDURE — 92943 PRQ TRLUML REVSC CH OCC ANT: CPT | Performed by: STUDENT IN AN ORGANIZED HEALTH CARE EDUCATION/TRAINING PROGRAM

## 2023-10-05 PROCEDURE — C9602 PERC D-E COR STENT ATHER S: HCPCS | Performed by: STUDENT IN AN ORGANIZED HEALTH CARE EDUCATION/TRAINING PROGRAM

## 2023-10-05 PROCEDURE — 82962 GLUCOSE BLOOD TEST: CPT

## 2023-10-05 PROCEDURE — 6370000000 HC RX 637 (ALT 250 FOR IP): Performed by: STUDENT IN AN ORGANIZED HEALTH CARE EDUCATION/TRAINING PROGRAM

## 2023-10-05 PROCEDURE — C1769 GUIDE WIRE: HCPCS | Performed by: STUDENT IN AN ORGANIZED HEALTH CARE EDUCATION/TRAINING PROGRAM

## 2023-10-05 PROCEDURE — 93458 L HRT ARTERY/VENTRICLE ANGIO: CPT | Performed by: STUDENT IN AN ORGANIZED HEALTH CARE EDUCATION/TRAINING PROGRAM

## 2023-10-05 PROCEDURE — 85347 COAGULATION TIME ACTIVATED: CPT

## 2023-10-05 PROCEDURE — 6360000002 HC RX W HCPCS: Performed by: STUDENT IN AN ORGANIZED HEALTH CARE EDUCATION/TRAINING PROGRAM

## 2023-10-05 DEVICE — STENT ONYXNG30026UX ONYX 3.00X26RX
Type: IMPLANTABLE DEVICE | Status: FUNCTIONAL
Brand: ONYX FRONTIER™

## 2023-10-05 RX ORDER — FENTANYL CITRATE 50 UG/ML
INJECTION, SOLUTION INTRAMUSCULAR; INTRAVENOUS PRN
Status: DISCONTINUED | OUTPATIENT
Start: 2023-10-05 | End: 2023-10-05 | Stop reason: HOSPADM

## 2023-10-05 RX ORDER — SODIUM CHLORIDE 0.9 % (FLUSH) 0.9 %
5-40 SYRINGE (ML) INJECTION EVERY 12 HOURS SCHEDULED
Status: DISCONTINUED | OUTPATIENT
Start: 2023-10-05 | End: 2023-10-05 | Stop reason: HOSPADM

## 2023-10-05 RX ORDER — SODIUM CHLORIDE 0.9 % (FLUSH) 0.9 %
5-40 SYRINGE (ML) INJECTION PRN
Status: DISCONTINUED | OUTPATIENT
Start: 2023-10-05 | End: 2023-10-05 | Stop reason: HOSPADM

## 2023-10-05 RX ORDER — ACETAMINOPHEN 325 MG/1
650 TABLET ORAL EVERY 4 HOURS PRN
Status: DISCONTINUED | OUTPATIENT
Start: 2023-10-05 | End: 2023-10-05 | Stop reason: HOSPADM

## 2023-10-05 RX ORDER — CLOPIDOGREL 300 MG/1
TABLET, FILM COATED ORAL PRN
Status: DISCONTINUED | OUTPATIENT
Start: 2023-10-05 | End: 2023-10-05 | Stop reason: HOSPADM

## 2023-10-05 RX ORDER — SODIUM CHLORIDE 9 MG/ML
INJECTION, SOLUTION INTRAVENOUS CONTINUOUS PRN
Status: COMPLETED | OUTPATIENT
Start: 2023-10-05 | End: 2023-10-05

## 2023-10-05 RX ORDER — CLOPIDOGREL BISULFATE 75 MG/1
75 TABLET ORAL DAILY
Qty: 90 TABLET | Refills: 1 | Status: SHIPPED | OUTPATIENT
Start: 2023-10-05

## 2023-10-05 RX ORDER — VERAPAMIL HYDROCHLORIDE 2.5 MG/ML
INJECTION, SOLUTION INTRAVENOUS PRN
Status: DISCONTINUED | OUTPATIENT
Start: 2023-10-05 | End: 2023-10-05 | Stop reason: HOSPADM

## 2023-10-05 RX ORDER — HEPARIN SODIUM 1000 [USP'U]/ML
INJECTION, SOLUTION INTRAVENOUS; SUBCUTANEOUS PRN
Status: DISCONTINUED | OUTPATIENT
Start: 2023-10-05 | End: 2023-10-05 | Stop reason: HOSPADM

## 2023-10-05 RX ORDER — LIDOCAINE HYDROCHLORIDE 10 MG/ML
INJECTION, SOLUTION INFILTRATION; PERINEURAL PRN
Status: DISCONTINUED | OUTPATIENT
Start: 2023-10-05 | End: 2023-10-05 | Stop reason: HOSPADM

## 2023-10-05 RX ORDER — SODIUM CHLORIDE 9 MG/ML
INJECTION, SOLUTION INTRAVENOUS PRN
Status: DISCONTINUED | OUTPATIENT
Start: 2023-10-05 | End: 2023-10-05 | Stop reason: HOSPADM

## 2023-10-05 RX ORDER — HEPARIN SODIUM 200 [USP'U]/100ML
INJECTION, SOLUTION INTRAVENOUS PRN
Status: DISCONTINUED | OUTPATIENT
Start: 2023-10-05 | End: 2023-10-05 | Stop reason: HOSPADM

## 2023-10-05 RX ORDER — MIDAZOLAM HYDROCHLORIDE 1 MG/ML
INJECTION INTRAMUSCULAR; INTRAVENOUS PRN
Status: DISCONTINUED | OUTPATIENT
Start: 2023-10-05 | End: 2023-10-05 | Stop reason: HOSPADM

## 2023-10-05 NOTE — CARDIO/PULMONARY
Chart reviewed: Patient is 66 y.o. female admitted for Cardiac Cath. Education: PCI education folder given to [employer]. Met briefly with patient to review benefits of outpatient cardiac rehab program.  Pt reported she is currently receiving chemotherapy for lung cancer. She is not able to participate in cardiac rehab at this time; however, she expressed interest in the program. Explained to patient that she will be eligible for cardiac rehab up to 12 months post-PCI procedure. Pt indicated she would like to be called after the first of the year for possible enrollment in cardiac rehab. [employer] was somewhat sleepy. Recommend reinforcement of information provided.      Gege Domingo RN

## 2023-10-05 NOTE — PROGRESS NOTES
9:44 AM  TRANSFER - OUT REPORT:    Verbal report given to Chintan martins RN on Ollie Benavides  being transferred to cath lab for ordered procedure       Report consisted of patient's Situation, Background, Assessment and   Recommendations(SBAR). Information from the following report(s) Nurse Handoff Report was reviewed with the receiving nurse. Lines:   Single Lumen Implantable Port 06/08/23 Right Chest (Active)       Peripheral IV 10/05/23 Proximal;Right Forearm (Active)        Opportunity for questions and clarification was provided. Patient transported with:  Registered Nurse and Tech    11:05 AM  TRANSFER - IN REPORT:    Verbal report received from Mary Jo Farnsworth on Ollie Benavides  being received from cath lab for routine post-op      Report consisted of patient's Situation, Background, Assessment and   Recommendations(SBAR). Information from the following report(s) Nurse Handoff Report was reviewed with the receiving nurse. Opportunity for questions and clarification was provided. Assessment completed upon patient's arrival to unit and care assumed. Patient has bruising at the insertion site. PCI patient meets criteria for outpatient cardiac rehab. [facility] outpatient cardiac rehab referral will be initiated. Educational handouts provided on: PCI procedure, coronary artery disease, coronary artery risk factors, heart healthy eating, and community resources. Reference information on [address] [place] [address] Drive Outpatient Cardiac Rehab Program also provided. [facility] cardiac rehab staff will contact patent, per telephone call, to assess and schedule program participation    1:00 PM  Patient seems to have some slight swollen distal the TR band. 1:10 PM  2 ml air released from TR Band. No bleeding or hematoma noted. Radial and Ulnar pulse on right wrist palpable. Pt tolerated well. Will continue to monitor. Bleeding inserted air back in. TR band still on.  Held manual pressure below the

## 2023-10-10 ENCOUNTER — OFFICE VISIT (OUTPATIENT)
Age: 78
End: 2023-10-10
Payer: MEDICARE

## 2023-10-10 ENCOUNTER — HOSPITAL ENCOUNTER (OUTPATIENT)
Facility: HOSPITAL | Age: 78
Setting detail: INFUSION SERIES
End: 2023-10-10
Payer: MEDICARE

## 2023-10-10 VITALS
RESPIRATION RATE: 19 BRPM | DIASTOLIC BLOOD PRESSURE: 60 MMHG | HEART RATE: 82 BPM | SYSTOLIC BLOOD PRESSURE: 110 MMHG | TEMPERATURE: 97.1 F | BODY MASS INDEX: 29.62 KG/M2 | WEIGHT: 184.3 LBS | OXYGEN SATURATION: 97 % | HEIGHT: 66 IN

## 2023-10-10 VITALS
OXYGEN SATURATION: 97 % | DIASTOLIC BLOOD PRESSURE: 60 MMHG | HEART RATE: 82 BPM | WEIGHT: 184.3 LBS | RESPIRATION RATE: 18 BRPM | HEIGHT: 66 IN | BODY MASS INDEX: 29.62 KG/M2 | SYSTOLIC BLOOD PRESSURE: 127 MMHG | TEMPERATURE: 97.1 F

## 2023-10-10 DIAGNOSIS — R53.83 OTHER FATIGUE: ICD-10-CM

## 2023-10-10 DIAGNOSIS — R11.2 NAUSEA AND VOMITING, UNSPECIFIED VOMITING TYPE: ICD-10-CM

## 2023-10-10 DIAGNOSIS — N18.30 STAGE 3 CHRONIC KIDNEY DISEASE, UNSPECIFIED WHETHER STAGE 3A OR 3B CKD (HCC): ICD-10-CM

## 2023-10-10 DIAGNOSIS — Z51.12 ENCOUNTER FOR ANTINEOPLASTIC IMMUNOTHERAPY: ICD-10-CM

## 2023-10-10 DIAGNOSIS — C34.92 SQUAMOUS CELL CARCINOMA OF LUNG, LEFT (HCC): Primary | ICD-10-CM

## 2023-10-10 DIAGNOSIS — C34.92 MALIGNANT NEOPLASM OF UNSPECIFIED PART OF LEFT BRONCHUS OR LUNG (HCC): Primary | ICD-10-CM

## 2023-10-10 DIAGNOSIS — D63.8 ANEMIA OF CHRONIC DISEASE: ICD-10-CM

## 2023-10-10 DIAGNOSIS — R63.4 UNINTENTIONAL WEIGHT LOSS: ICD-10-CM

## 2023-10-10 LAB
ALBUMIN SERPL-MCNC: 3.6 G/DL (ref 3.5–5)
ALBUMIN/GLOB SERPL: 1 (ref 1.1–2.2)
ALP SERPL-CCNC: 72 U/L (ref 45–117)
ALT SERPL-CCNC: 29 U/L (ref 12–78)
ANION GAP SERPL CALC-SCNC: 9 MMOL/L (ref 5–15)
AST SERPL-CCNC: 25 U/L (ref 15–37)
BASOPHILS # BLD: 0.1 K/UL (ref 0–0.1)
BASOPHILS NFR BLD: 1 % (ref 0–1)
BILIRUB SERPL-MCNC: 0.6 MG/DL (ref 0.2–1)
BUN SERPL-MCNC: 40 MG/DL (ref 6–20)
BUN/CREAT SERPL: 21 (ref 12–20)
CALCIUM SERPL-MCNC: 8.7 MG/DL (ref 8.5–10.1)
CHLORIDE SERPL-SCNC: 97 MMOL/L (ref 97–108)
CO2 SERPL-SCNC: 26 MMOL/L (ref 21–32)
CREAT SERPL-MCNC: 1.92 MG/DL (ref 0.55–1.02)
DIFFERENTIAL METHOD BLD: ABNORMAL
EOSINOPHIL # BLD: 0.1 K/UL (ref 0–0.4)
EOSINOPHIL NFR BLD: 2 % (ref 0–7)
ERYTHROCYTE [DISTWIDTH] IN BLOOD BY AUTOMATED COUNT: 15.4 % (ref 11.5–14.5)
GLOBULIN SER CALC-MCNC: 3.5 G/DL (ref 2–4)
GLUCOSE SERPL-MCNC: 328 MG/DL (ref 65–100)
HCT VFR BLD AUTO: 27.1 % (ref 35–47)
HGB BLD-MCNC: 8.7 G/DL (ref 11.5–16)
IMM GRANULOCYTES # BLD AUTO: 0 K/UL
IMM GRANULOCYTES NFR BLD AUTO: 0 %
LYMPHOCYTES # BLD: 1.7 K/UL (ref 0.8–3.5)
LYMPHOCYTES NFR BLD: 32 % (ref 12–49)
MCH RBC QN AUTO: 30.9 PG (ref 26–34)
MCHC RBC AUTO-ENTMCNC: 32.1 G/DL (ref 30–36.5)
MCV RBC AUTO: 96.1 FL (ref 80–99)
MONOCYTES # BLD: 0.2 K/UL (ref 0–1)
MONOCYTES NFR BLD: 3 % (ref 5–13)
NEUTS SEG # BLD: 3.1 K/UL (ref 1.8–8)
NEUTS SEG NFR BLD: 62 % (ref 32–75)
NRBC # BLD: 0 K/UL (ref 0–0.01)
NRBC BLD-RTO: 0 PER 100 WBC
PLATELET # BLD AUTO: 135 K/UL (ref 150–400)
PMV BLD AUTO: 12.9 FL (ref 8.9–12.9)
POTASSIUM SERPL-SCNC: 3.6 MMOL/L (ref 3.5–5.1)
PROT SERPL-MCNC: 7.1 G/DL (ref 6.4–8.2)
RBC # BLD AUTO: 2.82 M/UL (ref 3.8–5.2)
RBC MORPH BLD: ABNORMAL
RBC MORPH BLD: ABNORMAL
SODIUM SERPL-SCNC: 132 MMOL/L (ref 136–145)
WBC # BLD AUTO: 5.2 K/UL (ref 3.6–11)

## 2023-10-10 PROCEDURE — 96360 HYDRATION IV INFUSION INIT: CPT

## 2023-10-10 PROCEDURE — 85025 COMPLETE CBC W/AUTO DIFF WBC: CPT

## 2023-10-10 PROCEDURE — 80053 COMPREHEN METABOLIC PANEL: CPT

## 2023-10-10 PROCEDURE — 1036F TOBACCO NON-USER: CPT | Performed by: INTERNAL MEDICINE

## 2023-10-10 PROCEDURE — G8399 PT W/DXA RESULTS DOCUMENT: HCPCS | Performed by: INTERNAL MEDICINE

## 2023-10-10 PROCEDURE — 99215 OFFICE O/P EST HI 40 MIN: CPT | Performed by: INTERNAL MEDICINE

## 2023-10-10 PROCEDURE — 1090F PRES/ABSN URINE INCON ASSESS: CPT | Performed by: INTERNAL MEDICINE

## 2023-10-10 PROCEDURE — 3078F DIAST BP <80 MM HG: CPT | Performed by: INTERNAL MEDICINE

## 2023-10-10 PROCEDURE — G8484 FLU IMMUNIZE NO ADMIN: HCPCS | Performed by: INTERNAL MEDICINE

## 2023-10-10 PROCEDURE — 2580000003 HC RX 258: Performed by: INTERNAL MEDICINE

## 2023-10-10 PROCEDURE — G8417 CALC BMI ABV UP PARAM F/U: HCPCS | Performed by: INTERNAL MEDICINE

## 2023-10-10 PROCEDURE — 1123F ACP DISCUSS/DSCN MKR DOCD: CPT | Performed by: INTERNAL MEDICINE

## 2023-10-10 PROCEDURE — G8427 DOCREV CUR MEDS BY ELIG CLIN: HCPCS | Performed by: INTERNAL MEDICINE

## 2023-10-10 PROCEDURE — 96413 CHEMO IV INFUSION 1 HR: CPT

## 2023-10-10 PROCEDURE — 2580000003 HC RX 258: Performed by: NURSE PRACTITIONER

## 2023-10-10 PROCEDURE — 6360000002 HC RX W HCPCS: Performed by: INTERNAL MEDICINE

## 2023-10-10 PROCEDURE — 36415 COLL VENOUS BLD VENIPUNCTURE: CPT

## 2023-10-10 PROCEDURE — 96361 HYDRATE IV INFUSION ADD-ON: CPT

## 2023-10-10 PROCEDURE — 3074F SYST BP LT 130 MM HG: CPT | Performed by: INTERNAL MEDICINE

## 2023-10-10 RX ORDER — SODIUM CHLORIDE 0.9 % (FLUSH) 0.9 %
5-40 SYRINGE (ML) INJECTION PRN
Status: DISCONTINUED | OUTPATIENT
Start: 2023-10-10 | End: 2023-10-11 | Stop reason: HOSPADM

## 2023-10-10 RX ORDER — METOCLOPRAMIDE 10 MG/1
10 TABLET ORAL 3 TIMES DAILY PRN
Qty: 60 TABLET | Refills: 3 | Status: SHIPPED | OUTPATIENT
Start: 2023-10-10

## 2023-10-10 RX ORDER — SODIUM CHLORIDE 9 MG/ML
5-250 INJECTION, SOLUTION INTRAVENOUS PRN
Status: DISCONTINUED | OUTPATIENT
Start: 2023-10-10 | End: 2023-10-11 | Stop reason: HOSPADM

## 2023-10-10 RX ORDER — 0.9 % SODIUM CHLORIDE 0.9 %
1000 INTRAVENOUS SOLUTION INTRAVENOUS ONCE
Status: COMPLETED | OUTPATIENT
Start: 2023-10-10 | End: 2023-10-10

## 2023-10-10 RX ADMIN — ATEZOLIZUMAB 1200 MG: 1200 INJECTION, SOLUTION INTRAVENOUS at 14:47

## 2023-10-10 RX ADMIN — SODIUM CHLORIDE 1000 ML: 9 INJECTION, SOLUTION INTRAVENOUS at 13:26

## 2023-10-10 RX ADMIN — SODIUM CHLORIDE, PRESERVATIVE FREE 20 ML: 5 INJECTION INTRAVENOUS at 15:55

## 2023-10-10 ASSESSMENT — PAIN SCALES - GENERAL: PAINLEVEL_OUTOF10: 0

## 2023-10-10 NOTE — PROGRESS NOTES
[facility] Progress Note    Date: October 10, 2023    Name: Michael Camp    MRN: [id number]         : 1945    Ms. Tabares Arrived ambulatory and in no distress for C2D1 of Tecentriq Regimen. Assessment was completed, no acute issues at this time, no new complaints voiced. Right chest wall port accessed without difficulty, labs drawn & sent for processing. Patient proceed to appointment with Dr. Norma Mcgraw. Ms. Velma Funez vitals were reviewed. Vitals:    10/10/23 1554   BP: 127/60   Pulse:    Resp:    Temp:    SpO2:        Lab results were obtained and reviewed.   Recent Results (from the past 12 hour(s))   Comprehensive metabolic panel    Collection Time: 10/10/23 11:32 AM   Result Value Ref Range    Sodium 132 (L) 136 - 145 mmol/L    Potassium 3.6 3.5 - 5.1 mmol/L    Chloride 97 97 - 108 mmol/L    CO2 26 21 - 32 mmol/L    Anion Gap 9 5 - 15 mmol/L    Glucose 328 (H) 65 - 100 mg/dL    BUN 40 (H) 6 - 20 MG/DL    Creatinine 1.92 (H) 0.55 - 1.02 MG/DL    Bun/Cre Ratio 21 (H) 12 - 20      Est, Glom Filt Rate 26 (L) >60 ml/min/1.73m2    Calcium 8.7 8.5 - 10.1 MG/DL    Total Bilirubin 0.6 0.2 - 1.0 MG/DL    ALT 29 12 - 78 U/L    AST 25 15 - 37 U/L    Alk Phosphatase 72 45 - 117 U/L    Total Protein 7.1 6.4 - 8.2 g/dL    Albumin 3.6 3.5 - 5.0 g/dL    Globulin 3.5 2.0 - 4.0 g/dL    Albumin/Globulin Ratio 1.0 (L) 1.1 - 2.2     CBC With Auto Differential    Collection Time: 10/10/23 11:32 AM   Result Value Ref Range    WBC 5.2 3.6 - 11.0 K/uL    RBC 2.82 (L) 3.80 - 5.20 M/uL    Hemoglobin 8.7 (L) 11.5 - 16.0 g/dL    Hematocrit 27.1 (L) 35.0 - 47.0 %    MCV 96.1 80.0 - 99.0 FL    MCH 30.9 26.0 - 34.0 PG    MCHC 32.1 30.0 - 36.5 g/dL    RDW 15.4 (H) 11.5 - 14.5 %    Platelets 441 (L) 661 - 400 K/uL    MPV 12.9 8.9 - 12.9 FL    Nucleated RBCs 0.0 0  WBC    nRBC 0.00 0.00 - 0.01 K/uL    Neutrophils % 62 32 - 75 %    Lymphocytes % 32 12 - 49 %    Monocytes % 3 (L) 5 - 13 %    Eosinophils % 2 0 - 7 %    Basophils % 1 0 - 1 %    Immature Granulocytes 0 %    Neutrophils Absolute 3.1 1.8 - 8.0 K/UL    Lymphocytes Absolute 1.7 0.8 - 3.5 K/UL    Monocytes Absolute 0.2 0.0 - 1.0 K/UL    Eosinophils Absolute 0.1 0.0 - 0.4 K/UL    Basophils Absolute 0.1 0.0 - 0.1 K/UL    Absolute Immature Granulocyte 0.0 K/UL    Differential Type MANUAL      RBC Comment ANISOCYTOSIS  1+        RBC Comment OVALOCYTES  PRESENT           Medications:  Medications Administered         atezolizumab (TECENTRIQ) 1,200 mg in sodium chloride 0.9 % 100 mL IVPB Admin Date  10/10/2023 Action  New Bag Dose  1,200 mg Rate  260 mL/hr Route  IntraVENous Administered By  Dagoberto Avlarez RN        sodium chloride 0.9 % bolus 1,000 mL Admin Date  10/10/2023 Action  New Bag Dose  1,000 mL Rate  983.6 mL/hr Route  IntraVENous Administered By  Dagoberto Alvarez RN        sodium chloride flush 0.9 % injection 5-40 mL Admin Date  10/10/2023 Action  Given Dose  20 mL Rate   Route  IntraVENous Administered By  Dagoberto Alvarez RN                  Two nurses verified prior to administering: Drug name, drug dose, infusion volume or drug volume when prepared in a syringe, rate of administration, route of administration,  expiration dates and/or times, appearance and physical integrity of the drugs, rate set on infusion pump, when used sequencing of drug administration. Ms. Linda Abad tolerated treatment well and was discharged from [address] [place] [address] in stable condition. Port de-accessed & flushed per protocol. She is to return on 10/31/23 at 1100 for her next appointment.     Dagoberto Alvarez RN  October 10, 2023

## 2023-10-12 ENCOUNTER — TELEPHONE (OUTPATIENT)
Age: 78
End: 2023-10-12

## 2023-10-16 DIAGNOSIS — Z95.5 STENTED CORONARY ARTERY: Primary | ICD-10-CM

## 2023-10-24 RX ORDER — ONDANSETRON 2 MG/ML
8 INJECTION INTRAMUSCULAR; INTRAVENOUS
Status: CANCELLED | OUTPATIENT
Start: 2023-10-31

## 2023-10-24 RX ORDER — EPINEPHRINE 1 MG/ML
0.3 INJECTION, SOLUTION, CONCENTRATE INTRAVENOUS PRN
Status: CANCELLED | OUTPATIENT
Start: 2023-10-31

## 2023-10-24 RX ORDER — ACETAMINOPHEN 325 MG/1
650 TABLET ORAL
Status: CANCELLED | OUTPATIENT
Start: 2023-10-31

## 2023-10-24 RX ORDER — DIPHENHYDRAMINE HYDROCHLORIDE 50 MG/ML
50 INJECTION INTRAMUSCULAR; INTRAVENOUS
Status: CANCELLED | OUTPATIENT
Start: 2023-10-31

## 2023-10-24 RX ORDER — SODIUM CHLORIDE 9 MG/ML
5-250 INJECTION, SOLUTION INTRAVENOUS PRN
Status: CANCELLED | OUTPATIENT
Start: 2023-10-31

## 2023-10-24 RX ORDER — MEPERIDINE HYDROCHLORIDE 25 MG/ML
12.5 INJECTION INTRAMUSCULAR; INTRAVENOUS; SUBCUTANEOUS PRN
Status: CANCELLED | OUTPATIENT
Start: 2023-10-31

## 2023-10-24 RX ORDER — ALBUTEROL SULFATE 90 UG/1
4 AEROSOL, METERED RESPIRATORY (INHALATION) PRN
Status: CANCELLED | OUTPATIENT
Start: 2023-10-31

## 2023-10-24 RX ORDER — HEPARIN 100 UNIT/ML
500 SYRINGE INTRAVENOUS PRN
Status: CANCELLED | OUTPATIENT
Start: 2023-10-31

## 2023-10-24 RX ORDER — ACETAMINOPHEN 325 MG/1
650 TABLET ORAL
Status: CANCELLED
Start: 2023-10-31

## 2023-10-24 RX ORDER — SODIUM CHLORIDE 9 MG/ML
INJECTION, SOLUTION INTRAVENOUS CONTINUOUS
Status: CANCELLED | OUTPATIENT
Start: 2023-10-31

## 2023-10-30 ENCOUNTER — OFFICE VISIT (OUTPATIENT)
Age: 78
End: 2023-10-30
Payer: MEDICARE

## 2023-10-30 VITALS
WEIGHT: 184 LBS | OXYGEN SATURATION: 99 % | HEART RATE: 92 BPM | DIASTOLIC BLOOD PRESSURE: 68 MMHG | SYSTOLIC BLOOD PRESSURE: 122 MMHG | HEIGHT: 66 IN | BODY MASS INDEX: 29.57 KG/M2

## 2023-10-30 DIAGNOSIS — E78.2 MIXED HYPERLIPIDEMIA: ICD-10-CM

## 2023-10-30 DIAGNOSIS — I25.118 CORONARY ARTERY DISEASE INVOLVING NATIVE CORONARY ARTERY OF NATIVE HEART WITH OTHER FORM OF ANGINA PECTORIS (HCC): Primary | ICD-10-CM

## 2023-10-30 DIAGNOSIS — I10 PRIMARY HYPERTENSION: ICD-10-CM

## 2023-10-30 DIAGNOSIS — I20.89 STABLE ANGINA: ICD-10-CM

## 2023-10-30 PROCEDURE — G8484 FLU IMMUNIZE NO ADMIN: HCPCS | Performed by: INTERNAL MEDICINE

## 2023-10-30 PROCEDURE — 3078F DIAST BP <80 MM HG: CPT | Performed by: INTERNAL MEDICINE

## 2023-10-30 PROCEDURE — 99215 OFFICE O/P EST HI 40 MIN: CPT | Performed by: INTERNAL MEDICINE

## 2023-10-30 PROCEDURE — 3074F SYST BP LT 130 MM HG: CPT | Performed by: INTERNAL MEDICINE

## 2023-10-30 PROCEDURE — G8427 DOCREV CUR MEDS BY ELIG CLIN: HCPCS | Performed by: INTERNAL MEDICINE

## 2023-10-30 PROCEDURE — 1090F PRES/ABSN URINE INCON ASSESS: CPT | Performed by: INTERNAL MEDICINE

## 2023-10-30 PROCEDURE — 1123F ACP DISCUSS/DSCN MKR DOCD: CPT | Performed by: INTERNAL MEDICINE

## 2023-10-30 PROCEDURE — G8417 CALC BMI ABV UP PARAM F/U: HCPCS | Performed by: INTERNAL MEDICINE

## 2023-10-30 PROCEDURE — 1036F TOBACCO NON-USER: CPT | Performed by: INTERNAL MEDICINE

## 2023-10-30 PROCEDURE — G8399 PT W/DXA RESULTS DOCUMENT: HCPCS | Performed by: INTERNAL MEDICINE

## 2023-10-30 RX ORDER — ROSUVASTATIN CALCIUM 20 MG/1
20 TABLET, COATED ORAL NIGHTLY
Qty: 90 TABLET | Refills: 3 | Status: SHIPPED | OUTPATIENT
Start: 2023-10-30

## 2023-10-31 ENCOUNTER — OFFICE VISIT (OUTPATIENT)
Age: 78
End: 2023-10-31
Payer: MEDICARE

## 2023-10-31 ENCOUNTER — HOSPITAL ENCOUNTER (OUTPATIENT)
Facility: HOSPITAL | Age: 78
Setting detail: INFUSION SERIES
Discharge: HOME | End: 2023-10-31
Payer: MEDICARE

## 2023-10-31 VITALS
RESPIRATION RATE: 18 BRPM | DIASTOLIC BLOOD PRESSURE: 65 MMHG | HEART RATE: 83 BPM | TEMPERATURE: 97.6 F | HEIGHT: 66 IN | SYSTOLIC BLOOD PRESSURE: 114 MMHG | WEIGHT: 183.7 LBS | OXYGEN SATURATION: 100 % | BODY MASS INDEX: 29.52 KG/M2

## 2023-10-31 VITALS
HEIGHT: 66 IN | DIASTOLIC BLOOD PRESSURE: 60 MMHG | SYSTOLIC BLOOD PRESSURE: 112 MMHG | OXYGEN SATURATION: 100 % | HEART RATE: 88 BPM | BODY MASS INDEX: 29.57 KG/M2 | TEMPERATURE: 97.6 F | RESPIRATION RATE: 18 BRPM | WEIGHT: 184 LBS

## 2023-10-31 DIAGNOSIS — D69.6 THROMBOCYTOPENIA (HCC): ICD-10-CM

## 2023-10-31 DIAGNOSIS — D64.9 NORMOCYTIC ANEMIA: ICD-10-CM

## 2023-10-31 DIAGNOSIS — I25.118 CORONARY ARTERY DISEASE INVOLVING NATIVE CORONARY ARTERY OF NATIVE HEART WITH OTHER FORM OF ANGINA PECTORIS (HCC): ICD-10-CM

## 2023-10-31 DIAGNOSIS — I10 PRIMARY HYPERTENSION: ICD-10-CM

## 2023-10-31 DIAGNOSIS — Z51.12 ENCOUNTER FOR ANTINEOPLASTIC IMMUNOTHERAPY: ICD-10-CM

## 2023-10-31 DIAGNOSIS — D63.8 ANEMIA OF CHRONIC DISEASE: ICD-10-CM

## 2023-10-31 DIAGNOSIS — C34.92 MALIGNANT NEOPLASM OF UNSPECIFIED PART OF LEFT BRONCHUS OR LUNG (HCC): Primary | ICD-10-CM

## 2023-10-31 DIAGNOSIS — C34.92 SQUAMOUS CELL CARCINOMA OF LUNG, LEFT (HCC): Primary | ICD-10-CM

## 2023-10-31 DIAGNOSIS — G62.9 NEUROPATHY: ICD-10-CM

## 2023-10-31 DIAGNOSIS — E78.2 MIXED HYPERLIPIDEMIA: ICD-10-CM

## 2023-10-31 DIAGNOSIS — N18.4 CHRONIC KIDNEY DISEASE (CKD), STAGE IV (SEVERE) (HCC): ICD-10-CM

## 2023-10-31 LAB
ALBUMIN SERPL-MCNC: 3.5 G/DL (ref 3.5–5)
ALBUMIN/GLOB SERPL: 0.9 (ref 1.1–2.2)
ALP SERPL-CCNC: 62 U/L (ref 45–117)
ALT SERPL-CCNC: 20 U/L (ref 12–78)
ANION GAP SERPL CALC-SCNC: 7 MMOL/L (ref 5–15)
AST SERPL-CCNC: 17 U/L (ref 15–37)
BASOPHILS # BLD: 0 K/UL (ref 0–0.1)
BASOPHILS NFR BLD: 0 % (ref 0–1)
BILIRUB SERPL-MCNC: 0.4 MG/DL (ref 0.2–1)
BUN SERPL-MCNC: 23 MG/DL (ref 6–20)
BUN/CREAT SERPL: 17 (ref 12–20)
CALCIUM SERPL-MCNC: 8.9 MG/DL (ref 8.5–10.1)
CHLORIDE SERPL-SCNC: 109 MMOL/L (ref 97–108)
CHOLEST SERPL-MCNC: 143 MG/DL
CO2 SERPL-SCNC: 23 MMOL/L (ref 21–32)
CREAT SERPL-MCNC: 1.39 MG/DL (ref 0.55–1.02)
DIFFERENTIAL METHOD BLD: ABNORMAL
EOSINOPHIL # BLD: 0.2 K/UL (ref 0–0.4)
EOSINOPHIL NFR BLD: 4 % (ref 0–7)
ERYTHROCYTE [DISTWIDTH] IN BLOOD BY AUTOMATED COUNT: 13.4 % (ref 11.5–14.5)
GLOBULIN SER CALC-MCNC: 3.7 G/DL (ref 2–4)
GLUCOSE SERPL-MCNC: 158 MG/DL (ref 65–100)
HCT VFR BLD AUTO: 28.5 % (ref 35–47)
HDLC SERPL-MCNC: 65 MG/DL
HDLC SERPL: 2.2 (ref 0–5)
HGB BLD-MCNC: 9.2 G/DL (ref 11.5–16)
IMM GRANULOCYTES # BLD AUTO: 0 K/UL (ref 0–0.04)
IMM GRANULOCYTES NFR BLD AUTO: 0 % (ref 0–0.5)
LDLC SERPL CALC-MCNC: 61.4 MG/DL (ref 0–100)
LYMPHOCYTES # BLD: 0.6 K/UL (ref 0.8–3.5)
LYMPHOCYTES NFR BLD: 16 % (ref 12–49)
MCH RBC QN AUTO: 30.5 PG (ref 26–34)
MCHC RBC AUTO-ENTMCNC: 32.3 G/DL (ref 30–36.5)
MCV RBC AUTO: 94.4 FL (ref 80–99)
MONOCYTES # BLD: 0.2 K/UL (ref 0–1)
MONOCYTES NFR BLD: 6 % (ref 5–13)
NEUTS SEG # BLD: 2.8 K/UL (ref 1.8–8)
NEUTS SEG NFR BLD: 74 % (ref 32–75)
NRBC # BLD: 0 K/UL (ref 0–0.01)
NRBC BLD-RTO: 0 PER 100 WBC
PLATELET # BLD AUTO: 125 K/UL (ref 150–400)
PMV BLD AUTO: 10.6 FL (ref 8.9–12.9)
POTASSIUM SERPL-SCNC: 3.9 MMOL/L (ref 3.5–5.1)
PROT SERPL-MCNC: 7.2 G/DL (ref 6.4–8.2)
RBC # BLD AUTO: 3.02 M/UL (ref 3.8–5.2)
RBC MORPH BLD: ABNORMAL
SODIUM SERPL-SCNC: 139 MMOL/L (ref 136–145)
TRIGL SERPL-MCNC: 83 MG/DL
TSH SERPL DL<=0.05 MIU/L-ACNC: 1.54 UIU/ML (ref 0.36–3.74)
VLDLC SERPL CALC-MCNC: 16.6 MG/DL
WBC # BLD AUTO: 3.8 K/UL (ref 3.6–11)

## 2023-10-31 PROCEDURE — 1036F TOBACCO NON-USER: CPT | Performed by: INTERNAL MEDICINE

## 2023-10-31 PROCEDURE — 3074F SYST BP LT 130 MM HG: CPT | Performed by: INTERNAL MEDICINE

## 2023-10-31 PROCEDURE — G8484 FLU IMMUNIZE NO ADMIN: HCPCS | Performed by: INTERNAL MEDICINE

## 2023-10-31 PROCEDURE — G8417 CALC BMI ABV UP PARAM F/U: HCPCS | Performed by: INTERNAL MEDICINE

## 2023-10-31 PROCEDURE — 36415 COLL VENOUS BLD VENIPUNCTURE: CPT

## 2023-10-31 PROCEDURE — 99215 OFFICE O/P EST HI 40 MIN: CPT | Performed by: INTERNAL MEDICINE

## 2023-10-31 PROCEDURE — 85025 COMPLETE CBC W/AUTO DIFF WBC: CPT

## 2023-10-31 PROCEDURE — 2580000003 HC RX 258: Performed by: INTERNAL MEDICINE

## 2023-10-31 PROCEDURE — 6360000002 HC RX W HCPCS: Performed by: INTERNAL MEDICINE

## 2023-10-31 PROCEDURE — 96413 CHEMO IV INFUSION 1 HR: CPT

## 2023-10-31 PROCEDURE — 1090F PRES/ABSN URINE INCON ASSESS: CPT | Performed by: INTERNAL MEDICINE

## 2023-10-31 PROCEDURE — G8427 DOCREV CUR MEDS BY ELIG CLIN: HCPCS | Performed by: INTERNAL MEDICINE

## 2023-10-31 PROCEDURE — 80053 COMPREHEN METABOLIC PANEL: CPT

## 2023-10-31 PROCEDURE — 84443 ASSAY THYROID STIM HORMONE: CPT

## 2023-10-31 PROCEDURE — 3078F DIAST BP <80 MM HG: CPT | Performed by: INTERNAL MEDICINE

## 2023-10-31 PROCEDURE — 1123F ACP DISCUSS/DSCN MKR DOCD: CPT | Performed by: INTERNAL MEDICINE

## 2023-10-31 PROCEDURE — 80061 LIPID PANEL: CPT

## 2023-10-31 PROCEDURE — G8399 PT W/DXA RESULTS DOCUMENT: HCPCS | Performed by: INTERNAL MEDICINE

## 2023-10-31 RX ORDER — NORTRIPTYLINE HYDROCHLORIDE 10 MG/1
10 CAPSULE ORAL NIGHTLY
COMMUNITY

## 2023-10-31 RX ORDER — SODIUM CHLORIDE 0.9 % (FLUSH) 0.9 %
5-40 SYRINGE (ML) INJECTION PRN
Status: DISCONTINUED | OUTPATIENT
Start: 2023-10-31 | End: 2023-11-01 | Stop reason: HOSPADM

## 2023-10-31 RX ORDER — SODIUM CHLORIDE 9 MG/ML
5-250 INJECTION, SOLUTION INTRAVENOUS PRN
Status: DISCONTINUED | OUTPATIENT
Start: 2023-10-31 | End: 2023-11-01 | Stop reason: HOSPADM

## 2023-10-31 RX ORDER — LISINOPRIL 20 MG/1
20 TABLET ORAL DAILY
COMMUNITY

## 2023-10-31 RX ADMIN — SODIUM CHLORIDE 25 ML/HR: 9 INJECTION, SOLUTION INTRAVENOUS at 13:26

## 2023-10-31 RX ADMIN — SODIUM CHLORIDE, PRESERVATIVE FREE 20 ML: 5 INJECTION INTRAVENOUS at 14:05

## 2023-10-31 RX ADMIN — ATEZOLIZUMAB 1200 MG: 1200 INJECTION, SOLUTION INTRAVENOUS at 13:31

## 2023-10-31 ASSESSMENT — PAIN DESCRIPTION - PAIN TYPE: TYPE: CHRONIC PAIN

## 2023-10-31 ASSESSMENT — PAIN DESCRIPTION - ORIENTATION: ORIENTATION: RIGHT

## 2023-10-31 ASSESSMENT — PAIN DESCRIPTION - LOCATION: LOCATION: LEG

## 2023-10-31 ASSESSMENT — PAIN DESCRIPTION - DESCRIPTORS: DESCRIPTORS: ACHING

## 2023-10-31 ASSESSMENT — PAIN SCALES - GENERAL: PAINLEVEL_OUTOF10: 5

## 2023-10-31 NOTE — PROGRESS NOTES
[id number] [facility] Oncology at [facility]  [phone]    Hematology / Oncology Established Visit    Reason for Visit:   Driss Kaye is a 66 y.o. female who is seen for follow up of lung cancer. Hematology Oncology Treatment History:     Diagnosis: Squamous cell carcinoma of lung    Stage: IIB [[id number]]    Pathology:   3/14/23 Lung, left upper lobe, Core biopsy: Invasive poorly differentiated squamous cell carcinoma. Comment: Immunohistochemical stains show the malignant cells are positive  for cytokeratin 5/6 and negative for cytokeratin 7, cytokeratin 20 and  TTF-1 supporting the diagnosis. PD-L1 by immunohistochemistry pending. 5/3/23 Left upper lobe wedge resection:  Invasive poorly differentiated squamous cell carcinoma with areas of extensive necrosis. Tumor size 5.5cm, G3  Visceral pleural invasion present. Vascular invasion present. Parenchymal resection margin negative for tumor. Background lung parenchyma with moderate to severe architectural distortion associated with prominent peribronchiolar metaplasia. 0/6 LN positive [Station 5, 6, 7 x 2, 8, 10]  -PDL1 TPS 2%  - [place]    Prior Treatment:   1. Sublobar resection of JANELL by Dr. Pro Marinelli  2. Adjuvant Carbo-[place] x 4 on D1, 8 of q21d cycles, 6/20/23- 8/29/23    Current Treatment:  Atezolizumab x 1 year every 3 weeks, started 9/19/23 - current  Treatment duration: As detailed above  Frequency of visits: Every 3 weeks    History of Present Illness:   Driss Kaye is a 66 y.o. female with CAD, CKD, HTN, DM II, MAX who is referred for evaluation and management of lung cancer. Pt was recently hospitalized in early March 2023 after 2 syncopal episodes at home. She also noted some loose stools and vomiting. She was diagnosed with IVVD and VAIBHAV, treated with IVF. She was found to have a new 27 x 24mm cavitary lesion in JANELL and underwent CT-guided biopsy.  She was also found to have a vascularized mass in R light of

## 2023-11-03 ENCOUNTER — HOSPITAL ENCOUNTER (OUTPATIENT)
Facility: HOSPITAL | Age: 78
End: 2023-11-03
Attending: INTERNAL MEDICINE
Payer: MEDICARE

## 2023-11-03 DIAGNOSIS — I10 ESSENTIAL (PRIMARY) HYPERTENSION: ICD-10-CM

## 2023-11-03 DIAGNOSIS — N18.4 CHRONIC KIDNEY DISEASE, STAGE IV (SEVERE) (HCC): ICD-10-CM

## 2023-11-03 DIAGNOSIS — C34.12 MALIGNANT NEOPLASM OF UPPER LOBE BRONCHUS, LEFT (HCC): ICD-10-CM

## 2023-11-03 DIAGNOSIS — E11.9 INSULIN-REQUIRING OR DEPENDENT TYPE II DIABETES MELLITUS (HCC): ICD-10-CM

## 2023-11-03 DIAGNOSIS — Z79.4 INSULIN-REQUIRING OR DEPENDENT TYPE II DIABETES MELLITUS (HCC): ICD-10-CM

## 2023-11-03 PROCEDURE — 76770 US EXAM ABDO BACK WALL COMP: CPT

## 2023-11-14 RX ORDER — SODIUM CHLORIDE 9 MG/ML
INJECTION, SOLUTION INTRAVENOUS CONTINUOUS
Status: CANCELLED | OUTPATIENT
Start: 2023-11-21

## 2023-11-14 RX ORDER — MEPERIDINE HYDROCHLORIDE 25 MG/ML
12.5 INJECTION INTRAMUSCULAR; INTRAVENOUS; SUBCUTANEOUS PRN
Status: CANCELLED | OUTPATIENT
Start: 2023-11-21

## 2023-11-14 RX ORDER — ALBUTEROL SULFATE 90 UG/1
4 AEROSOL, METERED RESPIRATORY (INHALATION) PRN
Status: CANCELLED | OUTPATIENT
Start: 2023-11-21

## 2023-11-14 RX ORDER — DIPHENHYDRAMINE HYDROCHLORIDE 50 MG/ML
50 INJECTION INTRAMUSCULAR; INTRAVENOUS
Status: CANCELLED | OUTPATIENT
Start: 2023-11-21

## 2023-11-14 RX ORDER — EPINEPHRINE 1 MG/ML
0.3 INJECTION, SOLUTION, CONCENTRATE INTRAVENOUS PRN
Status: CANCELLED | OUTPATIENT
Start: 2023-11-21

## 2023-11-14 RX ORDER — SODIUM CHLORIDE 9 MG/ML
5-250 INJECTION, SOLUTION INTRAVENOUS PRN
Status: CANCELLED | OUTPATIENT
Start: 2023-11-21

## 2023-11-14 RX ORDER — HEPARIN 100 UNIT/ML
500 SYRINGE INTRAVENOUS PRN
Status: CANCELLED | OUTPATIENT
Start: 2023-11-21

## 2023-11-14 RX ORDER — ONDANSETRON 2 MG/ML
8 INJECTION INTRAMUSCULAR; INTRAVENOUS
Status: CANCELLED | OUTPATIENT
Start: 2023-11-21

## 2023-11-14 RX ORDER — ACETAMINOPHEN 325 MG/1
650 TABLET ORAL
Status: CANCELLED
Start: 2023-11-21

## 2023-11-14 RX ORDER — ACETAMINOPHEN 325 MG/1
650 TABLET ORAL
Status: CANCELLED | OUTPATIENT
Start: 2023-11-21

## 2023-11-21 ENCOUNTER — HOSPITAL ENCOUNTER (OUTPATIENT)
Facility: HOSPITAL | Age: 78
Setting detail: INFUSION SERIES
Discharge: HOME | End: 2023-11-21
Payer: MEDICARE

## 2023-11-21 ENCOUNTER — OFFICE VISIT (OUTPATIENT)
Age: 78
End: 2023-11-21
Payer: MEDICARE

## 2023-11-21 VITALS
OXYGEN SATURATION: 100 % | HEART RATE: 85 BPM | TEMPERATURE: 97.8 F | HEIGHT: 66 IN | DIASTOLIC BLOOD PRESSURE: 64 MMHG | WEIGHT: 183.2 LBS | SYSTOLIC BLOOD PRESSURE: 135 MMHG | BODY MASS INDEX: 29.44 KG/M2

## 2023-11-21 VITALS
HEART RATE: 85 BPM | SYSTOLIC BLOOD PRESSURE: 112 MMHG | WEIGHT: 183.2 LBS | HEIGHT: 66 IN | DIASTOLIC BLOOD PRESSURE: 62 MMHG | BODY MASS INDEX: 29.44 KG/M2 | RESPIRATION RATE: 18 BRPM | OXYGEN SATURATION: 100 % | TEMPERATURE: 97.8 F

## 2023-11-21 DIAGNOSIS — Z51.12 ENCOUNTER FOR ANTINEOPLASTIC IMMUNOTHERAPY: ICD-10-CM

## 2023-11-21 DIAGNOSIS — D64.9 NORMOCYTIC ANEMIA: ICD-10-CM

## 2023-11-21 DIAGNOSIS — C34.92 MALIGNANT NEOPLASM OF UNSPECIFIED PART OF LEFT BRONCHUS OR LUNG (HCC): Primary | ICD-10-CM

## 2023-11-21 DIAGNOSIS — C34.92 SQUAMOUS CELL CARCINOMA OF LUNG, LEFT (HCC): Primary | ICD-10-CM

## 2023-11-21 DIAGNOSIS — K59.09 OTHER CONSTIPATION: ICD-10-CM

## 2023-11-21 DIAGNOSIS — D63.8 ANEMIA OF CHRONIC DISEASE: ICD-10-CM

## 2023-11-21 LAB
ALBUMIN SERPL-MCNC: 3.5 G/DL (ref 3.5–5)
ALBUMIN/GLOB SERPL: 1 (ref 1.1–2.2)
ALP SERPL-CCNC: 75 U/L (ref 45–117)
ALT SERPL-CCNC: 29 U/L (ref 12–78)
ANION GAP SERPL CALC-SCNC: 4 MMOL/L (ref 5–15)
AST SERPL-CCNC: 23 U/L (ref 15–37)
BASOPHILS # BLD: 0 K/UL (ref 0–0.1)
BASOPHILS NFR BLD: 0 % (ref 0–1)
BILIRUB SERPL-MCNC: 0.4 MG/DL (ref 0.2–1)
BUN SERPL-MCNC: 15 MG/DL (ref 6–20)
BUN/CREAT SERPL: 12 (ref 12–20)
CALCIUM SERPL-MCNC: 8.6 MG/DL (ref 8.5–10.1)
CHLORIDE SERPL-SCNC: 107 MMOL/L (ref 97–108)
CO2 SERPL-SCNC: 25 MMOL/L (ref 21–32)
CREAT SERPL-MCNC: 1.28 MG/DL (ref 0.55–1.02)
DIFFERENTIAL METHOD BLD: ABNORMAL
EOSINOPHIL # BLD: 0.1 K/UL (ref 0–0.4)
EOSINOPHIL NFR BLD: 4 % (ref 0–7)
ERYTHROCYTE [DISTWIDTH] IN BLOOD BY AUTOMATED COUNT: 13.3 % (ref 11.5–14.5)
GLOBULIN SER CALC-MCNC: 3.5 G/DL (ref 2–4)
GLUCOSE SERPL-MCNC: 135 MG/DL (ref 65–100)
HCT VFR BLD AUTO: 29.9 % (ref 35–47)
HGB BLD-MCNC: 9.8 G/DL (ref 11.5–16)
IMM GRANULOCYTES # BLD AUTO: 0 K/UL (ref 0–0.04)
IMM GRANULOCYTES NFR BLD AUTO: 0 % (ref 0–0.5)
LYMPHOCYTES # BLD: 0.6 K/UL (ref 0.8–3.5)
LYMPHOCYTES NFR BLD: 17 % (ref 12–49)
MCH RBC QN AUTO: 29.1 PG (ref 26–34)
MCHC RBC AUTO-ENTMCNC: 32.8 G/DL (ref 30–36.5)
MCV RBC AUTO: 88.7 FL (ref 80–99)
MONOCYTES # BLD: 0.3 K/UL (ref 0–1)
MONOCYTES NFR BLD: 7 % (ref 5–13)
NEUTS SEG # BLD: 2.6 K/UL (ref 1.8–8)
NEUTS SEG NFR BLD: 72 % (ref 32–75)
NRBC # BLD: 0 K/UL (ref 0–0.01)
NRBC BLD-RTO: 0 PER 100 WBC
PLATELET # BLD AUTO: 161 K/UL (ref 150–400)
PMV BLD AUTO: 9.8 FL (ref 8.9–12.9)
POTASSIUM SERPL-SCNC: 4 MMOL/L (ref 3.5–5.1)
PROT SERPL-MCNC: 7 G/DL (ref 6.4–8.2)
RBC # BLD AUTO: 3.37 M/UL (ref 3.8–5.2)
RBC MORPH BLD: ABNORMAL
SODIUM SERPL-SCNC: 136 MMOL/L (ref 136–145)
WBC # BLD AUTO: 3.6 K/UL (ref 3.6–11)

## 2023-11-21 PROCEDURE — 1036F TOBACCO NON-USER: CPT | Performed by: INTERNAL MEDICINE

## 2023-11-21 PROCEDURE — 96413 CHEMO IV INFUSION 1 HR: CPT

## 2023-11-21 PROCEDURE — 80053 COMPREHEN METABOLIC PANEL: CPT

## 2023-11-21 PROCEDURE — G8399 PT W/DXA RESULTS DOCUMENT: HCPCS | Performed by: INTERNAL MEDICINE

## 2023-11-21 PROCEDURE — 1123F ACP DISCUSS/DSCN MKR DOCD: CPT | Performed by: INTERNAL MEDICINE

## 2023-11-21 PROCEDURE — 36415 COLL VENOUS BLD VENIPUNCTURE: CPT

## 2023-11-21 PROCEDURE — 2580000003 HC RX 258: Performed by: INTERNAL MEDICINE

## 2023-11-21 PROCEDURE — 99215 OFFICE O/P EST HI 40 MIN: CPT | Performed by: INTERNAL MEDICINE

## 2023-11-21 PROCEDURE — G8417 CALC BMI ABV UP PARAM F/U: HCPCS | Performed by: INTERNAL MEDICINE

## 2023-11-21 PROCEDURE — G8484 FLU IMMUNIZE NO ADMIN: HCPCS | Performed by: INTERNAL MEDICINE

## 2023-11-21 PROCEDURE — 85025 COMPLETE CBC W/AUTO DIFF WBC: CPT

## 2023-11-21 PROCEDURE — 3078F DIAST BP <80 MM HG: CPT | Performed by: INTERNAL MEDICINE

## 2023-11-21 PROCEDURE — G8427 DOCREV CUR MEDS BY ELIG CLIN: HCPCS | Performed by: INTERNAL MEDICINE

## 2023-11-21 PROCEDURE — 3074F SYST BP LT 130 MM HG: CPT | Performed by: INTERNAL MEDICINE

## 2023-11-21 PROCEDURE — 1090F PRES/ABSN URINE INCON ASSESS: CPT | Performed by: INTERNAL MEDICINE

## 2023-11-21 PROCEDURE — 6360000002 HC RX W HCPCS: Performed by: INTERNAL MEDICINE

## 2023-11-21 RX ORDER — SODIUM CHLORIDE 0.9 % (FLUSH) 0.9 %
5-40 SYRINGE (ML) INJECTION PRN
Status: DISCONTINUED | OUTPATIENT
Start: 2023-11-21 | End: 2023-11-22 | Stop reason: HOSPADM

## 2023-11-21 RX ORDER — SODIUM CHLORIDE 9 MG/ML
5-250 INJECTION, SOLUTION INTRAVENOUS PRN
Status: DISCONTINUED | OUTPATIENT
Start: 2023-11-21 | End: 2023-11-22 | Stop reason: HOSPADM

## 2023-11-21 RX ADMIN — ATEZOLIZUMAB 1200 MG: 1200 INJECTION, SOLUTION INTRAVENOUS at 12:38

## 2023-11-21 RX ADMIN — SODIUM CHLORIDE 25 ML/HR: 900 INJECTION, SOLUTION INTRAVENOUS at 12:31

## 2023-11-21 ASSESSMENT — PAIN DESCRIPTION - LOCATION: LOCATION: LEG

## 2023-11-21 ASSESSMENT — PAIN DESCRIPTION - ORIENTATION: ORIENTATION: RIGHT

## 2023-11-21 ASSESSMENT — PAIN SCALES - GENERAL: PAINLEVEL_OUTOF10: 6

## 2023-11-21 ASSESSMENT — PAIN DESCRIPTION - DESCRIPTORS: DESCRIPTORS: ACHING

## 2023-11-21 NOTE — PROGRESS NOTES
[facility] Progress Note    Date: 2023    Name: Glen Rodriguez    MRN: [id number]         : 1945    Ms. Tabares Arrived ambulatory and in no distress for C4D1 of Tecentriq Regimen. Assessment was completed, no acute issues at this time, no new complaints voiced. Chest wall port accessed without difficulty, labs drawn & sent for processing. Access and assessment performed by [facility] RN. Patient proceed to appointment with Dr. Libby Salcedo. Ms. Abiola Barnett vitals were reviewed. Vitals:    23 1300   BP: 135/64   Pulse:    Temp:    SpO2:        Lab results were obtained and reviewed.   Recent Results (from the past 12 hour(s))   Comprehensive metabolic panel    Collection Time: 23 10:18 AM   Result Value Ref Range    Sodium 136 136 - 145 mmol/L    Potassium 4.0 3.5 - 5.1 mmol/L    Chloride 107 97 - 108 mmol/L    CO2 25 21 - 32 mmol/L    Anion Gap 4 (L) 5 - 15 mmol/L    Glucose 135 (H) 65 - 100 mg/dL    BUN 15 6 - 20 MG/DL    Creatinine 1.28 (H) 0.55 - 1.02 MG/DL    Bun/Cre Ratio 12 12 - 20      Est, Glom Filt Rate 43 (L) >60 ml/min/1.73m2    Calcium 8.6 8.5 - 10.1 MG/DL    Total Bilirubin 0.4 0.2 - 1.0 MG/DL    ALT 29 12 - 78 U/L    AST 23 15 - 37 U/L    Alk Phosphatase 75 45 - 117 U/L    Total Protein 7.0 6.4 - 8.2 g/dL    Albumin 3.5 3.5 - 5.0 g/dL    Globulin 3.5 2.0 - 4.0 g/dL    Albumin/Globulin Ratio 1.0 (L) 1.1 - 2.2     CBC With Auto Differential    Collection Time: 23 10:18 AM   Result Value Ref Range    WBC 3.6 3.6 - 11.0 K/uL    RBC 3.37 (L) 3.80 - 5.20 M/uL    Hemoglobin 9.8 (L) 11.5 - 16.0 g/dL    Hematocrit 29.9 (L) 35.0 - 47.0 %    MCV 88.7 80.0 - 99.0 FL    MCH 29.1 26.0 - 34.0 PG    MCHC 32.8 30.0 - 36.5 g/dL    RDW 13.3 11.5 - 14.5 %    Platelets 718 170 - 869 K/uL    MPV 9.8 8.9 - 12.9 FL    Nucleated RBCs 0.0 0  WBC    nRBC 0.00 0.00 - 0.01 K/uL    Neutrophils % 72 32 - 75 %    Lymphocytes % 17 12 - 49 %    Monocytes % 7 5 - 13 %    Eosinophils % 4 0 - 7 %

## 2023-11-21 NOTE — PROGRESS NOTES
[id number] [facility] Oncology at [facility]  [phone]    Hematology / Oncology Established Visit    Reason for Visit:   Mariela Stoll is a 66 y.o. female who is seen for follow up of lung cancer. Hematology Oncology Treatment History:     Diagnosis: Squamous cell carcinoma of lung    Stage: IIB [[id number]]    Pathology:   3/14/23 Lung, left upper lobe, Core biopsy: Invasive poorly differentiated squamous cell carcinoma. Comment: Immunohistochemical stains show the malignant cells are positive  for cytokeratin 5/6 and negative for cytokeratin 7, cytokeratin 20 and  TTF-1 supporting the diagnosis. PD-L1 by immunohistochemistry pending. 5/3/23 Left upper lobe wedge resection:  Invasive poorly differentiated squamous cell carcinoma with areas of extensive necrosis. Tumor size 5.5cm, G3  Visceral pleural invasion present. Vascular invasion present. Parenchymal resection margin negative for tumor. Background lung parenchyma with moderate to severe architectural distortion associated with prominent peribronchiolar metaplasia. 0/6 LN positive [Station 5, 6, 7 x 2, 8, 10]  -PDL1 TPS 2%  - [place]    Prior Treatment:   1. Sublobar resection of JANELL by Dr. Karlos Lemus  2. Adjuvant Carbo-[place] x 4 on D1, 8 of q21d cycles, 6/20/23- 8/29/23    Current Treatment:  Atezolizumab x 1 year every 3 weeks, started 9/19/23 - current  Treatment duration: As detailed above  Frequency of visits: Every 3 weeks    History of Present Illness:   Mariela Stoll is a 66 y.o. female with CAD, CKD, HTN, DM II, MAX who is referred for evaluation and management of lung cancer. Pt was recently hospitalized in early March 2023 after 2 syncopal episodes at home. She also noted some loose stools and vomiting. She was diagnosed with IVVD and VAIBHAV, treated with IVF. She was found to have a new 27 x 24mm cavitary lesion in JANELL and underwent CT-guided biopsy.  She was also found to have a vascularized mass in R light of

## 2023-12-01 ENCOUNTER — HOSPITAL ENCOUNTER (OUTPATIENT)
Facility: HOSPITAL | Age: 78
End: 2023-12-01
Payer: MEDICARE

## 2023-12-01 DIAGNOSIS — C34.92 SQUAMOUS CELL CARCINOMA OF LUNG, LEFT (HCC): ICD-10-CM

## 2023-12-01 PROCEDURE — 71250 CT THORAX DX C-: CPT

## 2023-12-04 NOTE — PROGRESS NOTES
[address] [facility] Oncology at [address] [facility]  [phone]    Hematology / Oncology Established Visit    Reason for Visit:   Mariela Stoll is a 66 y.o. female who is seen for follow up of lung cancer. Hematology Oncology Treatment History:     Diagnosis: Squamous cell carcinoma of lung    Stage: IIB [[id number]]    Pathology:   3/14/23 Lung, left upper lobe, Core biopsy: Invasive poorly differentiated squamous cell carcinoma. Comment: Immunohistochemical stains show the malignant cells are positive  for cytokeratin 5/6 and negative for cytokeratin 7, cytokeratin 20 and  TTF-1 supporting the diagnosis. PD-L1 by immunohistochemistry pending. 5/3/23 Left upper lobe wedge resection:  Invasive poorly differentiated squamous cell carcinoma with areas of extensive necrosis. Tumor size 5.5cm, G3  Visceral pleural invasion present. Vascular invasion present. Parenchymal resection margin negative for tumor. Background lung parenchyma with moderate to severe architectural distortion associated with prominent peribronchiolar metaplasia. 0/6 LN positive [Station 5, 6, 7 x 2, 8, 10]  -PDL1 TPS 2%  - [place]    Prior Treatment:   1. Sublobar resection of JANELL by Dr. Karlos Lemus  2. Adjuvant Carbo-[place] x 4 on D1, 8 of q21d cycles, 6/20/23- 8/29/23    Current Treatment:  Atezolizumab x 1 year every 3 weeks, started 9/19/23 - current  Treatment duration: As detailed above  Frequency of visits: Every 3 weeks    History of Present Illness:   Mariela Stoll is a 66 y.o. female with CAD, CKD, HTN, DM II, MAX who is referred for evaluation and management of lung cancer. Pt was recently hospitalized in early March 2023 after 2 syncopal episodes at home. She also noted some loose stools and vomiting. She was diagnosed with IVVD and VAIBHAV, treated with IVF. She was found to have a new 27 x 24mm cavitary lesion in JANELL and underwent CT-guided biopsy.  She was also found to have a vascularized mass in R light of

## 2023-12-05 RX ORDER — ALBUTEROL SULFATE 90 UG/1
4 AEROSOL, METERED RESPIRATORY (INHALATION) PRN
Status: CANCELLED | OUTPATIENT
Start: 2023-12-12

## 2023-12-05 RX ORDER — EPINEPHRINE 1 MG/ML
0.3 INJECTION, SOLUTION INTRAMUSCULAR; SUBCUTANEOUS PRN
Status: CANCELLED | OUTPATIENT
Start: 2023-12-12

## 2023-12-05 RX ORDER — DIPHENHYDRAMINE HYDROCHLORIDE 50 MG/ML
50 INJECTION INTRAMUSCULAR; INTRAVENOUS
Status: CANCELLED | OUTPATIENT
Start: 2023-12-12

## 2023-12-05 RX ORDER — FAMOTIDINE 10 MG/ML
20 INJECTION, SOLUTION INTRAVENOUS
Status: CANCELLED | OUTPATIENT
Start: 2023-12-12

## 2023-12-05 RX ORDER — ACETAMINOPHEN 325 MG/1
650 TABLET ORAL
Status: CANCELLED | OUTPATIENT
Start: 2023-12-12

## 2023-12-05 RX ORDER — HEPARIN 100 UNIT/ML
500 SYRINGE INTRAVENOUS PRN
Status: CANCELLED | OUTPATIENT
Start: 2023-12-12

## 2023-12-05 RX ORDER — SODIUM CHLORIDE 9 MG/ML
INJECTION, SOLUTION INTRAVENOUS CONTINUOUS
Status: CANCELLED | OUTPATIENT
Start: 2023-12-12

## 2023-12-05 RX ORDER — MEPERIDINE HYDROCHLORIDE 25 MG/ML
12.5 INJECTION INTRAMUSCULAR; INTRAVENOUS; SUBCUTANEOUS PRN
Status: CANCELLED | OUTPATIENT
Start: 2023-12-12

## 2023-12-05 RX ORDER — ONDANSETRON 2 MG/ML
8 INJECTION INTRAMUSCULAR; INTRAVENOUS
Status: CANCELLED | OUTPATIENT
Start: 2023-12-12

## 2023-12-05 RX ORDER — ACETAMINOPHEN 325 MG/1
650 TABLET ORAL
Status: CANCELLED
Start: 2023-12-12

## 2023-12-05 RX ORDER — SODIUM CHLORIDE 9 MG/ML
5-250 INJECTION, SOLUTION INTRAVENOUS PRN
Status: CANCELLED | OUTPATIENT
Start: 2023-12-12

## 2023-12-12 ENCOUNTER — HOSPITAL ENCOUNTER (OUTPATIENT)
Facility: HOSPITAL | Age: 78
Setting detail: INFUSION SERIES
Discharge: HOME | End: 2023-12-12
Payer: MEDICARE

## 2023-12-12 ENCOUNTER — OFFICE VISIT (OUTPATIENT)
Age: 78
End: 2023-12-12
Payer: MEDICARE

## 2023-12-12 VITALS
HEART RATE: 83 BPM | BODY MASS INDEX: 30.17 KG/M2 | HEIGHT: 66 IN | DIASTOLIC BLOOD PRESSURE: 80 MMHG | TEMPERATURE: 97.4 F | WEIGHT: 187.7 LBS | RESPIRATION RATE: 18 BRPM | OXYGEN SATURATION: 98 % | SYSTOLIC BLOOD PRESSURE: 141 MMHG

## 2023-12-12 VITALS
WEIGHT: 187.7 LBS | HEIGHT: 66 IN | DIASTOLIC BLOOD PRESSURE: 64 MMHG | OXYGEN SATURATION: 97 % | TEMPERATURE: 97.3 F | BODY MASS INDEX: 30.17 KG/M2 | SYSTOLIC BLOOD PRESSURE: 137 MMHG | HEART RATE: 92 BPM

## 2023-12-12 DIAGNOSIS — D63.8 ANEMIA OF CHRONIC DISEASE: ICD-10-CM

## 2023-12-12 DIAGNOSIS — C34.92 MALIGNANT NEOPLASM OF UNSPECIFIED PART OF LEFT BRONCHUS OR LUNG (HCC): Primary | ICD-10-CM

## 2023-12-12 DIAGNOSIS — Z51.12 ENCOUNTER FOR ANTINEOPLASTIC IMMUNOTHERAPY: ICD-10-CM

## 2023-12-12 DIAGNOSIS — G62.9 NEUROPATHY: ICD-10-CM

## 2023-12-12 DIAGNOSIS — N18.4 CHRONIC KIDNEY DISEASE (CKD), STAGE IV (SEVERE) (HCC): ICD-10-CM

## 2023-12-12 DIAGNOSIS — C34.92 SQUAMOUS CELL CARCINOMA OF LUNG, LEFT (HCC): Primary | ICD-10-CM

## 2023-12-12 LAB
ALBUMIN SERPL-MCNC: 3.4 G/DL (ref 3.5–5)
ALBUMIN/GLOB SERPL: 1 (ref 1.1–2.2)
ALP SERPL-CCNC: 76 U/L (ref 45–117)
ALT SERPL-CCNC: 38 U/L (ref 12–78)
ANION GAP SERPL CALC-SCNC: 4 MMOL/L (ref 5–15)
AST SERPL-CCNC: 30 U/L (ref 15–37)
BASOPHILS # BLD: 0 K/UL (ref 0–0.1)
BASOPHILS NFR BLD: 0 % (ref 0–1)
BILIRUB SERPL-MCNC: 0.5 MG/DL (ref 0.2–1)
BUN SERPL-MCNC: 23 MG/DL (ref 6–20)
BUN/CREAT SERPL: 19 (ref 12–20)
CALCIUM SERPL-MCNC: 8.7 MG/DL (ref 8.5–10.1)
CHLORIDE SERPL-SCNC: 109 MMOL/L (ref 97–108)
CO2 SERPL-SCNC: 26 MMOL/L (ref 21–32)
CREAT SERPL-MCNC: 1.24 MG/DL (ref 0.55–1.02)
DIFFERENTIAL METHOD BLD: ABNORMAL
EOSINOPHIL # BLD: 0.2 K/UL (ref 0–0.4)
EOSINOPHIL NFR BLD: 4 % (ref 0–7)
ERYTHROCYTE [DISTWIDTH] IN BLOOD BY AUTOMATED COUNT: 13.4 % (ref 11.5–14.5)
GLOBULIN SER CALC-MCNC: 3.4 G/DL (ref 2–4)
GLUCOSE SERPL-MCNC: 77 MG/DL (ref 65–100)
HCT VFR BLD AUTO: 30.3 % (ref 35–47)
HGB BLD-MCNC: 10.1 G/DL (ref 11.5–16)
IMM GRANULOCYTES # BLD AUTO: 0 K/UL (ref 0–0.04)
IMM GRANULOCYTES NFR BLD AUTO: 0 % (ref 0–0.5)
LYMPHOCYTES # BLD: 0.8 K/UL (ref 0.8–3.5)
LYMPHOCYTES NFR BLD: 17 % (ref 12–49)
MCH RBC QN AUTO: 29.2 PG (ref 26–34)
MCHC RBC AUTO-ENTMCNC: 33.3 G/DL (ref 30–36.5)
MCV RBC AUTO: 87.6 FL (ref 80–99)
MONOCYTES # BLD: 0.3 K/UL (ref 0–1)
MONOCYTES NFR BLD: 6 % (ref 5–13)
NEUTS SEG # BLD: 3.2 K/UL (ref 1.8–8)
NEUTS SEG NFR BLD: 73 % (ref 32–75)
NRBC # BLD: 0 K/UL (ref 0–0.01)
NRBC BLD-RTO: 0 PER 100 WBC
PLATELET # BLD AUTO: 173 K/UL (ref 150–400)
PMV BLD AUTO: 10.5 FL (ref 8.9–12.9)
POTASSIUM SERPL-SCNC: 3.6 MMOL/L (ref 3.5–5.1)
PROT SERPL-MCNC: 6.8 G/DL (ref 6.4–8.2)
RBC # BLD AUTO: 3.46 M/UL (ref 3.8–5.2)
RBC MORPH BLD: ABNORMAL
SODIUM SERPL-SCNC: 139 MMOL/L (ref 136–145)
TSH SERPL DL<=0.05 MIU/L-ACNC: 1.52 UIU/ML (ref 0.36–3.74)
WBC # BLD AUTO: 4.5 K/UL (ref 3.6–11)

## 2023-12-12 PROCEDURE — 1123F ACP DISCUSS/DSCN MKR DOCD: CPT | Performed by: INTERNAL MEDICINE

## 2023-12-12 PROCEDURE — G8427 DOCREV CUR MEDS BY ELIG CLIN: HCPCS | Performed by: INTERNAL MEDICINE

## 2023-12-12 PROCEDURE — G8484 FLU IMMUNIZE NO ADMIN: HCPCS | Performed by: INTERNAL MEDICINE

## 2023-12-12 PROCEDURE — 96413 CHEMO IV INFUSION 1 HR: CPT

## 2023-12-12 PROCEDURE — 3075F SYST BP GE 130 - 139MM HG: CPT | Performed by: INTERNAL MEDICINE

## 2023-12-12 PROCEDURE — 36415 COLL VENOUS BLD VENIPUNCTURE: CPT

## 2023-12-12 PROCEDURE — 6360000002 HC RX W HCPCS: Performed by: INTERNAL MEDICINE

## 2023-12-12 PROCEDURE — 1090F PRES/ABSN URINE INCON ASSESS: CPT | Performed by: INTERNAL MEDICINE

## 2023-12-12 PROCEDURE — 80053 COMPREHEN METABOLIC PANEL: CPT

## 2023-12-12 PROCEDURE — 2580000003 HC RX 258: Performed by: INTERNAL MEDICINE

## 2023-12-12 PROCEDURE — 1036F TOBACCO NON-USER: CPT | Performed by: INTERNAL MEDICINE

## 2023-12-12 PROCEDURE — 84443 ASSAY THYROID STIM HORMONE: CPT

## 2023-12-12 PROCEDURE — 85025 COMPLETE CBC W/AUTO DIFF WBC: CPT

## 2023-12-12 PROCEDURE — G8399 PT W/DXA RESULTS DOCUMENT: HCPCS | Performed by: INTERNAL MEDICINE

## 2023-12-12 PROCEDURE — G8417 CALC BMI ABV UP PARAM F/U: HCPCS | Performed by: INTERNAL MEDICINE

## 2023-12-12 PROCEDURE — 99215 OFFICE O/P EST HI 40 MIN: CPT | Performed by: INTERNAL MEDICINE

## 2023-12-12 PROCEDURE — 3078F DIAST BP <80 MM HG: CPT | Performed by: INTERNAL MEDICINE

## 2023-12-12 RX ORDER — SODIUM CHLORIDE 9 MG/ML
5-250 INJECTION, SOLUTION INTRAVENOUS PRN
Status: DISCONTINUED | OUTPATIENT
Start: 2023-12-12 | End: 2023-12-13 | Stop reason: HOSPADM

## 2023-12-12 RX ORDER — SODIUM CHLORIDE 0.9 % (FLUSH) 0.9 %
5-40 SYRINGE (ML) INJECTION PRN
Status: DISCONTINUED | OUTPATIENT
Start: 2023-12-12 | End: 2023-12-13 | Stop reason: HOSPADM

## 2023-12-12 RX ADMIN — SODIUM CHLORIDE, PRESERVATIVE FREE 20 ML: 5 INJECTION INTRAVENOUS at 13:38

## 2023-12-12 RX ADMIN — SODIUM CHLORIDE 25 ML/HR: 9 INJECTION, SOLUTION INTRAVENOUS at 12:55

## 2023-12-12 RX ADMIN — ATEZOLIZUMAB 1200 MG: 1200 INJECTION, SOLUTION INTRAVENOUS at 13:00

## 2023-12-12 NOTE — PROGRESS NOTES
Karina Hernadez is a 66 y.o. female here for follow up of lung cancer. Patient with no complaints of pain at this time.

## 2023-12-26 RX ORDER — EPINEPHRINE 1 MG/ML
0.3 INJECTION, SOLUTION INTRAMUSCULAR; SUBCUTANEOUS PRN
Status: CANCELLED | OUTPATIENT
Start: 2024-01-02

## 2023-12-26 RX ORDER — FAMOTIDINE 10 MG/ML
20 INJECTION, SOLUTION INTRAVENOUS
Status: CANCELLED | OUTPATIENT
Start: 2024-01-02

## 2023-12-26 RX ORDER — SODIUM CHLORIDE 9 MG/ML
INJECTION, SOLUTION INTRAVENOUS CONTINUOUS
Status: CANCELLED | OUTPATIENT
Start: 2024-01-02

## 2023-12-26 RX ORDER — ACETAMINOPHEN 325 MG/1
650 TABLET ORAL
Status: CANCELLED
Start: 2024-01-02

## 2023-12-26 RX ORDER — HEPARIN 100 UNIT/ML
500 SYRINGE INTRAVENOUS PRN
Status: CANCELLED | OUTPATIENT
Start: 2024-01-02

## 2023-12-26 RX ORDER — ACETAMINOPHEN 325 MG/1
650 TABLET ORAL
Status: CANCELLED | OUTPATIENT
Start: 2024-01-02

## 2023-12-26 RX ORDER — ONDANSETRON 2 MG/ML
8 INJECTION INTRAMUSCULAR; INTRAVENOUS
Status: CANCELLED | OUTPATIENT
Start: 2024-01-02

## 2023-12-26 RX ORDER — ALBUTEROL SULFATE 90 UG/1
4 AEROSOL, METERED RESPIRATORY (INHALATION) PRN
Status: CANCELLED | OUTPATIENT
Start: 2024-01-02

## 2023-12-26 RX ORDER — DIPHENHYDRAMINE HYDROCHLORIDE 50 MG/ML
50 INJECTION INTRAMUSCULAR; INTRAVENOUS
Status: CANCELLED | OUTPATIENT
Start: 2024-01-02

## 2023-12-26 RX ORDER — SODIUM CHLORIDE 9 MG/ML
5-250 INJECTION, SOLUTION INTRAVENOUS PRN
Status: CANCELLED | OUTPATIENT
Start: 2024-01-02

## 2023-12-26 RX ORDER — MEPERIDINE HYDROCHLORIDE 25 MG/ML
12.5 INJECTION INTRAMUSCULAR; INTRAVENOUS; SUBCUTANEOUS PRN
Status: CANCELLED | OUTPATIENT
Start: 2024-01-02

## 2023-12-28 DIAGNOSIS — N18.32 CHRONIC KIDNEY DISEASE, STAGE 3B (HCC): ICD-10-CM

## 2023-12-28 DIAGNOSIS — I10 ESSENTIAL (PRIMARY) HYPERTENSION: ICD-10-CM

## 2023-12-30 RX ORDER — LISINOPRIL AND HYDROCHLOROTHIAZIDE 20; 12.5 MG/1; MG/1
1 TABLET ORAL DAILY
Qty: 90 TABLET | Refills: 0 | OUTPATIENT
Start: 2023-12-30

## 2023-12-30 NOTE — PROGRESS NOTES
[facility]  Medical Oncology at [place]  [phone]    Hematology / Oncology Established Visit    Reason for Visit:   Elise Tabares is a 78 y.o. female who is seen for follow up of lung cancer.     Hematology Oncology Treatment History:     Diagnosis: Squamous cell carcinoma of lung    Stage: IIB [[id number]]    Pathology:   3/14/23 Lung, left upper lobe, Core biopsy: Invasive poorly differentiated squamous cell carcinoma.   Comment: Immunohistochemical stains show the malignant cells are positive  for cytokeratin 5/6 and negative for cytokeratin 7, cytokeratin 20 and  TTF-1 supporting the diagnosis. PD-L1 by immunohistochemistry pending.     5/3/23 Left upper lobe wedge resection:  Invasive poorly differentiated squamous cell carcinoma with areas of extensive necrosis.   Tumor size 5.5cm, G3  Visceral pleural invasion present. Vascular invasion present. Parenchymal resection margin negative for tumor. Background lung parenchyma with moderate to severe architectural distortion associated with prominent peribronchiolar metaplasia.  0/6 LN positive [Station 5, 6, 7 x 2, 8, 10]  -PDL1 TPS 2%  - [place]    Prior Treatment:   1. Sublobar resection of JANELL by Dr. Dom Perez  2. Adjuvant Carbo-[place] x 4 on D1, 8 of q21d cycles, 6/20/23- 8/29/23    Current Treatment:  Atezolizumab [id number] x 1 year, started 9/19/23 - current  Treatment duration: 1 year  Frequency of visits: Every 3 weeks    History of Present Illness:   Elise Tabares is a 78 y.o. female with CAD, CKD, HTN, DM II, MAX who is referred for evaluation and management of lung cancer. Pt was recently hospitalized in early March 2023 after 2 syncopal episodes at home. She also noted some loose stools and vomiting. She was diagnosed with IVVD and VAIBHAV, treated with IVF. She was found to have a new 27 x 24mm cavitary lesion in JANELL and underwent CT-guided biopsy. She was also found to have a vascularized mass in R light of thyroid, 1.5cm and

## 2024-01-01 RX ORDER — METOPROLOL SUCCINATE 100 MG/1
100 TABLET, EXTENDED RELEASE ORAL DAILY
Qty: 90 TABLET | Refills: 0 | Status: SHIPPED | OUTPATIENT
Start: 2024-01-01

## 2024-01-02 ENCOUNTER — HOSPITAL ENCOUNTER (OUTPATIENT)
Facility: HOSPITAL | Age: 79
Setting detail: INFUSION SERIES
Discharge: HOME | End: 2024-01-02
Payer: MEDICARE

## 2024-01-02 ENCOUNTER — OFFICE VISIT (OUTPATIENT)
Age: 79
End: 2024-01-02
Payer: MEDICARE

## 2024-01-02 VITALS
HEART RATE: 75 BPM | OXYGEN SATURATION: 99 % | DIASTOLIC BLOOD PRESSURE: 73 MMHG | SYSTOLIC BLOOD PRESSURE: 143 MMHG | WEIGHT: 190.6 LBS | TEMPERATURE: 97.6 F | RESPIRATION RATE: 18 BRPM | BODY MASS INDEX: 30.63 KG/M2 | HEIGHT: 66 IN

## 2024-01-02 VITALS
OXYGEN SATURATION: 99 % | TEMPERATURE: 97.6 F | BODY MASS INDEX: 31.75 KG/M2 | HEIGHT: 65 IN | HEART RATE: 75 BPM | RESPIRATION RATE: 18 BRPM | DIASTOLIC BLOOD PRESSURE: 73 MMHG | SYSTOLIC BLOOD PRESSURE: 143 MMHG | WEIGHT: 190.6 LBS

## 2024-01-02 DIAGNOSIS — Z51.12 ENCOUNTER FOR ANTINEOPLASTIC IMMUNOTHERAPY: ICD-10-CM

## 2024-01-02 DIAGNOSIS — G62.9 NEUROPATHY: ICD-10-CM

## 2024-01-02 DIAGNOSIS — D63.8 ANEMIA OF CHRONIC DISEASE: ICD-10-CM

## 2024-01-02 DIAGNOSIS — N18.30 STAGE 3 CHRONIC KIDNEY DISEASE, UNSPECIFIED WHETHER STAGE 3A OR 3B CKD (HCC): ICD-10-CM

## 2024-01-02 DIAGNOSIS — C34.92 MALIGNANT NEOPLASM OF UNSPECIFIED PART OF LEFT BRONCHUS OR LUNG (HCC): Primary | ICD-10-CM

## 2024-01-02 DIAGNOSIS — C34.92 SQUAMOUS CELL CARCINOMA OF LUNG, LEFT (HCC): Primary | ICD-10-CM

## 2024-01-02 LAB
ALBUMIN SERPL-MCNC: 3.4 G/DL (ref 3.5–5)
ALBUMIN/GLOB SERPL: 1 (ref 1.1–2.2)
ALP SERPL-CCNC: 82 U/L (ref 45–117)
ALT SERPL-CCNC: 31 U/L (ref 12–78)
ANION GAP SERPL CALC-SCNC: 4 MMOL/L (ref 5–15)
AST SERPL-CCNC: 23 U/L (ref 15–37)
BASOPHILS # BLD: 0 K/UL (ref 0–0.1)
BASOPHILS NFR BLD: 0 % (ref 0–1)
BILIRUB SERPL-MCNC: 0.4 MG/DL (ref 0.2–1)
BUN SERPL-MCNC: 19 MG/DL (ref 6–20)
BUN/CREAT SERPL: 16 (ref 12–20)
CALCIUM SERPL-MCNC: 9.1 MG/DL (ref 8.5–10.1)
CHLORIDE SERPL-SCNC: 110 MMOL/L (ref 97–108)
CO2 SERPL-SCNC: 27 MMOL/L (ref 21–32)
CREAT SERPL-MCNC: 1.21 MG/DL (ref 0.55–1.02)
DIFFERENTIAL METHOD BLD: ABNORMAL
EOSINOPHIL # BLD: 0.2 K/UL (ref 0–0.4)
EOSINOPHIL NFR BLD: 3 % (ref 0–7)
ERYTHROCYTE [DISTWIDTH] IN BLOOD BY AUTOMATED COUNT: 14.1 % (ref 11.5–14.5)
GLOBULIN SER CALC-MCNC: 3.4 G/DL (ref 2–4)
GLUCOSE SERPL-MCNC: 117 MG/DL (ref 65–100)
HCT VFR BLD AUTO: 31.7 % (ref 35–47)
HGB BLD-MCNC: 10.4 G/DL (ref 11.5–16)
IMM GRANULOCYTES # BLD AUTO: 0 K/UL (ref 0–0.04)
IMM GRANULOCYTES NFR BLD AUTO: 0 % (ref 0–0.5)
LYMPHOCYTES # BLD: 0.6 K/UL (ref 0.8–3.5)
LYMPHOCYTES NFR BLD: 10 % (ref 12–49)
MCH RBC QN AUTO: 28.7 PG (ref 26–34)
MCHC RBC AUTO-ENTMCNC: 32.8 G/DL (ref 30–36.5)
MCV RBC AUTO: 87.3 FL (ref 80–99)
MONOCYTES # BLD: 0.4 K/UL (ref 0–1)
MONOCYTES NFR BLD: 7 % (ref 5–13)
NEUTS SEG # BLD: 4.7 K/UL (ref 1.8–8)
NEUTS SEG NFR BLD: 80 % (ref 32–75)
NRBC # BLD: 0 K/UL (ref 0–0.01)
NRBC BLD-RTO: 0 PER 100 WBC
PLATELET # BLD AUTO: 184 K/UL (ref 150–400)
PMV BLD AUTO: 10.5 FL (ref 8.9–12.9)
POTASSIUM SERPL-SCNC: 3.9 MMOL/L (ref 3.5–5.1)
PROT SERPL-MCNC: 6.8 G/DL (ref 6.4–8.2)
RBC # BLD AUTO: 3.63 M/UL (ref 3.8–5.2)
RBC MORPH BLD: ABNORMAL
SODIUM SERPL-SCNC: 141 MMOL/L (ref 136–145)
WBC # BLD AUTO: 5.9 K/UL (ref 3.6–11)

## 2024-01-02 PROCEDURE — 3077F SYST BP >= 140 MM HG: CPT | Performed by: INTERNAL MEDICINE

## 2024-01-02 PROCEDURE — 85025 COMPLETE CBC W/AUTO DIFF WBC: CPT

## 2024-01-02 PROCEDURE — 99215 OFFICE O/P EST HI 40 MIN: CPT | Performed by: INTERNAL MEDICINE

## 2024-01-02 PROCEDURE — G8484 FLU IMMUNIZE NO ADMIN: HCPCS | Performed by: INTERNAL MEDICINE

## 2024-01-02 PROCEDURE — 1036F TOBACCO NON-USER: CPT | Performed by: INTERNAL MEDICINE

## 2024-01-02 PROCEDURE — 6360000002 HC RX W HCPCS: Performed by: INTERNAL MEDICINE

## 2024-01-02 PROCEDURE — G8427 DOCREV CUR MEDS BY ELIG CLIN: HCPCS | Performed by: INTERNAL MEDICINE

## 2024-01-02 PROCEDURE — 1090F PRES/ABSN URINE INCON ASSESS: CPT | Performed by: INTERNAL MEDICINE

## 2024-01-02 PROCEDURE — 96413 CHEMO IV INFUSION 1 HR: CPT

## 2024-01-02 PROCEDURE — 1123F ACP DISCUSS/DSCN MKR DOCD: CPT | Performed by: INTERNAL MEDICINE

## 2024-01-02 PROCEDURE — G8417 CALC BMI ABV UP PARAM F/U: HCPCS | Performed by: INTERNAL MEDICINE

## 2024-01-02 PROCEDURE — 80053 COMPREHEN METABOLIC PANEL: CPT

## 2024-01-02 PROCEDURE — G8399 PT W/DXA RESULTS DOCUMENT: HCPCS | Performed by: INTERNAL MEDICINE

## 2024-01-02 PROCEDURE — 3078F DIAST BP <80 MM HG: CPT | Performed by: INTERNAL MEDICINE

## 2024-01-02 PROCEDURE — 2580000003 HC RX 258: Performed by: INTERNAL MEDICINE

## 2024-01-02 PROCEDURE — 36415 COLL VENOUS BLD VENIPUNCTURE: CPT

## 2024-01-02 RX ORDER — SODIUM CHLORIDE 0.9 % (FLUSH) 0.9 %
5-40 SYRINGE (ML) INJECTION PRN
Status: DISCONTINUED | OUTPATIENT
Start: 2024-01-02 | End: 2024-01-03 | Stop reason: HOSPADM

## 2024-01-02 RX ORDER — SODIUM CHLORIDE 9 MG/ML
5-250 INJECTION, SOLUTION INTRAVENOUS PRN
Status: DISCONTINUED | OUTPATIENT
Start: 2024-01-02 | End: 2024-01-03 | Stop reason: HOSPADM

## 2024-01-02 RX ADMIN — ATEZOLIZUMAB 1200 MG: 1200 INJECTION, SOLUTION INTRAVENOUS at 14:00

## 2024-01-02 RX ADMIN — SODIUM CHLORIDE, PRESERVATIVE FREE 20 ML: 5 INJECTION INTRAVENOUS at 14:41

## 2024-01-02 RX ADMIN — SODIUM CHLORIDE 25 ML/HR: 9 INJECTION, SOLUTION INTRAVENOUS at 13:57

## 2024-01-02 ASSESSMENT — PAIN SCALES - GENERAL: PAINLEVEL_OUTOF10: 0

## 2024-01-02 NOTE — PROGRESS NOTES
Chief Complaint   Patient presents with    Follow-up           Vitals:    01/02/24 1201   BP: (!) 143/73   Pulse: 75   Resp: 18   Temp: 97.6 °F (36.4 °C)   SpO2: 99%            1. Have you been to the ER, urgent care clinic since your last visit?  Hospitalized since your last visit?  No  2. Have you seen or consulted any other health care providers outside of the [facility] since your last visit?  Include any pap smears or colon screening. No

## 2024-01-02 NOTE — PROGRESS NOTES
Date: January 2, 2024         Ms. Tabares Arrived to hospitals for  Tecentriq ambulatory in stable condition.  Assessment was completed and port accessed by Kristal SORIA. Labs drawn and sent for processing. Went to provider appointment with Medical Oncology.     Returned from provider appointment. Labs reviewed. Criteria for treatment was met.    Ms. Tabares's vitals were reviewed.  Patient Vitals for the past 12 hrs:   Temp Pulse Resp BP SpO2   01/02/24 1115 97.6 °F (36.4 °C) 75 18 (!) 143/73 99 %         Lab results were obtained and reviewed.      Pre-medications  were administered as ordered and chemotherapy was initiated.  Medications Administered         0.9 % sodium chloride infusion Admin Date  01/02/2024 Action  New Bag Dose  25 mL/hr Rate  25 mL/hr Route  IntraVENous Administered By  Niya Parsons RN        atezolizumab (TECENTRIQ) 1,200 mg in sodium chloride 0.9 % 100 mL IVPB Admin Date  01/02/2024 Action  New Bag Dose  1,200 mg Rate  260 mL/hr Route  IntraVENous Administered By  Niya Parsons RN        sodium chloride flush 0.9 % injection 5-40 mL Admin Date  01/02/2024 Action  Given Dose  20 mL Rate   Route  IntraVENous Administered By  Niya Parsons RN             Two nurses verified prior to administering: Drug name, Drug dose, Infusion volume or drug volume when prepared in a syringe, Rate of administration, Route of administration, Expiration dates and/or times, Appearance and physical integrity of the drugs, Rate set on infusion pump, when used, and Sequencing of drug administration.    Patient tolerated treatment well.  Port maintained positive blood return throughout treatment. Port flushed and de accessed per protocol. Patient was discharged in stable condition. Patient is aware of next scheduled hospitals appointment.    Future Appointments   Date Time Provider Department Center   1/23/2024 11:00 AM SS INF2 CH4 <2H [facility]   1/23/2024 11:30 AM [place] MD ONCSF BS AMB   2/13/2024 10:00 AM SS

## 2024-01-16 ENCOUNTER — OFFICE VISIT (OUTPATIENT)
Age: 79
End: 2024-01-16
Payer: MEDICARE

## 2024-01-16 VITALS
BODY MASS INDEX: 32.24 KG/M2 | OXYGEN SATURATION: 100 % | WEIGHT: 193.5 LBS | HEART RATE: 81 BPM | DIASTOLIC BLOOD PRESSURE: 74 MMHG | HEIGHT: 65 IN | SYSTOLIC BLOOD PRESSURE: 131 MMHG

## 2024-01-16 DIAGNOSIS — G47.33 OBSTRUCTIVE SLEEP APNEA (ADULT) (PEDIATRIC): Primary | ICD-10-CM

## 2024-01-16 DIAGNOSIS — C34.92 MALIGNANT NEOPLASM OF UNSPECIFIED PART OF LEFT BRONCHUS OR LUNG (HCC): ICD-10-CM

## 2024-01-16 PROCEDURE — 99214 OFFICE O/P EST MOD 30 MIN: CPT | Performed by: NURSE PRACTITIONER

## 2024-01-16 PROCEDURE — G8417 CALC BMI ABV UP PARAM F/U: HCPCS | Performed by: NURSE PRACTITIONER

## 2024-01-16 PROCEDURE — G8484 FLU IMMUNIZE NO ADMIN: HCPCS | Performed by: NURSE PRACTITIONER

## 2024-01-16 PROCEDURE — G8427 DOCREV CUR MEDS BY ELIG CLIN: HCPCS | Performed by: NURSE PRACTITIONER

## 2024-01-16 PROCEDURE — G8399 PT W/DXA RESULTS DOCUMENT: HCPCS | Performed by: NURSE PRACTITIONER

## 2024-01-16 PROCEDURE — 1036F TOBACCO NON-USER: CPT | Performed by: NURSE PRACTITIONER

## 2024-01-16 PROCEDURE — 1090F PRES/ABSN URINE INCON ASSESS: CPT | Performed by: NURSE PRACTITIONER

## 2024-01-16 PROCEDURE — 1123F ACP DISCUSS/DSCN MKR DOCD: CPT | Performed by: NURSE PRACTITIONER

## 2024-01-16 PROCEDURE — 3078F DIAST BP <80 MM HG: CPT | Performed by: NURSE PRACTITIONER

## 2024-01-16 PROCEDURE — 3075F SYST BP GE 130 - 139MM HG: CPT | Performed by: NURSE PRACTITIONER

## 2024-01-16 RX ORDER — ACETAMINOPHEN 325 MG/1
650 TABLET ORAL
Status: CANCELLED
Start: 2024-01-23

## 2024-01-16 RX ORDER — SODIUM CHLORIDE 9 MG/ML
INJECTION, SOLUTION INTRAVENOUS CONTINUOUS
Status: CANCELLED | OUTPATIENT
Start: 2024-01-23

## 2024-01-16 RX ORDER — DIPHENHYDRAMINE HYDROCHLORIDE 50 MG/ML
50 INJECTION INTRAMUSCULAR; INTRAVENOUS
Status: CANCELLED | OUTPATIENT
Start: 2024-01-23

## 2024-01-16 RX ORDER — MEPERIDINE HYDROCHLORIDE 25 MG/ML
12.5 INJECTION INTRAMUSCULAR; INTRAVENOUS; SUBCUTANEOUS PRN
Status: CANCELLED | OUTPATIENT
Start: 2024-01-23

## 2024-01-16 RX ORDER — SODIUM CHLORIDE 9 MG/ML
5-250 INJECTION, SOLUTION INTRAVENOUS PRN
Status: CANCELLED | OUTPATIENT
Start: 2024-01-23

## 2024-01-16 RX ORDER — FAMOTIDINE 10 MG/ML
20 INJECTION, SOLUTION INTRAVENOUS
Status: CANCELLED | OUTPATIENT
Start: 2024-01-23

## 2024-01-16 RX ORDER — ACETAMINOPHEN 325 MG/1
650 TABLET ORAL
Status: CANCELLED | OUTPATIENT
Start: 2024-01-23

## 2024-01-16 RX ORDER — EPINEPHRINE 1 MG/ML
0.3 INJECTION, SOLUTION INTRAMUSCULAR; SUBCUTANEOUS PRN
Status: CANCELLED | OUTPATIENT
Start: 2024-01-23

## 2024-01-16 RX ORDER — HEPARIN 100 UNIT/ML
500 SYRINGE INTRAVENOUS PRN
Status: CANCELLED | OUTPATIENT
Start: 2024-01-23

## 2024-01-16 RX ORDER — ALBUTEROL SULFATE 90 UG/1
4 AEROSOL, METERED RESPIRATORY (INHALATION) PRN
Status: CANCELLED | OUTPATIENT
Start: 2024-01-23

## 2024-01-16 RX ORDER — ONDANSETRON 2 MG/ML
8 INJECTION INTRAMUSCULAR; INTRAVENOUS
Status: CANCELLED | OUTPATIENT
Start: 2024-01-23

## 2024-01-16 ASSESSMENT — SLEEP AND FATIGUE QUESTIONNAIRES
HOW LIKELY ARE YOU TO NOD OFF OR FALL ASLEEP WHILE SITTING QUIETLY AFTER LUNCH WITHOUT ALCOHOL: 1
ESS TOTAL SCORE: 4
HOW LIKELY ARE YOU TO NOD OFF OR FALL ASLEEP WHILE WATCHING TV: 1
HOW LIKELY ARE YOU TO NOD OFF OR FALL ASLEEP WHILE SITTING AND TALKING TO SOMEONE: 0
HOW LIKELY ARE YOU TO NOD OFF OR FALL ASLEEP WHEN YOU ARE A PASSENGER IN A CAR FOR AN HOUR WITHOUT A BREAK: 0
HOW LIKELY ARE YOU TO NOD OFF OR FALL ASLEEP WHILE SITTING AND READING: 1
HOW LIKELY ARE YOU TO NOD OFF OR FALL ASLEEP WHILE SITTING INACTIVE IN A PUBLIC PLACE: 0
HOW LIKELY ARE YOU TO NOD OFF OR FALL ASLEEP IN A CAR, WHILE STOPPED FOR A FEW MINUTES IN TRAFFIC: 0
HOW LIKELY ARE YOU TO NOD OFF OR FALL ASLEEP WHILE LYING DOWN TO REST IN THE AFTERNOON WHEN CIRCUMSTANCES PERMIT: 1

## 2024-01-16 NOTE — PATIENT INSTRUCTIONS
[address]  [place] [address]  Tel.  [phone]  Fax. [phone] [address]  [place] [address]  Tel.  [phone]  Fax. [phone] [address] [place] [address]  Tel.  [phone]  Fax. [phone]     Learning About CPAP for Sleep Apnea  What is CPAP?              CPAP is a small machine that you use at home every night while you sleep. It increases air pressure in your throat to keep your airway open. When you have sleep apnea, this can help you sleep better so you feel much better. CPAP stands for \"continuous positive airway pressure.\"  The CPAP machine will have one of the following:  A mask that covers your nose and mouth  Prongs that fit into your nose  A mask that covers your nose only, the most common type. This type is called NCPAP. The N stands for \"nasal.\"  Why is it done?  CPAP is usually the best treatment for obstructive sleep apnea. It is the first treatment choice and the most widely used. Your doctor may suggest CPAP if you have:  Moderate to severe sleep apnea.  Sleep apnea and coronary artery disease (CAD) or heart failure.  How does it help?  CPAP can help you have more normal sleep, so you feel less sleepy and more alert during the daytime.  CPAP may help keep heart failure or other heart problems from getting worse.  NCPAP may help lower your blood pressure.  If you use CPAP, your bed partner may also sleep better because you are not snoring or restless.  What are the side effects?  Some people who use CPAP have:  A dry or stuffy nose and a sore throat.  Irritated skin on the face.  Sore eyes.  Bloating.  If you have any of these problems, work with your doctor to fix them. Here are some things you can try:  Be sure the mask or nasal prongs fit well.  See if your doctor can adjust the pressure of your CPAP.  If your nose is dry, try a humidifier.  If your nose is runny or stuffy, try decongestant medicine or a steroid

## 2024-01-16 NOTE — PROGRESS NOTES
visit.    An electronic signature was used to authenticate this note.    -- SHANIQUE Garner NP, [facility]  01/16/24

## 2024-01-23 ENCOUNTER — HOSPITAL ENCOUNTER (OUTPATIENT)
Facility: HOSPITAL | Age: 79
Setting detail: INFUSION SERIES
Discharge: HOME | End: 2024-01-23
Payer: MEDICARE

## 2024-01-23 ENCOUNTER — OFFICE VISIT (OUTPATIENT)
Age: 79
End: 2024-01-23
Payer: MEDICARE

## 2024-01-23 VITALS
BODY MASS INDEX: 32.62 KG/M2 | DIASTOLIC BLOOD PRESSURE: 81 MMHG | WEIGHT: 195.8 LBS | SYSTOLIC BLOOD PRESSURE: 160 MMHG | TEMPERATURE: 97.5 F | HEART RATE: 74 BPM | RESPIRATION RATE: 18 BRPM | HEIGHT: 65 IN

## 2024-01-23 VITALS
TEMPERATURE: 97.5 F | RESPIRATION RATE: 18 BRPM | WEIGHT: 195.8 LBS | OXYGEN SATURATION: 100 % | SYSTOLIC BLOOD PRESSURE: 127 MMHG | DIASTOLIC BLOOD PRESSURE: 61 MMHG | BODY MASS INDEX: 32.62 KG/M2 | HEART RATE: 77 BPM | HEIGHT: 65 IN

## 2024-01-23 DIAGNOSIS — C34.92 MALIGNANT NEOPLASM OF UNSPECIFIED PART OF LEFT BRONCHUS OR LUNG (HCC): Primary | ICD-10-CM

## 2024-01-23 DIAGNOSIS — N18.31 TYPE 2 DIABETES MELLITUS WITH STAGE 3A CHRONIC KIDNEY DISEASE, WITHOUT LONG-TERM CURRENT USE OF INSULIN (HCC): ICD-10-CM

## 2024-01-23 DIAGNOSIS — Z51.12 ENCOUNTER FOR ANTINEOPLASTIC IMMUNOTHERAPY: ICD-10-CM

## 2024-01-23 DIAGNOSIS — C34.92 SQUAMOUS CELL CARCINOMA OF LUNG, LEFT (HCC): Primary | ICD-10-CM

## 2024-01-23 DIAGNOSIS — I10 PRIMARY HYPERTENSION: ICD-10-CM

## 2024-01-23 DIAGNOSIS — Z71.85 VACCINE COUNSELING: ICD-10-CM

## 2024-01-23 DIAGNOSIS — E11.22 TYPE 2 DIABETES MELLITUS WITH STAGE 3A CHRONIC KIDNEY DISEASE, WITHOUT LONG-TERM CURRENT USE OF INSULIN (HCC): ICD-10-CM

## 2024-01-23 DIAGNOSIS — N18.31 STAGE 3A CHRONIC KIDNEY DISEASE (HCC): ICD-10-CM

## 2024-01-23 DIAGNOSIS — D63.8 ANEMIA OF CHRONIC DISEASE: ICD-10-CM

## 2024-01-23 LAB
ALBUMIN SERPL-MCNC: 3.2 G/DL (ref 3.5–5)
ALBUMIN/GLOB SERPL: 0.9 (ref 1.1–2.2)
ALP SERPL-CCNC: 89 U/L (ref 45–117)
ALT SERPL-CCNC: 19 U/L (ref 12–78)
ANION GAP SERPL CALC-SCNC: 5 MMOL/L (ref 5–15)
AST SERPL-CCNC: 17 U/L (ref 15–37)
BASOPHILS # BLD: 0 K/UL (ref 0–0.1)
BASOPHILS NFR BLD: 0 % (ref 0–1)
BILIRUB SERPL-MCNC: 0.5 MG/DL (ref 0.2–1)
BUN SERPL-MCNC: 21 MG/DL (ref 6–20)
BUN/CREAT SERPL: 18 (ref 12–20)
CALCIUM SERPL-MCNC: 8.8 MG/DL (ref 8.5–10.1)
CHLORIDE SERPL-SCNC: 111 MMOL/L (ref 97–108)
CO2 SERPL-SCNC: 24 MMOL/L (ref 21–32)
CREAT SERPL-MCNC: 1.15 MG/DL (ref 0.55–1.02)
DIFFERENTIAL METHOD BLD: ABNORMAL
EOSINOPHIL # BLD: 0.2 K/UL (ref 0–0.4)
EOSINOPHIL NFR BLD: 4 % (ref 0–7)
ERYTHROCYTE [DISTWIDTH] IN BLOOD BY AUTOMATED COUNT: 14.3 % (ref 11.5–14.5)
GLOBULIN SER CALC-MCNC: 3.4 G/DL (ref 2–4)
GLUCOSE SERPL-MCNC: 136 MG/DL (ref 65–100)
HCT VFR BLD AUTO: 31.4 % (ref 35–47)
HGB BLD-MCNC: 10.2 G/DL (ref 11.5–16)
IMM GRANULOCYTES # BLD AUTO: 0 K/UL (ref 0–0.04)
IMM GRANULOCYTES NFR BLD AUTO: 1 % (ref 0–0.5)
LYMPHOCYTES # BLD: 0.5 K/UL (ref 0.8–3.5)
LYMPHOCYTES NFR BLD: 12 % (ref 12–49)
MCH RBC QN AUTO: 29 PG (ref 26–34)
MCHC RBC AUTO-ENTMCNC: 32.5 G/DL (ref 30–36.5)
MCV RBC AUTO: 89.2 FL (ref 80–99)
MONOCYTES # BLD: 0.3 K/UL (ref 0–1)
MONOCYTES NFR BLD: 7 % (ref 5–13)
NEUTS SEG # BLD: 3.2 K/UL (ref 1.8–8)
NEUTS SEG NFR BLD: 76 % (ref 32–75)
NRBC # BLD: 0 K/UL (ref 0–0.01)
NRBC BLD-RTO: 0 PER 100 WBC
PLATELET # BLD AUTO: 162 K/UL (ref 150–400)
PMV BLD AUTO: 10.6 FL (ref 8.9–12.9)
POTASSIUM SERPL-SCNC: 4 MMOL/L (ref 3.5–5.1)
PROT SERPL-MCNC: 6.6 G/DL (ref 6.4–8.2)
RBC # BLD AUTO: 3.52 M/UL (ref 3.8–5.2)
RBC MORPH BLD: ABNORMAL
SODIUM SERPL-SCNC: 140 MMOL/L (ref 136–145)
TSH SERPL DL<=0.05 MIU/L-ACNC: 1.38 UIU/ML (ref 0.36–3.74)
WBC # BLD AUTO: 4.2 K/UL (ref 3.6–11)

## 2024-01-23 PROCEDURE — 84443 ASSAY THYROID STIM HORMONE: CPT

## 2024-01-23 PROCEDURE — G8399 PT W/DXA RESULTS DOCUMENT: HCPCS | Performed by: INTERNAL MEDICINE

## 2024-01-23 PROCEDURE — 6360000002 HC RX W HCPCS: Performed by: INTERNAL MEDICINE

## 2024-01-23 PROCEDURE — 3078F DIAST BP <80 MM HG: CPT | Performed by: INTERNAL MEDICINE

## 2024-01-23 PROCEDURE — 99215 OFFICE O/P EST HI 40 MIN: CPT | Performed by: INTERNAL MEDICINE

## 2024-01-23 PROCEDURE — 3074F SYST BP LT 130 MM HG: CPT | Performed by: INTERNAL MEDICINE

## 2024-01-23 PROCEDURE — G8484 FLU IMMUNIZE NO ADMIN: HCPCS | Performed by: INTERNAL MEDICINE

## 2024-01-23 PROCEDURE — 80053 COMPREHEN METABOLIC PANEL: CPT

## 2024-01-23 PROCEDURE — G2211 COMPLEX E/M VISIT ADD ON: HCPCS | Performed by: INTERNAL MEDICINE

## 2024-01-23 PROCEDURE — 1090F PRES/ABSN URINE INCON ASSESS: CPT | Performed by: INTERNAL MEDICINE

## 2024-01-23 PROCEDURE — 1123F ACP DISCUSS/DSCN MKR DOCD: CPT | Performed by: INTERNAL MEDICINE

## 2024-01-23 PROCEDURE — G8427 DOCREV CUR MEDS BY ELIG CLIN: HCPCS | Performed by: INTERNAL MEDICINE

## 2024-01-23 PROCEDURE — 2580000003 HC RX 258: Performed by: INTERNAL MEDICINE

## 2024-01-23 PROCEDURE — G8417 CALC BMI ABV UP PARAM F/U: HCPCS | Performed by: INTERNAL MEDICINE

## 2024-01-23 PROCEDURE — 85025 COMPLETE CBC W/AUTO DIFF WBC: CPT

## 2024-01-23 PROCEDURE — 96413 CHEMO IV INFUSION 1 HR: CPT

## 2024-01-23 PROCEDURE — 1036F TOBACCO NON-USER: CPT | Performed by: INTERNAL MEDICINE

## 2024-01-23 RX ORDER — SODIUM CHLORIDE 9 MG/ML
5-250 INJECTION, SOLUTION INTRAVENOUS PRN
Status: DISCONTINUED | OUTPATIENT
Start: 2024-01-23 | End: 2024-01-24 | Stop reason: HOSPADM

## 2024-01-23 RX ORDER — SODIUM CHLORIDE 0.9 % (FLUSH) 0.9 %
5-40 SYRINGE (ML) INJECTION PRN
Status: DISCONTINUED | OUTPATIENT
Start: 2024-01-23 | End: 2024-01-24 | Stop reason: HOSPADM

## 2024-01-23 RX ADMIN — ATEZOLIZUMAB 1200 MG: 1200 INJECTION, SOLUTION INTRAVENOUS at 12:46

## 2024-01-23 RX ADMIN — SODIUM CHLORIDE, PRESERVATIVE FREE 20 ML: 5 INJECTION INTRAVENOUS at 13:25

## 2024-01-23 RX ADMIN — SODIUM CHLORIDE 26 ML/HR: 9 INJECTION, SOLUTION INTRAVENOUS at 12:45

## 2024-01-23 ASSESSMENT — PAIN SCALES - GENERAL: PAINLEVEL_OUTOF10: 0

## 2024-01-23 NOTE — PROGRESS NOTES
Chief Complaint   Patient presents with    Follow-up           Vitals:    01/23/24 1118   BP: 127/61   Pulse: 77   Resp: 18   Temp: 97.5 °F (36.4 °C)   SpO2: 100%            1. Have you been to the ER, urgent care clinic since your last visit?  Hospitalized since your last visit?  No  2. Have you seen or consulted any other health care providers outside of the [facility] since your last visit?  Include any pap smears or colon screening. No    
combined.     4/20/23 PET:  FINDINGS:  HEAD/NECK: No apparent foci of abnormal hypermetabolism. Cerebral evaluation is  limited by normal intense activity.  CHEST: Mass lesion left upper lobe is hypermetabolic, maximum SUV 9.1. Central  photopenia may represent underlying necrosis.  ABDOMEN/PELVIS: No foci of abnormal hypermetabolism.  SKELETON: No foci of abnormal hypermetabolism in the axial and visualized  appendicular skeleton.  IMPRESSION:  Mass lesion left upper lobe is hypermetabolic and compatible with the known neoplasm. No PET avid evidence of metastatic disease.    9/4/23 CT chest wo cont:  HEART: Normal in size. Coronary artery calcification is present and severe  PLEURA: No effusion or pneumothorax.  LUNGS: The previously seen spiculated cavitary nodule in the left upper lobe has been surgically resected. Patient status post partial left upper lobe lobectomy and there are the expected postoperative changes. The lingula remains. There are  no pulmonary nodules identified.The lungs demonstrate mild changes of centrilobular and paraseptal emphysema..  INCIDENTALLY IMAGED UPPER ABDOMEN: The right kidney is partially imaged and is  smaller than the left kidney with irregular contours consistent with areas of atrophy and scarring.. There is a small hiatal hernia. No adrenal lesions.  BONES: No destructive bone lesion.  IMPRESSION:  1. The left upper lobe pulmonary nodule has been resected. There are no pulmonary nodules present. There is no adenopathy.  2. No evidence for metastatic disease.    12/1/23 CT chest wo cont:  HEART: Normal in size. Coronary artery calcification is severe  PLEURA: No effusion or pneumothorax.  LUNGS: The previously seen spiculated cavitary nodule in the left upper lobe has been surgically resected. Patient status post partial left upper lobe lobectomy. Typical postoperative changes with no new pulmonary mass. The lungs demonstrate mild changes of centrilobular and paraseptal

## 2024-01-23 NOTE — PROGRESS NOTES
[facility] Chemo Progress Note    Date: January 23, 2024        1045: Ms. Tabares Arrived to [facility] for  C7D1 Atezolizumab ambulatory in stable condition.  Assessment was completed and port accessed by Josefina Finch RN. Labs drawn and sent for processing. Went to provider appointment with Medical Oncology.    1215: Returned from provider appointment. Labs reviewed. Criteria for treatment was met.    Ms. Tabares's vitals were reviewed.  Patient Vitals for the past 12 hrs:   Temp Pulse Resp BP   01/23/24 1319 -- 74 18 (!) 160/81   01/23/24 1045 97.5 °F (36.4 °C) 77 18 127/61       Lab results were obtained and reviewed.  Recent Results (from the past 12 hour(s))   CBC With Auto Differential    Collection Time: 01/23/24 11:07 AM   Result Value Ref Range    WBC 4.2 3.6 - 11.0 K/uL    RBC 3.52 (L) 3.80 - 5.20 M/uL    Hemoglobin 10.2 (L) 11.5 - 16.0 g/dL    Hematocrit 31.4 (L) 35.0 - 47.0 %    MCV 89.2 80.0 - 99.0 FL    MCH 29.0 26.0 - 34.0 PG    MCHC 32.5 30.0 - 36.5 g/dL    RDW 14.3 11.5 - 14.5 %    Platelets 162 150 - 400 K/uL    MPV 10.6 8.9 - 12.9 FL    Nucleated RBCs 0.0 0  WBC    nRBC 0.00 0.00 - 0.01 K/uL    Neutrophils % 76 (H) 32 - 75 %    Lymphocytes % 12 12 - 49 %    Monocytes % 7 5 - 13 %    Eosinophils % 4 0 - 7 %    Basophils % 0 0 - 1 %    Immature Granulocytes 1 (H) 0.0 - 0.5 %    Neutrophils Absolute 3.2 1.8 - 8.0 K/UL    Lymphocytes Absolute 0.5 (L) 0.8 - 3.5 K/UL    Monocytes Absolute 0.3 0.0 - 1.0 K/UL    Eosinophils Absolute 0.2 0.0 - 0.4 K/UL    Basophils Absolute 0.0 0.0 - 0.1 K/UL    Absolute Immature Granulocyte 0.0 0.00 - 0.04 K/UL    Differential Type SMEAR SCANNED      RBC Comment OVALOCYTES  PRESENT       Comprehensive metabolic panel    Collection Time: 01/23/24 11:07 AM   Result Value Ref Range    Sodium 140 136 - 145 mmol/L    Potassium 4.0 3.5 - 5.1 mmol/L    Chloride 111 (H) 97 - 108 mmol/L    CO2 24 21 - 32 mmol/L    Anion Gap 5 5 - 15 mmol/L    Glucose 136 (H) 65 - 100 mg/dL    BUN 21

## 2024-01-26 ENCOUNTER — HOSPITAL ENCOUNTER (OUTPATIENT)
Facility: HOSPITAL | Age: 79
End: 2024-01-26
Payer: MEDICARE

## 2024-01-26 VITALS
RESPIRATION RATE: 16 BRPM | TEMPERATURE: 98.2 F | DIASTOLIC BLOOD PRESSURE: 73 MMHG | WEIGHT: 195 LBS | OXYGEN SATURATION: 97 % | HEART RATE: 84 BPM | HEIGHT: 65 IN | SYSTOLIC BLOOD PRESSURE: 132 MMHG | BODY MASS INDEX: 32.49 KG/M2

## 2024-01-26 DIAGNOSIS — G47.33 OBSTRUCTIVE SLEEP APNEA (ADULT) (PEDIATRIC): ICD-10-CM

## 2024-01-26 PROCEDURE — 95811 POLYSOM 6/>YRS CPAP 4/> PARM: CPT | Performed by: INTERNAL MEDICINE

## 2024-01-27 NOTE — PROGRESS NOTES
Sleep Study Technical Notes   Disclaimer:  all notes have not been confirmed by interpreting physician      PRE-Test:  Elise Tabares (: 1945) arrived in the lobby. The patient was taken to the Sleep Center and taken directly to his/her room.   Procedure explained to the patient and questions were answered. The patient expressed understanding of the procedure. Electrodes were applied without incident. The patient was placed in bed and the study was started.    Acquisition Notes:  Lights off: 9:25pm    Respiratory events:   A__Y    H__Y  C__Y  M__N  ECG:    Possible arrhythmias:   N  Snoring:  Mild snore  Sleep Stages:  REM   Y    PAP titration information in Sleep Study CPAP  Evaluation  Positional therapy with: CPAP / BIPAP TITRATION Starting 5cm and titrated to 8cm / BIPAP  8/4cm titrated to 16/10cm  Patient slept with positional therapy:  Y used  2 pillows  Patient slept with head of bed elevated:  N  Supine sleep assessed per physician order:  N.     If not, why??   Patient wore an oral appliance:  N  Other comments: PT slept well in supine and left and right side, Sleep stage 1,2, REM sleep noted. low AHI 10, PT meet criteria for split night study ,Start  CPAP 4cm, titrated to CPAP 8cm , Then switch PT to BIPAP 8/4cm and titrated to 16/10cm . BIPAP 16/10cm PT had several apnea and no snoring, BIPAP 16/10cm appear to be best setting  Patient had caregiver in attendance:  N  Patient watched TV or on phone after lights out for ** hours  Patient to bathroom 0 times  Pediatric Patient:  Parent accompanied patient: N  Parent slept in bed with patient: N      POST Test:  Patient was awakened.  Electrodes were removed.    The patient was discharged after answering the Post Questionnaire.    Equipment and room cleaned per infection control policy.

## 2024-01-31 ENCOUNTER — TELEPHONE (OUTPATIENT)
Age: 79
End: 2024-01-31

## 2024-01-31 DIAGNOSIS — G47.33 OBSTRUCTIVE SLEEP APNEA (ADULT) (PEDIATRIC): Primary | ICD-10-CM

## 2024-01-31 NOTE — TELEPHONE ENCOUNTER
Results of sleep study in [employer]  Lead tech to convey results to patient\    Saw Nohelia KIRAN Gonzalez NP, [facility] in clinic      During first part of study, she fell asleep in 2 hours.  Mild snoring. Sleep study started as a diagnostic polysomnogram during which an AHI of 21/hour was found. Lowest oxygen saturation was 82%.    PAP therapy applied to control apnea/respiratory events. CPAP failed to control respiratory events, so therapy changed to bi-level. At a setting of BIPAP 16/10 cmH20, respiratory events controlled and oxygen levels maintained at or above 89%. AHI at optimal pressure was 5. she appeared to tolerate PAP well.     We will order a PAP device for her home use. It will start a little lower than best pressure attained in sleep center (for comfort) and will adjust up during sleep.          she will need a first adherence visit.   Staff to schedule first adherence visit     Order attached  Staff to kindly send to dme and inform patient of necessary details pertaining to PAP order and follow-up.     Follow up with TIAGO

## 2024-02-02 NOTE — TELEPHONE ENCOUNTER
Reviewed sleep study results with patient. She expressed understanding and is willing to proceed with a trial of BiPAP.

## 2024-02-05 ENCOUNTER — CLINICAL DOCUMENTATION (OUTPATIENT)
Age: 79
End: 2024-02-05

## 2024-02-06 ENCOUNTER — HOSPITAL ENCOUNTER (OUTPATIENT)
Facility: HOSPITAL | Age: 79
Setting detail: RECURRING SERIES
Discharge: HOME | End: 2024-02-09
Payer: MEDICARE

## 2024-02-06 VITALS — HEIGHT: 66 IN | WEIGHT: 189 LBS | BODY MASS INDEX: 30.37 KG/M2

## 2024-02-06 PROCEDURE — 93798 PHYS/QHP OP CAR RHAB W/ECG: CPT

## 2024-02-06 RX ORDER — MEPERIDINE HYDROCHLORIDE 25 MG/ML
12.5 INJECTION INTRAMUSCULAR; INTRAVENOUS; SUBCUTANEOUS PRN
Status: CANCELLED | OUTPATIENT
Start: 2024-02-13

## 2024-02-06 RX ORDER — EPINEPHRINE 1 MG/ML
0.3 INJECTION, SOLUTION INTRAMUSCULAR; SUBCUTANEOUS PRN
Status: CANCELLED | OUTPATIENT
Start: 2024-02-13

## 2024-02-06 RX ORDER — SODIUM CHLORIDE 9 MG/ML
INJECTION, SOLUTION INTRAVENOUS CONTINUOUS
Status: CANCELLED | OUTPATIENT
Start: 2024-02-13

## 2024-02-06 RX ORDER — FAMOTIDINE 10 MG/ML
20 INJECTION, SOLUTION INTRAVENOUS
Status: CANCELLED | OUTPATIENT
Start: 2024-02-13

## 2024-02-06 RX ORDER — ONDANSETRON 2 MG/ML
8 INJECTION INTRAMUSCULAR; INTRAVENOUS
Status: CANCELLED | OUTPATIENT
Start: 2024-02-13

## 2024-02-06 RX ORDER — DIPHENHYDRAMINE HYDROCHLORIDE 50 MG/ML
50 INJECTION INTRAMUSCULAR; INTRAVENOUS
Status: CANCELLED | OUTPATIENT
Start: 2024-02-13

## 2024-02-06 RX ORDER — ACETAMINOPHEN 325 MG/1
650 TABLET ORAL
Status: CANCELLED | OUTPATIENT
Start: 2024-02-13

## 2024-02-06 RX ORDER — ACETAMINOPHEN 325 MG/1
650 TABLET ORAL
Status: CANCELLED
Start: 2024-02-13

## 2024-02-06 RX ORDER — ALBUTEROL SULFATE 90 UG/1
4 AEROSOL, METERED RESPIRATORY (INHALATION) PRN
Status: CANCELLED | OUTPATIENT
Start: 2024-02-13

## 2024-02-06 RX ORDER — SODIUM CHLORIDE 9 MG/ML
5-250 INJECTION, SOLUTION INTRAVENOUS PRN
Status: CANCELLED | OUTPATIENT
Start: 2024-02-13

## 2024-02-06 RX ORDER — HEPARIN 100 UNIT/ML
500 SYRINGE INTRAVENOUS PRN
Status: CANCELLED | OUTPATIENT
Start: 2024-02-13

## 2024-02-06 ASSESSMENT — PATIENT HEALTH QUESTIONNAIRE - PHQ9
3. TROUBLE FALLING OR STAYING ASLEEP: 0
SUM OF ALL RESPONSES TO PHQ QUESTIONS 1-9: 0
5. POOR APPETITE OR OVEREATING: 0
9. THOUGHTS THAT YOU WOULD BE BETTER OFF DEAD, OR OF HURTING YOURSELF: 0
SUM OF ALL RESPONSES TO PHQ9 QUESTIONS 1 & 2: 0
10. IF YOU CHECKED OFF ANY PROBLEMS, HOW DIFFICULT HAVE THESE PROBLEMS MADE IT FOR YOU TO DO YOUR WORK, TAKE CARE OF THINGS AT HOME, OR GET ALONG WITH OTHER PEOPLE: 0
2. FEELING DOWN, DEPRESSED OR HOPELESS: 0
8. MOVING OR SPEAKING SO SLOWLY THAT OTHER PEOPLE COULD HAVE NOTICED. OR THE OPPOSITE, BEING SO FIGETY OR RESTLESS THAT YOU HAVE BEEN MOVING AROUND A LOT MORE THAN USUAL: 0
4. FEELING TIRED OR HAVING LITTLE ENERGY: 0
SUM OF ALL RESPONSES TO PHQ QUESTIONS 1-9: 0
1. LITTLE INTEREST OR PLEASURE IN DOING THINGS: 0
6. FEELING BAD ABOUT YOURSELF - OR THAT YOU ARE A FAILURE OR HAVE LET YOURSELF OR YOUR FAMILY DOWN: 0
SUM OF ALL RESPONSES TO PHQ QUESTIONS 1-9: 0
SUM OF ALL RESPONSES TO PHQ QUESTIONS 1-9: 0
7. TROUBLE CONCENTRATING ON THINGS, SUCH AS READING THE NEWSPAPER OR WATCHING TELEVISION: 0

## 2024-02-06 ASSESSMENT — EXERCISE STRESS TEST
PEAK_BP: 174/74
PEAK_BP: 174/74
PEAK_RPE: 11
PEAK_HR: 96
PEAK_RPE: 11
PEAK_METS: 2
PEAK_BP: 174/74
PEAK_HR: 99

## 2024-02-06 ASSESSMENT — LIFESTYLE VARIABLES: SMOKELESS_TOBACCO: NO

## 2024-02-06 ASSESSMENT — EJECTION FRACTION: EF_VALUE: 55

## 2024-02-06 NOTE — CARDIO/PULMONARY
INTAKE APPOINTMENT NOTE  2024    NAME: Elise Tabares : 1945 AGE: 78 y.o.  GENDER: female    CARDIAC REHAB ADMITTING DIAGNOSIS: Presence of coronary angioplasty implant and graft [Z95.5]    REFERRING PHYSICIAN: Micah Bah DO    MEDICAL HX:  Past Medical History:   Diagnosis Date    Arthritis     Lower back, also has T-11 compression fracture    CAD (coronary artery disease)     Cerebral artery occlusion with cerebral infarction (HCC)     Evident on head CT, Chronic Rt lacunar infarcts    Chronic back pain 2010    Chronic kidney disease (CKD)     Stage 3    COPD (chronic obstructive pulmonary disease) ([facility])     DM type 2 (diabetes mellitus, type 2) ([facility]) 2010    HTN (hypertension) 2010    Hyperlipidemia     Lung cancer ([facility]) 2023    Lt upper lobe squamous cell    MI (myocardial infarction) ([facility])     s/p PCI    Obesity (BMI 30-39.9)     Obstructive sleep apnea (adult) (pediatric) 2014    Thyroid mass        LABS:       Lab Results   Component Value Date/Time    CHOL 143 10/31/2023 11:08 AM    HDL 65 10/31/2023 11:08 AM   Hgb A1c       6.0       1/15/24      ANTHROPOMETRICS:      Ht Readings from Last 1 Encounters:   24 1.676 m (5' 6\")      Wt Readings from Last 1 Encounters:   24 85.7 kg (189 lb)        WAIST: 40 in       VISIT SUMMARY:    Elise Tabares 78 y.o. presented to Cardiac Rehab for program orientation and 6 minute exercise test today with a primary diagnosis of Presence of coronary angioplasty implant and graft [Z95.5], s/p PCI with ALIRIO to LAD. Cardiac hx includes: MI/PCI (), RCA with   (), and PCI to LAD (). Pt particpated in cardiac rehab at [facility] following PCI in 2017. EF is 55-60%. Cardiac risk factors include: DM, HTN, HLD, Obesity, post-menopausal, and family hx. Patient smoked 6-7 cigarettes/day from ages 28 to 59.    Elise Tabares is lives with her daughter in [place]. She has raised 2 sons & 1

## 2024-02-07 ENCOUNTER — HOSPITAL ENCOUNTER (OUTPATIENT)
Facility: HOSPITAL | Age: 79
Setting detail: RECURRING SERIES
Discharge: HOME | End: 2024-02-10
Payer: MEDICARE

## 2024-02-07 VITALS — WEIGHT: 190.4 LBS | BODY MASS INDEX: 30.73 KG/M2

## 2024-02-07 PROCEDURE — 93798 PHYS/QHP OP CAR RHAB W/ECG: CPT

## 2024-02-07 ASSESSMENT — EXERCISE STRESS TEST
PEAK_METS: 2
PEAK_HR: 116

## 2024-02-12 ENCOUNTER — HOSPITAL ENCOUNTER (OUTPATIENT)
Facility: HOSPITAL | Age: 79
Setting detail: RECURRING SERIES
Discharge: HOME | End: 2024-02-15
Payer: MEDICARE

## 2024-02-12 VITALS — BODY MASS INDEX: 30.89 KG/M2 | WEIGHT: 191.4 LBS

## 2024-02-12 PROCEDURE — 93798 PHYS/QHP OP CAR RHAB W/ECG: CPT

## 2024-02-13 ENCOUNTER — HOSPITAL ENCOUNTER (OUTPATIENT)
Facility: HOSPITAL | Age: 79
Setting detail: INFUSION SERIES
Discharge: HOME | End: 2024-02-13
Payer: MEDICARE

## 2024-02-13 ENCOUNTER — OFFICE VISIT (OUTPATIENT)
Age: 79
End: 2024-02-13
Payer: MEDICARE

## 2024-02-13 VITALS
TEMPERATURE: 97.4 F | RESPIRATION RATE: 16 BRPM | HEIGHT: 66 IN | DIASTOLIC BLOOD PRESSURE: 79 MMHG | BODY MASS INDEX: 30.68 KG/M2 | HEART RATE: 92 BPM | OXYGEN SATURATION: 98 % | SYSTOLIC BLOOD PRESSURE: 166 MMHG | WEIGHT: 190.9 LBS

## 2024-02-13 VITALS
HEIGHT: 66 IN | OXYGEN SATURATION: 98 % | RESPIRATION RATE: 18 BRPM | HEART RATE: 97 BPM | TEMPERATURE: 97.9 F | SYSTOLIC BLOOD PRESSURE: 132 MMHG | BODY MASS INDEX: 30.68 KG/M2 | WEIGHT: 190.9 LBS | DIASTOLIC BLOOD PRESSURE: 65 MMHG

## 2024-02-13 DIAGNOSIS — C34.92 SQUAMOUS CELL CARCINOMA OF LUNG, LEFT (HCC): Primary | ICD-10-CM

## 2024-02-13 DIAGNOSIS — R21 RASH: ICD-10-CM

## 2024-02-13 DIAGNOSIS — Z51.12 ENCOUNTER FOR ANTINEOPLASTIC IMMUNOTHERAPY: ICD-10-CM

## 2024-02-13 DIAGNOSIS — C34.92 MALIGNANT NEOPLASM OF UNSPECIFIED PART OF LEFT BRONCHUS OR LUNG (HCC): Primary | ICD-10-CM

## 2024-02-13 DIAGNOSIS — N18.31 STAGE 3A CHRONIC KIDNEY DISEASE (HCC): ICD-10-CM

## 2024-02-13 DIAGNOSIS — G47.09 OTHER INSOMNIA: ICD-10-CM

## 2024-02-13 LAB
ALBUMIN SERPL-MCNC: 3.3 G/DL (ref 3.5–5)
ALBUMIN/GLOB SERPL: 0.8 (ref 1.1–2.2)
ALP SERPL-CCNC: 102 U/L (ref 45–117)
ALT SERPL-CCNC: 16 U/L (ref 12–78)
ANION GAP SERPL CALC-SCNC: 4 MMOL/L (ref 5–15)
AST SERPL-CCNC: 19 U/L (ref 15–37)
BASOPHILS # BLD: 0 K/UL (ref 0–0.1)
BASOPHILS NFR BLD: 0 % (ref 0–1)
BILIRUB SERPL-MCNC: 0.4 MG/DL (ref 0.2–1)
BUN SERPL-MCNC: 22 MG/DL (ref 6–20)
BUN/CREAT SERPL: 17 (ref 12–20)
CALCIUM SERPL-MCNC: 9.5 MG/DL (ref 8.5–10.1)
CHLORIDE SERPL-SCNC: 106 MMOL/L (ref 97–108)
CO2 SERPL-SCNC: 25 MMOL/L (ref 21–32)
CREAT SERPL-MCNC: 1.29 MG/DL (ref 0.55–1.02)
DIFFERENTIAL METHOD BLD: ABNORMAL
EOSINOPHIL # BLD: 0.1 K/UL (ref 0–0.4)
EOSINOPHIL NFR BLD: 2 % (ref 0–7)
ERYTHROCYTE [DISTWIDTH] IN BLOOD BY AUTOMATED COUNT: 13.6 % (ref 11.5–14.5)
GLOBULIN SER CALC-MCNC: 4.3 G/DL (ref 2–4)
GLUCOSE SERPL-MCNC: 141 MG/DL (ref 65–100)
HCT VFR BLD AUTO: 30.9 % (ref 35–47)
HGB BLD-MCNC: 10.3 G/DL (ref 11.5–16)
IMM GRANULOCYTES # BLD AUTO: 0 K/UL (ref 0–0.04)
IMM GRANULOCYTES NFR BLD AUTO: 0 % (ref 0–0.5)
LYMPHOCYTES # BLD: 0.5 K/UL (ref 0.8–3.5)
LYMPHOCYTES NFR BLD: 8 % (ref 12–49)
MCH RBC QN AUTO: 28.6 PG (ref 26–34)
MCHC RBC AUTO-ENTMCNC: 33.3 G/DL (ref 30–36.5)
MCV RBC AUTO: 85.8 FL (ref 80–99)
MONOCYTES # BLD: 0.3 K/UL (ref 0–1)
MONOCYTES NFR BLD: 5 % (ref 5–13)
NEUTS SEG # BLD: 5.1 K/UL (ref 1.8–8)
NEUTS SEG NFR BLD: 85 % (ref 32–75)
NRBC # BLD: 0 K/UL (ref 0–0.01)
NRBC BLD-RTO: 0 PER 100 WBC
PLATELET # BLD AUTO: 225 K/UL (ref 150–400)
PMV BLD AUTO: 10 FL (ref 8.9–12.9)
POTASSIUM SERPL-SCNC: 4 MMOL/L (ref 3.5–5.1)
PROT SERPL-MCNC: 7.6 G/DL (ref 6.4–8.2)
RBC # BLD AUTO: 3.6 M/UL (ref 3.8–5.2)
RBC MORPH BLD: ABNORMAL
SODIUM SERPL-SCNC: 135 MMOL/L (ref 136–145)
WBC # BLD AUTO: 6 K/UL (ref 3.6–11)

## 2024-02-13 PROCEDURE — 1123F ACP DISCUSS/DSCN MKR DOCD: CPT | Performed by: NURSE PRACTITIONER

## 2024-02-13 PROCEDURE — 3075F SYST BP GE 130 - 139MM HG: CPT | Performed by: NURSE PRACTITIONER

## 2024-02-13 PROCEDURE — 80053 COMPREHEN METABOLIC PANEL: CPT

## 2024-02-13 PROCEDURE — 1090F PRES/ABSN URINE INCON ASSESS: CPT | Performed by: NURSE PRACTITIONER

## 2024-02-13 PROCEDURE — 6360000002 HC RX W HCPCS: Performed by: INTERNAL MEDICINE

## 2024-02-13 PROCEDURE — 1036F TOBACCO NON-USER: CPT | Performed by: NURSE PRACTITIONER

## 2024-02-13 PROCEDURE — G2211 COMPLEX E/M VISIT ADD ON: HCPCS | Performed by: NURSE PRACTITIONER

## 2024-02-13 PROCEDURE — G8427 DOCREV CUR MEDS BY ELIG CLIN: HCPCS | Performed by: NURSE PRACTITIONER

## 2024-02-13 PROCEDURE — 99215 OFFICE O/P EST HI 40 MIN: CPT | Performed by: NURSE PRACTITIONER

## 2024-02-13 PROCEDURE — 2580000003 HC RX 258: Performed by: INTERNAL MEDICINE

## 2024-02-13 PROCEDURE — G8399 PT W/DXA RESULTS DOCUMENT: HCPCS | Performed by: NURSE PRACTITIONER

## 2024-02-13 PROCEDURE — 96413 CHEMO IV INFUSION 1 HR: CPT

## 2024-02-13 PROCEDURE — 36415 COLL VENOUS BLD VENIPUNCTURE: CPT

## 2024-02-13 PROCEDURE — 96417 CHEMO IV INFUS EACH ADDL SEQ: CPT

## 2024-02-13 PROCEDURE — G8484 FLU IMMUNIZE NO ADMIN: HCPCS | Performed by: NURSE PRACTITIONER

## 2024-02-13 PROCEDURE — G8417 CALC BMI ABV UP PARAM F/U: HCPCS | Performed by: NURSE PRACTITIONER

## 2024-02-13 PROCEDURE — 3078F DIAST BP <80 MM HG: CPT | Performed by: NURSE PRACTITIONER

## 2024-02-13 PROCEDURE — 85025 COMPLETE CBC W/AUTO DIFF WBC: CPT

## 2024-02-13 RX ORDER — SODIUM CHLORIDE 9 MG/ML
5-250 INJECTION, SOLUTION INTRAVENOUS PRN
Status: DISCONTINUED | OUTPATIENT
Start: 2024-02-13 | End: 2024-02-14 | Stop reason: HOSPADM

## 2024-02-13 RX ORDER — TRIAMCINOLONE ACETONIDE 0.25 MG/G
OINTMENT TOPICAL
Qty: 80 G | Refills: 1 | Status: SHIPPED | OUTPATIENT
Start: 2024-02-13 | End: 2024-02-20

## 2024-02-13 RX ORDER — SODIUM CHLORIDE 0.9 % (FLUSH) 0.9 %
5-40 SYRINGE (ML) INJECTION PRN
Status: DISCONTINUED | OUTPATIENT
Start: 2024-02-13 | End: 2024-02-14 | Stop reason: HOSPADM

## 2024-02-13 RX ADMIN — SODIUM CHLORIDE, PRESERVATIVE FREE 20 ML: 5 INJECTION INTRAVENOUS at 12:47

## 2024-02-13 RX ADMIN — ATEZOLIZUMAB 1200 MG: 1200 INJECTION, SOLUTION INTRAVENOUS at 12:13

## 2024-02-13 RX ADMIN — SODIUM CHLORIDE 25 ML/HR: 9 INJECTION, SOLUTION INTRAVENOUS at 12:08

## 2024-02-13 ASSESSMENT — PAIN - FUNCTIONAL ASSESSMENT: PAIN_FUNCTIONAL_ASSESSMENT: PREVENTS OR INTERFERES SOME ACTIVE ACTIVITIES AND ADLS

## 2024-02-13 ASSESSMENT — PAIN DESCRIPTION - ORIENTATION: ORIENTATION: LOWER

## 2024-02-13 ASSESSMENT — PAIN DESCRIPTION - LOCATION: LOCATION: LEG

## 2024-02-13 ASSESSMENT — PAIN SCALES - GENERAL: PAINLEVEL_OUTOF10: 7

## 2024-02-13 ASSESSMENT — PAIN DESCRIPTION - DESCRIPTORS: DESCRIPTORS: NUMBNESS

## 2024-02-13 NOTE — PROGRESS NOTES
[facility] [place]  Medical Oncology at [place]  [phone]    Hematology / Oncology Established Visit    Reason for Visit:   Elise Tabares is a 78 y.o. female who is seen for follow up of lung cancer.     Hematology Oncology Treatment History:     Diagnosis: Squamous cell carcinoma of lung    Stage: IIB [[id number]]    Pathology:   3/14/23 Lung, left upper lobe, Core biopsy: Invasive poorly differentiated squamous cell carcinoma.   Comment: Immunohistochemical stains show the malignant cells are positive  for cytokeratin 5/6 and negative for cytokeratin 7, cytokeratin 20 and  TTF-1 supporting the diagnosis. PD-L1 by immunohistochemistry pending.     5/3/23 Left upper lobe wedge resection:  Invasive poorly differentiated squamous cell carcinoma with areas of extensive necrosis.   Tumor size 5.5cm, G3  Visceral pleural invasion present. Vascular invasion present. Parenchymal resection margin negative for tumor. Background lung parenchyma with moderate to severe architectural distortion associated with prominent peribronchiolar metaplasia.  0/6 LN positive [Station 5, 6, 7 x 2, 8, 10]  -PDL1 TPS 2%  - [place]    Prior Treatment:   1. Sublobar resection of JANELL by Dr. Dom Perez  2. Adjuvant Carbo-[place] x 4 on D1, 8 of q21d cycles, 6/20/23- 8/29/23    Current Treatment:  Atezolizumab [id number] x 1 year, started 9/19/23 - current  Treatment duration: 1 year  Frequency of visits: Every 3 weeks    History of Present Illness:   Elise Tabares is a 78 y.o. female with CAD, CKD, HTN, DM II, MAX who is referred for evaluation and management of lung cancer. Pt was recently hospitalized in early March 2023 after 2 syncopal episodes at home. She also noted some loose stools and vomiting. She was diagnosed with IVVD and VAIBHAV, treated with IVF. She was found to have a new 27 x 24mm cavitary lesion in JANELL and underwent CT-guided biopsy. She was also found to have a vascularized mass in R light of thyroid, 1.5cm and

## 2024-02-13 NOTE — PROGRESS NOTES
Chief Complaint   Patient presents with    Follow-up           Vitals:    02/13/24 1043   BP: 132/65   Pulse: 97   Resp: 18   Temp: 97.9 °F (36.6 °C)   SpO2: 98%            1. Have you been to the ER, urgent care clinic since your last visit?  Hospitalized since your last visit?  No  2. Have you seen or consulted any other health care providers outside of the [facility] since your last visit?  Include any pap smears or colon screening. No

## 2024-02-13 NOTE — PROGRESS NOTES
[facility] Progress Note    Date: 2024    Name: Elise Tabares    MRN: [id number]         : 1945    Ms. Tabares Arrived ambulatory and in no distress for C8D1 of Regimen.  Assessment was completed, no acute issues at this time, no new complaints voiced.   chest wall port accessed without difficulty, labs drawn & sent for processing.       Patient proceed to appointment with Dr. Park.    Ms. Tabares's vitals were reviewed.  Vitals:    24 1245   BP: (!) 166/79   Pulse: 92   Resp: 16   Temp: 97.4 °F (36.3 °C)   SpO2:        Lab results were obtained and reviewed.  Recent Results (from the past 12 hour(s))   Comprehensive metabolic panel    Collection Time: 24 10:11 AM   Result Value Ref Range    Sodium 135 (L) 136 - 145 mmol/L    Potassium 4.0 3.5 - 5.1 mmol/L    Chloride 106 97 - 108 mmol/L    CO2 25 21 - 32 mmol/L    Anion Gap 4 (L) 5 - 15 mmol/L    Glucose 141 (H) 65 - 100 mg/dL    BUN 22 (H) 6 - 20 MG/DL    Creatinine 1.29 (H) 0.55 - 1.02 MG/DL    Bun/Cre Ratio 17 12 - 20      Est, Glom Filt Rate 42 (L) >60 ml/min/1.73m2    Calcium 9.5 8.5 - 10.1 MG/DL    Total Bilirubin 0.4 0.2 - 1.0 MG/DL    ALT 16 12 - 78 U/L    AST 19 15 - 37 U/L    Alk Phosphatase 102 45 - 117 U/L    Total Protein 7.6 6.4 - 8.2 g/dL    Albumin 3.3 (L) 3.5 - 5.0 g/dL    Globulin 4.3 (H) 2.0 - 4.0 g/dL    Albumin/Globulin Ratio 0.8 (L) 1.1 - 2.2     CBC With Auto Differential    Collection Time: 24 10:11 AM   Result Value Ref Range    WBC 6.0 3.6 - 11.0 K/uL    RBC 3.60 (L) 3.80 - 5.20 M/uL    Hemoglobin 10.3 (L) 11.5 - 16.0 g/dL    Hematocrit 30.9 (L) 35.0 - 47.0 %    MCV 85.8 80.0 - 99.0 FL    MCH 28.6 26.0 - 34.0 PG    MCHC 33.3 30.0 - 36.5 g/dL    RDW 13.6 11.5 - 14.5 %    Platelets 225 150 - 400 K/uL    MPV 10.0 8.9 - 12.9 FL    Nucleated RBCs 0.0 0  WBC    nRBC 0.00 0.00 - 0.01 K/uL    Neutrophils % 85 (H) 32 - 75 %    Lymphocytes % 8 (L) 12 - 49 %    Monocytes % 5 5 - 13 %    Eosinophils % 2 0 - 7 %     Basophils % 0 0 - 1 %    Immature Granulocytes 0 0.0 - 0.5 %    Neutrophils Absolute 5.1 1.8 - 8.0 K/UL    Lymphocytes Absolute 0.5 (L) 0.8 - 3.5 K/UL    Monocytes Absolute 0.3 0.0 - 1.0 K/UL    Eosinophils Absolute 0.1 0.0 - 0.4 K/UL    Basophils Absolute 0.0 0.0 - 0.1 K/UL    Absolute Immature Granulocyte 0.0 0.00 - 0.04 K/UL    Differential Type SMEAR SCANNED      RBC Comment OVALOCYTES  PRESENT           Medications:    Medications Administered         0.9 % sodium chloride infusion Admin Date  02/13/2024 Action  New Bag Dose  25 mL/hr Rate  25 mL/hr Route  IntraVENous Administered By  Matias James RN        atezolizumab (TECENTRIQ) 1,200 mg in sodium chloride 0.9 % 100 mL IVPB Admin Date  02/13/2024 Action  New Bag Dose  1,200 mg Rate  260 mL/hr Route  IntraVENous Administered By  Matias James RN        sodium chloride flush 0.9 % injection 5-40 mL Admin Date  02/13/2024 Action  Given Dose  20 mL Rate   Route  IntraVENous Administered By  Matias James RN               Two nurses verified prior to administering: Drug name, drug dose, infusion volume or drug volume when prepared in a syringe, rate of administration, route of administration,  expiration dates and/or times, appearance and physical integrity of the drugs, rate set on infusion pump, when used sequencing of drug administration.           Ms. Tabares tolerated treatment well and was discharged from [facility] in stable condition at 1 pm.   Port de-accessed, flushed per protocol. She is to return on   3/5/24 at  1000 for her next appointment.    MATIAS JAMES RN  February 13, 2024

## 2024-02-14 ENCOUNTER — HOSPITAL ENCOUNTER (OUTPATIENT)
Facility: HOSPITAL | Age: 79
Setting detail: RECURRING SERIES
Discharge: HOME | End: 2024-02-17
Payer: MEDICARE

## 2024-02-14 VITALS — WEIGHT: 191.4 LBS | BODY MASS INDEX: 30.89 KG/M2

## 2024-02-14 PROCEDURE — 93798 PHYS/QHP OP CAR RHAB W/ECG: CPT

## 2024-02-14 PROCEDURE — 93797 PHYS/QHP OP CAR RHAB WO ECG: CPT

## 2024-02-19 ENCOUNTER — HOSPITAL ENCOUNTER (OUTPATIENT)
Facility: HOSPITAL | Age: 79
Setting detail: RECURRING SERIES
Discharge: HOME | End: 2024-02-22
Payer: MEDICARE

## 2024-02-19 VITALS — BODY MASS INDEX: 29.21 KG/M2 | WEIGHT: 181 LBS

## 2024-02-19 PROCEDURE — 93798 PHYS/QHP OP CAR RHAB W/ECG: CPT

## 2024-02-19 ASSESSMENT — EXERCISE STRESS TEST
PEAK_METS: 2.7
PEAK_RPE: 11
PEAK_HR: 105

## 2024-02-21 ENCOUNTER — HOSPITAL ENCOUNTER (OUTPATIENT)
Facility: HOSPITAL | Age: 79
Setting detail: RECURRING SERIES
Discharge: HOME | End: 2024-02-24
Payer: MEDICARE

## 2024-02-21 VITALS — BODY MASS INDEX: 29.8 KG/M2 | WEIGHT: 184.6 LBS

## 2024-02-21 PROCEDURE — 93798 PHYS/QHP OP CAR RHAB W/ECG: CPT

## 2024-02-21 ASSESSMENT — EXERCISE STRESS TEST
PEAK_HR: 108
PEAK_METS: 2.7
PEAK_RPE: 12

## 2024-02-23 ENCOUNTER — TELEPHONE (OUTPATIENT)
Age: 79
End: 2024-02-23

## 2024-02-23 RX ORDER — SODIUM CHLORIDE 9 MG/ML
5-250 INJECTION, SOLUTION INTRAVENOUS PRN
OUTPATIENT
Start: 2024-03-01

## 2024-02-23 RX ORDER — DIPHENHYDRAMINE HYDROCHLORIDE 50 MG/ML
50 INJECTION INTRAMUSCULAR; INTRAVENOUS
OUTPATIENT
Start: 2024-03-01

## 2024-02-23 RX ORDER — ALBUTEROL SULFATE 90 UG/1
4 AEROSOL, METERED RESPIRATORY (INHALATION) PRN
OUTPATIENT
Start: 2024-03-01

## 2024-02-23 RX ORDER — SODIUM CHLORIDE 0.9 % (FLUSH) 0.9 %
5-40 SYRINGE (ML) INJECTION PRN
OUTPATIENT
Start: 2024-03-01

## 2024-02-23 RX ORDER — EPINEPHRINE 1 MG/ML
0.3 INJECTION, SOLUTION, CONCENTRATE INTRAVENOUS PRN
OUTPATIENT
Start: 2024-03-01

## 2024-02-23 RX ORDER — 0.9 % SODIUM CHLORIDE 0.9 %
1000 INTRAVENOUS SOLUTION INTRAVENOUS ONCE
OUTPATIENT
Start: 2024-03-01 | End: 2024-03-01

## 2024-02-23 RX ORDER — HEPARIN 100 UNIT/ML
500 SYRINGE INTRAVENOUS PRN
OUTPATIENT
Start: 2024-03-01

## 2024-02-23 RX ORDER — ACETAMINOPHEN 325 MG/1
650 TABLET ORAL
OUTPATIENT
Start: 2024-03-01

## 2024-02-23 RX ORDER — ONDANSETRON 2 MG/ML
8 INJECTION INTRAMUSCULAR; INTRAVENOUS
OUTPATIENT
Start: 2024-03-01

## 2024-02-23 RX ORDER — SODIUM CHLORIDE 9 MG/ML
INJECTION, SOLUTION INTRAVENOUS CONTINUOUS
OUTPATIENT
Start: 2024-03-01

## 2024-02-23 NOTE — TELEPHONE ENCOUNTER
02/23/24 10:30 AM Call placed to patient. Verified patient ID x 2. Discussed CT scheduled for Saturday, 03/02, at 2:30 PM. Explained that MADDIE James, is recommending that patient receive IV fluids in [facility] right after CT. Unable to coordinate fluids on a Saturday during the late afternoon. Inquired if patient could reschedule CT for during the week, sometime in the morning or early afternoon, so that staff can assist with arranging IV fluid appointment for afterwards. Patient voiced understanding and agreeable to plan. She confirmed having contact number for central scheduling. Will continue to monitor.      11:13 AM Received incoming call from patient. She has rescheduled her CT to Friday, 03/01, at 11 AM at [facility]. Scheduled IV fluids for 1:30 PM. No further questions or concerns at this time.

## 2024-02-26 ENCOUNTER — HOSPITAL ENCOUNTER (OUTPATIENT)
Facility: HOSPITAL | Age: 79
Setting detail: RECURRING SERIES
Discharge: HOME | End: 2024-02-29
Payer: MEDICARE

## 2024-02-26 VITALS — BODY MASS INDEX: 30.47 KG/M2 | WEIGHT: 188.8 LBS

## 2024-02-26 PROCEDURE — 93798 PHYS/QHP OP CAR RHAB W/ECG: CPT

## 2024-02-26 ASSESSMENT — EXERCISE STRESS TEST
PEAK_METS: 2.7
PEAK_RPE: 12
PEAK_HR: 119

## 2024-02-27 ENCOUNTER — APPOINTMENT (OUTPATIENT)
Facility: HOSPITAL | Age: 79
End: 2024-02-27
Payer: MEDICARE

## 2024-02-27 RX ORDER — FAMOTIDINE 10 MG/ML
20 INJECTION, SOLUTION INTRAVENOUS
Status: CANCELLED | OUTPATIENT
Start: 2024-03-05

## 2024-02-27 RX ORDER — ACETAMINOPHEN 325 MG/1
650 TABLET ORAL
Status: CANCELLED
Start: 2024-03-05

## 2024-02-27 RX ORDER — ACETAMINOPHEN 325 MG/1
650 TABLET ORAL
Status: CANCELLED | OUTPATIENT
Start: 2024-03-05

## 2024-02-27 RX ORDER — SODIUM CHLORIDE 9 MG/ML
5-250 INJECTION, SOLUTION INTRAVENOUS PRN
Status: CANCELLED | OUTPATIENT
Start: 2024-03-05

## 2024-02-27 RX ORDER — ONDANSETRON 2 MG/ML
8 INJECTION INTRAMUSCULAR; INTRAVENOUS
Status: CANCELLED | OUTPATIENT
Start: 2024-03-05

## 2024-02-27 RX ORDER — SODIUM CHLORIDE 9 MG/ML
INJECTION, SOLUTION INTRAVENOUS CONTINUOUS
Status: CANCELLED | OUTPATIENT
Start: 2024-03-05

## 2024-02-27 RX ORDER — DIPHENHYDRAMINE HYDROCHLORIDE 50 MG/ML
50 INJECTION INTRAMUSCULAR; INTRAVENOUS
Status: CANCELLED | OUTPATIENT
Start: 2024-03-05

## 2024-02-27 RX ORDER — MEPERIDINE HYDROCHLORIDE 25 MG/ML
12.5 INJECTION INTRAMUSCULAR; INTRAVENOUS; SUBCUTANEOUS PRN
Status: CANCELLED | OUTPATIENT
Start: 2024-03-05

## 2024-02-27 RX ORDER — ALBUTEROL SULFATE 90 UG/1
4 AEROSOL, METERED RESPIRATORY (INHALATION) PRN
Status: CANCELLED | OUTPATIENT
Start: 2024-03-05

## 2024-02-27 RX ORDER — EPINEPHRINE 1 MG/ML
0.3 INJECTION, SOLUTION INTRAMUSCULAR; SUBCUTANEOUS PRN
Status: CANCELLED | OUTPATIENT
Start: 2024-03-05

## 2024-02-27 RX ORDER — HEPARIN 100 UNIT/ML
500 SYRINGE INTRAVENOUS PRN
Status: CANCELLED | OUTPATIENT
Start: 2024-03-05

## 2024-03-01 ENCOUNTER — HOSPITAL ENCOUNTER (OUTPATIENT)
Facility: HOSPITAL | Age: 79
Setting detail: INFUSION SERIES
Discharge: HOME | End: 2024-03-01
Payer: MEDICARE

## 2024-03-01 ENCOUNTER — HOSPITAL ENCOUNTER (OUTPATIENT)
Facility: HOSPITAL | Age: 79
End: 2024-03-01
Payer: MEDICARE

## 2024-03-01 VITALS
WEIGHT: 191.6 LBS | HEIGHT: 66 IN | BODY MASS INDEX: 30.79 KG/M2 | SYSTOLIC BLOOD PRESSURE: 149 MMHG | OXYGEN SATURATION: 96 % | DIASTOLIC BLOOD PRESSURE: 70 MMHG | HEART RATE: 85 BPM | TEMPERATURE: 97.2 F | RESPIRATION RATE: 18 BRPM

## 2024-03-01 DIAGNOSIS — C34.92 SQUAMOUS CELL CARCINOMA OF LUNG, LEFT (HCC): ICD-10-CM

## 2024-03-01 DIAGNOSIS — C34.92 SQUAMOUS CELL CARCINOMA OF LUNG, LEFT (HCC): Primary | ICD-10-CM

## 2024-03-01 PROCEDURE — 71260 CT THORAX DX C+: CPT

## 2024-03-01 PROCEDURE — 6360000004 HC RX CONTRAST MEDICATION: Performed by: NURSE PRACTITIONER

## 2024-03-01 PROCEDURE — 96360 HYDRATION IV INFUSION INIT: CPT

## 2024-03-01 PROCEDURE — 2580000003 HC RX 258: Performed by: NURSE PRACTITIONER

## 2024-03-01 RX ORDER — ALBUTEROL SULFATE 90 UG/1
4 AEROSOL, METERED RESPIRATORY (INHALATION) PRN
Status: CANCELLED | OUTPATIENT
Start: 2024-03-01

## 2024-03-01 RX ORDER — ONDANSETRON 2 MG/ML
8 INJECTION INTRAMUSCULAR; INTRAVENOUS
Status: CANCELLED | OUTPATIENT
Start: 2024-03-01

## 2024-03-01 RX ORDER — HEPARIN 100 UNIT/ML
500 SYRINGE INTRAVENOUS PRN
Status: CANCELLED | OUTPATIENT
Start: 2024-03-01

## 2024-03-01 RX ORDER — EPINEPHRINE 1 MG/ML
0.3 INJECTION, SOLUTION INTRAMUSCULAR; SUBCUTANEOUS PRN
Status: CANCELLED | OUTPATIENT
Start: 2024-03-01

## 2024-03-01 RX ORDER — 0.9 % SODIUM CHLORIDE 0.9 %
1000 INTRAVENOUS SOLUTION INTRAVENOUS ONCE
Status: COMPLETED | OUTPATIENT
Start: 2024-03-01 | End: 2024-03-01

## 2024-03-01 RX ORDER — 0.9 % SODIUM CHLORIDE 0.9 %
1000 INTRAVENOUS SOLUTION INTRAVENOUS ONCE
Status: CANCELLED | OUTPATIENT
Start: 2024-03-01 | End: 2024-03-01

## 2024-03-01 RX ORDER — SODIUM CHLORIDE 9 MG/ML
5-250 INJECTION, SOLUTION INTRAVENOUS PRN
Status: CANCELLED | OUTPATIENT
Start: 2024-03-01

## 2024-03-01 RX ORDER — SODIUM CHLORIDE 9 MG/ML
INJECTION, SOLUTION INTRAVENOUS CONTINUOUS
Status: CANCELLED | OUTPATIENT
Start: 2024-03-01

## 2024-03-01 RX ORDER — DIPHENHYDRAMINE HYDROCHLORIDE 50 MG/ML
50 INJECTION INTRAMUSCULAR; INTRAVENOUS
Status: CANCELLED | OUTPATIENT
Start: 2024-03-01

## 2024-03-01 RX ORDER — ACETAMINOPHEN 325 MG/1
650 TABLET ORAL
Status: CANCELLED | OUTPATIENT
Start: 2024-03-01

## 2024-03-01 RX ORDER — SODIUM CHLORIDE 0.9 % (FLUSH) 0.9 %
5-40 SYRINGE (ML) INJECTION PRN
Status: DISCONTINUED | OUTPATIENT
Start: 2024-03-01 | End: 2024-03-02 | Stop reason: HOSPADM

## 2024-03-01 RX ORDER — SODIUM CHLORIDE 0.9 % (FLUSH) 0.9 %
5-40 SYRINGE (ML) INJECTION PRN
Status: CANCELLED | OUTPATIENT
Start: 2024-03-01

## 2024-03-01 RX ORDER — SODIUM CHLORIDE 9 MG/ML
5-250 INJECTION, SOLUTION INTRAVENOUS PRN
Status: DISCONTINUED | OUTPATIENT
Start: 2024-03-01 | End: 2024-03-02 | Stop reason: HOSPADM

## 2024-03-01 RX ORDER — FAMOTIDINE 10 MG/ML
20 INJECTION, SOLUTION INTRAVENOUS
Status: CANCELLED | OUTPATIENT
Start: 2024-03-01

## 2024-03-01 RX ADMIN — SODIUM CHLORIDE, PRESERVATIVE FREE 20 ML: 5 INJECTION INTRAVENOUS at 14:58

## 2024-03-01 RX ADMIN — SODIUM CHLORIDE 1000 ML: 9 INJECTION, SOLUTION INTRAVENOUS at 13:47

## 2024-03-01 RX ADMIN — IOPAMIDOL 100 ML: 755 INJECTION, SOLUTION INTRAVENOUS at 12:08

## 2024-03-01 ASSESSMENT — PAIN SCALES - GENERAL
PAINLEVEL_OUTOF10: 0
PAINLEVEL_OUTOF10: 0

## 2024-03-01 NOTE — PROGRESS NOTES
[facility] Short Visit Progress Note    Ms. Tabares admitted to [facility] for Normal Saline infusion ambulatory in stable condition. Assessment completed. No new concerns voiced.     Vital Signs:  BP (!) 148/70   Pulse 87   Temp 98 °F (36.7 °C) (Temporal)   Resp 20   Ht 1.676 m (5' 5.98\")   Wt 86.9 kg (191 lb 9.6 oz)   SpO2 94%   BMI 30.94 kg/m²       Right chest wall port with positive blood return.     Medications:  Medications Administered         sodium chloride 0.9 % bolus 1,000 mL Admin Date  03/01/2024 Action  New Bag Dose  1,000 mL Rate  983.6 mL/hr Route  IntraVENous Administered By  Soila Madison, ESTELLA              Patient declined  AVS    Patient tolerated treatment well. Port flushed and deaccessed per protocol. Patient discharged from [facility] ambulatory in no distress. Patient aware of next appointment.    SOILA MADISON RN  March 1, 2024    Future Appointments   Date Time Provider Department Center   3/4/2024  2:30 PM SFM CARDIOPULM EXERCISE [facility]   3/5/2024 10:00 AM [facility]   3/5/2024 10:45 AM Guerline Park MD [facility]   3/6/2024  2:30 PM [facility]M CARDIOPULM EXERCISE [facility]   4/5/2024 10:00 AM [facility]   4/26/2024 10:00 AM SS IN[facility]2 [facility]   4/30/2024  2:30 PM [facility]   5/1/2024 11:20 AM Huma Duarte APRN - NP [facility]  AMB   5/17/2024 10:00 AM SS [facility]F2 [facility]

## 2024-03-04 ENCOUNTER — HOSPITAL ENCOUNTER (OUTPATIENT)
Facility: HOSPITAL | Age: 79
Setting detail: RECURRING SERIES
Discharge: HOME | End: 2024-03-07
Payer: MEDICARE

## 2024-03-04 VITALS — WEIGHT: 188.4 LBS | BODY MASS INDEX: 30.42 KG/M2

## 2024-03-04 PROCEDURE — 93798 PHYS/QHP OP CAR RHAB W/ECG: CPT

## 2024-03-04 ASSESSMENT — EXERCISE STRESS TEST
PEAK_RPE: 12
PEAK_HR: 109
PEAK_METS: 2.7

## 2024-03-04 ASSESSMENT — LIFESTYLE VARIABLES: SMOKELESS_TOBACCO: NO

## 2024-03-04 ASSESSMENT — EJECTION FRACTION: EF_VALUE: 55

## 2024-03-04 NOTE — PROGRESS NOTES
[facility] [place]  Medical Oncology at [place]  [phone]    Hematology / Oncology Established Visit    Reason for Visit:   Elise Tabares is a 78 y.o. female who is seen for follow up of lung cancer.     Hematology Oncology Treatment History:     Diagnosis: Squamous cell carcinoma of lung    Stage: IIB [[id number]]    Pathology:   3/14/23 Lung, left upper lobe, Core biopsy: Invasive poorly differentiated squamous cell carcinoma.   Comment: Immunohistochemical stains show the malignant cells are positive  for cytokeratin 5/6 and negative for cytokeratin 7, cytokeratin 20 and  TTF-1 supporting the diagnosis. PD-L1 by immunohistochemistry pending.     5/3/23 Left upper lobe wedge resection:  Invasive poorly differentiated squamous cell carcinoma with areas of extensive necrosis.   Tumor size 5.5cm, G3  Visceral pleural invasion present. Vascular invasion present. Parenchymal resection margin negative for tumor. Background lung parenchyma with moderate to severe architectural distortion associated with prominent peribronchiolar metaplasia.  0/6 LN positive [Station 5, 6, 7 x 2, 8, 10]  -PDL1 TPS 2%  - [place]    Prior Treatment:   1. Sublobar resection of JANELL by Dr. Dom Perez  2. Adjuvant Carbo-[place] x 4 on D1, 8 of q21d cycles, 6/20/23- 8/29/23    Current Treatment:  Atezolizumab f6yptmd x 1 year, started 9/19/23 - current  Treatment duration: 1 year  Frequency of visits: Every 3 weeks    History of Present Illness:   Elise Tabares is a 78 y.o. female with CAD, CKD, HTN, DM II, MAX who is referred for evaluation and management of lung cancer. Pt was recently hospitalized in early March 2023 after 2 syncopal episodes at home. She also noted some loose stools and vomiting. She was diagnosed with IVVD and VAIBHAV, treated with IVF. She was found to have a new 27 x 24mm cavitary lesion in JANELL and underwent CT-guided biopsy. She was also found to have a vascularized mass in R light of thyroid, 1.5cm and

## 2024-03-05 ENCOUNTER — HOSPITAL ENCOUNTER (OUTPATIENT)
Facility: HOSPITAL | Age: 79
Setting detail: INFUSION SERIES
Discharge: HOME | End: 2024-03-05
Payer: MEDICARE

## 2024-03-05 ENCOUNTER — OFFICE VISIT (OUTPATIENT)
Age: 79
End: 2024-03-05
Payer: MEDICARE

## 2024-03-05 VITALS
BODY MASS INDEX: 30.13 KG/M2 | HEIGHT: 66 IN | WEIGHT: 187.5 LBS | SYSTOLIC BLOOD PRESSURE: 157 MMHG | OXYGEN SATURATION: 98 % | HEART RATE: 82 BPM | DIASTOLIC BLOOD PRESSURE: 64 MMHG | RESPIRATION RATE: 18 BRPM | TEMPERATURE: 96.9 F

## 2024-03-05 VITALS
DIASTOLIC BLOOD PRESSURE: 75 MMHG | HEIGHT: 65 IN | WEIGHT: 187 LBS | RESPIRATION RATE: 18 BRPM | HEART RATE: 86 BPM | TEMPERATURE: 98.3 F | SYSTOLIC BLOOD PRESSURE: 147 MMHG | BODY MASS INDEX: 31.16 KG/M2 | OXYGEN SATURATION: 99 %

## 2024-03-05 DIAGNOSIS — Z51.12 ENCOUNTER FOR ANTINEOPLASTIC IMMUNOTHERAPY: ICD-10-CM

## 2024-03-05 DIAGNOSIS — R06.02 SOB (SHORTNESS OF BREATH): ICD-10-CM

## 2024-03-05 DIAGNOSIS — N18.31 TYPE 2 DIABETES MELLITUS WITH STAGE 3A CHRONIC KIDNEY DISEASE, WITHOUT LONG-TERM CURRENT USE OF INSULIN (HCC): ICD-10-CM

## 2024-03-05 DIAGNOSIS — I10 PRIMARY HYPERTENSION: ICD-10-CM

## 2024-03-05 DIAGNOSIS — J43.9 PULMONARY EMPHYSEMA, UNSPECIFIED EMPHYSEMA TYPE (HCC): Primary | ICD-10-CM

## 2024-03-05 DIAGNOSIS — N18.31 STAGE 3A CHRONIC KIDNEY DISEASE (HCC): ICD-10-CM

## 2024-03-05 DIAGNOSIS — D63.8 ANEMIA OF CHRONIC DISEASE: ICD-10-CM

## 2024-03-05 DIAGNOSIS — C34.92 MALIGNANT NEOPLASM OF UNSPECIFIED PART OF LEFT BRONCHUS OR LUNG (HCC): Primary | ICD-10-CM

## 2024-03-05 DIAGNOSIS — E11.22 TYPE 2 DIABETES MELLITUS WITH STAGE 3A CHRONIC KIDNEY DISEASE, WITHOUT LONG-TERM CURRENT USE OF INSULIN (HCC): ICD-10-CM

## 2024-03-05 DIAGNOSIS — J98.4 PNEUMONITIS: ICD-10-CM

## 2024-03-05 LAB
ALBUMIN SERPL-MCNC: 3.1 G/DL (ref 3.5–5)
ALBUMIN/GLOB SERPL: 0.8 (ref 1.1–2.2)
ALP SERPL-CCNC: 108 U/L (ref 45–117)
ALT SERPL-CCNC: 16 U/L (ref 12–78)
ANION GAP SERPL CALC-SCNC: 4 MMOL/L (ref 5–15)
AST SERPL-CCNC: 17 U/L (ref 15–37)
BASOPHILS # BLD: 0 K/UL (ref 0–0.1)
BASOPHILS NFR BLD: 0 % (ref 0–1)
BILIRUB SERPL-MCNC: 0.4 MG/DL (ref 0.2–1)
BUN SERPL-MCNC: 19 MG/DL (ref 6–20)
BUN/CREAT SERPL: 17 (ref 12–20)
CALCIUM SERPL-MCNC: 9.4 MG/DL (ref 8.5–10.1)
CHLORIDE SERPL-SCNC: 106 MMOL/L (ref 97–108)
CO2 SERPL-SCNC: 25 MMOL/L (ref 21–32)
CREAT SERPL-MCNC: 1.14 MG/DL (ref 0.55–1.02)
DIFFERENTIAL METHOD BLD: ABNORMAL
EOSINOPHIL # BLD: 0.2 K/UL (ref 0–0.4)
EOSINOPHIL NFR BLD: 3 % (ref 0–7)
ERYTHROCYTE [DISTWIDTH] IN BLOOD BY AUTOMATED COUNT: 13.7 % (ref 11.5–14.5)
FERRITIN SERPL-MCNC: 102 NG/ML (ref 8–252)
GLOBULIN SER CALC-MCNC: 3.8 G/DL (ref 2–4)
GLUCOSE SERPL-MCNC: 184 MG/DL (ref 65–100)
HCT VFR BLD AUTO: 30.3 % (ref 35–47)
HGB BLD-MCNC: 9.9 G/DL (ref 11.5–16)
IMM GRANULOCYTES # BLD AUTO: 0 K/UL (ref 0–0.04)
IMM GRANULOCYTES NFR BLD AUTO: 0 % (ref 0–0.5)
IRON SATN MFR SERPL: 17 % (ref 20–50)
IRON SERPL-MCNC: 43 UG/DL (ref 35–150)
LYMPHOCYTES # BLD: 0.6 K/UL (ref 0.8–3.5)
LYMPHOCYTES NFR BLD: 11 % (ref 12–49)
MCH RBC QN AUTO: 28.1 PG (ref 26–34)
MCHC RBC AUTO-ENTMCNC: 32.7 G/DL (ref 30–36.5)
MCV RBC AUTO: 86.1 FL (ref 80–99)
MONOCYTES # BLD: 0.3 K/UL (ref 0–1)
MONOCYTES NFR BLD: 5 % (ref 5–13)
NEUTS SEG # BLD: 4.1 K/UL (ref 1.8–8)
NEUTS SEG NFR BLD: 81 % (ref 32–75)
NRBC # BLD: 0 K/UL (ref 0–0.01)
NRBC BLD-RTO: 0 PER 100 WBC
PLATELET # BLD AUTO: 249 K/UL (ref 150–400)
PMV BLD AUTO: 10 FL (ref 8.9–12.9)
POTASSIUM SERPL-SCNC: 4.1 MMOL/L (ref 3.5–5.1)
PROT SERPL-MCNC: 6.9 G/DL (ref 6.4–8.2)
RBC # BLD AUTO: 3.52 M/UL (ref 3.8–5.2)
RBC MORPH BLD: ABNORMAL
SODIUM SERPL-SCNC: 135 MMOL/L (ref 136–145)
TIBC SERPL-MCNC: 252 UG/DL (ref 250–450)
TSH SERPL DL<=0.05 MIU/L-ACNC: 1.55 UIU/ML (ref 0.36–3.74)
WBC # BLD AUTO: 5.2 K/UL (ref 3.6–11)

## 2024-03-05 PROCEDURE — G8484 FLU IMMUNIZE NO ADMIN: HCPCS | Performed by: INTERNAL MEDICINE

## 2024-03-05 PROCEDURE — 3078F DIAST BP <80 MM HG: CPT | Performed by: INTERNAL MEDICINE

## 2024-03-05 PROCEDURE — G8427 DOCREV CUR MEDS BY ELIG CLIN: HCPCS | Performed by: INTERNAL MEDICINE

## 2024-03-05 PROCEDURE — 83550 IRON BINDING TEST: CPT

## 2024-03-05 PROCEDURE — 1090F PRES/ABSN URINE INCON ASSESS: CPT | Performed by: INTERNAL MEDICINE

## 2024-03-05 PROCEDURE — 1036F TOBACCO NON-USER: CPT | Performed by: INTERNAL MEDICINE

## 2024-03-05 PROCEDURE — G8399 PT W/DXA RESULTS DOCUMENT: HCPCS | Performed by: INTERNAL MEDICINE

## 2024-03-05 PROCEDURE — 1123F ACP DISCUSS/DSCN MKR DOCD: CPT | Performed by: INTERNAL MEDICINE

## 2024-03-05 PROCEDURE — 99215 OFFICE O/P EST HI 40 MIN: CPT | Performed by: INTERNAL MEDICINE

## 2024-03-05 PROCEDURE — 96413 CHEMO IV INFUSION 1 HR: CPT

## 2024-03-05 PROCEDURE — 2580000003 HC RX 258: Performed by: INTERNAL MEDICINE

## 2024-03-05 PROCEDURE — 84443 ASSAY THYROID STIM HORMONE: CPT

## 2024-03-05 PROCEDURE — 36415 COLL VENOUS BLD VENIPUNCTURE: CPT

## 2024-03-05 PROCEDURE — 85025 COMPLETE CBC W/AUTO DIFF WBC: CPT

## 2024-03-05 PROCEDURE — G8417 CALC BMI ABV UP PARAM F/U: HCPCS | Performed by: INTERNAL MEDICINE

## 2024-03-05 PROCEDURE — 82728 ASSAY OF FERRITIN: CPT

## 2024-03-05 PROCEDURE — 3077F SYST BP >= 140 MM HG: CPT | Performed by: INTERNAL MEDICINE

## 2024-03-05 PROCEDURE — G2211 COMPLEX E/M VISIT ADD ON: HCPCS | Performed by: INTERNAL MEDICINE

## 2024-03-05 PROCEDURE — 83540 ASSAY OF IRON: CPT

## 2024-03-05 PROCEDURE — 80053 COMPREHEN METABOLIC PANEL: CPT

## 2024-03-05 PROCEDURE — 6360000002 HC RX W HCPCS: Performed by: INTERNAL MEDICINE

## 2024-03-05 RX ORDER — SODIUM CHLORIDE 0.9 % (FLUSH) 0.9 %
5-40 SYRINGE (ML) INJECTION PRN
Status: DISCONTINUED | OUTPATIENT
Start: 2024-03-05 | End: 2024-03-06 | Stop reason: HOSPADM

## 2024-03-05 RX ORDER — ALBUTEROL SULFATE 90 UG/1
2 AEROSOL, METERED RESPIRATORY (INHALATION) EVERY 6 HOURS PRN
Qty: 18 G | Refills: 0 | Status: SHIPPED | OUTPATIENT
Start: 2024-03-05

## 2024-03-05 RX ORDER — PREDNISONE 10 MG/1
10 TABLET ORAL DAILY
Qty: 10 TABLET | Refills: 0 | Status: SHIPPED | OUTPATIENT
Start: 2024-03-05 | End: 2024-03-15

## 2024-03-05 RX ORDER — SODIUM CHLORIDE 9 MG/ML
5-250 INJECTION, SOLUTION INTRAVENOUS PRN
Status: DISCONTINUED | OUTPATIENT
Start: 2024-03-05 | End: 2024-03-06 | Stop reason: HOSPADM

## 2024-03-05 RX ADMIN — SODIUM CHLORIDE 25 ML/HR: 9 INJECTION, SOLUTION INTRAVENOUS at 11:48

## 2024-03-05 RX ADMIN — ATEZOLIZUMAB 1200 MG: 1200 INJECTION, SOLUTION INTRAVENOUS at 11:52

## 2024-03-05 ASSESSMENT — PAIN SCALES - GENERAL: PAINLEVEL_OUTOF10: 0

## 2024-03-05 NOTE — PROGRESS NOTES
[facility] Chemo Progress Note    Date: March 5, 2024         Ms. Tabares Arrived to [facility] for  Tecentriq ambulatory in stable condition.  Assessment was completed and port accessed by Susan SORIA. Labs drawn and sent for processing. Went to provider appointment with Medical Oncology.    Returned from provider appointment. Labs reviewed. Criteria for treatment was met.    Ms. Tabares's vitals were reviewed.  Patient Vitals for the past 12 hrs:   Temp Pulse Resp BP SpO2   03/05/24 0945 96.9 °F (36.1 °C) 81 18 136/76 98 %         Lab results were obtained and reviewed.  Recent Results (from the past 12 hour(s))   TSH without Reflex    Collection Time: 03/05/24 10:01 AM   Result Value Ref Range    TSH, 3RD GENERATION 1.55 0.36 - 3.74 uIU/mL   Comprehensive metabolic panel    Collection Time: 03/05/24 10:01 AM   Result Value Ref Range    Sodium 135 (L) 136 - 145 mmol/L    Potassium 4.1 3.5 - 5.1 mmol/L    Chloride 106 97 - 108 mmol/L    CO2 25 21 - 32 mmol/L    Anion Gap 4 (L) 5 - 15 mmol/L    Glucose 184 (H) 65 - 100 mg/dL    BUN 19 6 - 20 MG/DL    Creatinine 1.14 (H) 0.55 - 1.02 MG/DL    Bun/Cre Ratio 17 12 - 20      Est, Glom Filt Rate 49 (L) >60 ml/min/1.73m2    Calcium 9.4 8.5 - 10.1 MG/DL    Total Bilirubin 0.4 0.2 - 1.0 MG/DL    ALT 16 12 - 78 U/L    AST 17 15 - 37 U/L    Alk Phosphatase 108 45 - 117 U/L    Total Protein 6.9 6.4 - 8.2 g/dL    Albumin 3.1 (L) 3.5 - 5.0 g/dL    Globulin 3.8 2.0 - 4.0 g/dL    Albumin/Globulin Ratio 0.8 (L) 1.1 - 2.2     CBC With Auto Differential    Collection Time: 03/05/24 10:01 AM   Result Value Ref Range    WBC 5.2 3.6 - 11.0 K/uL    RBC 3.52 (L) 3.80 - 5.20 M/uL    Hemoglobin 9.9 (L) 11.5 - 16.0 g/dL    Hematocrit 30.3 (L) 35.0 - 47.0 %    MCV 86.1 80.0 - 99.0 FL    MCH 28.1 26.0 - 34.0 PG    MCHC 32.7 30.0 - 36.5 g/dL    RDW 13.7 11.5 - 14.5 %    Platelets 249 150 - 400 K/uL    MPV 10.0 8.9 - 12.9 FL    Nucleated RBCs 0.0 0  WBC    nRBC 0.00 0.00 - 0.01 K/uL    Neutrophils % 81 (H)  SHANIQUE Cortes NP [facility]   5/14/2024 10:30 AM [facility]   6/4/2024 10:00 AM SS INF3 [facility]         Dinora Benoit RN  March 5, 2024

## 2024-03-05 NOTE — PROGRESS NOTES
Chief Complaint   Patient presents with    Follow-up           Vitals:    03/05/24 1043   BP: (!) 147/75   Pulse: 86   Resp: 18   Temp: 98.3 °F (36.8 °C)   SpO2: 99%            1. Have you been to the ER, urgent care clinic since your last visit?  Hospitalized since your last visit?  No  2. Have you seen or consulted any other health care providers outside of the [facility] since your last visit?  Include any pap smears or colon screening. No

## 2024-03-06 ENCOUNTER — TELEPHONE (OUTPATIENT)
Age: 79
End: 2024-03-06

## 2024-03-06 ENCOUNTER — HOSPITAL ENCOUNTER (OUTPATIENT)
Facility: HOSPITAL | Age: 79
Setting detail: RECURRING SERIES
Discharge: HOME | End: 2024-03-09
Payer: MEDICARE

## 2024-03-06 VITALS — BODY MASS INDEX: 31.48 KG/M2 | WEIGHT: 189.2 LBS

## 2024-03-06 DIAGNOSIS — D63.1 ANEMIA DUE TO STAGE 3 CHRONIC KIDNEY DISEASE, UNSPECIFIED WHETHER STAGE 3A OR 3B CKD (HCC): Primary | ICD-10-CM

## 2024-03-06 DIAGNOSIS — N18.30 ANEMIA DUE TO STAGE 3 CHRONIC KIDNEY DISEASE, UNSPECIFIED WHETHER STAGE 3A OR 3B CKD (HCC): Primary | ICD-10-CM

## 2024-03-06 PROCEDURE — 93798 PHYS/QHP OP CAR RHAB W/ECG: CPT

## 2024-03-06 RX ORDER — ONDANSETRON 2 MG/ML
8 INJECTION INTRAMUSCULAR; INTRAVENOUS
OUTPATIENT
Start: 2024-03-15

## 2024-03-06 RX ORDER — EPINEPHRINE 1 MG/ML
0.3 INJECTION, SOLUTION, CONCENTRATE INTRAVENOUS PRN
OUTPATIENT
Start: 2024-03-15

## 2024-03-06 RX ORDER — SODIUM CHLORIDE 9 MG/ML
5-250 INJECTION, SOLUTION INTRAVENOUS PRN
OUTPATIENT
Start: 2024-03-15

## 2024-03-06 RX ORDER — SODIUM CHLORIDE 9 MG/ML
INJECTION, SOLUTION INTRAVENOUS CONTINUOUS
OUTPATIENT
Start: 2024-03-15

## 2024-03-06 RX ORDER — ACETAMINOPHEN 325 MG/1
650 TABLET ORAL
OUTPATIENT
Start: 2024-03-15

## 2024-03-06 RX ORDER — SODIUM CHLORIDE 0.9 % (FLUSH) 0.9 %
5-40 SYRINGE (ML) INJECTION PRN
OUTPATIENT
Start: 2024-03-15

## 2024-03-06 RX ORDER — DIPHENHYDRAMINE HYDROCHLORIDE 50 MG/ML
50 INJECTION INTRAMUSCULAR; INTRAVENOUS
OUTPATIENT
Start: 2024-03-15

## 2024-03-06 RX ORDER — HEPARIN 100 UNIT/ML
500 SYRINGE INTRAVENOUS PRN
OUTPATIENT
Start: 2024-03-15

## 2024-03-06 RX ORDER — ALBUTEROL SULFATE 90 UG/1
4 AEROSOL, METERED RESPIRATORY (INHALATION) PRN
OUTPATIENT
Start: 2024-03-15

## 2024-03-06 ASSESSMENT — EXERCISE STRESS TEST
PEAK_HR: 113
PEAK_METS: 2.9
PEAK_RPE: 13

## 2024-03-06 NOTE — TELEPHONE ENCOUNTER
Called patient regarding the information below.           [facility]  March 29th @ 215 with a 2pm arrival  Sonia Ocampo     [address] [address] [facility] [address] [address] [phone]

## 2024-03-11 ENCOUNTER — HOSPITAL ENCOUNTER (OUTPATIENT)
Facility: HOSPITAL | Age: 79
Setting detail: RECURRING SERIES
Discharge: HOME | End: 2024-03-14
Payer: MEDICARE

## 2024-03-11 VITALS — BODY MASS INDEX: 31.58 KG/M2 | WEIGHT: 189.8 LBS

## 2024-03-11 PROCEDURE — 93798 PHYS/QHP OP CAR RHAB W/ECG: CPT

## 2024-03-11 ASSESSMENT — EXERCISE STRESS TEST
PEAK_METS: 2.9
PEAK_RPE: 13
PEAK_HR: 108

## 2024-03-12 ENCOUNTER — HOSPITAL ENCOUNTER (OUTPATIENT)
Facility: HOSPITAL | Age: 79
Setting detail: RECURRING SERIES
Discharge: HOME | End: 2024-03-15
Payer: MEDICARE

## 2024-03-12 ENCOUNTER — TELEPHONE (OUTPATIENT)
Age: 79
End: 2024-03-12

## 2024-03-12 VITALS — BODY MASS INDEX: 31.52 KG/M2 | WEIGHT: 189.4 LBS

## 2024-03-12 PROCEDURE — 93798 PHYS/QHP OP CAR RHAB W/ECG: CPT

## 2024-03-12 ASSESSMENT — EXERCISE STRESS TEST
PEAK_METS: 2.9
PEAK_RPE: 13
PEAK_HR: 106

## 2024-03-12 NOTE — TELEPHONE ENCOUNTER
[facility] [place] at [facility][note 3]  [phone]    03/12/24 12:00 PM EDT - Attempted to call patient x 2 LM for CB with office number

## 2024-03-15 ENCOUNTER — HOSPITAL ENCOUNTER (OUTPATIENT)
Facility: HOSPITAL | Age: 79
Setting detail: INFUSION SERIES
Discharge: HOME | End: 2024-03-15
Payer: MEDICARE

## 2024-03-15 VITALS
RESPIRATION RATE: 18 BRPM | HEIGHT: 65 IN | SYSTOLIC BLOOD PRESSURE: 164 MMHG | BODY MASS INDEX: 31.56 KG/M2 | DIASTOLIC BLOOD PRESSURE: 70 MMHG | WEIGHT: 189.4 LBS | OXYGEN SATURATION: 99 % | HEART RATE: 77 BPM | TEMPERATURE: 97.9 F

## 2024-03-15 DIAGNOSIS — D63.1 ANEMIA DUE TO STAGE 3 CHRONIC KIDNEY DISEASE, UNSPECIFIED WHETHER STAGE 3A OR 3B CKD (HCC): Primary | ICD-10-CM

## 2024-03-15 DIAGNOSIS — N18.30 ANEMIA DUE TO STAGE 3 CHRONIC KIDNEY DISEASE, UNSPECIFIED WHETHER STAGE 3A OR 3B CKD (HCC): Primary | ICD-10-CM

## 2024-03-15 PROCEDURE — 2580000003 HC RX 258: Performed by: NURSE PRACTITIONER

## 2024-03-15 PROCEDURE — 96365 THER/PROPH/DIAG IV INF INIT: CPT

## 2024-03-15 PROCEDURE — 6360000002 HC RX W HCPCS: Performed by: NURSE PRACTITIONER

## 2024-03-15 RX ORDER — SODIUM CHLORIDE 0.9 % (FLUSH) 0.9 %
5-40 SYRINGE (ML) INJECTION PRN
OUTPATIENT
Start: 2024-03-15

## 2024-03-15 RX ORDER — ALBUTEROL SULFATE 90 UG/1
4 AEROSOL, METERED RESPIRATORY (INHALATION) PRN
OUTPATIENT
Start: 2024-03-15

## 2024-03-15 RX ORDER — SODIUM CHLORIDE 9 MG/ML
5-250 INJECTION, SOLUTION INTRAVENOUS PRN
OUTPATIENT
Start: 2024-03-15

## 2024-03-15 RX ORDER — ACETAMINOPHEN 325 MG/1
650 TABLET ORAL
OUTPATIENT
Start: 2024-03-15

## 2024-03-15 RX ORDER — SODIUM CHLORIDE 9 MG/ML
INJECTION, SOLUTION INTRAVENOUS CONTINUOUS
OUTPATIENT
Start: 2024-03-15

## 2024-03-15 RX ORDER — FAMOTIDINE 10 MG/ML
20 INJECTION, SOLUTION INTRAVENOUS
OUTPATIENT
Start: 2024-03-15

## 2024-03-15 RX ORDER — ONDANSETRON 2 MG/ML
8 INJECTION INTRAMUSCULAR; INTRAVENOUS
OUTPATIENT
Start: 2024-03-15

## 2024-03-15 RX ORDER — EPINEPHRINE 1 MG/ML
0.3 INJECTION, SOLUTION INTRAMUSCULAR; SUBCUTANEOUS PRN
OUTPATIENT
Start: 2024-03-15

## 2024-03-15 RX ORDER — SODIUM CHLORIDE 0.9 % (FLUSH) 0.9 %
5-40 SYRINGE (ML) INJECTION PRN
Status: DISCONTINUED | OUTPATIENT
Start: 2024-03-15 | End: 2024-03-16 | Stop reason: HOSPADM

## 2024-03-15 RX ORDER — SODIUM CHLORIDE 9 MG/ML
5-250 INJECTION, SOLUTION INTRAVENOUS PRN
Status: CANCELLED | OUTPATIENT
Start: 2024-03-15

## 2024-03-15 RX ORDER — SODIUM CHLORIDE 9 MG/ML
5-250 INJECTION, SOLUTION INTRAVENOUS PRN
Status: DISCONTINUED | OUTPATIENT
Start: 2024-03-15 | End: 2024-03-16 | Stop reason: HOSPADM

## 2024-03-15 RX ORDER — DIPHENHYDRAMINE HYDROCHLORIDE 50 MG/ML
50 INJECTION INTRAMUSCULAR; INTRAVENOUS
OUTPATIENT
Start: 2024-03-15

## 2024-03-15 RX ORDER — HEPARIN 100 UNIT/ML
500 SYRINGE INTRAVENOUS PRN
OUTPATIENT
Start: 2024-03-15

## 2024-03-15 RX ADMIN — FERRIC CARBOXYMALTOSE INJECTION 750 MG: 50 INJECTION, SOLUTION INTRAVENOUS at 13:20

## 2024-03-15 RX ADMIN — SODIUM CHLORIDE 25 ML/HR: 9 INJECTION, SOLUTION INTRAVENOUS at 13:10

## 2024-03-15 RX ADMIN — Medication 30 ML: at 14:12

## 2024-03-15 ASSESSMENT — PAIN SCALES - GENERAL: PAINLEVEL_OUTOF10: 0

## 2024-03-15 NOTE — PROGRESS NOTES
[facility] Progress Note    Date: March 15, 2024    Name: Elise Tabares    MRN: [id number]         : 1945    Ms. Tabares arrived ambulatory and in no distress for one dose of Injectafer.  Assessment was completed and right chest wall port accessed without difficulty, brisk blood return noted.        Ms. Tabares's vitals were reviewed.  Vitals:    03/15/24 1255 03/15/24 1405   BP: (!) 141/65 (!) 164/70   Pulse: 78 77   Resp: 18 18   Temp: 97.7 °F (36.5 °C) 97.9 °F (36.6 °C)   TempSrc: Temporal Temporal   SpO2: 99%    Weight: 85.9 kg (189 lb 6.4 oz)    Height: 1.651 m (5' 5\")           Medications:  Medications Administered         0.9 % sodium chloride infusion Admin Date  03/15/2024 Action  New Bag Dose  25 mL/hr Rate  25 mL/hr Route  IntraVENous Administered By  Jesse Langford RN        ferric carboxymaltose (INJECTAFER) 750 mg in sodium chloride 0.9 % 250 mL IVPB Admin Date  03/15/2024 Action  New Bag Dose  750 mg Rate  870 mL/hr Route  IntraVENous Administered By  Jesse Langford RN        sodium chloride flush 0.9 % injection 5-40 mL Admin Date  03/15/2024 Action  Given Dose  30 mL Rate   Route  IntraVENous Administered By  Jesse Langford RN                 Ms. Tabares tolerated treatment well and was discharged from [facility]fusion [facility] in stable condition after 30 minutes of post-infusion monitoring.   Port flushed and de-accessed per protocol. She is to return 24 at 1330 for her next appointment.    JESSE LANGFORD RN  March 15, 2024

## 2024-03-15 NOTE — PROGRESS NOTES
OUTPATIENT INFUSION CENTER     DISCHARGE INSTRUCTIONS FOR:     IRON INFUSIONS - INCLUDING VENOFER, FERRLECIT, AND INFED     You should continue to take your usual home medications unless otherwise instructed by your physician.  Drink plenty of fluids and eat your usual diet.     All medications have the potential to cause side effects.  Your physician can instruct you regarding any necessary treatment for side effects.  Some possible side effects of iron infusions may include the following:                 - Urinary changes;              - Mild muscle cramping;              - Mild nausea, stomach pain, diarrhea or constipation;              - Mild skin itching, mild pain at IV site.                   Signs/Symptoms of an allergic reaction may require immediate medical attention. These may include one or more of the following:         Skin redness, itching, swelling, blistering, weeping, crusting, rash or hives.  Wheezing, chest tightness, cough, or shortness of breath;   Swelling of the face, eyelids, lips, tongue, or throat;  Severe headache, seizures or tremor;  Stuffy nose, runny nose, sneezing;   Red (bloodshot), itchy, swollen, or watery eyes;  Stomach  pain, nausea, vomiting, diarrhea or bloody diarrhea;  Chest pain or tightness, increased heart rate, palpitations, changes in blood pressure which can cause dizziness, unusual feelings of weakness or fatigue;  Back ache or pain around waist;  Painful urination, increase or decrease in amount of urine, blood in urine.        Contact your physician’s office with any questions or concerns regarding your treatment.

## 2024-03-19 ENCOUNTER — HOSPITAL ENCOUNTER (OUTPATIENT)
Facility: HOSPITAL | Age: 79
Setting detail: RECURRING SERIES
Discharge: HOME | End: 2024-03-22
Payer: MEDICARE

## 2024-03-19 VITALS — BODY MASS INDEX: 30.99 KG/M2 | WEIGHT: 186.2 LBS

## 2024-03-19 PROCEDURE — 93798 PHYS/QHP OP CAR RHAB W/ECG: CPT

## 2024-03-19 ASSESSMENT — EXERCISE STRESS TEST
PEAK_METS: 2.9
PEAK_HR: 106
PEAK_RPE: 13

## 2024-03-20 ENCOUNTER — HOSPITAL ENCOUNTER (OUTPATIENT)
Facility: HOSPITAL | Age: 79
Setting detail: RECURRING SERIES
Discharge: HOME | End: 2024-03-23
Payer: MEDICARE

## 2024-03-20 VITALS — BODY MASS INDEX: 31.92 KG/M2 | WEIGHT: 191.8 LBS

## 2024-03-20 PROCEDURE — 93798 PHYS/QHP OP CAR RHAB W/ECG: CPT

## 2024-03-20 ASSESSMENT — EXERCISE STRESS TEST
PEAK_METS: 2.9
PEAK_HR: 111
PEAK_RPE: 13

## 2024-03-22 RX ORDER — CLOPIDOGREL BISULFATE 75 MG/1
75 TABLET ORAL DAILY
Qty: 90 TABLET | Refills: 2 | Status: SHIPPED | OUTPATIENT
Start: 2024-03-22

## 2024-03-22 NOTE — TELEPHONE ENCOUNTER
Refill per VO of Dr. AISHA Bah, OD:    Last appt:  10/30/2023    Future Appointments   Date Time Provider Department Center   4/2/2024  1:30 PM [facility]   4/2/2024  1:45 PM [facility]   4/23/2024 10:00 AM SS INF2 CH4 <2H [facility]   4/30/2024  2:30 PM Nohelia Gonzalez, APRN - [facility]   5/1/2024 11:20 AM [facility] Duarte [facility] - [facility]   5/14/2024 10:30 AM SS INF3 CH4 <2H [facility]   6/4/2024 10:00 AM SS INF3 CH4 <2H [facility]       Requested Prescriptions     Pending Prescriptions Disp Refills    clopidogrel (PLAVIX) 75 MG tablet [Pharmacy Med Name: Clopidogrel Bisulfate 75 MG Oral Tablet] 90 tablet 0     Sig: Take 1 tablet by mouth once daily

## 2024-03-25 RX ORDER — DIPHENHYDRAMINE HYDROCHLORIDE 50 MG/ML
50 INJECTION INTRAMUSCULAR; INTRAVENOUS
Status: CANCELLED | OUTPATIENT
Start: 2024-04-02

## 2024-03-25 RX ORDER — FAMOTIDINE 10 MG/ML
20 INJECTION, SOLUTION INTRAVENOUS
Status: CANCELLED | OUTPATIENT
Start: 2024-04-02

## 2024-03-25 RX ORDER — ACETAMINOPHEN 325 MG/1
650 TABLET ORAL
Status: CANCELLED | OUTPATIENT
Start: 2024-04-02

## 2024-03-25 RX ORDER — SODIUM CHLORIDE 0.9 % (FLUSH) 0.9 %
5-40 SYRINGE (ML) INJECTION PRN
Status: CANCELLED | OUTPATIENT
Start: 2024-04-02

## 2024-03-25 RX ORDER — MEPERIDINE HYDROCHLORIDE 25 MG/ML
12.5 INJECTION INTRAMUSCULAR; INTRAVENOUS; SUBCUTANEOUS PRN
Status: CANCELLED | OUTPATIENT
Start: 2024-04-02

## 2024-03-25 RX ORDER — SODIUM CHLORIDE 9 MG/ML
5-250 INJECTION, SOLUTION INTRAVENOUS PRN
Status: CANCELLED | OUTPATIENT
Start: 2024-04-02

## 2024-03-25 RX ORDER — ACETAMINOPHEN 325 MG/1
650 TABLET ORAL
Status: CANCELLED
Start: 2024-04-02

## 2024-03-25 RX ORDER — ALBUTEROL SULFATE 90 UG/1
4 AEROSOL, METERED RESPIRATORY (INHALATION) PRN
Status: CANCELLED | OUTPATIENT
Start: 2024-04-02

## 2024-03-25 RX ORDER — ONDANSETRON 2 MG/ML
8 INJECTION INTRAMUSCULAR; INTRAVENOUS
Status: CANCELLED | OUTPATIENT
Start: 2024-04-02

## 2024-03-25 RX ORDER — SODIUM CHLORIDE 9 MG/ML
INJECTION, SOLUTION INTRAVENOUS CONTINUOUS
Status: CANCELLED | OUTPATIENT
Start: 2024-04-02

## 2024-03-25 RX ORDER — HEPARIN 100 UNIT/ML
500 SYRINGE INTRAVENOUS PRN
Status: CANCELLED | OUTPATIENT
Start: 2024-04-02

## 2024-03-25 RX ORDER — EPINEPHRINE 1 MG/ML
0.3 INJECTION, SOLUTION INTRAMUSCULAR; SUBCUTANEOUS PRN
Status: CANCELLED | OUTPATIENT
Start: 2024-04-02

## 2024-03-26 ENCOUNTER — APPOINTMENT (OUTPATIENT)
Facility: HOSPITAL | Age: 79
End: 2024-03-26
Payer: MEDICARE

## 2024-04-02 ENCOUNTER — OFFICE VISIT (OUTPATIENT)
Age: 79
End: 2024-04-02
Payer: MEDICARE

## 2024-04-02 ENCOUNTER — HOSPITAL ENCOUNTER (OUTPATIENT)
Facility: HOSPITAL | Age: 79
Setting detail: INFUSION SERIES
Discharge: HOME | End: 2024-04-02
Payer: MEDICARE

## 2024-04-02 VITALS
OXYGEN SATURATION: 99 % | HEART RATE: 83 BPM | RESPIRATION RATE: 16 BRPM | DIASTOLIC BLOOD PRESSURE: 80 MMHG | WEIGHT: 193.2 LBS | BODY MASS INDEX: 32.19 KG/M2 | HEIGHT: 65 IN | TEMPERATURE: 98.1 F | SYSTOLIC BLOOD PRESSURE: 146 MMHG

## 2024-04-02 VITALS
TEMPERATURE: 98.1 F | HEART RATE: 92 BPM | RESPIRATION RATE: 18 BRPM | SYSTOLIC BLOOD PRESSURE: 149 MMHG | DIASTOLIC BLOOD PRESSURE: 74 MMHG

## 2024-04-02 DIAGNOSIS — N18.31 STAGE 3A CHRONIC KIDNEY DISEASE (HCC): ICD-10-CM

## 2024-04-02 DIAGNOSIS — C34.92 MALIGNANT NEOPLASM OF UNSPECIFIED PART OF LEFT BRONCHUS OR LUNG (HCC): Primary | ICD-10-CM

## 2024-04-02 DIAGNOSIS — R06.09 DOE (DYSPNEA ON EXERTION): ICD-10-CM

## 2024-04-02 DIAGNOSIS — R60.0 LOCALIZED EDEMA: ICD-10-CM

## 2024-04-02 DIAGNOSIS — D63.8 ANEMIA OF CHRONIC DISEASE: ICD-10-CM

## 2024-04-02 DIAGNOSIS — Z51.12 ENCOUNTER FOR ANTINEOPLASTIC IMMUNOTHERAPY: ICD-10-CM

## 2024-04-02 LAB
ALBUMIN SERPL-MCNC: 3.5 G/DL (ref 3.5–5)
ALBUMIN/GLOB SERPL: 1 (ref 1.1–2.2)
ALP SERPL-CCNC: 125 U/L (ref 45–117)
ALT SERPL-CCNC: 18 U/L (ref 12–78)
ANION GAP SERPL CALC-SCNC: 3 MMOL/L (ref 5–15)
AST SERPL-CCNC: 18 U/L (ref 15–37)
BASOPHILS # BLD: 0 K/UL (ref 0–0.1)
BASOPHILS NFR BLD: 0 % (ref 0–1)
BILIRUB SERPL-MCNC: 0.6 MG/DL (ref 0.2–1)
BUN SERPL-MCNC: 15 MG/DL (ref 6–20)
BUN/CREAT SERPL: 14 (ref 12–20)
CALCIUM SERPL-MCNC: 9 MG/DL (ref 8.5–10.1)
CHLORIDE SERPL-SCNC: 107 MMOL/L (ref 97–108)
CO2 SERPL-SCNC: 27 MMOL/L (ref 21–32)
CREAT SERPL-MCNC: 1.07 MG/DL (ref 0.55–1.02)
DIFFERENTIAL METHOD BLD: ABNORMAL
EOSINOPHIL # BLD: 0.1 K/UL (ref 0–0.4)
EOSINOPHIL NFR BLD: 3 % (ref 0–7)
ERYTHROCYTE [DISTWIDTH] IN BLOOD BY AUTOMATED COUNT: 16.2 % (ref 11.5–14.5)
GLOBULIN SER CALC-MCNC: 3.5 G/DL (ref 2–4)
GLUCOSE SERPL-MCNC: 192 MG/DL (ref 65–100)
HCT VFR BLD AUTO: 32.6 % (ref 35–47)
HGB BLD-MCNC: 11.1 G/DL (ref 11.5–16)
IMM GRANULOCYTES # BLD AUTO: 0 K/UL (ref 0–0.04)
IMM GRANULOCYTES NFR BLD AUTO: 1 % (ref 0–0.5)
LYMPHOCYTES # BLD: 0.5 K/UL (ref 0.8–3.5)
LYMPHOCYTES NFR BLD: 11 % (ref 12–49)
MCH RBC QN AUTO: 29.8 PG (ref 26–34)
MCHC RBC AUTO-ENTMCNC: 34 G/DL (ref 30–36.5)
MCV RBC AUTO: 87.4 FL (ref 80–99)
MONOCYTES # BLD: 0.3 K/UL (ref 0–1)
MONOCYTES NFR BLD: 6 % (ref 5–13)
NEUTS SEG # BLD: 3.5 K/UL (ref 1.8–8)
NEUTS SEG NFR BLD: 79 % (ref 32–75)
NRBC # BLD: 0 K/UL (ref 0–0.01)
NRBC BLD-RTO: 0 PER 100 WBC
PLATELET # BLD AUTO: 199 K/UL (ref 150–400)
PMV BLD AUTO: 10.5 FL (ref 8.9–12.9)
POTASSIUM SERPL-SCNC: 3.9 MMOL/L (ref 3.5–5.1)
PROT SERPL-MCNC: 7 G/DL (ref 6.4–8.2)
RBC # BLD AUTO: 3.73 M/UL (ref 3.8–5.2)
RBC MORPH BLD: ABNORMAL
SODIUM SERPL-SCNC: 137 MMOL/L (ref 136–145)
WBC # BLD AUTO: 4.4 K/UL (ref 3.6–11)

## 2024-04-02 PROCEDURE — 96413 CHEMO IV INFUSION 1 HR: CPT

## 2024-04-02 PROCEDURE — G8399 PT W/DXA RESULTS DOCUMENT: HCPCS | Performed by: NURSE PRACTITIONER

## 2024-04-02 PROCEDURE — 6360000002 HC RX W HCPCS: Performed by: INTERNAL MEDICINE

## 2024-04-02 PROCEDURE — 3079F DIAST BP 80-89 MM HG: CPT | Performed by: NURSE PRACTITIONER

## 2024-04-02 PROCEDURE — G8417 CALC BMI ABV UP PARAM F/U: HCPCS | Performed by: NURSE PRACTITIONER

## 2024-04-02 PROCEDURE — 99215 OFFICE O/P EST HI 40 MIN: CPT | Performed by: NURSE PRACTITIONER

## 2024-04-02 PROCEDURE — 3077F SYST BP >= 140 MM HG: CPT | Performed by: NURSE PRACTITIONER

## 2024-04-02 PROCEDURE — 85025 COMPLETE CBC W/AUTO DIFF WBC: CPT

## 2024-04-02 PROCEDURE — 1123F ACP DISCUSS/DSCN MKR DOCD: CPT | Performed by: NURSE PRACTITIONER

## 2024-04-02 PROCEDURE — 2580000003 HC RX 258: Performed by: INTERNAL MEDICINE

## 2024-04-02 PROCEDURE — G2211 COMPLEX E/M VISIT ADD ON: HCPCS | Performed by: NURSE PRACTITIONER

## 2024-04-02 PROCEDURE — 80053 COMPREHEN METABOLIC PANEL: CPT

## 2024-04-02 PROCEDURE — 1090F PRES/ABSN URINE INCON ASSESS: CPT | Performed by: NURSE PRACTITIONER

## 2024-04-02 PROCEDURE — 1036F TOBACCO NON-USER: CPT | Performed by: NURSE PRACTITIONER

## 2024-04-02 PROCEDURE — G8427 DOCREV CUR MEDS BY ELIG CLIN: HCPCS | Performed by: NURSE PRACTITIONER

## 2024-04-02 RX ORDER — SODIUM CHLORIDE 9 MG/ML
5-250 INJECTION, SOLUTION INTRAVENOUS PRN
Status: DISCONTINUED | OUTPATIENT
Start: 2024-04-02 | End: 2024-04-03 | Stop reason: HOSPADM

## 2024-04-02 RX ADMIN — ATEZOLIZUMAB 1200 MG: 1200 INJECTION, SOLUTION INTRAVENOUS at 15:38

## 2024-04-02 NOTE — TELEPHONE ENCOUNTER
Medication Refill Request    Elise Tabares is requesting a refill of the following medication(s):   Requested Prescriptions     Pending Prescriptions Disp Refills    metoprolol succinate (TOPROL XL) 100 MG extended release tablet [Pharmacy Med Name: Metoprolol Succinate  MG Oral Tablet Extended Release 24 Hour] 90 tablet 0     Sig: Take 1 tablet by mouth once daily        Listed PCP is Gerber Mayo MD   Last provider to prescribe medication: Simon  Last Date of Medication Prescribed: 1/1/2024  Date of Last Office Visit at [facility]: 3/31/2023  FUTURE APPOINTMENT:   Future Appointments   Date Time Provider Department Center   4/2/2024  1:30 PM [facility]   4/2/2024  1:45 PM [facility]   4/9/2024  1:00 PM SFM CARDIOPULM EXERCISE [facility]   4/10/2024  1:00 PM SFM CARDIOPULM EXERCISE [facility]   4/23/2024 10:00 AM [facility]   4/30/2024  2:30 PM [facility]ia [facility]   5/1/2024 11:20 AM [facility]uma Duarte [facility] - [facility]   5/14/2024 10:30 AM SS INF3 CH4 <2H [facility]   6/4/2024 10:00 AM SS INF3 CH4 <2H [facility]       Please send refill to:    [facility] [address] [place] [address] [facility] - P [phone] - F [phone]  [address] [facility] [address]  Phone: [phone] Fax: [phone]      Please review request and approve or deny with recommendations.

## 2024-04-02 NOTE — PROGRESS NOTES
Chief Complaint   Patient presents with    Follow-up           Vitals:    04/02/24 1409   BP: (!) 146/80   Pulse: 83   Resp: 16   Temp: 98.1 °F (36.7 °C)   SpO2: 99%            1. Have you been to the ER, urgent care clinic since your last visit?  Hospitalized since your last visit?  No  2. Have you seen or consulted any other health care providers outside of the [facility] since your last visit?  Include any pap smears or colon screening. No      
04/02/2024     Lab Results   Component Value Date     (L) 03/05/2024    K 4.1 03/05/2024     03/05/2024    CO2 25 03/05/2024    GLUCOSE 184 (H) 03/05/2024    BUN 19 03/05/2024    CREATININE 1.14 (H) 03/05/2024    LABGLOM 49 (L) 03/05/2024    CALCIUM 9.4 03/05/2024    MG 1.8 03/14/2023    PHOS 2.5 (L) 03/14/2023     Lab Results   Component Value Date    BILITOT 0.6 04/02/2024    ALT 18 04/02/2024    AST 18 04/02/2024    ALKPHOS 125 (H) 04/02/2024    PROT 7.0 04/02/2024    LABALBU 3.5 04/02/2024    GLOB 3.5 04/02/2024     Lab Results   Component Value Date    IRON 43 03/05/2024    TIBC 252 03/05/2024    IRONPERSAT 17 (L) 03/05/2024    FERRITIN 102 03/05/2024    VISTRBGI00 974 09/19/2023    FOLATE 7.0 09/19/2023     Lab Results   Component Value Date    LIPASE 398 (H) 03/11/2023    TSH 1.16 03/11/2023    DBN7GQN 1.55 03/05/2024           Imaging / Procedures:     3/11/23 CT chest:  FINDINGS:   SUPRACLAVICULAR REGION: No supraclavicular adenopathy.  MEDIASTINUM: Aortic atherosclerotic change and coronary artery disease. Small  hiatal hernia.  No hilar or mediastinal lymphadenopathy. No endotracheal or endobronchial mass.  Coronary artery calcifications: present.  PLEURA/LUNGS:: Left upper lobe cavitary focus 27 x 24 mm in size with associated  pleural thickening there is centrilobular emphysematous change. There is no  right lung nodule.   SOFT TISSUE/ AXILLA: No axillary adenopathy. No chest wall mass.  UPPER ABDOMEN: There is no intrahepatic duct dilatation. The CBD is not dilated.  BONES: T11 compression fracture is age indeterminate.. No lytic or blastic lesions.   IMPRESSION  27 x 24 mm cavitary lesion in the left upper lobe is associated with mild  pleural thickening. Cavitary lesion is a spiculated margin. Lesion is infectious  or neoplastic etiology.  Coronary artery disease.  Small hiatal hernia.  Age indeterminate T11 compression fracture.    3/12/23 CT abd/pelvis:  IMPRESSION  No acute

## 2024-04-03 RX ORDER — METOPROLOL SUCCINATE 100 MG/1
100 TABLET, EXTENDED RELEASE ORAL DAILY
Qty: 90 TABLET | Refills: 0 | Status: SHIPPED | OUTPATIENT
Start: 2024-04-03

## 2024-04-05 ENCOUNTER — APPOINTMENT (OUTPATIENT)
Facility: HOSPITAL | Age: 79
End: 2024-04-05
Payer: MEDICARE

## 2024-04-15 ENCOUNTER — TELEPHONE (OUTPATIENT)
Age: 79
End: 2024-04-15

## 2024-04-15 DIAGNOSIS — M79.622 LEFT AXILLARY PAIN: Primary | ICD-10-CM

## 2024-04-15 RX ORDER — TIZANIDINE 2 MG/1
2 TABLET ORAL 2 TIMES DAILY PRN
Qty: 10 TABLET | Refills: 0 | Status: SHIPPED | OUTPATIENT
Start: 2024-04-15

## 2024-04-15 NOTE — TELEPHONE ENCOUNTER
04/15/24 1:16 PM Return call placed to patient. Patient states she had mentioned pain in left axilla while in clinic to see Erika PERKINS on 04/02. Since then, pain has worsened. Patient describes that pain is always there, but worsens when coughing, taking a deep breath, and picking up items off of the floor. Patient also notes that pain radiates across chest and side of ribs when lying down. Unable to lie down on left side. Patient states pain is sharp and increases to a 10 out of 10 when doing any of the above actions. Patient wondering if she pulled a muscle. She denies any recent injury, but shared she has been participating in cardiac rehab. Patient denies SOB, headache, and nausea. Patient has attempted Tylenol and heating pad with no relief. Advised this nurse would forward above to Erika and call patient back with further recommendations. Patient voiced understanding.      4:04 PM Called patient. Advised of recommendations per Erika PERKINS. Patient voiced understanding. She inquired if she could try topical roll on Tylenol with Lidocaine. Discussed with Erika who advised that this would be okay. Advised that patient follow package insert. If she used Tylenol with Lidocaine, then she would not also use Lidocaine patch and/or Voltaren. Patient voiced understanding. No further questions or concerns at this time.    Aleks Manning

## 2024-04-16 ENCOUNTER — APPOINTMENT (OUTPATIENT)
Facility: HOSPITAL | Age: 79
End: 2024-04-16
Payer: MEDICARE

## 2024-04-16 RX ORDER — ONDANSETRON 2 MG/ML
8 INJECTION INTRAMUSCULAR; INTRAVENOUS
Status: CANCELLED | OUTPATIENT
Start: 2024-04-23

## 2024-04-16 RX ORDER — SODIUM CHLORIDE 9 MG/ML
5-250 INJECTION, SOLUTION INTRAVENOUS PRN
Status: CANCELLED | OUTPATIENT
Start: 2024-04-23

## 2024-04-16 RX ORDER — HEPARIN 100 UNIT/ML
500 SYRINGE INTRAVENOUS PRN
Status: CANCELLED | OUTPATIENT
Start: 2024-04-23

## 2024-04-16 RX ORDER — SODIUM CHLORIDE 9 MG/ML
INJECTION, SOLUTION INTRAVENOUS CONTINUOUS
Status: CANCELLED | OUTPATIENT
Start: 2024-04-23

## 2024-04-16 RX ORDER — MEPERIDINE HYDROCHLORIDE 25 MG/ML
12.5 INJECTION INTRAMUSCULAR; INTRAVENOUS; SUBCUTANEOUS PRN
Status: CANCELLED | OUTPATIENT
Start: 2024-04-23

## 2024-04-16 RX ORDER — ACETAMINOPHEN 325 MG/1
650 TABLET ORAL
Status: CANCELLED | OUTPATIENT
Start: 2024-04-23

## 2024-04-16 RX ORDER — EPINEPHRINE 1 MG/ML
0.3 INJECTION, SOLUTION INTRAMUSCULAR; SUBCUTANEOUS PRN
Status: CANCELLED | OUTPATIENT
Start: 2024-04-23

## 2024-04-16 RX ORDER — DIPHENHYDRAMINE HYDROCHLORIDE 50 MG/ML
50 INJECTION INTRAMUSCULAR; INTRAVENOUS
Status: CANCELLED | OUTPATIENT
Start: 2024-04-23

## 2024-04-16 RX ORDER — FAMOTIDINE 10 MG/ML
20 INJECTION, SOLUTION INTRAVENOUS
Status: CANCELLED | OUTPATIENT
Start: 2024-04-23

## 2024-04-16 RX ORDER — ALBUTEROL SULFATE 90 UG/1
4 AEROSOL, METERED RESPIRATORY (INHALATION) PRN
Status: CANCELLED | OUTPATIENT
Start: 2024-04-23

## 2024-04-18 ENCOUNTER — OFFICE VISIT (OUTPATIENT)
Age: 79
End: 2024-04-18
Payer: MEDICARE

## 2024-04-18 VITALS
HEIGHT: 65 IN | DIASTOLIC BLOOD PRESSURE: 78 MMHG | RESPIRATION RATE: 18 BRPM | WEIGHT: 196 LBS | SYSTOLIC BLOOD PRESSURE: 136 MMHG | BODY MASS INDEX: 32.65 KG/M2 | HEART RATE: 80 BPM | OXYGEN SATURATION: 98 %

## 2024-04-18 DIAGNOSIS — R06.09 DOE (DYSPNEA ON EXERTION): ICD-10-CM

## 2024-04-18 DIAGNOSIS — E11.9 TYPE 2 DIABETES MELLITUS WITHOUT COMPLICATION, UNSPECIFIED WHETHER LONG TERM INSULIN USE (HCC): ICD-10-CM

## 2024-04-18 DIAGNOSIS — I25.118 CORONARY ARTERY DISEASE INVOLVING NATIVE CORONARY ARTERY OF NATIVE HEART WITH OTHER FORM OF ANGINA PECTORIS (HCC): Primary | ICD-10-CM

## 2024-04-18 DIAGNOSIS — I10 BENIGN HYPERTENSION: ICD-10-CM

## 2024-04-18 DIAGNOSIS — E78.2 MIXED HYPERLIPIDEMIA: ICD-10-CM

## 2024-04-18 DIAGNOSIS — N18.31 STAGE 3A CHRONIC KIDNEY DISEASE (HCC): ICD-10-CM

## 2024-04-18 DIAGNOSIS — G47.33 OSA (OBSTRUCTIVE SLEEP APNEA): ICD-10-CM

## 2024-04-18 PROCEDURE — 1090F PRES/ABSN URINE INCON ASSESS: CPT

## 2024-04-18 PROCEDURE — G8427 DOCREV CUR MEDS BY ELIG CLIN: HCPCS

## 2024-04-18 PROCEDURE — 99214 OFFICE O/P EST MOD 30 MIN: CPT

## 2024-04-18 PROCEDURE — G8399 PT W/DXA RESULTS DOCUMENT: HCPCS

## 2024-04-18 PROCEDURE — 1036F TOBACCO NON-USER: CPT

## 2024-04-18 PROCEDURE — 3075F SYST BP GE 130 - 139MM HG: CPT

## 2024-04-18 PROCEDURE — 1123F ACP DISCUSS/DSCN MKR DOCD: CPT

## 2024-04-18 PROCEDURE — 3078F DIAST BP <80 MM HG: CPT

## 2024-04-18 PROCEDURE — G8417 CALC BMI ABV UP PARAM F/U: HCPCS

## 2024-04-18 RX ORDER — ROSUVASTATIN CALCIUM 10 MG/1
10 TABLET, COATED ORAL NIGHTLY
Qty: 90 TABLET | Refills: 3 | Status: SHIPPED | OUTPATIENT
Start: 2024-04-18

## 2024-04-18 RX ORDER — ROSUVASTATIN CALCIUM 20 MG/1
20 TABLET, COATED ORAL NIGHTLY
Qty: 90 TABLET | Refills: 3 | Status: SHIPPED | OUTPATIENT
Start: 2024-04-18 | End: 2024-04-18

## 2024-04-18 RX ORDER — EZETIMIBE 10 MG/1
10 TABLET ORAL DAILY
Qty: 90 TABLET | Refills: 3 | Status: SHIPPED | OUTPATIENT
Start: 2024-04-18

## 2024-04-18 RX ORDER — ISOSORBIDE MONONITRATE 30 MG/1
30 TABLET, EXTENDED RELEASE ORAL DAILY
Qty: 90 TABLET | Refills: 3 | Status: SHIPPED | OUTPATIENT
Start: 2024-04-18

## 2024-04-18 NOTE — PATIENT INSTRUCTIONS
Please decrease your Rosuvastatin to 10mg (half tab OR new prescription)    Please add Zetia to help your cholesterol.  Add Imdur 30mg- May give you a headache the first few days    Lab work before next appointment.    ZIP UP COMPRESSION SOCKS ([employer])

## 2024-04-18 NOTE — PROGRESS NOTES
had concerns including Coronary Artery Disease, Hypertension, and Cholesterol Problem.    Vitals:    04/18/24 1031   BP: 136/78   Site: Left Upper Arm   Position: Sitting   Pulse: 80   Resp: 18   SpO2: 98%   Weight: 88.9 kg (196 lb)   Height: 1.651 m (5' 5\")        Chest pain patient states some CP when breathing     Refills No        1. Have you been to the ER, urgent care clinic since your last visit? No       Hospitalized since your last visit? No       Where?        Date?  
shockwave 58 pulses, subsequent catheter error  3.0 nc balloon high pressure  Ivus showed complete calcium fracture of proximal lad  Proximal lad into mid-vessel pierre treated with 3.0x26mm filemon pierre deployed at 22 TIMMY.  Ivus showed well apposed stent.  Very ostium of lad (~1-2mm) with calcium fracture that is not covered by stent.  No dissection appreciated.  MLA > 7.6mm2    Cali 3 flow post-procedure    Signed by: Micah Bah,  on 10/5/2023 12:58 PM    9/20/17: CATH- LAD: p70% @ D1, dLAD:70%; LCX:m50%, RCA: mRCA:  supplied w/ collaterals from LAD  ---Unsuccessful PCI apical LAD: Attempted POBA with various balloons without success.  --- s/p PIERRE (3x15, 2.75 x14 PIERRE overlapping) ->mLAD    9/18/17: STRESS CARDIOLITE- Poor exercise tolerance. Nml stress. Abnormal Exercise gated SPECT MPS: Small apical defect, worse on stress, Likely small apical infarct with mild adiel-infarct ischemia. LVEF 64%    1997: CATH [place]- [place] stents      Lab Review:     Lab Results   Component Value Date     04/02/2024    K 3.9 04/02/2024     04/02/2024    CO2 27 04/02/2024    BUN 15 04/02/2024    CREATININE 1.07 (H) 04/02/2024    GLUCOSE 192 (H) 04/02/2024    CALCIUM 9.0 04/02/2024    PROT 7.0 04/02/2024    BILITOT 0.6 04/02/2024    ALKPHOS 125 (H) 04/02/2024    AST 18 04/02/2024    ALT 18 04/02/2024    LABGLOM 53 (L) 04/02/2024    GFRAA 31 (L) 01/31/2020    AGRATIO 1.0 (L) 04/02/2024    GLOB 3.5 04/02/2024     Lab Results   Component Value Date    WBC 4.4 04/02/2024    HGB 11.1 (L) 04/02/2024    HCT 32.6 (L) 04/02/2024    MCV 87.4 04/02/2024     04/02/2024     Cholesterol, Total   Date Value Ref Range Status   10/31/2023 143 <200 MG/DL Final     Triglycerides   Date Value Ref Range Status   10/31/2023 83 <150 MG/DL Final     Comment:     Based on NCEP-ATP III:  Triglycerides <150 mg/dL  is considered normal, 150-199 mg/dL  borderline high,  200-499 mg/dL high and  greater than or equal to 500 mg/dL very high.

## 2024-04-22 NOTE — PROGRESS NOTES
[facility]  Medical Oncology at [place]  [phone]    Hematology / Oncology Established Visit    Reason for Visit:   Elise Tabares is a 78 y.o. female who is seen for follow up of lung cancer.     Hematology Oncology Treatment History:     Diagnosis: Squamous cell carcinoma of lung    Stage: IIB [[id number]]    Pathology:   3/14/23 Lung, left upper lobe, Core biopsy: Invasive poorly differentiated squamous cell carcinoma.   Comment: Immunohistochemical stains show the malignant cells are positive  for cytokeratin 5/6 and negative for cytokeratin 7, cytokeratin 20 and  TTF-1 supporting the diagnosis. PD-L1 by immunohistochemistry pending.     5/3/23 Left upper lobe wedge resection:  Invasive poorly differentiated squamous cell carcinoma with areas of extensive necrosis.   Tumor size 5.5cm, G3  Visceral pleural invasion present. Vascular invasion present. Parenchymal resection margin negative for tumor. Background lung parenchyma with moderate to severe architectural distortion associated with prominent peribronchiolar metaplasia.  0/6 LN positive [Station 5, 6, 7 x 2, 8, 10]  -PDL1 TPS 2%  - [place]    Prior Treatment:   1. Sublobar resection of JANELL by Dr. Dom Perez  2. Adjuvant Carbo-Creole x 4 on D1, 8 of q21d cycles, 6/20/23- 8/29/23    Current Treatment:  Atezolizumab [id number] x 1 year, started 9/19/23 - current  Treatment duration: 1 year  Frequency of visits: Every 3 weeks    History of Present Illness:   Elise Tabares is a 78 y.o. female with CAD, CKD, HTN, DM II, MAX who is referred for evaluation and management of lung cancer. Pt was recently hospitalized in early March 2023 after 2 syncopal episodes at home. She also noted some loose stools and vomiting. She was diagnosed with IVVD and VAIBHAV, treated with IVF. She was found to have a new 27 x 24mm cavitary lesion in JANELL and underwent CT-guided biopsy. She was also found to have a vascularized mass in R light of thyroid, 1.5cm and

## 2024-04-23 ENCOUNTER — OFFICE VISIT (OUTPATIENT)
Age: 79
End: 2024-04-23
Payer: MEDICARE

## 2024-04-23 ENCOUNTER — HOSPITAL ENCOUNTER (OUTPATIENT)
Facility: HOSPITAL | Age: 79
Setting detail: INFUSION SERIES
Discharge: HOME | End: 2024-04-23
Payer: MEDICARE

## 2024-04-23 VITALS
TEMPERATURE: 98.3 F | HEART RATE: 78 BPM | HEIGHT: 66 IN | BODY MASS INDEX: 32.3 KG/M2 | RESPIRATION RATE: 18 BRPM | WEIGHT: 201 LBS | SYSTOLIC BLOOD PRESSURE: 129 MMHG | DIASTOLIC BLOOD PRESSURE: 78 MMHG | OXYGEN SATURATION: 99 %

## 2024-04-23 VITALS
HEART RATE: 72 BPM | DIASTOLIC BLOOD PRESSURE: 62 MMHG | WEIGHT: 200.5 LBS | RESPIRATION RATE: 18 BRPM | TEMPERATURE: 97.3 F | OXYGEN SATURATION: 100 % | HEIGHT: 66 IN | BODY MASS INDEX: 32.22 KG/M2 | SYSTOLIC BLOOD PRESSURE: 138 MMHG

## 2024-04-23 DIAGNOSIS — Z51.12 ENCOUNTER FOR ANTINEOPLASTIC IMMUNOTHERAPY: ICD-10-CM

## 2024-04-23 DIAGNOSIS — C34.92 MALIGNANT NEOPLASM OF UNSPECIFIED PART OF LEFT BRONCHUS OR LUNG (HCC): Primary | ICD-10-CM

## 2024-04-23 DIAGNOSIS — N18.31 STAGE 3A CHRONIC KIDNEY DISEASE (HCC): ICD-10-CM

## 2024-04-23 DIAGNOSIS — D63.8 ANEMIA OF CHRONIC DISEASE: ICD-10-CM

## 2024-04-23 LAB
ALBUMIN SERPL-MCNC: 3.4 G/DL (ref 3.5–5)
ALBUMIN/GLOB SERPL: 1 (ref 1.1–2.2)
ALP SERPL-CCNC: 117 U/L (ref 45–117)
ALT SERPL-CCNC: 19 U/L (ref 12–78)
ANION GAP SERPL CALC-SCNC: 4 MMOL/L (ref 5–15)
AST SERPL-CCNC: 16 U/L (ref 15–37)
BASOPHILS # BLD: 0 K/UL (ref 0–0.1)
BASOPHILS NFR BLD: 0 % (ref 0–1)
BILIRUB SERPL-MCNC: 0.4 MG/DL (ref 0.2–1)
BUN SERPL-MCNC: 23 MG/DL (ref 6–20)
BUN/CREAT SERPL: 19 (ref 12–20)
CALCIUM SERPL-MCNC: 9 MG/DL (ref 8.5–10.1)
CHLORIDE SERPL-SCNC: 107 MMOL/L (ref 97–108)
CO2 SERPL-SCNC: 26 MMOL/L (ref 21–32)
CREAT SERPL-MCNC: 1.2 MG/DL (ref 0.55–1.02)
DIFFERENTIAL METHOD BLD: ABNORMAL
EOSINOPHIL # BLD: 0.1 K/UL (ref 0–0.4)
EOSINOPHIL NFR BLD: 2 % (ref 0–7)
ERYTHROCYTE [DISTWIDTH] IN BLOOD BY AUTOMATED COUNT: 15.3 % (ref 11.5–14.5)
GLOBULIN SER CALC-MCNC: 3.4 G/DL (ref 2–4)
GLUCOSE SERPL-MCNC: 201 MG/DL (ref 65–100)
HCT VFR BLD AUTO: 32.6 % (ref 35–47)
HGB BLD-MCNC: 10.8 G/DL (ref 11.5–16)
IMM GRANULOCYTES # BLD AUTO: 0 K/UL (ref 0–0.04)
IMM GRANULOCYTES NFR BLD AUTO: 0 % (ref 0–0.5)
LYMPHOCYTES # BLD: 0.7 K/UL (ref 0.8–3.5)
LYMPHOCYTES NFR BLD: 11 % (ref 12–49)
MCH RBC QN AUTO: 29.3 PG (ref 26–34)
MCHC RBC AUTO-ENTMCNC: 33.1 G/DL (ref 30–36.5)
MCV RBC AUTO: 88.6 FL (ref 80–99)
MONOCYTES # BLD: 0.3 K/UL (ref 0–1)
MONOCYTES NFR BLD: 5 % (ref 5–13)
NEUTS SEG # BLD: 5.1 K/UL (ref 1.8–8)
NEUTS SEG NFR BLD: 82 % (ref 32–75)
NRBC # BLD: 0 K/UL (ref 0–0.01)
NRBC BLD-RTO: 0 PER 100 WBC
PLATELET # BLD AUTO: 221 K/UL (ref 150–400)
PMV BLD AUTO: 11 FL (ref 8.9–12.9)
POTASSIUM SERPL-SCNC: 3.8 MMOL/L (ref 3.5–5.1)
PROT SERPL-MCNC: 6.8 G/DL (ref 6.4–8.2)
RBC # BLD AUTO: 3.68 M/UL (ref 3.8–5.2)
RBC MORPH BLD: ABNORMAL
SODIUM SERPL-SCNC: 137 MMOL/L (ref 136–145)
TSH SERPL DL<=0.05 MIU/L-ACNC: 1.56 UIU/ML (ref 0.36–3.74)
WBC # BLD AUTO: 6.2 K/UL (ref 3.6–11)

## 2024-04-23 PROCEDURE — G8399 PT W/DXA RESULTS DOCUMENT: HCPCS | Performed by: INTERNAL MEDICINE

## 2024-04-23 PROCEDURE — 99215 OFFICE O/P EST HI 40 MIN: CPT | Performed by: INTERNAL MEDICINE

## 2024-04-23 PROCEDURE — 84443 ASSAY THYROID STIM HORMONE: CPT

## 2024-04-23 PROCEDURE — 3078F DIAST BP <80 MM HG: CPT | Performed by: INTERNAL MEDICINE

## 2024-04-23 PROCEDURE — 2580000003 HC RX 258: Performed by: INTERNAL MEDICINE

## 2024-04-23 PROCEDURE — G2211 COMPLEX E/M VISIT ADD ON: HCPCS | Performed by: INTERNAL MEDICINE

## 2024-04-23 PROCEDURE — 96413 CHEMO IV INFUSION 1 HR: CPT

## 2024-04-23 PROCEDURE — 80053 COMPREHEN METABOLIC PANEL: CPT

## 2024-04-23 PROCEDURE — 1123F ACP DISCUSS/DSCN MKR DOCD: CPT | Performed by: INTERNAL MEDICINE

## 2024-04-23 PROCEDURE — 1036F TOBACCO NON-USER: CPT | Performed by: INTERNAL MEDICINE

## 2024-04-23 PROCEDURE — 6360000002 HC RX W HCPCS: Performed by: INTERNAL MEDICINE

## 2024-04-23 PROCEDURE — 85025 COMPLETE CBC W/AUTO DIFF WBC: CPT

## 2024-04-23 PROCEDURE — G8417 CALC BMI ABV UP PARAM F/U: HCPCS | Performed by: INTERNAL MEDICINE

## 2024-04-23 PROCEDURE — G8427 DOCREV CUR MEDS BY ELIG CLIN: HCPCS | Performed by: INTERNAL MEDICINE

## 2024-04-23 PROCEDURE — 3074F SYST BP LT 130 MM HG: CPT | Performed by: INTERNAL MEDICINE

## 2024-04-23 PROCEDURE — 1090F PRES/ABSN URINE INCON ASSESS: CPT | Performed by: INTERNAL MEDICINE

## 2024-04-23 RX ORDER — SODIUM CHLORIDE 9 MG/ML
5-250 INJECTION, SOLUTION INTRAVENOUS PRN
Status: DISCONTINUED | OUTPATIENT
Start: 2024-04-23 | End: 2024-04-24 | Stop reason: HOSPADM

## 2024-04-23 RX ORDER — FAMOTIDINE 10 MG/ML
20 INJECTION, SOLUTION INTRAVENOUS
Status: DISCONTINUED | OUTPATIENT
Start: 2024-04-23 | End: 2024-04-24 | Stop reason: HOSPADM

## 2024-04-23 RX ORDER — DIPHENHYDRAMINE HYDROCHLORIDE 50 MG/ML
50 INJECTION INTRAMUSCULAR; INTRAVENOUS
Status: DISCONTINUED | OUTPATIENT
Start: 2024-04-23 | End: 2024-04-24 | Stop reason: HOSPADM

## 2024-04-23 RX ORDER — SODIUM CHLORIDE 0.9 % (FLUSH) 0.9 %
5-40 SYRINGE (ML) INJECTION PRN
Status: DISCONTINUED | OUTPATIENT
Start: 2024-04-23 | End: 2024-04-24 | Stop reason: HOSPADM

## 2024-04-23 RX ORDER — ACETAMINOPHEN 325 MG/1
650 TABLET ORAL
Status: DISCONTINUED | OUTPATIENT
Start: 2024-04-23 | End: 2024-04-24 | Stop reason: HOSPADM

## 2024-04-23 RX ADMIN — ATEZOLIZUMAB 1200 MG: 1200 INJECTION, SOLUTION INTRAVENOUS at 12:28

## 2024-04-23 RX ADMIN — SODIUM CHLORIDE 25 ML/HR: 9 INJECTION, SOLUTION INTRAVENOUS at 12:11

## 2024-04-23 RX ADMIN — SODIUM CHLORIDE, PRESERVATIVE FREE 20 ML: 5 INJECTION INTRAVENOUS at 13:07

## 2024-04-23 ASSESSMENT — PAIN DESCRIPTION - DESCRIPTORS: DESCRIPTORS: ACHING

## 2024-04-23 ASSESSMENT — PAIN DESCRIPTION - ORIENTATION: ORIENTATION: LEFT

## 2024-04-23 ASSESSMENT — PAIN DESCRIPTION - LOCATION: LOCATION: ARM

## 2024-04-23 ASSESSMENT — PAIN SCALES - GENERAL: PAINLEVEL_OUTOF10: 7

## 2024-04-23 NOTE — PROGRESS NOTES
Chief Complaint   Patient presents with    Follow-up           Vitals:    04/23/24 1033   BP: 129/78   Pulse: 78   Resp: 18   Temp: 98.3 °F (36.8 °C)   SpO2: 99%            1. Have you been to the ER, urgent care clinic since your last visit?  Hospitalized since your last visit?  No  2. Have you seen or consulted any other health care providers outside of the [facility] since your last visit?  Include any pap smears or colon screening. Yes Cardiologist, prescribed zanaflex for L arm pain

## 2024-04-23 NOTE — PROGRESS NOTES
Outpatient Infusion Center - Chemotherapy Progress Note    Pt admit to [facility] for C11D1 Tecentriq in stable condition. Assessment completed.  PAC with positive blood return. Labs were drawn and sent for processing. Pt proceeded to scheduled appt.      No new concerns voiced.  Pt reported to assessment nurse that her L arm is painful at times.  She has seen her cardiologist who prescribed a muscle relaxant for this problem.  She does not feel that it has been helpful.     VS  Vitals:    04/23/24 1000 04/23/24 1015 04/23/24 1307   BP:  130/70 138/62   Pulse:  66 72   Resp:  18    Temp:  97.3 °F (36.3 °C)    SpO2:  100%    Weight: 90.9 kg (200 lb 8 oz)     Height: 1.676 m (5' 6\")           Medications:  Medications Administered         0.9 % sodium chloride infusion Admin Date  04/23/2024 Action  New Bag Dose  25 mL/hr Rate  25 mL/hr Route  IntraVENous Administered By  Sakshi Rowan RN        atezolizumab (TECENTRIQ) 1,200 mg in sodium chloride 0.9 % 100 mL IVPB Admin Date  04/23/2024 Action  New Bag Dose  1,200 mg Rate  260 mL/hr Route  IntraVENous Administered By  Sakshi Rowan RN        sodium chloride flush 0.9 % injection 5-40 mL Admin Date  04/23/2024 Action  Given Dose  20 mL Rate   Route  IntraVENous Administered By  Sakshi Rowan RN              Two nurses verified prior to administering:  drug name, drug dose, infusion volume or drug volume when prepared in a syringe, rate of administration, route of administration, expiration dates and/or times, appearance and physical integrity of the drugs, rate set on infusion pump, when used, sequencing of drug administration    Pt tolerated treatment well. PAC maintained positive blood return throughout treatment, flushed with positive blood return at conclusion. D/c home in no distress. Pt aware of next appointment scheduled for 5/14.    Labs  Recent Results (from the past 12 hour(s))   CBC With Auto Differential    Collection Time:

## 2024-04-26 ENCOUNTER — APPOINTMENT (OUTPATIENT)
Facility: HOSPITAL | Age: 79
End: 2024-04-26
Payer: MEDICARE

## 2024-04-29 ASSESSMENT — SLEEP AND FATIGUE QUESTIONNAIRES
HOW LIKELY ARE YOU TO NOD OFF OR FALL ASLEEP WHILE LYING DOWN TO REST IN THE AFTERNOON WHEN CIRCUMSTANCES PERMIT: WOULD NEVER DOZE
HAS YOUR RELATIONSHIP WITH FAMILY, FRIENDS OR WORK COLLEAGUES BEEN AFFECTED BECAUSE YOU ARE SLEEPY OR TIRED: NO
DO YOU HAVE DIFFICULTY CONCENTRATING ON THE THINGS YOU DO BECAUSE YOU ARE SLEEPY OR TIRED: NO
DO YOU HAVE DIFFICULTY WATCHING A MOVIE OR VIDEO BECAUSE YOU BECOME SLEEPY OR TIRED: NO
DO YOU HAVE DIFFICULTY OPERATING A MOTOR VEHICLE FOR SHORT DISTANCES (LESS THAN 100 MILES) BECAUSE YOU BECOME SLEEPY: NO
HOW LIKELY ARE YOU TO NOD OFF OR FALL ASLEEP WHILE SITTING INACTIVE IN A PUBLIC PLACE: WOULD NEVER DOZE
HOW LIKELY ARE YOU TO NOD OFF OR FALL ASLEEP WHILE SITTING AND TALKING TO SOMEONE: WOULD NEVER DOZE
HOW LIKELY ARE YOU TO NOD OFF OR FALL ASLEEP WHILE SITTING QUIETLY AFTER LUNCH WITHOUT ALCOHOL: WOULD NEVER DOZE
DO YOU GENERALLY HAVE DIFFICULTY REMEMBERING THINGS BECAUSE YOU ARE SLEEPY OR TIRED: NO
DO YOU HAVE DIFFICULTY BEING AS ACTIVE AS YOU WANT TO BE IN THE MORNING BECAUSE YOU ARE SLEEPY OR TIRED: NO
HOW LIKELY ARE YOU TO NOD OFF OR FALL ASLEEP WHILE WATCHING TV: WOULD NEVER DOZE
HOW LIKELY ARE YOU TO NOD OFF OR FALL ASLEEP WHILE LYING DOWN TO REST IN THE AFTERNOON WHEN CIRCUMSTANCES PERMIT: WOULD NEVER DOZE
HOW LIKELY ARE YOU TO NOD OFF OR FALL ASLEEP WHILE SITTING AND TALKING TO SOMEONE: WOULD NEVER DOZE
HOW LIKELY ARE YOU TO NOD OFF OR FALL ASLEEP WHILE SITTING AND READING: WOULD NEVER DOZE
FOSQ SCORE: 20
HOW LIKELY ARE YOU TO NOD OFF OR FALL ASLEEP WHILE WATCHING TV: WOULD NEVER DOZE
DO YOU HAVE DIFFICULTY BEING AS ACTIVE AS YOU WANT TO BE IN THE EVENING BECAUSE YOU ARE SLEEPY OR TIRED: NO
HOW LIKELY ARE YOU TO NOD OFF OR FALL ASLEEP IN A CAR, WHILE STOPPED FOR A FEW MINUTES IN TRAFFIC: MODERATE CHANCE OF DOZING
HAS YOUR MOOD BEEN AFFECTED BECAUSE YOU ARE SLEEPY OR TIRED: NO
HOW LIKELY ARE YOU TO NOD OFF OR FALL ASLEEP WHEN YOU ARE A PASSENGER IN A CAR FOR AN HOUR WITHOUT A BREAK: WOULD NEVER DOZE
DO YOU HAVE DIFFICULTY VISITING YOUR FAMILY OR FRIENDS IN THEIR HOME BECAUSE YOU BECOME SLEEPY OR TIRED: NO
HOW LIKELY ARE YOU TO NOD OFF OR FALL ASLEEP WHILE SITTING QUIETLY AFTER LUNCH WITHOUT ALCOHOL: WOULD NEVER DOZE
HOW LIKELY ARE YOU TO NOD OFF OR FALL ASLEEP WHILE SITTING AND READING: WOULD NEVER DOZE
HOW LIKELY ARE YOU TO NOD OFF OR FALL ASLEEP IN A CAR, WHILE STOPPED FOR A FEW MINUTES IN TRAFFIC: MODERATE CHANCE OF DOZING
ESS TOTAL SCORE: 2
HOW LIKELY ARE YOU TO NOD OFF OR FALL ASLEEP WHILE SITTING INACTIVE IN A PUBLIC PLACE: WOULD NEVER DOZE
HOW LIKELY ARE YOU TO NOD OFF OR FALL ASLEEP WHEN YOU ARE A PASSENGER IN A CAR FOR AN HOUR WITHOUT A BREAK: WOULD NEVER DOZE
DO YOU HAVE DIFFICULTY OPERATING A MOTOR VEHICLE FOR LONG DISTANCES (GREATER THAN 100 MILES) BECAUSE YOU BECOME SLEEPY: NO

## 2024-04-30 ENCOUNTER — OFFICE VISIT (OUTPATIENT)
Age: 79
End: 2024-04-30
Payer: MEDICARE

## 2024-04-30 VITALS
WEIGHT: 201.3 LBS | HEART RATE: 76 BPM | BODY MASS INDEX: 32.35 KG/M2 | SYSTOLIC BLOOD PRESSURE: 143 MMHG | DIASTOLIC BLOOD PRESSURE: 76 MMHG | HEIGHT: 66 IN | OXYGEN SATURATION: 98 %

## 2024-04-30 DIAGNOSIS — C34.92 MALIGNANT NEOPLASM OF UNSPECIFIED PART OF LEFT BRONCHUS OR LUNG (HCC): ICD-10-CM

## 2024-04-30 DIAGNOSIS — G47.33 OBSTRUCTIVE SLEEP APNEA (ADULT) (PEDIATRIC): Primary | ICD-10-CM

## 2024-04-30 PROCEDURE — G8427 DOCREV CUR MEDS BY ELIG CLIN: HCPCS | Performed by: NURSE PRACTITIONER

## 2024-04-30 PROCEDURE — G8399 PT W/DXA RESULTS DOCUMENT: HCPCS | Performed by: NURSE PRACTITIONER

## 2024-04-30 PROCEDURE — 1123F ACP DISCUSS/DSCN MKR DOCD: CPT | Performed by: NURSE PRACTITIONER

## 2024-04-30 PROCEDURE — 3078F DIAST BP <80 MM HG: CPT | Performed by: NURSE PRACTITIONER

## 2024-04-30 PROCEDURE — 99214 OFFICE O/P EST MOD 30 MIN: CPT | Performed by: NURSE PRACTITIONER

## 2024-04-30 PROCEDURE — 3077F SYST BP >= 140 MM HG: CPT | Performed by: NURSE PRACTITIONER

## 2024-04-30 PROCEDURE — 1036F TOBACCO NON-USER: CPT | Performed by: NURSE PRACTITIONER

## 2024-04-30 PROCEDURE — G8417 CALC BMI ABV UP PARAM F/U: HCPCS | Performed by: NURSE PRACTITIONER

## 2024-04-30 PROCEDURE — 1090F PRES/ABSN URINE INCON ASSESS: CPT | Performed by: NURSE PRACTITIONER

## 2024-04-30 NOTE — PATIENT INSTRUCTIONS
[address]  [place] [address]  Tel.  [phone]  Fax. [phone] [address]  [place] [address]  Tel.  [phone]  Fax. [phone] [address] [place] [address]  Tel.  [phone]  Fax. [phone]     Learning About CPAP for Sleep Apnea  What is CPAP?              CPAP is a small machine that you use at home every night while you sleep. It increases air pressure in your throat to keep your airway open. When you have sleep apnea, this can help you sleep better so you feel much better. CPAP stands for \"continuous positive airway pressure.\"  The CPAP machine will have one of the following:  A mask that covers your nose and mouth  Prongs that fit into your nose  A mask that covers your nose only, the most common type. This type is called NCPAP. The N stands for \"nasal.\"  Why is it done?  CPAP is usually the best treatment for obstructive sleep apnea. It is the first treatment choice and the most widely used. Your doctor may suggest CPAP if you have:  Moderate to severe sleep apnea.  Sleep apnea and coronary artery disease (CAD) or heart failure.  How does it help?  CPAP can help you have more normal sleep, so you feel less sleepy and more alert during the daytime.  CPAP may help keep heart failure or other heart problems from getting worse.  NCPAP may help lower your blood pressure.  If you use CPAP, your bed partner may also sleep better because you are not snoring or restless.  What are the side effects?  Some people who use CPAP have:  A dry or stuffy nose and a sore throat.  Irritated skin on the face.  Sore eyes.  Bloating.  If you have any of these problems, work with your doctor to fix them. Here are some things you can try:  Be sure the mask or nasal prongs fit well.  See if your doctor can adjust the pressure of your CPAP.  If your nose is dry, try a humidifier.  If your nose is runny or stuffy, try decongestant medicine or a steroid

## 2024-04-30 NOTE — PROGRESS NOTES
[address]   [place] [address]   Tel.  [phone]   Fax. [phone]  [address]   [place] [address]   Tel.  [phone]   Fax. [phone] [address] [place] [address]   Tel.  [phone]   Fax. [phone]     Elise Tabares (: 1945) is a 78 y.o. female, established patient, seen for positive airway pressure follow-up and evaluation.  She was last seen by me on 2024, previously seen by Dr. Mercado on 2014, prior notes and sleep testing reviewed in detail. Home sleep test 2014 showed AHI of 33.2/hr with a lowest SpO2 of 77%, duration of SpO2 < 88% 16 min. Weight at time of sleep testing 226 pounds.     Re-evaluated after lung resection and weight loss with In lab split sleep test 2024 showed AHI of 21.3/hr with a lowest SpO2 of 85%, duration of SpO2 < 88% 2.6 min.  Weight at time of sleep testing 195 pounds.      ASSESSMENT/PLAN:   Diagnosis Orders   1. Obstructive sleep apnea (adult) (pediatric)        2. Malignant neoplasm of unspecified part of left bronchus or lung (HCC)        3. Adult BMI 32.0-32.9 kg/sq m            AHI = 21.3(2024).  On ResMed BIPAP Max IPAP 16, Min EPAP 8, PS 6. Set up 2024.    Previously On ResMed APAP : 5-12 cmH2O. Set up 2014.     She is not compliant with PAP therapy although when used PAP shows benefit to the patient and remains necessary for control of her sleep apnea. She has not yet been able to use device, tech reviewed settings and mask fit today, she will trial tonight.      Follow-up and Dispositions    Return in about 4 months (around 2024) for compliance follow up.         Sleep Apnea-  Sleep apnea condition has been exacerbated, she has not yet started using PAP device.    * Tech to review device setup and fit mask for patient so she may trial, she will let us know if she is able to tolerate on current treatment regimen    * Counseling was provided regarding

## 2024-05-02 ENCOUNTER — TELEPHONE (OUTPATIENT)
Age: 79
End: 2024-05-02

## 2024-05-02 NOTE — TELEPHONE ENCOUNTER
Patient called and stated that she will be out of town on may 14 and wanted to move to may 21 told her the new time

## 2024-05-07 ENCOUNTER — APPOINTMENT (OUTPATIENT)
Facility: HOSPITAL | Age: 79
End: 2024-05-07
Payer: MEDICARE

## 2024-05-14 ENCOUNTER — APPOINTMENT (OUTPATIENT)
Facility: HOSPITAL | Age: 79
End: 2024-05-14
Payer: MEDICARE

## 2024-05-14 DIAGNOSIS — C34.92 MALIGNANT NEOPLASM OF UNSPECIFIED PART OF LEFT BRONCHUS OR LUNG (HCC): Primary | ICD-10-CM

## 2024-05-14 RX ORDER — ACETAMINOPHEN 325 MG/1
650 TABLET ORAL
OUTPATIENT
Start: 2024-05-21

## 2024-05-14 RX ORDER — SODIUM CHLORIDE 0.9 % (FLUSH) 0.9 %
5-40 SYRINGE (ML) INJECTION PRN
OUTPATIENT
Start: 2024-05-21

## 2024-05-14 RX ORDER — DIPHENHYDRAMINE HYDROCHLORIDE 50 MG/ML
50 INJECTION INTRAMUSCULAR; INTRAVENOUS
OUTPATIENT
Start: 2024-05-21

## 2024-05-14 RX ORDER — EPINEPHRINE 1 MG/ML
0.3 INJECTION, SOLUTION, CONCENTRATE INTRAVENOUS PRN
OUTPATIENT
Start: 2024-05-21

## 2024-05-14 RX ORDER — MEPERIDINE HYDROCHLORIDE 25 MG/ML
12.5 INJECTION INTRAMUSCULAR; INTRAVENOUS; SUBCUTANEOUS PRN
OUTPATIENT
Start: 2024-05-21

## 2024-05-14 RX ORDER — SODIUM CHLORIDE 9 MG/ML
INJECTION, SOLUTION INTRAVENOUS CONTINUOUS
OUTPATIENT
Start: 2024-05-21

## 2024-05-14 RX ORDER — ACETAMINOPHEN 325 MG/1
650 TABLET ORAL
Start: 2024-05-21

## 2024-05-14 RX ORDER — HEPARIN 100 UNIT/ML
500 SYRINGE INTRAVENOUS PRN
OUTPATIENT
Start: 2024-05-21

## 2024-05-14 RX ORDER — SODIUM CHLORIDE 9 MG/ML
5-250 INJECTION, SOLUTION INTRAVENOUS PRN
OUTPATIENT
Start: 2024-05-21

## 2024-05-14 RX ORDER — ALBUTEROL SULFATE 90 UG/1
4 AEROSOL, METERED RESPIRATORY (INHALATION) PRN
OUTPATIENT
Start: 2024-05-21

## 2024-05-14 RX ORDER — ONDANSETRON 2 MG/ML
8 INJECTION INTRAMUSCULAR; INTRAVENOUS
OUTPATIENT
Start: 2024-05-21

## 2024-05-17 ENCOUNTER — APPOINTMENT (OUTPATIENT)
Facility: HOSPITAL | Age: 79
End: 2024-05-17
Payer: MEDICARE

## 2024-05-17 ENCOUNTER — TELEPHONE (OUTPATIENT)
Age: 79
End: 2024-05-17

## 2024-05-17 NOTE — TELEPHONE ENCOUNTER
Lvm requesting a call back to reschedule upcoming appointment on 6/13 with Huma. Please reschedule to next available.

## 2024-05-21 ENCOUNTER — OFFICE VISIT (OUTPATIENT)
Age: 79
End: 2024-05-21
Payer: MEDICARE

## 2024-05-21 ENCOUNTER — HOSPITAL ENCOUNTER (OUTPATIENT)
Facility: HOSPITAL | Age: 79
Setting detail: INFUSION SERIES
Discharge: HOME | End: 2024-05-21
Payer: MEDICARE

## 2024-05-21 VITALS
TEMPERATURE: 97.2 F | HEART RATE: 81 BPM | WEIGHT: 206.3 LBS | SYSTOLIC BLOOD PRESSURE: 149 MMHG | BODY MASS INDEX: 33.16 KG/M2 | HEIGHT: 66 IN | RESPIRATION RATE: 16 BRPM | DIASTOLIC BLOOD PRESSURE: 69 MMHG | OXYGEN SATURATION: 95 %

## 2024-05-21 VITALS
HEART RATE: 83 BPM | RESPIRATION RATE: 18 BRPM | DIASTOLIC BLOOD PRESSURE: 80 MMHG | SYSTOLIC BLOOD PRESSURE: 134 MMHG | TEMPERATURE: 97.8 F | WEIGHT: 206.8 LBS | BODY MASS INDEX: 34.45 KG/M2 | HEIGHT: 65 IN | OXYGEN SATURATION: 96 %

## 2024-05-21 DIAGNOSIS — E11.22 TYPE 2 DIABETES MELLITUS WITH STAGE 3A CHRONIC KIDNEY DISEASE, WITHOUT LONG-TERM CURRENT USE OF INSULIN (HCC): ICD-10-CM

## 2024-05-21 DIAGNOSIS — N18.31 TYPE 2 DIABETES MELLITUS WITH STAGE 3A CHRONIC KIDNEY DISEASE, WITHOUT LONG-TERM CURRENT USE OF INSULIN (HCC): ICD-10-CM

## 2024-05-21 DIAGNOSIS — D63.8 ANEMIA OF CHRONIC DISEASE: ICD-10-CM

## 2024-05-21 DIAGNOSIS — N18.31 STAGE 3A CHRONIC KIDNEY DISEASE (HCC): ICD-10-CM

## 2024-05-21 DIAGNOSIS — C34.92 MALIGNANT NEOPLASM OF UNSPECIFIED PART OF LEFT BRONCHUS OR LUNG (HCC): Primary | ICD-10-CM

## 2024-05-21 DIAGNOSIS — N64.4 BREAST PAIN: ICD-10-CM

## 2024-05-21 DIAGNOSIS — M79.622 PAIN IN AXILLA, LEFT: ICD-10-CM

## 2024-05-21 DIAGNOSIS — Z51.12 ENCOUNTER FOR ANTINEOPLASTIC IMMUNOTHERAPY: ICD-10-CM

## 2024-05-21 LAB
ALBUMIN SERPL-MCNC: 3.5 G/DL (ref 3.5–5)
ALBUMIN/GLOB SERPL: 1.1 (ref 1.1–2.2)
ALP SERPL-CCNC: 101 U/L (ref 45–117)
ALT SERPL-CCNC: 18 U/L (ref 12–78)
ANION GAP SERPL CALC-SCNC: 4 MMOL/L (ref 5–15)
AST SERPL-CCNC: 13 U/L (ref 15–37)
BASOPHILS # BLD: 0 K/UL (ref 0–0.1)
BASOPHILS NFR BLD: 0 % (ref 0–1)
BILIRUB SERPL-MCNC: 0.4 MG/DL (ref 0.2–1)
BUN SERPL-MCNC: 29 MG/DL (ref 6–20)
BUN/CREAT SERPL: 25 (ref 12–20)
CALCIUM SERPL-MCNC: 9.2 MG/DL (ref 8.5–10.1)
CHLORIDE SERPL-SCNC: 108 MMOL/L (ref 97–108)
CO2 SERPL-SCNC: 26 MMOL/L (ref 21–32)
CREAT SERPL-MCNC: 1.18 MG/DL (ref 0.55–1.02)
DIFFERENTIAL METHOD BLD: ABNORMAL
EOSINOPHIL # BLD: 0.1 K/UL (ref 0–0.4)
EOSINOPHIL NFR BLD: 2 % (ref 0–7)
ERYTHROCYTE [DISTWIDTH] IN BLOOD BY AUTOMATED COUNT: 14.6 % (ref 11.5–14.5)
GLOBULIN SER CALC-MCNC: 3.3 G/DL (ref 2–4)
GLUCOSE SERPL-MCNC: 146 MG/DL (ref 65–100)
HCT VFR BLD AUTO: 33.7 % (ref 35–47)
HGB BLD-MCNC: 11.2 G/DL (ref 11.5–16)
IMM GRANULOCYTES # BLD AUTO: 0 K/UL (ref 0–0.04)
IMM GRANULOCYTES NFR BLD AUTO: 0 % (ref 0–0.5)
LYMPHOCYTES # BLD: 0.7 K/UL (ref 0.8–3.5)
LYMPHOCYTES NFR BLD: 10 % (ref 12–49)
MCH RBC QN AUTO: 29.3 PG (ref 26–34)
MCHC RBC AUTO-ENTMCNC: 33.2 G/DL (ref 30–36.5)
MCV RBC AUTO: 88.2 FL (ref 80–99)
MONOCYTES # BLD: 0.3 K/UL (ref 0–1)
MONOCYTES NFR BLD: 5 % (ref 5–13)
NEUTS SEG # BLD: 5.8 K/UL (ref 1.8–8)
NEUTS SEG NFR BLD: 83 % (ref 32–75)
NRBC # BLD: 0 K/UL (ref 0–0.01)
NRBC BLD-RTO: 0 PER 100 WBC
PLATELET # BLD AUTO: 197 K/UL (ref 150–400)
PMV BLD AUTO: 10 FL (ref 8.9–12.9)
POTASSIUM SERPL-SCNC: 3.7 MMOL/L (ref 3.5–5.1)
PROT SERPL-MCNC: 6.8 G/DL (ref 6.4–8.2)
RBC # BLD AUTO: 3.82 M/UL (ref 3.8–5.2)
RBC MORPH BLD: ABNORMAL
SODIUM SERPL-SCNC: 138 MMOL/L (ref 136–145)
WBC # BLD AUTO: 6.9 K/UL (ref 3.6–11)

## 2024-05-21 PROCEDURE — G8417 CALC BMI ABV UP PARAM F/U: HCPCS | Performed by: NURSE PRACTITIONER

## 2024-05-21 PROCEDURE — 99215 OFFICE O/P EST HI 40 MIN: CPT | Performed by: NURSE PRACTITIONER

## 2024-05-21 PROCEDURE — 6360000002 HC RX W HCPCS: Performed by: NURSE PRACTITIONER

## 2024-05-21 PROCEDURE — 3075F SYST BP GE 130 - 139MM HG: CPT | Performed by: NURSE PRACTITIONER

## 2024-05-21 PROCEDURE — 3079F DIAST BP 80-89 MM HG: CPT | Performed by: NURSE PRACTITIONER

## 2024-05-21 PROCEDURE — G8399 PT W/DXA RESULTS DOCUMENT: HCPCS | Performed by: NURSE PRACTITIONER

## 2024-05-21 PROCEDURE — G2211 COMPLEX E/M VISIT ADD ON: HCPCS | Performed by: NURSE PRACTITIONER

## 2024-05-21 PROCEDURE — 85025 COMPLETE CBC W/AUTO DIFF WBC: CPT

## 2024-05-21 PROCEDURE — 1090F PRES/ABSN URINE INCON ASSESS: CPT | Performed by: NURSE PRACTITIONER

## 2024-05-21 PROCEDURE — G8427 DOCREV CUR MEDS BY ELIG CLIN: HCPCS | Performed by: NURSE PRACTITIONER

## 2024-05-21 PROCEDURE — 80053 COMPREHEN METABOLIC PANEL: CPT

## 2024-05-21 PROCEDURE — 2580000003 HC RX 258: Performed by: NURSE PRACTITIONER

## 2024-05-21 PROCEDURE — 96413 CHEMO IV INFUSION 1 HR: CPT

## 2024-05-21 PROCEDURE — 1036F TOBACCO NON-USER: CPT | Performed by: NURSE PRACTITIONER

## 2024-05-21 PROCEDURE — 1123F ACP DISCUSS/DSCN MKR DOCD: CPT | Performed by: NURSE PRACTITIONER

## 2024-05-21 RX ORDER — SODIUM CHLORIDE 9 MG/ML
5-250 INJECTION, SOLUTION INTRAVENOUS PRN
Status: DISCONTINUED | OUTPATIENT
Start: 2024-05-21 | End: 2024-05-22 | Stop reason: HOSPADM

## 2024-05-21 RX ORDER — SODIUM CHLORIDE 0.9 % (FLUSH) 0.9 %
5-40 SYRINGE (ML) INJECTION PRN
Status: DISCONTINUED | OUTPATIENT
Start: 2024-05-21 | End: 2024-05-22 | Stop reason: HOSPADM

## 2024-05-21 RX ADMIN — ATEZOLIZUMAB 1200 MG: 1200 INJECTION, SOLUTION INTRAVENOUS at 11:57

## 2024-05-21 ASSESSMENT — PAIN SCALES - GENERAL: PAINLEVEL_OUTOF10: 0

## 2024-05-21 NOTE — PROGRESS NOTES
Chief Complaint   Patient presents with    Follow-up           Vitals:    05/21/24 1046   BP: 134/80   Pulse: 83   Resp: 18   Temp: 97.8 °F (36.6 °C)   SpO2: 96%            1. Have you been to the ER, urgent care clinic since your last visit?  Hospitalized since your last visit?  No  2. Have you seen or consulted any other health care providers outside of the [facility] since your last visit?  Include any pap smears or colon screening. No

## 2024-05-21 NOTE — PROGRESS NOTES
[facility] Progress Note    Date: May 21, 2024    Name: Elise Tabares    MRN: [id number]         : 1945    Ms. Tabares Arrived ambulatory and in no distress for C12D1 of Tecentriq.  Assessment was completed, no acute issues at this time, no new complaints voiced.  RCW chest wall port accessed without difficulty, labs drawn & sent for processing.    Ms. Tabares's vitals were reviewed.  Vitals:    24 1234   BP: (!) 149/69   Pulse: 81   Resp: 16   Temp:    SpO2:        Lab results were obtained and reviewed.  Recent Results (from the past 12 hour(s))   Comprehensive metabolic panel    Collection Time: 24 10:30 AM   Result Value Ref Range    Sodium 138 136 - 145 mmol/L    Potassium 3.7 3.5 - 5.1 mmol/L    Chloride 108 97 - 108 mmol/L    CO2 26 21 - 32 mmol/L    Anion Gap 4 (L) 5 - 15 mmol/L    Glucose 146 (H) 65 - 100 mg/dL    BUN 29 (H) 6 - 20 MG/DL    Creatinine 1.18 (H) 0.55 - 1.02 MG/DL    BUN/Creatinine Ratio 25 (H) 12 - 20      Est, Glom Filt Rate 47 (L) >60 ml/min/1.73m2    Calcium 9.2 8.5 - 10.1 MG/DL    Total Bilirubin 0.4 0.2 - 1.0 MG/DL    ALT 18 12 - 78 U/L    AST 13 (L) 15 - 37 U/L    Alk Phosphatase 101 45 - 117 U/L    Total Protein 6.8 6.4 - 8.2 g/dL    Albumin 3.5 3.5 - 5.0 g/dL    Globulin 3.3 2.0 - 4.0 g/dL    Albumin/Globulin Ratio 1.1 1.1 - 2.2     CBC With Auto Differential    Collection Time: 24 10:30 AM   Result Value Ref Range    WBC 6.9 3.6 - 11.0 K/uL    RBC 3.82 3.80 - 5.20 M/uL    Hemoglobin 11.2 (L) 11.5 - 16.0 g/dL    Hematocrit 33.7 (L) 35.0 - 47.0 %    MCV 88.2 80.0 - 99.0 FL    MCH 29.3 26.0 - 34.0 PG    MCHC 33.2 30.0 - 36.5 g/dL    RDW 14.6 (H) 11.5 - 14.5 %    Platelets 197 150 - 400 K/uL    MPV 10.0 8.9 - 12.9 FL    Nucleated RBCs 0.0 0  WBC    nRBC 0.00 0.00 - 0.01 K/uL    Neutrophils % 83 (H) 32 - 75 %    Lymphocytes % 10 (L) 12 - 49 %    Monocytes % 5 5 - 13 %    Eosinophils % 2 0 - 7 %    Basophils % 0 0 - 1 %    Immature Granulocytes % 0 0.0 - 0.5 %

## 2024-05-21 NOTE — PROGRESS NOTES
[facility]  Medical Oncology at [place]  [phone]    Hematology / Oncology Established Visit    Reason for Visit:   Elise Tabares is a 78 y.o. female who is seen for follow up of lung cancer.     Hematology Oncology Treatment History:     Diagnosis: Squamous cell carcinoma of lung    Stage: IIB [[id number]]    Pathology:   3/14/23 Lung, left upper lobe, Core biopsy: Invasive poorly differentiated squamous cell carcinoma.   Comment: Immunohistochemical stains show the malignant cells are positive  for cytokeratin 5/6 and negative for cytokeratin 7, cytokeratin 20 and  TTF-1 supporting the diagnosis. PD-L1 by immunohistochemistry pending.     5/3/23 Left upper lobe wedge resection:  Invasive poorly differentiated squamous cell carcinoma with areas of extensive necrosis.   Tumor size 5.5cm, G3  Visceral pleural invasion present. Vascular invasion present. Parenchymal resection margin negative for tumor. Background lung parenchyma with moderate to severe architectural distortion associated with prominent peribronchiolar metaplasia.  0/6 LN positive [Station 5, 6, 7 x 2, 8, 10]  -PDL1 TPS 2%  - [place]    Prior Treatment:   1. Sublobar resection of JANELL by Dr. Dom Perez  2. Adjuvant Carbo-McIntosh x 4 on D1, 8 of q21d cycles, 6/20/23- 8/29/23    Current Treatment:  Atezolizumab [id number] x 1 year, started 9/19/23 - current  Treatment duration: 1 year  Frequency of visits: Every 3 weeks    History of Present Illness:   Elise Tabares is a 78 y.o. female with CAD, CKD, HTN, DM II, MAX who is referred for evaluation and management of lung cancer. Pt was recently hospitalized in early March 2023 after 2 syncopal episodes at home. She also noted some loose stools and vomiting. She was diagnosed with IVVD and VAIBHAV, treated with IVF. She was found to have a new 27 x 24mm cavitary lesion in JANELL and underwent CT-guided biopsy. She was also found to have a vascularized mass in R light of thyroid, 1.5cm and

## 2024-05-31 ENCOUNTER — TELEPHONE (OUTPATIENT)
Age: 79
End: 2024-05-31

## 2024-05-31 NOTE — TELEPHONE ENCOUNTER
05/31/24 2:10 PM Attempted to call patient via home number listed. No answer, left message requesting return call. Provided office phone number as well for call back. Per chart review, patient scheduled for CT chest on Sunday, 06/09. Per Dr. Park, will need to schedule IV fluids and labs (BMP) in [facility] [place] on Monday, 06/10. Patient to also push fluids on 06/09 by drinking 1L of water.

## 2024-06-03 RX ORDER — ONDANSETRON 2 MG/ML
8 INJECTION INTRAMUSCULAR; INTRAVENOUS
Status: CANCELLED | OUTPATIENT
Start: 2024-06-11

## 2024-06-03 RX ORDER — ALBUTEROL SULFATE 90 UG/1
4 AEROSOL, METERED RESPIRATORY (INHALATION) PRN
Status: CANCELLED | OUTPATIENT
Start: 2024-06-11

## 2024-06-03 RX ORDER — HEPARIN 100 UNIT/ML
500 SYRINGE INTRAVENOUS PRN
Status: CANCELLED | OUTPATIENT
Start: 2024-06-11

## 2024-06-03 RX ORDER — FAMOTIDINE 10 MG/ML
20 INJECTION, SOLUTION INTRAVENOUS
Status: CANCELLED | OUTPATIENT
Start: 2024-06-11

## 2024-06-03 RX ORDER — SODIUM CHLORIDE 9 MG/ML
INJECTION, SOLUTION INTRAVENOUS CONTINUOUS
Status: CANCELLED | OUTPATIENT
Start: 2024-06-11

## 2024-06-03 RX ORDER — EPINEPHRINE 1 MG/ML
0.3 INJECTION, SOLUTION INTRAMUSCULAR; SUBCUTANEOUS PRN
Status: CANCELLED | OUTPATIENT
Start: 2024-06-11

## 2024-06-03 RX ORDER — ACETAMINOPHEN 325 MG/1
650 TABLET ORAL
Status: CANCELLED
Start: 2024-06-11

## 2024-06-03 RX ORDER — SODIUM CHLORIDE 9 MG/ML
5-250 INJECTION, SOLUTION INTRAVENOUS PRN
Status: CANCELLED | OUTPATIENT
Start: 2024-06-11

## 2024-06-03 RX ORDER — ACETAMINOPHEN 325 MG/1
650 TABLET ORAL
Status: CANCELLED | OUTPATIENT
Start: 2024-06-11

## 2024-06-03 RX ORDER — DIPHENHYDRAMINE HYDROCHLORIDE 50 MG/ML
50 INJECTION INTRAMUSCULAR; INTRAVENOUS
Status: CANCELLED | OUTPATIENT
Start: 2024-06-11

## 2024-06-04 ENCOUNTER — APPOINTMENT (OUTPATIENT)
Facility: HOSPITAL | Age: 79
End: 2024-06-04
Payer: MEDICARE

## 2024-06-05 DIAGNOSIS — C34.92 SQUAMOUS CELL CARCINOMA OF LUNG, LEFT (HCC): Primary | ICD-10-CM

## 2024-06-05 RX ORDER — ACETAMINOPHEN 325 MG/1
650 TABLET ORAL
OUTPATIENT
Start: 2024-06-10

## 2024-06-05 RX ORDER — 0.9 % SODIUM CHLORIDE 0.9 %
1000 INTRAVENOUS SOLUTION INTRAVENOUS ONCE
OUTPATIENT
Start: 2024-06-10 | End: 2024-06-10

## 2024-06-05 RX ORDER — ONDANSETRON 2 MG/ML
8 INJECTION INTRAMUSCULAR; INTRAVENOUS
OUTPATIENT
Start: 2024-06-10

## 2024-06-05 RX ORDER — DIPHENHYDRAMINE HYDROCHLORIDE 50 MG/ML
50 INJECTION INTRAMUSCULAR; INTRAVENOUS
OUTPATIENT
Start: 2024-06-10

## 2024-06-05 RX ORDER — SODIUM CHLORIDE 9 MG/ML
5-250 INJECTION, SOLUTION INTRAVENOUS PRN
OUTPATIENT
Start: 2024-06-10

## 2024-06-05 RX ORDER — ALBUTEROL SULFATE 90 UG/1
4 AEROSOL, METERED RESPIRATORY (INHALATION) PRN
OUTPATIENT
Start: 2024-06-10

## 2024-06-05 RX ORDER — HEPARIN 100 UNIT/ML
500 SYRINGE INTRAVENOUS PRN
OUTPATIENT
Start: 2024-06-10

## 2024-06-05 RX ORDER — EPINEPHRINE 1 MG/ML
0.3 INJECTION, SOLUTION, CONCENTRATE INTRAVENOUS PRN
OUTPATIENT
Start: 2024-06-10

## 2024-06-05 RX ORDER — SODIUM CHLORIDE 9 MG/ML
INJECTION, SOLUTION INTRAVENOUS CONTINUOUS
OUTPATIENT
Start: 2024-06-10

## 2024-06-05 RX ORDER — SODIUM CHLORIDE 0.9 % (FLUSH) 0.9 %
5-40 SYRINGE (ML) INJECTION PRN
OUTPATIENT
Start: 2024-06-10

## 2024-06-09 ENCOUNTER — HOSPITAL ENCOUNTER (OUTPATIENT)
Facility: HOSPITAL | Age: 79
Discharge: HOME | End: 2024-06-12
Payer: MEDICARE

## 2024-06-09 DIAGNOSIS — C34.92 MALIGNANT NEOPLASM OF UNSPECIFIED PART OF LEFT BRONCHUS OR LUNG (HCC): ICD-10-CM

## 2024-06-09 LAB — CREAT BLD-MCNC: 1.1 MG/DL (ref 0.6–1.3)

## 2024-06-09 PROCEDURE — 82565 ASSAY OF CREATININE: CPT

## 2024-06-09 PROCEDURE — 6360000004 HC RX CONTRAST MEDICATION: Performed by: FAMILY MEDICINE

## 2024-06-09 PROCEDURE — 71260 CT THORAX DX C+: CPT

## 2024-06-09 RX ADMIN — IOPAMIDOL 100 ML: 755 INJECTION, SOLUTION INTRAVENOUS at 17:50

## 2024-06-10 ENCOUNTER — HOSPITAL ENCOUNTER (OUTPATIENT)
Facility: HOSPITAL | Age: 79
Setting detail: INFUSION SERIES
Discharge: HOME | End: 2024-06-10
Payer: MEDICARE

## 2024-06-10 VITALS
HEIGHT: 65 IN | HEART RATE: 77 BPM | RESPIRATION RATE: 18 BRPM | OXYGEN SATURATION: 99 % | WEIGHT: 209.2 LBS | TEMPERATURE: 97.9 F | SYSTOLIC BLOOD PRESSURE: 153 MMHG | BODY MASS INDEX: 34.85 KG/M2 | DIASTOLIC BLOOD PRESSURE: 77 MMHG

## 2024-06-10 DIAGNOSIS — C34.92 SQUAMOUS CELL CARCINOMA OF LUNG, LEFT (HCC): Primary | ICD-10-CM

## 2024-06-10 LAB
ANION GAP SERPL CALC-SCNC: 5 MMOL/L (ref 5–15)
BUN SERPL-MCNC: 30 MG/DL (ref 6–20)
BUN/CREAT SERPL: 25 (ref 12–20)
CALCIUM SERPL-MCNC: 9 MG/DL (ref 8.5–10.1)
CHLORIDE SERPL-SCNC: 104 MMOL/L (ref 97–108)
CO2 SERPL-SCNC: 26 MMOL/L (ref 21–32)
CREAT SERPL-MCNC: 1.19 MG/DL (ref 0.55–1.02)
GLUCOSE SERPL-MCNC: 221 MG/DL (ref 65–100)
POTASSIUM SERPL-SCNC: 4.4 MMOL/L (ref 3.5–5.1)
SODIUM SERPL-SCNC: 135 MMOL/L (ref 136–145)

## 2024-06-10 PROCEDURE — 96360 HYDRATION IV INFUSION INIT: CPT

## 2024-06-10 PROCEDURE — 80048 BASIC METABOLIC PNL TOTAL CA: CPT

## 2024-06-10 PROCEDURE — 2580000003 HC RX 258: Performed by: NURSE PRACTITIONER

## 2024-06-10 PROCEDURE — 80053 COMPREHEN METABOLIC PANEL: CPT

## 2024-06-10 RX ORDER — ALBUTEROL SULFATE 90 UG/1
4 AEROSOL, METERED RESPIRATORY (INHALATION) PRN
OUTPATIENT
Start: 2024-06-10

## 2024-06-10 RX ORDER — SODIUM CHLORIDE 9 MG/ML
5-250 INJECTION, SOLUTION INTRAVENOUS PRN
OUTPATIENT
Start: 2024-06-10

## 2024-06-10 RX ORDER — DIPHENHYDRAMINE HYDROCHLORIDE 50 MG/ML
50 INJECTION INTRAMUSCULAR; INTRAVENOUS
OUTPATIENT
Start: 2024-06-10

## 2024-06-10 RX ORDER — SODIUM CHLORIDE 0.9 % (FLUSH) 0.9 %
5-40 SYRINGE (ML) INJECTION PRN
Status: DISCONTINUED | OUTPATIENT
Start: 2024-06-10 | End: 2024-06-11 | Stop reason: HOSPADM

## 2024-06-10 RX ORDER — FAMOTIDINE 10 MG/ML
20 INJECTION, SOLUTION INTRAVENOUS
OUTPATIENT
Start: 2024-06-10

## 2024-06-10 RX ORDER — EPINEPHRINE 1 MG/ML
0.3 INJECTION, SOLUTION INTRAMUSCULAR; SUBCUTANEOUS PRN
OUTPATIENT
Start: 2024-06-10

## 2024-06-10 RX ORDER — HEPARIN 100 UNIT/ML
500 SYRINGE INTRAVENOUS PRN
OUTPATIENT
Start: 2024-06-10

## 2024-06-10 RX ORDER — ONDANSETRON 2 MG/ML
8 INJECTION INTRAMUSCULAR; INTRAVENOUS
OUTPATIENT
Start: 2024-06-10

## 2024-06-10 RX ORDER — 0.9 % SODIUM CHLORIDE 0.9 %
1000 INTRAVENOUS SOLUTION INTRAVENOUS ONCE
Status: COMPLETED | OUTPATIENT
Start: 2024-06-10 | End: 2024-06-10

## 2024-06-10 RX ORDER — ACETAMINOPHEN 325 MG/1
650 TABLET ORAL
OUTPATIENT
Start: 2024-06-10

## 2024-06-10 RX ORDER — SODIUM CHLORIDE 0.9 % (FLUSH) 0.9 %
5-40 SYRINGE (ML) INJECTION PRN
OUTPATIENT
Start: 2024-06-10

## 2024-06-10 RX ORDER — 0.9 % SODIUM CHLORIDE 0.9 %
1000 INTRAVENOUS SOLUTION INTRAVENOUS ONCE
Status: CANCELLED | OUTPATIENT
Start: 2024-06-10 | End: 2024-06-10

## 2024-06-10 RX ORDER — SODIUM CHLORIDE 9 MG/ML
INJECTION, SOLUTION INTRAVENOUS CONTINUOUS
OUTPATIENT
Start: 2024-06-10

## 2024-06-10 RX ADMIN — SODIUM CHLORIDE, PRESERVATIVE FREE 20 ML: 5 INJECTION INTRAVENOUS at 13:22

## 2024-06-10 RX ADMIN — SODIUM CHLORIDE 1000 ML: 9 INJECTION, SOLUTION INTRAVENOUS at 12:06

## 2024-06-10 ASSESSMENT — PAIN DESCRIPTION - ORIENTATION: ORIENTATION: RIGHT;LEFT

## 2024-06-10 ASSESSMENT — PAIN SCALES - GENERAL: PAINLEVEL_OUTOF10: 5

## 2024-06-10 ASSESSMENT — PAIN DESCRIPTION - DESCRIPTORS: DESCRIPTORS: SORE

## 2024-06-10 ASSESSMENT — PAIN DESCRIPTION - LOCATION: LOCATION: BREAST

## 2024-06-10 NOTE — PROGRESS NOTES
[facility]patient [facility]fusion [facility] Progress Note        Date: 06/10/24    Name: Elise Tabares    MRN: [id number]         : 1945      Ms. Tabares admitted to \[facility]\"" for Hydration + Labs ambulatory with cane in stable condition. Assessment completed, no acute issues at this time. No new concerns voiced.  RCW port a cath accessed using 0.75\" mays, blood return obtained and port flushed without difficulty.  Labs drawn and sent for processing.            Vitals:    06/10/24 1149 06/10/24 1320   BP: 131/70 (!) 153/77   Pulse: 88 77   Resp: 18 18   Temp: 97.6 °F (36.4 °C) 97.9 °F (36.6 °C)   TempSrc: Temporal Temporal   SpO2: 97% 99%   Weight: 94.9 kg (209 lb 3.2 oz)    Height: 1.651 m (5' 5\")        Labs were obtained and reviewed.    Results for orders placed or performed during the hospital encounter of 06/10/24   Basic Metabolic Panel   Result Value Ref Range    Sodium 135 (L) 136 - 145 mmol/L    Potassium 4.4 3.5 - 5.1 mmol/L    Chloride 104 97 - 108 mmol/L    CO2 26 21 - 32 mmol/L    Anion Gap 5 5 - 15 mmol/L    Glucose 221 (H) 65 - 100 mg/dL    BUN 30 (H) 6 - 20 MG/DL    Creatinine 1.19 (H) 0.55 - 1.02 MG/DL    BUN/Creatinine Ratio 25 (H) 12 - 20      Est, Glom Filt Rate 47 (L) >60 ml/min/1.73m2    Calcium 9.0 8.5 - 10.1 MG/DL             Medications:  MEDICATIONS GIVEN:  Medications Administered         sodium chloride 0.9 % bolus 1,000 mL Admin Date  06/10/2024 Action  New Bag Dose  1,000 mL Rate  983.6 mL/hr Route  IntraVENous Administered By  Wes Matta RN        sodium chloride flush 0.9 % injection 5-40 mL Admin Date  06/10/2024 Action  Given Dose  20 mL Rate   Route  IntraVENous Administered By  Wes Matta, RN                Post-Infusion Vitals:  Vitals:    06/10/24 1320   BP: (!) 153/77   Pulse: 77   Resp: 18   Temp: 97.9 °F (36.6 °C)   SpO2: 99%             Pt tolerated treatment well, no adverse reactions noted. Port maintained positive blood return throughout treatment, flushed with positive  blood return at conclusion and de-accessed per protocol. D/c home ambulatory with cane in no distress. Patient is aware of next appointment on 6/11/2024 @ 1030a at the [facility] location.        Future Appointments:  Future Appointments   Date Time Provider Department Center   6/11/2024 10:30 AM SS MED INF CHAIR 3 [facility]   6/11/2024 11:00 AM Guerline Park MD ONCSF BS AMB   6/13/2024  1:30 PM [facility] ECHO 1 [facility] BS AMB   6/14/2024 11:00 AM [facility]IS [facility]   7/2/2024 10:30 AM SS MED INF CHAIR 3 [facility]   7/23/2024 10:30 AM SS MED INF CHAIR 3 [facility]   8/13/2024 10:30 AM SS MED INF CHAIR 3 [facility]

## 2024-06-11 ENCOUNTER — OFFICE VISIT (OUTPATIENT)
Age: 79
End: 2024-06-11
Payer: MEDICARE

## 2024-06-11 ENCOUNTER — HOSPITAL ENCOUNTER (OUTPATIENT)
Facility: HOSPITAL | Age: 79
Setting detail: INFUSION SERIES
Discharge: HOME | End: 2024-06-11
Payer: MEDICARE

## 2024-06-11 VITALS
DIASTOLIC BLOOD PRESSURE: 64 MMHG | HEART RATE: 74 BPM | OXYGEN SATURATION: 98 % | TEMPERATURE: 97.1 F | SYSTOLIC BLOOD PRESSURE: 132 MMHG | BODY MASS INDEX: 34.59 KG/M2 | WEIGHT: 207.6 LBS | RESPIRATION RATE: 18 BRPM | HEIGHT: 65 IN

## 2024-06-11 VITALS
TEMPERATURE: 97.3 F | SYSTOLIC BLOOD PRESSURE: 159 MMHG | BODY MASS INDEX: 34.32 KG/M2 | DIASTOLIC BLOOD PRESSURE: 75 MMHG | RESPIRATION RATE: 18 BRPM | WEIGHT: 206 LBS | HEIGHT: 65 IN | OXYGEN SATURATION: 96 % | HEART RATE: 71 BPM

## 2024-06-11 DIAGNOSIS — N18.31 STAGE 3A CHRONIC KIDNEY DISEASE (HCC): ICD-10-CM

## 2024-06-11 DIAGNOSIS — D63.8 ANEMIA OF CHRONIC DISEASE: ICD-10-CM

## 2024-06-11 DIAGNOSIS — N64.4 BREAST PAIN: ICD-10-CM

## 2024-06-11 DIAGNOSIS — M79.621 AXILLARY PAIN, RIGHT: ICD-10-CM

## 2024-06-11 DIAGNOSIS — M79.622 LEFT AXILLARY PAIN: ICD-10-CM

## 2024-06-11 DIAGNOSIS — C34.92 MALIGNANT NEOPLASM OF UNSPECIFIED PART OF LEFT BRONCHUS OR LUNG (HCC): Primary | ICD-10-CM

## 2024-06-11 DIAGNOSIS — Z51.12 ENCOUNTER FOR ANTINEOPLASTIC IMMUNOTHERAPY: ICD-10-CM

## 2024-06-11 LAB
ALBUMIN SERPL-MCNC: 3.6 G/DL (ref 3.5–5)
ALBUMIN/GLOB SERPL: 1.1 (ref 1.1–2.2)
ALP SERPL-CCNC: 108 U/L (ref 45–117)
ALT SERPL-CCNC: 28 U/L (ref 12–78)
ANION GAP SERPL CALC-SCNC: 6 MMOL/L (ref 5–15)
AST SERPL-CCNC: 24 U/L (ref 15–37)
BASOPHILS # BLD: 0 K/UL (ref 0–0.1)
BASOPHILS NFR BLD: 0 % (ref 0–1)
BILIRUB SERPL-MCNC: 0.3 MG/DL (ref 0.2–1)
BUN SERPL-MCNC: 29 MG/DL (ref 6–20)
BUN/CREAT SERPL: 24 (ref 12–20)
CALCIUM SERPL-MCNC: 8.9 MG/DL (ref 8.5–10.1)
CHLORIDE SERPL-SCNC: 104 MMOL/L (ref 97–108)
CO2 SERPL-SCNC: 26 MMOL/L (ref 21–32)
CREAT SERPL-MCNC: 1.23 MG/DL (ref 0.55–1.02)
DIFFERENTIAL METHOD BLD: ABNORMAL
EOSINOPHIL # BLD: 0.1 K/UL (ref 0–0.4)
EOSINOPHIL NFR BLD: 3 % (ref 0–7)
ERYTHROCYTE [DISTWIDTH] IN BLOOD BY AUTOMATED COUNT: 13.3 % (ref 11.5–14.5)
GLOBULIN SER CALC-MCNC: 3.4 G/DL (ref 2–4)
GLUCOSE SERPL-MCNC: 206 MG/DL (ref 65–100)
HCT VFR BLD AUTO: 33.8 % (ref 35–47)
HGB BLD-MCNC: 11.1 G/DL (ref 11.5–16)
IMM GRANULOCYTES # BLD AUTO: 0 K/UL (ref 0–0.04)
IMM GRANULOCYTES NFR BLD AUTO: 0 % (ref 0–0.5)
LYMPHOCYTES # BLD: 0.6 K/UL (ref 0.8–3.5)
LYMPHOCYTES NFR BLD: 14 % (ref 12–49)
MCH RBC QN AUTO: 29.3 PG (ref 26–34)
MCHC RBC AUTO-ENTMCNC: 32.8 G/DL (ref 30–36.5)
MCV RBC AUTO: 89.2 FL (ref 80–99)
MONOCYTES # BLD: 0.4 K/UL (ref 0–1)
MONOCYTES NFR BLD: 8 % (ref 5–13)
NEUTS SEG # BLD: 3.4 K/UL (ref 1.8–8)
NEUTS SEG NFR BLD: 75 % (ref 32–75)
NRBC # BLD: 0 K/UL (ref 0–0.01)
NRBC BLD-RTO: 0 PER 100 WBC
PLATELET # BLD AUTO: 198 K/UL (ref 150–400)
PMV BLD AUTO: 10 FL (ref 8.9–12.9)
POTASSIUM SERPL-SCNC: 4.5 MMOL/L (ref 3.5–5.1)
PROT SERPL-MCNC: 7 G/DL (ref 6.4–8.2)
RBC # BLD AUTO: 3.79 M/UL (ref 3.8–5.2)
RBC MORPH BLD: ABNORMAL
SODIUM SERPL-SCNC: 136 MMOL/L (ref 136–145)
TSH SERPL DL<=0.05 MIU/L-ACNC: 1.69 UIU/ML (ref 0.36–3.74)
WBC # BLD AUTO: 4.5 K/UL (ref 3.6–11)

## 2024-06-11 PROCEDURE — 6360000002 HC RX W HCPCS: Performed by: INTERNAL MEDICINE

## 2024-06-11 PROCEDURE — G8427 DOCREV CUR MEDS BY ELIG CLIN: HCPCS | Performed by: NURSE PRACTITIONER

## 2024-06-11 PROCEDURE — 85025 COMPLETE CBC W/AUTO DIFF WBC: CPT

## 2024-06-11 PROCEDURE — G8399 PT W/DXA RESULTS DOCUMENT: HCPCS | Performed by: NURSE PRACTITIONER

## 2024-06-11 PROCEDURE — 96413 CHEMO IV INFUSION 1 HR: CPT

## 2024-06-11 PROCEDURE — 1090F PRES/ABSN URINE INCON ASSESS: CPT | Performed by: NURSE PRACTITIONER

## 2024-06-11 PROCEDURE — 3078F DIAST BP <80 MM HG: CPT | Performed by: NURSE PRACTITIONER

## 2024-06-11 PROCEDURE — 99215 OFFICE O/P EST HI 40 MIN: CPT | Performed by: NURSE PRACTITIONER

## 2024-06-11 PROCEDURE — G2211 COMPLEX E/M VISIT ADD ON: HCPCS | Performed by: NURSE PRACTITIONER

## 2024-06-11 PROCEDURE — 1036F TOBACCO NON-USER: CPT | Performed by: NURSE PRACTITIONER

## 2024-06-11 PROCEDURE — 2580000003 HC RX 258: Performed by: INTERNAL MEDICINE

## 2024-06-11 PROCEDURE — G8417 CALC BMI ABV UP PARAM F/U: HCPCS | Performed by: NURSE PRACTITIONER

## 2024-06-11 PROCEDURE — 3075F SYST BP GE 130 - 139MM HG: CPT | Performed by: NURSE PRACTITIONER

## 2024-06-11 PROCEDURE — 84443 ASSAY THYROID STIM HORMONE: CPT

## 2024-06-11 PROCEDURE — 1123F ACP DISCUSS/DSCN MKR DOCD: CPT | Performed by: NURSE PRACTITIONER

## 2024-06-11 RX ORDER — SODIUM CHLORIDE 9 MG/ML
5-250 INJECTION, SOLUTION INTRAVENOUS PRN
Status: DISCONTINUED | OUTPATIENT
Start: 2024-06-11 | End: 2024-06-12 | Stop reason: HOSPADM

## 2024-06-11 RX ORDER — SODIUM CHLORIDE 0.9 % (FLUSH) 0.9 %
5-40 SYRINGE (ML) INJECTION PRN
Status: DISCONTINUED | OUTPATIENT
Start: 2024-06-11 | End: 2024-06-12 | Stop reason: HOSPADM

## 2024-06-11 RX ADMIN — SODIUM CHLORIDE, PRESERVATIVE FREE 20 ML: 5 INJECTION INTRAVENOUS at 12:55

## 2024-06-11 RX ADMIN — ATEZOLIZUMAB 1200 MG: 1200 INJECTION, SOLUTION INTRAVENOUS at 12:15

## 2024-06-11 RX ADMIN — SODIUM CHLORIDE 50 ML/HR: 9 INJECTION, SOLUTION INTRAVENOUS at 11:38

## 2024-06-11 ASSESSMENT — PAIN SCALES - GENERAL: PAINLEVEL_OUTOF10: 0

## 2024-06-11 NOTE — PROGRESS NOTES
Chief Complaint   Patient presents with    Follow-up           Vitals:    06/11/24 1055   BP: 132/64   Pulse: 74   Resp: 18   Temp: 97.1 °F (36.2 °C)   SpO2: 98%            1. Have you been to the ER, urgent care clinic since your last visit?  Hospitalized since your last visit?  No  2. Have you seen or consulted any other health care providers outside of the [facility] since your last visit?  Include any pap smears or colon screening. Yes Pulmonologist    
effusion or pneumothorax.  LUNGS: The previously seen spiculated cavitary nodule in the left upper lobe has been surgically resected. Patient status post partial left upper lobe lobectomy and there are the expected postoperative changes. The lingula remains. There are  no pulmonary nodules identified.The lungs demonstrate mild changes of centrilobular and paraseptal emphysema..  INCIDENTALLY IMAGED UPPER ABDOMEN: The right kidney is partially imaged and is  smaller than the left kidney with irregular contours consistent with areas of atrophy and scarring.. There is a small hiatal hernia. No adrenal lesions.  BONES: No destructive bone lesion.  IMPRESSION:  1. The left upper lobe pulmonary nodule has been resected. There are no pulmonary nodules present. There is no adenopathy.  2. No evidence for metastatic disease.    12/1/23 CT chest wo cont:  HEART: Normal in size. Coronary artery calcification is severe  PLEURA: No effusion or pneumothorax.  LUNGS: The previously seen spiculated cavitary nodule in the left upper lobe has been surgically resected. Patient status post partial left upper lobe lobectomy. Typical postoperative changes with no new pulmonary mass. The lungs demonstrate mild changes of centrilobular and paraseptal emphysema.  INCIDENTALLY IMAGED UPPER ABDOMEN: There is a small hiatal hernia. No adrenal lesions.  BONES: No destructive bone lesion.  IMPRESSION:  No suspicious pulmonary mass or nodule.  No evidence for metastatic disease.    3/1/24 CT c/a/p:  IMPRESSION:  1. Postsurgical changes in the left upper lobe of the lung without evidence for recurrent or metastatic disease.  2. 1.3 cm right thyroid nodule.  3. Subacute/chronic appearing interstitial opacities in the lungs with mild emphysema and bibasilar bronchiectasis.  4. Distal esophagitis.  5. Renal cortical scarring.    6/9/24 CT chest w IV contrast pending        Assessment & Plan:   Elise Tabares is a 78 y.o. female is seen for lung

## 2024-06-11 NOTE — PROGRESS NOTES
Date: June 11, 2024        Ms. Tabares Arrived to [facility] for  Atezolizumab ambulatory in stable condition.  Assessment was completed and port accessed by Niya SORIA. Labs drawn and sent for processing. Ok to use CMP from yesterday for treatment today per MADDIE James.Went to provider appointment with Medical Oncology.    Returned from provider appointment. Labs reviewed. Criteria for treatment was met.    Ms. Tabares's vitals were reviewed.  Patient Vitals for the past 12 hrs:   Temp Pulse Resp BP SpO2   06/11/24 1028 97.3 °F (36.3 °C) 76 18 137/67 96 %         Lab results were obtained and reviewed.  Recent Results (from the past 12 hour(s))   TSH without Reflex    Collection Time: 06/11/24 10:41 AM   Result Value Ref Range    TSH, 3rd Generation 1.69 0.36 - 3.74 uIU/mL   CBC With Auto Differential    Collection Time: 06/11/24 10:41 AM   Result Value Ref Range    WBC 4.5 3.6 - 11.0 K/uL    RBC 3.79 (L) 3.80 - 5.20 M/uL    Hemoglobin 11.1 (L) 11.5 - 16.0 g/dL    Hematocrit 33.8 (L) 35.0 - 47.0 %    MCV 89.2 80.0 - 99.0 FL    MCH 29.3 26.0 - 34.0 PG    MCHC 32.8 30.0 - 36.5 g/dL    RDW 13.3 11.5 - 14.5 %    Platelets 198 150 - 400 K/uL    MPV 10.0 8.9 - 12.9 FL    Nucleated RBCs 0.0 0  WBC    nRBC 0.00 0.00 - 0.01 K/uL    Neutrophils % 75 32 - 75 %    Lymphocytes % 14 12 - 49 %    Monocytes % 8 5 - 13 %    Eosinophils % 3 0 - 7 %    Basophils % 0 0 - 1 %    Immature Granulocytes % 0 0.0 - 0.5 %    Neutrophils Absolute 3.4 1.8 - 8.0 K/UL    Lymphocytes Absolute 0.6 (L) 0.8 - 3.5 K/UL    Monocytes Absolute 0.4 0.0 - 1.0 K/UL    Eosinophils Absolute 0.1 0.0 - 0.4 K/UL    Basophils Absolute 0.0 0.0 - 0.1 K/UL    Immature Granulocytes Absolute 0.0 0.00 - 0.04 K/UL    Differential Type SMEAR SCANNED      RBC Comment NORMOCYTIC, NORMOCHROMIC          Pre-medications  were administered as ordered and chemotherapy was initiated.  Medications Administered         0.9 % sodium chloride infusion Admin Date  06/11/2024 Action  New

## 2024-06-13 ENCOUNTER — ANCILLARY PROCEDURE (OUTPATIENT)
Age: 79
End: 2024-06-13
Payer: MEDICARE

## 2024-06-13 VITALS
SYSTOLIC BLOOD PRESSURE: 140 MMHG | HEIGHT: 65 IN | BODY MASS INDEX: 34.32 KG/M2 | WEIGHT: 206 LBS | HEART RATE: 69 BPM | DIASTOLIC BLOOD PRESSURE: 76 MMHG

## 2024-06-13 DIAGNOSIS — R06.09 DOE (DYSPNEA ON EXERTION): ICD-10-CM

## 2024-06-13 PROCEDURE — 93306 TTE W/DOPPLER COMPLETE: CPT | Performed by: INTERNAL MEDICINE

## 2024-06-14 ENCOUNTER — HOSPITAL ENCOUNTER (OUTPATIENT)
Facility: HOSPITAL | Age: 79
End: 2024-06-14
Payer: MEDICARE

## 2024-06-14 VITALS — HEIGHT: 65 IN | WEIGHT: 206 LBS | BODY MASS INDEX: 34.32 KG/M2

## 2024-06-14 DIAGNOSIS — Z12.31 VISIT FOR SCREENING MAMMOGRAM: ICD-10-CM

## 2024-06-14 LAB
ECHO AO ASC DIAM: 3.3 CM
ECHO AO ASCENDING AORTA INDEX: 1.65 CM/M2
ECHO AO ROOT DIAM: 3.3 CM
ECHO AO ROOT INDEX: 1.65 CM/M2
ECHO AV AREA PEAK VELOCITY: 2.1 CM2
ECHO AV AREA VTI: 2.2 CM2
ECHO AV AREA/BSA PEAK VELOCITY: 1.1 CM2/M2
ECHO AV AREA/BSA VTI: 1.1 CM2/M2
ECHO AV MEAN GRADIENT: 3 MMHG
ECHO AV MEAN VELOCITY: 0.9 M/S
ECHO AV PEAK GRADIENT: 6 MMHG
ECHO AV PEAK VELOCITY: 1.3 M/S
ECHO AV VELOCITY RATIO: 0.77
ECHO AV VTI: 24.7 CM
ECHO BSA: 2.07 M2
ECHO EST RA PRESSURE: 3 MMHG
ECHO LA DIAMETER INDEX: 2.05 CM/M2
ECHO LA DIAMETER: 4.1 CM
ECHO LA TO AORTIC ROOT RATIO: 1.24
ECHO LA VOL A-L A2C: 56 ML (ref 22–52)
ECHO LA VOL A-L A4C: 58 ML (ref 22–52)
ECHO LA VOL BP: 53 ML (ref 22–52)
ECHO LA VOL MOD A2C: 53 ML (ref 22–52)
ECHO LA VOL MOD A4C: 52 ML (ref 22–52)
ECHO LA VOL/BSA BIPLANE: 27 ML/M2 (ref 16–34)
ECHO LA VOLUME AREA LENGTH: 58 ML
ECHO LA VOLUME INDEX A-L A2C: 28 ML/M2 (ref 16–34)
ECHO LA VOLUME INDEX A-L A4C: 29 ML/M2 (ref 16–34)
ECHO LA VOLUME INDEX AREA LENGTH: 29 ML/M2 (ref 16–34)
ECHO LA VOLUME INDEX MOD A2C: 27 ML/M2 (ref 16–34)
ECHO LA VOLUME INDEX MOD A4C: 26 ML/M2 (ref 16–34)
ECHO LV E' LATERAL VELOCITY: 7 CM/S
ECHO LV E' SEPTAL VELOCITY: 4 CM/S
ECHO LV EDV A2C: 78 ML
ECHO LV EDV A4C: 69 ML
ECHO LV EDV BP: 74 ML (ref 56–104)
ECHO LV EDV INDEX A4C: 35 ML/M2
ECHO LV EDV INDEX BP: 37 ML/M2
ECHO LV EDV NDEX A2C: 39 ML/M2
ECHO LV EJECTION FRACTION A2C: 51 %
ECHO LV EJECTION FRACTION A4C: 47 %
ECHO LV ESV A2C: 38 ML
ECHO LV ESV A4C: 36 ML
ECHO LV ESV BP: 37 ML (ref 19–49)
ECHO LV ESV INDEX A2C: 19 ML/M2
ECHO LV ESV INDEX A4C: 18 ML/M2
ECHO LV ESV INDEX BP: 19 ML/M2
ECHO LV FRACTIONAL SHORTENING: 29 % (ref 28–44)
ECHO LV INTERNAL DIMENSION DIASTOLE INDEX: 2.25 CM/M2
ECHO LV INTERNAL DIMENSION DIASTOLIC: 4.5 CM (ref 3.9–5.3)
ECHO LV INTERNAL DIMENSION SYSTOLIC INDEX: 1.6 CM/M2
ECHO LV INTERNAL DIMENSION SYSTOLIC: 3.2 CM
ECHO LV IVSD: 1.1 CM (ref 0.6–0.9)
ECHO LV MASS 2D: 186.4 G (ref 67–162)
ECHO LV MASS INDEX 2D: 93.2 G/M2 (ref 43–95)
ECHO LV POSTERIOR WALL DIASTOLIC: 1.2 CM (ref 0.6–0.9)
ECHO LV RELATIVE WALL THICKNESS RATIO: 0.53
ECHO LVOT AREA: 2.8 CM2
ECHO LVOT AV VTI INDEX: 0.8
ECHO LVOT DIAM: 1.9 CM
ECHO LVOT MEAN GRADIENT: 2 MMHG
ECHO LVOT PEAK VELOCITY: 1 M/S
ECHO LVOT STROKE VOLUME INDEX: 28.1 ML/M2
ECHO LVOT SV: 56.1 ML
ECHO LVOT VTI: 19.8 CM
ECHO MV A VELOCITY: 1.04 M/S
ECHO MV AREA PHT: 3.8 CM2
ECHO MV E DECELERATION TIME (DT): 201.5 MS
ECHO MV E VELOCITY: 0.66 M/S
ECHO MV E/A RATIO: 0.63
ECHO MV E/E' LATERAL: 9.43
ECHO MV E/E' RATIO (AVERAGED): 12.96
ECHO MV E/E' SEPTAL: 16.5
ECHO MV PRESSURE HALF TIME (PHT): 58.4 MS
ECHO RIGHT VENTRICULAR SYSTOLIC PRESSURE (RVSP): 29 MMHG
ECHO RV FREE WALL PEAK S': 12 CM/S
ECHO RV INTERNAL DIMENSION: 3.5 CM
ECHO RV TAPSE: 2.1 CM (ref 1.7–?)
ECHO TV REGURGITANT MAX VELOCITY: 2.55 M/S
ECHO TV REGURGITANT PEAK GRADIENT: 26 MMHG

## 2024-06-14 PROCEDURE — 77063 BREAST TOMOSYNTHESIS BI: CPT

## 2024-06-14 NOTE — RESULT ENCOUNTER NOTE
Dear Ms. Tabares,  Good News!  Your echo results show normal heart function.   Please continue the current treatment plan.  We can discuss more at your scheduled follow up if you have questions.  Best Regards,  SHANIQUE Phillips NP

## 2024-06-18 NOTE — PROGRESS NOTES
[facility]  Medical Oncology at [place]  [phone]    Hematology / Oncology Established Visit    Reason for Visit:   Elise Tabares is a 78 y.o. female who is seen for follow up of lung cancer.     Hematology Oncology Treatment History:     Diagnosis: Squamous cell carcinoma of lung    Stage: IIB [[id number]], now metastatic    Pathology:   3/14/23 Lung, left upper lobe, Core biopsy: Invasive poorly differentiated squamous cell carcinoma.   Comment: Immunohistochemical stains show the malignant cells are positive  for cytokeratin 5/6 and negative for cytokeratin 7, cytokeratin 20 and  TTF-1 supporting the diagnosis.     5/3/23 Left upper lobe wedge resection:  Invasive poorly differentiated squamous cell carcinoma with areas of extensive necrosis.   Tumor size 5.5cm, G3  Visceral pleural invasion present. Vascular invasion present. Parenchymal resection margin negative for tumor. Background lung parenchyma with moderate to severe architectural distortion associated with prominent peribronchiolar metaplasia.  0/6 LN positive [Station 5, 6, 7 x 2, 8, 10]  -PDL1 TPS 2%      Prior Treatment:   1. Sublobar resection of JANELL by Dr. Dom Perez  2. Adjuvant Carbo-Crowell x 4 on D1, 8 of q21d cycles, 6/20/23- 8/29/23    Current Treatment:  Atezolizumab [id number] x 1 year, started 9/19/23 - current.   Treatment duration: 1 year  Frequency of visits: Every 3 weeks    History of Present Illness:   Elise Tabares is a 78 y.o. female with CAD, CKD, HTN, DM II, MAX who is referred for evaluation and management of lung cancer. Pt was recently hospitalized in early March 2023 after 2 syncopal episodes at home. She also noted some loose stools and vomiting. She was diagnosed with IVVD and VAIBHAV, treated with IVF. She was found to have a new 27 x 24mm cavitary lesion in JANELL and underwent CT-guided biopsy. She was also found to have a vascularized mass in R light of thyroid, 1.5cm and referred for outpatient

## 2024-06-25 ENCOUNTER — APPOINTMENT (OUTPATIENT)
Facility: HOSPITAL | Age: 79
End: 2024-06-25
Payer: MEDICARE

## 2024-06-25 RX ORDER — HEPARIN 100 UNIT/ML
500 SYRINGE INTRAVENOUS PRN
Status: CANCELLED | OUTPATIENT
Start: 2024-07-02

## 2024-06-25 RX ORDER — SODIUM CHLORIDE 9 MG/ML
5-250 INJECTION, SOLUTION INTRAVENOUS PRN
Status: CANCELLED | OUTPATIENT
Start: 2024-07-02

## 2024-06-25 RX ORDER — DIPHENHYDRAMINE HYDROCHLORIDE 50 MG/ML
50 INJECTION INTRAMUSCULAR; INTRAVENOUS
Status: CANCELLED | OUTPATIENT
Start: 2024-07-02

## 2024-06-25 RX ORDER — SODIUM CHLORIDE 0.9 % (FLUSH) 0.9 %
5-40 SYRINGE (ML) INJECTION PRN
Status: CANCELLED | OUTPATIENT
Start: 2024-07-02

## 2024-06-25 RX ORDER — EPINEPHRINE 1 MG/ML
0.3 INJECTION, SOLUTION INTRAMUSCULAR; SUBCUTANEOUS PRN
Status: CANCELLED | OUTPATIENT
Start: 2024-07-02

## 2024-06-25 RX ORDER — FAMOTIDINE 10 MG/ML
20 INJECTION, SOLUTION INTRAVENOUS
Status: CANCELLED | OUTPATIENT
Start: 2024-07-02

## 2024-06-25 RX ORDER — ONDANSETRON 2 MG/ML
8 INJECTION INTRAMUSCULAR; INTRAVENOUS
Status: CANCELLED | OUTPATIENT
Start: 2024-07-02

## 2024-06-25 RX ORDER — ACETAMINOPHEN 325 MG/1
650 TABLET ORAL
Status: CANCELLED | OUTPATIENT
Start: 2024-07-02

## 2024-06-25 RX ORDER — ALBUTEROL SULFATE 90 UG/1
4 AEROSOL, METERED RESPIRATORY (INHALATION) PRN
Status: CANCELLED | OUTPATIENT
Start: 2024-07-02

## 2024-06-25 RX ORDER — SODIUM CHLORIDE 9 MG/ML
INJECTION, SOLUTION INTRAVENOUS CONTINUOUS
Status: CANCELLED | OUTPATIENT
Start: 2024-07-02

## 2024-06-25 RX ORDER — ACETAMINOPHEN 325 MG/1
650 TABLET ORAL
Status: CANCELLED
Start: 2024-07-02

## 2024-07-01 NOTE — TELEPHONE ENCOUNTER
Medication Refill Request    Elise Tabares is requesting a refill of the following medication(s):   Requested Prescriptions     Pending Prescriptions Disp Refills    metoprolol succinate (TOPROL XL) 100 MG extended release tablet [Pharmacy Med Name: Metoprolol Succinate  MG Oral Tablet Extended Release 24 Hour] 90 tablet 0     Sig: Take 1 tablet by mouth once daily        Listed PCP is Gerber Mayo MD   Last provider to prescribe medication: Simon  Last Date of Medication Prescribed: 4/3/2024   Date of Last Office Visit at [facility]: 3/31/2023   FUTURE APPOINTMENT:   Future Appointments   Date Time Provider Department Center   7/2/2024 10:30 AM  MED INF CHAIR 3 [facility]   7/2/2024 10:45 AM Guerline Park MD ONCSF [facility]   7/23/2024 10:30 AM  MED INF CHAIR 3 [facility]   8/13/2024 10:30 AM  MED INF CHAIR 3 [facility]       Please send refill to:    [facility] [address] [place] [address] [facility] -  [phone] - F [phone]  [address] [facility] [address]  Phone: [phone] Fax: [phone]      Please review request and approve or deny with recommendations.

## 2024-07-02 ENCOUNTER — OFFICE VISIT (OUTPATIENT)
Age: 79
End: 2024-07-02
Payer: MEDICARE

## 2024-07-02 ENCOUNTER — HOSPITAL ENCOUNTER (OUTPATIENT)
Facility: HOSPITAL | Age: 79
Setting detail: INFUSION SERIES
Discharge: HOME | End: 2024-07-02
Payer: MEDICARE

## 2024-07-02 VITALS
HEART RATE: 80 BPM | HEIGHT: 65 IN | TEMPERATURE: 97.8 F | WEIGHT: 207 LBS | RESPIRATION RATE: 18 BRPM | SYSTOLIC BLOOD PRESSURE: 132 MMHG | BODY MASS INDEX: 34.49 KG/M2 | OXYGEN SATURATION: 97 % | DIASTOLIC BLOOD PRESSURE: 70 MMHG

## 2024-07-02 VITALS
RESPIRATION RATE: 18 BRPM | OXYGEN SATURATION: 95 % | TEMPERATURE: 98 F | HEART RATE: 76 BPM | DIASTOLIC BLOOD PRESSURE: 69 MMHG | SYSTOLIC BLOOD PRESSURE: 136 MMHG

## 2024-07-02 DIAGNOSIS — N18.31 STAGE 3A CHRONIC KIDNEY DISEASE (HCC): ICD-10-CM

## 2024-07-02 DIAGNOSIS — D63.8 ANEMIA OF CHRONIC DISEASE: ICD-10-CM

## 2024-07-02 DIAGNOSIS — G89.3 ACUTE NEOPLASM-RELATED PAIN: ICD-10-CM

## 2024-07-02 DIAGNOSIS — M79.622 LEFT AXILLARY PAIN: ICD-10-CM

## 2024-07-02 DIAGNOSIS — E11.22 TYPE 2 DIABETES MELLITUS WITH STAGE 3A CHRONIC KIDNEY DISEASE, WITHOUT LONG-TERM CURRENT USE OF INSULIN (HCC): ICD-10-CM

## 2024-07-02 DIAGNOSIS — C34.92 MALIGNANT NEOPLASM OF UNSPECIFIED PART OF LEFT BRONCHUS OR LUNG (HCC): Primary | ICD-10-CM

## 2024-07-02 DIAGNOSIS — N18.31 TYPE 2 DIABETES MELLITUS WITH STAGE 3A CHRONIC KIDNEY DISEASE, WITHOUT LONG-TERM CURRENT USE OF INSULIN (HCC): ICD-10-CM

## 2024-07-02 DIAGNOSIS — R07.89 CHEST WALL PAIN: ICD-10-CM

## 2024-07-02 DIAGNOSIS — I10 PRIMARY HYPERTENSION: ICD-10-CM

## 2024-07-02 LAB
ALBUMIN SERPL-MCNC: 3.3 G/DL (ref 3.5–5)
ALBUMIN/GLOB SERPL: 0.9 (ref 1.1–2.2)
ALP SERPL-CCNC: 122 U/L (ref 45–117)
ALT SERPL-CCNC: 19 U/L (ref 12–78)
ANION GAP SERPL CALC-SCNC: 5 MMOL/L (ref 5–15)
AST SERPL-CCNC: 12 U/L (ref 15–37)
BASOPHILS # BLD: 0 K/UL (ref 0–0.1)
BASOPHILS NFR BLD: 0 % (ref 0–1)
BILIRUB SERPL-MCNC: 0.4 MG/DL (ref 0.2–1)
BUN SERPL-MCNC: 27 MG/DL (ref 6–20)
BUN/CREAT SERPL: 23 (ref 12–20)
CALCIUM SERPL-MCNC: 9 MG/DL (ref 8.5–10.1)
CHLORIDE SERPL-SCNC: 106 MMOL/L (ref 97–108)
CO2 SERPL-SCNC: 26 MMOL/L (ref 21–32)
CREAT SERPL-MCNC: 1.2 MG/DL (ref 0.55–1.02)
DIFFERENTIAL METHOD BLD: ABNORMAL
EOSINOPHIL # BLD: 0.1 K/UL (ref 0–0.4)
EOSINOPHIL NFR BLD: 1 % (ref 0–7)
ERYTHROCYTE [DISTWIDTH] IN BLOOD BY AUTOMATED COUNT: 12.7 % (ref 11.5–14.5)
GLOBULIN SER CALC-MCNC: 3.7 G/DL (ref 2–4)
GLUCOSE SERPL-MCNC: 203 MG/DL (ref 65–100)
HCT VFR BLD AUTO: 35.7 % (ref 35–47)
HGB BLD-MCNC: 11.5 G/DL (ref 11.5–16)
IMM GRANULOCYTES # BLD AUTO: 0.1 K/UL (ref 0–0.04)
IMM GRANULOCYTES NFR BLD AUTO: 1 % (ref 0–0.5)
LYMPHOCYTES # BLD: 0.6 K/UL (ref 0.8–3.5)
LYMPHOCYTES NFR BLD: 7 % (ref 12–49)
MCH RBC QN AUTO: 28.9 PG (ref 26–34)
MCHC RBC AUTO-ENTMCNC: 32.2 G/DL (ref 30–36.5)
MCV RBC AUTO: 89.7 FL (ref 80–99)
MONOCYTES # BLD: 0.4 K/UL (ref 0–1)
MONOCYTES NFR BLD: 5 % (ref 5–13)
NEUTS SEG # BLD: 7.1 K/UL (ref 1.8–8)
NEUTS SEG NFR BLD: 86 % (ref 32–75)
NRBC # BLD: 0 K/UL (ref 0–0.01)
NRBC BLD-RTO: 0 PER 100 WBC
PLATELET # BLD AUTO: 214 K/UL (ref 150–400)
PMV BLD AUTO: 10 FL (ref 8.9–12.9)
POTASSIUM SERPL-SCNC: 4.1 MMOL/L (ref 3.5–5.1)
PROT SERPL-MCNC: 7 G/DL (ref 6.4–8.2)
RBC # BLD AUTO: 3.98 M/UL (ref 3.8–5.2)
RBC MORPH BLD: ABNORMAL
SODIUM SERPL-SCNC: 137 MMOL/L (ref 136–145)
WBC # BLD AUTO: 8.3 K/UL (ref 3.6–11)

## 2024-07-02 PROCEDURE — G8399 PT W/DXA RESULTS DOCUMENT: HCPCS | Performed by: INTERNAL MEDICINE

## 2024-07-02 PROCEDURE — 3078F DIAST BP <80 MM HG: CPT | Performed by: INTERNAL MEDICINE

## 2024-07-02 PROCEDURE — 1090F PRES/ABSN URINE INCON ASSESS: CPT | Performed by: INTERNAL MEDICINE

## 2024-07-02 PROCEDURE — G8427 DOCREV CUR MEDS BY ELIG CLIN: HCPCS | Performed by: INTERNAL MEDICINE

## 2024-07-02 PROCEDURE — 99215 OFFICE O/P EST HI 40 MIN: CPT | Performed by: INTERNAL MEDICINE

## 2024-07-02 PROCEDURE — G8417 CALC BMI ABV UP PARAM F/U: HCPCS | Performed by: INTERNAL MEDICINE

## 2024-07-02 PROCEDURE — 1123F ACP DISCUSS/DSCN MKR DOCD: CPT | Performed by: INTERNAL MEDICINE

## 2024-07-02 PROCEDURE — 1036F TOBACCO NON-USER: CPT | Performed by: INTERNAL MEDICINE

## 2024-07-02 PROCEDURE — 85025 COMPLETE CBC W/AUTO DIFF WBC: CPT

## 2024-07-02 PROCEDURE — 36591 DRAW BLOOD OFF VENOUS DEVICE: CPT

## 2024-07-02 PROCEDURE — 3075F SYST BP GE 130 - 139MM HG: CPT | Performed by: INTERNAL MEDICINE

## 2024-07-02 PROCEDURE — 80053 COMPREHEN METABOLIC PANEL: CPT

## 2024-07-02 PROCEDURE — G2211 COMPLEX E/M VISIT ADD ON: HCPCS | Performed by: INTERNAL MEDICINE

## 2024-07-02 RX ORDER — OXYCODONE HYDROCHLORIDE 5 MG/1
5 TABLET ORAL EVERY 8 HOURS PRN
Qty: 12 TABLET | Refills: 0 | Status: SHIPPED | OUTPATIENT
Start: 2024-07-02 | End: 2024-08-01

## 2024-07-02 ASSESSMENT — PAIN DESCRIPTION - ORIENTATION: ORIENTATION: LEFT

## 2024-07-02 ASSESSMENT — PAIN SCALES - GENERAL: PAINLEVEL_OUTOF10: 4

## 2024-07-02 ASSESSMENT — PAIN DESCRIPTION - LOCATION: LOCATION: BREAST

## 2024-07-02 ASSESSMENT — PAIN DESCRIPTION - DESCRIPTORS: DESCRIPTORS: ACHING

## 2024-07-02 NOTE — PROGRESS NOTES
Chief Complaint   Patient presents with    Follow-up           Vitals:    07/02/24 1056   BP: 132/70   Pulse: 80   Resp: 18   Temp: 97.8 °F (36.6 °C)   SpO2: 97%            1. Have you been to the ER, urgent care clinic since your last visit?  Hospitalized since your last visit?  No  2. Have you seen or consulted any other health care providers outside of the [facility] since your last visit?  Include any pap smears or colon screening. No

## 2024-07-02 NOTE — PROGRESS NOTES
[facility] VISIT NOTE  Date: 2024    Name: Elise Tabares    MRN: [id number]         : 1945    Ms. Tabares Arrived ambulatory and in no distress for C14D1 of [place]q (HELD).  Assessment was completed , no acute issues at this time, no new complaints voiced. Port accessed without difficulty, labs drawn & sent for processing.  Patient proceeded to appointment with Dr. Park's team.       Ms. Tabares's vitals were reviewed.  Patient Vitals for the past 12 hrs:   Temp Pulse Resp BP SpO2   24 1015 98 °F (36.7 °C) 76 18 136/69 95 %         Lab results were obtained and reviewed.  Recent Results (from the past 12 hour(s))   Comprehensive metabolic panel    Collection Time: 24 10:41 AM   Result Value Ref Range    Sodium 137 136 - 145 mmol/L    Potassium 4.1 3.5 - 5.1 mmol/L    Chloride 106 97 - 108 mmol/L    CO2 26 21 - 32 mmol/L    Anion Gap 5 5 - 15 mmol/L    Glucose 203 (H) 65 - 100 mg/dL    BUN 27 (H) 6 - 20 MG/DL    Creatinine 1.20 (H) 0.55 - 1.02 MG/DL    BUN/Creatinine Ratio 23 (H) 12 - 20      Est, Glom Filt Rate 46 (L) >60 ml/min/1.73m2    Calcium 9.0 8.5 - 10.1 MG/DL    Total Bilirubin 0.4 0.2 - 1.0 MG/DL    ALT 19 12 - 78 U/L    AST 12 (L) 15 - 37 U/L    Alk Phosphatase 122 (H) 45 - 117 U/L    Total Protein 7.0 6.4 - 8.2 g/dL    Albumin 3.3 (L) 3.5 - 5.0 g/dL    Globulin 3.7 2.0 - 4.0 g/dL    Albumin/Globulin Ratio 0.9 (L) 1.1 - 2.2     CBC With Auto Differential    Collection Time: 24 10:41 AM   Result Value Ref Range    WBC 8.3 3.6 - 11.0 K/uL    RBC 3.98 3.80 - 5.20 M/uL    Hemoglobin 11.5 11.5 - 16.0 g/dL    Hematocrit 35.7 35.0 - 47.0 %    MCV 89.7 80.0 - 99.0 FL    MCH 28.9 26.0 - 34.0 PG    MCHC 32.2 30.0 - 36.5 g/dL    RDW 12.7 11.5 - 14.5 %    Platelets 214 150 - 400 K/uL    MPV 10.0 8.9 - 12.9 FL    Nucleated RBCs 0.0 0  WBC    nRBC 0.00 0.00 - 0.01 K/uL    Neutrophils % 86 (H) 32 - 75 %    Lymphocytes % 7 (L) 12 - 49 %    Monocytes % 5 5 - 13 %     Eosinophils % 1 0 - 7 %    Basophils % 0 0 - 1 %    Immature Granulocytes % 1 (H) 0.0 - 0.5 %    Neutrophils Absolute 7.1 1.8 - 8.0 K/UL    Lymphocytes Absolute 0.6 (L) 0.8 - 3.5 K/UL    Monocytes Absolute 0.4 0.0 - 1.0 K/UL    Eosinophils Absolute 0.1 0.0 - 0.4 K/UL    Basophils Absolute 0.0 0.0 - 0.1 K/UL    Immature Granulocytes Absolute 0.1 (H) 0.00 - 0.04 K/UL    Differential Type SMEAR SCANNED      RBC Comment NORMOCYTIC, NORMOCHROMIC           Treatment held  and RN notified pharmacist of held treatment.      Ms. Tabares was discharged from [facility] in stable condition at 1200.   Port flushed and de accessed per protocol. She is aware of  her next appointment.    Shirley Olmedo RN  July 2, 2024    Future Appointments:  Future Appointments   Date Time Provider Department Center   7/16/2024 10:00 AM  MED INF CHAIR 3 [facility]   7/16/2024 10:15 AM Erika Arredondo APRN - NP ONCSF BS [facility]   8/6/2024 10:00 AM  MED INF CHAIR 3 [facility]

## 2024-07-03 ENCOUNTER — TELEPHONE (OUTPATIENT)
Age: 79
End: 2024-07-03

## 2024-07-03 RX ORDER — METOPROLOL SUCCINATE 100 MG/1
100 TABLET, EXTENDED RELEASE ORAL DAILY
Qty: 30 TABLET | Refills: 0 | Status: SHIPPED | OUTPATIENT
Start: 2024-07-03

## 2024-07-03 NOTE — TELEPHONE ENCOUNTER
07/03/24 9:35 AM Blood specimen collected in [facility] on 07/02/2024 for [employer] blood testing. Requisition completed for blood and tissue testing (without PDL1), copy to be scanned to patient's chart. Scheduled [employer] -- # [id number]. Will continue to monitor.

## 2024-07-03 NOTE — TELEPHONE ENCOUNTER
Covering inbox with PCP is out, pt not seen since 3/2023, 1 month of Rx sent, needs appt for additional refills

## 2024-07-09 ENCOUNTER — TELEMEDICINE (OUTPATIENT)
Age: 79
End: 2024-07-09
Payer: MEDICARE

## 2024-07-09 DIAGNOSIS — I10 ESSENTIAL (PRIMARY) HYPERTENSION: Primary | ICD-10-CM

## 2024-07-09 DIAGNOSIS — E11.22 TYPE 2 DIABETES MELLITUS WITH STAGE 3A CHRONIC KIDNEY DISEASE, WITH LONG-TERM CURRENT USE OF INSULIN (HCC): Chronic | ICD-10-CM

## 2024-07-09 DIAGNOSIS — N18.31 TYPE 2 DIABETES MELLITUS WITH STAGE 3A CHRONIC KIDNEY DISEASE, WITH LONG-TERM CURRENT USE OF INSULIN (HCC): Chronic | ICD-10-CM

## 2024-07-09 DIAGNOSIS — Z79.4 TYPE 2 DIABETES MELLITUS WITH STAGE 3A CHRONIC KIDNEY DISEASE, WITH LONG-TERM CURRENT USE OF INSULIN (HCC): Chronic | ICD-10-CM

## 2024-07-09 DIAGNOSIS — N18.31 CKD STAGE 3A, GFR 45-59 ML/MIN (HCC): ICD-10-CM

## 2024-07-09 DIAGNOSIS — C34.92 SQUAMOUS CELL CARCINOMA OF LUNG, LEFT (HCC): ICD-10-CM

## 2024-07-09 PROBLEM — E11.40 TYPE 2 DIABETES MELLITUS WITH DIABETIC NEUROPATHY, WITHOUT LONG-TERM CURRENT USE OF INSULIN (HCC): Chronic | Status: ACTIVE | Noted: 2023-07-11

## 2024-07-09 PROBLEM — D70.9 NEUTROPENIA (HCC): Status: RESOLVED | Noted: 2023-07-11 | Resolved: 2024-07-09

## 2024-07-09 PROBLEM — I25.10 CAD (CORONARY ARTERY DISEASE): Chronic | Status: ACTIVE | Noted: 2017-09-20

## 2024-07-09 PROBLEM — J43.9 EMPHYSEMA LUNG (HCC): Chronic | Status: ACTIVE | Noted: 2024-03-05

## 2024-07-09 PROBLEM — E78.2 MIXED HYPERLIPIDEMIA: Chronic | Status: ACTIVE | Noted: 2021-08-13

## 2024-07-09 PROBLEM — E66.9 OBESITY: Chronic | Status: ACTIVE | Noted: 2018-07-20

## 2024-07-09 PROCEDURE — 99214 OFFICE O/P EST MOD 30 MIN: CPT | Performed by: FAMILY MEDICINE

## 2024-07-09 PROCEDURE — G8428 CUR MEDS NOT DOCUMENT: HCPCS | Performed by: FAMILY MEDICINE

## 2024-07-09 PROCEDURE — 1090F PRES/ABSN URINE INCON ASSESS: CPT | Performed by: FAMILY MEDICINE

## 2024-07-09 PROCEDURE — G8399 PT W/DXA RESULTS DOCUMENT: HCPCS | Performed by: FAMILY MEDICINE

## 2024-07-09 PROCEDURE — 1123F ACP DISCUSS/DSCN MKR DOCD: CPT | Performed by: FAMILY MEDICINE

## 2024-07-09 SDOH — ECONOMIC STABILITY: FOOD INSECURITY: WITHIN THE PAST 12 MONTHS, YOU WORRIED THAT YOUR FOOD WOULD RUN OUT BEFORE YOU GOT MONEY TO BUY MORE.: NEVER TRUE

## 2024-07-09 SDOH — ECONOMIC STABILITY: INCOME INSECURITY: HOW HARD IS IT FOR YOU TO PAY FOR THE VERY BASICS LIKE FOOD, HOUSING, MEDICAL CARE, AND HEATING?: SOMEWHAT HARD

## 2024-07-09 SDOH — ECONOMIC STABILITY: FOOD INSECURITY: WITHIN THE PAST 12 MONTHS, THE FOOD YOU BOUGHT JUST DIDN'T LAST AND YOU DIDN'T HAVE MONEY TO GET MORE.: NEVER TRUE

## 2024-07-09 SDOH — ECONOMIC STABILITY: TRANSPORTATION INSECURITY
IN THE PAST 12 MONTHS, HAS LACK OF TRANSPORTATION KEPT YOU FROM MEETINGS, WORK, OR FROM GETTING THINGS NEEDED FOR DAILY LIVING?: NO

## 2024-07-09 NOTE — PROGRESS NOTES
Elise Tabares  79 y.o. female  1945  [address] [facility] [address]  [id number]   [facility]:    Telemedicine Progress Note  Gerber Mayo MD       Encounter Date and Time: July 9, 2024 at 3:39 PM    Elise Tabares, was evaluated through a synchronous (real-time) audio-video encounter. The patient (or guardian if applicable) is aware that this is a billable service, which includes applicable co-pays. This Virtual Visit was conducted with patient's (and/or legal guardian's) consent. Patient identification was verified, and a caregiver was present when appropriate.   The patient was located at Home: [id number] [place] [id number]  Provider was located at Facility (Appt Dept): [address] [place] [phone]  Confirm you are appropriately licensed, registered, or certified to deliver care in the state where the patient is located as indicated above. If you are not or unsure, please re-schedule the visit: Yes, I confirm.       Chief Complaint   Patient presents with    Follow-up     Chronic conditions.      History of Present Illness   Elise Tabares is a 79 y.o. female was evaluated by synchronous (real-time) audio-video technology from home, through a secure patient portal.  Patient is following up on her chronic conditions.  Patient last seen at our office in March 2023.  Since that time she has been following regularly with hematology oncology.  Diagnosed with invasive poorly differentiated squamous cell carcinoma of the lung.  Underwent left upper lobe wedge resection in May 2023 followed by adjuvant chemotherapy.  She notes that recent CT scan of the chest shows progression of her cancer and she meets with her oncologist next week to determine next steps.    Patient notes she has had continued chest wall pain since prior surgery (wedge resection) not regularly taking analgesic medications.  Has been prescribed oxycodone and

## 2024-07-10 ENCOUNTER — TELEPHONE (OUTPATIENT)
Age: 79
End: 2024-07-10

## 2024-07-10 DIAGNOSIS — N18.30 ANEMIA DUE TO STAGE 3 CHRONIC KIDNEY DISEASE, UNSPECIFIED WHETHER STAGE 3A OR 3B CKD (HCC): Primary | ICD-10-CM

## 2024-07-10 DIAGNOSIS — C34.92 SQUAMOUS CELL CARCINOMA OF LUNG, LEFT (HCC): ICD-10-CM

## 2024-07-10 DIAGNOSIS — D63.1 ANEMIA DUE TO STAGE 3 CHRONIC KIDNEY DISEASE, UNSPECIFIED WHETHER STAGE 3A OR 3B CKD (HCC): Primary | ICD-10-CM

## 2024-07-10 NOTE — TELEPHONE ENCOUNTER
Patient called and stated that she needs a refill on her oxycodone medication. Patient also stated that the 5 mg isnt really helping her pain so she wanted to know if the dosage could be increased. Requested a call back.       # [phone]    Fluoxetine 20mg daily via PEG}  Recommend starting Seroquel 12.5mg qHS

## 2024-07-10 NOTE — TELEPHONE ENCOUNTER
07/10/24 2:27 PM Attempted to place return call via contact number provided. Phone rang with no answer. Automated recording indicated that call cannot be completed at this time time--no option to leave voicemail.     07/11/24 10:44 AM Attempted to call patient via contact number provided. Phone rang with no answer. Automated recording indicated that call cannot be completed at this time time--no option to leave voicemail     07/12/24 11:01 AM Attempted to call patient via contact number provided. Phone rang with no answer. Automated recording indicated that call cannot be completed at this time time--no option to leave voicemail. Attempted to call home number listed, no answer--left message requesting call back. Provided office phone number as well.

## 2024-07-12 ENCOUNTER — TELEPHONE (OUTPATIENT)
Age: 79
End: 2024-07-12

## 2024-07-12 DIAGNOSIS — G89.3 NEOPLASM RELATED PAIN: Primary | ICD-10-CM

## 2024-07-12 DIAGNOSIS — C34.92 MALIGNANT NEOPLASM OF UNSPECIFIED PART OF LEFT BRONCHUS OR LUNG (HCC): ICD-10-CM

## 2024-07-12 RX ORDER — OXYCODONE HYDROCHLORIDE AND ACETAMINOPHEN 5; 325 MG/1; MG/1
1 TABLET ORAL EVERY 8 HOURS PRN
Qty: 20 TABLET | Refills: 0 | Status: SHIPPED | OUTPATIENT
Start: 2024-07-12 | End: 2024-07-26

## 2024-07-12 NOTE — TELEPHONE ENCOUNTER
07/12/24 1:13 PM  Pt called requesting oxycodone refill. Called pt to evaluate pain. NA x 2 to home and cell phone. Sent Percocet take 1 tab every 8 hours prn neoplasm pain.

## 2024-07-15 NOTE — PROGRESS NOTES
[facility] [place]  Medical Oncology at [place]  [phone]    Hematology / Oncology Established Visit    Reason for Visit:   Elise Tabares is a 79 y.o. female who is seen for follow up of lung cancer.     Hematology Oncology Treatment History:     Diagnosis: Squamous cell carcinoma of lung    Stage: IIB [[id number]], now metastatic    Pathology:   3/14/23 Lung, left upper lobe, Core biopsy: Invasive poorly differentiated squamous cell carcinoma.   Comment: Immunohistochemical stains show the malignant cells are positive  for cytokeratin 5/6 and negative for cytokeratin 7, cytokeratin 20 and  TTF-1 supporting the diagnosis.     5/3/23 Left upper lobe wedge resection:  Invasive poorly differentiated squamous cell carcinoma with areas of extensive necrosis.   Tumor size 5.5cm, G3  Visceral pleural invasion present. Vascular invasion present. Parenchymal resection margin negative for tumor. Background lung parenchyma with moderate to severe architectural distortion associated with prominent peribronchiolar metaplasia.  0/6 LN positive [Station 5, 6, 7 x 2, 8, 10]  -PDL1 TPS 2%    -[employer] NGS: TP53 R267G, TP53 S241F. CDKN2A L32fs, MSI-High not detected. No FDA approved medications     Prior Treatment:   1. Sublobar resection of JNAELL by Dr. Dom Perez  2. Adjuvant Carbo-Trosper x 4 on D1, 8 of q21d cycles, 6/20/23- 8/29/23  3. Atezolizumab x5ujjiq x 1 year, 9/19/23 - 7/2024    Current Treatment:  pending docetaxel 75mg/m2 every 3 weeks until disease progression or toxicity, to start 7/23/24    History of Present Illness:   Elise Tabares is a 79 y.o. female with CAD, CKD, HTN, DM II, MAX who is referred for evaluation and management of lung cancer. Pt was recently hospitalized in early March 2023 after 2 syncopal episodes at home. She also noted some loose stools and vomiting. She was diagnosed with IVVD and VAIBHAV, treated with IVF. She was found to have a new 27 x 24mm cavitary lesion in JANELL and

## 2024-07-16 ENCOUNTER — OFFICE VISIT (OUTPATIENT)
Age: 79
End: 2024-07-16
Payer: MEDICARE

## 2024-07-16 ENCOUNTER — HOSPITAL ENCOUNTER (OUTPATIENT)
Facility: HOSPITAL | Age: 79
Setting detail: INFUSION SERIES
Discharge: HOME | End: 2024-07-16
Payer: MEDICARE

## 2024-07-16 ENCOUNTER — APPOINTMENT (OUTPATIENT)
Facility: HOSPITAL | Age: 79
End: 2024-07-16
Payer: MEDICARE

## 2024-07-16 VITALS
HEART RATE: 70 BPM | SYSTOLIC BLOOD PRESSURE: 120 MMHG | TEMPERATURE: 97.9 F | RESPIRATION RATE: 18 BRPM | DIASTOLIC BLOOD PRESSURE: 66 MMHG | WEIGHT: 213 LBS | BODY MASS INDEX: 34.23 KG/M2 | HEIGHT: 66 IN | OXYGEN SATURATION: 96 %

## 2024-07-16 VITALS
WEIGHT: 211.8 LBS | HEART RATE: 66 BPM | RESPIRATION RATE: 18 BRPM | DIASTOLIC BLOOD PRESSURE: 63 MMHG | HEIGHT: 66 IN | TEMPERATURE: 97.3 F | OXYGEN SATURATION: 95 % | SYSTOLIC BLOOD PRESSURE: 120 MMHG | BODY MASS INDEX: 34.04 KG/M2

## 2024-07-16 DIAGNOSIS — T45.1X5A CINV (CHEMOTHERAPY-INDUCED NAUSEA AND VOMITING): ICD-10-CM

## 2024-07-16 DIAGNOSIS — N17.9 AKI (ACUTE KIDNEY INJURY) (HCC): ICD-10-CM

## 2024-07-16 DIAGNOSIS — R11.2 CINV (CHEMOTHERAPY-INDUCED NAUSEA AND VOMITING): ICD-10-CM

## 2024-07-16 DIAGNOSIS — C34.92 MALIGNANT NEOPLASM OF UNSPECIFIED PART OF LEFT BRONCHUS OR LUNG (HCC): ICD-10-CM

## 2024-07-16 DIAGNOSIS — C34.92 SQUAMOUS CELL CARCINOMA OF LUNG, LEFT (HCC): Primary | ICD-10-CM

## 2024-07-16 DIAGNOSIS — M79.622 LEFT AXILLARY PAIN: ICD-10-CM

## 2024-07-16 DIAGNOSIS — D63.1 ANEMIA DUE TO STAGE 3 CHRONIC KIDNEY DISEASE, UNSPECIFIED WHETHER STAGE 3A OR 3B CKD (HCC): ICD-10-CM

## 2024-07-16 DIAGNOSIS — N18.31 STAGE 3A CHRONIC KIDNEY DISEASE (HCC): ICD-10-CM

## 2024-07-16 DIAGNOSIS — D63.8 ANEMIA OF CHRONIC DISEASE: ICD-10-CM

## 2024-07-16 DIAGNOSIS — N18.30 ANEMIA DUE TO STAGE 3 CHRONIC KIDNEY DISEASE, UNSPECIFIED WHETHER STAGE 3A OR 3B CKD (HCC): ICD-10-CM

## 2024-07-16 DIAGNOSIS — C34.90 PRIMARY MALIGNANT NEOPLASM OF LUNG METASTATIC TO OTHER SITE, UNSPECIFIED LATERALITY (HCC): ICD-10-CM

## 2024-07-16 DIAGNOSIS — C34.92 MALIGNANT NEOPLASM OF UNSPECIFIED PART OF LEFT BRONCHUS OR LUNG (HCC): Primary | ICD-10-CM

## 2024-07-16 DIAGNOSIS — G89.3 ACUTE NEOPLASM-RELATED PAIN: ICD-10-CM

## 2024-07-16 DIAGNOSIS — R07.89 CHEST WALL PAIN: ICD-10-CM

## 2024-07-16 LAB
ALBUMIN SERPL-MCNC: 3.1 G/DL (ref 3.5–5)
ALBUMIN/GLOB SERPL: 0.8 (ref 1.1–2.2)
ALP SERPL-CCNC: 115 U/L (ref 45–117)
ALT SERPL-CCNC: 17 U/L (ref 12–78)
ANION GAP SERPL CALC-SCNC: 8 MMOL/L (ref 5–15)
AST SERPL-CCNC: 19 U/L (ref 15–37)
BASOPHILS # BLD: 0 K/UL (ref 0–0.1)
BASOPHILS NFR BLD: 0 % (ref 0–1)
BILIRUB SERPL-MCNC: 0.4 MG/DL (ref 0.2–1)
BUN SERPL-MCNC: 34 MG/DL (ref 6–20)
BUN/CREAT SERPL: 23 (ref 12–20)
CALCIUM SERPL-MCNC: 8.7 MG/DL (ref 8.5–10.1)
CHLORIDE SERPL-SCNC: 106 MMOL/L (ref 97–108)
CO2 SERPL-SCNC: 23 MMOL/L (ref 21–32)
CREAT SERPL-MCNC: 1.45 MG/DL (ref 0.55–1.02)
DIFFERENTIAL METHOD BLD: ABNORMAL
EOSINOPHIL # BLD: 0.2 K/UL (ref 0–0.4)
EOSINOPHIL NFR BLD: 2 % (ref 0–7)
ERYTHROCYTE [DISTWIDTH] IN BLOOD BY AUTOMATED COUNT: 12.7 % (ref 11.5–14.5)
GLOBULIN SER CALC-MCNC: 3.8 G/DL (ref 2–4)
GLUCOSE SERPL-MCNC: 157 MG/DL (ref 65–100)
HCT VFR BLD AUTO: 33 % (ref 35–47)
HGB BLD-MCNC: 11 G/DL (ref 11.5–16)
IMM GRANULOCYTES # BLD AUTO: 0 K/UL (ref 0–0.04)
IMM GRANULOCYTES NFR BLD AUTO: 0 % (ref 0–0.5)
LYMPHOCYTES # BLD: 0.6 K/UL (ref 0.8–3.5)
LYMPHOCYTES NFR BLD: 8 % (ref 12–49)
MCH RBC QN AUTO: 29.6 PG (ref 26–34)
MCHC RBC AUTO-ENTMCNC: 33.3 G/DL (ref 30–36.5)
MCV RBC AUTO: 88.7 FL (ref 80–99)
MONOCYTES # BLD: 0.4 K/UL (ref 0–1)
MONOCYTES NFR BLD: 5 % (ref 5–13)
NEUTS SEG # BLD: 6.3 K/UL (ref 1.8–8)
NEUTS SEG NFR BLD: 85 % (ref 32–75)
NRBC # BLD: 0 K/UL (ref 0–0.01)
NRBC BLD-RTO: 0 PER 100 WBC
PLATELET # BLD AUTO: 253 K/UL (ref 150–400)
PMV BLD AUTO: 10.2 FL (ref 8.9–12.9)
POTASSIUM SERPL-SCNC: 4.3 MMOL/L (ref 3.5–5.1)
PROT SERPL-MCNC: 6.9 G/DL (ref 6.4–8.2)
RBC # BLD AUTO: 3.72 M/UL (ref 3.8–5.2)
RBC MORPH BLD: ABNORMAL
SODIUM SERPL-SCNC: 137 MMOL/L (ref 136–145)
WBC # BLD AUTO: 7.5 K/UL (ref 3.6–11)

## 2024-07-16 PROCEDURE — 85025 COMPLETE CBC W/AUTO DIFF WBC: CPT

## 2024-07-16 PROCEDURE — G8427 DOCREV CUR MEDS BY ELIG CLIN: HCPCS | Performed by: NURSE PRACTITIONER

## 2024-07-16 PROCEDURE — 36591 DRAW BLOOD OFF VENOUS DEVICE: CPT

## 2024-07-16 PROCEDURE — 3078F DIAST BP <80 MM HG: CPT | Performed by: NURSE PRACTITIONER

## 2024-07-16 PROCEDURE — G8417 CALC BMI ABV UP PARAM F/U: HCPCS | Performed by: NURSE PRACTITIONER

## 2024-07-16 PROCEDURE — 99214 OFFICE O/P EST MOD 30 MIN: CPT | Performed by: NURSE PRACTITIONER

## 2024-07-16 PROCEDURE — 80053 COMPREHEN METABOLIC PANEL: CPT

## 2024-07-16 PROCEDURE — G8399 PT W/DXA RESULTS DOCUMENT: HCPCS | Performed by: NURSE PRACTITIONER

## 2024-07-16 PROCEDURE — 1036F TOBACCO NON-USER: CPT | Performed by: NURSE PRACTITIONER

## 2024-07-16 PROCEDURE — 1123F ACP DISCUSS/DSCN MKR DOCD: CPT | Performed by: NURSE PRACTITIONER

## 2024-07-16 PROCEDURE — G2211 COMPLEX E/M VISIT ADD ON: HCPCS | Performed by: NURSE PRACTITIONER

## 2024-07-16 PROCEDURE — 3074F SYST BP LT 130 MM HG: CPT | Performed by: NURSE PRACTITIONER

## 2024-07-16 PROCEDURE — 1090F PRES/ABSN URINE INCON ASSESS: CPT | Performed by: NURSE PRACTITIONER

## 2024-07-16 RX ORDER — DEXAMETHASONE SODIUM PHOSPHATE 10 MG/ML
8 INJECTION INTRAMUSCULAR; INTRAVENOUS ONCE
OUTPATIENT
Start: 2024-07-23 | End: 2024-07-23

## 2024-07-16 RX ORDER — LISINOPRIL 20 MG/1
20 TABLET ORAL DAILY
Qty: 90 TABLET | Refills: 3 | Status: SHIPPED | OUTPATIENT
Start: 2024-07-16

## 2024-07-16 RX ORDER — HEPARIN SODIUM (PORCINE) LOCK FLUSH IV SOLN 100 UNIT/ML 100 UNIT/ML
500 SOLUTION INTRAVENOUS PRN
OUTPATIENT
Start: 2024-07-23

## 2024-07-16 RX ORDER — DIPHENHYDRAMINE HYDROCHLORIDE 50 MG/ML
50 INJECTION INTRAMUSCULAR; INTRAVENOUS
OUTPATIENT
Start: 2024-07-23

## 2024-07-16 RX ORDER — SODIUM CHLORIDE 0.9 % (FLUSH) 0.9 %
5-40 SYRINGE (ML) INJECTION PRN
OUTPATIENT
Start: 2024-07-23

## 2024-07-16 RX ORDER — SODIUM CHLORIDE 9 MG/ML
5-250 INJECTION, SOLUTION INTRAVENOUS PRN
OUTPATIENT
Start: 2024-07-23

## 2024-07-16 RX ORDER — FAMOTIDINE 10 MG/ML
20 INJECTION, SOLUTION INTRAVENOUS
OUTPATIENT
Start: 2024-07-23

## 2024-07-16 RX ORDER — ACETAMINOPHEN 325 MG/1
650 TABLET ORAL
OUTPATIENT
Start: 2024-07-23

## 2024-07-16 RX ORDER — ONDANSETRON 2 MG/ML
8 INJECTION INTRAMUSCULAR; INTRAVENOUS
OUTPATIENT
Start: 2024-07-23

## 2024-07-16 RX ORDER — MEPERIDINE HYDROCHLORIDE 50 MG/ML
12.5 INJECTION INTRAMUSCULAR; INTRAVENOUS; SUBCUTANEOUS PRN
OUTPATIENT
Start: 2024-07-23

## 2024-07-16 RX ORDER — SODIUM CHLORIDE 9 MG/ML
INJECTION, SOLUTION INTRAVENOUS CONTINUOUS
OUTPATIENT
Start: 2024-07-23

## 2024-07-16 RX ORDER — EPINEPHRINE 1 MG/ML
0.3 INJECTION, SOLUTION, CONCENTRATE INTRAVENOUS PRN
OUTPATIENT
Start: 2024-07-23

## 2024-07-16 RX ORDER — DEXAMETHASONE 4 MG/1
TABLET ORAL
Qty: 30 TABLET | Refills: 1 | Status: SHIPPED | OUTPATIENT
Start: 2024-07-16

## 2024-07-16 RX ORDER — ALBUTEROL SULFATE 90 UG/1
4 AEROSOL, METERED RESPIRATORY (INHALATION) PRN
OUTPATIENT
Start: 2024-07-23

## 2024-07-16 RX ORDER — METOPROLOL SUCCINATE 100 MG/1
100 TABLET, EXTENDED RELEASE ORAL DAILY
Qty: 90 TABLET | Refills: 3 | Status: SHIPPED | OUTPATIENT
Start: 2024-07-16

## 2024-07-16 ASSESSMENT — PAIN SCALES - GENERAL: PAINLEVEL_OUTOF10: 5

## 2024-07-16 ASSESSMENT — PAIN DESCRIPTION - PAIN TYPE: TYPE: CHRONIC PAIN

## 2024-07-16 ASSESSMENT — PAIN DESCRIPTION - LOCATION: LOCATION: BREAST

## 2024-07-16 ASSESSMENT — PAIN DESCRIPTION - ORIENTATION: ORIENTATION: LEFT

## 2024-07-16 NOTE — PROGRESS NOTES
[facility] Progress Note    Date: 2024    Name: Elise Tabares    MRN: [id number]         : 1945    Ms. Tabares Arrived ambulatory and in no distress for Port flush and labs Regimen. Right chest wall port accessed without difficulty, labs drawn & sent for processing.      Ms. Tabares's vitals were reviewed.  Vitals:    24 1017   BP: 120/63   Pulse: 66   Resp: 18   Temp: 97.3 °F (36.3 °C)   SpO2: 95%       Lab results pending    Medications:  NS flush      Ms. Tabares tolerated treatment well and was discharged [facility] in stable condition.   Port de-accessed, flushed per protocol. She is to return on     Future Appointments   Date Time Provider Department Center   2024 10:00 AM SS MED INF CHAIR 3 [facility]   2024 10:00 AM SS MED INF CHAIR 3 [facility]   2024 10:00 AM SS MED INF CHAIR 3 [facility]   10/8/2024 10:00 AM SS MED INF CHAIR 3 [facility]   10/29/2024 10:00 AM SS MED INF CHAIR 3 [facility]   2024 10:00 AM SS MED INF CHAIR 3 [facility]      Patient proceeded to scheduled MD appointment

## 2024-07-16 NOTE — PROGRESS NOTES
Chief Complaint   Patient presents with    Follow-up           Vitals:    07/16/24 1040   BP: 120/66   Pulse: 70   Resp: 18   Temp: 97.9 °F (36.6 °C)   SpO2: 96%            1. Have you been to the ER, urgent care clinic since your last visit?  Hospitalized since your last visit?  No  2. Have you seen or consulted any other health care providers outside of the [facility] since your last visit?  Include any pap smears or colon screening. Yes PCP

## 2024-07-17 ENCOUNTER — TELEPHONE (OUTPATIENT)
Age: 79
End: 2024-07-17

## 2024-07-17 NOTE — TELEPHONE ENCOUNTER
07/17/24 12:12 PM Call placed to patient. Verified patient ID x 2. Updated her that Erika, NP, has placed an order for brain MRI and CT abdomen/pelvis. Advised that Erika recommends IV fluids right after so imaging should be scheduled during the week. Discussed that patient has mild VAIBHAV and should drink 60 ounces of water daily. Patient voiced understanding and confirmed having contact number for central scheduling. While on phone, patient states she read that patients with kidney issues should not take Dexamethasone. Discussed that this nurse suspects that that may be the case when Dexamethasone is taken on regular, routine basis but will clarify with Erika and call patient back. She voiced understanding.      3:23 PM Called patient and advised of response per Erika PERKINS. Patient voiced understanding. While on phone, patient shared she is now scheduled to see palliative medicine on 08/21. No further questions or concerns at this time.     07/19/24 2:44 PM Called patient. Verified patient ID x 2. Advised of message per Erika PERKINS and updated patient of [facility] appointment on 07/25 for IV fluids. Patient voiced understanding and agreeable to plan. No further questions or concerns at this time.

## 2024-07-17 NOTE — TELEPHONE ENCOUNTER
[facility] Palliative Medicine Office  Nursing Note  [phone]  Fax [phone]      Name:  Elise Tabares  YOB: 1945    Received outpatient Palliative Medicine referral from Erika Arredondo NP to see patient for symptom management and supportive care. Chart  reviewed. Elise Tabares is a 79 y.o. female with squamous cell carcinoma of left lung.  Patient's most recent office visit with Erika Arredondo NP was on 7/16/24.      ACP:     No ACP documents on file    Referral routed to outpatient Palliative team, a member of the team with contact patient to schedule the appointment.      Bianka Garzon RN, Gerontological Nursing-BC, CHPN

## 2024-07-23 ENCOUNTER — HOSPITAL ENCOUNTER (OUTPATIENT)
Facility: HOSPITAL | Age: 79
Setting detail: INFUSION SERIES
Discharge: HOME | End: 2024-07-23
Payer: MEDICARE

## 2024-07-23 ENCOUNTER — TELEPHONE (OUTPATIENT)
Age: 79
End: 2024-07-23

## 2024-07-23 ENCOUNTER — APPOINTMENT (OUTPATIENT)
Facility: HOSPITAL | Age: 79
End: 2024-07-23
Payer: MEDICARE

## 2024-07-23 VITALS
HEART RATE: 72 BPM | TEMPERATURE: 97 F | RESPIRATION RATE: 16 BRPM | DIASTOLIC BLOOD PRESSURE: 72 MMHG | SYSTOLIC BLOOD PRESSURE: 133 MMHG | BODY MASS INDEX: 33.73 KG/M2 | HEIGHT: 66 IN | WEIGHT: 209.9 LBS

## 2024-07-23 DIAGNOSIS — C34.92 SQUAMOUS CELL CARCINOMA OF LUNG, LEFT (HCC): ICD-10-CM

## 2024-07-23 DIAGNOSIS — C34.92 MALIGNANT NEOPLASM OF UNSPECIFIED PART OF LEFT BRONCHUS OR LUNG (HCC): Primary | ICD-10-CM

## 2024-07-23 DIAGNOSIS — C34.92 SQUAMOUS CELL CARCINOMA OF LUNG, LEFT (HCC): Primary | ICD-10-CM

## 2024-07-23 LAB
ALBUMIN SERPL-MCNC: 3.4 G/DL (ref 3.5–5)
ALBUMIN/GLOB SERPL: 1 (ref 1.1–2.2)
ALP SERPL-CCNC: 127 U/L (ref 45–117)
ALT SERPL-CCNC: 18 U/L (ref 12–78)
ANION GAP SERPL CALC-SCNC: 11 MMOL/L (ref 5–15)
AST SERPL-CCNC: 14 U/L (ref 15–37)
BASOPHILS # BLD: 0 K/UL (ref 0–0.1)
BASOPHILS NFR BLD: 0 % (ref 0–1)
BILIRUB SERPL-MCNC: 0.4 MG/DL (ref 0.2–1)
BUN SERPL-MCNC: 34 MG/DL (ref 6–20)
BUN/CREAT SERPL: 24 (ref 12–20)
CALCIUM SERPL-MCNC: 9 MG/DL (ref 8.5–10.1)
CHLORIDE SERPL-SCNC: 101 MMOL/L (ref 97–108)
CO2 SERPL-SCNC: 21 MMOL/L (ref 21–32)
CREAT SERPL-MCNC: 1.44 MG/DL (ref 0.55–1.02)
DIFFERENTIAL METHOD BLD: ABNORMAL
EOSINOPHIL # BLD: 0 K/UL (ref 0–0.4)
EOSINOPHIL NFR BLD: 0 % (ref 0–7)
ERYTHROCYTE [DISTWIDTH] IN BLOOD BY AUTOMATED COUNT: 12.1 % (ref 11.5–14.5)
GLOBULIN SER CALC-MCNC: 3.4 G/DL (ref 2–4)
GLUCOSE SERPL-MCNC: 381 MG/DL (ref 65–100)
HCT VFR BLD AUTO: 33.6 % (ref 35–47)
HGB BLD-MCNC: 11.2 G/DL (ref 11.5–16)
IMM GRANULOCYTES # BLD AUTO: 0 K/UL (ref 0–0.04)
IMM GRANULOCYTES NFR BLD AUTO: 0 % (ref 0–0.5)
LYMPHOCYTES # BLD: 0.6 K/UL (ref 0.8–3.5)
LYMPHOCYTES NFR BLD: 5 % (ref 12–49)
MCH RBC QN AUTO: 29 PG (ref 26–34)
MCHC RBC AUTO-ENTMCNC: 33.3 G/DL (ref 30–36.5)
MCV RBC AUTO: 87 FL (ref 80–99)
MONOCYTES # BLD: 0.4 K/UL (ref 0–1)
MONOCYTES NFR BLD: 3 % (ref 5–13)
NEUTS SEG # BLD: 10.7 K/UL (ref 1.8–8)
NEUTS SEG NFR BLD: 92 % (ref 32–75)
NRBC # BLD: 0 K/UL (ref 0–0.01)
NRBC BLD-RTO: 0 PER 100 WBC
PLATELET # BLD AUTO: 270 K/UL (ref 150–400)
PMV BLD AUTO: 10.3 FL (ref 8.9–12.9)
POTASSIUM SERPL-SCNC: 4.3 MMOL/L (ref 3.5–5.1)
PROT SERPL-MCNC: 6.8 G/DL (ref 6.4–8.2)
RBC # BLD AUTO: 3.86 M/UL (ref 3.8–5.2)
RBC MORPH BLD: ABNORMAL
SODIUM SERPL-SCNC: 133 MMOL/L (ref 136–145)
WBC # BLD AUTO: 11.7 K/UL (ref 3.6–11)

## 2024-07-23 PROCEDURE — 96417 CHEMO IV INFUS EACH ADDL SEQ: CPT

## 2024-07-23 PROCEDURE — 2580000003 HC RX 258: Performed by: INTERNAL MEDICINE

## 2024-07-23 PROCEDURE — 96377 APPLICATON ON-BODY INJECTOR: CPT

## 2024-07-23 PROCEDURE — 80053 COMPREHEN METABOLIC PANEL: CPT

## 2024-07-23 PROCEDURE — 85025 COMPLETE CBC W/AUTO DIFF WBC: CPT

## 2024-07-23 PROCEDURE — 96413 CHEMO IV INFUSION 1 HR: CPT

## 2024-07-23 PROCEDURE — 96375 TX/PRO/DX INJ NEW DRUG ADDON: CPT

## 2024-07-23 PROCEDURE — 6360000002 HC RX W HCPCS: Performed by: NURSE PRACTITIONER

## 2024-07-23 PROCEDURE — 6360000002 HC RX W HCPCS: Performed by: INTERNAL MEDICINE

## 2024-07-23 PROCEDURE — 96361 HYDRATE IV INFUSION ADD-ON: CPT

## 2024-07-23 PROCEDURE — 2580000003 HC RX 258: Performed by: NURSE PRACTITIONER

## 2024-07-23 RX ORDER — EPINEPHRINE 1 MG/ML
0.3 INJECTION, SOLUTION INTRAMUSCULAR; SUBCUTANEOUS PRN
Status: DISCONTINUED | OUTPATIENT
Start: 2024-07-23 | End: 2024-07-24 | Stop reason: HOSPADM

## 2024-07-23 RX ORDER — ONDANSETRON 2 MG/ML
8 INJECTION INTRAMUSCULAR; INTRAVENOUS
Status: CANCELLED | OUTPATIENT
Start: 2024-07-25

## 2024-07-23 RX ORDER — DEXAMETHASONE SODIUM PHOSPHATE 4 MG/ML
6 INJECTION, SOLUTION INTRA-ARTICULAR; INTRALESIONAL; INTRAMUSCULAR; INTRAVENOUS; SOFT TISSUE ONCE
Status: COMPLETED | OUTPATIENT
Start: 2024-07-23 | End: 2024-07-23

## 2024-07-23 RX ORDER — SODIUM CHLORIDE 9 MG/ML
5-250 INJECTION, SOLUTION INTRAVENOUS PRN
Status: DISCONTINUED | OUTPATIENT
Start: 2024-07-23 | End: 2024-07-24 | Stop reason: HOSPADM

## 2024-07-23 RX ORDER — ONDANSETRON 2 MG/ML
8 INJECTION INTRAMUSCULAR; INTRAVENOUS
Status: DISCONTINUED | OUTPATIENT
Start: 2024-07-23 | End: 2024-07-24 | Stop reason: HOSPADM

## 2024-07-23 RX ORDER — 0.9 % SODIUM CHLORIDE 0.9 %
1000 INTRAVENOUS SOLUTION INTRAVENOUS ONCE
Status: CANCELLED | OUTPATIENT
Start: 2024-07-25 | End: 2024-07-25

## 2024-07-23 RX ORDER — SODIUM CHLORIDE 0.9 % (FLUSH) 0.9 %
5-40 SYRINGE (ML) INJECTION PRN
Status: DISCONTINUED | OUTPATIENT
Start: 2024-07-23 | End: 2024-07-24 | Stop reason: HOSPADM

## 2024-07-23 RX ORDER — FAMOTIDINE 10 MG/ML
20 INJECTION, SOLUTION INTRAVENOUS
Status: DISCONTINUED | OUTPATIENT
Start: 2024-07-23 | End: 2024-07-24 | Stop reason: HOSPADM

## 2024-07-23 RX ORDER — SODIUM CHLORIDE 9 MG/ML
5-250 INJECTION, SOLUTION INTRAVENOUS PRN
Status: CANCELLED | OUTPATIENT
Start: 2024-07-25

## 2024-07-23 RX ORDER — SODIUM CHLORIDE 9 MG/ML
INJECTION, SOLUTION INTRAVENOUS CONTINUOUS
Status: DISCONTINUED | OUTPATIENT
Start: 2024-07-23 | End: 2024-07-24 | Stop reason: HOSPADM

## 2024-07-23 RX ORDER — MEPERIDINE HYDROCHLORIDE 25 MG/ML
12.5 INJECTION INTRAMUSCULAR; INTRAVENOUS; SUBCUTANEOUS PRN
Status: DISCONTINUED | OUTPATIENT
Start: 2024-07-23 | End: 2024-07-24 | Stop reason: HOSPADM

## 2024-07-23 RX ORDER — SODIUM CHLORIDE 0.9 % (FLUSH) 0.9 %
5-40 SYRINGE (ML) INJECTION PRN
Status: CANCELLED | OUTPATIENT
Start: 2024-07-25

## 2024-07-23 RX ORDER — ALBUTEROL SULFATE 90 UG/1
4 AEROSOL, METERED RESPIRATORY (INHALATION) PRN
Status: DISCONTINUED | OUTPATIENT
Start: 2024-07-23 | End: 2024-07-24 | Stop reason: HOSPADM

## 2024-07-23 RX ORDER — FAMOTIDINE 10 MG/ML
20 INJECTION, SOLUTION INTRAVENOUS
Status: CANCELLED | OUTPATIENT
Start: 2024-07-25

## 2024-07-23 RX ORDER — ONDANSETRON 2 MG/ML
8 INJECTION INTRAMUSCULAR; INTRAVENOUS ONCE
Status: CANCELLED
Start: 2024-07-23 | End: 2024-07-23

## 2024-07-23 RX ORDER — HEPARIN SODIUM (PORCINE) LOCK FLUSH IV SOLN 100 UNIT/ML 100 UNIT/ML
500 SOLUTION INTRAVENOUS PRN
Status: CANCELLED | OUTPATIENT
Start: 2024-07-25

## 2024-07-23 RX ORDER — ACETAMINOPHEN 325 MG/1
650 TABLET ORAL
Status: CANCELLED | OUTPATIENT
Start: 2024-07-25

## 2024-07-23 RX ORDER — ACETAMINOPHEN 325 MG/1
650 TABLET ORAL
Status: DISCONTINUED | OUTPATIENT
Start: 2024-07-23 | End: 2024-07-24 | Stop reason: HOSPADM

## 2024-07-23 RX ORDER — DIPHENHYDRAMINE HYDROCHLORIDE 50 MG/ML
50 INJECTION INTRAMUSCULAR; INTRAVENOUS
Status: CANCELLED | OUTPATIENT
Start: 2024-07-25

## 2024-07-23 RX ORDER — ALBUTEROL SULFATE 90 UG/1
4 AEROSOL, METERED RESPIRATORY (INHALATION) PRN
Status: CANCELLED | OUTPATIENT
Start: 2024-07-25

## 2024-07-23 RX ORDER — SODIUM CHLORIDE 9 MG/ML
INJECTION, SOLUTION INTRAVENOUS CONTINUOUS
Status: CANCELLED | OUTPATIENT
Start: 2024-07-25

## 2024-07-23 RX ORDER — DIPHENHYDRAMINE HYDROCHLORIDE 50 MG/ML
50 INJECTION INTRAMUSCULAR; INTRAVENOUS
Status: DISCONTINUED | OUTPATIENT
Start: 2024-07-23 | End: 2024-07-24 | Stop reason: HOSPADM

## 2024-07-23 RX ORDER — 0.9 % SODIUM CHLORIDE 0.9 %
500 INTRAVENOUS SOLUTION INTRAVENOUS ONCE
Status: COMPLETED | OUTPATIENT
Start: 2024-07-23 | End: 2024-07-23

## 2024-07-23 RX ORDER — EPINEPHRINE 1 MG/ML
0.3 INJECTION, SOLUTION, CONCENTRATE INTRAVENOUS PRN
Status: CANCELLED | OUTPATIENT
Start: 2024-07-25

## 2024-07-23 RX ORDER — ONDANSETRON 2 MG/ML
8 INJECTION INTRAMUSCULAR; INTRAVENOUS ONCE
Status: COMPLETED | OUTPATIENT
Start: 2024-07-23 | End: 2024-07-23

## 2024-07-23 RX ADMIN — SODIUM CHLORIDE 25 ML/HR: 9 INJECTION, SOLUTION INTRAVENOUS at 12:51

## 2024-07-23 RX ADMIN — DOCETAXEL ANHYDROUS 159 MG: 10 INJECTION, SOLUTION INTRAVENOUS at 13:48

## 2024-07-23 RX ADMIN — DEXAMETHASONE SODIUM PHOSPHATE 6 MG: 4 INJECTION INTRA-ARTICULAR; INTRALESIONAL; INTRAMUSCULAR; INTRAVENOUS; SOFT TISSUE at 12:49

## 2024-07-23 RX ADMIN — ONDANSETRON 8 MG: 2 INJECTION INTRAMUSCULAR; INTRAVENOUS at 12:50

## 2024-07-23 RX ADMIN — SODIUM CHLORIDE, PRESERVATIVE FREE 20 ML: 5 INJECTION INTRAVENOUS at 15:02

## 2024-07-23 RX ADMIN — SODIUM CHLORIDE 500 ML: 9 INJECTION, SOLUTION INTRAVENOUS at 12:44

## 2024-07-23 RX ADMIN — PEGFILGRASTIM 6 MG: KIT SUBCUTANEOUS at 15:04

## 2024-07-23 ASSESSMENT — PAIN DESCRIPTION - ORIENTATION: ORIENTATION: LEFT

## 2024-07-23 ASSESSMENT — PAIN DESCRIPTION - PAIN TYPE: TYPE: CHRONIC PAIN

## 2024-07-23 ASSESSMENT — PAIN SCALES - GENERAL: PAINLEVEL_OUTOF10: 9

## 2024-07-23 ASSESSMENT — PAIN DESCRIPTION - LOCATION: LOCATION: BREAST;BACK

## 2024-07-23 NOTE — TELEPHONE ENCOUNTER
07/23/24 9:33 AM Called patient. Verified patient ID x 2. Per Erika NP, updated patient that she should hydrate really well with at least 60 oz of water today and then at least 100 oz of water throughout the day tomorrow for the IV contrast. Patient also scheduled for IV fluids on 07/25. Patient voiced understanding. While on phone, patient stated she took her Dexamethasone in preparation for her Docetaxel infusion today. Blood sugars increased to >400. Patient states she has been in contact with her endocrinologist and is planning on  insulin from their office prior to arriving for infusion today. Advised this nurse would also update Erika of above. No further questions or concerns at this time.

## 2024-07-23 NOTE — TELEPHONE ENCOUNTER
[facility]  Oncology Social Work Encounter    [] Med-Onc [facility] [x] Med-Onc [facility] [] Med-Onc [facility] [] Rad-Onc [facility] [] Rad-Onc [facility] [] Rad-Onc [facility] [] Rad-Onc [facility] [] Breast Center    Patient: Elise Tabares    Encounter Type:    [] Initial SW Encounter  [] Patient Initiated  [x] Referral  [] Distress/PHQ Screening  [] Other:      Concern(s)/Barrier(s) to Care: caregiver support    Narrative: Social work referral received for family support. Called placed to Raquel Coyne (daughter) and voicemail left requesting a return call.     Referral/Handouts:   Complimentary therapies referral  Caregiver Support referral  Support Groups referral    Plan:     Amna Grissom LCSW  Clinical Social Work Medical Oncology   [facility]  [address]  [place]  [address]  W: [phone]  F: [phone]

## 2024-07-23 NOTE — PROGRESS NOTES
[facility] Progress Note    Date: 2024    Name: Elise Tabares    MRN: [id number]         : 1945    Ms. Tabares Arrived ambulatory and in no distress for C1D1 of Taxotere/OBIRegimen.  Assessment was completed, no acute issues at this time, no new complaints voiced.  Right chest wall port accessed without difficulty, labs drawn & sent for processing.     Patient proceed to appointment with Dr. Park.    Ms. Tabares's vitals were reviewed.  Vitals:    24 1600   BP: 133/72   Pulse: 72   Resp: 16   Temp:        Lab results were obtained and reviewed.  Recent Results (from the past 12 hour(s))   Comprehensive metabolic panel    Collection Time: 24 10:37 AM   Result Value Ref Range    Sodium 133 (L) 136 - 145 mmol/L    Potassium 4.3 3.5 - 5.1 mmol/L    Chloride 101 97 - 108 mmol/L    CO2 21 21 - 32 mmol/L    Anion Gap 11 5 - 15 mmol/L    Glucose 381 (H) 65 - 100 mg/dL    BUN 34 (H) 6 - 20 MG/DL    Creatinine 1.44 (H) 0.55 - 1.02 MG/DL    BUN/Creatinine Ratio 24 (H) 12 - 20      Est, Glom Filt Rate 37 (L) >60 ml/min/1.73m2    Calcium 9.0 8.5 - 10.1 MG/DL    Total Bilirubin 0.4 0.2 - 1.0 MG/DL    ALT 18 12 - 78 U/L    AST 14 (L) 15 - 37 U/L    Alk Phosphatase 127 (H) 45 - 117 U/L    Total Protein 6.8 6.4 - 8.2 g/dL    Albumin 3.4 (L) 3.5 - 5.0 g/dL    Globulin 3.4 2.0 - 4.0 g/dL    Albumin/Globulin Ratio 1.0 (L) 1.1 - 2.2     CBC With Auto Differential    Collection Time: 24 10:37 AM   Result Value Ref Range    WBC 11.7 (H) 3.6 - 11.0 K/uL    RBC 3.86 3.80 - 5.20 M/uL    Hemoglobin 11.2 (L) 11.5 - 16.0 g/dL    Hematocrit 33.6 (L) 35.0 - 47.0 %    MCV 87.0 80.0 - 99.0 FL    MCH 29.0 26.0 - 34.0 PG    MCHC 33.3 30.0 - 36.5 g/dL    RDW 12.1 11.5 - 14.5 %    Platelets 270 150 - 400 K/uL    MPV 10.3 8.9 - 12.9 FL    Nucleated RBCs 0.0 0  WBC    nRBC 0.00 0.00 - 0.01 K/uL    Neutrophils % 92 (H) 32 - 75 %    Lymphocytes % 5 (L) 12 - 49 %    Monocytes % 3 (L) 5 - 13 %    Eosinophils % 0 0 - 7 %

## 2024-07-24 ENCOUNTER — TELEPHONE (OUTPATIENT)
Age: 79
End: 2024-07-24

## 2024-07-24 ENCOUNTER — HOSPITAL ENCOUNTER (OUTPATIENT)
Facility: HOSPITAL | Age: 79
Discharge: HOME | End: 2024-07-27
Payer: MEDICARE

## 2024-07-24 VITALS — BODY MASS INDEX: 33.73 KG/M2 | WEIGHT: 209 LBS

## 2024-07-24 DIAGNOSIS — C34.92 MALIGNANT NEOPLASM OF UNSPECIFIED PART OF LEFT BRONCHUS OR LUNG (HCC): ICD-10-CM

## 2024-07-24 DIAGNOSIS — C34.90 PRIMARY MALIGNANT NEOPLASM OF LUNG METASTATIC TO OTHER SITE, UNSPECIFIED LATERALITY (HCC): ICD-10-CM

## 2024-07-24 DIAGNOSIS — G89.3 NEOPLASM RELATED PAIN: ICD-10-CM

## 2024-07-24 PROCEDURE — 6360000004 HC RX CONTRAST MEDICATION: Performed by: NURSE PRACTITIONER

## 2024-07-24 PROCEDURE — 74177 CT ABD & PELVIS W/CONTRAST: CPT

## 2024-07-24 RX ORDER — OXYCODONE HYDROCHLORIDE 5 MG/1
5 TABLET ORAL EVERY 6 HOURS PRN
Qty: 30 TABLET | Refills: 0 | Status: SHIPPED | OUTPATIENT
Start: 2024-07-24 | End: 2024-08-23

## 2024-07-24 RX ORDER — GADOTERATE MEGLUMINE 376.9 MG/ML
18 INJECTION INTRAVENOUS
Status: DISCONTINUED | OUTPATIENT
Start: 2024-07-24 | End: 2024-07-24

## 2024-07-24 RX ADMIN — IOPAMIDOL 100 ML: 755 INJECTION, SOLUTION INTRAVENOUS at 16:00

## 2024-07-24 NOTE — TELEPHONE ENCOUNTER
07/24/24 3:32 PM Received incoming call from patient. Verified patient ID x 2. Patient states she is at hospital now for MRI but they are unable to proceed since patient is wearing Neulasta OBI. Patient inquiring if she can remove device. She states she has been wearing it for 24 hours as of 3:05 PM and [facility] RN advised that she keep it on until 8 PM. Updated Erika NP of above who obtained clarification from [facility] RN. Per [facility] RN, Neulasta OBI is a timed release device. It does not adminster the medication until 27 hours post placement. And then it adminsters over 45 minutes. Medication administration should be complete around 8 PM tonight. Updated patient of this. Advised, per Erika, that she does not recommend removing it and that patient's risk of infection due to severe neutropenia is more of a priority. Discussed that MRI would need to be rescheduled. Reassured patient that MRI is not urgent. Patient voiced understanding. She states she requested that nurse apply Neulasta device today to avoid having to reschedule MRI, but was unable to do so. No further questions or concerns at this time.

## 2024-07-25 ENCOUNTER — HOSPITAL ENCOUNTER (OUTPATIENT)
Facility: HOSPITAL | Age: 79
Setting detail: INFUSION SERIES
Discharge: HOME | End: 2024-07-25
Payer: MEDICARE

## 2024-07-25 VITALS
TEMPERATURE: 97.2 F | HEART RATE: 90 BPM | BODY MASS INDEX: 33.73 KG/M2 | RESPIRATION RATE: 18 BRPM | WEIGHT: 209 LBS | SYSTOLIC BLOOD PRESSURE: 135 MMHG | OXYGEN SATURATION: 97 % | DIASTOLIC BLOOD PRESSURE: 60 MMHG

## 2024-07-25 DIAGNOSIS — C34.92 SQUAMOUS CELL CARCINOMA OF LUNG, LEFT (HCC): Primary | ICD-10-CM

## 2024-07-25 PROCEDURE — 96360 HYDRATION IV INFUSION INIT: CPT

## 2024-07-25 PROCEDURE — 2580000003 HC RX 258: Performed by: NURSE PRACTITIONER

## 2024-07-25 RX ORDER — 0.9 % SODIUM CHLORIDE 0.9 %
1000 INTRAVENOUS SOLUTION INTRAVENOUS ONCE
Status: COMPLETED | OUTPATIENT
Start: 2024-07-25 | End: 2024-07-25

## 2024-07-25 RX ORDER — SODIUM CHLORIDE 0.9 % (FLUSH) 0.9 %
5-40 SYRINGE (ML) INJECTION PRN
Status: CANCELLED | OUTPATIENT
Start: 2024-07-25

## 2024-07-25 RX ORDER — FAMOTIDINE 10 MG/ML
20 INJECTION, SOLUTION INTRAVENOUS
Status: CANCELLED | OUTPATIENT
Start: 2024-07-25

## 2024-07-25 RX ORDER — EPINEPHRINE 1 MG/ML
0.3 INJECTION, SOLUTION INTRAMUSCULAR; SUBCUTANEOUS PRN
Status: CANCELLED | OUTPATIENT
Start: 2024-07-25

## 2024-07-25 RX ORDER — DIPHENHYDRAMINE HYDROCHLORIDE 50 MG/ML
50 INJECTION INTRAMUSCULAR; INTRAVENOUS
Status: CANCELLED | OUTPATIENT
Start: 2024-07-25

## 2024-07-25 RX ORDER — SODIUM CHLORIDE 9 MG/ML
5-250 INJECTION, SOLUTION INTRAVENOUS PRN
Status: CANCELLED | OUTPATIENT
Start: 2024-07-25

## 2024-07-25 RX ORDER — HEPARIN 100 UNIT/ML
500 SYRINGE INTRAVENOUS PRN
Status: CANCELLED | OUTPATIENT
Start: 2024-07-25

## 2024-07-25 RX ORDER — SODIUM CHLORIDE 0.9 % (FLUSH) 0.9 %
5-40 SYRINGE (ML) INJECTION PRN
Status: DISCONTINUED | OUTPATIENT
Start: 2024-07-25 | End: 2024-07-26 | Stop reason: HOSPADM

## 2024-07-25 RX ORDER — 0.9 % SODIUM CHLORIDE 0.9 %
1000 INTRAVENOUS SOLUTION INTRAVENOUS ONCE
Status: CANCELLED | OUTPATIENT
Start: 2024-07-25 | End: 2024-07-25

## 2024-07-25 RX ORDER — ACETAMINOPHEN 325 MG/1
650 TABLET ORAL
Status: CANCELLED | OUTPATIENT
Start: 2024-07-25

## 2024-07-25 RX ORDER — ONDANSETRON 2 MG/ML
8 INJECTION INTRAMUSCULAR; INTRAVENOUS
Status: CANCELLED | OUTPATIENT
Start: 2024-07-25

## 2024-07-25 RX ORDER — ALBUTEROL SULFATE 90 UG/1
4 AEROSOL, METERED RESPIRATORY (INHALATION) PRN
Status: CANCELLED | OUTPATIENT
Start: 2024-07-25

## 2024-07-25 RX ORDER — SODIUM CHLORIDE 9 MG/ML
INJECTION, SOLUTION INTRAVENOUS CONTINUOUS
Status: CANCELLED | OUTPATIENT
Start: 2024-07-25

## 2024-07-25 RX ADMIN — SODIUM CHLORIDE 1000 ML: 9 INJECTION, SOLUTION INTRAVENOUS at 11:09

## 2024-07-25 ASSESSMENT — PAIN SCALES - GENERAL: PAINLEVEL_OUTOF10: 0

## 2024-07-25 NOTE — PROGRESS NOTES
Spiritual Care Partner Volunteer visited patient at [facility] in [facility] on 7/25/2024    Documented by:  Chaplain Crystal Cain MS, MDiv, [facility]  Spiritual Health Services  Paging service: [phone] (SHANNON)

## 2024-07-25 NOTE — PROGRESS NOTES
hospitals Progress Note    Date: 2024    Name: Elise Tabares    MRN: [id number]         : 1945  Hydration Regimen.  Assessment was completed, no acute issues at this time, no new complaints voiced.  Right chest wall port accessed without difficulty,        Ms. Tabares's vitals were reviewed.  Vitals:    24 1230   BP: 135/60   Pulse: 90   Resp: 18   Temp:    SpO2:        Lab results were obtained and reviewed.      Medications:      Medications Administered         sodium chloride 0.9 % bolus 1,000 mL Admin Date  2024 Action  New Bag Dose  1,000 mL Rate  983.6 mL/hr Route  IntraVENous Administered By  Tracy Betancourt RN                 Ms. Tabares tolerated treatment well and was [facility] in stable condition at 1215.   Port de-accessed, flushed per protocol. She is to return on  24 at 1030 for her next appointment.  MATIAS JAMES RN  2024

## 2024-07-26 ENCOUNTER — TELEPHONE (OUTPATIENT)
Age: 79
End: 2024-07-26

## 2024-07-26 DIAGNOSIS — M79.10 MYALGIA: Primary | ICD-10-CM

## 2024-07-26 RX ORDER — TIZANIDINE 2 MG/1
2 TABLET ORAL EVERY 6 HOURS PRN
Qty: 30 TABLET | Refills: 0 | Status: SHIPPED | OUTPATIENT
Start: 2024-07-26

## 2024-07-26 NOTE — TELEPHONE ENCOUNTER
[facility]  [phone]    07/26/24 3:06 PM EDT - Received call from patient. Verified ID X 2. Patient reports achiness and bone pain above waist. Reports that pain started yesterday, taking Oxycodone as prescribed with no relief. Patient states that pain is unbearable. Denies any fevers NVD, glucose 126, BP WNL. Writer will consult with NP for further recommendations and call patient. No further questions at this time.      [facility]  [phone]    07/26/24 3:20 PM EDT - Placed return call to patient per NP request, verified ID X 2. Advised patient of NP recommendations to help with pain relief. Patient voiced understanding. No further questions at this time

## 2024-07-29 ENCOUNTER — TELEPHONE (OUTPATIENT)
Age: 79
End: 2024-07-29

## 2024-07-29 DIAGNOSIS — C34.92 SQUAMOUS CELL CARCINOMA OF LUNG, LEFT (HCC): Primary | ICD-10-CM

## 2024-07-29 RX ORDER — 0.9 % SODIUM CHLORIDE 0.9 %
1000 INTRAVENOUS SOLUTION INTRAVENOUS ONCE
Status: CANCELLED
Start: 2024-08-01

## 2024-07-29 NOTE — TELEPHONE ENCOUNTER
07/29/24 3:24 PM Received incoming call from patient. Verified patient ID x 2. Patient with complaints of increased fatigue and requesting IV fluids for this week, ideally on 08/01 prior to her MRI. Patient states IV fluids will normally help when she feels this way. She states she is hydrating well but notes decreased appetite due to altered taste. Denies dizziness/lightheadedness. Patient also drinking Muscle Milk protein drink twice daily. Patient states she continues to feel achy but pain has improved compared to prior. Has been taking Claritin daily for past 2-3 days, using heating pad, and taking Tizanidine as needed. Advised this nurse would forward above to Erika PERKINS and call back with any additional recommendations, otherwise will attempt to coordinate fluids on 08/01. Patient voiced understanding.

## 2024-07-31 ENCOUNTER — HOSPITAL ENCOUNTER (OUTPATIENT)
Facility: HOSPITAL | Age: 79
Setting detail: INFUSION SERIES
Discharge: HOME | End: 2024-07-31
Payer: MEDICARE

## 2024-07-31 VITALS
OXYGEN SATURATION: 94 % | SYSTOLIC BLOOD PRESSURE: 132 MMHG | HEIGHT: 66 IN | RESPIRATION RATE: 16 BRPM | BODY MASS INDEX: 33.73 KG/M2 | DIASTOLIC BLOOD PRESSURE: 74 MMHG | TEMPERATURE: 97.3 F | HEART RATE: 95 BPM

## 2024-07-31 DIAGNOSIS — C34.92 SQUAMOUS CELL CARCINOMA OF LUNG, LEFT (HCC): Primary | ICD-10-CM

## 2024-07-31 LAB
ANION GAP SERPL CALC-SCNC: 8 MMOL/L (ref 5–15)
BUN SERPL-MCNC: 28 MG/DL (ref 6–20)
BUN/CREAT SERPL: 16 (ref 12–20)
CALCIUM SERPL-MCNC: 8.3 MG/DL (ref 8.5–10.1)
CHLORIDE SERPL-SCNC: 103 MMOL/L (ref 97–108)
CO2 SERPL-SCNC: 23 MMOL/L (ref 21–32)
CREAT SERPL-MCNC: 1.77 MG/DL (ref 0.55–1.02)
GLUCOSE SERPL-MCNC: 316 MG/DL (ref 65–100)
POTASSIUM SERPL-SCNC: 4.1 MMOL/L (ref 3.5–5.1)
SODIUM SERPL-SCNC: 134 MMOL/L (ref 136–145)

## 2024-07-31 PROCEDURE — 80048 BASIC METABOLIC PNL TOTAL CA: CPT

## 2024-07-31 PROCEDURE — 2580000003 HC RX 258: Performed by: NURSE PRACTITIONER

## 2024-07-31 PROCEDURE — 96360 HYDRATION IV INFUSION INIT: CPT

## 2024-07-31 RX ORDER — 0.9 % SODIUM CHLORIDE 0.9 %
1000 INTRAVENOUS SOLUTION INTRAVENOUS ONCE
Status: COMPLETED | OUTPATIENT
Start: 2024-07-31 | End: 2024-07-31

## 2024-07-31 RX ORDER — ONDANSETRON 2 MG/ML
8 INJECTION INTRAMUSCULAR; INTRAVENOUS
Status: CANCELLED | OUTPATIENT
Start: 2024-08-01

## 2024-07-31 RX ORDER — EPINEPHRINE 1 MG/ML
0.3 INJECTION, SOLUTION INTRAMUSCULAR; SUBCUTANEOUS PRN
Status: CANCELLED | OUTPATIENT
Start: 2024-08-01

## 2024-07-31 RX ORDER — SODIUM CHLORIDE 0.9 % (FLUSH) 0.9 %
5-40 SYRINGE (ML) INJECTION PRN
Status: CANCELLED | OUTPATIENT
Start: 2024-08-01

## 2024-07-31 RX ORDER — ACETAMINOPHEN 325 MG/1
650 TABLET ORAL
Status: CANCELLED | OUTPATIENT
Start: 2024-08-01

## 2024-07-31 RX ORDER — 0.9 % SODIUM CHLORIDE 0.9 %
1000 INTRAVENOUS SOLUTION INTRAVENOUS ONCE
Status: CANCELLED
Start: 2024-08-01 | End: 2024-08-01

## 2024-07-31 RX ORDER — HEPARIN 100 UNIT/ML
500 SYRINGE INTRAVENOUS PRN
Status: CANCELLED | OUTPATIENT
Start: 2024-08-01

## 2024-07-31 RX ORDER — DIPHENHYDRAMINE HYDROCHLORIDE 50 MG/ML
50 INJECTION INTRAMUSCULAR; INTRAVENOUS
Status: CANCELLED | OUTPATIENT
Start: 2024-08-01

## 2024-07-31 RX ORDER — FAMOTIDINE 10 MG/ML
20 INJECTION, SOLUTION INTRAVENOUS
Status: CANCELLED | OUTPATIENT
Start: 2024-08-01

## 2024-07-31 RX ORDER — SODIUM CHLORIDE 9 MG/ML
5-250 INJECTION, SOLUTION INTRAVENOUS PRN
Status: CANCELLED | OUTPATIENT
Start: 2024-08-01

## 2024-07-31 RX ORDER — SODIUM CHLORIDE 9 MG/ML
INJECTION, SOLUTION INTRAVENOUS CONTINUOUS
Status: CANCELLED | OUTPATIENT
Start: 2024-08-01

## 2024-07-31 RX ORDER — ALBUTEROL SULFATE 90 UG/1
4 AEROSOL, METERED RESPIRATORY (INHALATION) PRN
Status: CANCELLED | OUTPATIENT
Start: 2024-08-01

## 2024-07-31 RX ADMIN — SODIUM CHLORIDE 1000 ML: 9 INJECTION, SOLUTION INTRAVENOUS at 15:32

## 2024-07-31 ASSESSMENT — PAIN SCALES - GENERAL: PAINLEVEL_OUTOF10: 0

## 2024-07-31 NOTE — PROGRESS NOTES
[facility] Progress Note        Date: 24    Name: Elise Tabares    MRN: [id number]         : 1945      Ms. Tabares admitted to [facility] for Supplemental Hydration ambulatory in stable condition. Assessment completed. No new concerns voiced.  Right port a cath accessed using 0.75\" mays, blood return obtained and port flushed without difficulty.  Labs drawn and sent for processing.          Vitals:    24 1524 24 1636   BP: (!) 104/54 132/74   Pulse: 97 95   Resp: 16 16   Temp: (!) 96.7 °F (35.9 °C) 97.3 °F (36.3 °C)   TempSrc: Temporal    SpO2: 92% 94%   Height: 1.676 m (5' 6\")            Results for orders placed or performed during the hospital encounter of 24   Basic Metabolic Panel   Result Value Ref Range    Sodium 134 (L) 136 - 145 mmol/L    Potassium 4.1 3.5 - 5.1 mmol/L    Chloride 103 97 - 108 mmol/L    CO2 23 21 - 32 mmol/L    Anion Gap 8 5 - 15 mmol/L    Glucose 316 (H) 65 - 100 mg/dL    BUN 28 (H) 6 - 20 MG/DL    Creatinine 1.77 (H) 0.55 - 1.02 MG/DL    BUN/Creatinine Ratio 16 12 - 20      Est, Glom Filt Rate 29 (L) >60 ml/min/1.73m2    Calcium 8.3 (L) 8.5 - 10.1 MG/DL             Medications:  MEDICATIONS GIVEN:  Medications Administered         sodium chloride 0.9 % bolus 1,000 mL Admin Date  2024 Action  New Bag Dose  1,000 mL Rate  983.6 mL/hr Route  IntraVENous Administered By  Nessa Irving RN                Post-Infusion Vitals:  Vitals:    24 1636   BP: 132/74   Pulse: 95   Resp: 16   Temp: 97.3 °F (36.3 °C)   SpO2: 94%             Pt tolerated treatment well. Port maintained positive blood return throughout treatment, flushed with positive blood return at conclusion and de-accessed per protocol. D/c home ambulatory in no distress. Patient is aware of next appointment on 24 @ 1030 at the [facility] location.        Future Appointments:  Future Appointments   Date Time Provider Department Center   2024  4:00 PM

## 2024-08-01 ENCOUNTER — HOSPITAL ENCOUNTER (OUTPATIENT)
Facility: HOSPITAL | Age: 79
Setting detail: INFUSION SERIES
Discharge: HOME | End: 2024-08-01
Payer: MEDICARE

## 2024-08-01 ENCOUNTER — HOSPITAL ENCOUNTER (OUTPATIENT)
Facility: HOSPITAL | Age: 79
Discharge: HOME | End: 2024-08-01
Payer: MEDICARE

## 2024-08-01 VITALS
TEMPERATURE: 97.4 F | RESPIRATION RATE: 16 BRPM | HEART RATE: 87 BPM | OXYGEN SATURATION: 94 % | DIASTOLIC BLOOD PRESSURE: 73 MMHG | SYSTOLIC BLOOD PRESSURE: 150 MMHG

## 2024-08-01 VITALS — BODY MASS INDEX: 33.73 KG/M2 | WEIGHT: 209 LBS

## 2024-08-01 DIAGNOSIS — C34.92 SQUAMOUS CELL CARCINOMA OF LUNG, LEFT (HCC): Primary | ICD-10-CM

## 2024-08-01 DIAGNOSIS — N17.9 AKI (ACUTE KIDNEY INJURY) (HCC): ICD-10-CM

## 2024-08-01 DIAGNOSIS — N17.9 AKI (ACUTE KIDNEY INJURY) (HCC): Primary | ICD-10-CM

## 2024-08-01 DIAGNOSIS — C34.92 SQUAMOUS CELL CARCINOMA OF LUNG, LEFT (HCC): ICD-10-CM

## 2024-08-01 LAB
ANION GAP SERPL CALC-SCNC: 5 MMOL/L (ref 5–15)
BUN SERPL-MCNC: 22 MG/DL (ref 6–20)
BUN/CREAT SERPL: 18 (ref 12–20)
CALCIUM SERPL-MCNC: 8.5 MG/DL (ref 8.5–10.1)
CHLORIDE SERPL-SCNC: 107 MMOL/L (ref 97–108)
CO2 SERPL-SCNC: 25 MMOL/L (ref 21–32)
CREAT SERPL-MCNC: 1.22 MG/DL (ref 0.55–1.02)
GLUCOSE SERPL-MCNC: 217 MG/DL (ref 65–100)
POTASSIUM SERPL-SCNC: 3.9 MMOL/L (ref 3.5–5.1)
SODIUM SERPL-SCNC: 137 MMOL/L (ref 136–145)

## 2024-08-01 PROCEDURE — 2580000003 HC RX 258: Performed by: NURSE PRACTITIONER

## 2024-08-01 PROCEDURE — 6360000002 HC RX W HCPCS: Performed by: RADIOLOGY

## 2024-08-01 PROCEDURE — A9579 GAD-BASE MR CONTRAST NOS,1ML: HCPCS | Performed by: RADIOLOGY

## 2024-08-01 PROCEDURE — 80048 BASIC METABOLIC PNL TOTAL CA: CPT

## 2024-08-01 PROCEDURE — 6360000004 HC RX CONTRAST MEDICATION: Performed by: RADIOLOGY

## 2024-08-01 PROCEDURE — 96360 HYDRATION IV INFUSION INIT: CPT

## 2024-08-01 PROCEDURE — 70553 MRI BRAIN STEM W/O & W/DYE: CPT

## 2024-08-01 RX ORDER — ONDANSETRON 2 MG/ML
8 INJECTION INTRAMUSCULAR; INTRAVENOUS
OUTPATIENT
Start: 2024-08-01

## 2024-08-01 RX ORDER — SODIUM CHLORIDE 0.9 % (FLUSH) 0.9 %
5-40 SYRINGE (ML) INJECTION PRN
OUTPATIENT
Start: 2024-08-01

## 2024-08-01 RX ORDER — ACETAMINOPHEN 325 MG/1
650 TABLET ORAL
OUTPATIENT
Start: 2024-08-01

## 2024-08-01 RX ORDER — EPINEPHRINE 1 MG/ML
0.3 INJECTION, SOLUTION INTRAMUSCULAR; SUBCUTANEOUS PRN
OUTPATIENT
Start: 2024-08-01

## 2024-08-01 RX ORDER — 0.9 % SODIUM CHLORIDE 0.9 %
1000 INTRAVENOUS SOLUTION INTRAVENOUS ONCE
Status: COMPLETED | OUTPATIENT
Start: 2024-08-01 | End: 2024-08-01

## 2024-08-01 RX ORDER — ALBUTEROL SULFATE 90 UG/1
4 AEROSOL, METERED RESPIRATORY (INHALATION) PRN
OUTPATIENT
Start: 2024-08-01

## 2024-08-01 RX ORDER — HEPARIN 100 UNIT/ML
500 SYRINGE INTRAVENOUS PRN
OUTPATIENT
Start: 2024-08-01

## 2024-08-01 RX ORDER — FAMOTIDINE 10 MG/ML
20 INJECTION, SOLUTION INTRAVENOUS
OUTPATIENT
Start: 2024-08-01

## 2024-08-01 RX ORDER — DIPHENHYDRAMINE HYDROCHLORIDE 50 MG/ML
50 INJECTION INTRAMUSCULAR; INTRAVENOUS
OUTPATIENT
Start: 2024-08-01

## 2024-08-01 RX ORDER — SODIUM CHLORIDE 9 MG/ML
INJECTION, SOLUTION INTRAVENOUS CONTINUOUS
OUTPATIENT
Start: 2024-08-01

## 2024-08-01 RX ORDER — 0.9 % SODIUM CHLORIDE 0.9 %
1000 INTRAVENOUS SOLUTION INTRAVENOUS ONCE
Status: CANCELLED
Start: 2024-08-01 | End: 2024-08-01

## 2024-08-01 RX ORDER — HEPARIN 100 UNIT/ML
500 SYRINGE INTRAVENOUS PRN
Status: DISPENSED | OUTPATIENT
Start: 2024-08-01

## 2024-08-01 RX ORDER — 0.9 % SODIUM CHLORIDE 0.9 %
1000 INTRAVENOUS SOLUTION INTRAVENOUS ONCE
Status: CANCELLED
Start: 2024-08-01

## 2024-08-01 RX ORDER — SODIUM CHLORIDE 9 MG/ML
5-250 INJECTION, SOLUTION INTRAVENOUS PRN
OUTPATIENT
Start: 2024-08-01

## 2024-08-01 RX ADMIN — GADOTERIDOL 18 ML: 279.3 INJECTION, SOLUTION INTRAVENOUS at 16:33

## 2024-08-01 RX ADMIN — SODIUM CHLORIDE 1000 ML: 9 INJECTION, SOLUTION INTRAVENOUS at 11:14

## 2024-08-01 RX ADMIN — HEPARIN 300 UNITS: 100 SYRINGE at 16:50

## 2024-08-01 ASSESSMENT — PAIN SCALES - GENERAL: PAINLEVEL_OUTOF10: 0

## 2024-08-01 NOTE — PROGRESS NOTES
[facility] Progress Note        Date: 24    Name: Elise Tabares    MRN: [id number]         : 1945    MD: Guerline Park MD       Ms. Tabares admitted to [facility] for Supplemental Hydration ambulatory with cane in stable condition. Assessment completed. No new concerns voiced.  Right port a cath accessed using 0.75\" mays, blood return obtained and port flushed without difficulty.  Labs drawn and sent for processing.        Vitals:    24 1106 24 1224   BP: 133/69 (!) 150/73   Pulse: 91 87   Resp: 16    Temp: 97.4 °F (36.3 °C)    TempSrc: Temporal    SpO2: 94%            Results for orders placed or performed during the hospital encounter of 24   Basic Metabolic Panel   Result Value Ref Range    Sodium 137 136 - 145 mmol/L    Potassium 3.9 3.5 - 5.1 mmol/L    Chloride 107 97 - 108 mmol/L    CO2 25 21 - 32 mmol/L    Anion Gap 5 5 - 15 mmol/L    Glucose 217 (H) 65 - 100 mg/dL    BUN 22 (H) 6 - 20 MG/DL    Creatinine 1.22 (H) 0.55 - 1.02 MG/DL    BUN/Creatinine Ratio 18 12 - 20      Est, Glom Filt Rate 45 (L) >60 ml/min/1.73m2    Calcium 8.5 8.5 - 10.1 MG/DL             Medications:  MEDICATIONS GIVEN:  Medications Administered         sodium chloride 0.9 % bolus 1,000 mL Admin Date  2024 Action  New Bag Dose  1,000 mL Rate  984 mL/hr Route  IntraVENous Administered By  Nessa Irving RN                Post-Infusion Vitals:  Vitals:    24 1224   BP: (!) 150/73   Pulse: 87   Resp:    Temp:    SpO2:              Pt tolerated treatment well. Port maintained positive blood return throughout treatment, flushed with positive blood return at conclusion and capped for MRI this afternoon (OK to leave accessed per Radha SORIA for Dr. Park). D/c home ambulatory with cane in no distress. Patient is aware of next appointment on 24 @ 1030 at the [facility] location.        Future Appointments:  Future Appointments   Date Time Provider Department Center

## 2024-08-06 ENCOUNTER — APPOINTMENT (OUTPATIENT)
Facility: HOSPITAL | Age: 79
End: 2024-08-06
Payer: MEDICARE

## 2024-08-07 RX ORDER — ALBUTEROL SULFATE 90 UG/1
4 AEROSOL, METERED RESPIRATORY (INHALATION) PRN
Status: CANCELLED | OUTPATIENT
Start: 2024-08-13

## 2024-08-07 RX ORDER — SODIUM CHLORIDE 9 MG/ML
5-250 INJECTION, SOLUTION INTRAVENOUS PRN
Status: CANCELLED | OUTPATIENT
Start: 2024-08-13

## 2024-08-07 RX ORDER — ONDANSETRON 2 MG/ML
8 INJECTION INTRAMUSCULAR; INTRAVENOUS
Status: CANCELLED | OUTPATIENT
Start: 2024-08-13

## 2024-08-07 RX ORDER — ACETAMINOPHEN 325 MG/1
650 TABLET ORAL
Status: CANCELLED | OUTPATIENT
Start: 2024-08-13

## 2024-08-07 RX ORDER — FAMOTIDINE 10 MG/ML
20 INJECTION, SOLUTION INTRAVENOUS
Status: CANCELLED | OUTPATIENT
Start: 2024-08-13

## 2024-08-07 RX ORDER — DIPHENHYDRAMINE HYDROCHLORIDE 50 MG/ML
50 INJECTION INTRAMUSCULAR; INTRAVENOUS
Status: CANCELLED | OUTPATIENT
Start: 2024-08-13

## 2024-08-07 RX ORDER — EPINEPHRINE 1 MG/ML
0.3 INJECTION, SOLUTION INTRAMUSCULAR; SUBCUTANEOUS PRN
Status: CANCELLED | OUTPATIENT
Start: 2024-08-13

## 2024-08-07 RX ORDER — MEPERIDINE HYDROCHLORIDE 25 MG/ML
12.5 INJECTION INTRAMUSCULAR; INTRAVENOUS; SUBCUTANEOUS PRN
Status: CANCELLED | OUTPATIENT
Start: 2024-08-13

## 2024-08-07 RX ORDER — HEPARIN 100 UNIT/ML
500 SYRINGE INTRAVENOUS PRN
Status: CANCELLED | OUTPATIENT
Start: 2024-08-13

## 2024-08-07 RX ORDER — SODIUM CHLORIDE 9 MG/ML
INJECTION, SOLUTION INTRAVENOUS CONTINUOUS
Status: CANCELLED | OUTPATIENT
Start: 2024-08-13

## 2024-08-13 ENCOUNTER — HOSPITAL ENCOUNTER (OUTPATIENT)
Facility: HOSPITAL | Age: 79
Setting detail: INFUSION SERIES
Discharge: HOME | End: 2024-08-13
Payer: MEDICARE

## 2024-08-13 ENCOUNTER — APPOINTMENT (OUTPATIENT)
Facility: HOSPITAL | Age: 79
End: 2024-08-13
Payer: MEDICARE

## 2024-08-13 ENCOUNTER — OFFICE VISIT (OUTPATIENT)
Age: 79
End: 2024-08-13
Payer: MEDICARE

## 2024-08-13 VITALS
DIASTOLIC BLOOD PRESSURE: 65 MMHG | TEMPERATURE: 97.7 F | OXYGEN SATURATION: 98 % | SYSTOLIC BLOOD PRESSURE: 144 MMHG | RESPIRATION RATE: 17 BRPM | BODY MASS INDEX: 33.14 KG/M2 | WEIGHT: 206.2 LBS | HEART RATE: 92 BPM | HEIGHT: 66 IN

## 2024-08-13 VITALS
DIASTOLIC BLOOD PRESSURE: 77 MMHG | HEART RATE: 83 BPM | OXYGEN SATURATION: 97 % | SYSTOLIC BLOOD PRESSURE: 148 MMHG | BODY MASS INDEX: 33.27 KG/M2 | TEMPERATURE: 97.8 F | HEIGHT: 66 IN | RESPIRATION RATE: 16 BRPM | WEIGHT: 207 LBS

## 2024-08-13 DIAGNOSIS — C34.92 MALIGNANT NEOPLASM OF UNSPECIFIED PART OF LEFT BRONCHUS OR LUNG (HCC): ICD-10-CM

## 2024-08-13 DIAGNOSIS — Z51.12 ENCOUNTER FOR ANTINEOPLASTIC IMMUNOTHERAPY: ICD-10-CM

## 2024-08-13 DIAGNOSIS — C34.92 SQUAMOUS CELL CARCINOMA OF LUNG, LEFT (HCC): Primary | ICD-10-CM

## 2024-08-13 DIAGNOSIS — D63.8 ANEMIA OF CHRONIC DISEASE: ICD-10-CM

## 2024-08-13 DIAGNOSIS — N18.31 STAGE 3A CHRONIC KIDNEY DISEASE (HCC): ICD-10-CM

## 2024-08-13 DIAGNOSIS — I10 PRIMARY HYPERTENSION: ICD-10-CM

## 2024-08-13 DIAGNOSIS — C34.92 MALIGNANT NEOPLASM OF UNSPECIFIED PART OF LEFT BRONCHUS OR LUNG (HCC): Primary | ICD-10-CM

## 2024-08-13 DIAGNOSIS — G89.3 CHRONIC NEOPLASM-RELATED PAIN: ICD-10-CM

## 2024-08-13 LAB
ALBUMIN SERPL-MCNC: 3.3 G/DL (ref 3.5–5)
ALBUMIN/GLOB SERPL: 0.9 (ref 1.1–2.2)
ALP SERPL-CCNC: 144 U/L (ref 45–117)
ALT SERPL-CCNC: 22 U/L (ref 12–78)
ANION GAP SERPL CALC-SCNC: 9 MMOL/L (ref 5–15)
AST SERPL-CCNC: 13 U/L (ref 15–37)
BASOPHILS # BLD: 0 K/UL (ref 0–0.1)
BASOPHILS NFR BLD: 0 % (ref 0–1)
BILIRUB SERPL-MCNC: 0.6 MG/DL (ref 0.2–1)
BUN SERPL-MCNC: 31 MG/DL (ref 6–20)
BUN/CREAT SERPL: 19 (ref 12–20)
CALCIUM SERPL-MCNC: 9.1 MG/DL (ref 8.5–10.1)
CHLORIDE SERPL-SCNC: 103 MMOL/L (ref 97–108)
CO2 SERPL-SCNC: 22 MMOL/L (ref 21–32)
CREAT SERPL-MCNC: 1.6 MG/DL (ref 0.55–1.02)
DIFFERENTIAL METHOD BLD: ABNORMAL
EOSINOPHIL # BLD: 0 K/UL (ref 0–0.4)
EOSINOPHIL NFR BLD: 0 % (ref 0–7)
ERYTHROCYTE [DISTWIDTH] IN BLOOD BY AUTOMATED COUNT: 13.6 % (ref 11.5–14.5)
GLOBULIN SER CALC-MCNC: 3.6 G/DL (ref 2–4)
GLUCOSE SERPL-MCNC: 428 MG/DL (ref 65–100)
HCT VFR BLD AUTO: 31 % (ref 35–47)
HGB BLD-MCNC: 10.3 G/DL (ref 11.5–16)
IMM GRANULOCYTES # BLD AUTO: 0.1 K/UL (ref 0–0.04)
IMM GRANULOCYTES NFR BLD AUTO: 1 % (ref 0–0.5)
LYMPHOCYTES # BLD: 0.6 K/UL (ref 0.8–3.5)
LYMPHOCYTES NFR BLD: 5 % (ref 12–49)
MCH RBC QN AUTO: 28.9 PG (ref 26–34)
MCHC RBC AUTO-ENTMCNC: 33.2 G/DL (ref 30–36.5)
MCV RBC AUTO: 87.1 FL (ref 80–99)
MONOCYTES # BLD: 0 K/UL (ref 0–1)
MONOCYTES NFR BLD: 0 % (ref 5–13)
NEUTS SEG # BLD: 10.9 K/UL (ref 1.8–8)
NEUTS SEG NFR BLD: 94 % (ref 32–75)
NRBC # BLD: 0 K/UL (ref 0–0.01)
NRBC BLD-RTO: 0 PER 100 WBC
PLATELET # BLD AUTO: 364 K/UL (ref 150–400)
PMV BLD AUTO: 10.3 FL (ref 8.9–12.9)
POTASSIUM SERPL-SCNC: 4.6 MMOL/L (ref 3.5–5.1)
PROT SERPL-MCNC: 6.9 G/DL (ref 6.4–8.2)
RBC # BLD AUTO: 3.56 M/UL (ref 3.8–5.2)
RBC MORPH BLD: ABNORMAL
SODIUM SERPL-SCNC: 134 MMOL/L (ref 136–145)
WBC # BLD AUTO: 11.6 K/UL (ref 3.6–11)

## 2024-08-13 PROCEDURE — 99215 OFFICE O/P EST HI 40 MIN: CPT | Performed by: INTERNAL MEDICINE

## 2024-08-13 PROCEDURE — G8399 PT W/DXA RESULTS DOCUMENT: HCPCS | Performed by: INTERNAL MEDICINE

## 2024-08-13 PROCEDURE — 1123F ACP DISCUSS/DSCN MKR DOCD: CPT | Performed by: INTERNAL MEDICINE

## 2024-08-13 PROCEDURE — 96377 APPLICATON ON-BODY INJECTOR: CPT

## 2024-08-13 PROCEDURE — 3078F DIAST BP <80 MM HG: CPT | Performed by: INTERNAL MEDICINE

## 2024-08-13 PROCEDURE — 2580000003 HC RX 258: Performed by: NURSE PRACTITIONER

## 2024-08-13 PROCEDURE — 1036F TOBACCO NON-USER: CPT | Performed by: INTERNAL MEDICINE

## 2024-08-13 PROCEDURE — G8417 CALC BMI ABV UP PARAM F/U: HCPCS | Performed by: INTERNAL MEDICINE

## 2024-08-13 PROCEDURE — 2580000003 HC RX 258: Performed by: INTERNAL MEDICINE

## 2024-08-13 PROCEDURE — G8427 DOCREV CUR MEDS BY ELIG CLIN: HCPCS | Performed by: INTERNAL MEDICINE

## 2024-08-13 PROCEDURE — G2211 COMPLEX E/M VISIT ADD ON: HCPCS | Performed by: INTERNAL MEDICINE

## 2024-08-13 PROCEDURE — 3077F SYST BP >= 140 MM HG: CPT | Performed by: INTERNAL MEDICINE

## 2024-08-13 PROCEDURE — 1090F PRES/ABSN URINE INCON ASSESS: CPT | Performed by: INTERNAL MEDICINE

## 2024-08-13 PROCEDURE — 6360000002 HC RX W HCPCS: Performed by: INTERNAL MEDICINE

## 2024-08-13 PROCEDURE — 96375 TX/PRO/DX INJ NEW DRUG ADDON: CPT

## 2024-08-13 PROCEDURE — 96413 CHEMO IV INFUSION 1 HR: CPT

## 2024-08-13 PROCEDURE — 85025 COMPLETE CBC W/AUTO DIFF WBC: CPT

## 2024-08-13 PROCEDURE — 80053 COMPREHEN METABOLIC PANEL: CPT

## 2024-08-13 RX ORDER — DESLORATADINE 5 MG/1
5 TABLET ORAL DAILY
Qty: 30 TABLET | Refills: 0 | Status: SHIPPED | OUTPATIENT
Start: 2024-08-13

## 2024-08-13 RX ORDER — ONDANSETRON 2 MG/ML
8 INJECTION INTRAMUSCULAR; INTRAVENOUS ONCE
Status: COMPLETED | OUTPATIENT
Start: 2024-08-13 | End: 2024-08-13

## 2024-08-13 RX ORDER — 0.9 % SODIUM CHLORIDE 0.9 %
500 INTRAVENOUS SOLUTION INTRAVENOUS ONCE
Status: COMPLETED | OUTPATIENT
Start: 2024-08-13 | End: 2024-08-13

## 2024-08-13 RX ORDER — OXYCODONE HYDROCHLORIDE 5 MG/1
5 TABLET ORAL EVERY 6 HOURS PRN
Qty: 30 TABLET | Refills: 0 | Status: SHIPPED | OUTPATIENT
Start: 2024-08-13 | End: 2024-08-27

## 2024-08-13 RX ORDER — SODIUM CHLORIDE 9 MG/ML
5-250 INJECTION, SOLUTION INTRAVENOUS PRN
Status: DISCONTINUED | OUTPATIENT
Start: 2024-08-13 | End: 2024-08-14 | Stop reason: HOSPADM

## 2024-08-13 RX ORDER — DEXAMETHASONE SODIUM PHOSPHATE 10 MG/ML
6 INJECTION INTRAMUSCULAR; INTRAVENOUS ONCE
Status: COMPLETED | OUTPATIENT
Start: 2024-08-13 | End: 2024-08-13

## 2024-08-13 RX ORDER — SODIUM CHLORIDE 0.9 % (FLUSH) 0.9 %
5-40 SYRINGE (ML) INJECTION PRN
Status: DISCONTINUED | OUTPATIENT
Start: 2024-08-13 | End: 2024-08-14 | Stop reason: HOSPADM

## 2024-08-13 RX ADMIN — SODIUM CHLORIDE 500 ML: 9 INJECTION, SOLUTION INTRAVENOUS at 12:46

## 2024-08-13 RX ADMIN — DEXAMETHASONE SODIUM PHOSPHATE 6 MG: 10 INJECTION INTRAMUSCULAR; INTRAVENOUS at 12:42

## 2024-08-13 RX ADMIN — DOCETAXEL ANHYDROUS 159 MG: 10 INJECTION, SOLUTION INTRAVENOUS at 13:44

## 2024-08-13 RX ADMIN — SODIUM CHLORIDE 25 ML/HR: 9 INJECTION, SOLUTION INTRAVENOUS at 12:38

## 2024-08-13 RX ADMIN — ONDANSETRON 8 MG: 2 INJECTION INTRAMUSCULAR; INTRAVENOUS at 12:39

## 2024-08-13 RX ADMIN — SODIUM CHLORIDE, PRESERVATIVE FREE 20 ML: 5 INJECTION INTRAVENOUS at 15:15

## 2024-08-13 RX ADMIN — PEGFILGRASTIM 6 MG: KIT SUBCUTANEOUS at 15:15

## 2024-08-13 ASSESSMENT — PAIN SCALES - GENERAL: PAINLEVEL_OUTOF10: 0

## 2024-08-13 NOTE — PROGRESS NOTES
[facility] VISIT NOTE  Date: 2024    Name: Elise Tabares    MRN: [id number]         : 1945    Ms. Tabares Arrived ambulatory and in no distress for C2D1 of Taxotere Regimen.  Assessment was completed, no acute issues at this time, no new complaints voiced.  Chest wall port accessed without difficulty by the assessment nurse, labs drawn & sent for processing. Pt proceeded to MD appointment with Medical Oncology.    When pt returned from MD appointment, lab reviewed and criteria are met for today treatment.      Ms. Tabares's vitals were reviewed.  Patient Vitals for the past 12 hrs:   Temp Pulse Resp BP SpO2   24 1500 97.7 °F (36.5 °C) 92 17 (!) 144/65 98 %   24 1015 97.6 °F (36.4 °C) 85 17 106/84 97 %         Lab results were obtained and reviewed.  Recent Results (from the past 12 hour(s))   Comprehensive metabolic panel    Collection Time: 24 10:24 AM   Result Value Ref Range    Sodium 134 (L) 136 - 145 mmol/L    Potassium 4.6 3.5 - 5.1 mmol/L    Chloride 103 97 - 108 mmol/L    CO2 22 21 - 32 mmol/L    Anion Gap 9 5 - 15 mmol/L    Glucose 428 (H) 65 - 100 mg/dL    BUN 31 (H) 6 - 20 MG/DL    Creatinine 1.60 (H) 0.55 - 1.02 MG/DL    BUN/Creatinine Ratio 19 12 - 20      Est, Glom Filt Rate 33 (L) >60 ml/min/1.73m2    Calcium 9.1 8.5 - 10.1 MG/DL    Total Bilirubin 0.6 0.2 - 1.0 MG/DL    ALT 22 12 - 78 U/L    AST 13 (L) 15 - 37 U/L    Alk Phosphatase 144 (H) 45 - 117 U/L    Total Protein 6.9 6.4 - 8.2 g/dL    Albumin 3.3 (L) 3.5 - 5.0 g/dL    Globulin 3.6 2.0 - 4.0 g/dL    Albumin/Globulin Ratio 0.9 (L) 1.1 - 2.2     CBC With Auto Differential    Collection Time: 24 10:24 AM   Result Value Ref Range    WBC 11.6 (H) 3.6 - 11.0 K/uL    RBC 3.56 (L) 3.80 - 5.20 M/uL    Hemoglobin 10.3 (L) 11.5 - 16.0 g/dL    Hematocrit 31.0 (L) 35.0 - 47.0 %    MCV 87.1 80.0 - 99.0 FL    MCH 28.9 26.0 - 34.0 PG    MCHC 33.2 30.0 - 36.5 g/dL    RDW 13.6 11.5 - 14.5 %    Platelets 364 150 - 400

## 2024-08-13 NOTE — PROGRESS NOTES
Chief Complaint   Patient presents with    Follow-up           Vitals:    08/13/24 1056   BP: (!) 148/77   Pulse: 83   Resp: 16   Temp: 97.8 °F (36.6 °C)   SpO2: 97%            1. Have you been to the ER, urgent care clinic since your last visit?  Hospitalized since your last visit?  No  2. Have you seen or consulted any other health care providers outside of the [facility] since your last visit?  Include any pap smears or colon screening. No  Chief Complaint   Patient presents with    Follow-up           Vitals:    08/13/24 1056   BP: (!) 148/77   Pulse: 83   Resp: 16   Temp: 97.8 °F (36.6 °C)   SpO2: 97%            1. Have you been to the ER, urgent care clinic since your last visit?  Hospitalized since your last visit?  No  2. Have you seen or consulted any other health care providers outside of the [facility] since your last visit?  Include any pap smears or colon screening. No      
possibly exacerbated by prior chemotherapy. Crt range 1.2. Followed by Dr. De Leon. Crt increased to 1.45 today.   -- Advised 60 oz water daily.     4. CAD / HTN:  On ASA, BB, statin as well as Lisinopril. Stent placement in proximal LAD on 10/5/23. On Plavix. Cardiac rehab twice weekly currently on hold given left/right axilla/breast pain.     5. Neuropathy:  Pre-dated treatment. Present in right leg. On nortriptyline and getting electric cell signaling treatment.     6. Anemia of chronic disease:  2/2 CKD. B12 and folate normal. S/p Injectafer x 1 in March 2024 in the setting of CKD.    7. Rash on BLE:   Grade 1 2/2 IO, which is now discontinued. Triamcinolone cream BID prn and emollient, topical benadryl. Following with Dermatology.     9. Neoplasm pain:  Present in left and right axilla pain / bilateral breast pain  This is due to disease recurrence with tumor extending into and replacing bone (2nd rib.) Is amenable to palliative RT if systemic treatment does not help.  -- Oxycodone 5mg q6h prn with stool softeners; #30 tabs sent 8/13/24. Pt reminded to call during office hrs for refills/changes.    Goals of care:  Disease is now incurable, but is treatable to improve quality and duration of life.    Emotional well being: Pt is coping well with his/her disease and has excellent support.  I appreciate the opportunity to participate in Ms. Elise Tabares's care.    I personally provided the service today.     Signed By: Guerline Park MD

## 2024-08-16 ENCOUNTER — TELEPHONE (OUTPATIENT)
Age: 79
End: 2024-08-16

## 2024-08-16 NOTE — TELEPHONE ENCOUNTER
Called patient to advise/confirm upcoming appt with Dr. Street on 08/21/2024 at 10:30  at [facility].  Spoke with Elise Tabares and confirmed appointment.

## 2024-08-19 ENCOUNTER — TELEPHONE (OUTPATIENT)
Age: 79
End: 2024-08-19

## 2024-08-19 DIAGNOSIS — C34.92 SQUAMOUS CELL CARCINOMA OF LUNG, LEFT (HCC): Primary | ICD-10-CM

## 2024-08-19 RX ORDER — 0.9 % SODIUM CHLORIDE 0.9 %
1000 INTRAVENOUS SOLUTION INTRAVENOUS ONCE
Status: CANCELLED | OUTPATIENT
Start: 2024-08-22 | End: 2024-08-22

## 2024-08-19 NOTE — TELEPHONE ENCOUNTER
Pt called in wanting to know if she can be scheduled for fluids this coming Wednesday.     # [phone]

## 2024-08-19 NOTE — TELEPHONE ENCOUNTER
08/19/24 2:09 PM Return call placed to patient. Verified patient ID x 2. Patient requesting IV fluids on Wednesday since she is already scheduled to see palliative medicine that day. Patient with complaints of weakness and decreased appetite--she describes that she just doesn't feel like eating. Denies nausea/vomiting. She states she had diarrhea x 1 day on Thursday but this resolved with Imodium. Patient is drinking water and Pedialyte. Also drinking \"Muscle Milk\" protein drink. She states she will drink 2 in one day if she hasn't eaten much. She denies any dizziness/lightheadedness. Patient also notes that her bones ache, which she attributes to Neulasta. This is starting to improve. Advised that there is no availability to schedule IV fluids on Wednesday at present time. Patient voiced understanding but is agreeable to schedule [facility] appointment for any other time this week, if availability. Will defer to [facility]  and call patient back. She voiced understanding.

## 2024-08-21 ENCOUNTER — OFFICE VISIT (OUTPATIENT)
Age: 79
End: 2024-08-21
Payer: MEDICARE

## 2024-08-21 VITALS
HEART RATE: 86 BPM | BODY MASS INDEX: 33.41 KG/M2 | SYSTOLIC BLOOD PRESSURE: 157 MMHG | TEMPERATURE: 97.6 F | RESPIRATION RATE: 18 BRPM | OXYGEN SATURATION: 91 % | DIASTOLIC BLOOD PRESSURE: 94 MMHG | HEIGHT: 66 IN

## 2024-08-21 DIAGNOSIS — R63.0 POOR APPETITE: ICD-10-CM

## 2024-08-21 DIAGNOSIS — F11.90 UNCOMPLICATED OPIOID USE: ICD-10-CM

## 2024-08-21 DIAGNOSIS — Z51.5 PALLIATIVE CARE BY SPECIALIST: ICD-10-CM

## 2024-08-21 DIAGNOSIS — G89.3 CHRONIC NEOPLASM-RELATED PAIN: ICD-10-CM

## 2024-08-21 DIAGNOSIS — R53.83 OTHER FATIGUE: ICD-10-CM

## 2024-08-21 DIAGNOSIS — T40.2X5A CONSTIPATION DUE TO OPIOID THERAPY: ICD-10-CM

## 2024-08-21 DIAGNOSIS — R11.0 NAUSEA: ICD-10-CM

## 2024-08-21 DIAGNOSIS — G89.3 CANCER ASSOCIATED PAIN: Primary | ICD-10-CM

## 2024-08-21 DIAGNOSIS — K59.03 CONSTIPATION DUE TO OPIOID THERAPY: ICD-10-CM

## 2024-08-21 DIAGNOSIS — R43.2 DYSGEUSIA: ICD-10-CM

## 2024-08-21 PROCEDURE — 99205 OFFICE O/P NEW HI 60 MIN: CPT | Performed by: STUDENT IN AN ORGANIZED HEALTH CARE EDUCATION/TRAINING PROGRAM

## 2024-08-21 PROCEDURE — 1124F ACP DISCUSS-NO DSCNMKR DOCD: CPT | Performed by: STUDENT IN AN ORGANIZED HEALTH CARE EDUCATION/TRAINING PROGRAM

## 2024-08-21 PROCEDURE — G8417 CALC BMI ABV UP PARAM F/U: HCPCS | Performed by: STUDENT IN AN ORGANIZED HEALTH CARE EDUCATION/TRAINING PROGRAM

## 2024-08-21 PROCEDURE — 3080F DIAST BP >= 90 MM HG: CPT | Performed by: STUDENT IN AN ORGANIZED HEALTH CARE EDUCATION/TRAINING PROGRAM

## 2024-08-21 PROCEDURE — 1090F PRES/ABSN URINE INCON ASSESS: CPT | Performed by: STUDENT IN AN ORGANIZED HEALTH CARE EDUCATION/TRAINING PROGRAM

## 2024-08-21 PROCEDURE — 1036F TOBACCO NON-USER: CPT | Performed by: STUDENT IN AN ORGANIZED HEALTH CARE EDUCATION/TRAINING PROGRAM

## 2024-08-21 PROCEDURE — G8399 PT W/DXA RESULTS DOCUMENT: HCPCS | Performed by: STUDENT IN AN ORGANIZED HEALTH CARE EDUCATION/TRAINING PROGRAM

## 2024-08-21 PROCEDURE — G8427 DOCREV CUR MEDS BY ELIG CLIN: HCPCS | Performed by: STUDENT IN AN ORGANIZED HEALTH CARE EDUCATION/TRAINING PROGRAM

## 2024-08-21 PROCEDURE — 3077F SYST BP >= 140 MM HG: CPT | Performed by: STUDENT IN AN ORGANIZED HEALTH CARE EDUCATION/TRAINING PROGRAM

## 2024-08-21 RX ORDER — OXYCODONE HYDROCHLORIDE 5 MG/1
5 TABLET ORAL EVERY 4 HOURS PRN
Qty: 42 TABLET | Refills: 0 | Status: SHIPPED | OUTPATIENT
Start: 2024-08-21 | End: 2024-08-28

## 2024-08-21 RX ORDER — MIRTAZAPINE 7.5 MG/1
7.5 TABLET, FILM COATED ORAL NIGHTLY
Qty: 90 TABLET | Refills: 0 | Status: SHIPPED | OUTPATIENT
Start: 2024-08-21

## 2024-08-21 ASSESSMENT — PATIENT HEALTH QUESTIONNAIRE - PHQ9
1. LITTLE INTEREST OR PLEASURE IN DOING THINGS: NOT AT ALL
2. FEELING DOWN, DEPRESSED OR HOPELESS: NOT AT ALL
SUM OF ALL RESPONSES TO PHQ QUESTIONS 1-9: 0
SUM OF ALL RESPONSES TO PHQ9 QUESTIONS 1 & 2: 0
SUM OF ALL RESPONSES TO PHQ QUESTIONS 1-9: 0

## 2024-08-21 NOTE — PROGRESS NOTES
Palliative Medicine Outpatient Clinic  Nurse Check in Note  [phone]    Patient Name: Elise Tabares  YOB: 1945      Date of Visit: 08/21/2024  Visit Location:  [facility]    Chief patient or family concern today: neuropathy in b/l feet , pain uncontrolled     Medications:  Med reconciliation was performed with:  Patient    Requested refills:  None    If prescribed an opioid, does patient have access to naloxone at home?:  No  If No, pend naloxone nasal spray    Function and Symptoms:  Use of assist devices:  None    Palliative Performance Status (PPS):   Palliative Performance Scale (PPS)  PPS: 70    ESAS:  Modified-[place] Symptom Assessment Scale (ESAS)  Tiredness Score: Worst possible tiredness  Drowsiness Score: 8  Depression Score: Not depressed  Pain Score: 9  Anxiety Score: Not anxious  Nausea Score: 2  Appetite Score: Worst possible appetite  Dyspnea Score: 7  Constipation: Yes  Wellbeing Score: Worst possible feeling of wellbeing  Other Problem Score: Best possible response    Constipation?  No  Last BM: 8/    Advance Care Planning:  Currently listed healthcare agent:      Is there an ACP Note within the past 12 months?  NO  If No, Alert Clinician and/or Social Work      Mary Ellen Yusuf LPN          
(CKD)     Stage 3    COPD (chronic obstructive pulmonary disease) (HCC)     DM type 2 (diabetes mellitus, type 2) (HCC) 2010    HTN (hypertension) 2010    Hyperlipidemia     Lung cancer (HCC) 2023    Lt upper lobe squamous cell    MI (myocardial infarction) (HCC)     s/p PCI    Obesity (BMI 30-39.9)     Obstructive sleep apnea (adult) (pediatric) 2014    Thyroid mass       Past Surgical History:   Procedure Laterality Date    CARDIAC PROCEDURE N/A 10/05/2023    Left heart cath / coronary angiography performed by Micah Bah DO at [facility] CARDIAC CATH LAB    CARDIAC PROCEDURE N/A 10/05/2023    Insert stent pierre coronary - x1 LAD performed by Micah Bah DO at [facility] CARDIAC CATH LAB    CARDIAC PROCEDURE N/A 10/05/2023    Intravascular ultrasound performed by Micah Bah DO at [facility] CARDIAC CATH LAB    CARDIAC PROCEDURE N/A 10/05/2023    Intravascular lithotripsy coronary performed by Micah Bah DO at [facility] CARDIAC CATH LAB    CATARACT REMOVAL  2021    bilateral     CORONARY ANGIOPLASTY WITH STENT PLACEMENT      PCI, also had PCI  and     CORONARY STENT PLACEMENT      CT NEEDLE BIOPSY LUNG PERCUTANEOUS  2023    CT NEEDLE BIOPSY LUNG PERCUTANEOUS 3/14/2023 [facility] RAD CT    IR PORT PLACEMENT EQUAL OR GREATER THAN 5 YEARS  2023    IR PORT PLACEMENT EQUAL OR GREATER THAN 5 YEARS 2023 [facility] CARDIAC CATH/EP/IR LAB    LUNG REMOVAL, PARTIAL Left 2023    [facility] - Left Upper Lobe, Thoracoscopic    ORTHOPEDIC SURGERY      Lumbar disc surgery    OTHER SURGICAL HISTORY  2023    Thoracic lung surgery    TUBAL LIGATION        Family History   Problem Relation Age of Onset    Hypertension Mother          58yo    Coronary Art Dis Mother     Diabetes Sister     Breast Cancer Sister     Diabetes Sister     Lung Cancer Sister     Diabetes Sister     Diabetes Sister       History reviewed, no pertinent family history.  Social History     Tobacco Use    Smoking status:

## 2024-08-22 ENCOUNTER — HOSPITAL ENCOUNTER (OUTPATIENT)
Facility: HOSPITAL | Age: 79
Setting detail: INFUSION SERIES
Discharge: HOME | End: 2024-08-22
Payer: MEDICARE

## 2024-08-22 VITALS
RESPIRATION RATE: 20 BRPM | BODY MASS INDEX: 33.44 KG/M2 | TEMPERATURE: 97.5 F | SYSTOLIC BLOOD PRESSURE: 140 MMHG | WEIGHT: 207.2 LBS | DIASTOLIC BLOOD PRESSURE: 78 MMHG | HEART RATE: 100 BPM | OXYGEN SATURATION: 94 %

## 2024-08-22 DIAGNOSIS — C34.92 SQUAMOUS CELL CARCINOMA OF LUNG, LEFT (HCC): Primary | ICD-10-CM

## 2024-08-22 LAB
ANION GAP SERPL CALC-SCNC: 9 MMOL/L (ref 5–15)
BUN SERPL-MCNC: 19 MG/DL (ref 6–20)
BUN/CREAT SERPL: 12 (ref 12–20)
CALCIUM SERPL-MCNC: 8.2 MG/DL (ref 8.5–10.1)
CHLORIDE SERPL-SCNC: 112 MMOL/L (ref 97–108)
CO2 SERPL-SCNC: 21 MMOL/L (ref 21–32)
CREAT SERPL-MCNC: 1.58 MG/DL (ref 0.55–1.02)
GLUCOSE SERPL-MCNC: 364 MG/DL (ref 65–100)
POTASSIUM SERPL-SCNC: 3.8 MMOL/L (ref 3.5–5.1)
SODIUM SERPL-SCNC: 142 MMOL/L (ref 136–145)

## 2024-08-22 PROCEDURE — 96360 HYDRATION IV INFUSION INIT: CPT

## 2024-08-22 PROCEDURE — 2580000003 HC RX 258: Performed by: NURSE PRACTITIONER

## 2024-08-22 PROCEDURE — 80048 BASIC METABOLIC PNL TOTAL CA: CPT

## 2024-08-22 PROCEDURE — 36415 COLL VENOUS BLD VENIPUNCTURE: CPT

## 2024-08-22 RX ORDER — HEPARIN 100 UNIT/ML
500 SYRINGE INTRAVENOUS PRN
Status: CANCELLED | OUTPATIENT
Start: 2024-08-22

## 2024-08-22 RX ORDER — SODIUM CHLORIDE 9 MG/ML
INJECTION, SOLUTION INTRAVENOUS CONTINUOUS
Status: CANCELLED | OUTPATIENT
Start: 2024-08-22

## 2024-08-22 RX ORDER — EPINEPHRINE 1 MG/ML
0.3 INJECTION, SOLUTION INTRAMUSCULAR; SUBCUTANEOUS PRN
Status: CANCELLED | OUTPATIENT
Start: 2024-08-22

## 2024-08-22 RX ORDER — SODIUM CHLORIDE 0.9 % (FLUSH) 0.9 %
5-40 SYRINGE (ML) INJECTION PRN
Status: CANCELLED | OUTPATIENT
Start: 2024-08-22

## 2024-08-22 RX ORDER — ALBUTEROL SULFATE 90 UG/1
4 AEROSOL, METERED RESPIRATORY (INHALATION) PRN
Status: CANCELLED | OUTPATIENT
Start: 2024-08-22

## 2024-08-22 RX ORDER — SODIUM CHLORIDE 9 MG/ML
5-250 INJECTION, SOLUTION INTRAVENOUS PRN
Status: DISCONTINUED | OUTPATIENT
Start: 2024-08-22 | End: 2024-08-23 | Stop reason: HOSPADM

## 2024-08-22 RX ORDER — ONDANSETRON 2 MG/ML
8 INJECTION INTRAMUSCULAR; INTRAVENOUS
Status: CANCELLED | OUTPATIENT
Start: 2024-08-22

## 2024-08-22 RX ORDER — ACETAMINOPHEN 325 MG/1
650 TABLET ORAL
Status: CANCELLED | OUTPATIENT
Start: 2024-08-22

## 2024-08-22 RX ORDER — SODIUM CHLORIDE 9 MG/ML
5-250 INJECTION, SOLUTION INTRAVENOUS PRN
Status: CANCELLED | OUTPATIENT
Start: 2024-08-22

## 2024-08-22 RX ORDER — DIPHENHYDRAMINE HYDROCHLORIDE 50 MG/ML
50 INJECTION INTRAMUSCULAR; INTRAVENOUS
Status: CANCELLED | OUTPATIENT
Start: 2024-08-22

## 2024-08-22 RX ORDER — HEPARIN 100 UNIT/ML
500 SYRINGE INTRAVENOUS PRN
Status: DISCONTINUED | OUTPATIENT
Start: 2024-08-22 | End: 2024-08-23 | Stop reason: HOSPADM

## 2024-08-22 RX ORDER — 0.9 % SODIUM CHLORIDE 0.9 %
1000 INTRAVENOUS SOLUTION INTRAVENOUS ONCE
Status: COMPLETED | OUTPATIENT
Start: 2024-08-22 | End: 2024-08-22

## 2024-08-22 RX ORDER — FAMOTIDINE 10 MG/ML
20 INJECTION, SOLUTION INTRAVENOUS
Status: CANCELLED | OUTPATIENT
Start: 2024-08-22

## 2024-08-22 RX ORDER — 0.9 % SODIUM CHLORIDE 0.9 %
1000 INTRAVENOUS SOLUTION INTRAVENOUS ONCE
Status: CANCELLED | OUTPATIENT
Start: 2024-08-22 | End: 2024-08-22

## 2024-08-22 RX ORDER — SODIUM CHLORIDE 0.9 % (FLUSH) 0.9 %
5-40 SYRINGE (ML) INJECTION PRN
Status: DISCONTINUED | OUTPATIENT
Start: 2024-08-22 | End: 2024-08-23 | Stop reason: HOSPADM

## 2024-08-22 RX ADMIN — SODIUM CHLORIDE, PRESERVATIVE FREE 20 ML: 5 INJECTION INTRAVENOUS at 16:42

## 2024-08-22 RX ADMIN — SODIUM CHLORIDE 1000 ML: 9 INJECTION, SOLUTION INTRAVENOUS at 15:39

## 2024-08-22 ASSESSMENT — PAIN SCALES - GENERAL: PAINLEVEL_OUTOF10: 0

## 2024-08-22 NOTE — PROGRESS NOTES
[facility] Progress Note    Date: 2024    Name: Elise Tabares    MRN: [id number]         : 1945    Ms. Tabares Arrived ambulatory and in no distress for Hydration Regimen.  Assessment was completed, no acute issues at this time, no new complaints voiced.  Right chest wall port accessed without difficulty, labs drawn & sent for processing.  Ms. Tabares's vitals were reviewed.  Vitals:    24 1530   BP: (!) 119/58   Pulse: 100   Resp: 20   Temp: 97.5 °F (36.4 °C)   SpO2: 94%       Lab results were obtained and reviewed.  Medications:      Medications Administered         sodium chloride 0.9 % bolus 1,000 mL Admin Date  2024 Action  New Bag Dose  1,000 mL Rate  983.6 mL/hr Route  IntraVENous Documented By  Aleah James, RN               Ms. Tabares tolerated treatment well and was [facility] in stable condition at 1645.   Port de-accessed, flushed per protocol. She is to return on 9/3/24  at 1030  for her next appointment.    ALEAH JAMES RN  2024

## 2024-08-23 NOTE — PATIENT INSTRUCTIONS
Dear Elise Tabares ,    It was a pleasure seeing you today.    We will see you again in 6 weeks.    If labs or imaging tests have been ordered for you today, please call the office at [phone] 48 hours after completion to obtain the results.        This is the plan we talked about:    # Cancer Pain  - This is suspected to cancer in the lung which is invading into adjacent bone and chest wall.  - Let's continue oxycodone IR (short-acting pain med) 5mg bid increased to every 4 hours as needed for pain.  - Please keep a log of how often you are needing pain medication and how well it is working for you.  We will use this to make safe medication changes as well as to add a long-acting pain medication or patch if needed.  You inquire about using a medication patch like fentanyl today, but this would not be safe or indicated to use at present with your very limited opioid pain medication use so far.  - You may use Tylenol 1000 mg every 8 hours as needed for pain.  - We are avoiding NSAIDs given your history of kidney disease.  - You also have neuro with the pain to your feet which is suspected due to diabetes; you may benefit from treatment of this with duloxetine.  We can discuss this further at your next visit.  Gabapentin was unsuccessful in the past but seems like he may not have been on this long enough for adequate trial.  - Continue bowel regimen as opioid medications (like morphine and oxycodone) will cause constipation  - Please contact clinic when you have about 4-5 days of medication left so we can ensure pharmacy has it in stock, complete prior authorizations required by insurance, and make sure it is filled by your pharmacy before you run out of meds.  - We will periodically check a urine tox screen in accordance with our clinic policy for safe prescribing of opioids.  - You have naloxone (Narcan) at home for use in setting of accidental overdose.    # Poor Appetite, Nausea  - Reviewed that your body

## 2024-08-26 ENCOUNTER — TELEPHONE (OUTPATIENT)
Age: 79
End: 2024-08-26

## 2024-08-26 RX ORDER — EPINEPHRINE 1 MG/ML
0.3 INJECTION, SOLUTION INTRAMUSCULAR; SUBCUTANEOUS PRN
Status: CANCELLED | OUTPATIENT
Start: 2024-09-03

## 2024-08-26 RX ORDER — DIPHENHYDRAMINE HYDROCHLORIDE 50 MG/ML
50 INJECTION INTRAMUSCULAR; INTRAVENOUS
Status: CANCELLED | OUTPATIENT
Start: 2024-09-03

## 2024-08-26 RX ORDER — ONDANSETRON 2 MG/ML
8 INJECTION INTRAMUSCULAR; INTRAVENOUS
Status: CANCELLED | OUTPATIENT
Start: 2024-09-03

## 2024-08-26 RX ORDER — ACETAMINOPHEN 325 MG/1
650 TABLET ORAL
Status: CANCELLED | OUTPATIENT
Start: 2024-09-03

## 2024-08-26 RX ORDER — SODIUM CHLORIDE 0.9 % (FLUSH) 0.9 %
5-40 SYRINGE (ML) INJECTION PRN
Status: CANCELLED | OUTPATIENT
Start: 2024-09-03

## 2024-08-26 RX ORDER — SODIUM CHLORIDE 9 MG/ML
INJECTION, SOLUTION INTRAVENOUS CONTINUOUS
Status: CANCELLED | OUTPATIENT
Start: 2024-09-03

## 2024-08-26 RX ORDER — FAMOTIDINE 10 MG/ML
20 INJECTION, SOLUTION INTRAVENOUS
Status: CANCELLED | OUTPATIENT
Start: 2024-09-03

## 2024-08-26 RX ORDER — HEPARIN 100 UNIT/ML
500 SYRINGE INTRAVENOUS PRN
Status: CANCELLED | OUTPATIENT
Start: 2024-09-03

## 2024-08-26 RX ORDER — ALBUTEROL SULFATE 90 UG/1
4 AEROSOL, METERED RESPIRATORY (INHALATION) PRN
Status: CANCELLED | OUTPATIENT
Start: 2024-09-03

## 2024-08-26 RX ORDER — SODIUM CHLORIDE 9 MG/ML
5-250 INJECTION, SOLUTION INTRAVENOUS PRN
Status: CANCELLED | OUTPATIENT
Start: 2024-09-03

## 2024-08-26 NOTE — TELEPHONE ENCOUNTER
08/26/24 2:56 PM Return call placed to patient. Patient has complaints of ongoing weakness and low appetite. Also has complaints of lightheadedness with position changes. Denies diarrhea and denies vomiting. Patient received IV fluids on 08/22 and noted improvement in symptoms afterwards. Will defer to [facility]  to assist in scheduling IV fluids for sometime this week. Patient requesting appointment after 1 PM if possible.      3:53 PM Called patient. Advised of [facility] appointment date/time. Patient voiced understanding and confirmed appointment. No further questions or concerns at this time.

## 2024-08-27 ENCOUNTER — APPOINTMENT (OUTPATIENT)
Facility: HOSPITAL | Age: 79
End: 2024-08-27
Payer: MEDICARE

## 2024-08-27 DIAGNOSIS — C34.92 SQUAMOUS CELL CARCINOMA OF LUNG, LEFT (HCC): Primary | ICD-10-CM

## 2024-08-27 RX ORDER — 0.9 % SODIUM CHLORIDE 0.9 %
1000 INTRAVENOUS SOLUTION INTRAVENOUS ONCE
Status: CANCELLED
Start: 2024-08-27

## 2024-08-30 ENCOUNTER — HOSPITAL ENCOUNTER (OUTPATIENT)
Facility: HOSPITAL | Age: 79
Setting detail: INFUSION SERIES
Discharge: HOME | End: 2024-08-30
Payer: MEDICARE

## 2024-08-30 VITALS
SYSTOLIC BLOOD PRESSURE: 136 MMHG | OXYGEN SATURATION: 98 % | TEMPERATURE: 98 F | HEART RATE: 91 BPM | RESPIRATION RATE: 18 BRPM | HEIGHT: 66 IN | WEIGHT: 204 LBS | DIASTOLIC BLOOD PRESSURE: 76 MMHG | BODY MASS INDEX: 32.78 KG/M2

## 2024-08-30 DIAGNOSIS — C34.92 SQUAMOUS CELL CARCINOMA OF LUNG, LEFT (HCC): Primary | ICD-10-CM

## 2024-08-30 LAB
ANION GAP SERPL CALC-SCNC: 6 MMOL/L (ref 5–15)
BUN SERPL-MCNC: 16 MG/DL (ref 6–20)
BUN/CREAT SERPL: 14 (ref 12–20)
CALCIUM SERPL-MCNC: 8.6 MG/DL (ref 8.5–10.1)
CHLORIDE SERPL-SCNC: 109 MMOL/L (ref 97–108)
CO2 SERPL-SCNC: 24 MMOL/L (ref 21–32)
CREAT SERPL-MCNC: 1.14 MG/DL (ref 0.55–1.02)
GLUCOSE SERPL-MCNC: 268 MG/DL (ref 65–100)
POTASSIUM SERPL-SCNC: 4 MMOL/L (ref 3.5–5.1)
SODIUM SERPL-SCNC: 139 MMOL/L (ref 136–145)

## 2024-08-30 PROCEDURE — 2580000003 HC RX 258: Performed by: NURSE PRACTITIONER

## 2024-08-30 PROCEDURE — 80048 BASIC METABOLIC PNL TOTAL CA: CPT

## 2024-08-30 PROCEDURE — 96360 HYDRATION IV INFUSION INIT: CPT

## 2024-08-30 RX ORDER — SODIUM CHLORIDE 9 MG/ML
5-250 INJECTION, SOLUTION INTRAVENOUS PRN
OUTPATIENT
Start: 2024-08-30

## 2024-08-30 RX ORDER — FAMOTIDINE 10 MG/ML
20 INJECTION, SOLUTION INTRAVENOUS
OUTPATIENT
Start: 2024-08-30

## 2024-08-30 RX ORDER — EPINEPHRINE 1 MG/ML
0.3 INJECTION, SOLUTION INTRAMUSCULAR; SUBCUTANEOUS PRN
OUTPATIENT
Start: 2024-08-30

## 2024-08-30 RX ORDER — 0.9 % SODIUM CHLORIDE 0.9 %
1000 INTRAVENOUS SOLUTION INTRAVENOUS ONCE
Status: CANCELLED
Start: 2024-08-30

## 2024-08-30 RX ORDER — 0.9 % SODIUM CHLORIDE 0.9 %
1000 INTRAVENOUS SOLUTION INTRAVENOUS ONCE
Status: COMPLETED | OUTPATIENT
Start: 2024-08-30 | End: 2024-08-30

## 2024-08-30 RX ORDER — SODIUM CHLORIDE 0.9 % (FLUSH) 0.9 %
5-40 SYRINGE (ML) INJECTION PRN
OUTPATIENT
Start: 2024-08-30

## 2024-08-30 RX ORDER — DIPHENHYDRAMINE HYDROCHLORIDE 50 MG/ML
50 INJECTION INTRAMUSCULAR; INTRAVENOUS
OUTPATIENT
Start: 2024-08-30

## 2024-08-30 RX ORDER — HEPARIN 100 UNIT/ML
500 SYRINGE INTRAVENOUS PRN
OUTPATIENT
Start: 2024-08-30

## 2024-08-30 RX ORDER — ALBUTEROL SULFATE 90 UG/1
4 AEROSOL, METERED RESPIRATORY (INHALATION) PRN
OUTPATIENT
Start: 2024-08-30

## 2024-08-30 RX ORDER — ACETAMINOPHEN 325 MG/1
650 TABLET ORAL
OUTPATIENT
Start: 2024-08-30

## 2024-08-30 RX ORDER — SODIUM CHLORIDE 9 MG/ML
INJECTION, SOLUTION INTRAVENOUS CONTINUOUS
OUTPATIENT
Start: 2024-08-30

## 2024-08-30 RX ORDER — 0.9 % SODIUM CHLORIDE 0.9 %
1000 INTRAVENOUS SOLUTION INTRAVENOUS ONCE
Status: CANCELLED
Start: 2024-08-30 | End: 2024-08-30

## 2024-08-30 RX ORDER — ONDANSETRON 2 MG/ML
8 INJECTION INTRAMUSCULAR; INTRAVENOUS
OUTPATIENT
Start: 2024-08-30

## 2024-08-30 RX ADMIN — SODIUM CHLORIDE 1000 ML: 9 INJECTION, SOLUTION INTRAVENOUS at 15:29

## 2024-08-30 ASSESSMENT — PAIN SCALES - GENERAL: PAINLEVEL_OUTOF10: 0

## 2024-08-30 NOTE — PROGRESS NOTES
[facility] Progress Note    Date: 2024    Name: Elise Tabares    MRN: [id number]         : 1945    Ms. Tabares arrived ambulatory and in no distress for Hydration.  Assessment was completed, no acute issues at this time, no new complaints voiced. Chest wall port accessed without difficulty, labs drawn & sent for processing.      Ms. Tabares's vitals were reviewed.  Vitals:    24 1530   BP: 136/76   Pulse: 91   Resp: 18   Temp: 98 °F (36.7 °C)   SpO2: 98%           Medications:    Medications Administered         sodium chloride 0.9 % bolus 1,000 mL Admin Date  2024 Action  New Bag Dose  1,000 mL Rate  983.6 mL/hr Route  IntraVENous Documented By  Shirley Olmedo, RN           Ms. Tabares tolerated treatment well and was discharged [facility] in stable condition.   Port flushed and de-accessed per protocol. Patient is aware of future appointments.     Shirley Olmedo RN  2024  Future Appointments   Date Time Provider Department Center   9/3/2024 10:30 AM SS CHEMO CHAIR 11 [facility]   9/3/2024 10:45 AM Erika Arredondo APRN - NP ONCSF BS AMB   2024 10:30 AM SS CHEMO CHAIR 14 [facility]   10/2/2024 11:30 AM Vinod Street MD PCSSF BS AMB   10/15/2024 10:30 AM SS CHEMO CHAIR 14 [facility]   2024 10:30 AM SS CHEMO CHAIR 14 [facility]

## 2024-09-02 NOTE — PROGRESS NOTES
[facility] [place]  Medical Oncology at [place]  [phone]    Hematology / Oncology Established Visit    Reason for Visit:   Elise Tabares is a 79 y.o. female who is seen for follow up of lung cancer.     Hematology Oncology Treatment History:     Diagnosis: Squamous cell carcinoma of lung    Stage: IIB [[id number]], now metastatic    Pathology:   3/14/23 Lung, left upper lobe, Core biopsy: Invasive poorly differentiated squamous cell carcinoma.   Comment: Immunohistochemical stains show the malignant cells are positive  for cytokeratin 5/6 and negative for cytokeratin 7, cytokeratin 20 and  TTF-1 supporting the diagnosis.     5/3/23 Left upper lobe wedge resection:  Invasive poorly differentiated squamous cell carcinoma with areas of extensive necrosis.   Tumor size 5.5cm, G3  Visceral pleural invasion present. Vascular invasion present. Parenchymal resection margin negative for tumor. Background lung parenchyma with moderate to severe architectural distortion associated with prominent peribronchiolar metaplasia.  0/6 LN positive [Station 5, 6, 7 x 2, 8, 10]  -PDL1 TPS 2%    -Guardant NGS: TP53 R267G, TP53 S241F. CDKN2A L32fs, MSI-High not detected. No FDA approved medications     Prior Treatment:   1. Sublobar resection of JANELL by Dr. Dom Perez  2. Adjuvant Carbo-Lowell x 4 on D1, 8 of q21d cycles, 6/20/23- 8/29/23  3. Atezolizumab c3qcved x 1 year, 9/19/23 - 7/2024    Current Treatment:  Docetaxel 75mg/m2 b2lbutu until disease progression/ toxicity, 7/23/24 - current    History of Present Illness:   Elise Tabares is a 79 y.o. female with CAD, CKD, HTN, DM II, MAX who is referred for evaluation and management of lung cancer. Pt was recently hospitalized in early March 2023 after 2 syncopal episodes at home. She also noted some loose stools and vomiting. She was diagnosed with IVVD and VAIBHAV, treated with IVF. She was found to have a new 27 x 24mm cavitary lesion in JANELL and underwent CT-guided

## 2024-09-03 ENCOUNTER — OFFICE VISIT (OUTPATIENT)
Age: 79
End: 2024-09-03
Payer: MEDICARE

## 2024-09-03 ENCOUNTER — HOSPITAL ENCOUNTER (OUTPATIENT)
Facility: HOSPITAL | Age: 79
Setting detail: INFUSION SERIES
Discharge: HOME | End: 2024-09-03
Payer: MEDICARE

## 2024-09-03 VITALS
RESPIRATION RATE: 16 BRPM | DIASTOLIC BLOOD PRESSURE: 66 MMHG | WEIGHT: 204.8 LBS | BODY MASS INDEX: 34.12 KG/M2 | OXYGEN SATURATION: 98 % | SYSTOLIC BLOOD PRESSURE: 115 MMHG | HEART RATE: 86 BPM | HEIGHT: 65 IN | TEMPERATURE: 97.8 F

## 2024-09-03 VITALS
SYSTOLIC BLOOD PRESSURE: 133 MMHG | HEIGHT: 66 IN | OXYGEN SATURATION: 97 % | HEART RATE: 97 BPM | TEMPERATURE: 97 F | BODY MASS INDEX: 32.92 KG/M2 | WEIGHT: 204.8 LBS | DIASTOLIC BLOOD PRESSURE: 97 MMHG | RESPIRATION RATE: 18 BRPM

## 2024-09-03 DIAGNOSIS — C34.92 MALIGNANT NEOPLASM OF UNSPECIFIED PART OF LEFT BRONCHUS OR LUNG (HCC): Primary | ICD-10-CM

## 2024-09-03 DIAGNOSIS — C34.92 SQUAMOUS CELL CARCINOMA OF LUNG, LEFT (HCC): Primary | ICD-10-CM

## 2024-09-03 DIAGNOSIS — N18.31 TYPE 2 DIABETES MELLITUS WITH STAGE 3A CHRONIC KIDNEY DISEASE, WITHOUT LONG-TERM CURRENT USE OF INSULIN (HCC): ICD-10-CM

## 2024-09-03 DIAGNOSIS — D72.9 NEUTROPHILIA: ICD-10-CM

## 2024-09-03 DIAGNOSIS — N17.9 AKI (ACUTE KIDNEY INJURY) (HCC): ICD-10-CM

## 2024-09-03 DIAGNOSIS — C34.92 MALIGNANT NEOPLASM OF UNSPECIFIED PART OF LEFT BRONCHUS OR LUNG (HCC): ICD-10-CM

## 2024-09-03 DIAGNOSIS — E11.22 TYPE 2 DIABETES MELLITUS WITH STAGE 3A CHRONIC KIDNEY DISEASE, WITHOUT LONG-TERM CURRENT USE OF INSULIN (HCC): ICD-10-CM

## 2024-09-03 DIAGNOSIS — N18.31 STAGE 3A CHRONIC KIDNEY DISEASE (HCC): ICD-10-CM

## 2024-09-03 DIAGNOSIS — G62.9 NEUROPATHY: ICD-10-CM

## 2024-09-03 DIAGNOSIS — Z51.12 ENCOUNTER FOR ANTINEOPLASTIC IMMUNOTHERAPY: ICD-10-CM

## 2024-09-03 LAB
ALBUMIN SERPL-MCNC: 3.4 G/DL (ref 3.5–5)
ALBUMIN/GLOB SERPL: 1 (ref 1.1–2.2)
ALP SERPL-CCNC: 145 U/L (ref 45–117)
ALT SERPL-CCNC: 24 U/L (ref 12–78)
ANION GAP SERPL CALC-SCNC: 9 MMOL/L (ref 5–15)
AST SERPL-CCNC: 15 U/L (ref 15–37)
BASOPHILS # BLD: 0 K/UL (ref 0–0.1)
BASOPHILS NFR BLD: 0 % (ref 0–1)
BILIRUB SERPL-MCNC: 0.5 MG/DL (ref 0.2–1)
BUN SERPL-MCNC: 28 MG/DL (ref 6–20)
BUN/CREAT SERPL: 18 (ref 12–20)
CALCIUM SERPL-MCNC: 9 MG/DL (ref 8.5–10.1)
CHLORIDE SERPL-SCNC: 106 MMOL/L (ref 97–108)
CO2 SERPL-SCNC: 23 MMOL/L (ref 21–32)
CREAT SERPL-MCNC: 1.55 MG/DL (ref 0.55–1.02)
DIFFERENTIAL METHOD BLD: ABNORMAL
EOSINOPHIL # BLD: 0 K/UL (ref 0–0.4)
EOSINOPHIL NFR BLD: 0 % (ref 0–7)
ERYTHROCYTE [DISTWIDTH] IN BLOOD BY AUTOMATED COUNT: 15.9 % (ref 11.5–14.5)
GLOBULIN SER CALC-MCNC: 3.4 G/DL (ref 2–4)
GLUCOSE SERPL-MCNC: 203 MG/DL (ref 65–100)
HCT VFR BLD AUTO: 29.2 % (ref 35–47)
HGB BLD-MCNC: 9.6 G/DL (ref 11.5–16)
IMM GRANULOCYTES # BLD AUTO: 0.1 K/UL (ref 0–0.04)
IMM GRANULOCYTES NFR BLD AUTO: 1 % (ref 0–0.5)
LYMPHOCYTES # BLD: 0.6 K/UL (ref 0.8–3.5)
LYMPHOCYTES NFR BLD: 4 % (ref 12–49)
MCH RBC QN AUTO: 29.5 PG (ref 26–34)
MCHC RBC AUTO-ENTMCNC: 32.9 G/DL (ref 30–36.5)
MCV RBC AUTO: 89.8 FL (ref 80–99)
MONOCYTES # BLD: 0.3 K/UL (ref 0–1)
MONOCYTES NFR BLD: 2 % (ref 5–13)
NEUTS SEG # BLD: 13.3 K/UL (ref 1.8–8)
NEUTS SEG NFR BLD: 93 % (ref 32–75)
NRBC # BLD: 0 K/UL (ref 0–0.01)
NRBC BLD-RTO: 0 PER 100 WBC
PLATELET # BLD AUTO: 254 K/UL (ref 150–400)
PMV BLD AUTO: 10.7 FL (ref 8.9–12.9)
POTASSIUM SERPL-SCNC: 4 MMOL/L (ref 3.5–5.1)
PROT SERPL-MCNC: 6.8 G/DL (ref 6.4–8.2)
RBC # BLD AUTO: 3.25 M/UL (ref 3.8–5.2)
RBC MORPH BLD: ABNORMAL
RBC MORPH BLD: ABNORMAL
SODIUM SERPL-SCNC: 138 MMOL/L (ref 136–145)
WBC # BLD AUTO: 14.3 K/UL (ref 3.6–11)

## 2024-09-03 PROCEDURE — 96375 TX/PRO/DX INJ NEW DRUG ADDON: CPT

## 2024-09-03 PROCEDURE — 36415 COLL VENOUS BLD VENIPUNCTURE: CPT

## 2024-09-03 PROCEDURE — 96413 CHEMO IV INFUSION 1 HR: CPT

## 2024-09-03 PROCEDURE — 99215 OFFICE O/P EST HI 40 MIN: CPT | Performed by: NURSE PRACTITIONER

## 2024-09-03 PROCEDURE — 2580000003 HC RX 258: Performed by: NURSE PRACTITIONER

## 2024-09-03 PROCEDURE — 1124F ACP DISCUSS-NO DSCNMKR DOCD: CPT | Performed by: NURSE PRACTITIONER

## 2024-09-03 PROCEDURE — 6360000002 HC RX W HCPCS: Performed by: INTERNAL MEDICINE

## 2024-09-03 PROCEDURE — 96377 APPLICATON ON-BODY INJECTOR: CPT

## 2024-09-03 PROCEDURE — G8399 PT W/DXA RESULTS DOCUMENT: HCPCS | Performed by: NURSE PRACTITIONER

## 2024-09-03 PROCEDURE — G2211 COMPLEX E/M VISIT ADD ON: HCPCS | Performed by: NURSE PRACTITIONER

## 2024-09-03 PROCEDURE — G8427 DOCREV CUR MEDS BY ELIG CLIN: HCPCS | Performed by: NURSE PRACTITIONER

## 2024-09-03 PROCEDURE — 2580000003 HC RX 258: Performed by: INTERNAL MEDICINE

## 2024-09-03 PROCEDURE — 96374 THER/PROPH/DIAG INJ IV PUSH: CPT

## 2024-09-03 PROCEDURE — 1090F PRES/ABSN URINE INCON ASSESS: CPT | Performed by: NURSE PRACTITIONER

## 2024-09-03 PROCEDURE — 1036F TOBACCO NON-USER: CPT | Performed by: NURSE PRACTITIONER

## 2024-09-03 PROCEDURE — 96361 HYDRATE IV INFUSION ADD-ON: CPT

## 2024-09-03 PROCEDURE — G8417 CALC BMI ABV UP PARAM F/U: HCPCS | Performed by: NURSE PRACTITIONER

## 2024-09-03 PROCEDURE — 80053 COMPREHEN METABOLIC PANEL: CPT

## 2024-09-03 PROCEDURE — 96360 HYDRATION IV INFUSION INIT: CPT

## 2024-09-03 PROCEDURE — 3078F DIAST BP <80 MM HG: CPT | Performed by: NURSE PRACTITIONER

## 2024-09-03 PROCEDURE — 85025 COMPLETE CBC W/AUTO DIFF WBC: CPT

## 2024-09-03 PROCEDURE — 3074F SYST BP LT 130 MM HG: CPT | Performed by: NURSE PRACTITIONER

## 2024-09-03 RX ORDER — ONDANSETRON 2 MG/ML
8 INJECTION INTRAMUSCULAR; INTRAVENOUS ONCE
Status: COMPLETED | OUTPATIENT
Start: 2024-09-03 | End: 2024-09-03

## 2024-09-03 RX ORDER — 0.9 % SODIUM CHLORIDE 0.9 %
1000 INTRAVENOUS SOLUTION INTRAVENOUS ONCE
Status: CANCELLED
Start: 2024-09-03

## 2024-09-03 RX ORDER — FAMOTIDINE 10 MG/ML
20 INJECTION, SOLUTION INTRAVENOUS
Status: CANCELLED | OUTPATIENT
Start: 2024-09-03

## 2024-09-03 RX ORDER — SODIUM CHLORIDE 9 MG/ML
5-250 INJECTION, SOLUTION INTRAVENOUS PRN
Status: DISCONTINUED | OUTPATIENT
Start: 2024-09-03 | End: 2024-09-04 | Stop reason: HOSPADM

## 2024-09-03 RX ORDER — ONDANSETRON 2 MG/ML
8 INJECTION INTRAMUSCULAR; INTRAVENOUS
Status: CANCELLED | OUTPATIENT
Start: 2024-09-03

## 2024-09-03 RX ORDER — 0.9 % SODIUM CHLORIDE 0.9 %
1000 INTRAVENOUS SOLUTION INTRAVENOUS ONCE
Start: 2024-09-10

## 2024-09-03 RX ORDER — EPINEPHRINE 1 MG/ML
0.3 INJECTION, SOLUTION INTRAMUSCULAR; SUBCUTANEOUS PRN
Status: CANCELLED | OUTPATIENT
Start: 2024-09-03

## 2024-09-03 RX ORDER — SODIUM CHLORIDE 0.9 % (FLUSH) 0.9 %
5-40 SYRINGE (ML) INJECTION PRN
Status: CANCELLED | OUTPATIENT
Start: 2024-09-03

## 2024-09-03 RX ORDER — HEPARIN 100 UNIT/ML
500 SYRINGE INTRAVENOUS PRN
Status: CANCELLED | OUTPATIENT
Start: 2024-09-03

## 2024-09-03 RX ORDER — DIPHENHYDRAMINE HYDROCHLORIDE 50 MG/ML
50 INJECTION INTRAMUSCULAR; INTRAVENOUS
Status: CANCELLED | OUTPATIENT
Start: 2024-09-03

## 2024-09-03 RX ORDER — DEXAMETHASONE SODIUM PHOSPHATE 4 MG/ML
6 INJECTION, SOLUTION INTRA-ARTICULAR; INTRALESIONAL; INTRAMUSCULAR; INTRAVENOUS; SOFT TISSUE ONCE
Status: COMPLETED | OUTPATIENT
Start: 2024-09-03 | End: 2024-09-03

## 2024-09-03 RX ORDER — 0.9 % SODIUM CHLORIDE 0.9 %
500 INTRAVENOUS SOLUTION INTRAVENOUS ONCE
Status: COMPLETED | OUTPATIENT
Start: 2024-09-03 | End: 2024-09-03

## 2024-09-03 RX ORDER — SODIUM CHLORIDE 0.9 % (FLUSH) 0.9 %
5-40 SYRINGE (ML) INJECTION PRN
Status: DISCONTINUED | OUTPATIENT
Start: 2024-09-03 | End: 2024-09-04 | Stop reason: HOSPADM

## 2024-09-03 RX ORDER — 0.9 % SODIUM CHLORIDE 0.9 %
500 INTRAVENOUS SOLUTION INTRAVENOUS ONCE
Status: CANCELLED
Start: 2024-09-03 | End: 2024-09-03

## 2024-09-03 RX ORDER — ACETAMINOPHEN 325 MG/1
650 TABLET ORAL
Status: CANCELLED | OUTPATIENT
Start: 2024-09-03

## 2024-09-03 RX ORDER — SODIUM CHLORIDE 9 MG/ML
5-250 INJECTION, SOLUTION INTRAVENOUS PRN
Status: CANCELLED | OUTPATIENT
Start: 2024-09-03

## 2024-09-03 RX ORDER — SODIUM CHLORIDE 9 MG/ML
INJECTION, SOLUTION INTRAVENOUS CONTINUOUS
Status: CANCELLED | OUTPATIENT
Start: 2024-09-03

## 2024-09-03 RX ORDER — 0.9 % SODIUM CHLORIDE 0.9 %
500 INTRAVENOUS SOLUTION INTRAVENOUS ONCE
Status: CANCELLED
Start: 2024-09-03

## 2024-09-03 RX ORDER — ALBUTEROL SULFATE 90 UG/1
4 AEROSOL, METERED RESPIRATORY (INHALATION) PRN
Status: CANCELLED | OUTPATIENT
Start: 2024-09-03

## 2024-09-03 RX ADMIN — SODIUM CHLORIDE 500 ML: 9 INJECTION, SOLUTION INTRAVENOUS at 11:40

## 2024-09-03 RX ADMIN — DOCETAXEL ANHYDROUS 159 MG: 10 INJECTION, SOLUTION INTRAVENOUS at 13:08

## 2024-09-03 RX ADMIN — DEXAMETHASONE SODIUM PHOSPHATE 6 MG: 4 INJECTION INTRA-ARTICULAR; INTRALESIONAL; INTRAMUSCULAR; INTRAVENOUS; SOFT TISSUE at 12:08

## 2024-09-03 RX ADMIN — SODIUM CHLORIDE 25 ML/HR: 9 INJECTION, SOLUTION INTRAVENOUS at 12:16

## 2024-09-03 RX ADMIN — PEGFILGRASTIM 6 MG: KIT SUBCUTANEOUS at 14:10

## 2024-09-03 RX ADMIN — ONDANSETRON 8 MG: 2 INJECTION INTRAMUSCULAR; INTRAVENOUS at 12:03

## 2024-09-03 RX ADMIN — SODIUM CHLORIDE, PRESERVATIVE FREE 20 ML: 5 INJECTION INTRAVENOUS at 14:15

## 2024-09-03 ASSESSMENT — PAIN SCALES - GENERAL: PAINLEVEL_OUTOF10: 0

## 2024-09-03 NOTE — PROGRESS NOTES
Chief Complaint   Patient presents with    Follow-up           Vitals:    09/03/24 1052   BP: 115/66   Pulse: 86   Resp: 16   Temp: 97.8 °F (36.6 °C)   SpO2: 98%            1. Have you been to the ER, urgent care clinic since your last visit?  Hospitalized since your last visit?  No  2. Have you seen or consulted any other health care providers outside of the [facility] since your last visit?  Include any pap smears or colon screening. Yes Dr [facility] Palliative

## 2024-09-03 NOTE — PROGRESS NOTES
Date: September 3, 2024         Ms. Tabares Arrived to hospitals for  Taxotere, OBI, IVF ambulatory in stable condition.  Assessment was completed and port accessed by Niya SORIA. Labs drawn and sent for processing. Went to provider appointment with Medical Oncology.    Returned from provider appointment. Labs reviewed. Criteria for treatment was met.    Ms. Tabares's vitals were reviewed.  Patient Vitals for the past 12 hrs:   Temp Pulse Resp BP SpO2   09/03/24 1024 97 °F (36.1 °C) 97 18 129/74 97 %         Lab results were obtained and reviewed.  Recent Results (from the past 12 hour(s))   CBC With Auto Differential    Collection Time: 09/03/24 10:36 AM   Result Value Ref Range    WBC 14.3 (H) 3.6 - 11.0 K/uL    RBC 3.25 (L) 3.80 - 5.20 M/uL    Hemoglobin 9.6 (L) 11.5 - 16.0 g/dL    Hematocrit 29.2 (L) 35.0 - 47.0 %    MCV 89.8 80.0 - 99.0 FL    MCH 29.5 26.0 - 34.0 PG    MCHC 32.9 30.0 - 36.5 g/dL    RDW 15.9 (H) 11.5 - 14.5 %    Platelets 254 150 - 400 K/uL    MPV 10.7 8.9 - 12.9 FL    Nucleated RBCs 0.0 0  WBC    nRBC 0.00 0.00 - 0.01 K/uL    Neutrophils % 93 (H) 32 - 75 %    Lymphocytes % 4 (L) 12 - 49 %    Monocytes % 2 (L) 5 - 13 %    Eosinophils % 0 0 - 7 %    Basophils % 0 0 - 1 %    Immature Granulocytes % 1 (H) 0.0 - 0.5 %    Neutrophils Absolute 13.3 (H) 1.8 - 8.0 K/UL    Lymphocytes Absolute 0.6 (L) 0.8 - 3.5 K/UL    Monocytes Absolute 0.3 0.0 - 1.0 K/UL    Eosinophils Absolute 0.0 0.0 - 0.4 K/UL    Basophils Absolute 0.0 0.0 - 0.1 K/UL    Immature Granulocytes Absolute 0.1 (H) 0.00 - 0.04 K/UL    Differential Type SMEAR SCANNED      RBC Comment ANISOCYTOSIS  1+        RBC Comment OVALOCYTES  PRESENT       Comprehensive metabolic panel    Collection Time: 09/03/24 10:36 AM   Result Value Ref Range    Sodium 138 136 - 145 mmol/L    Potassium 4.0 3.5 - 5.1 mmol/L    Chloride 106 97 - 108 mmol/L    CO2 23 21 - 32 mmol/L    Anion Gap 9 5 - 15 mmol/L    Glucose 203 (H) 65 - 100 mg/dL    BUN 28 (H) 6 - 20

## 2024-09-05 DIAGNOSIS — C34.92 SQUAMOUS CELL CARCINOMA OF LUNG, LEFT (HCC): Primary | ICD-10-CM

## 2024-09-10 ENCOUNTER — HOSPITAL ENCOUNTER (OUTPATIENT)
Facility: HOSPITAL | Age: 79
Setting detail: INFUSION SERIES
Discharge: HOME | End: 2024-09-10
Payer: MEDICARE

## 2024-09-10 VITALS
SYSTOLIC BLOOD PRESSURE: 126 MMHG | TEMPERATURE: 97.3 F | HEART RATE: 93 BPM | DIASTOLIC BLOOD PRESSURE: 60 MMHG | BODY MASS INDEX: 33.63 KG/M2 | WEIGHT: 202.1 LBS | RESPIRATION RATE: 20 BRPM | OXYGEN SATURATION: 97 %

## 2024-09-10 DIAGNOSIS — N17.9 AKI (ACUTE KIDNEY INJURY) (HCC): Primary | ICD-10-CM

## 2024-09-10 DIAGNOSIS — N18.31 STAGE 3A CHRONIC KIDNEY DISEASE (HCC): ICD-10-CM

## 2024-09-10 DIAGNOSIS — C34.92 SQUAMOUS CELL CARCINOMA OF LUNG, LEFT (HCC): ICD-10-CM

## 2024-09-10 PROCEDURE — 2580000003 HC RX 258: Performed by: NURSE PRACTITIONER

## 2024-09-10 PROCEDURE — 96360 HYDRATION IV INFUSION INIT: CPT

## 2024-09-10 RX ORDER — SODIUM CHLORIDE 9 MG/ML
5-250 INJECTION, SOLUTION INTRAVENOUS PRN
Status: CANCELLED | OUTPATIENT
Start: 2024-09-17

## 2024-09-10 RX ORDER — ONDANSETRON 2 MG/ML
8 INJECTION INTRAMUSCULAR; INTRAVENOUS
Status: CANCELLED | OUTPATIENT
Start: 2024-09-17

## 2024-09-10 RX ORDER — SODIUM CHLORIDE 0.9 % (FLUSH) 0.9 %
5-40 SYRINGE (ML) INJECTION PRN
Status: CANCELLED | OUTPATIENT
Start: 2024-09-17

## 2024-09-10 RX ORDER — ALBUTEROL SULFATE 90 UG/1
4 INHALANT RESPIRATORY (INHALATION) PRN
Status: CANCELLED | OUTPATIENT
Start: 2024-09-17

## 2024-09-10 RX ORDER — ACETAMINOPHEN 325 MG/1
650 TABLET ORAL
Status: CANCELLED | OUTPATIENT
Start: 2024-09-17

## 2024-09-10 RX ORDER — 0.9 % SODIUM CHLORIDE 0.9 %
1000 INTRAVENOUS SOLUTION INTRAVENOUS ONCE
Status: CANCELLED
Start: 2024-09-17 | End: 2024-09-17

## 2024-09-10 RX ORDER — HEPARIN 100 UNIT/ML
500 SYRINGE INTRAVENOUS PRN
Status: CANCELLED | OUTPATIENT
Start: 2024-09-17

## 2024-09-10 RX ORDER — EPINEPHRINE 1 MG/ML
0.3 INJECTION, SOLUTION INTRAMUSCULAR; SUBCUTANEOUS PRN
Status: CANCELLED | OUTPATIENT
Start: 2024-09-17

## 2024-09-10 RX ORDER — 0.9 % SODIUM CHLORIDE 0.9 %
1000 INTRAVENOUS SOLUTION INTRAVENOUS ONCE
Status: COMPLETED | OUTPATIENT
Start: 2024-09-10 | End: 2024-09-10

## 2024-09-10 RX ORDER — SODIUM CHLORIDE 9 MG/ML
INJECTION, SOLUTION INTRAVENOUS CONTINUOUS
Status: CANCELLED | OUTPATIENT
Start: 2024-09-17

## 2024-09-10 RX ORDER — DIPHENHYDRAMINE HYDROCHLORIDE 50 MG/ML
50 INJECTION INTRAMUSCULAR; INTRAVENOUS
Status: CANCELLED | OUTPATIENT
Start: 2024-09-17

## 2024-09-10 RX ORDER — FAMOTIDINE 10 MG/ML
20 INJECTION, SOLUTION INTRAVENOUS
Status: CANCELLED | OUTPATIENT
Start: 2024-09-17

## 2024-09-10 RX ADMIN — SODIUM CHLORIDE 1000 ML: 9 INJECTION, SOLUTION INTRAVENOUS at 15:45

## 2024-09-10 ASSESSMENT — PAIN SCALES - GENERAL: PAINLEVEL_OUTOF10: 0

## 2024-09-12 RX ORDER — CLOPIDOGREL BISULFATE 75 MG/1
75 TABLET ORAL DAILY
Qty: 90 TABLET | Refills: 1 | Status: SHIPPED | OUTPATIENT
Start: 2024-09-12

## 2024-09-12 RX ORDER — CLOPIDOGREL BISULFATE 75 MG/1
75 TABLET ORAL DAILY
COMMUNITY

## 2024-09-17 ENCOUNTER — APPOINTMENT (OUTPATIENT)
Facility: HOSPITAL | Age: 79
End: 2024-09-17
Payer: MEDICARE

## 2024-09-17 ENCOUNTER — HOSPITAL ENCOUNTER (OUTPATIENT)
Facility: HOSPITAL | Age: 79
Setting detail: INFUSION SERIES
Discharge: HOME | End: 2024-09-17
Payer: MEDICARE

## 2024-09-17 VITALS
RESPIRATION RATE: 20 BRPM | TEMPERATURE: 96.9 F | SYSTOLIC BLOOD PRESSURE: 123 MMHG | DIASTOLIC BLOOD PRESSURE: 87 MMHG | HEART RATE: 98 BPM

## 2024-09-17 DIAGNOSIS — C34.92 SQUAMOUS CELL CARCINOMA OF LUNG, LEFT (HCC): ICD-10-CM

## 2024-09-17 DIAGNOSIS — N17.9 AKI (ACUTE KIDNEY INJURY) (HCC): Primary | ICD-10-CM

## 2024-09-17 DIAGNOSIS — N18.31 STAGE 3A CHRONIC KIDNEY DISEASE (HCC): ICD-10-CM

## 2024-09-17 LAB
ANION GAP SERPL CALC-SCNC: 5 MMOL/L (ref 2–12)
BUN SERPL-MCNC: 14 MG/DL (ref 6–20)
BUN/CREAT SERPL: 10 (ref 12–20)
CALCIUM SERPL-MCNC: 8.1 MG/DL (ref 8.5–10.1)
CHLORIDE SERPL-SCNC: 110 MMOL/L (ref 97–108)
CO2 SERPL-SCNC: 24 MMOL/L (ref 21–32)
CREAT SERPL-MCNC: 1.37 MG/DL (ref 0.55–1.02)
GLUCOSE SERPL-MCNC: 266 MG/DL (ref 65–100)
POTASSIUM SERPL-SCNC: 3.9 MMOL/L (ref 3.5–5.1)
SODIUM SERPL-SCNC: 139 MMOL/L (ref 136–145)

## 2024-09-17 PROCEDURE — 96360 HYDRATION IV INFUSION INIT: CPT

## 2024-09-17 PROCEDURE — 2580000003 HC RX 258: Performed by: NURSE PRACTITIONER

## 2024-09-17 PROCEDURE — 80048 BASIC METABOLIC PNL TOTAL CA: CPT

## 2024-09-17 RX ORDER — ACETAMINOPHEN 325 MG/1
650 TABLET ORAL
Status: CANCELLED | OUTPATIENT
Start: 2024-09-24

## 2024-09-17 RX ORDER — SODIUM CHLORIDE 9 MG/ML
5-250 INJECTION, SOLUTION INTRAVENOUS PRN
OUTPATIENT
Start: 2024-09-24

## 2024-09-17 RX ORDER — SODIUM CHLORIDE 9 MG/ML
5-250 INJECTION, SOLUTION INTRAVENOUS PRN
Status: CANCELLED | OUTPATIENT
Start: 2024-09-24

## 2024-09-17 RX ORDER — FAMOTIDINE 10 MG/ML
20 INJECTION, SOLUTION INTRAVENOUS
Status: CANCELLED | OUTPATIENT
Start: 2024-09-24

## 2024-09-17 RX ORDER — ACETAMINOPHEN 325 MG/1
650 TABLET ORAL
OUTPATIENT
Start: 2024-09-24

## 2024-09-17 RX ORDER — DIPHENHYDRAMINE HYDROCHLORIDE 50 MG/ML
50 INJECTION INTRAMUSCULAR; INTRAVENOUS
OUTPATIENT
Start: 2024-09-24

## 2024-09-17 RX ORDER — 0.9 % SODIUM CHLORIDE 0.9 %
1000 INTRAVENOUS SOLUTION INTRAVENOUS ONCE
Status: COMPLETED | OUTPATIENT
Start: 2024-09-17 | End: 2024-09-17

## 2024-09-17 RX ORDER — ALBUTEROL SULFATE 90 UG/1
4 INHALANT RESPIRATORY (INHALATION) PRN
OUTPATIENT
Start: 2024-09-24

## 2024-09-17 RX ORDER — HEPARIN 100 UNIT/ML
500 SYRINGE INTRAVENOUS PRN
OUTPATIENT
Start: 2024-09-24

## 2024-09-17 RX ORDER — FAMOTIDINE 10 MG/ML
20 INJECTION, SOLUTION INTRAVENOUS
OUTPATIENT
Start: 2024-09-24

## 2024-09-17 RX ORDER — SODIUM CHLORIDE 9 MG/ML
INJECTION, SOLUTION INTRAVENOUS CONTINUOUS
OUTPATIENT
Start: 2024-09-24

## 2024-09-17 RX ORDER — SODIUM CHLORIDE 0.9 % (FLUSH) 0.9 %
5-40 SYRINGE (ML) INJECTION PRN
OUTPATIENT
Start: 2024-09-24

## 2024-09-17 RX ORDER — ONDANSETRON 2 MG/ML
8 INJECTION INTRAMUSCULAR; INTRAVENOUS
Status: CANCELLED | OUTPATIENT
Start: 2024-09-24

## 2024-09-17 RX ORDER — HEPARIN 100 UNIT/ML
500 SYRINGE INTRAVENOUS PRN
Status: CANCELLED | OUTPATIENT
Start: 2024-09-24

## 2024-09-17 RX ORDER — EPINEPHRINE 1 MG/ML
0.3 INJECTION, SOLUTION INTRAMUSCULAR; SUBCUTANEOUS PRN
Status: CANCELLED | OUTPATIENT
Start: 2024-09-24

## 2024-09-17 RX ORDER — 0.9 % SODIUM CHLORIDE 0.9 %
1000 INTRAVENOUS SOLUTION INTRAVENOUS ONCE
Status: CANCELLED
Start: 2024-09-24 | End: 2024-09-24

## 2024-09-17 RX ORDER — SODIUM CHLORIDE 9 MG/ML
INJECTION, SOLUTION INTRAVENOUS CONTINUOUS
Status: CANCELLED | OUTPATIENT
Start: 2024-09-24

## 2024-09-17 RX ORDER — ONDANSETRON 2 MG/ML
8 INJECTION INTRAMUSCULAR; INTRAVENOUS
OUTPATIENT
Start: 2024-09-24

## 2024-09-17 RX ORDER — EPINEPHRINE 1 MG/ML
0.3 INJECTION, SOLUTION INTRAMUSCULAR; SUBCUTANEOUS PRN
OUTPATIENT
Start: 2024-09-24

## 2024-09-17 RX ORDER — DIPHENHYDRAMINE HYDROCHLORIDE 50 MG/ML
50 INJECTION INTRAMUSCULAR; INTRAVENOUS
Status: CANCELLED | OUTPATIENT
Start: 2024-09-24

## 2024-09-17 RX ORDER — ALBUTEROL SULFATE 90 UG/1
4 INHALANT RESPIRATORY (INHALATION) PRN
Status: CANCELLED | OUTPATIENT
Start: 2024-09-24

## 2024-09-17 RX ADMIN — SODIUM CHLORIDE 1000 ML: 9 INJECTION, SOLUTION INTRAVENOUS at 15:43

## 2024-09-17 ASSESSMENT — PAIN SCALES - GENERAL: PAINLEVEL_OUTOF10: 0

## 2024-09-18 ENCOUNTER — HOSPITAL ENCOUNTER (OUTPATIENT)
Facility: HOSPITAL | Age: 79
Discharge: HOME | End: 2024-09-21
Payer: MEDICARE

## 2024-09-18 DIAGNOSIS — C34.92 MALIGNANT NEOPLASM OF UNSPECIFIED PART OF LEFT BRONCHUS OR LUNG (HCC): ICD-10-CM

## 2024-09-18 DIAGNOSIS — Z51.12 ENCOUNTER FOR ANTINEOPLASTIC IMMUNOTHERAPY: ICD-10-CM

## 2024-09-18 LAB — CREAT BLD-MCNC: 1.1 MG/DL (ref 0.6–1.3)

## 2024-09-18 PROCEDURE — 6360000004 HC RX CONTRAST MEDICATION: Performed by: INTERNAL MEDICINE

## 2024-09-18 PROCEDURE — 82565 ASSAY OF CREATININE: CPT

## 2024-09-18 PROCEDURE — 74177 CT ABD & PELVIS W/CONTRAST: CPT

## 2024-09-18 RX ORDER — IOPAMIDOL 755 MG/ML
100 INJECTION, SOLUTION INTRAVASCULAR
Status: COMPLETED | OUTPATIENT
Start: 2024-09-18 | End: 2024-09-18

## 2024-09-18 RX ADMIN — IOPAMIDOL 100 ML: 755 INJECTION, SOLUTION INTRAVENOUS at 14:26

## 2024-09-24 ENCOUNTER — HOSPITAL ENCOUNTER (OUTPATIENT)
Facility: HOSPITAL | Age: 79
Setting detail: INFUSION SERIES
Discharge: HOME | End: 2024-09-24
Payer: MEDICARE

## 2024-09-24 ENCOUNTER — OFFICE VISIT (OUTPATIENT)
Age: 79
End: 2024-09-24
Payer: MEDICARE

## 2024-09-24 VITALS
RESPIRATION RATE: 18 BRPM | DIASTOLIC BLOOD PRESSURE: 73 MMHG | SYSTOLIC BLOOD PRESSURE: 143 MMHG | HEIGHT: 66 IN | TEMPERATURE: 96.8 F | OXYGEN SATURATION: 99 % | WEIGHT: 208.3 LBS | HEART RATE: 81 BPM | BODY MASS INDEX: 33.48 KG/M2

## 2024-09-24 VITALS
RESPIRATION RATE: 16 BRPM | BODY MASS INDEX: 33.59 KG/M2 | SYSTOLIC BLOOD PRESSURE: 116 MMHG | HEART RATE: 91 BPM | WEIGHT: 209 LBS | OXYGEN SATURATION: 99 % | TEMPERATURE: 97.3 F | HEIGHT: 66 IN | DIASTOLIC BLOOD PRESSURE: 63 MMHG

## 2024-09-24 DIAGNOSIS — C34.92 SQUAMOUS CELL CARCINOMA OF LUNG, LEFT (HCC): Primary | ICD-10-CM

## 2024-09-24 DIAGNOSIS — N18.31 STAGE 3A CHRONIC KIDNEY DISEASE (HCC): ICD-10-CM

## 2024-09-24 DIAGNOSIS — Z51.11 ENCOUNTER FOR ANTINEOPLASTIC CHEMOTHERAPY: ICD-10-CM

## 2024-09-24 DIAGNOSIS — N18.31 STAGE 3A CHRONIC KIDNEY DISEASE (HCC): Chronic | ICD-10-CM

## 2024-09-24 DIAGNOSIS — C34.92 MALIGNANT NEOPLASM OF UNSPECIFIED PART OF LEFT BRONCHUS OR LUNG (HCC): ICD-10-CM

## 2024-09-24 DIAGNOSIS — F33.9 RECURRENT MAJOR DEPRESSIVE DISORDER, REMISSION STATUS UNSPECIFIED (HCC): ICD-10-CM

## 2024-09-24 DIAGNOSIS — K59.03 DRUG-INDUCED CONSTIPATION: ICD-10-CM

## 2024-09-24 DIAGNOSIS — N17.9 AKI (ACUTE KIDNEY INJURY) (HCC): ICD-10-CM

## 2024-09-24 DIAGNOSIS — Z79.4 TYPE 2 DIABETES MELLITUS WITH DIABETIC NEUROPATHY, WITH LONG-TERM CURRENT USE OF INSULIN (HCC): Chronic | ICD-10-CM

## 2024-09-24 DIAGNOSIS — E11.40 TYPE 2 DIABETES MELLITUS WITH DIABETIC NEUROPATHY, WITH LONG-TERM CURRENT USE OF INSULIN (HCC): Chronic | ICD-10-CM

## 2024-09-24 PROBLEM — F33.1 MAJOR DEPRESSIVE DISORDER, RECURRENT, MODERATE (HCC): Status: ACTIVE | Noted: 2024-09-24

## 2024-09-24 PROBLEM — F33.0 MAJOR DEPRESSIVE DISORDER, RECURRENT, MILD (HCC): Status: ACTIVE | Noted: 2024-09-24

## 2024-09-24 LAB
ALBUMIN SERPL-MCNC: 3.1 G/DL (ref 3.5–5)
ALBUMIN/GLOB SERPL: 0.9 (ref 1.1–2.2)
ALP SERPL-CCNC: 121 U/L (ref 45–117)
ALT SERPL-CCNC: 20 U/L (ref 12–78)
ANION GAP SERPL CALC-SCNC: 9 MMOL/L (ref 2–12)
AST SERPL-CCNC: 13 U/L (ref 15–37)
BASOPHILS # BLD: 0 K/UL (ref 0–0.1)
BASOPHILS NFR BLD: 0 % (ref 0–1)
BILIRUB SERPL-MCNC: 0.5 MG/DL (ref 0.2–1)
BUN SERPL-MCNC: 22 MG/DL (ref 6–20)
BUN/CREAT SERPL: 15 (ref 12–20)
CALCIUM SERPL-MCNC: 8.9 MG/DL (ref 8.5–10.1)
CHLORIDE SERPL-SCNC: 107 MMOL/L (ref 97–108)
CO2 SERPL-SCNC: 21 MMOL/L (ref 21–32)
CREAT SERPL-MCNC: 1.43 MG/DL (ref 0.55–1.02)
DIFFERENTIAL METHOD BLD: ABNORMAL
EOSINOPHIL # BLD: 0 K/UL (ref 0–0.4)
EOSINOPHIL NFR BLD: 0 % (ref 0–7)
ERYTHROCYTE [DISTWIDTH] IN BLOOD BY AUTOMATED COUNT: 17.1 % (ref 11.5–14.5)
FERRITIN SERPL-MCNC: 381 NG/ML (ref 26–388)
FOLATE SERPL-MCNC: 8.2 NG/ML (ref 5–21)
GLOBULIN SER CALC-MCNC: 3.3 G/DL (ref 2–4)
GLUCOSE SERPL-MCNC: 184 MG/DL (ref 65–100)
HCT VFR BLD AUTO: 27.5 % (ref 35–47)
HGB BLD-MCNC: 8.9 G/DL (ref 11.5–16)
IMM GRANULOCYTES # BLD AUTO: 0.1 K/UL (ref 0–0.04)
IMM GRANULOCYTES NFR BLD AUTO: 1 % (ref 0–0.5)
IRON SATN MFR SERPL: 20 % (ref 20–50)
IRON SERPL-MCNC: 43 UG/DL (ref 35–150)
LYMPHOCYTES # BLD: 0.5 K/UL (ref 0.8–3.5)
LYMPHOCYTES NFR BLD: 8 % (ref 12–49)
MCH RBC QN AUTO: 30.1 PG (ref 26–34)
MCHC RBC AUTO-ENTMCNC: 32.4 G/DL (ref 30–36.5)
MCV RBC AUTO: 92.9 FL (ref 80–99)
MONOCYTES # BLD: 0.2 K/UL (ref 0–1)
MONOCYTES NFR BLD: 3 % (ref 5–13)
NEUTS SEG # BLD: 5.8 K/UL (ref 1.8–8)
NEUTS SEG NFR BLD: 88 % (ref 32–75)
NRBC # BLD: 0 K/UL (ref 0–0.01)
NRBC BLD-RTO: 0 PER 100 WBC
PLATELET # BLD AUTO: 234 K/UL (ref 150–400)
PMV BLD AUTO: 10.6 FL (ref 8.9–12.9)
POTASSIUM SERPL-SCNC: 3.7 MMOL/L (ref 3.5–5.1)
PROT SERPL-MCNC: 6.4 G/DL (ref 6.4–8.2)
RBC # BLD AUTO: 2.96 M/UL (ref 3.8–5.2)
RBC MORPH BLD: ABNORMAL
SODIUM SERPL-SCNC: 137 MMOL/L (ref 136–145)
TIBC SERPL-MCNC: 213 UG/DL (ref 250–450)
VIT B12 SERPL-MCNC: >2000 PG/ML (ref 193–986)
WBC # BLD AUTO: 6.6 K/UL (ref 3.6–11)

## 2024-09-24 PROCEDURE — 2580000003 HC RX 258: Performed by: INTERNAL MEDICINE

## 2024-09-24 PROCEDURE — G8427 DOCREV CUR MEDS BY ELIG CLIN: HCPCS | Performed by: NURSE PRACTITIONER

## 2024-09-24 PROCEDURE — 82728 ASSAY OF FERRITIN: CPT

## 2024-09-24 PROCEDURE — G2211 COMPLEX E/M VISIT ADD ON: HCPCS | Performed by: NURSE PRACTITIONER

## 2024-09-24 PROCEDURE — 83540 ASSAY OF IRON: CPT

## 2024-09-24 PROCEDURE — 6360000002 HC RX W HCPCS: Performed by: INTERNAL MEDICINE

## 2024-09-24 PROCEDURE — 85025 COMPLETE CBC W/AUTO DIFF WBC: CPT

## 2024-09-24 PROCEDURE — 1090F PRES/ABSN URINE INCON ASSESS: CPT | Performed by: NURSE PRACTITIONER

## 2024-09-24 PROCEDURE — G8417 CALC BMI ABV UP PARAM F/U: HCPCS | Performed by: NURSE PRACTITIONER

## 2024-09-24 PROCEDURE — 82746 ASSAY OF FOLIC ACID SERUM: CPT

## 2024-09-24 PROCEDURE — 99215 OFFICE O/P EST HI 40 MIN: CPT | Performed by: NURSE PRACTITIONER

## 2024-09-24 PROCEDURE — 3078F DIAST BP <80 MM HG: CPT | Performed by: NURSE PRACTITIONER

## 2024-09-24 PROCEDURE — 3074F SYST BP LT 130 MM HG: CPT | Performed by: NURSE PRACTITIONER

## 2024-09-24 PROCEDURE — 96377 APPLICATON ON-BODY INJECTOR: CPT

## 2024-09-24 PROCEDURE — 83550 IRON BINDING TEST: CPT

## 2024-09-24 PROCEDURE — 96375 TX/PRO/DX INJ NEW DRUG ADDON: CPT

## 2024-09-24 PROCEDURE — G8399 PT W/DXA RESULTS DOCUMENT: HCPCS | Performed by: NURSE PRACTITIONER

## 2024-09-24 PROCEDURE — 80053 COMPREHEN METABOLIC PANEL: CPT

## 2024-09-24 PROCEDURE — 2580000003 HC RX 258: Performed by: NURSE PRACTITIONER

## 2024-09-24 PROCEDURE — 96413 CHEMO IV INFUSION 1 HR: CPT

## 2024-09-24 PROCEDURE — 1036F TOBACCO NON-USER: CPT | Performed by: NURSE PRACTITIONER

## 2024-09-24 PROCEDURE — 82607 VITAMIN B-12: CPT

## 2024-09-24 PROCEDURE — 1124F ACP DISCUSS-NO DSCNMKR DOCD: CPT | Performed by: NURSE PRACTITIONER

## 2024-09-24 RX ORDER — DIPHENHYDRAMINE HYDROCHLORIDE 50 MG/ML
50 INJECTION INTRAMUSCULAR; INTRAVENOUS
OUTPATIENT
Start: 2024-10-01

## 2024-09-24 RX ORDER — SODIUM CHLORIDE 9 MG/ML
INJECTION, SOLUTION INTRAVENOUS CONTINUOUS
OUTPATIENT
Start: 2024-10-01

## 2024-09-24 RX ORDER — SODIUM CHLORIDE 0.9 % (FLUSH) 0.9 %
5-40 SYRINGE (ML) INJECTION PRN
Status: DISCONTINUED | OUTPATIENT
Start: 2024-09-24 | End: 2024-09-25 | Stop reason: HOSPADM

## 2024-09-24 RX ORDER — DEXAMETHASONE SODIUM PHOSPHATE 10 MG/ML
6 INJECTION INTRAMUSCULAR; INTRAVENOUS ONCE
Status: COMPLETED | OUTPATIENT
Start: 2024-09-24 | End: 2024-09-24

## 2024-09-24 RX ORDER — 0.9 % SODIUM CHLORIDE 0.9 %
1000 INTRAVENOUS SOLUTION INTRAVENOUS ONCE
Status: COMPLETED | OUTPATIENT
Start: 2024-09-24 | End: 2024-09-24

## 2024-09-24 RX ORDER — 0.9 % SODIUM CHLORIDE 0.9 %
1000 INTRAVENOUS SOLUTION INTRAVENOUS ONCE
Start: 2024-10-01 | End: 2024-10-01

## 2024-09-24 RX ORDER — GABAPENTIN 300 MG/1
300 CAPSULE ORAL
Qty: 30 CAPSULE | Refills: 0 | Status: SHIPPED | OUTPATIENT
Start: 2024-09-24 | End: 2024-10-24

## 2024-09-24 RX ORDER — ONDANSETRON 2 MG/ML
8 INJECTION INTRAMUSCULAR; INTRAVENOUS
OUTPATIENT
Start: 2024-10-01

## 2024-09-24 RX ORDER — SODIUM CHLORIDE 9 MG/ML
5-250 INJECTION, SOLUTION INTRAVENOUS PRN
OUTPATIENT
Start: 2024-10-01

## 2024-09-24 RX ORDER — FAMOTIDINE 10 MG/ML
20 INJECTION, SOLUTION INTRAVENOUS
OUTPATIENT
Start: 2024-10-01

## 2024-09-24 RX ORDER — POLYETHYLENE GLYCOL 3350 17 G/17G
17 POWDER, FOR SOLUTION ORAL 3 TIMES DAILY
Qty: 1530 G | Refills: 5 | Status: SHIPPED | OUTPATIENT
Start: 2024-09-24

## 2024-09-24 RX ORDER — HEPARIN 100 UNIT/ML
500 SYRINGE INTRAVENOUS PRN
OUTPATIENT
Start: 2024-10-01

## 2024-09-24 RX ORDER — DESLORATADINE 5 MG/1
5 TABLET ORAL DAILY
Qty: 90 TABLET | Refills: 3 | Status: SHIPPED | OUTPATIENT
Start: 2024-09-24

## 2024-09-24 RX ORDER — ACETAMINOPHEN 325 MG/1
650 TABLET ORAL
OUTPATIENT
Start: 2024-10-01

## 2024-09-24 RX ORDER — ONDANSETRON 2 MG/ML
8 INJECTION INTRAMUSCULAR; INTRAVENOUS ONCE
Status: COMPLETED | OUTPATIENT
Start: 2024-09-24 | End: 2024-09-24

## 2024-09-24 RX ORDER — SODIUM CHLORIDE 9 MG/ML
5-250 INJECTION, SOLUTION INTRAVENOUS PRN
Status: DISCONTINUED | OUTPATIENT
Start: 2024-09-24 | End: 2024-09-25 | Stop reason: HOSPADM

## 2024-09-24 RX ORDER — SODIUM CHLORIDE 0.9 % (FLUSH) 0.9 %
5-40 SYRINGE (ML) INJECTION PRN
OUTPATIENT
Start: 2024-10-01

## 2024-09-24 RX ORDER — ALBUTEROL SULFATE 90 UG/1
4 INHALANT RESPIRATORY (INHALATION) PRN
OUTPATIENT
Start: 2024-10-01

## 2024-09-24 RX ORDER — EPINEPHRINE 1 MG/ML
0.3 INJECTION, SOLUTION INTRAMUSCULAR; SUBCUTANEOUS PRN
OUTPATIENT
Start: 2024-10-01

## 2024-09-24 RX ADMIN — SODIUM CHLORIDE 25 ML/HR: 9 INJECTION, SOLUTION INTRAVENOUS at 13:30

## 2024-09-24 RX ADMIN — DOCETAXEL ANHYDROUS 159 MG: 10 INJECTION, SOLUTION INTRAVENOUS at 13:33

## 2024-09-24 RX ADMIN — ONDANSETRON 8 MG: 2 INJECTION INTRAMUSCULAR; INTRAVENOUS at 12:27

## 2024-09-24 RX ADMIN — PEGFILGRASTIM 6 MG: KIT SUBCUTANEOUS at 14:52

## 2024-09-24 RX ADMIN — SODIUM CHLORIDE 1000 ML: 9 INJECTION, SOLUTION INTRAVENOUS at 11:57

## 2024-09-24 RX ADMIN — DEXAMETHASONE SODIUM PHOSPHATE 6 MG: 10 INJECTION INTRAMUSCULAR; INTRAVENOUS at 12:29

## 2024-09-24 RX ADMIN — SODIUM CHLORIDE, PRESERVATIVE FREE 20 ML: 5 INJECTION INTRAVENOUS at 14:45

## 2024-09-24 ASSESSMENT — PAIN SCALES - GENERAL: PAINLEVEL_OUTOF10: 0

## 2024-09-24 NOTE — PROGRESS NOTES
[facility] Progress Note    Date: September 24, 2024    1030am: Ms. Tabares Arrived ambulatory and in stable condition to the [facility] for  C4D1 Taxotere Regimen + Neulasta.  Assessment was completed, Patient reports increased fatigue and SOB with exertion. Port accessed with positive blood return. Labs drawn and sent for processing. Went to provider appointment with Medical Oncology.Returned from provider appointment. Labs reviewed. Criteria for treatment was met.    Additional Iron study labs added. Drawn and sent for processing. Hydration added.     Ms. Tabares's vitals were reviewed.  Vitals:    09/24/24 1015 09/24/24 1448   BP: 129/60 (!) 143/73   Pulse: 86 81   Resp: 18 18   Temp: 96.8 °F (36 °C)    TempSrc: Temporal    SpO2: 99%    Weight: 94.5 kg (208 lb 4.8 oz)    Height: 1.676 m (5' 6\")       Lab results were obtained and reviewed.  Recent Results (from the past 12 hour(s))   CBC With Auto Differential    Collection Time: 09/24/24 10:24 AM   Result Value Ref Range    WBC 6.6 3.6 - 11.0 K/uL    RBC 2.96 (L) 3.80 - 5.20 M/uL    Hemoglobin 8.9 (L) 11.5 - 16.0 g/dL    Hematocrit 27.5 (L) 35.0 - 47.0 %    MCV 92.9 80.0 - 99.0 FL    MCH 30.1 26.0 - 34.0 PG    MCHC 32.4 30.0 - 36.5 g/dL    RDW 17.1 (H) 11.5 - 14.5 %    Platelets 234 150 - 400 K/uL    MPV 10.6 8.9 - 12.9 FL    Nucleated RBCs 0.0 0  WBC    nRBC 0.00 0.00 - 0.01 K/uL    Neutrophils % 88 (H) 32 - 75 %    Lymphocytes % 8 (L) 12 - 49 %    Monocytes % 3 (L) 5 - 13 %    Eosinophils % 0 0 - 7 %    Basophils % 0 0 - 1 %    Immature Granulocytes % 1 (H) 0.0 - 0.5 %    Neutrophils Absolute 5.8 1.8 - 8.0 K/UL    Lymphocytes Absolute 0.5 (L) 0.8 - 3.5 K/UL    Monocytes Absolute 0.2 0.0 - 1.0 K/UL    Eosinophils Absolute 0.0 0.0 - 0.4 K/UL    Basophils Absolute 0.0 0.0 - 0.1 K/UL    Immature Granulocytes Absolute 0.1 (H) 0.00 - 0.04 K/UL    Differential Type SMEAR SCANNED      RBC Comment ANISOCYTOSIS  1+       Comprehensive metabolic panel    Collection Time:  By  Tracy Betancourt RN           Two nurses verified prior to administering: Drug name, Drug dose, Infusion volume or drug volume when prepared in a syringe, Rate of administration, Route of administration, Expiration dates and/or times, Appearance and physical integrity of the drugs, Rate set on infusion pump, when used, and Sequencing of drug administration.    Neulasta OBI attached to upper right arm. Device enabled. Education provided to patient about Neulasta On Body Injector including: side effects, how/when to remove the device, as well as what to do in the event of device malfunction. Opportunity for questions was provided and all questions were answered. Patient verbalized understanding.     Patient tolerated treatment well.  Port maintained positive blood return throughout treatment. Port flushed and de accessed per protocol. Patient was discharged in stable condition. Patient is aware of next scheduled [facility] appointment on October 1, 2024 at 1500.    Future Appointments   Date Time Provider Department Center   9/24/2024 11:00 AM Guerline Park MD ONCSF BS AMB   10/1/2024  3:00 PM SS FASTTRACK 1 [facility]   10/2/2024 11:30 AM Vinod Street MD PC[facility] BS AMB   10/8/2024  3:30 PM SS FASTTRACK 3 [facility]   10/15/2024 10:30 AM SS CHEMO CHAIR 14 [facility]   10/22/2024  3:30 PM SS FASTTRACK 3 [facility]   10/29/2024  3:30 PM SS FASTTRACK 3 [facility]   11/5/2024 10:30 AM SS CHEMO CHAIR 14 [facility]     Bianca Bullard RN  September 24, 2024

## 2024-09-30 ENCOUNTER — TELEPHONE (OUTPATIENT)
Age: 79
End: 2024-09-30

## 2024-09-30 NOTE — TELEPHONE ENCOUNTER
Called patient to advise/confirm upcoming appt with Dr. Street on 10/02/2024 at 11:30  at [facility].  Spoke with Elise Tabares and she cancelled the appt. Did not provide a reason and did not want to reschedule at this time.

## 2024-10-01 ENCOUNTER — HOSPITAL ENCOUNTER (OUTPATIENT)
Facility: HOSPITAL | Age: 79
Setting detail: INFUSION SERIES
Discharge: HOME | End: 2024-10-01
Payer: MEDICARE

## 2024-10-01 VITALS
OXYGEN SATURATION: 95 % | TEMPERATURE: 97.3 F | DIASTOLIC BLOOD PRESSURE: 54 MMHG | RESPIRATION RATE: 16 BRPM | HEIGHT: 66 IN | WEIGHT: 202.5 LBS | SYSTOLIC BLOOD PRESSURE: 98 MMHG | BODY MASS INDEX: 32.54 KG/M2 | HEART RATE: 83 BPM

## 2024-10-01 DIAGNOSIS — C34.92 SQUAMOUS CELL CARCINOMA OF LUNG, LEFT (HCC): ICD-10-CM

## 2024-10-01 DIAGNOSIS — N17.9 AKI (ACUTE KIDNEY INJURY) (HCC): Primary | ICD-10-CM

## 2024-10-01 DIAGNOSIS — N18.31 STAGE 3A CHRONIC KIDNEY DISEASE (HCC): ICD-10-CM

## 2024-10-01 LAB
HCT VFR BLD AUTO: 27.2 % (ref 35–47)
HGB BLD-MCNC: 8.8 G/DL (ref 11.5–16)

## 2024-10-01 PROCEDURE — 85014 HEMATOCRIT: CPT

## 2024-10-01 PROCEDURE — 36415 COLL VENOUS BLD VENIPUNCTURE: CPT

## 2024-10-01 PROCEDURE — 2580000003 HC RX 258: Performed by: NURSE PRACTITIONER

## 2024-10-01 PROCEDURE — 85018 HEMOGLOBIN: CPT

## 2024-10-01 PROCEDURE — 96360 HYDRATION IV INFUSION INIT: CPT

## 2024-10-01 RX ORDER — ALBUTEROL SULFATE 90 UG/1
4 INHALANT RESPIRATORY (INHALATION) PRN
Status: CANCELLED | OUTPATIENT
Start: 2024-10-08

## 2024-10-01 RX ORDER — SODIUM CHLORIDE 9 MG/ML
5-250 INJECTION, SOLUTION INTRAVENOUS PRN
Status: CANCELLED | OUTPATIENT
Start: 2024-10-08

## 2024-10-01 RX ORDER — FAMOTIDINE 10 MG/ML
20 INJECTION, SOLUTION INTRAVENOUS
Status: CANCELLED | OUTPATIENT
Start: 2024-10-08

## 2024-10-01 RX ORDER — 0.9 % SODIUM CHLORIDE 0.9 %
1000 INTRAVENOUS SOLUTION INTRAVENOUS ONCE
Status: COMPLETED | OUTPATIENT
Start: 2024-10-01 | End: 2024-10-01

## 2024-10-01 RX ORDER — SODIUM CHLORIDE 0.9 % (FLUSH) 0.9 %
5-40 SYRINGE (ML) INJECTION PRN
Status: CANCELLED | OUTPATIENT
Start: 2024-10-08

## 2024-10-01 RX ORDER — ACETAMINOPHEN 325 MG/1
650 TABLET ORAL
Status: CANCELLED | OUTPATIENT
Start: 2024-10-08

## 2024-10-01 RX ORDER — ONDANSETRON 2 MG/ML
8 INJECTION INTRAMUSCULAR; INTRAVENOUS
Status: CANCELLED | OUTPATIENT
Start: 2024-10-08

## 2024-10-01 RX ORDER — HEPARIN 100 UNIT/ML
500 SYRINGE INTRAVENOUS PRN
Status: CANCELLED | OUTPATIENT
Start: 2024-10-08

## 2024-10-01 RX ORDER — SODIUM CHLORIDE 0.9 % (FLUSH) 0.9 %
5-40 SYRINGE (ML) INJECTION PRN
Status: DISCONTINUED | OUTPATIENT
Start: 2024-10-01 | End: 2024-10-02 | Stop reason: HOSPADM

## 2024-10-01 RX ORDER — DIPHENHYDRAMINE HYDROCHLORIDE 50 MG/ML
50 INJECTION INTRAMUSCULAR; INTRAVENOUS
Status: CANCELLED | OUTPATIENT
Start: 2024-10-08

## 2024-10-01 RX ORDER — EPINEPHRINE 1 MG/ML
0.3 INJECTION, SOLUTION INTRAMUSCULAR; SUBCUTANEOUS PRN
Status: CANCELLED | OUTPATIENT
Start: 2024-10-08

## 2024-10-01 RX ORDER — 0.9 % SODIUM CHLORIDE 0.9 %
1000 INTRAVENOUS SOLUTION INTRAVENOUS ONCE
Status: CANCELLED
Start: 2024-10-08 | End: 2024-10-08

## 2024-10-01 RX ORDER — SODIUM CHLORIDE 9 MG/ML
INJECTION, SOLUTION INTRAVENOUS CONTINUOUS
Status: CANCELLED | OUTPATIENT
Start: 2024-10-08

## 2024-10-01 RX ADMIN — SODIUM CHLORIDE 1000 ML: 9 INJECTION, SOLUTION INTRAVENOUS at 15:26

## 2024-10-01 ASSESSMENT — PAIN SCALES - GENERAL: PAINLEVEL_OUTOF10: 0

## 2024-10-01 NOTE — PROGRESS NOTES
[facility] Short Visit Progress Note    Ms. Tabares admitted to [facility] for hydration with cane in wheelchair in stable condition. Assessment completed. Patient having increased SOB and fatigue.    Vital Signs:  BP (!) 98/54   Pulse 83   Temp 97.3 °F (36.3 °C) (Temporal)   Resp 16   Ht 1.676 m (5' 5.98\")   Wt 91.9 kg (202 lb 8 oz)   LMP  (LMP Unknown)   SpO2 95%   BMI 32.70 kg/m²       R chest port with positive blood return.   Lab Results:  Recent Results (from the past 12 hour(s))   Hemoglobin and Hematocrit    Collection Time: 10/01/24  3:34 PM   Result Value Ref Range    Hemoglobin 8.8 (L) 11.5 - 16.0 g/dL    Hematocrit 27.2 (L) 35.0 - 47.0 %         Medications:  Medications Administered         sodium chloride 0.9 % bolus 1,000 mL Admin Date  10/01/2024 Action  New Bag Dose  1,000 mL Rate  983.6 mL/hr Route  IntraVENous Documented By  Nora Louis, RN              Patient tolerated treatment well. Patient discharged from [facility] ambulatory in no distress. Patient aware of next appointment.    Susan Brothers RN  October 1, 2024    Future Appointments   Date Time Provider Department Center   10/8/2024  3:30 PM [facility]   10/15/2024 10:30 AM SS CHEMO CHAIR 14 [facility]   10/15/2024 11:00 AM Guerline Park MD ONCSF BS AMB   10/22/2024  3:30 PM SS FASTTRACK 3 [facility]   10/29/2024  3:30 PM SS FASTTRACK 3 [facility]   11/5/2024 10:30 AM SS CHEMO CHAIR 14 [facility]

## 2024-10-02 ENCOUNTER — TELEPHONE (OUTPATIENT)
Age: 79
End: 2024-10-02

## 2024-10-02 NOTE — TELEPHONE ENCOUNTER
10/02/24 4:05 PM Return call placed to patient. Verified patient ID x 2. Patient requesting refill of Oxycodone. Patient states pain is improving, taking Oxycodone every other day. She states she will usually have to take pain medication around time of treatment due to bone pain from Neulasta. Per chart review, palliative medicine filled pain medication most recently. Requested that patient defer this request to palliative team. Patient also voiced complaints of constipation--currently taking Miralax once daily. Has not had BM x 1 week. Patient passing gas. Denies nausea/vomiting and denies fevers. Patient continues to have decreased appetite but reports this is unchanged from baseline. Advised this nurse would forward above to Dinora PERKINS for further recommendations regarding bowel regimen and call patient back. She voiced understanding.     10/03/24 1:32 PM Called patient. Advised of recommendations per Dinora. Patient voiced understanding. No further questions or concerns at this time.

## 2024-10-03 NOTE — TELEPHONE ENCOUNTER
[facility] [place] at [facility]  [phone]    For constipation: Increase PO fluids to at least 64 ounces daily - avoid caffeine and alcohol.      At a time when patient will not be leaving her home - try magnesium citrate 1/2 bottle, if no BM in 1 hour drink remainder. This will likely cause some abdominal cramping.  Start Sennakot 2 tabs with a full glass of water before bed.   Increase Miralax to TID PRN to keep stool soft; she may need to take miralax TID for several days before determining if stool is too soft and she needs to decrease.

## 2024-10-07 RX ORDER — ACETAMINOPHEN 325 MG/1
650 TABLET ORAL
Status: CANCELLED | OUTPATIENT
Start: 2024-10-15

## 2024-10-07 RX ORDER — DEXAMETHASONE SODIUM PHOSPHATE 10 MG/ML
6 INJECTION INTRAMUSCULAR; INTRAVENOUS ONCE
Status: CANCELLED | OUTPATIENT
Start: 2024-10-15 | End: 2024-10-15

## 2024-10-07 RX ORDER — ONDANSETRON 2 MG/ML
8 INJECTION INTRAMUSCULAR; INTRAVENOUS
Status: CANCELLED | OUTPATIENT
Start: 2024-10-15

## 2024-10-07 RX ORDER — SODIUM CHLORIDE 9 MG/ML
5-250 INJECTION, SOLUTION INTRAVENOUS PRN
Status: CANCELLED | OUTPATIENT
Start: 2024-10-15

## 2024-10-07 RX ORDER — FAMOTIDINE 10 MG/ML
20 INJECTION, SOLUTION INTRAVENOUS
Status: CANCELLED | OUTPATIENT
Start: 2024-10-15

## 2024-10-07 RX ORDER — ALBUTEROL SULFATE 90 UG/1
4 INHALANT RESPIRATORY (INHALATION) PRN
Status: CANCELLED | OUTPATIENT
Start: 2024-10-15

## 2024-10-07 RX ORDER — DIPHENHYDRAMINE HYDROCHLORIDE 50 MG/ML
50 INJECTION INTRAMUSCULAR; INTRAVENOUS
Status: CANCELLED | OUTPATIENT
Start: 2024-10-15

## 2024-10-07 RX ORDER — SODIUM CHLORIDE 9 MG/ML
INJECTION, SOLUTION INTRAVENOUS CONTINUOUS
Status: CANCELLED | OUTPATIENT
Start: 2024-10-15

## 2024-10-07 RX ORDER — EPINEPHRINE 1 MG/ML
0.3 INJECTION, SOLUTION INTRAMUSCULAR; SUBCUTANEOUS PRN
Status: CANCELLED | OUTPATIENT
Start: 2024-10-15

## 2024-10-07 RX ORDER — HEPARIN 100 UNIT/ML
500 SYRINGE INTRAVENOUS PRN
Status: CANCELLED | OUTPATIENT
Start: 2024-10-15

## 2024-10-08 ENCOUNTER — HOSPITAL ENCOUNTER (OUTPATIENT)
Facility: HOSPITAL | Age: 79
Setting detail: INFUSION SERIES
Discharge: HOME | End: 2024-10-08
Payer: MEDICARE

## 2024-10-08 ENCOUNTER — APPOINTMENT (OUTPATIENT)
Facility: HOSPITAL | Age: 79
End: 2024-10-08
Payer: MEDICARE

## 2024-10-08 VITALS
RESPIRATION RATE: 18 BRPM | BODY MASS INDEX: 31.82 KG/M2 | HEART RATE: 98 BPM | WEIGHT: 198 LBS | HEIGHT: 66 IN | OXYGEN SATURATION: 96 % | SYSTOLIC BLOOD PRESSURE: 134 MMHG | DIASTOLIC BLOOD PRESSURE: 64 MMHG | TEMPERATURE: 97.4 F

## 2024-10-08 DIAGNOSIS — N17.9 AKI (ACUTE KIDNEY INJURY) (HCC): Primary | ICD-10-CM

## 2024-10-08 DIAGNOSIS — N18.31 STAGE 3A CHRONIC KIDNEY DISEASE (HCC): ICD-10-CM

## 2024-10-08 DIAGNOSIS — G89.3 CHRONIC NEOPLASM-RELATED PAIN: ICD-10-CM

## 2024-10-08 DIAGNOSIS — C34.92 SQUAMOUS CELL CARCINOMA OF LUNG, LEFT (HCC): ICD-10-CM

## 2024-10-08 PROCEDURE — 2580000003 HC RX 258: Performed by: NURSE PRACTITIONER

## 2024-10-08 PROCEDURE — 96360 HYDRATION IV INFUSION INIT: CPT

## 2024-10-08 RX ORDER — 0.9 % SODIUM CHLORIDE 0.9 %
1000 INTRAVENOUS SOLUTION INTRAVENOUS ONCE
Status: CANCELLED
Start: 2024-10-15 | End: 2024-10-15

## 2024-10-08 RX ORDER — EPINEPHRINE 1 MG/ML
0.3 INJECTION, SOLUTION INTRAMUSCULAR; SUBCUTANEOUS PRN
Status: CANCELLED | OUTPATIENT
Start: 2024-10-15

## 2024-10-08 RX ORDER — 0.9 % SODIUM CHLORIDE 0.9 %
1000 INTRAVENOUS SOLUTION INTRAVENOUS ONCE
Status: COMPLETED | OUTPATIENT
Start: 2024-10-08 | End: 2024-10-08

## 2024-10-08 RX ORDER — SODIUM CHLORIDE 9 MG/ML
INJECTION, SOLUTION INTRAVENOUS CONTINUOUS
Status: CANCELLED | OUTPATIENT
Start: 2024-10-15

## 2024-10-08 RX ORDER — ACETAMINOPHEN 325 MG/1
650 TABLET ORAL
Status: CANCELLED | OUTPATIENT
Start: 2024-10-15

## 2024-10-08 RX ORDER — OXYCODONE HYDROCHLORIDE 5 MG/1
5 TABLET ORAL EVERY 4 HOURS PRN
Qty: 42 TABLET | Refills: 0 | Status: SHIPPED | OUTPATIENT
Start: 2024-10-08 | End: 2024-10-15

## 2024-10-08 RX ORDER — SODIUM CHLORIDE 0.9 % (FLUSH) 0.9 %
5-40 SYRINGE (ML) INJECTION PRN
Status: CANCELLED | OUTPATIENT
Start: 2024-10-15

## 2024-10-08 RX ORDER — SODIUM CHLORIDE 9 MG/ML
5-250 INJECTION, SOLUTION INTRAVENOUS PRN
Status: CANCELLED | OUTPATIENT
Start: 2024-10-15

## 2024-10-08 RX ORDER — FAMOTIDINE 10 MG/ML
20 INJECTION, SOLUTION INTRAVENOUS
Status: CANCELLED | OUTPATIENT
Start: 2024-10-15

## 2024-10-08 RX ORDER — ALBUTEROL SULFATE 90 UG/1
4 INHALANT RESPIRATORY (INHALATION) PRN
Status: CANCELLED | OUTPATIENT
Start: 2024-10-15

## 2024-10-08 RX ORDER — HEPARIN 100 UNIT/ML
500 SYRINGE INTRAVENOUS PRN
Status: CANCELLED | OUTPATIENT
Start: 2024-10-15

## 2024-10-08 RX ORDER — ONDANSETRON 2 MG/ML
8 INJECTION INTRAMUSCULAR; INTRAVENOUS
Status: CANCELLED | OUTPATIENT
Start: 2024-10-15

## 2024-10-08 RX ORDER — DIPHENHYDRAMINE HYDROCHLORIDE 50 MG/ML
50 INJECTION INTRAMUSCULAR; INTRAVENOUS
Status: CANCELLED | OUTPATIENT
Start: 2024-10-15

## 2024-10-08 RX ADMIN — SODIUM CHLORIDE 1000 ML: 9 INJECTION, SOLUTION INTRAVENOUS at 15:32

## 2024-10-08 ASSESSMENT — PAIN SCALES - GENERAL: PAINLEVEL_OUTOF10: 0

## 2024-10-08 NOTE — PROGRESS NOTES
[facility] Progress Note    Date: 2024    Name: Elise Tabares    MRN: [id number]         : 1945    Ms. Tabares Arrived ambulatory and in no distress for Hydration.  Assessment was completed, no acute issues at this time, no new complaints voiced.  Right chest wall port accessed without difficulty, labs drawn & sent for processing.      Ms. Tabares's vitals were reviewed.  Vitals:    10/08/24 1515   BP: 134/64   Pulse: 98   Resp: 18   Temp: 97.4 °F (36.3 °C)   SpO2: 96%       Medications:  Medications Administered         sodium chloride 0.9 % bolus 1,000 mL Admin Date  10/08/2024 Action  New Bag Dose  1,000 mL Rate  983.6 mL/hr Route  IntraVENous Documented By  Josefina Finch, ESTELLA            Ms. Tabares tolerated treatment well and was discharged from [facility] in stable condition.   Port de-accessed & flushed per protocol. Patient is aware of future appointments.    Future Appointments   Date Time Provider Department Center   10/15/2024 10:30 AM [facility]   10/15/2024 11:00 AM Guerline Park MD ON[facility] BS AMB   10/22/2024  3:30 PM [facility]   10/29/2024  3:30 PM [facility]   2024 10:30 AM [facility]   2024 11:30 AM Vinod Street MD PC[facility] BS AMB       Josefina Finch RN  2024

## 2024-10-08 NOTE — TELEPHONE ENCOUNTER
Palliative Medicine Clinic   [place]: [phone] ([phone])    Patient Name: Elise Tabares  YOB: 1945    Medication Refill Request Triage    Requested by:    [x]  Patient  []  Support person:      Last Pall Med Clinic Visit:  8/21/2024    Next Pall Med Clinic Visit: 12/04/2024     Preferred Pharmacy: [facility]   Backup Pharmacy:  *    Medications requested:  Oxycodone    Is patient completely out?  []   YES  [x]   NO  If NO, how many pills does patient have left?  2    Other pertinent information shared:

## 2024-10-08 NOTE — TELEPHONE ENCOUNTER
Palliative Medicine Clinic   [place]: [phone] ([phone])    Patient Name: Elise Tabares  YOB: 1945    Medication Refill Request    Patient is scheduled for follow up:  [x]  YES  []   NO  Next Pall Med Clinic Visit:     PDMP reviewed:  [x] YES   []  System down / Unable  []  NO- Patient fills out of state        Medication:oxyCODONE (ROXICODONE) 5 MG   Dose and directions:Take 1 tablet by mouth every 4 hour   Number dispensed:42  Date filled (PDMP or Pharmacy):8/21/2024  #left:    Appropriate for refill:  [x]  YES  []  Early Request - Requires MD/NP Review      Other pertinent information for prescriber:

## 2024-10-15 ENCOUNTER — HOSPITAL ENCOUNTER (OUTPATIENT)
Facility: HOSPITAL | Age: 79
Setting detail: INFUSION SERIES
Discharge: HOME | End: 2024-10-15
Payer: MEDICARE

## 2024-10-15 VITALS
HEIGHT: 66 IN | DIASTOLIC BLOOD PRESSURE: 77 MMHG | RESPIRATION RATE: 18 BRPM | WEIGHT: 195.1 LBS | BODY MASS INDEX: 31.36 KG/M2 | HEART RATE: 86 BPM | SYSTOLIC BLOOD PRESSURE: 126 MMHG

## 2024-10-15 DIAGNOSIS — B37.31 VAGINAL YEAST INFECTION: Primary | ICD-10-CM

## 2024-10-15 DIAGNOSIS — C34.92 MALIGNANT NEOPLASM OF UNSPECIFIED PART OF LEFT BRONCHUS OR LUNG (HCC): ICD-10-CM

## 2024-10-15 DIAGNOSIS — C34.92 SQUAMOUS CELL CARCINOMA OF LUNG, LEFT (HCC): Primary | ICD-10-CM

## 2024-10-15 LAB
ALBUMIN SERPL-MCNC: 3.4 G/DL (ref 3.5–5)
ALBUMIN/GLOB SERPL: 0.9 (ref 1.1–2.2)
ALP SERPL-CCNC: 132 U/L (ref 45–117)
ALT SERPL-CCNC: 17 U/L (ref 12–78)
ANION GAP SERPL CALC-SCNC: 12 MMOL/L (ref 2–12)
AST SERPL-CCNC: 12 U/L (ref 15–37)
BASOPHILS # BLD: 0 K/UL (ref 0–0.1)
BASOPHILS NFR BLD: 0 % (ref 0–1)
BILIRUB SERPL-MCNC: 0.5 MG/DL (ref 0.2–1)
BUN SERPL-MCNC: 39 MG/DL (ref 6–20)
BUN/CREAT SERPL: 18 (ref 12–20)
CALCIUM SERPL-MCNC: 9.9 MG/DL (ref 8.5–10.1)
CHLORIDE SERPL-SCNC: 103 MMOL/L (ref 97–108)
CO2 SERPL-SCNC: 21 MMOL/L (ref 21–32)
CREAT SERPL-MCNC: 2.11 MG/DL (ref 0.55–1.02)
DIFFERENTIAL METHOD BLD: ABNORMAL
EOSINOPHIL # BLD: 0 K/UL (ref 0–0.4)
EOSINOPHIL NFR BLD: 0 % (ref 0–7)
ERYTHROCYTE [DISTWIDTH] IN BLOOD BY AUTOMATED COUNT: 16 % (ref 11.5–14.5)
GLOBULIN SER CALC-MCNC: 4 G/DL (ref 2–4)
GLUCOSE BLD STRIP.AUTO-MCNC: 323 MG/DL (ref 65–117)
GLUCOSE SERPL-MCNC: 288 MG/DL (ref 65–100)
HCT VFR BLD AUTO: 30.2 % (ref 35–47)
HGB BLD-MCNC: 9.9 G/DL (ref 11.5–16)
IMM GRANULOCYTES # BLD AUTO: 0.1 K/UL (ref 0–0.04)
IMM GRANULOCYTES NFR BLD AUTO: 1 % (ref 0–0.5)
LYMPHOCYTES # BLD: 1.1 K/UL (ref 0.8–3.5)
LYMPHOCYTES NFR BLD: 6 % (ref 12–49)
MCH RBC QN AUTO: 30.2 PG (ref 26–34)
MCHC RBC AUTO-ENTMCNC: 32.8 G/DL (ref 30–36.5)
MCV RBC AUTO: 92.1 FL (ref 80–99)
MONOCYTES # BLD: 0.7 K/UL (ref 0–1)
MONOCYTES NFR BLD: 4 % (ref 5–13)
NEUTS SEG # BLD: 15.5 K/UL (ref 1.8–8)
NEUTS SEG NFR BLD: 89 % (ref 32–75)
NRBC # BLD: 0 K/UL (ref 0–0.01)
NRBC BLD-RTO: 0 PER 100 WBC
PLATELET # BLD AUTO: 379 K/UL (ref 150–400)
PMV BLD AUTO: 10.9 FL (ref 8.9–12.9)
POTASSIUM SERPL-SCNC: 3.7 MMOL/L (ref 3.5–5.1)
PROT SERPL-MCNC: 7.4 G/DL (ref 6.4–8.2)
RBC # BLD AUTO: 3.28 M/UL (ref 3.8–5.2)
SERVICE CMNT-IMP: ABNORMAL
SODIUM SERPL-SCNC: 136 MMOL/L (ref 136–145)
WBC # BLD AUTO: 17.4 K/UL (ref 3.6–11)

## 2024-10-15 PROCEDURE — 96376 TX/PRO/DX INJ SAME DRUG ADON: CPT

## 2024-10-15 PROCEDURE — 96377 APPLICATON ON-BODY INJECTOR: CPT

## 2024-10-15 PROCEDURE — 96413 CHEMO IV INFUSION 1 HR: CPT

## 2024-10-15 PROCEDURE — 80053 COMPREHEN METABOLIC PANEL: CPT

## 2024-10-15 PROCEDURE — 96360 HYDRATION IV INFUSION INIT: CPT

## 2024-10-15 PROCEDURE — 82962 GLUCOSE BLOOD TEST: CPT

## 2024-10-15 PROCEDURE — 96361 HYDRATE IV INFUSION ADD-ON: CPT

## 2024-10-15 PROCEDURE — 6360000002 HC RX W HCPCS: Performed by: INTERNAL MEDICINE

## 2024-10-15 PROCEDURE — 2580000003 HC RX 258: Performed by: INTERNAL MEDICINE

## 2024-10-15 PROCEDURE — 85025 COMPLETE CBC W/AUTO DIFF WBC: CPT

## 2024-10-15 PROCEDURE — 96375 TX/PRO/DX INJ NEW DRUG ADDON: CPT

## 2024-10-15 PROCEDURE — 96374 THER/PROPH/DIAG INJ IV PUSH: CPT

## 2024-10-15 PROCEDURE — 2580000003 HC RX 258: Performed by: NURSE PRACTITIONER

## 2024-10-15 RX ORDER — SODIUM CHLORIDE 0.9 % (FLUSH) 0.9 %
5-40 SYRINGE (ML) INJECTION PRN
Status: CANCELLED | OUTPATIENT
Start: 2024-10-15

## 2024-10-15 RX ORDER — ALBUTEROL SULFATE 90 UG/1
4 INHALANT RESPIRATORY (INHALATION) PRN
Status: CANCELLED | OUTPATIENT
Start: 2024-10-15

## 2024-10-15 RX ORDER — SODIUM CHLORIDE 9 MG/ML
INJECTION, SOLUTION INTRAVENOUS CONTINUOUS
Status: CANCELLED | OUTPATIENT
Start: 2024-10-15

## 2024-10-15 RX ORDER — SODIUM CHLORIDE 9 MG/ML
5-250 INJECTION, SOLUTION INTRAVENOUS PRN
Status: DISCONTINUED | OUTPATIENT
Start: 2024-10-15 | End: 2024-10-16 | Stop reason: HOSPADM

## 2024-10-15 RX ORDER — DIPHENHYDRAMINE HYDROCHLORIDE 50 MG/ML
50 INJECTION INTRAMUSCULAR; INTRAVENOUS
Status: CANCELLED | OUTPATIENT
Start: 2024-10-15

## 2024-10-15 RX ORDER — HEPARIN 100 UNIT/ML
500 SYRINGE INTRAVENOUS PRN
Status: CANCELLED | OUTPATIENT
Start: 2024-10-15

## 2024-10-15 RX ORDER — ACETAMINOPHEN 325 MG/1
650 TABLET ORAL
Status: CANCELLED | OUTPATIENT
Start: 2024-10-15

## 2024-10-15 RX ORDER — 0.9 % SODIUM CHLORIDE 0.9 %
1000 INTRAVENOUS SOLUTION INTRAVENOUS ONCE
Status: COMPLETED | OUTPATIENT
Start: 2024-10-15 | End: 2024-10-15

## 2024-10-15 RX ORDER — ONDANSETRON 2 MG/ML
8 INJECTION INTRAMUSCULAR; INTRAVENOUS
Status: CANCELLED | OUTPATIENT
Start: 2024-10-15

## 2024-10-15 RX ORDER — EPINEPHRINE 1 MG/ML
0.3 INJECTION, SOLUTION, CONCENTRATE INTRAVENOUS PRN
Status: CANCELLED | OUTPATIENT
Start: 2024-10-15

## 2024-10-15 RX ORDER — SODIUM CHLORIDE 9 MG/ML
5-250 INJECTION, SOLUTION INTRAVENOUS PRN
Status: CANCELLED | OUTPATIENT
Start: 2024-10-15

## 2024-10-15 RX ORDER — SODIUM CHLORIDE 0.9 % (FLUSH) 0.9 %
5-40 SYRINGE (ML) INJECTION PRN
Status: DISCONTINUED | OUTPATIENT
Start: 2024-10-15 | End: 2024-10-16 | Stop reason: HOSPADM

## 2024-10-15 RX ORDER — DEXTROSE MONOHYDRATE, SODIUM CHLORIDE, AND POTASSIUM CHLORIDE 50; .745; 4.5 G/1000ML; G/1000ML; G/1000ML
INJECTION, SOLUTION INTRAVENOUS ONCE
Status: CANCELLED
Start: 2024-10-15 | End: 2024-10-15

## 2024-10-15 RX ORDER — FLUCONAZOLE 150 MG/1
150 TABLET ORAL
Qty: 2 TABLET | Refills: 0 | Status: SHIPPED | OUTPATIENT
Start: 2024-10-15 | End: 2024-10-21

## 2024-10-15 RX ORDER — ONDANSETRON 2 MG/ML
8 INJECTION INTRAMUSCULAR; INTRAVENOUS ONCE
Status: COMPLETED | OUTPATIENT
Start: 2024-10-15 | End: 2024-10-15

## 2024-10-15 RX ORDER — 0.9 % SODIUM CHLORIDE 0.9 %
1000 INTRAVENOUS SOLUTION INTRAVENOUS ONCE
Status: CANCELLED
Start: 2024-10-15

## 2024-10-15 RX ADMIN — SODIUM CHLORIDE 25 ML/HR: 9 INJECTION, SOLUTION INTRAVENOUS at 14:10

## 2024-10-15 RX ADMIN — ONDANSETRON 8 MG: 2 INJECTION INTRAMUSCULAR; INTRAVENOUS at 11:05

## 2024-10-15 RX ADMIN — PEGFILGRASTIM 6 MG: KIT SUBCUTANEOUS at 15:27

## 2024-10-15 RX ADMIN — DOCETAXEL ANHYDROUS 159 MG: 10 INJECTION, SOLUTION INTRAVENOUS at 14:05

## 2024-10-15 RX ADMIN — SODIUM CHLORIDE, PRESERVATIVE FREE 20 ML: 5 INJECTION INTRAVENOUS at 15:27

## 2024-10-15 RX ADMIN — SODIUM CHLORIDE 1000 ML: 9 INJECTION, SOLUTION INTRAVENOUS at 12:58

## 2024-10-15 ASSESSMENT — PAIN SCALES - GENERAL: PAINLEVEL_OUTOF10: 0

## 2024-10-15 NOTE — PROGRESS NOTES
Date: October 15, 2024        Ms. Tabares Arrived to [facility] for  Taxotere+ Neulasta On Pro+IVF in a wheelchair. Patient stated she has been short of breath today, with exertion and while at rest. States this started today. Patient states her blood glucose was in the 600s this AM. Patient states she took her insulin as prescribed, reports not eating today. Patient pale and diaphoretic. Patient began vomiting, MD notified. Zofran given as ordered, patient started on 2L of supplemental oxygen as ordered for comfort.     Assessment was completed and port accessed by Niya SORIA. Labs drawn and sent for processing.  NP visited patient at chairside in [facility]. Per NP, ok to proceed with treatment today, adding in IVF and holding dexamethasone due to elevated glucose. No additional premedications ordered.     Labs reviewed. Criteria for treatment was met.    Ms. Tabares's vitals were reviewed.  Patient Vitals for the past 12 hrs:   Pulse Resp BP   10/15/24 1515 86 -- 126/77   10/15/24 1030 -- 18 --         Lab results were obtained and reviewed.  Recent Results (from the past 12 hour(s))   POCT Glucose    Collection Time: 10/15/24 10:41 AM   Result Value Ref Range    POC Glucose 323 (H) 65 - 117 mg/dL    Performed by: Stef Rocha    Comprehensive metabolic panel    Collection Time: 10/15/24 10:52 AM   Result Value Ref Range    Sodium 136 136 - 145 mmol/L    Potassium 3.7 3.5 - 5.1 mmol/L    Chloride 103 97 - 108 mmol/L    CO2 21 21 - 32 mmol/L    Anion Gap 12 2 - 12 mmol/L    Glucose 288 (H) 65 - 100 mg/dL    BUN 39 (H) 6 - 20 MG/DL    Creatinine 2.11 (H) 0.55 - 1.02 MG/DL    BUN/Creatinine Ratio 18 12 - 20      Est, Glom Filt Rate 23 (L) >60 ml/min/1.73m2    Calcium 9.9 8.5 - 10.1 MG/DL    Total Bilirubin 0.5 0.2 - 1.0 MG/DL    ALT 17 12 - 78 U/L    AST 12 (L) 15 - 37 U/L    Alk Phosphatase 132 (H) 45 - 117 U/L    Total Protein 7.4 6.4 - 8.2 g/dL    Albumin 3.4 (L) 3.5 - 5.0 g/dL    Globulin 4.0 2.0 - 4.0 g/dL     Albumin/Globulin Ratio 0.9 (L) 1.1 - 2.2     CBC With Auto Differential    Collection Time: 10/15/24 10:52 AM   Result Value Ref Range    WBC 17.4 (H) 3.6 - 11.0 K/uL    RBC 3.28 (L) 3.80 - 5.20 M/uL    Hemoglobin 9.9 (L) 11.5 - 16.0 g/dL    Hematocrit 30.2 (L) 35.0 - 47.0 %    MCV 92.1 80.0 - 99.0 FL    MCH 30.2 26.0 - 34.0 PG    MCHC 32.8 30.0 - 36.5 g/dL    RDW 16.0 (H) 11.5 - 14.5 %    Platelets 379 150 - 400 K/uL    MPV 10.9 8.9 - 12.9 FL    Nucleated RBCs 0.0 0  WBC    nRBC 0.00 0.00 - 0.01 K/uL    Neutrophils % 89 (H) 32 - 75 %    Lymphocytes % 6 (L) 12 - 49 %    Monocytes % 4 (L) 5 - 13 %    Eosinophils % 0 0 - 7 %    Basophils % 0 0 - 1 %    Immature Granulocytes % 1 (H) 0.0 - 0.5 %    Neutrophils Absolute 15.5 (H) 1.8 - 8.0 K/UL    Lymphocytes Absolute 1.1 0.8 - 3.5 K/UL    Monocytes Absolute 0.7 0.0 - 1.0 K/UL    Eosinophils Absolute 0.0 0.0 - 0.4 K/UL    Basophils Absolute 0.0 0.0 - 0.1 K/UL    Immature Granulocytes Absolute 0.1 (H) 0.00 - 0.04 K/UL    Differential Type AUTOMATED          Pre-medications  were administered as ordered and chemotherapy was initiated.  Medications Administered         0.9 % sodium chloride infusion Admin Date  10/15/2024 Action  New Bag Dose  25 mL/hr Rate  25 mL/hr Route  IntraVENous Documented By  Niya Parsons, RN        DOCEtaxel (TAXOTERE) 159 mg in sodium chloride 0.9 % 250 mL chemo IVPB Admin Date  10/15/2024 Action  New Bag Dose  159 mg Rate  290.9 mL/hr Route  IntraVENous Documented By  Niya Parsons, RN        ondansetron (ZOFRAN) injection 8 mg Admin Date  10/15/2024 Action  Given Dose  8 mg Rate   Route  IntraVENous Documented By  Niya Parsons, RN        pegfilgrastim (NEULASTA) on-body injector 6 mg Admin Date  10/15/2024 Action  Given Dose  6 mg Rate   Route  SubCUTAneous Documented By  Niya Parsons, ESTELLA        sodium chloride 0.9 % bolus 1,000 mL Admin Date  10/15/2024 Action  New Bag Dose  1,000 mL Rate  983.6 mL/hr Route  IntraVENous Documented

## 2024-10-15 NOTE — PROGRESS NOTES
[facility] [place]  Medical Oncology at [place]  [phone]    Hematology / Oncology Established Visit    Reason for Visit:   Elise Tabares is a 79 y.o. female who is seen for follow up of lung cancer.     Hematology Oncology Treatment History:     Diagnosis: Squamous cell carcinoma of lung    Stage: IIB [[id number]], now metastatic    Pathology:   3/14/23 Lung, left upper lobe, Core biopsy: Invasive poorly differentiated squamous cell carcinoma.   Comment: Immunohistochemical stains show the malignant cells are positive  for cytokeratin 5/6 and negative for cytokeratin 7, cytokeratin 20 and  TTF-1 supporting the diagnosis.     5/3/23 Left upper lobe wedge resection:  Invasive poorly differentiated squamous cell carcinoma with areas of extensive necrosis.   Tumor size 5.5cm, G3  Visceral pleural invasion present. Vascular invasion present. Parenchymal resection margin negative for tumor. Background lung parenchyma with moderate to severe architectural distortion associated with prominent peribronchiolar metaplasia.  0/6 LN positive [Station 5, 6, 7 x 2, 8, 10]  -PDL1 TPS 2%    -Guardant NGS: TP53 R267G, TP53 S241F. CDKN2A L32fs, MSI-High not detected. No FDA approved medications     Prior Treatment:   1. Sublobar resection of JANELL by Dr. Dom Perez  2. Adjuvant Car[place] x 4 on D1, 8 of q21d cycles, 6/20/23- 8/29/23  3. Atezolizumab [id number] x 1 year, 9/19/23 - 7/2024    Current Treatment:  Docetaxel 75mg/m2 c1sdoyl until disease progression/ toxicity, 7/23/24 - current    History of Present Illness:   Elise Tabares is a 79 y.o. female with CAD, CKD, HTN, DM II, MAX who is referred for evaluation and management of lung cancer. Pt was recently hospitalized in early March 2023 after 2 syncopal episodes at home. She also noted some loose stools and vomiting. She was diagnosed with IVVD and VAIBHAV, treated with IVF. She was found to have a new 27 x 24mm cavitary lesion in JANELL and underwent CT-guided  of metastatic disease.    9/4/23 CT chest wo cont:  1. The left upper lobe pulmonary nodule has been resected. There are no pulmonary nodules present. There is no adenopathy.  2. No evidence for metastatic disease.    12/1/23 CT chest wo cont:  No suspicious pulmonary mass or nodule.  No evidence for metastatic disease.    3/1/24 CT c/a/p:  IMPRESSION:  1. Postsurgical changes in the left upper lobe of the lung without evidence for recurrent or metastatic disease.  2. 1.3 cm right thyroid nodule.  3. Subacute/chronic appearing interstitial opacities in the lungs with mild emphysema and bibasilar bronchiectasis.  4. Distal esophagitis.  5. Renal cortical scarring.    6/9/24 CT chest w IV contrast:  CHEST WALL: There is an enhancing mass along the left posterior chest wall measuring 4.2 x 3.1 cm on series 301, slice 59, which is increased from 2.1 x 2.1 cm on the previous exam. There is a right-sided Port-A-Cath in place. There is no axillary adenopathy.  THYROID: There is a grossly unchanged low-density nodule of the right thyroid lobe.   MEDIASTINUM: No mass or lymphadenopathy.  ARIANA: No mass or lymphadenopathy.  THORACIC AORTA: No dissection or aneurysm.  MAIN PULMONARY ARTERY: Normal in caliber.  TRACHEA/BRONCHI: Patent.  ESOPHAGUS: No wall thickening or dilatation.  HEART: Normal in size. Coronary artery calcium: present  PLEURA: There is a small left-sided pleural effusion which is increased from the previous exam. Mild pleural thickening is noted. Increased pleural thickening is seen along the anterior aspect of the left upper lobe concerning for metastasis and demonstrated on series 301, slice 22.  LUNGS: Status post left upper lobe wedge resection. There are multiple new or enlarged left lung nodules concerning for metastatic disease. There is a left upper lobe juxtapleural nodule measuring approximately 2.1 x 1.4 cm on series 303, slice 52. There is a spiculated nodule along the fissure measuring 1.2 x 0.9

## 2024-10-17 ENCOUNTER — APPOINTMENT (OUTPATIENT)
Facility: HOSPITAL | Age: 79
End: 2024-10-17
Payer: MEDICARE

## 2024-10-17 ENCOUNTER — HOSPITAL ENCOUNTER (INPATIENT)
Facility: HOSPITAL | Age: 79
LOS: 2 days | Discharge: NURSING, REHAB OR LONG-TERM CARE | End: 2024-10-19
Attending: EMERGENCY MEDICINE | Admitting: FAMILY MEDICINE
Payer: MEDICARE

## 2024-10-17 DIAGNOSIS — S82.64XA CLOSED NONDISPLACED FRACTURE OF LATERAL MALLEOLUS OF RIGHT FIBULA, INITIAL ENCOUNTER: Primary | ICD-10-CM

## 2024-10-17 PROBLEM — S82.65XA NONDISPLACED FRACTURE OF LATERAL MALLEOLUS OF LEFT FIBULA, INITIAL ENCOUNTER FOR CLOSED FRACTURE: Status: ACTIVE | Noted: 2024-10-17

## 2024-10-17 LAB
ALBUMIN SERPL-MCNC: 3.1 G/DL (ref 3.5–5)
ALBUMIN/GLOB SERPL: 0.9 (ref 1.1–2.2)
ALP SERPL-CCNC: 128 U/L (ref 45–117)
ALT SERPL-CCNC: 18 U/L (ref 12–78)
ANION GAP SERPL CALC-SCNC: 3 MMOL/L (ref 2–12)
AST SERPL-CCNC: 18 U/L (ref 15–37)
BASOPHILS # BLD: 0 K/UL (ref 0–0.1)
BASOPHILS NFR BLD: 0 % (ref 0–1)
BILIRUB SERPL-MCNC: 0.6 MG/DL (ref 0.2–1)
BUN SERPL-MCNC: 44 MG/DL (ref 6–20)
BUN/CREAT SERPL: 29 (ref 12–20)
CALCIUM SERPL-MCNC: 8.8 MG/DL (ref 8.5–10.1)
CHLORIDE SERPL-SCNC: 107 MMOL/L (ref 97–108)
CO2 SERPL-SCNC: 26 MMOL/L (ref 21–32)
CREAT SERPL-MCNC: 1.52 MG/DL (ref 0.55–1.02)
DIFFERENTIAL METHOD BLD: ABNORMAL
EOSINOPHIL # BLD: 0 K/UL (ref 0–0.4)
EOSINOPHIL NFR BLD: 0 % (ref 0–7)
ERYTHROCYTE [DISTWIDTH] IN BLOOD BY AUTOMATED COUNT: 17.2 % (ref 11.5–14.5)
GLOBULIN SER CALC-MCNC: 3.4 G/DL (ref 2–4)
GLUCOSE BLD STRIP.AUTO-MCNC: 174 MG/DL (ref 65–117)
GLUCOSE BLD STRIP.AUTO-MCNC: 241 MG/DL (ref 65–117)
GLUCOSE SERPL-MCNC: 252 MG/DL (ref 65–100)
HCT VFR BLD AUTO: 29.4 % (ref 35–47)
HGB BLD-MCNC: 9.5 G/DL (ref 11.5–16)
IMM GRANULOCYTES # BLD AUTO: 0.3 K/UL (ref 0–0.04)
IMM GRANULOCYTES NFR BLD AUTO: 1 % (ref 0–0.5)
LYMPHOCYTES # BLD: 0.3 K/UL (ref 0.8–3.5)
LYMPHOCYTES NFR BLD: 1 % (ref 12–49)
MCH RBC QN AUTO: 30.6 PG (ref 26–34)
MCHC RBC AUTO-ENTMCNC: 32.3 G/DL (ref 30–36.5)
MCV RBC AUTO: 94.8 FL (ref 80–99)
MONOCYTES # BLD: 0 K/UL (ref 0–1)
MONOCYTES NFR BLD: 0 % (ref 5–13)
NEUTS SEG # BLD: 25.2 K/UL (ref 1.8–8)
NEUTS SEG NFR BLD: 98 % (ref 32–75)
NRBC # BLD: 0 K/UL (ref 0–0.01)
NRBC BLD-RTO: 0 PER 100 WBC
PLATELET # BLD AUTO: 261 K/UL (ref 150–400)
PMV BLD AUTO: 11 FL (ref 8.9–12.9)
POTASSIUM SERPL-SCNC: 4.5 MMOL/L (ref 3.5–5.1)
PROT SERPL-MCNC: 6.5 G/DL (ref 6.4–8.2)
RBC # BLD AUTO: 3.1 M/UL (ref 3.8–5.2)
RBC MORPH BLD: ABNORMAL
SERVICE CMNT-IMP: ABNORMAL
SERVICE CMNT-IMP: ABNORMAL
SODIUM SERPL-SCNC: 136 MMOL/L (ref 136–145)
WBC # BLD AUTO: 25.8 K/UL (ref 3.6–11)

## 2024-10-17 PROCEDURE — 80053 COMPREHEN METABOLIC PANEL: CPT

## 2024-10-17 PROCEDURE — 73590 X-RAY EXAM OF LOWER LEG: CPT

## 2024-10-17 PROCEDURE — 93005 ELECTROCARDIOGRAM TRACING: CPT

## 2024-10-17 PROCEDURE — 6370000000 HC RX 637 (ALT 250 FOR IP)

## 2024-10-17 PROCEDURE — 36415 COLL VENOUS BLD VENIPUNCTURE: CPT

## 2024-10-17 PROCEDURE — 85025 COMPLETE CBC W/AUTO DIFF WBC: CPT

## 2024-10-17 PROCEDURE — 2580000003 HC RX 258

## 2024-10-17 PROCEDURE — 82962 GLUCOSE BLOOD TEST: CPT

## 2024-10-17 PROCEDURE — 94761 N-INVAS EAR/PLS OXIMETRY MLT: CPT

## 2024-10-17 PROCEDURE — 73610 X-RAY EXAM OF ANKLE: CPT

## 2024-10-17 PROCEDURE — 99285 EMERGENCY DEPT VISIT HI MDM: CPT

## 2024-10-17 PROCEDURE — APPNB15 APP NON BILLABLE TIME 0-15 MINS: Performed by: NURSE PRACTITIONER

## 2024-10-17 PROCEDURE — 2500000003 HC RX 250 WO HCPCS

## 2024-10-17 PROCEDURE — 96374 THER/PROPH/DIAG INJ IV PUSH: CPT

## 2024-10-17 PROCEDURE — 1100000000 HC RM PRIVATE

## 2024-10-17 PROCEDURE — 29515 APPLICATION SHORT LEG SPLINT: CPT

## 2024-10-17 RX ORDER — SODIUM CHLORIDE 0.9 % (FLUSH) 0.9 %
5-40 SYRINGE (ML) INJECTION EVERY 12 HOURS SCHEDULED
Status: DISCONTINUED | OUTPATIENT
Start: 2024-10-17 | End: 2024-10-19 | Stop reason: HOSPADM

## 2024-10-17 RX ORDER — CLOPIDOGREL BISULFATE 75 MG/1
75 TABLET ORAL DAILY
Status: DISCONTINUED | OUTPATIENT
Start: 2024-10-18 | End: 2024-10-19 | Stop reason: HOSPADM

## 2024-10-17 RX ORDER — ASPIRIN 81 MG/1
81 TABLET, CHEWABLE ORAL DAILY
Status: DISCONTINUED | OUTPATIENT
Start: 2024-10-18 | End: 2024-10-19 | Stop reason: HOSPADM

## 2024-10-17 RX ORDER — OXYCODONE HYDROCHLORIDE 5 MG/1
5 TABLET ORAL EVERY 6 HOURS PRN
Status: ON HOLD | COMMUNITY
End: 2024-10-19 | Stop reason: HOSPADM

## 2024-10-17 RX ORDER — ALBUTEROL SULFATE 0.83 MG/ML
2.5 SOLUTION RESPIRATORY (INHALATION) EVERY 6 HOURS PRN
Status: DISCONTINUED | OUTPATIENT
Start: 2024-10-17 | End: 2024-10-19 | Stop reason: HOSPADM

## 2024-10-17 RX ORDER — SODIUM CHLORIDE 0.9 % (FLUSH) 0.9 %
5-40 SYRINGE (ML) INJECTION PRN
Status: DISCONTINUED | OUTPATIENT
Start: 2024-10-17 | End: 2024-10-19 | Stop reason: HOSPADM

## 2024-10-17 RX ORDER — POLYETHYLENE GLYCOL 3350 17 G/17G
17 POWDER, FOR SOLUTION ORAL DAILY
Status: DISCONTINUED | OUTPATIENT
Start: 2024-10-17 | End: 2024-10-19 | Stop reason: HOSPADM

## 2024-10-17 RX ORDER — LISINOPRIL 20 MG/1
20 TABLET ORAL DAILY
Status: DISCONTINUED | OUTPATIENT
Start: 2024-10-18 | End: 2024-10-19 | Stop reason: HOSPADM

## 2024-10-17 RX ORDER — ACETAMINOPHEN 325 MG/1
650 TABLET ORAL EVERY 6 HOURS SCHEDULED
Status: DISCONTINUED | OUTPATIENT
Start: 2024-10-17 | End: 2024-10-17

## 2024-10-17 RX ORDER — METOPROLOL SUCCINATE 50 MG/1
100 TABLET, EXTENDED RELEASE ORAL DAILY
Status: DISCONTINUED | OUTPATIENT
Start: 2024-10-18 | End: 2024-10-19 | Stop reason: HOSPADM

## 2024-10-17 RX ORDER — POLYETHYLENE GLYCOL 3350 17 G/17G
17 POWDER, FOR SOLUTION ORAL DAILY PRN
Status: DISCONTINUED | OUTPATIENT
Start: 2024-10-17 | End: 2024-10-17

## 2024-10-17 RX ORDER — ACETAMINOPHEN 325 MG/1
650 TABLET ORAL EVERY 6 HOURS PRN
Status: DISCONTINUED | OUTPATIENT
Start: 2024-10-17 | End: 2024-10-17

## 2024-10-17 RX ORDER — ONDANSETRON 4 MG/1
4 TABLET, ORALLY DISINTEGRATING ORAL EVERY 8 HOURS PRN
Status: DISCONTINUED | OUTPATIENT
Start: 2024-10-17 | End: 2024-10-19 | Stop reason: HOSPADM

## 2024-10-17 RX ORDER — DEXTROSE MONOHYDRATE 100 MG/ML
INJECTION, SOLUTION INTRAVENOUS CONTINUOUS PRN
Status: DISCONTINUED | OUTPATIENT
Start: 2024-10-17 | End: 2024-10-19 | Stop reason: HOSPADM

## 2024-10-17 RX ORDER — ROSUVASTATIN CALCIUM 10 MG/1
10 TABLET, COATED ORAL NIGHTLY
Status: DISCONTINUED | OUTPATIENT
Start: 2024-10-17 | End: 2024-10-19 | Stop reason: HOSPADM

## 2024-10-17 RX ORDER — ACETAMINOPHEN 650 MG/1
650 SUPPOSITORY RECTAL EVERY 6 HOURS PRN
Status: DISCONTINUED | OUTPATIENT
Start: 2024-10-17 | End: 2024-10-17

## 2024-10-17 RX ORDER — ONDANSETRON 2 MG/ML
4 INJECTION INTRAMUSCULAR; INTRAVENOUS EVERY 6 HOURS PRN
Status: DISCONTINUED | OUTPATIENT
Start: 2024-10-17 | End: 2024-10-19 | Stop reason: HOSPADM

## 2024-10-17 RX ORDER — MORPHINE SULFATE 2 MG/ML
2 INJECTION, SOLUTION INTRAMUSCULAR; INTRAVENOUS
Status: COMPLETED | OUTPATIENT
Start: 2024-10-17 | End: 2024-10-17

## 2024-10-17 RX ORDER — ISOSORBIDE MONONITRATE 30 MG/1
30 TABLET, EXTENDED RELEASE ORAL DAILY
Status: DISCONTINUED | OUTPATIENT
Start: 2024-10-18 | End: 2024-10-19 | Stop reason: HOSPADM

## 2024-10-17 RX ORDER — INSULIN LISPRO 100 [IU]/ML
0-8 INJECTION, SOLUTION INTRAVENOUS; SUBCUTANEOUS
Status: DISCONTINUED | OUTPATIENT
Start: 2024-10-17 | End: 2024-10-19 | Stop reason: HOSPADM

## 2024-10-17 RX ORDER — EZETIMIBE 10 MG/1
10 TABLET ORAL NIGHTLY
Status: DISCONTINUED | OUTPATIENT
Start: 2024-10-18 | End: 2024-10-19 | Stop reason: HOSPADM

## 2024-10-17 RX ORDER — SENNOSIDES A AND B 8.6 MG/1
1 TABLET, FILM COATED ORAL NIGHTLY
Status: DISCONTINUED | OUTPATIENT
Start: 2024-10-17 | End: 2024-10-19 | Stop reason: HOSPADM

## 2024-10-17 RX ORDER — LIDOCAINE/PRILOCAINE 2.5 %-2.5%
CREAM (GRAM) TOPICAL PRN
Status: DISCONTINUED | OUTPATIENT
Start: 2024-10-17 | End: 2024-10-19 | Stop reason: HOSPADM

## 2024-10-17 RX ORDER — ENOXAPARIN SODIUM 100 MG/ML
40 INJECTION SUBCUTANEOUS DAILY
Status: DISCONTINUED | OUTPATIENT
Start: 2024-10-18 | End: 2024-10-19 | Stop reason: HOSPADM

## 2024-10-17 RX ORDER — MORPHINE SULFATE 2 MG/ML
2 INJECTION, SOLUTION INTRAMUSCULAR; INTRAVENOUS EVERY 4 HOURS PRN
Status: DISCONTINUED | OUTPATIENT
Start: 2024-10-17 | End: 2024-10-18

## 2024-10-17 RX ORDER — FLUCONAZOLE 100 MG/1
150 TABLET ORAL
Status: DISCONTINUED | OUTPATIENT
Start: 2024-10-17 | End: 2024-10-19 | Stop reason: HOSPADM

## 2024-10-17 RX ORDER — ACETAMINOPHEN 500 MG
1000 TABLET ORAL EVERY 8 HOURS SCHEDULED
Status: DISCONTINUED | OUTPATIENT
Start: 2024-10-17 | End: 2024-10-19 | Stop reason: HOSPADM

## 2024-10-17 RX ORDER — ACETAMINOPHEN 650 MG/1
650 SUPPOSITORY RECTAL EVERY 6 HOURS SCHEDULED
Status: DISCONTINUED | OUTPATIENT
Start: 2024-10-17 | End: 2024-10-17

## 2024-10-17 RX ORDER — ALBUTEROL SULFATE 90 UG/1
2 INHALANT RESPIRATORY (INHALATION) EVERY 6 HOURS PRN
Status: DISCONTINUED | OUTPATIENT
Start: 2024-10-17 | End: 2024-10-17

## 2024-10-17 RX ORDER — MORPHINE SULFATE 2 MG/ML
1 INJECTION, SOLUTION INTRAMUSCULAR; INTRAVENOUS EVERY 4 HOURS PRN
Status: DISCONTINUED | OUTPATIENT
Start: 2024-10-17 | End: 2024-10-18

## 2024-10-17 RX ORDER — SODIUM CHLORIDE 9 MG/ML
INJECTION, SOLUTION INTRAVENOUS PRN
Status: DISCONTINUED | OUTPATIENT
Start: 2024-10-17 | End: 2024-10-19 | Stop reason: HOSPADM

## 2024-10-17 RX ORDER — INSULIN GLARGINE 100 [IU]/ML
48 INJECTION, SOLUTION SUBCUTANEOUS DAILY
Status: DISCONTINUED | OUTPATIENT
Start: 2024-10-17 | End: 2024-10-18

## 2024-10-17 RX ADMIN — MORPHINE SULFATE 2 MG: 2 INJECTION, SOLUTION INTRAMUSCULAR; INTRAVENOUS at 17:19

## 2024-10-17 RX ADMIN — SODIUM CHLORIDE, PRESERVATIVE FREE 10 ML: 5 INJECTION INTRAVENOUS at 20:15

## 2024-10-17 RX ADMIN — FLUCONAZOLE 150 MG: 100 TABLET ORAL at 18:47

## 2024-10-17 RX ADMIN — SENNOSIDES 8.6 MG: 8.6 TABLET, FILM COATED ORAL at 20:12

## 2024-10-17 RX ADMIN — INSULIN GLARGINE 48 UNITS: 100 INJECTION, SOLUTION SUBCUTANEOUS at 19:16

## 2024-10-17 RX ADMIN — MORPHINE SULFATE 2 MG: 2 INJECTION, SOLUTION INTRAMUSCULAR; INTRAVENOUS at 13:04

## 2024-10-17 RX ADMIN — ACETAMINOPHEN 650 MG: 325 TABLET ORAL at 18:47

## 2024-10-17 RX ADMIN — POLYETHYLENE GLYCOL 3350 17 G: 17 POWDER, FOR SOLUTION ORAL at 18:48

## 2024-10-17 RX ADMIN — MORPHINE SULFATE 2 MG: 2 INJECTION, SOLUTION INTRAMUSCULAR; INTRAVENOUS at 21:03

## 2024-10-17 RX ADMIN — INSULIN LISPRO 2 UNITS: 100 INJECTION, SOLUTION INTRAVENOUS; SUBCUTANEOUS at 21:40

## 2024-10-17 RX ADMIN — ROSUVASTATIN CALCIUM 10 MG: 10 TABLET, FILM COATED ORAL at 20:12

## 2024-10-17 RX ADMIN — ACETAMINOPHEN 1000 MG: 500 TABLET ORAL at 23:30

## 2024-10-17 ASSESSMENT — ENCOUNTER SYMPTOMS
ABDOMINAL DISTENTION: 0
VOMITING: 0
ABDOMINAL PAIN: 0
SHORTNESS OF BREATH: 0
NAUSEA: 0

## 2024-10-17 ASSESSMENT — PAIN DESCRIPTION - LOCATION
LOCATION: ANKLE
LOCATION: LEG

## 2024-10-17 ASSESSMENT — PAIN SCALES - GENERAL
PAINLEVEL_OUTOF10: 8
PAINLEVEL_OUTOF10: 10
PAINLEVEL_OUTOF10: 5
PAINLEVEL_OUTOF10: 7
PAINLEVEL_OUTOF10: 8

## 2024-10-17 ASSESSMENT — PAIN DESCRIPTION - ORIENTATION
ORIENTATION: RIGHT
ORIENTATION: RIGHT

## 2024-10-17 ASSESSMENT — PAIN - FUNCTIONAL ASSESSMENT: PAIN_FUNCTIONAL_ASSESSMENT: 0-10

## 2024-10-17 ASSESSMENT — PAIN DESCRIPTION - DESCRIPTORS
DESCRIPTORS: THROBBING
DESCRIPTORS: DISCOMFORT

## 2024-10-17 NOTE — ED TRIAGE NOTES
Patient arrives with c/o right ankle pain. Per EMS, patient was ambulating up 4 steps in her house and became weak causing her to trip and roll right ankle.     Denies hitting head, no LOC.    Patient reportedly receives chemotherapy and had last treatment on Tuesday. States that she suffers from intermittent generalized weakness for days following treatment.     Patient is A&O x 4.

## 2024-10-17 NOTE — PLAN OF CARE
Problem: Chronic Conditions and Co-morbidities  Goal: Patient's chronic conditions and co-morbidity symptoms are monitored and maintained or improved  Outcome: Progressing     Problem: Skin/Tissue Integrity  Goal: Absence of new skin breakdown  Description: 1.  Monitor for areas of redness and/or skin breakdown  2.  Assess vascular access sites hourly  3.  Every 4-6 hours minimum:  Change oxygen saturation probe site  4.  Every 4-6 hours:  If on nasal continuous positive airway pressure, respiratory therapy assess nares and determine need for appliance change or resting period.  Outcome: Progressing     Problem: Safety - Adult  Goal: Free from fall injury  Outcome: Progressing     Problem: Discharge Planning  Goal: Discharge to home or other facility with appropriate resources  Outcome: Progressing

## 2024-10-17 NOTE — H&P
PLACEMENT      CT NEEDLE BIOPSY LUNG PERCUTANEOUS  2023    CT NEEDLE BIOPSY LUNG PERCUTANEOUS 3/14/2023 [facility] RAD CT    IR PORT PLACEMENT EQUAL OR GREATER THAN 5 YEARS  2023    IR PORT PLACEMENT EQUAL OR GREATER THAN 5 YEARS 2023 [facility] CATH/EP[facility]IR [facility]    LUNG REMOVAL, PARTIAL Left 2023    [facility] - Left Upper Lobe, Thoracoscopic    ORTHOPEDIC SURGERY      Lumbar disc surgery    OTHER SURGICAL HISTORY  2023    Thoracic lung surgery    TUBAL LIGATION         Family History  Family History   Problem Relation Age of Onset    Hypertension Mother          56yo    Coronary Art Dis Mother     Diabetes Sister     Breast Cancer Sister     Diabetes Sister     Lung Cancer Sister     Diabetes Sister     Diabetes Sister         Social History  Social History     Socioeconomic History    Marital status: Single     Spouse name: Not on file    Number of children: 3    Years of education: 12    Highest education level: Not on file   Occupational History    Not on file   Tobacco Use    Smoking status: Former     Current packs/day: 0.00     Types: Cigarettes     Quit date: 2004     Years since quittin.8     Passive exposure: Past    Smokeless tobacco: Never   Vaping Use    Vaping status: Never Used   Substance and Sexual Activity    Alcohol use: No    Drug use: No    Sexual activity: Not Currently   Other Topics Concern    Not on file   Social History Narrative    Not on file     Social Determinants of Health     Financial Resource Strain: Low Risk  (2023)    Received from [facility]    Overall Financial Resource Strain (CARDIA)     Difficulty of Paying Living Expenses: Not hard at all   Food Insecurity: Not on file (2024)   Transportation Needs: No Transportation Needs (2023)    Received from [facility]    PRAPARE - Transportation     Lack of Transportation (Medical): No     Lack of Transportation (Non-Medical): No   Physical Activity: Not on file

## 2024-10-17 NOTE — ED PROVIDER NOTES
[facility]  EMERGENCY DEPARTMENT ENCOUNTER      Pt Name: Elise Tabares  MRN: [id number]  Birthdate 1945  Date of evaluation: 10/17/2024  Provider: Lennox Hale DO    CHIEF COMPLAINT       Chief Complaint   Patient presents with    Ankle Pain         HISTORY OF PRESENT ILLNESS   (Location/Symptom, Timing/Onset, Context/Setting, Quality, Duration, Modifying Factors, Severity)  Note limiting factors.   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     Patient reports that last night she was going up the stairs in her home when she became very weak and rolled her right ankle at the top step last night around 10 PM. She has a history of left sided lung cancer for which she undergoes chemotherapy; date of last treatment was on Tuesday, and she notes she usually does become weak after treatments. She denies hitting her head or loss of consciousness. She does take plavix 75 mg daily which she last took around 10:30 PM last night. She tried Tylenol for pain which was minimally helpful. Currently rates pain 10/10 located more in the lateral right ankle. Denies any chest pain, shortness of breath, abdominal pain, fevers/chills at this time.    Review of External Medical Records:     Nursing Notes were reviewed.    REVIEW OF SYSTEMS    (2-9 systems for level 4, 10 or more for level 5)     Review of Systems   Constitutional:  Negative for chills and fever.   Respiratory:  Negative for shortness of breath.    Cardiovascular:  Negative for chest pain and leg swelling.   Gastrointestinal:  Negative for abdominal pain.   Musculoskeletal:  Positive for arthralgias and joint swelling.       Except as noted above the remainder of the review of systems was reviewed and negative.       PAST MEDICAL HISTORY     Past Medical History:   Diagnosis Date    Arthritis 1993         CONTINUE these medications which have NOT CHANGED    Details   oxyCODONE (ROXICODONE) 5 MG immediate release tablet Take 1 tablet by mouth every 6 hours as needed for Pain. Max Daily Amount: 20 mg      fluconazole (DIFLUCAN) 150 MG tablet Take 1 tablet by mouth every 72 hours for 6 days  Qty: 2 tablet, Refills: 0    Associated Diagnoses: Vaginal yeast infection      polyethylene glycol (GLYCOLAX) 17 GM/SCOOP powder Take 17 g by mouth in the morning, at noon, and at bedtime  Qty: 1530 g, Refills: 5    Associated Diagnoses: Drug-induced constipation      desloratadine (CLARINEX) 5 MG tablet Take 1 tablet by mouth daily  Qty: 90 tablet, Refills: 3    Associated Diagnoses: Squamous cell carcinoma of lung, left (HCC)      gabapentin (NEURONTIN) 300 MG capsule Take 1 capsule by mouth nightly as needed (numbness/tingling) for up to 30 days. Max Daily Amount: 300 mg  Qty: 30 capsule, Refills: 0    Associated Diagnoses: Type 2 diabetes mellitus with diabetic neuropathy, with long-term current use of insulin ([facility])      !! clopidogrel (PLAVIX) 75 MG tablet Take 1 tablet by mouth daily NEEDS APPOINTMENT TO CONTINUE REFILLS  Qty: 90 tablet, Refills: 1      !! clopidogrel (PLAVIX) 75 MG tablet Take 1 tablet by mouth daily      naloxone (NARCAN) 4 MG/0.1ML LIQD nasal spray 1 spray by Nasal route as needed for Opioid Reversal  Qty: 1 each, Refills: 0    Associated Diagnoses: Cancer associated pain; Uncomplicated opioid use      mirtazapine (REMERON) 7.5 MG tablet Take 1 tablet by mouth nightly  Qty: 90 tablet, Refills: 0    Associated Diagnoses: Poor appetite      tiZANidine (ZANAFLEX) 2 MG tablet Take 1 tablet by mouth every 6 hours as needed (muscle pain)  Qty: 30 tablet, Refills: 0    Associated Diagnoses: Myalgia      lisinopril (PRINIVIL;ZESTRIL) 20 MG tablet Take 1 tablet by mouth daily  Qty: 90 tablet, Refills: 3    Associated Diagnoses: Essential (primary) hypertension      metoprolol succinate (TOPROL XL) 100 MG

## 2024-10-17 NOTE — CARE COORDINATION
10/17/24  4:13 PM    Case Management Assessment  Initial Evaluation    Date/Time of Evaluation: 10/17/2024 4:13 PM  Assessment Completed by: Dinora Lancaster    If patient is discharged prior to next notation, then this note serves as note for discharge by case management.    Patient Name: Elise Tabares                   YOB: 1945  Diagnosis: Nondisplaced fracture of lateral malleolus of left fibula, initial encounter for closed fracture [S82.65XA]                   Date / Time: 10/17/2024 11:41 AM    Patient Admission Status: Inpatient   Readmission Risk (Low < 19, Mod (19-27), High > 27): Readmission Risk Score: 19.6    Current PCP: Gerber Mayo MD  PCP verified by CM? (P) Yes (Dr Gerber Mayo)    Chart Reviewed: Yes      History Provided by: (P) Patient, Medical Record  Patient Orientation: (P) Alert and Oriented    Patient Cognition: (P) Alert    Hospitalization in the last 30 days (Readmission):  No    If yes, Readmission Assessment in CM Navigator will be completed.    Advance Directives:      Code Status: Full Code   Patient's Primary Decision Maker is: (P) Named in Scanned ACP Document      Discharge Planning:    Patient lives with: (P) Children Type of Home: (P) House  Primary Care Giver: (P) Self  Patient Support Systems include: (P) Children, Family Members, Friends/Neighbors   Current Financial resources: (P) Medicare, Other (Comment) (bcbs secondary)  Current community resources: (P) None  Current services prior to admission: (P) Durable Medical Equipment            Current DME: (P) Cane            Type of Home Care services:  (P) None    ADLS  Prior functional level: (P) Independent in ADLs/IADLs  Current functional level: (P) Independent in ADLs/IADLs    PT AM-PAC:   /24  OT AM-PAC:   /24    Family can provide assistance at DC: (P) Yes  Would you like Case Management to discuss the discharge plan with any other family members/significant others, and if so, who? (P) No  Plans to  fibula, initial encounter for closed fracture [S82.65XA]    IF APPLICABLE: The Patient and/or patient representative Elise and her family were provided with a choice of provider and agrees with the discharge plan. Freedom of choice list with basic dialogue that supports the patient's individualized plan of care/goals and shares the quality data associated with the providers was provided to: (P) Patient   Patient Representative Name:       The Patient and/or Patient Representative Agree with the Discharge Plan? (P) Yes       10/17/24 160   Service Assessment   Patient Orientation Alert and Oriented   Cognition Alert   History Provided By Patient;Medical Record   Primary Caregiver Self   Support Systems Children;Family Members;Friends/Neighbors   Patient's Healthcare Decision Maker is: Named in Scanned ACP Document   PCP Verified by CM Yes  (Dr Gerber Mayo)   Last Visit to PCP Within last 6 months   Prior Functional Level Independent in ADLs/IADLs   Current Functional Level Independent in ADLs/IADLs   Can patient return to prior living arrangement Yes   Ability to make needs known: Good   Family able to assist with home care needs: Yes   Would you like for me to discuss the discharge plan with any other family members/significant others, and if so, who? No   Financial Resources Medicare;Other (Comment)  (bcbs secondary)   Community Resources None   Social/Functional History   Lives With Daughter   Type of Home House   Home Layout One level   Bathroom Shower/Tub None   Bathroom Toilet Standard   Bathroom Equipment None   Bathroom Accessibility Accessible   Home Equipment Cane   ADL Assistance Independent   Homemaking Assistance Independent   Homemaking Responsibilities No   Ambulation Assistance Independent   Transfer Assistance Independent   Active  Yes   Mode of Transportation Car   Occupation Retired   Discharge Planning   Type of Residence House   Living Arrangements Children   Current Services Prior

## 2024-10-17 NOTE — ED NOTES
TRANSFER - OUT REPORT:    Verbal report given to Michelle SORIA on Elise Tabares  being transferred to [facility] for routine progression of patient care       Report consisted of patient's Situation, Background, Assessment and   Recommendations(SBAR).     Information from the following report(s) Nurse Handoff Report, ED Encounter Summary, ED SBAR, Adult Overview, Intake/Output, MAR, and Recent Results was reviewed with the receiving nurse.    [place] Fall Assessment:    Presents to emergency department  because of falls (Syncope, seizure, or loss of consciousness): Yes  Age > 70: Yes  Altered Mental Status, Intoxication with alcohol or substance confusion (Disorientation, impaired judgment, poor safety awaremess, or inability to follow instructions): No  Impaired Mobility: Ambulates or transfers with assistive devices or assistance; Unable to ambulate or transer.: Yes             Lines:   Implantable Port Right Chest (Active)       Peripheral IV 10/17/24 Right Antecubital (Active)        Opportunity for questions and clarification was provided.      Patient transported with:  Tech

## 2024-10-18 DIAGNOSIS — J43.9 PULMONARY EMPHYSEMA, UNSPECIFIED EMPHYSEMA TYPE (HCC): Chronic | ICD-10-CM

## 2024-10-18 DIAGNOSIS — I25.118 CORONARY ARTERY DISEASE INVOLVING NATIVE CORONARY ARTERY OF NATIVE HEART WITH OTHER FORM OF ANGINA PECTORIS (HCC): Primary | Chronic | ICD-10-CM

## 2024-10-18 DIAGNOSIS — N18.31 TYPE 2 DIABETES MELLITUS WITH STAGE 3A CHRONIC KIDNEY DISEASE, WITH LONG-TERM CURRENT USE OF INSULIN (HCC): Chronic | ICD-10-CM

## 2024-10-18 DIAGNOSIS — S82.65XA NONDISPLACED FRACTURE OF LATERAL MALLEOLUS OF LEFT FIBULA, INITIAL ENCOUNTER FOR CLOSED FRACTURE: ICD-10-CM

## 2024-10-18 DIAGNOSIS — Z79.4 TYPE 2 DIABETES MELLITUS WITH STAGE 3A CHRONIC KIDNEY DISEASE, WITH LONG-TERM CURRENT USE OF INSULIN (HCC): Chronic | ICD-10-CM

## 2024-10-18 DIAGNOSIS — D63.1 ANEMIA DUE TO STAGE 3 CHRONIC KIDNEY DISEASE, UNSPECIFIED WHETHER STAGE 3A OR 3B CKD (HCC): ICD-10-CM

## 2024-10-18 DIAGNOSIS — N18.30 ANEMIA DUE TO STAGE 3 CHRONIC KIDNEY DISEASE, UNSPECIFIED WHETHER STAGE 3A OR 3B CKD (HCC): ICD-10-CM

## 2024-10-18 DIAGNOSIS — E11.22 TYPE 2 DIABETES MELLITUS WITH STAGE 3A CHRONIC KIDNEY DISEASE, WITH LONG-TERM CURRENT USE OF INSULIN (HCC): Chronic | ICD-10-CM

## 2024-10-18 DIAGNOSIS — C34.92 MALIGNANT NEOPLASM OF UNSPECIFIED PART OF LEFT BRONCHUS OR LUNG (HCC): ICD-10-CM

## 2024-10-18 DIAGNOSIS — I10 PRIMARY HYPERTENSION: Chronic | ICD-10-CM

## 2024-10-18 DIAGNOSIS — E78.2 MIXED HYPERLIPIDEMIA: Chronic | ICD-10-CM

## 2024-10-18 DIAGNOSIS — N18.31 STAGE 3A CHRONIC KIDNEY DISEASE (HCC): Chronic | ICD-10-CM

## 2024-10-18 PROBLEM — S82.64XA CLOSED NONDISPLACED FRACTURE OF LATERAL MALLEOLUS OF RIGHT FIBULA: Status: ACTIVE | Noted: 2024-10-18

## 2024-10-18 LAB
ALBUMIN SERPL-MCNC: 2.7 G/DL (ref 3.5–5)
ALBUMIN/GLOB SERPL: 0.9 (ref 1.1–2.2)
ALP SERPL-CCNC: 112 U/L (ref 45–117)
ALT SERPL-CCNC: 16 U/L (ref 12–78)
ANION GAP SERPL CALC-SCNC: 5 MMOL/L (ref 2–12)
AST SERPL-CCNC: 14 U/L (ref 15–37)
BASOPHILS # BLD: 0 K/UL (ref 0–0.1)
BASOPHILS NFR BLD: 0 % (ref 0–1)
BILIRUB SERPL-MCNC: 0.6 MG/DL (ref 0.2–1)
BUN SERPL-MCNC: 43 MG/DL (ref 6–20)
BUN/CREAT SERPL: 29 (ref 12–20)
CALCIUM SERPL-MCNC: 8.3 MG/DL (ref 8.5–10.1)
CHLORIDE SERPL-SCNC: 110 MMOL/L (ref 97–108)
CO2 SERPL-SCNC: 23 MMOL/L (ref 21–32)
COMMENT:: NORMAL
CREAT SERPL-MCNC: 1.48 MG/DL (ref 0.55–1.02)
DIFFERENTIAL METHOD BLD: ABNORMAL
EOSINOPHIL # BLD: 0.2 K/UL (ref 0–0.4)
EOSINOPHIL NFR BLD: 1 % (ref 0–7)
ERYTHROCYTE [DISTWIDTH] IN BLOOD BY AUTOMATED COUNT: 17.2 % (ref 11.5–14.5)
GLOBULIN SER CALC-MCNC: 3.1 G/DL (ref 2–4)
GLUCOSE BLD STRIP.AUTO-MCNC: 143 MG/DL (ref 65–117)
GLUCOSE BLD STRIP.AUTO-MCNC: 156 MG/DL (ref 65–117)
GLUCOSE BLD STRIP.AUTO-MCNC: 340 MG/DL (ref 65–117)
GLUCOSE BLD STRIP.AUTO-MCNC: 377 MG/DL (ref 65–117)
GLUCOSE SERPL-MCNC: 146 MG/DL (ref 65–100)
HCT VFR BLD AUTO: 26.4 % (ref 35–47)
HGB BLD-MCNC: 8.4 G/DL (ref 11.5–16)
IMM GRANULOCYTES # BLD AUTO: 0 K/UL
IMM GRANULOCYTES NFR BLD AUTO: 0 %
LYMPHOCYTES # BLD: 1 K/UL (ref 0.8–3.5)
LYMPHOCYTES NFR BLD: 5 % (ref 12–49)
MCH RBC QN AUTO: 30.7 PG (ref 26–34)
MCHC RBC AUTO-ENTMCNC: 31.8 G/DL (ref 30–36.5)
MCV RBC AUTO: 96.4 FL (ref 80–99)
MONOCYTES # BLD: 0 K/UL (ref 0–1)
MONOCYTES NFR BLD: 0 % (ref 5–13)
NEUTS BAND NFR BLD MANUAL: 4 % (ref 0–6)
NEUTS SEG # BLD: 19.3 K/UL (ref 1.8–8)
NEUTS SEG NFR BLD: 90 % (ref 32–75)
NRBC # BLD: 0 K/UL (ref 0–0.01)
NRBC BLD-RTO: 0 PER 100 WBC
PLATELET # BLD AUTO: 211 K/UL (ref 150–400)
PMV BLD AUTO: 11.1 FL (ref 8.9–12.9)
POTASSIUM SERPL-SCNC: 4.1 MMOL/L (ref 3.5–5.1)
PROT SERPL-MCNC: 5.8 G/DL (ref 6.4–8.2)
RBC # BLD AUTO: 2.74 M/UL (ref 3.8–5.2)
RBC MORPH BLD: ABNORMAL
SERVICE CMNT-IMP: ABNORMAL
SODIUM SERPL-SCNC: 138 MMOL/L (ref 136–145)
SPECIMEN HOLD: NORMAL
WBC # BLD AUTO: 20.5 K/UL (ref 3.6–11)

## 2024-10-18 PROCEDURE — 6370000000 HC RX 637 (ALT 250 FOR IP)

## 2024-10-18 PROCEDURE — 2500000003 HC RX 250 WO HCPCS

## 2024-10-18 PROCEDURE — 97116 GAIT TRAINING THERAPY: CPT

## 2024-10-18 PROCEDURE — 2580000003 HC RX 258

## 2024-10-18 PROCEDURE — 97535 SELF CARE MNGMENT TRAINING: CPT | Performed by: OCCUPATIONAL THERAPIST

## 2024-10-18 PROCEDURE — 6360000002 HC RX W HCPCS: Performed by: FAMILY MEDICINE

## 2024-10-18 PROCEDURE — 1100000000 HC RM PRIVATE

## 2024-10-18 PROCEDURE — 6360000002 HC RX W HCPCS

## 2024-10-18 PROCEDURE — 97165 OT EVAL LOW COMPLEX 30 MIN: CPT | Performed by: OCCUPATIONAL THERAPIST

## 2024-10-18 PROCEDURE — 85025 COMPLETE CBC W/AUTO DIFF WBC: CPT

## 2024-10-18 PROCEDURE — P9047 ALBUMIN (HUMAN), 25%, 50ML: HCPCS | Performed by: FAMILY MEDICINE

## 2024-10-18 PROCEDURE — 94761 N-INVAS EAR/PLS OXIMETRY MLT: CPT

## 2024-10-18 PROCEDURE — 99222 1ST HOSP IP/OBS MODERATE 55: CPT | Performed by: NURSE PRACTITIONER

## 2024-10-18 PROCEDURE — 82962 GLUCOSE BLOOD TEST: CPT

## 2024-10-18 PROCEDURE — 51798 US URINE CAPACITY MEASURE: CPT

## 2024-10-18 PROCEDURE — 36415 COLL VENOUS BLD VENIPUNCTURE: CPT

## 2024-10-18 PROCEDURE — 97161 PT EVAL LOW COMPLEX 20 MIN: CPT

## 2024-10-18 PROCEDURE — 80053 COMPREHEN METABOLIC PANEL: CPT

## 2024-10-18 PROCEDURE — 99233 SBSQ HOSP IP/OBS HIGH 50: CPT | Performed by: STUDENT IN AN ORGANIZED HEALTH CARE EDUCATION/TRAINING PROGRAM

## 2024-10-18 RX ORDER — 0.9 % SODIUM CHLORIDE 0.9 %
500 INTRAVENOUS SOLUTION INTRAVENOUS ONCE
Status: COMPLETED | OUTPATIENT
Start: 2024-10-18 | End: 2024-10-18

## 2024-10-18 RX ORDER — OXYCODONE HYDROCHLORIDE 5 MG/1
2.5 TABLET ORAL EVERY 4 HOURS PRN
Status: DISCONTINUED | OUTPATIENT
Start: 2024-10-18 | End: 2024-10-19

## 2024-10-18 RX ORDER — ALBUMIN (HUMAN) 12.5 G/50ML
12.5 SOLUTION INTRAVENOUS ONCE
Status: COMPLETED | OUTPATIENT
Start: 2024-10-18 | End: 2024-10-18

## 2024-10-18 RX ORDER — DOCUSATE SODIUM 100 MG/1
100 CAPSULE, LIQUID FILLED ORAL DAILY
Status: DISCONTINUED | OUTPATIENT
Start: 2024-10-18 | End: 2024-10-19 | Stop reason: HOSPADM

## 2024-10-18 RX ORDER — INSULIN GLARGINE 100 [IU]/ML
60 INJECTION, SOLUTION SUBCUTANEOUS DAILY
Status: DISCONTINUED | OUTPATIENT
Start: 2024-10-18 | End: 2024-10-19 | Stop reason: HOSPADM

## 2024-10-18 RX ORDER — 0.9 % SODIUM CHLORIDE 0.9 %
1000 INTRAVENOUS SOLUTION INTRAVENOUS ONCE
Status: COMPLETED | OUTPATIENT
Start: 2024-10-18 | End: 2024-10-18

## 2024-10-18 RX ORDER — OXYCODONE HYDROCHLORIDE 5 MG/1
5 TABLET ORAL
Status: COMPLETED | OUTPATIENT
Start: 2024-10-18 | End: 2024-10-18

## 2024-10-18 RX ORDER — OXYCODONE HYDROCHLORIDE 5 MG/1
5 TABLET ORAL EVERY 4 HOURS PRN
Status: DISCONTINUED | OUTPATIENT
Start: 2024-10-18 | End: 2024-10-19

## 2024-10-18 RX ADMIN — ASPIRIN 81 MG: 81 TABLET, CHEWABLE ORAL at 09:09

## 2024-10-18 RX ADMIN — DOCUSATE SODIUM 100 MG: 100 CAPSULE, LIQUID FILLED ORAL at 09:09

## 2024-10-18 RX ADMIN — METOPROLOL SUCCINATE 100 MG: 50 TABLET, EXTENDED RELEASE ORAL at 09:09

## 2024-10-18 RX ADMIN — MORPHINE SULFATE 2 MG: 2 INJECTION, SOLUTION INTRAMUSCULAR; INTRAVENOUS at 13:31

## 2024-10-18 RX ADMIN — ENOXAPARIN SODIUM 40 MG: 100 INJECTION SUBCUTANEOUS at 09:10

## 2024-10-18 RX ADMIN — ACETAMINOPHEN 1000 MG: 500 TABLET ORAL at 16:21

## 2024-10-18 RX ADMIN — LISINOPRIL 20 MG: 20 TABLET ORAL at 09:10

## 2024-10-18 RX ADMIN — ISOSORBIDE MONONITRATE 30 MG: 30 TABLET, EXTENDED RELEASE ORAL at 09:10

## 2024-10-18 RX ADMIN — ACETAMINOPHEN 1000 MG: 500 TABLET ORAL at 05:12

## 2024-10-18 RX ADMIN — MORPHINE SULFATE 1 MG: 2 INJECTION, SOLUTION INTRAMUSCULAR; INTRAVENOUS at 09:27

## 2024-10-18 RX ADMIN — ROSUVASTATIN CALCIUM 10 MG: 10 TABLET, FILM COATED ORAL at 20:19

## 2024-10-18 RX ADMIN — INSULIN LISPRO 8 UNITS: 100 INJECTION, SOLUTION INTRAVENOUS; SUBCUTANEOUS at 17:25

## 2024-10-18 RX ADMIN — EZETIMIBE 10 MG: 10 TABLET ORAL at 20:20

## 2024-10-18 RX ADMIN — INSULIN LISPRO 6 UNITS: 100 INJECTION, SOLUTION INTRAVENOUS; SUBCUTANEOUS at 12:04

## 2024-10-18 RX ADMIN — OXYCODONE 5 MG: 5 TABLET ORAL at 20:20

## 2024-10-18 RX ADMIN — ALBUMIN (HUMAN) 12.5 G: 0.25 INJECTION, SOLUTION INTRAVENOUS at 17:32

## 2024-10-18 RX ADMIN — SODIUM CHLORIDE, PRESERVATIVE FREE 10 ML: 5 INJECTION INTRAVENOUS at 09:11

## 2024-10-18 RX ADMIN — SODIUM CHLORIDE 1000 ML: 9 INJECTION, SOLUTION INTRAVENOUS at 17:16

## 2024-10-18 RX ADMIN — MORPHINE SULFATE 2 MG: 2 INJECTION, SOLUTION INTRAMUSCULAR; INTRAVENOUS at 05:13

## 2024-10-18 RX ADMIN — CLOPIDOGREL BISULFATE 75 MG: 75 TABLET ORAL at 09:09

## 2024-10-18 RX ADMIN — SODIUM CHLORIDE, PRESERVATIVE FREE 10 ML: 5 INJECTION INTRAVENOUS at 20:23

## 2024-10-18 RX ADMIN — SENNOSIDES 8.6 MG: 8.6 TABLET, FILM COATED ORAL at 20:20

## 2024-10-18 RX ADMIN — INSULIN GLARGINE 60 UNITS: 100 INJECTION, SOLUTION SUBCUTANEOUS at 20:22

## 2024-10-18 RX ADMIN — ACETAMINOPHEN 1000 MG: 500 TABLET ORAL at 20:19

## 2024-10-18 RX ADMIN — SODIUM CHLORIDE 500 ML: 9 INJECTION, SOLUTION INTRAVENOUS at 15:59

## 2024-10-18 RX ADMIN — OXYCODONE 5 MG: 5 TABLET ORAL at 02:57

## 2024-10-18 RX ADMIN — MORPHINE SULFATE 2 MG: 2 INJECTION, SOLUTION INTRAMUSCULAR; INTRAVENOUS at 01:12

## 2024-10-18 ASSESSMENT — PAIN SCALES - GENERAL
PAINLEVEL_OUTOF10: 6
PAINLEVEL_OUTOF10: 10
PAINLEVEL_OUTOF10: 2
PAINLEVEL_OUTOF10: 8
PAINLEVEL_OUTOF10: 5
PAINLEVEL_OUTOF10: 5
PAINLEVEL_OUTOF10: 7
PAINLEVEL_OUTOF10: 5
PAINLEVEL_OUTOF10: 7
PAINLEVEL_OUTOF10: 5
PAINLEVEL_OUTOF10: 10
PAINLEVEL_OUTOF10: 8
PAINLEVEL_OUTOF10: 6

## 2024-10-18 ASSESSMENT — PAIN DESCRIPTION - LOCATION
LOCATION: ANKLE

## 2024-10-18 ASSESSMENT — PAIN DESCRIPTION - DESCRIPTORS
DESCRIPTORS: DISCOMFORT
DESCRIPTORS: ACHING
DESCRIPTORS: DISCOMFORT

## 2024-10-18 ASSESSMENT — PAIN DESCRIPTION - ORIENTATION
ORIENTATION: RIGHT
ORIENTATION: LEFT
ORIENTATION: LEFT

## 2024-10-18 NOTE — PROGRESS NOTES
Comprehensive Nutrition Assessment    Type and Reason for Visit: Initial, Positive Nutrition Screen    Nutrition Recommendations/Plan:   Continue 4 carb diet  Adjusted ONS to Glucerna x 1 per day for more appropriate kcal/pro/carbohydrate intake.       Malnutrition Assessment:  Malnutrition Status:  No malnutrition (10/18/24 1333)    Context:  Acute Illness     Findings of the 6 clinical characteristics of malnutrition:  Energy Intake:  Mild decrease in energy intake (Comment)  Weight Loss:  No significant weight loss     Body Fat Loss:  No significant body fat loss     Muscle Mass Loss:  No significant muscle mass loss    Fluid Accumulation:  No significant fluid accumulation     Strength:  Not Performed       Nutrition Assessment:    Past medical hx:       Diagnosis Date    Arthritis 1993    Lower back, also has T-11 compression fracture    CAD (coronary artery disease)     Cerebral artery occlusion with cerebral infarction (HCC)     Evident on head CT, Chronic Rt lacunar infarcts    Chronic back pain 09/08/2010    Chronic kidney disease (CKD)     Stage 3    COPD (chronic obstructive pulmonary disease) ([facility])     DM type 2 (diabetes mellitus, type 2) ([facility]) 05/03/2010    HTN (hypertension) 05/03/2010    Hyperlipidemia     Lung cancer ([facility]) 03/14/2023    Lt upper lobe squamous cell    MI (myocardial infarction) ([facility]) 1997    s/p PCI    Obesity (BMI 30-39.9)     Obstructive sleep apnea (adult) (pediatric) 12/12/2014    Thyroid mass        MST for wt loss received. Pt with 14 lbs (6.6%) over last 3 months, not significant for time frame. This appears to be after some weight gain earlier this year and pt trending back towards UBW. No PO documented currently. Appetite hindered a little by pain yesterday. Reports doing better today. Adjusted ONS for lower Carb content and less frequently. Awaiting placement now. No known food allergies. No chew/swallow difficulties. No BM in 3 days. Monitor GI status for need to  increase bowel regimen.       Current Nutrition Therapies:  ADULT DIET; Regular; 4 carb choices (60 gm/meal)  ADULT ORAL NUTRITION SUPPLEMENT; Breakfast; Diabetic Oral Supplement  Meal Intake:   No data found.  Supplement Intake:  No data found.  Nutrition Support: n/a    Nutritionally Significant Medications:  Scheduled Meds:   docusate sodium  100 mg Oral Daily    sodium chloride flush  5-40 mL IntraVENous 2 times per day    enoxaparin  40 mg SubCUTAneous Daily    senna  1 tablet Oral Nightly    polyethylene glycol  17 g Oral Daily    aspirin  81 mg Oral Daily    clopidogrel  75 mg Oral Daily    ezetimibe  10 mg Oral Nightly    fluconazole  150 mg Oral Q72H    isosorbide mononitrate  30 mg Oral Daily    lisinopril  20 mg Oral Daily    metoprolol succinate  100 mg Oral Daily    rosuvastatin  10 mg Oral Nightly    insulin glargine  48 Units SubCUTAneous Daily    insulin lispro  0-8 Units SubCUTAneous 4x Daily AC & HS    acetaminophen  1,000 mg Oral 3 times per day     Continuous Infusions:   sodium chloride      dextrose       PRN Meds:.sodium chloride flush, sodium chloride, ondansetron **OR** ondansetron, morphine **AND** morphine, lidocaine-prilocaine, albuterol, glucose, dextrose bolus **OR** dextrose bolus, glucagon (rDNA), dextrose      Estimated Daily Nutrient Needs:  Energy Requirements Based On: Formula  Weight Used for Energy Requirements: Current  Energy (kcal/day): 1770 (MSJx1.2x1.1)  Weight Used for Protein Requirements: Current  Protein (g/day): 106 (1.2g/kg)  Method Used for Fluid Requirements: 1 ml/kcal  Fluid (ml/day): 1775 mL    Nutrition Related Findings:   Edema:                      Bowel Movement:  10/15/24    Wounds: Wound Type: None      Anthropometric Measures:  Height: 165.1 cm (5' 5\")  Ideal Body Weight (IBW): 125 lbs (57 kg)       Current Body Weight: 88.5 kg (195 lb 1.7 oz), 156.1 % IBW. Weight Source: Stated  Current BMI (kg/m2): 32.5  Usual Body Weight: 90.7 kg (200 lb)  % Weight

## 2024-10-18 NOTE — PLAN OF CARE
Problem: Chronic Conditions and Co-morbidities  Goal: Patient's chronic conditions and co-morbidity symptoms are monitored and maintained or improved  10/18/2024 1251 by Michelle Houser LPN  Outcome: Progressing  10/18/2024 0337 by Tabatha Morrison RN  Outcome: Progressing     Problem: Skin/Tissue Integrity  Goal: Absence of new skin breakdown  Description: 1.  Monitor for areas of redness and/or skin breakdown  2.  Assess vascular access sites hourly  3.  Every 4-6 hours minimum:  Change oxygen saturation probe site  4.  Every 4-6 hours:  If on nasal continuous positive airway pressure, respiratory therapy assess nares and determine need for appliance change or resting period.  10/18/2024 1251 by Michelle Houser LPN  Outcome: Progressing  10/18/2024 0337 by Tabatha Morrison RN  Outcome: Progressing     Problem: Safety - Adult  Goal: Free from fall injury  10/18/2024 1251 by Michelle Houser LPN  Outcome: Progressing  10/18/2024 0337 by Tabatha Morrison RN  Outcome: Progressing     Problem: Discharge Planning  Goal: Discharge to home or other facility with appropriate resources  10/18/2024 1251 by Michelle Houser LPN  Outcome: Progressing  10/18/2024 0337 by Tabatha Morrison RN  Outcome: Progressing     Problem: Pain  Goal: Verbalizes/displays adequate comfort level or baseline comfort level  10/18/2024 1251 by Michelle Houser LPN  Outcome: Progressing  10/18/2024 0337 by Tabatha Morrison RN  Outcome: Progressing     Problem: Physical Therapy - Adult  Goal: By Discharge: Performs mobility at highest level of function for planned discharge setting.  See evaluation for individualized goals.  Description: FUNCTIONAL STATUS PRIOR TO ADMISSION: Patient was modified independent using a single point cane for functional mobility.    HOME SUPPORT PRIOR TO ADMISSION: The patient lived with daughter.    Physical Therapy Goals  Initiated 10/18/2024  1.  Patient will move from supine to sit and sit to

## 2024-10-18 NOTE — PROGRESS NOTES
fibula, initial encounter for closed fracture  Resolved Problems:    * No resolved hospital problems. *

## 2024-10-18 NOTE — CARE COORDINATION
10/18/2024    2:06 PM  CM received acceptance from [facility]; however, pt needs to be off IV pain meds for 24 hours prior to admission. Ortho and attending notified. Pt provided with update. Auth is not required. Transport need TBD - stretcher vs wheelchair pending pain tolerance and pt condition at DC.     12:36 PM  Care Management Progress Note    Reason for Admission:   Closed nondisplaced fracture of lateral malleolus of right fibula, initial encounter [S82.64XA]  Nondisplaced fracture of lateral malleolus of left fibula, initial encounter for closed fracture [S82.65XA]         Patient Admission Status: Inpatient  Date Admitted to INP: 10/17/24 []NA - OBS/Outpatient  RUR: Readmission Risk Score: 22.1    Hospitalization in the last 30 days (Readmission):  No        Transition of care plan:  Awaiting medical clearance and DC order. Ortho following. Therapy treating. Pt is NWB.   IPR - CM noted therapy's recommendation for IPR level of care. CM met with pt to discuss. Freedom of Choice offered, and preferences indicated as [facility] and [facility]. CM submitted referral via [employer], and awaiting responses.   Date IM given: 10/18/24 []NA  Outpatient follow-up.  Discharge transport: TBD

## 2024-10-18 NOTE — PROGRESS NOTES
Ortho consult:    Full consult to follow. 78 yo female suffered GLF resulting in minimally displaced right distal fibula fx. Pt was unable to maintain NWB status. Pt was splinted in the ER, plan for admission to the family practice service for PT services and possibly rehab. Recommend strict NWB status, strict elevation, ice, pain control. PT/OT services. Pt will need outpatient follow up to Dr. Mcqueen, repeat xrays and determine definitive treatment, with possible ORIF of ankle.   To see in the am.     Sally Swift NP

## 2024-10-18 NOTE — CONSULTS
Social History     Tobacco Use    Smoking status: Former     Current packs/day: 0.00     Types: Cigarettes     Quit date: 2004     Years since quittin.8     Passive exposure: Past    Smokeless tobacco: Never   Substance Use Topics    Alcohol use: No     Past Medical History:   Diagnosis Date    Arthritis     Lower back, also has T-11 compression fracture    CAD (coronary artery disease)     Cerebral artery occlusion with cerebral infarction (HCC)     Evident on head CT, Chronic Rt lacunar infarcts    Chronic back pain 2010    Chronic kidney disease (CKD)     Stage 3    COPD (chronic obstructive pulmonary disease) (HCC)     DM type 2 (diabetes mellitus, type 2) (HCC) 2010    HTN (hypertension) 2010    Hyperlipidemia     Lung cancer (HCC) 2023    Lt upper lobe squamous cell    MI (myocardial infarction) ([facility])     s/p PCI    Obesity (BMI 30-39.9)     Obstructive sleep apnea (adult) (pediatric) 2014    Thyroid mass       Past Surgical History:   Procedure Laterality Date    CARDIAC PROCEDURE N/A 10/05/2023    Left heart cath / coronary angiography performed by Micah Bah DO at [facility] CARDIAC CATH LAB    CARDIAC PROCEDURE N/A 10/05/2023    Insert stent pierre coronary - x1 LAD performed by Micah Bah DO at [facility] CARDIAC CATH LAB    CARDIAC PROCEDURE N/A 10/05/2023    Intravascular ultrasound performed by Micah Bah DO at [facility] CARDIAC CATH LAB    CARDIAC PROCEDURE N/A 10/05/2023    Intravascular lithotripsy coronary performed by Micah Bah DO at [facility] CARDIAC CATH LAB    CATARACT REMOVAL  2021    bilateral     CORONARY ANGIOPLASTY WITH STENT PLACEMENT      PCI, also had PCI  and     CORONARY STENT PLACEMENT      CT NEEDLE BIOPSY LUNG PERCUTANEOUS  2023    CT NEEDLE BIOPSY LUNG PERCUTANEOUS 3/14/2023 [facility] RAD CT    IR PORT PLACEMENT EQUAL OR GREATER THAN 5 YEARS  2023    IR PORT PLACEMENT EQUAL OR GREATER THAN 5 YEARS 2023 [facility] CARDIAC  depressive disorder, recurrent, moderate 09/24/2024    Major depressive disorder, recurrent, unspecified 09/24/2024    Anemia due to stage 3 chronic kidney disease (HCC) 03/06/2024    Emphysema lung (HCC) 03/05/2024    Squamous cell carcinoma of lung, left (HCC) 08/29/2023    Stage 3a chronic kidney disease (HCC) 07/11/2023    Type 2 diabetes mellitus with diabetic neuropathy, with long-term current use of insulin (HCC) 07/11/2023    Malignant neoplasm of unspecified part of left bronchus or lung (HCC) 03/31/2023    Thyroid mass of unclear etiology 03/31/2023    VAIBHAV (acute kidney injury) (HCC) 03/11/2023    Neuropathy 03/02/2023    Type 2 diabetes mellitus with stage 3a chronic kidney disease, with long-term current use of insulin ([facility]) 07/22/2022    Mixed hyperlipidemia 08/13/2021    Obesity 07/20/2018    Stable angina ([facility]) 09/15/2017    Advanced care planning/counseling discussion 04/04/2017    MAX treated with BiPAP 12/12/2014    Chronic back pain 09/08/2010    Primary hypertension 05/03/2010     Principal Problem:    Nondisplaced fracture of lateral malleolus of left fibula, initial encounter for closed fracture  Resolved Problems:    * No resolved hospital problems. *      Plan:   -  R lateral malleolus fracture - continue splint wit NWB to RLE, ice and elevation. Pt to follow up with Dr. Mcqueen next week. Pt cleared for DC from Ortho standpoint once DC plan in place.     Dr. Finn aware and agrees with plan as above.        SHANIQUE Parker - NP  Orthopedic Nurse Practitioner   [facility]

## 2024-10-18 NOTE — PLAN OF CARE
Problem: Chronic Conditions and Co-morbidities  Goal: Patient's chronic conditions and co-morbidity symptoms are monitored and maintained or improved  10/18/2024 0337 by Tabatha Morrison RN  Outcome: Progressing  10/17/2024 1841 by Michelle Houser LPN  Outcome: Progressing     Problem: Skin/Tissue Integrity  Goal: Absence of new skin breakdown  Description: 1.  Monitor for areas of redness and/or skin breakdown  2.  Assess vascular access sites hourly  3.  Every 4-6 hours minimum:  Change oxygen saturation probe site  4.  Every 4-6 hours:  If on nasal continuous positive airway pressure, respiratory therapy assess nares and determine need for appliance change or resting period.  10/18/2024 0337 by Tabatha Morrison RN  Outcome: Progressing  10/17/2024 1841 by Michelle Houser LPN  Outcome: Progressing     Problem: Safety - Adult  Goal: Free from fall injury  10/18/2024 0337 by Tabatha Morrison RN  Outcome: Progressing  10/17/2024 1841 by Michelle Houser LPN  Outcome: Progressing     Problem: Discharge Planning  Goal: Discharge to home or other facility with appropriate resources  10/18/2024 0337 by Tabatha Morrison RN  Outcome: Progressing  10/17/2024 1841 by Michelle Houser LPN  Outcome: Progressing     Problem: Pain  Goal: Verbalizes/displays adequate comfort level or baseline comfort level  Outcome: Progressing

## 2024-10-18 NOTE — PROGRESS NOTES
Evaluated pt for BP with MAPs in the high 50s. Pt had just received morphine 2mg. Pt is asymptomatic. She is mildly tachy to 102 with 1+ radial pulse. Will give 500cc bolus and reevaluate. Morphine discontinued and will hold home BP meds.

## 2024-10-18 NOTE — ACP (ADVANCE CARE PLANNING)
Advance Care Planning     Advance Care Planning Inpatient Note  Spiritual [facility] Emergency Department Department    Today's Date: 10/18/2024  Unit: [facility] B4 MULTI-SPECIALTY ORTHOPEDICS 1    Received request from HealthCare Provider.  Upon review of chart and communication with care team, patient's decision making abilities are not in question.. Patient and   was/were present in the room during visit.    Goals of ACP Conversation:  Discuss advance care planning documents    Health Care Decision Makers:    Summary:  No Decision Maker named by patient at this time  No healthcare decision makers have been documented.     Advance Care Planning Documents (Patient Wishes):  None     Assessment:  Chart review. I received and responded to spiritual consult requesting assistance in completing AMD. Patient is named Elise. Patient requested that I leave blank AMD with her to go over it. Left blank AMD with patient. Am available if patient needs a  to assist her with completing AMD. Patient's wishes honored. Patient was thankful for the visit and the explanation. Please contact [facility] health services for further assistance needed.      Interventions:  Provided education on documents for clarity and greater understanding    Care Preferences Communicated:   No    Outcomes/Plan:  ACP Discussion: Postponed    Electronically signed by Chaplain Niles on 10/18/2024 at 10:36 AM

## 2024-10-18 NOTE — PROGRESS NOTES
Spiritual Health History and Assessment/Progress Note  [facility]    Advance Care Planning,  ,  ,      Name: Elise Tabares MRN: [id number]    Age: 79 y.o.     Sex: female   Language: English   Baptism: Synagogue   Nondisplaced fracture of lateral malleolus of left fibula, initial encounter for closed fracture     Date: 10/18/2024            Total Time Calculated: 20 min              Spiritual Assessment began in [facility] B4 MULTI-SPECIALTY ORTHOPEDICS 1        Referral/Consult From: Nurse   Encounter Overview/Reason: Advance Care Planning  Service Provided For: Patient    Crystal, Belief, Meaning:   Patient is connected with a crystal tradition or spiritual practice  Family/Friends No family/friends present      Importance and Influence:  Patient has spiritual/personal beliefs that influence decisions regarding their health  Family/Friends No family/friends present    Community:  Patient feels well-supported. Support system includes: Children and [place] Community  Family/Friends No family/friends present    Assessment and Plan of Care:     Patient Interventions include: Facilitated expression of thoughts and feelings and Provided sacramental/Advent ritual  Family/Friends Interventions include: No family/friends present    Patient Plan of Care: Spiritual Care available upon further referral  Family/Friends Plan of Care: No family/friends present    Chart review. I received and responded to spiritual consult requesting assistance in completing AMD. Patient requested that I leave a blank AMD with her. Left blank AMD with patient. Patient is named Elise. Patient is of the Synagogue Orthodoxy. She however attends a non [employer] named [employer]. Patient has three children, and six grand children. Patient's Orthodoxy is important to her. Patient's Orthodoxy influences her morals, values, and ethics. Patient has a good support system at home. Her support system entails her daughter, two

## 2024-10-18 NOTE — PLAN OF CARE
Problem: Physical Therapy - Adult  Goal: By Discharge: Performs mobility at highest level of function for planned discharge setting.  See evaluation for individualized goals.  Description: FUNCTIONAL STATUS PRIOR TO ADMISSION: Patient was modified independent using a single point cane for functional mobility.    HOME SUPPORT PRIOR TO ADMISSION: The patient lived with daughter.    Physical Therapy Goals  Initiated 10/18/2024  1.  Patient will move from supine to sit and sit to supine in bed with independence within 7 day(s).    2.  Patient will perform sit to stand with modified independence within 7 day(s).  3.  Patient will transfer from bed to chair and chair to bed with minimal assistance using the least restrictive device within 7 day(s).  4.  Patient will ambulate with minimal assistance for 5 feet with the least restrictive device within 7 day(s).   5.  Patient will ascend/descend 4 stairs with handrail(s) with minimal assistance within 7 day(s).   Outcome: Progressing   PHYSICAL THERAPY EVALUATION    Patient: Elise Tabaers (79 y.o. female)  Date: 10/18/2024  Primary Diagnosis: Closed nondisplaced fracture of lateral malleolus of right fibula, initial encounter [S82.64XA]  Nondisplaced fracture of lateral malleolus of left fibula, initial encounter for closed fracture [S82.65XA]       Precautions: Restrictions/Precautions: Weight Bearing   Lower Extremity Weight Bearing Restrictions  Right Lower Extremity Weight Bearing: Non Weight Bearing                  ASSESSMENT : Patient fell going up her steps into her home.  Fractured right ankle, may have ORIF - ortho will assess patient today.  Had Chemo this week, next chemo scheduled in three weeks.    DEFICITS/IMPAIRMENTS:   The patient is limited by decreased functional mobility, ROM, strength, activity tolerance, endurance, balance     Based on the impairments listed above the patient participated in supine to sit at EOB.  Unable to jose alfredo socks  ([facility]) 03/14/2023    Lt upper lobe squamous cell    MI (myocardial infarction) ([facility]) 1997    s/p PCI    Obesity (BMI 30-39.9)     Obstructive sleep apnea (adult) (pediatric) 12/12/2014    Thyroid mass      Past Surgical History:   Procedure Laterality Date    CARDIAC PROCEDURE N/A 10/05/2023    Left heart cath / coronary angiography performed by Micah Bah DO at [facility] CARDIAC CATH LAB    CARDIAC PROCEDURE N/A 10/05/2023    Insert stent pierre coronary - x1 LAD performed by Micah Bah DO at [facility] CARDIAC CATH LAB    CARDIAC PROCEDURE N/A 10/05/2023    Intravascular ultrasound performed by Micah Bah DO at [facility] CARDIAC CATH LAB    CARDIAC PROCEDURE N/A 10/05/2023    Intravascular lithotripsy coronary performed by Micah Bah DO at [facility] CARDIAC CATH LAB    CATARACT REMOVAL  01/2021    bilateral     CORONARY ANGIOPLASTY WITH STENT PLACEMENT  1997    PCI, also had PCI 2017 and 2023    CORONARY STENT PLACEMENT      CT NEEDLE BIOPSY LUNG PERCUTANEOUS  03/14/2023    CT NEEDLE BIOPSY LUNG PERCUTANEOUS 3/14/2023 [facility] RAD CT    IR PORT PLACEMENT EQUAL OR GREATER THAN 5 YEARS  06/08/2023    IR PORT PLACEMENT EQUAL OR GREATER THAN 5 YEARS 6/8/2023 [facility] CARDIAC CATH/EP/IR LAB    LUNG REMOVAL, PARTIAL Left 05/03/2023    [facility] - Left Upper Lobe, Thoracoscopic    ORTHOPEDIC SURGERY  1993    Lumbar disc surgery    OTHER SURGICAL HISTORY  05/03/2023    Thoracic lung surgery    TUBAL LIGATION         Home Situation:  Social/Functional History  Lives With: Daughter  Type of Home: House  Home Layout: Two level, Bed/Bath upstairs  Home Access: Stairs to enter with rails  Entrance Stairs - Number of Steps: 3  Entrance Stairs - Rails: Right  Bathroom Shower/Tub: Walk-in shower  Bathroom Toilet: Standard  Bathroom Equipment: Grab bars in shower, Built-in shower seat  Bathroom Accessibility: Accessible  Home Equipment: Cane  Has the patient had two or more falls in the past year or any fall with injury in the past year?: No (just this one-

## 2024-10-18 NOTE — PLAN OF CARE
Problem: Occupational Therapy - Adult  Goal: By Discharge: Performs self-care activities at highest level of function for planned discharge setting.  See evaluation for individualized goals.  Description: FUNCTIONAL STATUS PRIOR TO ADMISSION:  Pt was mod I, amb with cane, drives, and going to chemo every several weeks  ADL Assistance: Independent,  Homemaking Assistance: Independent, Ambulation Assistance: Independent, Transfer Assistance: Independent, Active : Yes     HOME SUPPORT: Patient lived with daughter but didn't require assistance.    Occupational Therapy Goals:  Initiated 10/18/2024  1.  Patient will perform lower body dressing with Moderate Assist within 7 day(s).  2.  Patient will perform toilet transfers to bedside commode with Moderate Assist  within 7 day(s).  3.  Patient will perform all aspects of toileting with Moderate Assist within 7 day(s).  4.  Patient will participate in upper extremity therapeutic exercise/activities with Modified Weber for 10 minutes within 7 day(s).    5.  Patient will utilize energy conservation techniques during functional activities with verbal and visual cues within 7 day(s).   Outcome: Progressing     OCCUPATIONAL THERAPY EVALUATION    Patient: Elise Tabares (79 y.o. female)  Date: 10/18/2024  Primary Diagnosis: Closed nondisplaced fracture of lateral malleolus of right fibula, initial encounter [S82.64XA]  Nondisplaced fracture of lateral malleolus of left fibula, initial encounter for closed fracture [S82.65XA]         Precautions: Weight Bearing Right Lower Extremity Weight Bearing: Non Weight Bearing                ASSESSMENT :  The patient is limited by decreased functional mobility, independence in ADLs, strength, endurance, safety awareness, balance following fall at home while going up the steps with closed nondisplaced fracture of lateral malleolus of right fibula.  Sx needs being determined by ortho.  Pt is NWB RLE.    Based on the  impairments listed above pt requires max A for LE ADLs and toileting and mod A for OOB mobility using stand pivot technique using RW and RLE NWB.    Functional Outcome Measure:  The patient scored 19/24 on the ADL AM-PAC outcome measure.         PLAN :  Recommendations and Planned Interventions:   self care training, therapeutic activities, functional mobility training, balance training, therapeutic exercise, visual/perceptual training, endurance activities, patient education, home safety training, and family training/education    Frequency/Duration: OT Plan of Care: 5 times/week    Recommendation for discharge: (in order for the patient to meet his/her long term goals):   High intensity/comprehensive skilled occupational therapy in a multidisciplinary setting as patient is working towards tolerating up to 3 hours of therapy/day 5-7x/week    Other factors to consider for discharge:  pt received chemo every few weeks    IF patient discharges home will need the following DME: continuing to assess with progress       SUBJECTIVE:   Patient stated, “I'm glad to get OOB, but that was hard work.”  OBJECTIVE DATA SUMMARY:     Past Medical History:   Diagnosis Date    Arthritis 1993    Lower back, also has T-11 compression fracture    CAD (coronary artery disease)     Cerebral artery occlusion with cerebral infarction (HCC)     Evident on head CT, Chronic Rt lacunar infarcts    Chronic back pain 09/08/2010    Chronic kidney disease (CKD)     Stage 3    COPD (chronic obstructive pulmonary disease) ([facility])     DM type 2 (diabetes mellitus, type 2) ([facility]) 05/03/2010    HTN (hypertension) 05/03/2010    Hyperlipidemia     Lung cancer ([facility]) 03/14/2023    Lt upper lobe squamous cell    MI (myocardial infarction) ([facility]) 1997    s/p PCI    Obesity (BMI 30-39.9)     Obstructive sleep apnea (adult) (pediatric) 12/12/2014    Thyroid mass      Past Surgical History:   Procedure Laterality Date    CARDIAC PROCEDURE N/A 10/05/2023    Left

## 2024-10-19 VITALS
DIASTOLIC BLOOD PRESSURE: 66 MMHG | OXYGEN SATURATION: 94 % | WEIGHT: 195 LBS | BODY MASS INDEX: 32.49 KG/M2 | RESPIRATION RATE: 14 BRPM | TEMPERATURE: 97.3 F | SYSTOLIC BLOOD PRESSURE: 99 MMHG | HEIGHT: 65 IN | HEART RATE: 101 BPM

## 2024-10-19 LAB
ALBUMIN SERPL-MCNC: 2.7 G/DL (ref 3.5–5)
ALBUMIN/GLOB SERPL: 0.9 (ref 1.1–2.2)
ALP SERPL-CCNC: 104 U/L (ref 45–117)
ALT SERPL-CCNC: 14 U/L (ref 12–78)
ANION GAP SERPL CALC-SCNC: 5 MMOL/L (ref 2–12)
AST SERPL-CCNC: 17 U/L (ref 15–37)
BASOPHILS # BLD: 0 K/UL (ref 0–0.1)
BASOPHILS NFR BLD: 0 % (ref 0–1)
BILIRUB SERPL-MCNC: 0.5 MG/DL (ref 0.2–1)
BUN SERPL-MCNC: 49 MG/DL (ref 6–20)
BUN/CREAT SERPL: 34 (ref 12–20)
CALCIUM SERPL-MCNC: 7.9 MG/DL (ref 8.5–10.1)
CHLORIDE SERPL-SCNC: 112 MMOL/L (ref 97–108)
CO2 SERPL-SCNC: 19 MMOL/L (ref 21–32)
CREAT SERPL-MCNC: 1.44 MG/DL (ref 0.55–1.02)
DIFFERENTIAL METHOD BLD: ABNORMAL
EKG ATRIAL RATE: 115 BPM
EKG DIAGNOSIS: NORMAL
EKG P AXIS: 35 DEGREES
EKG P-R INTERVAL: 130 MS
EKG Q-T INTERVAL: 354 MS
EKG QRS DURATION: 76 MS
EKG QTC CALCULATION (BAZETT): 489 MS
EKG R AXIS: -30 DEGREES
EKG T AXIS: 37 DEGREES
EKG VENTRICULAR RATE: 115 BPM
EOSINOPHIL # BLD: 0.1 K/UL (ref 0–0.4)
EOSINOPHIL NFR BLD: 1 % (ref 0–7)
ERYTHROCYTE [DISTWIDTH] IN BLOOD BY AUTOMATED COUNT: 17.2 % (ref 11.5–14.5)
GLOBULIN SER CALC-MCNC: 3 G/DL (ref 2–4)
GLUCOSE BLD STRIP.AUTO-MCNC: 134 MG/DL (ref 65–117)
GLUCOSE BLD STRIP.AUTO-MCNC: 212 MG/DL (ref 65–117)
GLUCOSE BLD STRIP.AUTO-MCNC: 77 MG/DL (ref 65–117)
GLUCOSE SERPL-MCNC: 71 MG/DL (ref 65–100)
HCT VFR BLD AUTO: 24.1 % (ref 35–47)
HGB BLD-MCNC: 7.5 G/DL (ref 11.5–16)
IMM GRANULOCYTES # BLD AUTO: 0 K/UL
IMM GRANULOCYTES NFR BLD AUTO: 0 %
LYMPHOCYTES # BLD: 0.8 K/UL (ref 0.8–3.5)
LYMPHOCYTES NFR BLD: 8 % (ref 12–49)
MCH RBC QN AUTO: 30.7 PG (ref 26–34)
MCHC RBC AUTO-ENTMCNC: 31.1 G/DL (ref 30–36.5)
MCV RBC AUTO: 98.8 FL (ref 80–99)
MONOCYTES # BLD: 0.2 K/UL (ref 0–1)
MONOCYTES NFR BLD: 2 % (ref 5–13)
NEUTS BAND NFR BLD MANUAL: 1 % (ref 0–6)
NEUTS SEG # BLD: 8.5 K/UL (ref 1.8–8)
NEUTS SEG NFR BLD: 88 % (ref 32–75)
NRBC # BLD: 0 K/UL (ref 0–0.01)
NRBC BLD-RTO: 0 PER 100 WBC
PLATELET # BLD AUTO: 188 K/UL (ref 150–400)
PMV BLD AUTO: 11.5 FL (ref 8.9–12.9)
POTASSIUM SERPL-SCNC: 4.2 MMOL/L (ref 3.5–5.1)
PROT SERPL-MCNC: 5.7 G/DL (ref 6.4–8.2)
RBC # BLD AUTO: 2.44 M/UL (ref 3.8–5.2)
RBC MORPH BLD: ABNORMAL
RBC MORPH BLD: ABNORMAL
SERVICE CMNT-IMP: ABNORMAL
SERVICE CMNT-IMP: ABNORMAL
SERVICE CMNT-IMP: NORMAL
SODIUM SERPL-SCNC: 136 MMOL/L (ref 136–145)
WBC # BLD AUTO: 9.6 K/UL (ref 3.6–11)
WBC MORPH BLD: ABNORMAL

## 2024-10-19 PROCEDURE — 82962 GLUCOSE BLOOD TEST: CPT

## 2024-10-19 PROCEDURE — 94761 N-INVAS EAR/PLS OXIMETRY MLT: CPT

## 2024-10-19 PROCEDURE — 2580000003 HC RX 258

## 2024-10-19 PROCEDURE — 97530 THERAPEUTIC ACTIVITIES: CPT

## 2024-10-19 PROCEDURE — 6370000000 HC RX 637 (ALT 250 FOR IP)

## 2024-10-19 PROCEDURE — 36415 COLL VENOUS BLD VENIPUNCTURE: CPT

## 2024-10-19 PROCEDURE — 6360000002 HC RX W HCPCS

## 2024-10-19 PROCEDURE — 99239 HOSP IP/OBS DSCHRG MGMT >30: CPT | Performed by: STUDENT IN AN ORGANIZED HEALTH CARE EDUCATION/TRAINING PROGRAM

## 2024-10-19 PROCEDURE — 93010 ELECTROCARDIOGRAM REPORT: CPT | Performed by: SPECIALIST

## 2024-10-19 PROCEDURE — 85025 COMPLETE CBC W/AUTO DIFF WBC: CPT

## 2024-10-19 PROCEDURE — 51798 US URINE CAPACITY MEASURE: CPT

## 2024-10-19 PROCEDURE — 80053 COMPREHEN METABOLIC PANEL: CPT

## 2024-10-19 RX ORDER — OXYCODONE HYDROCHLORIDE 5 MG/1
2.5 TABLET ORAL EVERY 6 HOURS PRN
Qty: 12 TABLET | Refills: 0 | Status: SHIPPED | OUTPATIENT
Start: 2024-10-19 | End: 2024-10-25

## 2024-10-19 RX ORDER — OXYCODONE HYDROCHLORIDE 5 MG/1
2.5 TABLET ORAL EVERY 6 HOURS PRN
Status: DISCONTINUED | OUTPATIENT
Start: 2024-10-19 | End: 2024-10-19 | Stop reason: HOSPADM

## 2024-10-19 RX ORDER — PSEUDOEPHEDRINE HCL 30 MG
100 TABLET ORAL DAILY
Qty: 30 CAPSULE | Refills: 0 | Status: SHIPPED
Start: 2024-10-20

## 2024-10-19 RX ORDER — POLYETHYLENE GLYCOL 3350 17 G/17G
17 POWDER, FOR SOLUTION ORAL DAILY
Qty: 30 PACKET | Refills: 0 | Status: SHIPPED
Start: 2024-10-20 | End: 2024-11-19

## 2024-10-19 RX ORDER — SENNOSIDES A AND B 8.6 MG/1
1 TABLET, FILM COATED ORAL NIGHTLY
Qty: 30 TABLET | Refills: 0 | Status: SHIPPED
Start: 2024-10-19 | End: 2024-11-18

## 2024-10-19 RX ORDER — OXYCODONE HYDROCHLORIDE 5 MG/1
5 TABLET ORAL EVERY 6 HOURS PRN
Status: DISCONTINUED | OUTPATIENT
Start: 2024-10-19 | End: 2024-10-19 | Stop reason: HOSPADM

## 2024-10-19 RX ADMIN — INSULIN LISPRO 2 UNITS: 100 INJECTION, SOLUTION INTRAVENOUS; SUBCUTANEOUS at 11:47

## 2024-10-19 RX ADMIN — POLYETHYLENE GLYCOL 3350 17 G: 17 POWDER, FOR SOLUTION ORAL at 09:13

## 2024-10-19 RX ADMIN — SODIUM CHLORIDE, PRESERVATIVE FREE 10 ML: 5 INJECTION INTRAVENOUS at 09:14

## 2024-10-19 RX ADMIN — ACETAMINOPHEN 1000 MG: 500 TABLET ORAL at 05:54

## 2024-10-19 RX ADMIN — ENOXAPARIN SODIUM 40 MG: 100 INJECTION SUBCUTANEOUS at 09:13

## 2024-10-19 RX ADMIN — ACETAMINOPHEN 1000 MG: 500 TABLET ORAL at 13:47

## 2024-10-19 RX ADMIN — DOCUSATE SODIUM 100 MG: 100 CAPSULE, LIQUID FILLED ORAL at 09:13

## 2024-10-19 RX ADMIN — ASPIRIN 81 MG: 81 TABLET, CHEWABLE ORAL at 09:13

## 2024-10-19 RX ADMIN — OXYCODONE 5 MG: 5 TABLET ORAL at 11:46

## 2024-10-19 RX ADMIN — OXYCODONE 5 MG: 5 TABLET ORAL at 05:54

## 2024-10-19 ASSESSMENT — PAIN DESCRIPTION - ORIENTATION
ORIENTATION: RIGHT
ORIENTATION: RIGHT

## 2024-10-19 ASSESSMENT — PAIN SCALES - GENERAL
PAINLEVEL_OUTOF10: 8
PAINLEVEL_OUTOF10: 2
PAINLEVEL_OUTOF10: 5
PAINLEVEL_OUTOF10: 7
PAINLEVEL_OUTOF10: 5

## 2024-10-19 ASSESSMENT — PAIN DESCRIPTION - LOCATION
LOCATION: LEG
LOCATION: LEG

## 2024-10-19 ASSESSMENT — PAIN DESCRIPTION - DESCRIPTORS
DESCRIPTORS: ACHING
DESCRIPTORS: BURNING

## 2024-10-19 NOTE — PLAN OF CARE
repositioning    Activity Tolerance:   Poor and requires frequent rest breaks    After treatment:   Patient left in no apparent distress sitting up in chair, Call bell within reach, and Bed/ chair alarm activated      COMMUNICATION/EDUCATION:   The patient's plan of care was discussed with: registered nurse and rehabilitation technician           Huma Packer, PT, DPT  Minutes: 13

## 2024-10-19 NOTE — DISCHARGE SUMMARY
increased work of breathing. Symmetric chest rise b/l.  Inspiratory crackles present at right lower lung lobe.  Cardiovascular: RRR. No clubbing or cyanosis  GI: Nondistended. No masses.   Extremities: No LE edema.  Right ankle wrapped and clean with overlying ice pack.  Skin: Warm, dry. No rashes.   Neuro: Alert, normal behavior. No focal deficit.              Condition at discharge: Stable    Labs  Recent Labs     10/17/24  1259 10/18/24  0111 10/19/24  0217   WBC 25.8* 20.5* 9.6   HGB 9.5* 8.4* 7.5*   HCT 29.4* 26.4* 24.1*    211 188     Recent Labs     10/17/24  1259 10/18/24  0111 10/19/24  0217    138 136   K 4.5 4.1 4.2    110* 112*   CO2 26 23 19*   BUN 44* 43* 49*     Recent Labs     10/17/24  1259 10/18/24  0111 10/19/24  0217   ALT 18 16 14   GLOB 3.4 3.1 3.0     No results for input(s): \"PH\", \"PCO2\", \"PO2\", \"TNIPOC\", \"INR\", \"APTT\", \"TIBC\", \"GLUCPOC\" in the last 72 hours.    Invalid input(s): \"TROIQ\", \"PTP\", \"FE\", \"PSAT\", \"FERR\", \"GLPOC\"    Micro:  No components found for: \"CULT\"    Imaging:  XR ANKLE RIGHT (MIN 3 VIEWS)    Result Date: 10/17/2024  EXAM: XR TIBIA FIBULA RIGHT (2 VIEWS), XR ANKLE RIGHT (MIN 3 VIEWS) INDICATION: mechanical injury. COMPARISON: None. FINDINGS: AP and lateral  views of the right tibia and fibula. 3 views of the right ankle. Mild osteoarthritis is seen in the right knee. No knee joint effusion. There is an acute nondisplaced obliquely oriented fracture of the lateral malleolus extending upwards from the level of the tibiotalar joint. There is adjacent soft tissue swelling. The ankle mortise remains intact. No additional fracture. No dislocation. There are Achilles and plantar calcaneal enthesophytes.     Acute nondisplaced fracture of the lateral malleolus.. Electronically signed by ANAHY MONIQUE    XR TIBIA FIBULA RIGHT (2 VIEWS)    Result Date: 10/17/2024  EXAM: XR TIBIA FIBULA RIGHT (2 VIEWS), XR ANKLE RIGHT (MIN 3 VIEWS) INDICATION: mechanical injury.  COMPARISON: None. FINDINGS: AP and lateral  views of the right tibia and fibula. 3 views of the right ankle. Mild osteoarthritis is seen in the right knee. No knee joint effusion. There is an acute nondisplaced obliquely oriented fracture of the lateral malleolus extending upwards from the level of the tibiotalar joint. There is adjacent soft tissue swelling. The ankle mortise remains intact. No additional fracture. No dislocation. There are Achilles and plantar calcaneal enthesophytes.     Acute nondisplaced fracture of the lateral malleolus.. Electronically signed by ANAHY MONIQUE      Chronic diagnoses   Patient Active Problem List   Diagnosis    Chronic back pain    CAD (coronary artery disease)    MAX treated with BiPAP    Advanced care planning/counseling discussion    Stable angina (HCC)    Type 2 diabetes mellitus with stage 3a chronic kidney disease, with long-term current use of insulin (HCC)    Primary hypertension    Mixed hyperlipidemia    Neuropathy    VAIBHAV (acute kidney injury) (HCC)    Obesity    Malignant neoplasm of unspecified part of left bronchus or lung (HCC)    Thyroid mass of unclear etiology    Stage 3a chronic kidney disease (HCC)    Type 2 diabetes mellitus with diabetic neuropathy, with long-term current use of insulin (HCC)    Squamous cell carcinoma of lung, left (HCC)    Emphysema lung (HCC)    Anemia due to stage 3 chronic kidney disease (HCC)    Major depressive disorder, recurrent, mild    Major depressive disorder, recurrent, moderate    Major depressive disorder, recurrent, unspecified    Nondisplaced fracture of lateral malleolus of left fibula, initial encounter for closed fracture    Closed nondisplaced fracture of lateral malleolus of right fibula        Diet:  Diabetic Diet    Activity:  As tolerated     Disposition: Home or Self Care    Discharge instructions to patient/family  Please seek medical attention for any new or worsening symptoms particularly fever, chest pain,

## 2024-10-19 NOTE — PROGRESS NOTES
Attempted contacting Dr. Rosa via phone about requested transition of care discussion from inpatient to rehab facility. Voicemail box full.

## 2024-10-19 NOTE — PROGRESS NOTES
[address] [id number]   Office [phone]  Fax [phone]          Subjective / Objective     24 Hour Events: Nurse states that patient does not urinate frequently.  Straight cath with production of 600 cc of urine.  Bladder scan postvoid does not show urinary retention.    Subjective: Patient resting comfortably in bed this morning and has no complaints.  She states that her current pain regimen is working.  She is tolerating food and drink with no nausea and vomiting.  Her last bowel movement was upon admission.  Continues to have urine output.    Temp (24hrs), Av.2 °F (36.8 °C), Min:97.9 °F (36.6 °C), Max:98.4 °F (36.9 °C)     Height: 1.651 m (5' 5\"), BP: (!) 110/58, Pain 0-10: Pain Level: 5;     Physical Exam  General: No acute distress. Alert. Cooperative.  Head: Normocephalic. Atraumatic.  Mouth: Mucosa pink. Moist mucous membranes.   Respiratory: No increased work of breathing. Symmetric chest rise b/l.  Inspiratory crackles present at right lower lung lobe.  Cardiovascular: RRR. No clubbing or cyanosis  GI: Nondistended. No masses.   Extremities: No LE edema.  Right ankle wrapped and clean with overlying ice pack.  Skin: Warm, dry. No rashes.   Neuro: Alert, normal behavior. No focal deficit.     I/O:  I/O last 3 completed shifts:  In: -   Out: 1200 [Urine:1200]  No intake/output data recorded.    Inpatient Medications  Current Facility-Administered Medications   Medication Dose Route Frequency    oxyCODONE (ROXICODONE) immediate release tablet 2.5 mg  2.5 mg Oral Q6H PRN    Or    oxyCODONE (ROXICODONE) immediate release tablet 5 mg  5 mg Oral Q6H PRN    docusate sodium (COLACE) capsule 100 mg  100 mg Oral Daily    insulin glargine (LANTUS) injection vial 60 Units  60 Units SubCUTAneous Daily    sodium chloride flush 0.9 % injection 5-40 mL  5-40 mL IntraVENous 2 times per day    sodium chloride flush 0.9 % injection 5-40 mL  5-40 mL IntraVENous PRN    0.9 % sodium  Pt reports being diagnosed with vaginal yeast infection yesterday. She was given RX for Fluconazole 150mg q72hrs x 6 days (2 doses). Will complete treatment while here.      Metastatic Squamous Cell Carcinoma: Following with Oncology Dr. Hyman. Receiving Docetaxel chemotherapy. Last infusion was 10/15/24. Ct chest with contrast on 06/09/24 showed marked disease progression in the chest. Home Clarinex 5mg associated with chemotherapy treatments.      Obesity: Body mass index is 32.45 kg/m². Encourage lifestyle modifications and further follow up outpatient.        FEN/GI - Regular diet. Encourage PO fluids.   Activity - Up as tolerated  DVT prophylaxis - Lovenox  GI prophylaxis - not indicated at this time  Fall prophylaxis - Not indicated at this time.  Disposition - Plan to d/c to Rehab pending PT/OT recommendations. Consult CM  Code Status - Full, discussed with patient / caregivers.  Point of Contact Raquel Coyne, Daughter, [phone]    Patient discussed with Dr. Netta Pineda MD  Family Medicine Resident       For Billing    Chief Complaint   Patient presents with    Ankle Pain

## 2024-10-19 NOTE — DISCHARGE INSTRUCTIONS
aspirin  - hold lisinopril, metoprolol, imdur due to low BP and readjust as BP's trend     Hyperlipidemia: On home Rosuvastatin 10mg, Zetia 10mg   - Continue home medications     Yeast Infection: Pt reports being diagnosed with vaginal yeast infection 10/16. She was given RX for Fluconazole 150mg q72hrs x 6 days (2 doses). Fluconazole treatment continued inpatient.  - continue fluconazole, next dose 10/20     Metastatic Squamous Cell Carcinoma: Following with Oncology Dr. Hyman. Receiving Docetaxel chemotherapy. Last infusion was 10/15/24. Ct chest with contrast on 06/09/24 showed marked disease progression in the chest. Home Clarinex 5mg associated with chemotherapy treatments.      Obesity: Body mass index is 32.45 kg/m². Outpatient management with PCP.        FOLLOW-UP CARE RECOMMENDATIONS:    APPOINTMENTS:  Future Appointments   Date Time Provider Department Center   11/5/2024 10:30 AM  CHEMO CHAIR 14 [facility]   11/5/2024 11:00 AM Guerline Park MD ONCSF [facility]   11/26/2024 10:00 AM  CHEMO CHAIR 12 [facility]   12/4/2024 11:30 AM Vinod Street MD [facility]   12/17/2024 10:00 AM  CHEMO CHAIR 12 [facility]         It is very important that you keep follow-up appointment(s).  Bring discharge papers, medication list (and/or medication bottles) to follow-up appointments for review by outpatient provider(s).    FOLLOW-UP TESTS RECOMMENDED:   Hemoglobin and Hematocrit    ONGOING TREATMENT PLAN: PT/OT, Outpatient ORIF    PENDING TEST RESULTS:  At the time of discharge the following test results are still pending: N/A.   Please review these results as they become available.    Specific symptoms to watch for: chest pain, shortness of breath, fever, chills, nausea, vomiting, diarrhea, change in mentation, falling, weakness, bleeding.    DIET:  diabetic diet and low fat, low cholesterol diet    ACTIVITY:  activity as tolerated    EQUIPMENT needed:  N/A    INCIDENTAL FINDINGS:  N/A    GOALS OF CARE:

## 2024-10-19 NOTE — CARE COORDINATION
Transition of Care Plan to SNF/Rehab    Communication to Patient/Family:  Met with patient and family and they are agreeable to the transition plan. The Plan for Transition of Care is related to the following treatment goals: return to highest level of function to return home    The Patient and/or patient representative was provided with a choice of provider and agrees  with the discharge plan.      Yes [x] No []    A Freedom of choice list was provided with basic dialogue that supports the patient's individualized plan of care/goals and shares the quality data associated with the providers.       Yes [x] No []    SNF/Rehab Transition:  Patient has been accepted to [facility]  and meets criteria for admission.   Patient will transported by Hospital to Home and expected to leave with a 4:30 pickup    Communication to SNF/Rehab:  Bedside RN, Christine Hatfield, has been notified to update the transition plan to the facility and call report [phone]  Discharge information has been updated on the AVS. And communicated to facility via [employer], or CC link.       Nursing Please include all hard scripts for controlled substances, med rec and dc summary, and AVS in packet.     Reviewed and confirmed with facility, [facility] can manage the patient care needs for the following:     Gato with (X) only those applicable:  Medication:  [x]Medications are available at the facility  []IV Antibiotics    [x]Controlled Substance - hard copies available sent.  [x]Weekly Labs    Equipment:  []CPAP/BiPAP  []Wound Vacuum  []Willett or Urinary Device  []PICC/Central Line  []Nebulizer  []Ventilator    Treatment:  []Isolation (for MRSA, VRE, etc.)  []Surgical Drain Management  []Tracheostomy Care  []Dressing Changes  []Dialysis with transportation  []PEG Care  []Oxygen  []Daily Weights for Heart Failure    Dietary:  [x]Any diet limitations  []Tube Feedings   []Total Parenteral Management (TPN)   let us know bed availability soon

## 2024-10-22 ENCOUNTER — HOSPITAL ENCOUNTER (OUTPATIENT)
Facility: HOSPITAL | Age: 79
Setting detail: INFUSION SERIES
End: 2024-10-22

## 2024-10-29 ENCOUNTER — APPOINTMENT (OUTPATIENT)
Facility: HOSPITAL | Age: 79
End: 2024-10-29
Payer: MEDICARE

## 2024-10-29 RX ORDER — EPINEPHRINE 1 MG/ML
0.3 INJECTION, SOLUTION INTRAMUSCULAR; SUBCUTANEOUS PRN
Status: CANCELLED | OUTPATIENT
Start: 2024-11-05

## 2024-10-29 RX ORDER — HEPARIN 100 UNIT/ML
500 SYRINGE INTRAVENOUS PRN
Status: CANCELLED | OUTPATIENT
Start: 2024-11-05

## 2024-10-29 RX ORDER — FAMOTIDINE 10 MG/ML
20 INJECTION, SOLUTION INTRAVENOUS
Status: CANCELLED | OUTPATIENT
Start: 2024-11-05

## 2024-10-29 RX ORDER — SODIUM CHLORIDE 9 MG/ML
INJECTION, SOLUTION INTRAVENOUS CONTINUOUS
Status: CANCELLED | OUTPATIENT
Start: 2024-11-05

## 2024-10-29 RX ORDER — ONDANSETRON 2 MG/ML
8 INJECTION INTRAMUSCULAR; INTRAVENOUS
Status: CANCELLED | OUTPATIENT
Start: 2024-11-05

## 2024-10-29 RX ORDER — SODIUM CHLORIDE 9 MG/ML
5-250 INJECTION, SOLUTION INTRAVENOUS PRN
Status: CANCELLED | OUTPATIENT
Start: 2024-11-05

## 2024-10-29 RX ORDER — ACETAMINOPHEN 325 MG/1
650 TABLET ORAL
Status: CANCELLED | OUTPATIENT
Start: 2024-11-05

## 2024-10-29 RX ORDER — DIPHENHYDRAMINE HYDROCHLORIDE 50 MG/ML
50 INJECTION INTRAMUSCULAR; INTRAVENOUS
Status: CANCELLED | OUTPATIENT
Start: 2024-11-05

## 2024-10-29 RX ORDER — ALBUTEROL SULFATE 90 UG/1
4 INHALANT RESPIRATORY (INHALATION) PRN
Status: CANCELLED | OUTPATIENT
Start: 2024-11-05

## 2024-10-30 NOTE — PROGRESS NOTES
[facility] [place]  Medical Oncology at [place]  [phone]    Hematology / Oncology Established Visit    Reason for Visit:   Elise Tabares is a 79 y.o. female who is seen for follow up of lung cancer.     Hematology Oncology Treatment History:     Diagnosis: Squamous cell carcinoma of lung    Stage: IIB [[id number]], now metastatic    Pathology:   3/14/23 Lung, left upper lobe, Core biopsy: Invasive poorly differentiated squamous cell carcinoma.   Comment: Immunohistochemical stains show the malignant cells are positive  for cytokeratin 5/6 and negative for cytokeratin 7, cytokeratin 20 and  TTF-1 supporting the diagnosis.     5/3/23 Left upper lobe wedge resection:  Invasive poorly differentiated squamous cell carcinoma with areas of extensive necrosis.   Tumor size 5.5cm, G3  Visceral pleural invasion present. Vascular invasion present. Parenchymal resection margin negative for tumor. Background lung parenchyma with moderate to severe architectural distortion associated with prominent peribronchiolar metaplasia.  0/6 LN positive [Station 5, 6, 7 x 2, 8, 10]  -PDL1 TPS 2%    -Guardant NGS: TP53 R267G, TP53 S241F. CDKN2A L32fs, MSI-High not detected. No FDA approved medications     Prior Treatment:   1. Sublobar resection of JANELL by Dr. Dom Perez  2. Adjuvant Carbo-Reynolds x 4 on D1, 8 of q21d cycles, 6/20/23- 8/29/23  3. Atezolizumab x2mwyub x 1 year, 9/19/23 - 7/2024    Current Treatment:  Docetaxel 75mg/m2 q3igkbd until disease progression/ toxicity, 7/23/24 - current    History of Present Illness:   Elise Tabares is a 79 y.o. female with CAD, CKD, HTN, DM II, MAX who is referred for evaluation and management of lung cancer. Pt was recently hospitalized in early March 2023 after 2 syncopal episodes at home. She also noted some loose stools and vomiting. She was diagnosed with IVVD and VAIBHAV, treated with IVF. She was found to have a new 27 x 24mm cavitary lesion in JANELL and underwent CT-guided

## 2024-11-04 ENCOUNTER — TELEPHONE (OUTPATIENT)
Age: 79
End: 2024-11-04

## 2024-11-04 NOTE — TELEPHONE ENCOUNTER
Patient called back and stated she was released from [facility] today and she will be at her treatment and MD tomorrow.

## 2024-11-04 NOTE — TELEPHONE ENCOUNTER
11/04/24 3:12 PM Attempted to call patient via home number listed to check on her and confirm if she was discharged from [facility]. Patient has [facility]IC/MD visits tomorrow. No answer, left message requesting return call. Provided office phone number as well for call back.

## 2024-11-04 NOTE — TELEPHONE ENCOUNTER
Called to schedule patient an appointment per Dr. Mayo message. Patient did not want to make an appointment stating she does not understand why an appointment is needed and why records are needed. She stated that all Dr. Mayo needs to do is follow the orders for PT. Patient then stated that she can not leave her house and wants a virtual appointment.

## 2024-11-05 ENCOUNTER — HOSPITAL ENCOUNTER (OUTPATIENT)
Facility: HOSPITAL | Age: 79
Setting detail: INFUSION SERIES
Discharge: HOME | End: 2024-11-05
Payer: MEDICARE

## 2024-11-05 ENCOUNTER — OFFICE VISIT (OUTPATIENT)
Age: 79
End: 2024-11-05
Payer: MEDICARE

## 2024-11-05 VITALS
WEIGHT: 190.6 LBS | TEMPERATURE: 98.1 F | HEART RATE: 108 BPM | DIASTOLIC BLOOD PRESSURE: 66 MMHG | HEIGHT: 65 IN | SYSTOLIC BLOOD PRESSURE: 104 MMHG | OXYGEN SATURATION: 98 % | RESPIRATION RATE: 16 BRPM | BODY MASS INDEX: 31.75 KG/M2

## 2024-11-05 VITALS
TEMPERATURE: 97.6 F | OXYGEN SATURATION: 100 % | HEIGHT: 65 IN | SYSTOLIC BLOOD PRESSURE: 149 MMHG | BODY MASS INDEX: 31.75 KG/M2 | RESPIRATION RATE: 16 BRPM | WEIGHT: 190.6 LBS | HEART RATE: 96 BPM | DIASTOLIC BLOOD PRESSURE: 81 MMHG

## 2024-11-05 DIAGNOSIS — C34.92 SQUAMOUS CELL CARCINOMA OF LUNG, LEFT (HCC): Primary | ICD-10-CM

## 2024-11-05 DIAGNOSIS — Z51.11 ENCOUNTER FOR ANTINEOPLASTIC CHEMOTHERAPY: ICD-10-CM

## 2024-11-05 DIAGNOSIS — E11.40 TYPE 2 DIABETES MELLITUS WITH DIABETIC NEUROPATHY, WITH LONG-TERM CURRENT USE OF INSULIN (HCC): ICD-10-CM

## 2024-11-05 DIAGNOSIS — C34.92 MALIGNANT NEOPLASM OF UNSPECIFIED PART OF LEFT BRONCHUS OR LUNG (HCC): ICD-10-CM

## 2024-11-05 DIAGNOSIS — Z79.4 TYPE 2 DIABETES MELLITUS WITH DIABETIC NEUROPATHY, WITH LONG-TERM CURRENT USE OF INSULIN (HCC): ICD-10-CM

## 2024-11-05 DIAGNOSIS — N18.31 STAGE 3A CHRONIC KIDNEY DISEASE (HCC): ICD-10-CM

## 2024-11-05 LAB
ALBUMIN SERPL-MCNC: 3.4 G/DL (ref 3.5–5)
ALBUMIN/GLOB SERPL: 1.1 (ref 1.1–2.2)
ALP SERPL-CCNC: 95 U/L (ref 45–117)
ALT SERPL-CCNC: 17 U/L (ref 12–78)
ANION GAP SERPL CALC-SCNC: 7 MMOL/L (ref 2–12)
AST SERPL-CCNC: 18 U/L (ref 15–37)
BASOPHILS # BLD: 0 K/UL (ref 0–0.1)
BASOPHILS NFR BLD: 0 % (ref 0–1)
BILIRUB SERPL-MCNC: 0.5 MG/DL (ref 0.2–1)
BUN SERPL-MCNC: 28 MG/DL (ref 6–20)
BUN/CREAT SERPL: 19 (ref 12–20)
CALCIUM SERPL-MCNC: 9.1 MG/DL (ref 8.5–10.1)
CHLORIDE SERPL-SCNC: 105 MMOL/L (ref 97–108)
CO2 SERPL-SCNC: 21 MMOL/L (ref 21–32)
CREAT SERPL-MCNC: 1.5 MG/DL (ref 0.55–1.02)
DIFFERENTIAL METHOD BLD: ABNORMAL
EOSINOPHIL # BLD: 0 K/UL (ref 0–0.4)
EOSINOPHIL NFR BLD: 0 % (ref 0–7)
ERYTHROCYTE [DISTWIDTH] IN BLOOD BY AUTOMATED COUNT: 17.1 % (ref 11.5–14.5)
GLOBULIN SER CALC-MCNC: 3.1 G/DL (ref 2–4)
GLUCOSE SERPL-MCNC: 264 MG/DL (ref 65–100)
HCT VFR BLD AUTO: 28.8 % (ref 35–47)
HGB BLD-MCNC: 9.2 G/DL (ref 11.5–16)
IMM GRANULOCYTES # BLD AUTO: 0 K/UL (ref 0–0.04)
IMM GRANULOCYTES NFR BLD AUTO: 0 % (ref 0–0.5)
LYMPHOCYTES # BLD: 0.5 K/UL (ref 0.8–3.5)
LYMPHOCYTES NFR BLD: 7 % (ref 12–49)
MCH RBC QN AUTO: 30.5 PG (ref 26–34)
MCHC RBC AUTO-ENTMCNC: 31.9 G/DL (ref 30–36.5)
MCV RBC AUTO: 95.4 FL (ref 80–99)
MONOCYTES # BLD: 0.3 K/UL (ref 0–1)
MONOCYTES NFR BLD: 4 % (ref 5–13)
NEUTS SEG # BLD: 6 K/UL (ref 1.8–8)
NEUTS SEG NFR BLD: 89 % (ref 32–75)
NRBC # BLD: 0 K/UL (ref 0–0.01)
NRBC BLD-RTO: 0 PER 100 WBC
PLATELET # BLD AUTO: 238 K/UL (ref 150–400)
PMV BLD AUTO: 10.1 FL (ref 8.9–12.9)
POTASSIUM SERPL-SCNC: 4.8 MMOL/L (ref 3.5–5.1)
PROT SERPL-MCNC: 6.5 G/DL (ref 6.4–8.2)
RBC # BLD AUTO: 3.02 M/UL (ref 3.8–5.2)
RBC MORPH BLD: ABNORMAL
SODIUM SERPL-SCNC: 133 MMOL/L (ref 136–145)
WBC # BLD AUTO: 6.8 K/UL (ref 3.6–11)

## 2024-11-05 PROCEDURE — 96377 APPLICATON ON-BODY INJECTOR: CPT

## 2024-11-05 PROCEDURE — 1111F DSCHRG MED/CURRENT MED MERGE: CPT | Performed by: INTERNAL MEDICINE

## 2024-11-05 PROCEDURE — 1123F ACP DISCUSS/DSCN MKR DOCD: CPT | Performed by: INTERNAL MEDICINE

## 2024-11-05 PROCEDURE — 3074F SYST BP LT 130 MM HG: CPT | Performed by: INTERNAL MEDICINE

## 2024-11-05 PROCEDURE — G8417 CALC BMI ABV UP PARAM F/U: HCPCS | Performed by: INTERNAL MEDICINE

## 2024-11-05 PROCEDURE — 1036F TOBACCO NON-USER: CPT | Performed by: INTERNAL MEDICINE

## 2024-11-05 PROCEDURE — 99215 OFFICE O/P EST HI 40 MIN: CPT | Performed by: INTERNAL MEDICINE

## 2024-11-05 PROCEDURE — G2211 COMPLEX E/M VISIT ADD ON: HCPCS | Performed by: INTERNAL MEDICINE

## 2024-11-05 PROCEDURE — 6360000002 HC RX W HCPCS: Performed by: INTERNAL MEDICINE

## 2024-11-05 PROCEDURE — 96413 CHEMO IV INFUSION 1 HR: CPT

## 2024-11-05 PROCEDURE — 1090F PRES/ABSN URINE INCON ASSESS: CPT | Performed by: INTERNAL MEDICINE

## 2024-11-05 PROCEDURE — 80053 COMPREHEN METABOLIC PANEL: CPT

## 2024-11-05 PROCEDURE — 1126F AMNT PAIN NOTED NONE PRSNT: CPT | Performed by: INTERNAL MEDICINE

## 2024-11-05 PROCEDURE — 3078F DIAST BP <80 MM HG: CPT | Performed by: INTERNAL MEDICINE

## 2024-11-05 PROCEDURE — 96375 TX/PRO/DX INJ NEW DRUG ADDON: CPT

## 2024-11-05 PROCEDURE — 2580000003 HC RX 258: Performed by: INTERNAL MEDICINE

## 2024-11-05 PROCEDURE — G8484 FLU IMMUNIZE NO ADMIN: HCPCS | Performed by: INTERNAL MEDICINE

## 2024-11-05 PROCEDURE — 85025 COMPLETE CBC W/AUTO DIFF WBC: CPT

## 2024-11-05 PROCEDURE — G8427 DOCREV CUR MEDS BY ELIG CLIN: HCPCS | Performed by: INTERNAL MEDICINE

## 2024-11-05 PROCEDURE — 1159F MED LIST DOCD IN RCRD: CPT | Performed by: INTERNAL MEDICINE

## 2024-11-05 PROCEDURE — G8399 PT W/DXA RESULTS DOCUMENT: HCPCS | Performed by: INTERNAL MEDICINE

## 2024-11-05 RX ORDER — PREDNISONE 10 MG/1
10 TABLET ORAL
Qty: 10 TABLET | Refills: 0 | Status: SHIPPED | OUTPATIENT
Start: 2024-11-05 | End: 2024-11-15

## 2024-11-05 RX ORDER — DRONABINOL 2.5 MG/1
CAPSULE ORAL
COMMUNITY

## 2024-11-05 RX ORDER — SODIUM CHLORIDE 9 MG/ML
5-250 INJECTION, SOLUTION INTRAVENOUS PRN
Status: DISCONTINUED | OUTPATIENT
Start: 2024-11-05 | End: 2024-11-06 | Stop reason: HOSPADM

## 2024-11-05 RX ORDER — GABAPENTIN 300 MG/1
300 CAPSULE ORAL
Qty: 30 CAPSULE | Refills: 2 | Status: SHIPPED | OUTPATIENT
Start: 2024-11-05 | End: 2025-02-03

## 2024-11-05 RX ORDER — ONDANSETRON 2 MG/ML
8 INJECTION INTRAMUSCULAR; INTRAVENOUS ONCE
Status: COMPLETED | OUTPATIENT
Start: 2024-11-05 | End: 2024-11-05

## 2024-11-05 RX ORDER — SODIUM CHLORIDE 0.9 % (FLUSH) 0.9 %
5-40 SYRINGE (ML) INJECTION PRN
Status: DISCONTINUED | OUTPATIENT
Start: 2024-11-05 | End: 2024-11-06 | Stop reason: HOSPADM

## 2024-11-05 RX ORDER — DEXAMETHASONE SODIUM PHOSPHATE 10 MG/ML
6 INJECTION INTRAMUSCULAR; INTRAVENOUS ONCE
Status: COMPLETED | OUTPATIENT
Start: 2024-11-05 | End: 2024-11-05

## 2024-11-05 RX ADMIN — PEGFILGRASTIM 6 MG: KIT SUBCUTANEOUS at 14:11

## 2024-11-05 RX ADMIN — DOCETAXEL ANHYDROUS 159 MG: 10 INJECTION, SOLUTION INTRAVENOUS at 12:58

## 2024-11-05 RX ADMIN — ONDANSETRON 8 MG: 2 INJECTION INTRAMUSCULAR; INTRAVENOUS at 12:17

## 2024-11-05 RX ADMIN — DEXAMETHASONE SODIUM PHOSPHATE 6 MG: 10 INJECTION INTRAMUSCULAR; INTRAVENOUS at 12:13

## 2024-11-05 ASSESSMENT — PAIN SCALES - GENERAL: PAINLEVEL_OUTOF10: 0

## 2024-11-05 NOTE — PROGRESS NOTES
Outpatient Infusion Center Progress Note        Date: 24    Name: Elise Tabares    MRN: [id number]         : 1945    MD: Guerline Park MD       Ms. Tabares admitted to [facility] for C6D1 of [place] via wheelchair in stable condition. Assessment completed.Patient had recent ER visit concerning a right fibula fracture d/t a fall.  Right port a cath accessed using 0.75\" mays, blood return obtained and port flushed without difficulty.  Labs drawn and sent for processing.    Patient proceeded to appointment with Dr. Park's team.    ** Patient has received this medication in the past. Patient reports no previous reactions to the medication(s).       Vitals:    24 1025 24 1359   BP: (!) 112/53 (!) 149/81   Pulse: (!) 114 96   Resp: 16 16   Temp: 97.3 °F (36.3 °C) 97.6 °F (36.4 °C)   TempSrc: Temporal Temporal   SpO2: 96% 100%   Weight: 86.5 kg (190 lb 9.6 oz)    Height: 1.651 m (5' 5\")            Results for orders placed or performed during the hospital encounter of 24   Comprehensive metabolic panel   Result Value Ref Range    Sodium 133 (L) 136 - 145 mmol/L    Potassium 4.8 3.5 - 5.1 mmol/L    Chloride 105 97 - 108 mmol/L    CO2 21 21 - 32 mmol/L    Anion Gap 7 2 - 12 mmol/L    Glucose 264 (H) 65 - 100 mg/dL    BUN 28 (H) 6 - 20 MG/DL    Creatinine 1.50 (H) 0.55 - 1.02 MG/DL    BUN/Creatinine Ratio 19 12 - 20      Est, Glom Filt Rate 35 (L) >60 ml/min/1.73m2    Calcium 9.1 8.5 - 10.1 MG/DL    Total Bilirubin 0.5 0.2 - 1.0 MG/DL    ALT 17 12 - 78 U/L    AST 18 15 - 37 U/L    Alk Phosphatase 95 45 - 117 U/L    Total Protein 6.5 6.4 - 8.2 g/dL    Albumin 3.4 (L) 3.5 - 5.0 g/dL    Globulin 3.1 2.0 - 4.0 g/dL    Albumin/Globulin Ratio 1.1 1.1 - 2.2     CBC With Auto Differential   Result Value Ref Range    WBC 6.8 3.6 - 11.0 K/uL    RBC 3.02 (L) 3.80 - 5.20 M/uL    Hemoglobin 9.2 (L) 11.5 - 16.0 g/dL    Hematocrit 28.8 (L) 35.0 - 47.0 %    MCV 95.4 80.0 - 99.0 FL    MCH 30.5 26.0 - 34.0 PG     MCHC 31.9 30.0 - 36.5 g/dL    RDW 17.1 (H) 11.5 - 14.5 %    Platelets 238 150 - 400 K/uL    MPV 10.1 8.9 - 12.9 FL    Nucleated RBCs 0.0 0  WBC    nRBC 0.00 0.00 - 0.01 K/uL    Neutrophils % 89 (H) 32 - 75 %    Lymphocytes % 7 (L) 12 - 49 %    Monocytes % 4 (L) 5 - 13 %    Eosinophils % 0 0 - 7 %    Basophils % 0 0 - 1 %    Immature Granulocytes % 0 0.0 - 0.5 %    Neutrophils Absolute 6.0 1.8 - 8.0 K/UL    Lymphocytes Absolute 0.5 (L) 0.8 - 3.5 K/UL    Monocytes Absolute 0.3 0.0 - 1.0 K/UL    Eosinophils Absolute 0.0 0.0 - 0.4 K/UL    Basophils Absolute 0.0 0.0 - 0.1 K/UL    Immature Granulocytes Absolute 0.0 0.00 - 0.04 K/UL    Differential Type SMEAR SCANNED      RBC Comment ANISOCYTOSIS  1+                 Medications:  MEDICATIONS GIVEN:  Medications Administered         dexAMETHasone (DECADRON) injection 6 mg Admin Date  11/05/2024 Action  Given Dose  6 mg Rate   Route  IntraVENous Documented By  Nessa Irving RN        DOCEtaxel (TAXOTERE) 159 mg in sodium chloride 0.9 % 250 mL chemo IVPB Admin Date  11/05/2024 Action  New Bag Dose  159 mg Rate  290.9 mL/hr Route  IntraVENous Documented By  Nessa Irving, ESTELLA        ondansetron (ZOFRAN) injection 8 mg Admin Date  11/05/2024 Action  Given Dose  8 mg Rate   Route  IntraVENous Documented By  Nessa Irving RN        pegfilgrastim (NEULASTA) on-body injector 6 mg Admin Date  11/05/2024 Action  Given Dose  6 mg Rate   Route  SubCUTAneous Documented By  Nessa Irving RN              Two nurses verified prior to administering:    Drug name  Drug dose  Infusion volume or drug volume when prepared in a syringe  Rate of administration  Route of administration  Expiration dates and/or times  Appearance and physical integrity of the drugs  Rate set on infusion pump, when used  Sequencing of drug administration      Post-Infusion Vitals:  Vitals:    11/05/24 1359   BP: (!) 149/81   Pulse: 96   Resp: 16   Temp: 97.6 °F (36.4 °C)

## 2024-11-05 NOTE — PROGRESS NOTES
Chief Complaint   Patient presents with    Follow-up           Vitals:    11/05/24 1100   BP: 104/66   Pulse: (!) 108   Resp: 16   Temp: 98.1 °F (36.7 °C)   SpO2: 98%            1. Have you been to the ER, urgent care clinic since your last visit?  Hospitalized since your last visit?  Yes [facility], Fracture of R Tibia, [facility] x 2 weeks  2. Have you seen or consulted any other health care providers outside of the [facility] since your last visit?  Include any pap smears or colon screening. No

## 2024-11-07 ENCOUNTER — TELEPHONE (OUTPATIENT)
Age: 79
End: 2024-11-07

## 2024-11-07 NOTE — TELEPHONE ENCOUNTER
Juanjose from [facility] called stating that they received a referral for home health and was discharged from [facility]. She stated that they discontinued the Metoprolol and she just wanted to inform the doctor because he was the one to prescribe it. Also, she stated that the patient's heart rate is between 110-119 at rest and patient's bp was 102/58. If need be Juanjose can be contacted at [phone].    Thanks!

## 2024-11-12 ENCOUNTER — TELEPHONE (OUTPATIENT)
Age: 79
End: 2024-11-12

## 2024-11-12 NOTE — TELEPHONE ENCOUNTER
Dinora from [facility] called wanting to know if orders could be sent over for patient to have a urinalysis done to check for a uti. She stated that the patient was seen today and her bp was low and her blood sugar levels were high. A nurse will be seeing the patient again tomorrow so they were wanting the orders before then. Would like for orders to be faxed to [phone]. If need be Dinora can be contacted at [phone].    Thanks!

## 2024-11-18 ENCOUNTER — TELEPHONE (OUTPATIENT)
Age: 79
End: 2024-11-18

## 2024-11-18 DIAGNOSIS — R11.10 VOMITING, UNSPECIFIED VOMITING TYPE, UNSPECIFIED WHETHER NAUSEA PRESENT: Primary | ICD-10-CM

## 2024-11-18 RX ORDER — PANTOPRAZOLE SODIUM 40 MG/1
40 TABLET, DELAYED RELEASE ORAL
Qty: 90 TABLET | Refills: 1 | Status: SHIPPED | OUTPATIENT
Start: 2024-11-18 | End: 2024-11-19

## 2024-11-18 NOTE — TELEPHONE ENCOUNTER
11/18/24 @11:12AM- Patient ID verified x2, patient states that she had been vomiting every other day , having a hard time holding down liquids and has barely eaten the last two days. Patient denied having any other symptoms. This user asked if she was able to try increasing her mirtazapine at bedtime that was recommended by . The patient stated she tried that but it did not work for her. Patient then stated she would like to know if she can do some medical marijuana to help with her appetite. This user informed her that the NP and MD will be informed of this message.

## 2024-11-18 NOTE — TELEPHONE ENCOUNTER
Patient called and stated that she would like to be prescribed some medication that helps with your appetite. Requested a call back to discuss.       # [phone]

## 2024-11-18 NOTE — TELEPHONE ENCOUNTER
Is this the entire time between treatments?  I would not expect N/V from therapy to last more than a week after.        Patient should be taking Zofran to help prevent nausea.  She can take this every morning when she wakes up regardless of how she is feeling to see if this helps.     Increase mirtazapine to 15mg nightly (2 tabs).  Start pantoprazole (Protonix) daily - reflux may be contributing.  The steroids she takes with treatment can make the stomach lining inflamed and the protonix can help.     No contraindication to marijuana for nausea relief and appetite stimulant. We prefer patients take it orally - gummies, tincture, food stuffs rather than smoking.  She has a prescription for Marinol in the computer system which a synthetic form of the ingredient in marijuana that makes you hungry.  She should check with palliative care to see if she should stop Marinol if she is using marijuana.

## 2024-11-18 NOTE — TELEPHONE ENCOUNTER
11/18/24 2:48 PM Called patient. Verified patient ID x 2. Advised of recommendations per Dinora NP. Patient voiced understanding. She states that nausea/vomiting does not persist the entire time between treatments. Patient states that nausea/vomiting occurs first thing in the morning after having something to drink. She has attempted taking Zofran every 6 hours, with first dose being in the morning after using restroom. She also shared that she stopped oral dexamethasone with most recent treatment, stating Dr. Park advised patient to do so. Will update Dinora of above and call patient back if any other recommendations. Encouraged patient to call office back if symptoms persist or worsen despite medication recommendations from Dinora. Patient voiced understanding.     11/19/24 10:28 AM Called patient. Verified patient ID x 2. Advised of recommendations per Dr. Park. Patient voiced understanding but is unable to come in today. Patient also has PT appointment this afternoon. Scheduled for virtual visit at 10:45 AM today. Dr. Park updated as well.

## 2024-11-19 ENCOUNTER — TELEMEDICINE (OUTPATIENT)
Age: 79
End: 2024-11-19
Payer: MEDICARE

## 2024-11-19 ENCOUNTER — APPOINTMENT (OUTPATIENT)
Facility: HOSPITAL | Age: 79
End: 2024-11-19
Payer: MEDICARE

## 2024-11-19 DIAGNOSIS — N18.31 STAGE 3A CHRONIC KIDNEY DISEASE (HCC): ICD-10-CM

## 2024-11-19 DIAGNOSIS — C34.92 SQUAMOUS CELL CARCINOMA OF LUNG, LEFT (HCC): Primary | ICD-10-CM

## 2024-11-19 DIAGNOSIS — K59.03 DRUG-INDUCED CONSTIPATION: ICD-10-CM

## 2024-11-19 DIAGNOSIS — S82.64XD CLOSED NONDISPLACED FRACTURE OF LATERAL MALLEOLUS OF RIGHT FIBULA WITH ROUTINE HEALING: ICD-10-CM

## 2024-11-19 DIAGNOSIS — S82.65XD CLOSED NONDISPLACED FRACTURE OF LATERAL MALLEOLUS OF LEFT FIBULA WITH ROUTINE HEALING: Primary | ICD-10-CM

## 2024-11-19 DIAGNOSIS — Z79.4 TYPE 2 DIABETES MELLITUS WITH DIABETIC NEUROPATHY, WITH LONG-TERM CURRENT USE OF INSULIN (HCC): ICD-10-CM

## 2024-11-19 DIAGNOSIS — N18.4 TYPE 2 DIABETES MELLITUS WITH STAGE 4 CHRONIC KIDNEY DISEASE, UNSPECIFIED WHETHER LONG TERM INSULIN USE (HCC): ICD-10-CM

## 2024-11-19 DIAGNOSIS — E11.40 TYPE 2 DIABETES MELLITUS WITH DIABETIC NEUROPATHY, WITH LONG-TERM CURRENT USE OF INSULIN (HCC): ICD-10-CM

## 2024-11-19 DIAGNOSIS — I12.9 PARENCHYMAL RENAL HYPERTENSION, STAGE 1 THROUGH STAGE 4 OR UNSPECIFIED CHRONIC KIDNEY DISEASE: ICD-10-CM

## 2024-11-19 DIAGNOSIS — R11.2 NAUSEA AND VOMITING, UNSPECIFIED VOMITING TYPE: ICD-10-CM

## 2024-11-19 DIAGNOSIS — E11.22 TYPE 2 DIABETES MELLITUS WITH STAGE 4 CHRONIC KIDNEY DISEASE, UNSPECIFIED WHETHER LONG TERM INSULIN USE (HCC): ICD-10-CM

## 2024-11-19 PROCEDURE — 99214 OFFICE O/P EST MOD 30 MIN: CPT | Performed by: INTERNAL MEDICINE

## 2024-11-19 PROCEDURE — 1159F MED LIST DOCD IN RCRD: CPT | Performed by: INTERNAL MEDICINE

## 2024-11-19 PROCEDURE — G8399 PT W/DXA RESULTS DOCUMENT: HCPCS | Performed by: INTERNAL MEDICINE

## 2024-11-19 PROCEDURE — 1123F ACP DISCUSS/DSCN MKR DOCD: CPT | Performed by: INTERNAL MEDICINE

## 2024-11-19 PROCEDURE — G8427 DOCREV CUR MEDS BY ELIG CLIN: HCPCS | Performed by: INTERNAL MEDICINE

## 2024-11-19 PROCEDURE — G2211 COMPLEX E/M VISIT ADD ON: HCPCS | Performed by: INTERNAL MEDICINE

## 2024-11-19 PROCEDURE — 1160F RVW MEDS BY RX/DR IN RCRD: CPT | Performed by: INTERNAL MEDICINE

## 2024-11-19 PROCEDURE — 1090F PRES/ABSN URINE INCON ASSESS: CPT | Performed by: INTERNAL MEDICINE

## 2024-11-19 NOTE — PROGRESS NOTES
Eliselolis Tabares is a 79 y.o. female here for follow up of lung cancer. Patient with no complaints of pain at this time.   
hyperglycemia)  -- Claritin x 3 days at time of treatment  -- CT of c/a/p scheduled for 11/27/24  -- Will schedule for fluids right after future CT scans due to underlying CKD if possible.   -- Return on 12/3/24 for C7, NP visit.     2. Nausea / vomiting due to constipation:  Not related to chemotherapy necessarily as this has been present since rehab stay. No BM in past week despite Miralax daily, suppository, stool softeners. MgCitrate has worked in past.  -- Mg citrate today after appointments  -- Then switch from Miralax to Senna daily at bedtime.    3. 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 Anglin in Endocrinology. On glargine in PM and takes Lyumjev (insulin lispro) 20 units on day of treatment if BG is > 250. On Gabapentin  -- Following closely with Dr. Anglin.     4. Stage IIIa CKD:  Likely 2/2 DM, HTN, possibly exacerbated by chemotherapy. Crt range 1.2. Followed by Dr. De Leon.   -- Advised 60 oz water daily.     5. CAD / HTN:  On ASA, BB, statin as well as Lisinopril. Stent placement in proximal LAD on 10/5/23. On Plavix.      6. Anemia of chronic disease:  2/2 CKD. B12 and folate normal. S/p Injectafer x 1 in March 2024 in the setting of CKD.    7. R ankle lateral malleolus fracture:  Following with Dr. Mcqueen in Orthopedic surgery - was told she does not need surgery, but will continue wearing boot.     8. 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 No longer on Oxycodone.     Goals of care:  Disease is now incurable, but is treatable to improve quality and duration of life.    Emotional well being: Pt is coping well with his/her disease and has excellent support.  I appreciate the opportunity to participate in Ms. Elise Tabares's care.      The patient was evaluated through a synchronous (real-time) audio-video encounter. The

## 2024-11-19 NOTE — PROGRESS NOTES
Reviewed and Signed Home Health Certification/Recertification and Care Plan (see attached)    Elise AKASH Raysa  1945     Agency: [facility] Medicare #: [id number]  Medical Record #: [id number]  Provider #: [id number]    SOC Date: 11/7/2024  Certification Period: 11/7/2024-1/5/2025  Last F2F: LOV 7/9/2024; Hospital Discharge 10/19/2024    Services: SN/PT/OT    Gerber Mayo MD  11/19/2024 8:37 AM

## 2024-11-22 ENCOUNTER — APPOINTMENT (OUTPATIENT)
Facility: HOSPITAL | Age: 79
DRG: 871 | End: 2024-11-22
Payer: MEDICARE

## 2024-11-22 ENCOUNTER — TELEPHONE (OUTPATIENT)
Age: 79
End: 2024-11-22

## 2024-11-22 ENCOUNTER — HOSPITAL ENCOUNTER (INPATIENT)
Facility: HOSPITAL | Age: 79
LOS: 3 days | Discharge: HOME HEALTH | DRG: 871 | End: 2024-11-25
Attending: STUDENT IN AN ORGANIZED HEALTH CARE EDUCATION/TRAINING PROGRAM | Admitting: FAMILY MEDICINE
Payer: MEDICARE

## 2024-11-22 DIAGNOSIS — I95.9 HYPOTENSION, UNSPECIFIED HYPOTENSION TYPE: ICD-10-CM

## 2024-11-22 DIAGNOSIS — A41.9 SEPTICEMIA (HCC): Primary | ICD-10-CM

## 2024-11-22 DIAGNOSIS — R00.0 TACHYCARDIA: ICD-10-CM

## 2024-11-22 LAB
ALBUMIN SERPL-MCNC: 3.5 G/DL (ref 3.5–5)
ALBUMIN/GLOB SERPL: 1.1 (ref 1.1–2.2)
ALP SERPL-CCNC: 114 U/L (ref 45–117)
ALT SERPL-CCNC: 14 U/L (ref 12–78)
ANION GAP SERPL CALC-SCNC: 11 MMOL/L (ref 2–12)
AST SERPL-CCNC: 14 U/L (ref 15–37)
BASOPHILS # BLD: 0 K/UL (ref 0–0.1)
BASOPHILS NFR BLD: 0 % (ref 0–1)
BILIRUB SERPL-MCNC: 0.7 MG/DL (ref 0.2–1)
BUN SERPL-MCNC: 32 MG/DL (ref 6–20)
BUN/CREAT SERPL: 14 (ref 12–20)
CALCIUM SERPL-MCNC: 9 MG/DL (ref 8.5–10.1)
CHLORIDE SERPL-SCNC: 106 MMOL/L (ref 97–108)
CO2 SERPL-SCNC: 20 MMOL/L (ref 21–32)
CREAT SERPL-MCNC: 2.28 MG/DL (ref 0.55–1.02)
DIFFERENTIAL METHOD BLD: ABNORMAL
EOSINOPHIL # BLD: 0 K/UL (ref 0–0.4)
EOSINOPHIL NFR BLD: 0 % (ref 0–7)
ERYTHROCYTE [DISTWIDTH] IN BLOOD BY AUTOMATED COUNT: 16.6 % (ref 11.5–14.5)
GLOBULIN SER CALC-MCNC: 3.2 G/DL (ref 2–4)
GLUCOSE BLD STRIP.AUTO-MCNC: 337 MG/DL (ref 65–117)
GLUCOSE SERPL-MCNC: 328 MG/DL (ref 65–100)
HCT VFR BLD AUTO: 31.7 % (ref 35–47)
HGB BLD-MCNC: 10.2 G/DL (ref 11.5–16)
IMM GRANULOCYTES # BLD AUTO: 0.1 K/UL (ref 0–0.04)
IMM GRANULOCYTES NFR BLD AUTO: 1 % (ref 0–0.5)
LACTATE BLD-SCNC: 1.65 MMOL/L (ref 0.4–2)
LACTATE BLD-SCNC: 3.99 MMOL/L (ref 0.4–2)
LYMPHOCYTES # BLD: 0.6 K/UL (ref 0.8–3.5)
LYMPHOCYTES NFR BLD: 5 % (ref 12–49)
MCH RBC QN AUTO: 31.1 PG (ref 26–34)
MCHC RBC AUTO-ENTMCNC: 32.2 G/DL (ref 30–36.5)
MCV RBC AUTO: 96.6 FL (ref 80–99)
MONOCYTES # BLD: 0.4 K/UL (ref 0–1)
MONOCYTES NFR BLD: 3 % (ref 5–13)
NEUTS SEG # BLD: 11.2 K/UL (ref 1.8–8)
NEUTS SEG NFR BLD: 91 % (ref 32–75)
NRBC # BLD: 0.03 K/UL (ref 0–0.01)
NRBC BLD-RTO: 0.2 PER 100 WBC
PLATELET # BLD AUTO: 197 K/UL (ref 150–400)
PMV BLD AUTO: 11.4 FL (ref 8.9–12.9)
POTASSIUM SERPL-SCNC: 4.6 MMOL/L (ref 3.5–5.1)
PROCALCITONIN SERPL-MCNC: 0.67 NG/ML
PROT SERPL-MCNC: 6.7 G/DL (ref 6.4–8.2)
RBC # BLD AUTO: 3.28 M/UL (ref 3.8–5.2)
RBC MORPH BLD: ABNORMAL
SERVICE CMNT-IMP: ABNORMAL
SODIUM SERPL-SCNC: 137 MMOL/L (ref 136–145)
TROPONIN I SERPL HS-MCNC: 13 NG/L (ref 0–51)
WBC # BLD AUTO: 12.3 K/UL (ref 3.6–11)

## 2024-11-22 PROCEDURE — 71045 X-RAY EXAM CHEST 1 VIEW: CPT

## 2024-11-22 PROCEDURE — 84145 PROCALCITONIN (PCT): CPT

## 2024-11-22 PROCEDURE — 99285 EMERGENCY DEPT VISIT HI MDM: CPT

## 2024-11-22 PROCEDURE — 85025 COMPLETE CBC W/AUTO DIFF WBC: CPT

## 2024-11-22 PROCEDURE — 96374 THER/PROPH/DIAG INJ IV PUSH: CPT

## 2024-11-22 PROCEDURE — 2580000003 HC RX 258

## 2024-11-22 PROCEDURE — 87040 BLOOD CULTURE FOR BACTERIA: CPT

## 2024-11-22 PROCEDURE — 6360000002 HC RX W HCPCS: Performed by: NURSE PRACTITIONER

## 2024-11-22 PROCEDURE — 74018 RADEX ABDOMEN 1 VIEW: CPT

## 2024-11-22 PROCEDURE — 96361 HYDRATE IV INFUSION ADD-ON: CPT

## 2024-11-22 PROCEDURE — 6360000002 HC RX W HCPCS

## 2024-11-22 PROCEDURE — 84484 ASSAY OF TROPONIN QUANT: CPT

## 2024-11-22 PROCEDURE — 82962 GLUCOSE BLOOD TEST: CPT

## 2024-11-22 PROCEDURE — 83605 ASSAY OF LACTIC ACID: CPT

## 2024-11-22 PROCEDURE — 2580000003 HC RX 258: Performed by: NURSE PRACTITIONER

## 2024-11-22 PROCEDURE — 80053 COMPREHEN METABOLIC PANEL: CPT

## 2024-11-22 PROCEDURE — 36415 COLL VENOUS BLD VENIPUNCTURE: CPT

## 2024-11-22 PROCEDURE — 86738 MYCOPLASMA ANTIBODY: CPT

## 2024-11-22 PROCEDURE — 6370000000 HC RX 637 (ALT 250 FOR IP)

## 2024-11-22 PROCEDURE — 2060000000 HC ICU INTERMEDIATE R&B

## 2024-11-22 RX ORDER — INSULIN GLARGINE 100 [IU]/ML
6 INJECTION, SOLUTION SUBCUTANEOUS DAILY
Status: DISCONTINUED | OUTPATIENT
Start: 2024-11-23 | End: 2024-11-23

## 2024-11-22 RX ORDER — MIRTAZAPINE 15 MG/1
30 TABLET, FILM COATED ORAL NIGHTLY
Status: DISCONTINUED | OUTPATIENT
Start: 2024-11-23 | End: 2024-11-25 | Stop reason: HOSPADM

## 2024-11-22 RX ORDER — POLYETHYLENE GLYCOL 3350 17 G/17G
17 POWDER, FOR SOLUTION ORAL DAILY PRN
Status: DISCONTINUED | OUTPATIENT
Start: 2024-11-22 | End: 2024-11-23

## 2024-11-22 RX ORDER — ENOXAPARIN SODIUM 100 MG/ML
30 INJECTION SUBCUTANEOUS DAILY
Status: DISCONTINUED | OUTPATIENT
Start: 2024-11-23 | End: 2024-11-25 | Stop reason: HOSPADM

## 2024-11-22 RX ORDER — ACETAMINOPHEN 325 MG/1
650 TABLET ORAL EVERY 6 HOURS PRN
Status: DISCONTINUED | OUTPATIENT
Start: 2024-11-22 | End: 2024-11-25 | Stop reason: HOSPADM

## 2024-11-22 RX ORDER — SODIUM CHLORIDE 9 MG/ML
INJECTION, SOLUTION INTRAVENOUS CONTINUOUS
Status: DISPENSED | OUTPATIENT
Start: 2024-11-22 | End: 2024-11-23

## 2024-11-22 RX ORDER — 0.9 % SODIUM CHLORIDE 0.9 %
30 INTRAVENOUS SOLUTION INTRAVENOUS ONCE
Status: COMPLETED | OUTPATIENT
Start: 2024-11-22 | End: 2024-11-22

## 2024-11-22 RX ORDER — SODIUM CHLORIDE 9 MG/ML
INJECTION, SOLUTION INTRAVENOUS PRN
Status: DISCONTINUED | OUTPATIENT
Start: 2024-11-22 | End: 2024-11-25 | Stop reason: HOSPADM

## 2024-11-22 RX ORDER — ONDANSETRON 2 MG/ML
4 INJECTION INTRAMUSCULAR; INTRAVENOUS EVERY 6 HOURS PRN
Status: DISCONTINUED | OUTPATIENT
Start: 2024-11-22 | End: 2024-11-25 | Stop reason: HOSPADM

## 2024-11-22 RX ORDER — SODIUM CHLORIDE 0.9 % (FLUSH) 0.9 %
5-40 SYRINGE (ML) INJECTION PRN
Status: DISCONTINUED | OUTPATIENT
Start: 2024-11-22 | End: 2024-11-25 | Stop reason: HOSPADM

## 2024-11-22 RX ORDER — ASPIRIN 81 MG/1
81 TABLET, CHEWABLE ORAL DAILY
Status: DISCONTINUED | OUTPATIENT
Start: 2024-11-23 | End: 2024-11-25 | Stop reason: HOSPADM

## 2024-11-22 RX ORDER — ONDANSETRON 4 MG/1
4 TABLET, ORALLY DISINTEGRATING ORAL EVERY 8 HOURS PRN
Status: DISCONTINUED | OUTPATIENT
Start: 2024-11-22 | End: 2024-11-25 | Stop reason: HOSPADM

## 2024-11-22 RX ORDER — CLOPIDOGREL BISULFATE 75 MG/1
75 TABLET ORAL DAILY
Status: DISCONTINUED | OUTPATIENT
Start: 2024-11-23 | End: 2024-11-25 | Stop reason: HOSPADM

## 2024-11-22 RX ORDER — ROSUVASTATIN CALCIUM 10 MG/1
10 TABLET, COATED ORAL NIGHTLY
Status: DISCONTINUED | OUTPATIENT
Start: 2024-11-22 | End: 2024-11-25 | Stop reason: HOSPADM

## 2024-11-22 RX ORDER — INSULIN LISPRO 100 [IU]/ML
0-4 INJECTION, SOLUTION INTRAVENOUS; SUBCUTANEOUS
Status: DISCONTINUED | OUTPATIENT
Start: 2024-11-23 | End: 2024-11-25 | Stop reason: HOSPADM

## 2024-11-22 RX ORDER — SODIUM CHLORIDE 0.9 % (FLUSH) 0.9 %
5-40 SYRINGE (ML) INJECTION EVERY 12 HOURS SCHEDULED
Status: DISCONTINUED | OUTPATIENT
Start: 2024-11-22 | End: 2024-11-25 | Stop reason: HOSPADM

## 2024-11-22 RX ORDER — EZETIMIBE 10 MG/1
10 TABLET ORAL DAILY
Status: DISCONTINUED | OUTPATIENT
Start: 2024-11-23 | End: 2024-11-25 | Stop reason: HOSPADM

## 2024-11-22 RX ORDER — ISOSORBIDE MONONITRATE 30 MG/1
30 TABLET, EXTENDED RELEASE ORAL DAILY
Status: DISCONTINUED | OUTPATIENT
Start: 2024-11-23 | End: 2024-11-25 | Stop reason: HOSPADM

## 2024-11-22 RX ORDER — ACETAMINOPHEN 650 MG/1
650 SUPPOSITORY RECTAL EVERY 6 HOURS PRN
Status: DISCONTINUED | OUTPATIENT
Start: 2024-11-22 | End: 2024-11-25 | Stop reason: HOSPADM

## 2024-11-22 RX ORDER — DEXTROSE MONOHYDRATE 100 MG/ML
INJECTION, SOLUTION INTRAVENOUS CONTINUOUS PRN
Status: DISCONTINUED | OUTPATIENT
Start: 2024-11-22 | End: 2024-11-25 | Stop reason: HOSPADM

## 2024-11-22 RX ADMIN — ROSUVASTATIN CALCIUM 10 MG: 10 TABLET, FILM COATED ORAL at 23:48

## 2024-11-22 RX ADMIN — SODIUM CHLORIDE 2667 ML: 9 INJECTION, SOLUTION INTRAVENOUS at 20:32

## 2024-11-22 RX ADMIN — SODIUM CHLORIDE: 9 INJECTION, SOLUTION INTRAVENOUS at 23:34

## 2024-11-22 RX ADMIN — WATER 1000 MG: 1 INJECTION INTRAMUSCULAR; INTRAVENOUS; SUBCUTANEOUS at 20:32

## 2024-11-22 RX ADMIN — INSULIN LISPRO 3 UNITS: 100 INJECTION, SOLUTION INTRAVENOUS; SUBCUTANEOUS at 23:56

## 2024-11-22 RX ADMIN — AZITHROMYCIN 500 MG: 500 INJECTION, POWDER, LYOPHILIZED, FOR SOLUTION INTRAVENOUS at 23:46

## 2024-11-22 ASSESSMENT — PAIN - FUNCTIONAL ASSESSMENT: PAIN_FUNCTIONAL_ASSESSMENT: NONE - DENIES PAIN

## 2024-11-22 NOTE — TELEPHONE ENCOUNTER
11/22/24 2:20 PM Received incoming call from nurse Emmy with [facility]. Verified patient ID x 2. She states patient has continued complaints of constipation, has not had bowel movement in 2 weeks. Patient attempted Magnesium citrate per Dr. Park but vomited this back up. She continues to have nausea/vomiting and has decreased PO intake. Patient has only eaten 1/2 banana today. Patient reports not having much gas. Emmy also notes that patient feels dizzy and weak when not lying down. Emmy denies patient having abdominal distention. Bowel sounds hypoactive. BP 98/60 and heart rate 87. Blood sugar 287 at time of visit. Of note, patient has UTI and PCP prescribed Cefdinir. Discussed above with Dinora, nurse practitioner. Per Dinora, advised that patient proceed to ED for evaluation to rule out bowel obstruction. Emmy handed phone to patient, this nurse relayed recommendations to patient. Patient hesitant and stated she would rather go to hospital next week. Advised that clinical team recommends that patient go to hospital sooner versus later due to continued symptoms over the past 2 weeks. Patient voiced understanding and states she would go to a [facility], likely tomorrow. Again, reiterated recommendations per Dinora NP. No further questions or concerns at this time.      2:38 PM Received incoming call from Emmy home health nurse. She stated patient shared that she is going to hospital tomorrow and that this nurse advised that that was okay. Discussed recommendations per above. Per Dinora, discussed that home health nurse could offer to call EMS on patient's behalf and otherwise reinforce same recommendations to proceed to ED soon, if patient declines EMS transport. Emmy voiced understanding.

## 2024-11-23 PROBLEM — R11.2 NAUSEA AND VOMITING: Status: ACTIVE | Noted: 2023-03-11

## 2024-11-23 LAB
ANION GAP SERPL CALC-SCNC: 10 MMOL/L (ref 2–12)
BASOPHILS # BLD: 0 K/UL (ref 0–0.1)
BASOPHILS # BLD: NORMAL K/UL
BASOPHILS NFR BLD: 0 % (ref 0–1)
BASOPHILS NFR BLD: NORMAL %
BUN SERPL-MCNC: 28 MG/DL (ref 6–20)
BUN/CREAT SERPL: 17 (ref 12–20)
CALCIUM SERPL-MCNC: 8.4 MG/DL (ref 8.5–10.1)
CHLORIDE SERPL-SCNC: 109 MMOL/L (ref 97–108)
CO2 SERPL-SCNC: 21 MMOL/L (ref 21–32)
COMMENT:: NORMAL
CREAT SERPL-MCNC: 1.62 MG/DL (ref 0.55–1.02)
DIFFERENTIAL METHOD BLD: ABNORMAL
DIFFERENTIAL METHOD BLD: NORMAL
EOSINOPHIL # BLD: 0 K/UL (ref 0–0.4)
EOSINOPHIL # BLD: NORMAL K/UL
EOSINOPHIL NFR BLD: 0 % (ref 0–7)
EOSINOPHIL NFR BLD: NORMAL %
ERYTHROCYTE [DISTWIDTH] IN BLOOD BY AUTOMATED COUNT: 16.5 % (ref 11.5–14.5)
ERYTHROCYTE [DISTWIDTH] IN BLOOD BY AUTOMATED COUNT: NORMAL % (ref 11.5–14.5)
GLUCOSE BLD STRIP.AUTO-MCNC: 235 MG/DL (ref 65–117)
GLUCOSE BLD STRIP.AUTO-MCNC: 238 MG/DL (ref 65–117)
GLUCOSE BLD STRIP.AUTO-MCNC: 276 MG/DL (ref 65–117)
GLUCOSE BLD STRIP.AUTO-MCNC: 277 MG/DL (ref 65–117)
GLUCOSE BLD STRIP.AUTO-MCNC: 338 MG/DL (ref 65–117)
GLUCOSE SERPL-MCNC: 229 MG/DL (ref 65–100)
HCT VFR BLD AUTO: 28.9 % (ref 35–47)
HCT VFR BLD AUTO: NORMAL % (ref 35–47)
HGB BLD-MCNC: 9.3 G/DL (ref 11.5–16)
HGB BLD-MCNC: NORMAL G/DL (ref 11.5–16)
IMM GRANULOCYTES # BLD AUTO: 0.2 K/UL (ref 0–0.04)
IMM GRANULOCYTES # BLD AUTO: NORMAL K/UL
IMM GRANULOCYTES NFR BLD AUTO: 1 % (ref 0–0.5)
IMM GRANULOCYTES NFR BLD AUTO: NORMAL %
LYMPHOCYTES # BLD: 0.6 K/UL (ref 0.8–3.5)
LYMPHOCYTES # BLD: NORMAL K/UL
LYMPHOCYTES NFR BLD: 4 % (ref 12–49)
LYMPHOCYTES NFR BLD: NORMAL %
MCH RBC QN AUTO: 31 PG (ref 26–34)
MCH RBC QN AUTO: NORMAL PG (ref 26–34)
MCHC RBC AUTO-ENTMCNC: 32.2 G/DL (ref 30–36.5)
MCHC RBC AUTO-ENTMCNC: NORMAL G/DL (ref 30–36.5)
MCV RBC AUTO: 96.3 FL (ref 80–99)
MCV RBC AUTO: NORMAL FL (ref 80–99)
MONOCYTES # BLD: 0.6 K/UL (ref 0–1)
MONOCYTES # BLD: NORMAL K/UL
MONOCYTES NFR BLD: 4 % (ref 5–13)
MONOCYTES NFR BLD: NORMAL %
NEUTS SEG # BLD: 13.8 K/UL (ref 1.8–8)
NEUTS SEG # BLD: NORMAL K/UL
NEUTS SEG NFR BLD: 91 % (ref 32–75)
NEUTS SEG NFR BLD: NORMAL %
NRBC # BLD: 0 K/UL (ref 0–0.01)
NRBC # BLD: NORMAL K/UL (ref 0–0.01)
NRBC BLD-RTO: 0 PER 100 WBC
NRBC BLD-RTO: NORMAL PER 100 WBC
PLATELET # BLD AUTO: 184 K/UL (ref 150–400)
PLATELET # BLD AUTO: NORMAL K/UL (ref 150–400)
PMV BLD AUTO: 10.7 FL (ref 8.9–12.9)
PMV BLD AUTO: NORMAL FL (ref 8.9–12.9)
POTASSIUM SERPL-SCNC: 4 MMOL/L (ref 3.5–5.1)
RBC # BLD AUTO: 3 M/UL (ref 3.8–5.2)
RBC # BLD AUTO: NORMAL M/UL (ref 3.8–5.2)
RBC MORPH BLD: ABNORMAL
RBC MORPH BLD: ABNORMAL
SERVICE CMNT-IMP: ABNORMAL
SODIUM SERPL-SCNC: 140 MMOL/L (ref 136–145)
SPECIMEN HOLD: NORMAL
WBC # BLD AUTO: 15.2 K/UL (ref 3.6–11)
WBC # BLD AUTO: NORMAL K/UL (ref 3.6–11)

## 2024-11-23 PROCEDURE — 80048 BASIC METABOLIC PNL TOTAL CA: CPT

## 2024-11-23 PROCEDURE — 99223 1ST HOSP IP/OBS HIGH 75: CPT | Performed by: FAMILY MEDICINE

## 2024-11-23 PROCEDURE — 87449 NOS EACH ORGANISM AG IA: CPT

## 2024-11-23 PROCEDURE — 87899 AGENT NOS ASSAY W/OPTIC: CPT

## 2024-11-23 PROCEDURE — 82962 GLUCOSE BLOOD TEST: CPT

## 2024-11-23 PROCEDURE — 94761 N-INVAS EAR/PLS OXIMETRY MLT: CPT

## 2024-11-23 PROCEDURE — 6370000000 HC RX 637 (ALT 250 FOR IP)

## 2024-11-23 PROCEDURE — 36415 COLL VENOUS BLD VENIPUNCTURE: CPT

## 2024-11-23 PROCEDURE — 2060000000 HC ICU INTERMEDIATE R&B

## 2024-11-23 PROCEDURE — 6360000002 HC RX W HCPCS

## 2024-11-23 PROCEDURE — 2580000003 HC RX 258

## 2024-11-23 PROCEDURE — 85025 COMPLETE CBC W/AUTO DIFF WBC: CPT

## 2024-11-23 RX ORDER — INSULIN GLARGINE 100 [IU]/ML
10 INJECTION, SOLUTION SUBCUTANEOUS DAILY
Status: DISCONTINUED | OUTPATIENT
Start: 2024-11-24 | End: 2024-11-25 | Stop reason: HOSPADM

## 2024-11-23 RX ORDER — PANTOPRAZOLE SODIUM 40 MG/1
40 TABLET, DELAYED RELEASE ORAL
Status: DISCONTINUED | OUTPATIENT
Start: 2024-11-23 | End: 2024-11-25 | Stop reason: HOSPADM

## 2024-11-23 RX ORDER — SODIUM PHOSPHATE, DIBASIC AND SODIUM PHOSPHATE, MONOBASIC 7; 19 G/230ML; G/230ML
1 ENEMA RECTAL
Status: DISCONTINUED | OUTPATIENT
Start: 2024-11-23 | End: 2024-11-23

## 2024-11-23 RX ORDER — POLYETHYLENE GLYCOL 3350 17 G/17G
17 POWDER, FOR SOLUTION ORAL DAILY
Status: DISCONTINUED | OUTPATIENT
Start: 2024-11-23 | End: 2024-11-25 | Stop reason: HOSPADM

## 2024-11-23 RX ORDER — SENNA AND DOCUSATE SODIUM 50; 8.6 MG/1; MG/1
2 TABLET, FILM COATED ORAL DAILY
Status: DISCONTINUED | OUTPATIENT
Start: 2024-11-23 | End: 2024-11-25 | Stop reason: HOSPADM

## 2024-11-23 RX ORDER — SODIUM PHOSPHATE, DIBASIC AND SODIUM PHOSPHATE, MONOBASIC 7; 19 G/230ML; G/230ML
1 ENEMA RECTAL ONCE
Status: COMPLETED | OUTPATIENT
Start: 2024-11-23 | End: 2024-11-23

## 2024-11-23 RX ADMIN — SODIUM PHOSPHATE, DIBASIC AND SODIUM PHOSPHATE, MONOBASIC 1 ENEMA: 7; 19 ENEMA RECTAL at 12:33

## 2024-11-23 RX ADMIN — INSULIN GLARGINE 6 UNITS: 100 INJECTION, SOLUTION SUBCUTANEOUS at 09:02

## 2024-11-23 RX ADMIN — PANTOPRAZOLE SODIUM 40 MG: 40 TABLET, DELAYED RELEASE ORAL at 05:24

## 2024-11-23 RX ADMIN — MIRTAZAPINE 30 MG: 15 TABLET, FILM COATED ORAL at 20:04

## 2024-11-23 RX ADMIN — WATER 1000 MG: 1 INJECTION INTRAMUSCULAR; INTRAVENOUS; SUBCUTANEOUS at 20:04

## 2024-11-23 RX ADMIN — INSULIN LISPRO 1 UNITS: 100 INJECTION, SOLUTION INTRAVENOUS; SUBCUTANEOUS at 17:00

## 2024-11-23 RX ADMIN — SODIUM CHLORIDE, PRESERVATIVE FREE 10 ML: 5 INJECTION INTRAVENOUS at 09:03

## 2024-11-23 RX ADMIN — INSULIN LISPRO 3 UNITS: 100 INJECTION, SOLUTION INTRAVENOUS; SUBCUTANEOUS at 20:41

## 2024-11-23 RX ADMIN — INSULIN LISPRO 1 UNITS: 100 INJECTION, SOLUTION INTRAVENOUS; SUBCUTANEOUS at 09:02

## 2024-11-23 RX ADMIN — CLOPIDOGREL BISULFATE 75 MG: 75 TABLET ORAL at 09:00

## 2024-11-23 RX ADMIN — SODIUM CHLORIDE, PRESERVATIVE FREE 10 ML: 5 INJECTION INTRAVENOUS at 20:05

## 2024-11-23 RX ADMIN — ASPIRIN 81 MG: 81 TABLET, CHEWABLE ORAL at 09:01

## 2024-11-23 RX ADMIN — EZETIMIBE 10 MG: 10 TABLET ORAL at 09:00

## 2024-11-23 RX ADMIN — ENOXAPARIN SODIUM 30 MG: 100 INJECTION SUBCUTANEOUS at 09:01

## 2024-11-23 RX ADMIN — ACETAMINOPHEN 650 MG: 325 TABLET ORAL at 09:15

## 2024-11-23 RX ADMIN — ROSUVASTATIN CALCIUM 10 MG: 10 TABLET, FILM COATED ORAL at 20:05

## 2024-11-23 RX ADMIN — INSULIN LISPRO 2 UNITS: 100 INJECTION, SOLUTION INTRAVENOUS; SUBCUTANEOUS at 12:26

## 2024-11-23 RX ADMIN — AZITHROMYCIN 500 MG: 500 INJECTION, POWDER, LYOPHILIZED, FOR SOLUTION INTRAVENOUS at 20:14

## 2024-11-23 ASSESSMENT — PAIN DESCRIPTION - ORIENTATION: ORIENTATION: RIGHT

## 2024-11-23 ASSESSMENT — PAIN DESCRIPTION - DESCRIPTORS: DESCRIPTORS: ACHING

## 2024-11-23 ASSESSMENT — PAIN SCALES - GENERAL: PAINLEVEL_OUTOF10: 4

## 2024-11-23 ASSESSMENT — PAIN DESCRIPTION - LOCATION: LOCATION: FOOT

## 2024-11-23 ASSESSMENT — PAIN - FUNCTIONAL ASSESSMENT: PAIN_FUNCTIONAL_ASSESSMENT: PREVENTS OR INTERFERES SOME ACTIVE ACTIVITIES AND ADLS

## 2024-11-23 NOTE — H&P
mL/hr  -Strict I&O, monitor to keep urine output > 30 mL/hr    Anemia of Chronic Disease: Secondary to CKD. BL hg ~ 9. POA Hg was 10.2.   - Continue to monitor daily CBC.     Lung CA: SCC of the JANELL. Followed by Dr. Park. Current treatment includes Docetaxel 75mg/m2 [id number] (7/23/24 - current).   - May consider oncology consult if symptoms do not improve.     T2DM: Home medications include Insulin Glargine 6u daily, Ozempic 0.5m once per week.   - HgbA1C  - Hold home Ozempic.  -Insulin Glargine 6u daily.   - Insulin Sliding Scale low sensitivity with AC&HS glucose checks.  - Hypoglycemia protocols ordered.      Hypertension: Previously on Metoprolol and Lisinopril, these were held after she was discharged from [facility]. She has not been taking Imdur due to decrease PO intake.   - Will hold home medications ISO hypotension.   - Will continue to monitor at this time and readjust as BP's trend.    Hyperlipidemia/CAD: s/p PCI prox LAD 10/2023. On home Crestor 10mg qd and Zetia 10mg qd, Plavix 75mg qd  - Continue home medications.    Depression: Chronic well controlled on home Mirtazapine 30mg nightly.   -Continue current medication    Ankle Fracture: Following with [facility]. RLE Boot x 6 weeks.   -RN order to wear RLE boot with weight bearing.     Obesity: Body mass index is 32.62 kg/m².  - Encourage lifestyle modifications and further follow up outpatient.}      FEN/GI - Full liquid diet advance as tolerated. NS at 100 mL/hr.  Activity - Out of bed with assistance, RLE boot with weight bearing.   DVT prophylaxis - Lovenox  GI prophylaxis - Not indicated at this time  Fall prophylaxis - Not indicated at this time.  Disposition - Admit to Telemetry. Plan to d/c to home.  Code Status - Full, discussed with patient / caregivers.  Point of Contact     Raquel Coyne (Child)  [phone] (Mobile)         Patient Elise Tabares will be discussed with Dr. Catia Wang.    12:34 AM, 11/23/24  Lesia Heck,

## 2024-11-23 NOTE — ED PROVIDER NOTES
[facility] EMERGENCY DEPT  EMERGENCY DEPARTMENT ENCOUNTER      Pt Name: Elise Tabares  MRN: [id number]  Birthdate 1945  Date of evaluation: 11/22/2024  Provider: SHANIQUE Reynolds NP      HISTORY OF PRESENT ILLNESS      Chief Complaint:  Dehydration, hypotension, nausea/vomiting, constipation, and shortness of breath.      History of Present Illness:  The patient is a 79-year-old female who presents today following a referral from her oncologist for dehydration, hypotension, nausea/vomiting, and constipation. She reports feeling short of breath for approximately two weeks, using her inhaler as needed. The patient has a history of lung cancer, with her last chemo treatment occurring on 11/5.  She denies any fevers. However, she has been experiencing ongoing symptoms consistent with a urinary tract infection (UTI), which was recently diagnosed, but she has not yet started antibiotics. Her vital signs in triage revealed hypotension, tachycardia, and an overall ill appearance, prompting a code sepsis to be called.      Past Medical History:  1993: Arthritis      Comment:  Lower back, also has T-11 compression fracture  No date: CAD (coronary artery disease)  No date: Cerebral artery occlusion with cerebral infarction ([facility])      Comment:  Evident on head CT, Chronic Rt lacunar infarcts  09/08/2010: Chronic back pain  No date: Chronic kidney disease (CKD)      Comment:  Stage 3  No date: COPD (chronic obstructive pulmonary disease) ([facility])  05/03/2010: DM type 2 (diabetes mellitus, type 2) ([facility])  05/03/2010: HTN (hypertension)  No date: Hyperlipidemia  03/14/2023: Lung cancer ([facility])      Comment:  Lt upper lobe squamous cell  1997: MI (myocardial infarction) ([facility])      Comment:  s/p PCI  No date: Obesity (BMI 30-39.9)  12/12/2014: Obstructive sleep apnea (adult) (pediatric)  No date: Thyroid mass  10/2024: Tibia fracture  Past Surgical History:  10/05/2023: CARDIAC PROCEDURE; N/A      Comment:  Left heart cath /

## 2024-11-24 LAB
ANION GAP SERPL CALC-SCNC: 9 MMOL/L (ref 2–12)
BASOPHILS # BLD: 0 K/UL (ref 0–0.1)
BASOPHILS NFR BLD: 0 % (ref 0–1)
BUN SERPL-MCNC: 22 MG/DL (ref 6–20)
BUN/CREAT SERPL: 16 (ref 12–20)
CALCIUM SERPL-MCNC: 8.2 MG/DL (ref 8.5–10.1)
CHLORIDE SERPL-SCNC: 107 MMOL/L (ref 97–108)
CO2 SERPL-SCNC: 24 MMOL/L (ref 21–32)
CREAT SERPL-MCNC: 1.36 MG/DL (ref 0.55–1.02)
DIFFERENTIAL METHOD BLD: ABNORMAL
EOSINOPHIL # BLD: 0 K/UL (ref 0–0.4)
EOSINOPHIL NFR BLD: 0 % (ref 0–7)
ERYTHROCYTE [DISTWIDTH] IN BLOOD BY AUTOMATED COUNT: 16.2 % (ref 11.5–14.5)
GLUCOSE BLD STRIP.AUTO-MCNC: 189 MG/DL (ref 65–117)
GLUCOSE BLD STRIP.AUTO-MCNC: 211 MG/DL (ref 65–117)
GLUCOSE BLD STRIP.AUTO-MCNC: 212 MG/DL (ref 65–117)
GLUCOSE BLD STRIP.AUTO-MCNC: 262 MG/DL (ref 65–117)
GLUCOSE SERPL-MCNC: 235 MG/DL (ref 65–100)
HCT VFR BLD AUTO: 28.3 % (ref 35–47)
HGB BLD-MCNC: 9.2 G/DL (ref 11.5–16)
IMM GRANULOCYTES # BLD AUTO: 0.1 K/UL (ref 0–0.04)
IMM GRANULOCYTES NFR BLD AUTO: 1 % (ref 0–0.5)
LYMPHOCYTES # BLD: 0.5 K/UL (ref 0.8–3.5)
LYMPHOCYTES NFR BLD: 4 % (ref 12–49)
MCH RBC QN AUTO: 31.3 PG (ref 26–34)
MCHC RBC AUTO-ENTMCNC: 32.5 G/DL (ref 30–36.5)
MCV RBC AUTO: 96.3 FL (ref 80–99)
MONOCYTES # BLD: 0.6 K/UL (ref 0–1)
MONOCYTES NFR BLD: 5 % (ref 5–13)
NEUTS SEG # BLD: 11 K/UL (ref 1.8–8)
NEUTS SEG NFR BLD: 90 % (ref 32–75)
NRBC # BLD: 0 K/UL (ref 0–0.01)
NRBC BLD-RTO: 0 PER 100 WBC
PLATELET # BLD AUTO: 185 K/UL (ref 150–400)
PMV BLD AUTO: 10.6 FL (ref 8.9–12.9)
POTASSIUM SERPL-SCNC: 3.8 MMOL/L (ref 3.5–5.1)
RBC # BLD AUTO: 2.94 M/UL (ref 3.8–5.2)
SERVICE CMNT-IMP: ABNORMAL
SODIUM SERPL-SCNC: 140 MMOL/L (ref 136–145)
WBC # BLD AUTO: 12.3 K/UL (ref 3.6–11)

## 2024-11-24 PROCEDURE — 6370000000 HC RX 637 (ALT 250 FOR IP)

## 2024-11-24 PROCEDURE — 2580000003 HC RX 258

## 2024-11-24 PROCEDURE — 85025 COMPLETE CBC W/AUTO DIFF WBC: CPT

## 2024-11-24 PROCEDURE — 6360000002 HC RX W HCPCS

## 2024-11-24 PROCEDURE — 82962 GLUCOSE BLOOD TEST: CPT

## 2024-11-24 PROCEDURE — 94761 N-INVAS EAR/PLS OXIMETRY MLT: CPT

## 2024-11-24 PROCEDURE — 99233 SBSQ HOSP IP/OBS HIGH 50: CPT | Performed by: FAMILY MEDICINE

## 2024-11-24 PROCEDURE — 2060000000 HC ICU INTERMEDIATE R&B

## 2024-11-24 PROCEDURE — 80048 BASIC METABOLIC PNL TOTAL CA: CPT

## 2024-11-24 RX ADMIN — ENOXAPARIN SODIUM 30 MG: 100 INJECTION SUBCUTANEOUS at 09:08

## 2024-11-24 RX ADMIN — ROSUVASTATIN CALCIUM 10 MG: 10 TABLET, FILM COATED ORAL at 21:19

## 2024-11-24 RX ADMIN — EZETIMIBE 10 MG: 10 TABLET ORAL at 09:07

## 2024-11-24 RX ADMIN — SODIUM CHLORIDE, PRESERVATIVE FREE 10 ML: 5 INJECTION INTRAVENOUS at 09:09

## 2024-11-24 RX ADMIN — INSULIN LISPRO 1 UNITS: 100 INJECTION, SOLUTION INTRAVENOUS; SUBCUTANEOUS at 21:20

## 2024-11-24 RX ADMIN — INSULIN LISPRO 2 UNITS: 100 INJECTION, SOLUTION INTRAVENOUS; SUBCUTANEOUS at 09:08

## 2024-11-24 RX ADMIN — SODIUM CHLORIDE, PRESERVATIVE FREE 10 ML: 5 INJECTION INTRAVENOUS at 21:20

## 2024-11-24 RX ADMIN — INSULIN GLARGINE 10 UNITS: 100 INJECTION, SOLUTION SUBCUTANEOUS at 09:08

## 2024-11-24 RX ADMIN — ACETAMINOPHEN 650 MG: 325 TABLET ORAL at 22:43

## 2024-11-24 RX ADMIN — MIRTAZAPINE 30 MG: 15 TABLET, FILM COATED ORAL at 21:19

## 2024-11-24 RX ADMIN — PANTOPRAZOLE SODIUM 40 MG: 40 TABLET, DELAYED RELEASE ORAL at 06:29

## 2024-11-24 RX ADMIN — ASPIRIN 81 MG: 81 TABLET, CHEWABLE ORAL at 09:07

## 2024-11-24 RX ADMIN — INSULIN LISPRO 1 UNITS: 100 INJECTION, SOLUTION INTRAVENOUS; SUBCUTANEOUS at 17:37

## 2024-11-24 RX ADMIN — ONDANSETRON 4 MG: 4 TABLET, ORALLY DISINTEGRATING ORAL at 09:15

## 2024-11-24 RX ADMIN — CLOPIDOGREL BISULFATE 75 MG: 75 TABLET ORAL at 09:07

## 2024-11-24 RX ADMIN — INSULIN LISPRO 1 UNITS: 100 INJECTION, SOLUTION INTRAVENOUS; SUBCUTANEOUS at 12:52

## 2024-11-24 RX ADMIN — WATER 1000 MG: 1 INJECTION INTRAMUSCULAR; INTRAVENOUS; SUBCUTANEOUS at 21:20

## 2024-11-24 RX ADMIN — AZITHROMYCIN 500 MG: 500 INJECTION, POWDER, LYOPHILIZED, FOR SOLUTION INTRAVENOUS at 21:30

## 2024-11-24 ASSESSMENT — PAIN SCALES - GENERAL
PAINLEVEL_OUTOF10: 0
PAINLEVEL_OUTOF10: 0

## 2024-11-24 ASSESSMENT — PAIN DESCRIPTION - LOCATION: LOCATION: LEG

## 2024-11-24 ASSESSMENT — PAIN SCALES - WONG BAKER: WONGBAKER_NUMERICALRESPONSE: NO HURT

## 2024-11-24 NOTE — CARE COORDINATION
Care Management Initial Assessment  11/24/2024 1:14 PM  If patient is discharged prior to next notation, then this note serves as note for discharge by case management.    Reason for Admission:   Tachycardia [R00.0]  Septicemia (HCC) [A41.9]  Sepsis (HCC) [A41.9]  Hypotension, unspecified hypotension type [I95.9]         Patient Admission Status: Inpatient  Date Admitted to INP: 11/22/24  RUR: Readmission Risk Score: 19.5    Hospitalization in the last 30 days (Readmission):  No        Advance Care Planning:  Code Status: Full Code  Primary Healthcare Decision Maker:     Advance Directive: has NO advanced directive - not interested in additional information     __________________________________________________________________________  Assessment:   Transition of care  RUR 20%    PT/OT consult  Blood/urine cx  Telemetry  IVF  Labs  Pending the need for oncology consult    Met with patient at beside, reports she was independent prior to admission.  Patient reports she resides with her daughter, who provides support if needed.   Patient reports her dtr works from home and is there during the day.  DME in home: wheelchair, grab bars, shower bench.  Reports she uses the wheelchair to navigate through the home.   PT/OT evals are pending, no preference of HH if recommended.     Case management attempted to call sg Coyne [phone], no answer.    Transport-family if appropriate (RLE ankle fracture) followed by Ortho.     Case management to follow to assist with appropriate discharge plan.  JW            Comments:     Discharge Concerns: []Yes [x]No []Unknown   Describe:    Financial concerns/barriers: []Yes, explain: [x]No []Unknown/Not discussed  __________________________________________________________________________    Insurer:   Active Insurance as of 11/22/2024       Primary Coverage       Payor Plan Insurance Group Employer/Plan Group    MEDICARE MEDICARE PART A AND B        Payor Address

## 2024-11-25 VITALS
HEIGHT: 65 IN | OXYGEN SATURATION: 98 % | SYSTOLIC BLOOD PRESSURE: 110 MMHG | BODY MASS INDEX: 32.65 KG/M2 | RESPIRATION RATE: 16 BRPM | WEIGHT: 196 LBS | TEMPERATURE: 97.7 F | DIASTOLIC BLOOD PRESSURE: 66 MMHG | HEART RATE: 101 BPM

## 2024-11-25 PROBLEM — N39.0 URINARY TRACT INFECTION WITHOUT HEMATURIA: Status: ACTIVE | Noted: 2024-11-25

## 2024-11-25 PROBLEM — J18.9 COMMUNITY ACQUIRED PNEUMONIA: Status: ACTIVE | Noted: 2024-11-25

## 2024-11-25 PROBLEM — E11.9 DIABETES MELLITUS (HCC): Status: ACTIVE | Noted: 2024-11-25

## 2024-11-25 PROBLEM — C34.90 MALIGNANT NEOPLASM OF LUNG (HCC): Status: ACTIVE | Noted: 2024-11-25

## 2024-11-25 PROBLEM — A41.9 SEPTICEMIA (HCC): Status: ACTIVE | Noted: 2024-11-25

## 2024-11-25 LAB
ANION GAP SERPL CALC-SCNC: 5 MMOL/L (ref 2–12)
BASOPHILS # BLD: 0.1 K/UL (ref 0–0.1)
BASOPHILS NFR BLD: 1 % (ref 0–1)
BUN SERPL-MCNC: 22 MG/DL (ref 6–20)
BUN/CREAT SERPL: 15 (ref 12–20)
CALCIUM SERPL-MCNC: 8.1 MG/DL (ref 8.5–10.1)
CHLORIDE SERPL-SCNC: 108 MMOL/L (ref 97–108)
CO2 SERPL-SCNC: 26 MMOL/L (ref 21–32)
CREAT SERPL-MCNC: 1.47 MG/DL (ref 0.55–1.02)
DIFFERENTIAL METHOD BLD: ABNORMAL
EOSINOPHIL # BLD: 0 K/UL (ref 0–0.4)
EOSINOPHIL NFR BLD: 0 % (ref 0–7)
ERYTHROCYTE [DISTWIDTH] IN BLOOD BY AUTOMATED COUNT: 16.1 % (ref 11.5–14.5)
GLUCOSE BLD STRIP.AUTO-MCNC: 155 MG/DL (ref 65–117)
GLUCOSE BLD STRIP.AUTO-MCNC: 242 MG/DL (ref 65–117)
GLUCOSE SERPL-MCNC: 142 MG/DL (ref 65–100)
HCT VFR BLD AUTO: 26.8 % (ref 35–47)
HGB BLD-MCNC: 8.6 G/DL (ref 11.5–16)
IMM GRANULOCYTES # BLD AUTO: 0 K/UL (ref 0–0.04)
IMM GRANULOCYTES NFR BLD AUTO: 0 % (ref 0–0.5)
LYMPHOCYTES # BLD: 0.6 K/UL (ref 0.8–3.5)
LYMPHOCYTES NFR BLD: 7 % (ref 12–49)
MCH RBC QN AUTO: 31.5 PG (ref 26–34)
MCHC RBC AUTO-ENTMCNC: 32.1 G/DL (ref 30–36.5)
MCV RBC AUTO: 98.2 FL (ref 80–99)
MONOCYTES # BLD: 0.5 K/UL (ref 0–1)
MONOCYTES NFR BLD: 6 % (ref 5–13)
NEUTS SEG # BLD: 7.2 K/UL (ref 1.8–8)
NEUTS SEG NFR BLD: 86 % (ref 32–75)
NRBC # BLD: 0 K/UL (ref 0–0.01)
NRBC BLD-RTO: 0 PER 100 WBC
PLATELET # BLD AUTO: 189 K/UL (ref 150–400)
PMV BLD AUTO: 10.3 FL (ref 8.9–12.9)
POTASSIUM SERPL-SCNC: 3.8 MMOL/L (ref 3.5–5.1)
RBC # BLD AUTO: 2.73 M/UL (ref 3.8–5.2)
RBC MORPH BLD: ABNORMAL
SERVICE CMNT-IMP: ABNORMAL
SERVICE CMNT-IMP: ABNORMAL
SODIUM SERPL-SCNC: 139 MMOL/L (ref 136–145)
WBC # BLD AUTO: 8.4 K/UL (ref 3.6–11)

## 2024-11-25 PROCEDURE — 2580000003 HC RX 258

## 2024-11-25 PROCEDURE — 97530 THERAPEUTIC ACTIVITIES: CPT

## 2024-11-25 PROCEDURE — 36415 COLL VENOUS BLD VENIPUNCTURE: CPT

## 2024-11-25 PROCEDURE — 82962 GLUCOSE BLOOD TEST: CPT

## 2024-11-25 PROCEDURE — 99238 HOSP IP/OBS DSCHRG MGMT 30/<: CPT | Performed by: FAMILY MEDICINE

## 2024-11-25 PROCEDURE — 97535 SELF CARE MNGMENT TRAINING: CPT

## 2024-11-25 PROCEDURE — 94760 N-INVAS EAR/PLS OXIMETRY 1: CPT

## 2024-11-25 PROCEDURE — 85025 COMPLETE CBC W/AUTO DIFF WBC: CPT

## 2024-11-25 PROCEDURE — 97165 OT EVAL LOW COMPLEX 30 MIN: CPT

## 2024-11-25 PROCEDURE — 80048 BASIC METABOLIC PNL TOTAL CA: CPT

## 2024-11-25 PROCEDURE — 6360000002 HC RX W HCPCS

## 2024-11-25 PROCEDURE — 6370000000 HC RX 637 (ALT 250 FOR IP)

## 2024-11-25 PROCEDURE — 97161 PT EVAL LOW COMPLEX 20 MIN: CPT

## 2024-11-25 PROCEDURE — 97116 GAIT TRAINING THERAPY: CPT

## 2024-11-25 RX ORDER — DOXYCYCLINE HYCLATE 100 MG
100 TABLET ORAL EVERY 12 HOURS SCHEDULED
Status: DISCONTINUED | OUTPATIENT
Start: 2024-11-25 | End: 2024-11-25 | Stop reason: HOSPADM

## 2024-11-25 RX ORDER — MIRTAZAPINE 30 MG/1
30 TABLET, FILM COATED ORAL NIGHTLY
Qty: 30 TABLET | Refills: 3 | Status: SHIPPED | OUTPATIENT
Start: 2024-11-25

## 2024-11-25 RX ORDER — CLOPIDOGREL BISULFATE 75 MG/1
75 TABLET ORAL DAILY
Qty: 30 TABLET | Refills: 3 | Status: SHIPPED | OUTPATIENT
Start: 2024-11-26

## 2024-11-25 RX ORDER — DOXYCYCLINE HYCLATE 100 MG
100 TABLET ORAL EVERY 12 HOURS SCHEDULED
Qty: 7 TABLET | Refills: 0 | Status: SHIPPED | OUTPATIENT
Start: 2024-11-25 | End: 2024-11-29

## 2024-11-25 RX ADMIN — EZETIMIBE 10 MG: 10 TABLET ORAL at 08:25

## 2024-11-25 RX ADMIN — CLOPIDOGREL BISULFATE 75 MG: 75 TABLET ORAL at 08:25

## 2024-11-25 RX ADMIN — PANTOPRAZOLE SODIUM 40 MG: 40 TABLET, DELAYED RELEASE ORAL at 08:25

## 2024-11-25 RX ADMIN — AMOXICILLIN AND CLAVULANATE POTASSIUM 1 TABLET: 875; 125 TABLET, FILM COATED ORAL at 09:31

## 2024-11-25 RX ADMIN — SENNOSIDES AND DOCUSATE SODIUM 2 TABLET: 50; 8.6 TABLET ORAL at 08:25

## 2024-11-25 RX ADMIN — INSULIN LISPRO 1 UNITS: 100 INJECTION, SOLUTION INTRAVENOUS; SUBCUTANEOUS at 11:45

## 2024-11-25 RX ADMIN — DOXYCYCLINE HYCLATE 100 MG: 100 TABLET, COATED ORAL at 09:31

## 2024-11-25 RX ADMIN — SODIUM CHLORIDE, PRESERVATIVE FREE 10 ML: 5 INJECTION INTRAVENOUS at 08:26

## 2024-11-25 RX ADMIN — INSULIN GLARGINE 10 UNITS: 100 INJECTION, SOLUTION SUBCUTANEOUS at 08:25

## 2024-11-25 RX ADMIN — ENOXAPARIN SODIUM 30 MG: 100 INJECTION SUBCUTANEOUS at 08:25

## 2024-11-25 RX ADMIN — ASPIRIN 81 MG: 81 TABLET, CHEWABLE ORAL at 08:25

## 2024-11-25 NOTE — CARE COORDINATION
Care Management Progress Note    Reason for Admission:   Tachycardia [R00.0]  Septicemia (HCC) [A41.9]  Sepsis (HCC) [A41.9]  Hypotension, unspecified hypotension type [I95.9]         Patient Admission Status: Inpatient  RUR:   Hospitalization in the last 30 days (Readmission):  No        Transition of care plan:  Patient is current with [facility]. CM sent WILLIAN orders via [facility] to continue HH services at dc. Per IDR, patient may dc home today pending improvement in clinical status. Family will transport at time of dc.   ______________________  Josey PEGUERO, ESTELLA  Care Management  11/25/2024    Care Management Discharge Note:         11/25/24 1538   Discharge Planning   Patient expects to be discharged to: House   Services At/After Discharge   Transition of Care Consult (CM Consult) Home Health   Services At/After Discharge OT;PT;Nursing services   Mode of Transport at Discharge Other (see comment)  (family)   Confirm Follow Up Transport Self       Patient with dc orders.[facility] Family will transport home at time of dc.  ______________________  Josey PEGUERO RN  Care Management  11/25/2024     Is This A New Presentation, Or A Follow-Up?: Skin Lesions How Severe Is Your Skin Lesion?: moderate Have Your Skin Lesions Been Treated?: not been treated

## 2024-11-25 NOTE — DISCHARGE SUMMARY
[address] [place] [address]   Office [phone]  Fax [phone]       Discharge Note     Name: Elise Tabares MRN: [id number]  Sex: Female   YOB: 1945  Age: 79 y.o.  PCP: Gerber Mayo MD     Date of admission: 11/22/2024  Date of discharge/transfer: 11/25/2024    Attending physician at admission: Catia Wang DO.  Attending physician at discharge/transfer:  Catia Wang DO.  Resident physician at discharge/transfer: Rosette Green MD     Consultants during hospitalization  IP CONSULT TO CASE MANAGEMENT     Admission diagnoses   Tachycardia [R00.0]  Septicemia (HCC) [A41.9]  Sepsis (HCC) [A41.9]  Hypotension, unspecified hypotension type [I95.9]    Recommended follow-up after discharge    1. PCP-Gerber Mayo MD  2. Oncology - Dr. Hyman 12/3/24  3. Palliative - Dr. Street 12/4/24  4. Orthopedics - Erika Gomez 12/5/24     Things to follow up on with PCP:   6 week CXR for resolution of pneumonia     History of Present Illness    As per admitting provider, Dr. Heck:   \"Elise Tabares is a 79 y.o. female with PMH  Lung CA (SCC of L upper lobe), CAD, CKD, HTN, HLD, DM II, MAX  who presents to the ER with nausea, vomiting, and constipation.      Came to ER today for low blood pressure. Was recently diagnosed with UTI by PCP but has not started treatment yet. Nausea, vomiting, and constipation over the last 2 weeks. Last BM was 2 weeks ago, previously was having BM every other day. Has been using mag citrate, mirlax, and senokot without relief because she vomited them back up. Appetite has decreased. No abdominal pain. Some shortness of breath over the last 2 weeks. Dysuria for the last 2 weeks as well. Denies hematuria. She denies any chest pain, cough, fever, chills, abdominal pain, dizziness, lightheadedness, or lower extremity swelling. \"      Hospital course  Elise Tabares was admitted into the Family Medicine Service from 11/22/2024 to

## 2024-11-25 NOTE — PROGRESS NOTES
[address] [address] [id number]   Office [phone]  Fax [phone]          Subjective / Objective     Subjective  Overnight Events: NAEO    Patient seen and examined at bedside. Reports feeling well, denies n/v, had BM after enema yesterday.    Respiratory:   RA  Vitals:    11/24/24 1202   BP: (!) 147/73   Pulse: (!) 105   Resp: 16   Temp: 98.4 °F (36.9 °C)   SpO2: 97%     Physical Examination:   General appearance - alert, and in no distress  Chest - clear to auscultation, no wheezes, rales or rhonchi, symmetric air entry  Heart - tachycardic, regular rhythm, normal S1, S2, no murmurs, rubs, clicks or gallops,   Abdomen - nontender, soft, nondistended. No rebound/guarding  Neurological - alert, oriented, normal speech  Psychiatric - normal speech and thought processes    I/O:  11/23 0701 - 11/24 0700  In: 970 [P.O.:720]  Out: 775 [Urine:775]  Inpatient Medications  Current Facility-Administered Medications   Medication Dose Route Frequency    polyethylene glycol (GLYCOLAX) packet 17 g  17 g Oral Daily    sennosides-docusate sodium (SENOKOT-S) 8.6-50 MG tablet 2 tablet  2 tablet Oral Daily    pantoprazole (PROTONIX) tablet 40 mg  40 mg Oral QAM AC    insulin glargine (LANTUS) injection vial 10 Units  10 Units SubCUTAneous Daily    sodium chloride flush 0.9 % injection 5-40 mL  5-40 mL IntraVENous 2 times per day    sodium chloride flush 0.9 % injection 5-40 mL  5-40 mL IntraVENous PRN    0.9 % sodium chloride infusion   IntraVENous PRN    enoxaparin Sodium (LOVENOX) injection 30 mg  30 mg SubCUTAneous Daily    ondansetron (ZOFRAN-ODT) disintegrating tablet 4 mg  4 mg Oral Q8H PRN    Or    ondansetron (ZOFRAN) injection 4 mg  4 mg IntraVENous Q6H PRN    acetaminophen (TYLENOL) tablet 650 mg  650 mg Oral Q6H PRN    Or    acetaminophen (TYLENOL) suppository 650 mg  650 mg Rectal Q6H PRN    cefTRIAXone (ROCEPHIN) 1,000 mg in sterile water 10 mL IV syringe  1,000 mg IntraVENous Q24H    
  [address] [place] [address]   Office [phone]  Fax [phone]          Subjective / Objective     Subjective  Overnight Events: Admitted overnight    Patient seen and examined at bedside. Reports feeling about the same this AM. Denies BM. Reports some RLQ pain.    Respiratory:   RA  Vitals:    11/23/24 1105   BP: 130/74   Pulse: (!) 110   Resp: 16   Temp: 97.3 °F (36.3 °C)   SpO2: 98%     Physical Examination:   General appearance - alert, and in no distress  Chest - clear to auscultation, no wheezes, rales or rhonchi, symmetric air entry  Heart - tachycardic, regular rhythm, normal S1, S2, no murmurs, rubs, clicks or gallops,   Abdomen - tender to palpation RLQ, soft, nondistended. No rebound/guarding  Neurological - alert, oriented, normal speech  Psychiatric - normal speech and thought processes    I/O:  11/22 0701 - 11/23 0700  In: 2667   Out: -   Inpatient Medications  Current Facility-Administered Medications   Medication Dose Route Frequency    polyethylene glycol (GLYCOLAX) packet 17 g  17 g Oral Daily    sennosides-docusate sodium (SENOKOT-S) 8.6-50 MG tablet 2 tablet  2 tablet Oral Daily    pantoprazole (PROTONIX) tablet 40 mg  40 mg Oral QAM AC    [START ON 11/24/2024] insulin glargine (LANTUS) injection vial 10 Units  10 Units SubCUTAneous Daily    sodium phosphate (FLEET) rectal enema 1 enema  1 enema Rectal Once    sodium chloride flush 0.9 % injection 5-40 mL  5-40 mL IntraVENous 2 times per day    sodium chloride flush 0.9 % injection 5-40 mL  5-40 mL IntraVENous PRN    0.9 % sodium chloride infusion   IntraVENous PRN    enoxaparin Sodium (LOVENOX) injection 30 mg  30 mg SubCUTAneous Daily    ondansetron (ZOFRAN-ODT) disintegrating tablet 4 mg  4 mg Oral Q8H PRN    Or    ondansetron (ZOFRAN) injection 4 mg  4 mg IntraVENous Q6H PRN    acetaminophen (TYLENOL) tablet 650 mg  650 mg Oral Q6H PRN    Or    acetaminophen (TYLENOL) suppository 650 mg  650 mg Rectal Q6H PRN    
[facility] MEDICINE RESIDENCY PROGRAM   Senior Resident Admission Note    Chart reviewed. Patient seen, examined, and discussed with Dr. Heck (PGY-1). See H&P note for more details.    CC: Vomiting, Dehydration, and Hypotension    HPI:  Elise Tabares is a 79 y.o. female w/ a known history of squamous cell carcinoma of the lung, DM, HLD who presents for hypotension.     VS: BP (!) 166/67   Pulse (!) 108   Temp 98.8 °F (37.1 °C) (Oral)   Resp 20   Ht 1.651 m (5' 5\")   Wt 88.9 kg (196 lb)   LMP  (LMP Unknown)   SpO2 100%   BMI 32.62 kg/m²     A/P: 79 y.o. female admitted for sepsis secondary to UTI and PNA.    Sepsis due to UTI/PNA: UTI outpatient with >100k colonies pan sensitive E. Coli. CXR with left sided patchy densities concerning for pneumonia. Received 30 cc/kg bolus in ER and blood pressures have improved. Lactic acidosis has resolved.   - Admit to telemetry, monitor vitals  - Continue NS @ 100 cc/hr x 10 hours, then encourage oral hydration  - Antibiotics: Rocephin, Azithromycin  - Follow blood cultures  - Obtain pneumonia labs    Nausea and vomiting, constipation: Evaluated by Oncology, who felt the symptoms were not related to current treatment. N&V may be due to her constipation. No abdominal pain.   - Obtain KUB then consider enema  - Zofran prn for nausea  - FLD, advance as tolerated to diabetic diet     VAIBHAV: Likely prerenal due to IVVD in setting of above.   - IV fluids as above, anticipate will improve on morning labs    I agree with remaining assessment and plan as documented in Dr. Heck (PGY-1) note.    Pt discussed with Dr. Wang (on-call attending physician)    Arcelia Simms DO  Family Medicine Resident (PGY-3)    
Spiritual Health History and Assessment/Progress Note  [facility]    Initial Encounter,  ,  ,      Name: Elise Tabares MRN: [id number]    Age: 79 y.o.     Sex: female   Language: English   Oriental orthodox: Mandaen   Sepsis (HCC)     Date: 11/24/2024            Total Time Calculated: 25 min              Spiritual Assessment began in [facility] B3 INTERMEDIATE CARE UNIT            Encounter Overview/Reason: Initial Encounter  Service Provided For: Patient    Crystal, Belief, Meaning:   Patient identifies as spiritual, is connected with a crystal tradition or spiritual practice, and has beliefs or practices that help with coping during difficult times  Family/Friends No family/friends present      Importance and Influence:  Patient has spiritual/personal beliefs that influence decisions regarding their health  Family/Friends No family/friends present    Community:  Patient feels well-supported. Support system includes: Children and Extended family  Family/Friends No family/friends present    Assessment and Plan of Care:     Patient Interventions include: Facilitated expression of thoughts and feelings, Explored spiritual coping/struggle/distress, Engaged in theological reflection, Affirmed coping skills/support systems, and Facilitated life review and/ or legacy  Family/Friends Interventions include: No family/friends present    Patient Plan of Care: Spiritual Care available upon further referral  Family/Friends Plan of Care: No family/friends present      Narrative: Reviewed chart prior to visiting. Met Pt bedside, she is resting in bed, lights off and sleepy. She spoke quietly as if she were sleeping, sharing that she is doing alight and has family coming in to visit and feels supported by them. She expressed that she is tired and desiring rest before they come and thanked  for the care, support and availability for her and family.     Electronically signed by JONATHAN Mendoza on 11/24/2024 at 11:22 AM 
[facility].  3/4 SIRS criteria met on admission (WBC 12.3, , RR 22, T 98.3). s/p sepsis fluid bolus Lactic acidosis of 3.99> 1.65. Procalcitonin was 0.67. CXR with Patchy densities in the L lung c/f PNA. Sp CTX (11/22-11/25) and azithromycin (11/22 -11/24). Bcx NGTD (3d)  - PO hydration  - Oral abx: Augmentin 875-125 BID and doxycycline 100 mg BID (11/25-11/28)  - Legionella, Mycoplasma, S. Pneumonia pending     Nausea, Vomiting Constipation (resolved): Ongoing x 2 weeks, with decreased PO intake. Per oncology evaluation likely not related to current Lung CA treatment. N/V likely secondary to constipation. Denies any abdominal pain. Benign abdominal exam. KUB no obstruction  - Miralax and Senakot daily  - Zofran PRN for nausea.   - GI bland     VAIBHAV (resolved) on CKD3b: admission Cr 2.28 and GFR 21. Baseline 1.5r. Likely pre-renal, Etiology may be secondary to IVVD secondary to decreased PO intake. Improved with fluids.  -Avoid nephrotoxic agents.  - CTM     Anemia of Chronic Disease: Secondary to CKD. BL hg ~ 9. POA Hg was 10.2.   - Continue to monitor daily CBC.      Lung CA: SCC of the JANELL. Followed by Dr. Park. Current treatment includes Docetaxel 75mg/m2 [id number] (7/23/24 - current).   - May consider oncology consult if symptoms do not improve.      T2DM: Home medications include Insulin Glargine 6u daily, Ozempic 0.5m once per week.   - Hold home Ozempic.  - Insulin Glargine 10u daily.   - Insulin Sliding Scale low sensitivity with AC&HS glucose checks.  - Hypoglycemia protocols ordered.       Hypertension: Previously on Metoprolol and Lisinopril, these were held after she was discharged from [facility]. She has not been taking Imdur due to decrease PO intake.   - hold home medications ISO initial hypotension.   - continue to monitor at this time and readjust as BP's trend.     Hyperlipidemia/CAD: s/p PCI prox LAD 10/2023. On home Crestor 10mg qd and Zetia 10mg qd, Plavix 75mg qd  - Continue home medications.

## 2024-11-25 NOTE — DISCHARGE INSTRUCTIONS
HOME DISCHARGE INSTRUCTIONS    Elise Tabares / [id number] : 1945    Admission date: 2024 Discharge date: 2024     Please bring this form with you to show your care provider at your follow-up appointment.    Primary care provider:  Gerber Mayo MD    Discharging provider:  Rosette Green MD  - Family Medicine Resident  Catia Wang DO - Family Medicine Attending      You have been admitted to the hospital with the following diagnoses:    ACUTE DIAGNOSES:  Tachycardia [R00.0]  Septicemia (HCC) [A41.9]  Sepsis (HCC) [A41.9]  Hypotension, unspecified hypotension type [I95.9]    You were admitted to the hospital for sepsis, we believe this was caused by a UTI and pneumonia. Your past urine culture showed E coli. Your chest x-ray showed opacities which suggest pneumonia. We covered both of these infections with antibiotics (IV ceftriaxone and azithromycin for 3 days) we are continuing your antibiotics by mouth for 4 additional days. You should take Augmentin 875-125 mg AND doxycycline 100 mg TWICE daily for the next 4 days (ending ). Your blood cultures were negative. You also had a kidney injury that resolved with IV rehydration, this was likely from sepsis.     For your lung cancer, you should continue to follow up with Dr. Park and continue your chemotherapy.     For your ankle fracture, you should continue to wear your ankle boot and follow up with [facility] as scheduled.   . . . . . . . . . . . . . . . . . . . . . . . . . . . . . . . . . . . . . . . . . . . . . . . . . . . . . . . . . . . . . . . . . . . . . . . .   FOLLOW-UP CARE RECOMMENDATIONS:  PCP - Gerber Mayo MD      Appointments  Future Appointments   Date Time Provider Department Center   2024  1:00 PM [facility] CT 2 SFMRCT [facility]   12/3/2024 10:00 AM SS CHEMO CHAIR 12 RCHICS [facility]   12/3/2024 10:15 AM Dinora Hyman, APRN - NP ON[facility]   2024 11:30 AM Vinod Street MD PCSSF [facility]   2024

## 2024-11-25 NOTE — PLAN OF CARE
Problem: Chronic Conditions and Co-morbidities  Goal: Patient's chronic conditions and co-morbidity symptoms are monitored and maintained or improved  Outcome: Progressing  Flowsheets (Taken 11/24/2024 1910)  Care Plan - Patient's Chronic Conditions and Co-Morbidity Symptoms are Monitored and Maintained or Improved: Monitor and assess patient's chronic conditions and comorbid symptoms for stability, deterioration, or improvement     Problem: Discharge Planning  Goal: Discharge to home or other facility with appropriate resources  Outcome: Progressing  Flowsheets (Taken 11/24/2024 1910)  Discharge to home or other facility with appropriate resources: Identify barriers to discharge with patient and caregiver     Problem: Safety - Adult  Goal: Free from fall injury  Outcome: Progressing     Problem: Pain  Goal: Verbalizes/displays adequate comfort level or baseline comfort level  Outcome: Progressing     
  Problem: Safety - Adult  Goal: Free from fall injury  Outcome: Progressing  Note: Bed is in the lowest position and wheels are locked, call bell is within reach, bathroom light is on during evening hours, gripper socks are on and patient has been instructed to call out for assistance if needed.     As of now, patient is free from falls and will continue to be monitored.        
Problem: Occupational Therapy - Adult  Goal: By Discharge: Performs self-care activities at highest level of function for planned discharge setting.  See evaluation for individualized goals.  Description: FUNCTIONAL STATUS PRIOR TO ADMISSION:  Patient lives with daughter who works from home. She had R ankle fx 10/17/24 and dc to rehab [facility] then home at wc level.  She was mod indep at  level for transfer and ADL tasks.  Transport chair fits in bathroom. Ortho OP note 11/11/ 24 states protective WB in boot and patient reports this is just for transfers.  She had been receiving  OT and PT.    Receives Help From: Family, ADL Assistance: Independent,  ,  ,  ,  ,  , Homemaking Assistance: Needs assistance,  ,  , Active : No     Occupational Therapy Goals:  Initiated 11/25/2024  1.  Patient will perform lower body dressing with Supervision within 7 day(s).  2.  Patient will perform bathing with Supervision within 7 day(s).  3.  Patient will perform toilet transfers to [facility] with Contact Guard Assist  within 7 day(s).  4.  Patient will perform all aspects of toileting with Contact Guard Assist within 7 day(s).  5.  Patient will participate in upper extremity therapeutic exercise/activities with Supervision for 5 minutes within 7 day(s).    6.  Patient will utilize energy conservation techniques during functional activities with verbal cues within 7 day(s).  Outcome: Progressing   OCCUPATIONAL THERAPY EVALUATION    Patient: Elise Tabares (79 y.o. female)  Date: 11/25/2024  Primary Diagnosis: Tachycardia [R00.0]  Septicemia (HCC) [A41.9]  Sepsis (HCC) [A41.9]  Hypotension, unspecified hypotension type [I95.9]         Precautions: Fall Risk, Weight Bearing-protected WB in RLE with boot                  ASSESSMENT :  The patient is limited by decreased functional mobility, independence in ADLs, high-level IADLs, strength, body mechanics, activity tolerance, endurance, balance, protected WB RLE  with admission for 
32      Physical Therapy Evaluation Charge Determination   History Examination Presentation Decision-Making   MEDIUM  Complexity : 1-2 comorbidities / personal factors will impact the outcome/ POC  MEDIUM Complexity : 3 Standardized tests and measures addressin body structure, function, activity limitation and / or participation in recreation  MEDIUM Complexity : Evolving with changing characteristics  AM-PAC  MEDIUM   Based on the above components, the patient evaluation is determined to be of the following complexity level: Medium

## 2024-11-26 ENCOUNTER — APPOINTMENT (OUTPATIENT)
Facility: HOSPITAL | Age: 79
End: 2024-11-26
Payer: MEDICARE

## 2024-11-26 LAB
FLUID CULTURE: NORMAL
L PNEUMO1 AG UR QL IA: NEGATIVE
Lab: NORMAL
M PNEUMO IGG SER IA-ACNC: 975 U/ML (ref 0–99)
M PNEUMO IGM SER IA-ACNC: <770 U/ML (ref 0–769)
ORGANISM ID: NORMAL
S PNEUM AG SPEC QL LA: NEGATIVE
SPECIMEN SOURCE: NORMAL
SPECIMEN SOURCE: NORMAL
SPECIMEN: NORMAL

## 2024-11-26 RX ORDER — ACETAMINOPHEN 325 MG/1
650 TABLET ORAL
Status: CANCELLED | OUTPATIENT
Start: 2024-12-03

## 2024-11-26 RX ORDER — HEPARIN 100 UNIT/ML
500 SYRINGE INTRAVENOUS PRN
Status: CANCELLED | OUTPATIENT
Start: 2024-12-03

## 2024-11-26 RX ORDER — EPINEPHRINE 1 MG/ML
0.3 INJECTION, SOLUTION INTRAMUSCULAR; SUBCUTANEOUS PRN
Status: CANCELLED | OUTPATIENT
Start: 2024-12-03

## 2024-11-26 RX ORDER — ONDANSETRON 2 MG/ML
8 INJECTION INTRAMUSCULAR; INTRAVENOUS
Status: CANCELLED | OUTPATIENT
Start: 2024-12-03

## 2024-11-26 RX ORDER — SODIUM CHLORIDE 9 MG/ML
5-250 INJECTION, SOLUTION INTRAVENOUS PRN
Status: CANCELLED | OUTPATIENT
Start: 2024-12-03

## 2024-11-26 RX ORDER — DIPHENHYDRAMINE HYDROCHLORIDE 50 MG/ML
50 INJECTION INTRAMUSCULAR; INTRAVENOUS
Status: CANCELLED | OUTPATIENT
Start: 2024-12-03

## 2024-11-26 RX ORDER — ALBUTEROL SULFATE 90 UG/1
4 INHALANT RESPIRATORY (INHALATION) PRN
Status: CANCELLED | OUTPATIENT
Start: 2024-12-03

## 2024-11-26 RX ORDER — FAMOTIDINE 10 MG/ML
20 INJECTION, SOLUTION INTRAVENOUS
Status: CANCELLED | OUTPATIENT
Start: 2024-12-03

## 2024-11-26 RX ORDER — SODIUM CHLORIDE 9 MG/ML
INJECTION, SOLUTION INTRAVENOUS CONTINUOUS
Status: CANCELLED | OUTPATIENT
Start: 2024-12-03

## 2024-11-26 RX ORDER — HYDROCORTISONE SODIUM SUCCINATE 100 MG/2ML
100 INJECTION INTRAMUSCULAR; INTRAVENOUS
Status: CANCELLED | OUTPATIENT
Start: 2024-12-03

## 2024-11-27 ENCOUNTER — HOSPITAL ENCOUNTER (OUTPATIENT)
Facility: HOSPITAL | Age: 79
Discharge: HOME | End: 2024-11-30
Attending: INTERNAL MEDICINE
Payer: MEDICARE

## 2024-11-27 DIAGNOSIS — C34.92 SQUAMOUS CELL CARCINOMA OF LUNG, LEFT (HCC): ICD-10-CM

## 2024-11-27 PROCEDURE — 6360000004 HC RX CONTRAST MEDICATION: Performed by: INTERNAL MEDICINE

## 2024-11-27 PROCEDURE — 74177 CT ABD & PELVIS W/CONTRAST: CPT

## 2024-11-27 RX ORDER — IOPAMIDOL 755 MG/ML
100 INJECTION, SOLUTION INTRAVASCULAR
Status: COMPLETED | OUTPATIENT
Start: 2024-11-27 | End: 2024-11-27

## 2024-11-27 RX ADMIN — IOPAMIDOL 100 ML: 755 INJECTION, SOLUTION INTRAVENOUS at 13:38

## 2024-11-28 LAB
BACTERIA SPEC CULT: NORMAL
BACTERIA SPEC CULT: NORMAL
SERVICE CMNT-IMP: NORMAL
SERVICE CMNT-IMP: NORMAL

## 2024-12-02 ENCOUNTER — TELEPHONE (OUTPATIENT)
Age: 79
End: 2024-12-02

## 2024-12-02 NOTE — TELEPHONE ENCOUNTER
Called patient to advise/confirm upcoming vv appt with Dr. Street on 12/04/2024 at 11:30  at virtual.  Spoke with Elise Tabares and confirmed appointment.

## 2024-12-03 ENCOUNTER — HOSPITAL ENCOUNTER (OUTPATIENT)
Facility: HOSPITAL | Age: 79
Setting detail: INFUSION SERIES
Discharge: HOME | End: 2024-12-03
Payer: MEDICARE

## 2024-12-03 ENCOUNTER — OFFICE VISIT (OUTPATIENT)
Age: 79
End: 2024-12-03
Payer: MEDICARE

## 2024-12-03 VITALS
TEMPERATURE: 97.6 F | RESPIRATION RATE: 18 BRPM | OXYGEN SATURATION: 96 % | HEIGHT: 66 IN | BODY MASS INDEX: 31.61 KG/M2 | HEART RATE: 105 BPM | WEIGHT: 196.7 LBS | SYSTOLIC BLOOD PRESSURE: 180 MMHG | DIASTOLIC BLOOD PRESSURE: 91 MMHG

## 2024-12-03 VITALS
TEMPERATURE: 97.8 F | HEIGHT: 66 IN | OXYGEN SATURATION: 97 % | BODY MASS INDEX: 31.66 KG/M2 | WEIGHT: 197 LBS | SYSTOLIC BLOOD PRESSURE: 125 MMHG | HEART RATE: 97 BPM | DIASTOLIC BLOOD PRESSURE: 75 MMHG | RESPIRATION RATE: 16 BRPM

## 2024-12-03 DIAGNOSIS — R93.89 IMAGING ABNORMALITY: ICD-10-CM

## 2024-12-03 DIAGNOSIS — E11.40 TYPE 2 DIABETES MELLITUS WITH DIABETIC NEUROPATHY, WITH LONG-TERM CURRENT USE OF INSULIN (HCC): ICD-10-CM

## 2024-12-03 DIAGNOSIS — C34.92 SQUAMOUS CELL CARCINOMA OF LUNG, LEFT (HCC): Primary | ICD-10-CM

## 2024-12-03 DIAGNOSIS — Z51.11 ENCOUNTER FOR ANTINEOPLASTIC CHEMOTHERAPY: ICD-10-CM

## 2024-12-03 DIAGNOSIS — C34.92 MALIGNANT NEOPLASM OF UNSPECIFIED PART OF LEFT BRONCHUS OR LUNG (HCC): ICD-10-CM

## 2024-12-03 DIAGNOSIS — N18.31 STAGE 3A CHRONIC KIDNEY DISEASE (HCC): ICD-10-CM

## 2024-12-03 DIAGNOSIS — N13.30 HYDROURETERONEPHROSIS: ICD-10-CM

## 2024-12-03 DIAGNOSIS — Z79.4 TYPE 2 DIABETES MELLITUS WITH DIABETIC NEUROPATHY, WITH LONG-TERM CURRENT USE OF INSULIN (HCC): ICD-10-CM

## 2024-12-03 LAB
ALBUMIN SERPL-MCNC: 3 G/DL (ref 3.5–5)
ALBUMIN/GLOB SERPL: 1 (ref 1.1–2.2)
ALP SERPL-CCNC: 90 U/L (ref 45–117)
ALT SERPL-CCNC: 25 U/L (ref 12–78)
ANION GAP SERPL CALC-SCNC: 7 MMOL/L (ref 2–12)
AST SERPL-CCNC: 24 U/L (ref 15–37)
BASOPHILS # BLD: 0 K/UL (ref 0–0.1)
BASOPHILS NFR BLD: 0 % (ref 0–1)
BILIRUB SERPL-MCNC: 0.6 MG/DL (ref 0.2–1)
BUN SERPL-MCNC: 15 MG/DL (ref 6–20)
BUN/CREAT SERPL: 11 (ref 12–20)
CALCIUM SERPL-MCNC: 8.8 MG/DL (ref 8.5–10.1)
CHLORIDE SERPL-SCNC: 109 MMOL/L (ref 97–108)
CO2 SERPL-SCNC: 20 MMOL/L (ref 21–32)
CREAT SERPL-MCNC: 1.36 MG/DL (ref 0.55–1.02)
DIFFERENTIAL METHOD BLD: ABNORMAL
EOSINOPHIL # BLD: 0.2 K/UL (ref 0–0.4)
EOSINOPHIL NFR BLD: 3 % (ref 0–7)
ERYTHROCYTE [DISTWIDTH] IN BLOOD BY AUTOMATED COUNT: 15.2 % (ref 11.5–14.5)
GLOBULIN SER CALC-MCNC: 3 G/DL (ref 2–4)
GLUCOSE SERPL-MCNC: 316 MG/DL (ref 65–100)
HCT VFR BLD AUTO: 27.5 % (ref 35–47)
HGB BLD-MCNC: 8.7 G/DL (ref 11.5–16)
IMM GRANULOCYTES # BLD AUTO: 0 K/UL (ref 0–0.04)
IMM GRANULOCYTES NFR BLD AUTO: 0 % (ref 0–0.5)
LYMPHOCYTES # BLD: 0.5 K/UL (ref 0.8–3.5)
LYMPHOCYTES NFR BLD: 8 % (ref 12–49)
MCH RBC QN AUTO: 30.9 PG (ref 26–34)
MCHC RBC AUTO-ENTMCNC: 31.6 G/DL (ref 30–36.5)
MCV RBC AUTO: 97.5 FL (ref 80–99)
MONOCYTES # BLD: 0.2 K/UL (ref 0–1)
MONOCYTES NFR BLD: 4 % (ref 5–13)
NEUTS SEG # BLD: 5.1 K/UL (ref 1.8–8)
NEUTS SEG NFR BLD: 85 % (ref 32–75)
NRBC # BLD: 0 K/UL (ref 0–0.01)
NRBC BLD-RTO: 0 PER 100 WBC
PLATELET # BLD AUTO: 243 K/UL (ref 150–400)
PMV BLD AUTO: 10.2 FL (ref 8.9–12.9)
POTASSIUM SERPL-SCNC: 4.4 MMOL/L (ref 3.5–5.1)
PROT SERPL-MCNC: 6 G/DL (ref 6.4–8.2)
RBC # BLD AUTO: 2.82 M/UL (ref 3.8–5.2)
RBC MORPH BLD: ABNORMAL
RBC MORPH BLD: ABNORMAL
SODIUM SERPL-SCNC: 136 MMOL/L (ref 136–145)
WBC # BLD AUTO: 6 K/UL (ref 3.6–11)

## 2024-12-03 PROCEDURE — 96377 APPLICATON ON-BODY INJECTOR: CPT

## 2024-12-03 PROCEDURE — 2580000003 HC RX 258: Performed by: NURSE PRACTITIONER

## 2024-12-03 PROCEDURE — 99215 OFFICE O/P EST HI 40 MIN: CPT | Performed by: NURSE PRACTITIONER

## 2024-12-03 PROCEDURE — 96361 HYDRATE IV INFUSION ADD-ON: CPT

## 2024-12-03 PROCEDURE — G8399 PT W/DXA RESULTS DOCUMENT: HCPCS | Performed by: NURSE PRACTITIONER

## 2024-12-03 PROCEDURE — 96375 TX/PRO/DX INJ NEW DRUG ADDON: CPT

## 2024-12-03 PROCEDURE — 1126F AMNT PAIN NOTED NONE PRSNT: CPT | Performed by: NURSE PRACTITIONER

## 2024-12-03 PROCEDURE — 6360000002 HC RX W HCPCS: Performed by: NURSE PRACTITIONER

## 2024-12-03 PROCEDURE — 3078F DIAST BP <80 MM HG: CPT | Performed by: NURSE PRACTITIONER

## 2024-12-03 PROCEDURE — G8417 CALC BMI ABV UP PARAM F/U: HCPCS | Performed by: NURSE PRACTITIONER

## 2024-12-03 PROCEDURE — G8427 DOCREV CUR MEDS BY ELIG CLIN: HCPCS | Performed by: NURSE PRACTITIONER

## 2024-12-03 PROCEDURE — 85025 COMPLETE CBC W/AUTO DIFF WBC: CPT

## 2024-12-03 PROCEDURE — 1111F DSCHRG MED/CURRENT MED MERGE: CPT | Performed by: NURSE PRACTITIONER

## 2024-12-03 PROCEDURE — 1123F ACP DISCUSS/DSCN MKR DOCD: CPT | Performed by: NURSE PRACTITIONER

## 2024-12-03 PROCEDURE — G8484 FLU IMMUNIZE NO ADMIN: HCPCS | Performed by: NURSE PRACTITIONER

## 2024-12-03 PROCEDURE — G2211 COMPLEX E/M VISIT ADD ON: HCPCS | Performed by: NURSE PRACTITIONER

## 2024-12-03 PROCEDURE — 2580000003 HC RX 258: Performed by: INTERNAL MEDICINE

## 2024-12-03 PROCEDURE — 3074F SYST BP LT 130 MM HG: CPT | Performed by: NURSE PRACTITIONER

## 2024-12-03 PROCEDURE — 6360000002 HC RX W HCPCS: Performed by: INTERNAL MEDICINE

## 2024-12-03 PROCEDURE — 1036F TOBACCO NON-USER: CPT | Performed by: NURSE PRACTITIONER

## 2024-12-03 PROCEDURE — 80053 COMPREHEN METABOLIC PANEL: CPT

## 2024-12-03 PROCEDURE — 1159F MED LIST DOCD IN RCRD: CPT | Performed by: NURSE PRACTITIONER

## 2024-12-03 PROCEDURE — 96413 CHEMO IV INFUSION 1 HR: CPT

## 2024-12-03 PROCEDURE — 1090F PRES/ABSN URINE INCON ASSESS: CPT | Performed by: NURSE PRACTITIONER

## 2024-12-03 PROCEDURE — 1160F RVW MEDS BY RX/DR IN RCRD: CPT | Performed by: NURSE PRACTITIONER

## 2024-12-03 RX ORDER — DIPHENHYDRAMINE HYDROCHLORIDE 50 MG/ML
50 INJECTION INTRAMUSCULAR; INTRAVENOUS
OUTPATIENT
Start: 2024-12-24

## 2024-12-03 RX ORDER — SODIUM CHLORIDE 9 MG/ML
5-250 INJECTION, SOLUTION INTRAVENOUS PRN
OUTPATIENT
Start: 2024-12-24

## 2024-12-03 RX ORDER — SODIUM CHLORIDE 0.9 % (FLUSH) 0.9 %
5-40 SYRINGE (ML) INJECTION PRN
Status: DISCONTINUED | OUTPATIENT
Start: 2024-12-03 | End: 2024-12-04 | Stop reason: HOSPADM

## 2024-12-03 RX ORDER — ONDANSETRON 2 MG/ML
8 INJECTION INTRAMUSCULAR; INTRAVENOUS
OUTPATIENT
Start: 2024-12-24

## 2024-12-03 RX ORDER — EPINEPHRINE 1 MG/ML
0.3 INJECTION, SOLUTION INTRAMUSCULAR; SUBCUTANEOUS PRN
OUTPATIENT
Start: 2024-12-24

## 2024-12-03 RX ORDER — DEXAMETHASONE SODIUM PHOSPHATE 10 MG/ML
6 INJECTION INTRAMUSCULAR; INTRAVENOUS ONCE
Status: COMPLETED | OUTPATIENT
Start: 2024-12-03 | End: 2024-12-03

## 2024-12-03 RX ORDER — SODIUM CHLORIDE 9 MG/ML
INJECTION, SOLUTION INTRAVENOUS CONTINUOUS
OUTPATIENT
Start: 2024-12-24

## 2024-12-03 RX ORDER — ALBUTEROL SULFATE 90 UG/1
4 INHALANT RESPIRATORY (INHALATION) PRN
OUTPATIENT
Start: 2024-12-24

## 2024-12-03 RX ORDER — SODIUM CHLORIDE 9 MG/ML
5-250 INJECTION, SOLUTION INTRAVENOUS PRN
Status: DISCONTINUED | OUTPATIENT
Start: 2024-12-03 | End: 2024-12-04 | Stop reason: HOSPADM

## 2024-12-03 RX ORDER — ACETAMINOPHEN 325 MG/1
650 TABLET ORAL
OUTPATIENT
Start: 2024-12-24

## 2024-12-03 RX ORDER — 0.9 % SODIUM CHLORIDE 0.9 %
500 INTRAVENOUS SOLUTION INTRAVENOUS ONCE
Status: COMPLETED | OUTPATIENT
Start: 2024-12-03 | End: 2024-12-03

## 2024-12-03 RX ORDER — HEPARIN 100 UNIT/ML
500 SYRINGE INTRAVENOUS PRN
OUTPATIENT
Start: 2024-12-24

## 2024-12-03 RX ORDER — ONDANSETRON 2 MG/ML
8 INJECTION INTRAMUSCULAR; INTRAVENOUS ONCE
OUTPATIENT
Start: 2024-12-24 | End: 2024-12-24

## 2024-12-03 RX ORDER — FAMOTIDINE 10 MG/ML
20 INJECTION, SOLUTION INTRAVENOUS
OUTPATIENT
Start: 2024-12-24

## 2024-12-03 RX ORDER — ONDANSETRON 2 MG/ML
8 INJECTION INTRAMUSCULAR; INTRAVENOUS ONCE
Status: COMPLETED | OUTPATIENT
Start: 2024-12-03 | End: 2024-12-03

## 2024-12-03 RX ORDER — HYDROCORTISONE SODIUM SUCCINATE 100 MG/2ML
100 INJECTION INTRAMUSCULAR; INTRAVENOUS
OUTPATIENT
Start: 2024-12-24

## 2024-12-03 RX ORDER — SODIUM CHLORIDE 0.9 % (FLUSH) 0.9 %
5-40 SYRINGE (ML) INJECTION PRN
OUTPATIENT
Start: 2024-12-24

## 2024-12-03 RX ORDER — DEXAMETHASONE SODIUM PHOSPHATE 10 MG/ML
6 INJECTION INTRAMUSCULAR; INTRAVENOUS ONCE
OUTPATIENT
Start: 2024-12-24 | End: 2024-12-24

## 2024-12-03 RX ADMIN — SODIUM CHLORIDE 500 ML: 9 INJECTION, SOLUTION INTRAVENOUS at 11:50

## 2024-12-03 RX ADMIN — Medication 20 ML: at 14:30

## 2024-12-03 RX ADMIN — DOCETAXEL ANHYDROUS 135 MG: 10 INJECTION, SOLUTION INTRAVENOUS at 12:56

## 2024-12-03 RX ADMIN — DEXAMETHASONE SODIUM PHOSPHATE 6 MG: 10 INJECTION INTRAMUSCULAR; INTRAVENOUS at 11:49

## 2024-12-03 RX ADMIN — Medication 10 ML: at 10:00

## 2024-12-03 RX ADMIN — PEGFILGRASTIM 6 MG: KIT SUBCUTANEOUS at 14:25

## 2024-12-03 RX ADMIN — ONDANSETRON 8 MG: 2 INJECTION INTRAMUSCULAR; INTRAVENOUS at 11:47

## 2024-12-03 ASSESSMENT — PAIN SCALES - GENERAL: PAINLEVEL_OUTOF10: 0

## 2024-12-03 ASSESSMENT — PAIN SCALES - WONG BAKER: WONGBAKER_NUMERICALRESPONSE: NO HURT

## 2024-12-03 NOTE — PROGRESS NOTES
Chief Complaint   Patient presents with    Follow-up           Vitals:    12/03/24 1051   BP: 125/75   Pulse: 97   Resp: 16   Temp: 97.8 °F (36.6 °C)   SpO2: 97%            1. Have you been to the ER, urgent care clinic since your last visit?  Hospitalized since your last visit?  Yes [facility] Dehydration x 3 days  2. Have you seen or consulted any other health care providers outside of the [facility] since your last visit?  Include any pap smears or colon screening. No    
for fluids right after future CT scans due to underlying CKD if possible.   -- Return in 3 week for C8, no provider visit due to holiday.  -- Return to clinic in 6 weeks for C9, MD visit.    2. 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 Anglin in Endocrinology. On glargine in PM and takes Lyumjev (insulin lispro) 20 units on day of treatment if BG is > 250.   -- Following closely with Dr. Anglin.     3. Stage IIIa CKD:  Likely 2/2 DM, HTN, possibly exacerbated by chemotherapy. Crt range 1.2. Followed by Dr. De Leon.   -- Advised 60 oz water daily.     4. CAD / HTN:  On ASA, BB, statin as well as Lisinopril. Stent placement in proximal LAD on 10/5/23. On Plavix.      5. Neuropathy:  Pre-dated treatment. Present in right leg. Now on Gabapentin.     6. Anemia of chronic disease:  2/2 CKD. B12 and folate normal. S/p Injectafer x 1 in March 2024 in the setting of CKD.    7. R ankle lateral malleolus fracture:  [facility].  No plans for surgery at present.     8. 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 No longer on Oxycodone.     9. Hydroureteronephrosis   New.  Found incidentally on imaging. Likely secondary to kidney stones given flank pain. Referral to urology ordered today.       Goals of care:  Disease is now incurable, but is treatable to improve quality and duration of life.    Emotional well being: Pt is coping well with his/her disease and has excellent support.  I appreciate the opportunity to participate in Ms. Elise Tabares's care.    I personally provided the service today.     Signed By: SHANIQUE Sin NP

## 2024-12-03 NOTE — PROGRESS NOTES
[facility] Chemo Progress Note    Date: December 3, 2024    1000am: Ms. Tabares Arrived in a wheelchair and in stable condition to the [facility] for  C7D1 Docetaxel Regimen.  Assessment was completed, no new complaints voiced. Port accessed with positive blood return. Labs drawn and sent for processing. Went to provider appointment with Medical Oncology. Returned from provider appointment. Labs reviewed. Criteria for treatment was met.    At discharge, patient's BP elevated. Patient asymptomatic. RN had patient wait 30 minutes to recheck vitals. Per NP, okay for discharge if patient is feeling well.    Ms. Tabares's vitals were reviewed.  Vitals:    12/03/24 1004 12/03/24 1357 12/03/24 1408 12/03/24 1415   BP: 139/72 (!) 184/93 (!) 181/82 (!) 180/91   Pulse: (!) 110 (!) 102 (!) 105    Resp: 18      Temp: 97.6 °F (36.4 °C)      TempSrc: Temporal      SpO2: 96%      Weight: 89.2 kg (196 lb 11.2 oz)      Height: 1.676 m (5' 6\")         Lab results were obtained and reviewed.  Recent Results (from the past 12 hour(s))   CBC With Auto Differential    Collection Time: 12/03/24 10:09 AM   Result Value Ref Range    WBC 6.0 3.6 - 11.0 K/uL    RBC 2.82 (L) 3.80 - 5.20 M/uL    Hemoglobin 8.7 (L) 11.5 - 16.0 g/dL    Hematocrit 27.5 (L) 35.0 - 47.0 %    MCV 97.5 80.0 - 99.0 FL    MCH 30.9 26.0 - 34.0 PG    MCHC 31.6 30.0 - 36.5 g/dL    RDW 15.2 (H) 11.5 - 14.5 %    Platelets 243 150 - 400 K/uL    MPV 10.2 8.9 - 12.9 FL    Nucleated RBCs 0.0 0  WBC    nRBC 0.00 0.00 - 0.01 K/uL    Neutrophils % 85 (H) 32 - 75 %    Lymphocytes % 8 (L) 12 - 49 %    Monocytes % 4 (L) 5 - 13 %    Eosinophils % 3 0 - 7 %    Basophils % 0 0 - 1 %    Immature Granulocytes % 0 0.0 - 0.5 %    Neutrophils Absolute 5.1 1.8 - 8.0 K/UL    Lymphocytes Absolute 0.5 (L) 0.8 - 3.5 K/UL    Monocytes Absolute 0.2 0.0 - 1.0 K/UL    Eosinophils Absolute 0.2 0.0 - 0.4 K/UL    Basophils Absolute 0.0 0.0 - 0.1 K/UL    Immature Granulocytes Absolute 0.0 0.00 - 0.04 K/UL     Differential Type AUTOMATED      RBC Comment TEARDROP CELLS  PRESENT        RBC Comment ANISOCYTOSIS  1+       Comprehensive metabolic panel    Collection Time: 12/03/24 10:09 AM   Result Value Ref Range    Sodium 136 136 - 145 mmol/L    Potassium 4.4 3.5 - 5.1 mmol/L    Chloride 109 (H) 97 - 108 mmol/L    CO2 20 (L) 21 - 32 mmol/L    Anion Gap 7 2 - 12 mmol/L    Glucose 316 (H) 65 - 100 mg/dL    BUN 15 6 - 20 MG/DL    Creatinine 1.36 (H) 0.55 - 1.02 MG/DL    BUN/Creatinine Ratio 11 (L) 12 - 20      Est, Glom Filt Rate 40 (L) >60 ml/min/1.73m2    Calcium 8.8 8.5 - 10.1 MG/DL    Total Bilirubin 0.6 0.2 - 1.0 MG/DL    ALT 25 12 - 78 U/L    AST 24 15 - 37 U/L    Alk Phosphatase 90 45 - 117 U/L    Total Protein 6.0 (L) 6.4 - 8.2 g/dL    Albumin 3.0 (L) 3.5 - 5.0 g/dL    Globulin 3.0 2.0 - 4.0 g/dL    Albumin/Globulin Ratio 1.0 (L) 1.1 - 2.2          Pre-medications  were administered as ordered and chemotherapy was initiated.  Medications Administered         dexAMETHasone (DECADRON) injection 6 mg Admin Date  12/03/2024 Action  Given Dose  6 mg Rate   Route  IntraVENous Documented By  Bianca Bullard RN        DOCEtaxel (TAXOTERE) 135 mg in sodium chloride 0.9 % 250 mL chemo IVPB Admin Date  12/03/2024 Action  New Bag Dose  135 mg Rate  288.5 mL/hr Route  IntraVENous Documented By  Bianca Bullard RN        ondansetron (ZOFRAN) injection 8 mg Admin Date  12/03/2024 Action  Given Dose  8 mg Rate   Route  IntraVENous Documented By  Bianca Bullard RN        pegfilgrastim (NEULASTA) on-body injector 6 mg Admin Date  12/03/2024 Action  Given Dose  6 mg Rate   Route  SubCUTAneous Documented By  Bianca Bullard RN        sodium chloride 0.9 % bolus 500 mL Admin Date  12/03/2024 Action  New Bag Dose  500 mL Rate  491.8 mL/hr Route  IntraVENous Documented By  Smilek, Bianca P, RN        sodium chloride flush 0.9 % injection 5-40 mL Admin Date  12/03/2024 Action  Given Dose  10 mL Rate   Route  IntraVENous

## 2024-12-04 ENCOUNTER — TELEMEDICINE (OUTPATIENT)
Age: 79
End: 2024-12-04
Payer: MEDICARE

## 2024-12-04 DIAGNOSIS — Z79.4 TYPE 2 DIABETES MELLITUS WITH DIABETIC NEUROPATHY, WITH LONG-TERM CURRENT USE OF INSULIN (HCC): ICD-10-CM

## 2024-12-04 DIAGNOSIS — G89.3 CANCER ASSOCIATED PAIN: Primary | ICD-10-CM

## 2024-12-04 DIAGNOSIS — R63.0 POOR APPETITE: ICD-10-CM

## 2024-12-04 DIAGNOSIS — R53.83 OTHER FATIGUE: ICD-10-CM

## 2024-12-04 DIAGNOSIS — Z51.5 PALLIATIVE CARE BY SPECIALIST: ICD-10-CM

## 2024-12-04 DIAGNOSIS — K59.03 CONSTIPATION DUE TO OPIOID THERAPY: ICD-10-CM

## 2024-12-04 DIAGNOSIS — E11.40 TYPE 2 DIABETES MELLITUS WITH DIABETIC NEUROPATHY, WITH LONG-TERM CURRENT USE OF INSULIN (HCC): ICD-10-CM

## 2024-12-04 DIAGNOSIS — T40.2X5A CONSTIPATION DUE TO OPIOID THERAPY: ICD-10-CM

## 2024-12-04 PROCEDURE — 99215 OFFICE O/P EST HI 40 MIN: CPT | Performed by: STUDENT IN AN ORGANIZED HEALTH CARE EDUCATION/TRAINING PROGRAM

## 2024-12-04 PROCEDURE — G8417 CALC BMI ABV UP PARAM F/U: HCPCS | Performed by: STUDENT IN AN ORGANIZED HEALTH CARE EDUCATION/TRAINING PROGRAM

## 2024-12-04 PROCEDURE — G8428 CUR MEDS NOT DOCUMENT: HCPCS | Performed by: STUDENT IN AN ORGANIZED HEALTH CARE EDUCATION/TRAINING PROGRAM

## 2024-12-04 PROCEDURE — 1090F PRES/ABSN URINE INCON ASSESS: CPT | Performed by: STUDENT IN AN ORGANIZED HEALTH CARE EDUCATION/TRAINING PROGRAM

## 2024-12-04 PROCEDURE — 1111F DSCHRG MED/CURRENT MED MERGE: CPT | Performed by: STUDENT IN AN ORGANIZED HEALTH CARE EDUCATION/TRAINING PROGRAM

## 2024-12-04 PROCEDURE — G8484 FLU IMMUNIZE NO ADMIN: HCPCS | Performed by: STUDENT IN AN ORGANIZED HEALTH CARE EDUCATION/TRAINING PROGRAM

## 2024-12-04 PROCEDURE — 1123F ACP DISCUSS/DSCN MKR DOCD: CPT | Performed by: STUDENT IN AN ORGANIZED HEALTH CARE EDUCATION/TRAINING PROGRAM

## 2024-12-04 PROCEDURE — 1036F TOBACCO NON-USER: CPT | Performed by: STUDENT IN AN ORGANIZED HEALTH CARE EDUCATION/TRAINING PROGRAM

## 2024-12-04 PROCEDURE — G8399 PT W/DXA RESULTS DOCUMENT: HCPCS | Performed by: STUDENT IN AN ORGANIZED HEALTH CARE EDUCATION/TRAINING PROGRAM

## 2024-12-04 RX ORDER — OXYCODONE HYDROCHLORIDE 5 MG/1
5 TABLET ORAL EVERY 4 HOURS PRN
Qty: 28 TABLET | Refills: 0 | Status: SHIPPED | OUTPATIENT
Start: 2024-12-04 | End: 2024-12-11

## 2024-12-04 RX ORDER — GABAPENTIN 300 MG/1
300 CAPSULE ORAL NIGHTLY
Qty: 30 CAPSULE | Refills: 1 | Status: SHIPPED | OUTPATIENT
Start: 2024-12-04 | End: 2025-03-04

## 2024-12-04 RX ORDER — MIRTAZAPINE 30 MG/1
15 TABLET, FILM COATED ORAL NIGHTLY
Qty: 30 TABLET | Refills: 3 | Status: SHIPPED
Start: 2024-12-04

## 2024-12-04 NOTE — PATIENT INSTRUCTIONS
your next in-person visit.  - You understand that we will have further conversations as cancer changes.    This is what you have shared with us about [facility]/4/2024    11:30 AM   Demographics   Marital Status Single         The Palliative Medicine Team is here to support you and your family.     Sincerely,    Vinod Street MD and the Palliative Medicine Team

## 2024-12-04 NOTE — PROGRESS NOTES
nodule.  LIVER: No mass or biliary dilatation.  GALLBLADDER: Unremarkable.  SPLEEN: No mass.  PANCREAS: No mass or ductal dilatation.  ADRENALS: Unremarkable.  KIDNEYS: There is now mild right hydroureteronephrosis to the level of the  bladder, there is concern for high density material within the distal ureter.  Scarring of the right kidney is again noted.  STOMACH: Unremarkable.  SMALL BOWEL: No dilatation or wall thickening.  COLON: No dilatation or wall thickening. Moderate fecal stasis.  APPENDIX: Not distended.  PERITONEUM: No ascites or pneumoperitoneum.  RETROPERITONEUM: No lymphadenopathy or aortic aneurysm.  REPRODUCTIVE ORGANS: Unremarkable.  URINARY BLADDER: No mass or calculus.  BONES: Degenerative changes are seen in the thoracic and lumbar spine.  ADDITIONAL COMMENTS: N/A    Impression  Stable postsurgical changes left upper lobe. Persistent small left pleural  effusion, improved aeration left lower lobe compared to the prior exam. No  evidence of metastatic disease in the abdomen or pelvis. New right  hydroureteronephrosis to the level of the bladder. There is concern for high  density material within the distal ureter, this may represent small stones,  blood products, or small neoplasm. Follow-up is recommended.    Electronically signed by JOHANNA KHAN        PET CT SKULL BASE TO MID THIGH 04/20/2023    Narrative  PET/CT SCAN    PROCEDURE: Following IV injection of 10.0 mCi 18 Fluoro 2 deoxyglucose (FDG) and  a standard uptake delay, PET imaging is performed from the skull vertex to mid  thigh and axial, sagittal and coronal images were acquired. Unenhanced CT is  obtained for anatomic localization, and attenuation correction of the PET scan.  Patient preprocedure blood glucose level: 78 mg/dL.    CORRELATIVE IMAGING STUDIES: Chest CT 3/14/2023.    COMPARISON PET: None    HISTORY: The study is requested for staging squamous cell carcinoma left upper  lobe. Biopsy confirmed

## 2024-12-10 ENCOUNTER — TELEPHONE (OUTPATIENT)
Age: 79
End: 2024-12-10

## 2024-12-10 NOTE — TELEPHONE ENCOUNTER
Called [facility] twice about referral to them and they stated they haven't received it. Faxed over the referral for the third time to the number they suggested.

## 2024-12-11 NOTE — TELEPHONE ENCOUNTER
12/11/24 11:34 AM Called [facility], spoke with Tonia.   She states they still did not receive records via fax from yesterday. Explained that provider placed urgent referral for patient to be seen regarding Hydroureteronephrosis on imaging, left flank pain, and concern for obstructing stone on CT. Staff assisted in registering patient in system. Scheduled appointment for tomorrow at [place] location ([address] [place] [address]). Patient to arrive at 12:40 PM for check in. She will first have an x-ray to evaluate for kidney stone, then scheduled to see Dr. Irizarry at 1:10 PM. Tonia advised that records can be re-faxed to [phone]. Will update referral coordinator of above to notify patient.       4:06 PM Called patient. Advised of response per Dinora PERKINS. Patient voiced understanding but states that 12/23 was the next soonest appointment urology office could offer. Patient states staff did not mention having a cancellation list. Will defer to Dinora to clarify next steps and update patient thereafter.     12/13/24 2:23 PM Called urology office, spoke with Magdalena. Scheduled patient to see Dr. Frederick Fernandez at [facility] location ([address] [facility] [address] [facility] [address]) on 12/16 at 1:40 PM. Magdalena advised that this was their only available appointment for next week at this time. Called patient. Verified patient ID x 2. Notified her of above appointment. Patient hesitant to travel to [place]. Explained that Dinora PERKINS recommended appointment sooner versus later in the event patient has a kidney stone. Patient also relies on daughter for transportation and daughter is unable to drive her that day. Provided patient with phone number for urology office so that she may proceed with rescheduling. Encouraged her to call office back if any issues. Also updated Dinora PERKINS of above. No further questions or concerns at this time.

## 2024-12-11 NOTE — TELEPHONE ENCOUNTER
Patient returned call that she missed and stated that she received the message and made an appt with [facility] at 12/23 at 2.

## 2024-12-17 ENCOUNTER — APPOINTMENT (OUTPATIENT)
Facility: HOSPITAL | Age: 79
End: 2024-12-17
Payer: MEDICARE

## 2024-12-24 ENCOUNTER — HOSPITAL ENCOUNTER (OUTPATIENT)
Facility: HOSPITAL | Age: 79
Setting detail: INFUSION SERIES
Discharge: HOME | End: 2024-12-24
Payer: MEDICARE

## 2024-12-24 VITALS
RESPIRATION RATE: 18 BRPM | WEIGHT: 189.7 LBS | DIASTOLIC BLOOD PRESSURE: 97 MMHG | OXYGEN SATURATION: 95 % | HEART RATE: 76 BPM | SYSTOLIC BLOOD PRESSURE: 178 MMHG | BODY MASS INDEX: 30.49 KG/M2 | TEMPERATURE: 97 F | HEIGHT: 66 IN

## 2024-12-24 DIAGNOSIS — C34.92 SQUAMOUS CELL CARCINOMA OF LUNG, LEFT (HCC): ICD-10-CM

## 2024-12-24 DIAGNOSIS — C34.92 MALIGNANT NEOPLASM OF UNSPECIFIED PART OF LEFT BRONCHUS OR LUNG (HCC): Primary | ICD-10-CM

## 2024-12-24 LAB
ALBUMIN SERPL-MCNC: 3.1 G/DL (ref 3.5–5)
ALBUMIN/GLOB SERPL: 1 (ref 1.1–2.2)
ALP SERPL-CCNC: 81 U/L (ref 45–117)
ALT SERPL-CCNC: 15 U/L (ref 12–78)
ANION GAP SERPL CALC-SCNC: 7 MMOL/L (ref 2–12)
AST SERPL-CCNC: 17 U/L (ref 15–37)
BASOPHILS # BLD: 0 K/UL (ref 0–0.1)
BASOPHILS NFR BLD: 0 % (ref 0–1)
BILIRUB SERPL-MCNC: 0.8 MG/DL (ref 0.2–1)
BUN SERPL-MCNC: 24 MG/DL (ref 6–20)
BUN/CREAT SERPL: 17 (ref 12–20)
CALCIUM SERPL-MCNC: 8.9 MG/DL (ref 8.5–10.1)
CHLORIDE SERPL-SCNC: 105 MMOL/L (ref 97–108)
CO2 SERPL-SCNC: 26 MMOL/L (ref 21–32)
CREAT SERPL-MCNC: 1.42 MG/DL (ref 0.55–1.02)
DIFFERENTIAL METHOD BLD: ABNORMAL
EOSINOPHIL # BLD: 0.1 K/UL (ref 0–0.4)
EOSINOPHIL NFR BLD: 1 % (ref 0–7)
ERYTHROCYTE [DISTWIDTH] IN BLOOD BY AUTOMATED COUNT: 15 % (ref 11.5–14.5)
GLOBULIN SER CALC-MCNC: 3.1 G/DL (ref 2–4)
GLUCOSE SERPL-MCNC: 272 MG/DL (ref 65–100)
HCT VFR BLD AUTO: 27.7 % (ref 35–47)
HGB BLD-MCNC: 9.1 G/DL (ref 11.5–16)
IMM GRANULOCYTES # BLD AUTO: 0 K/UL (ref 0–0.04)
IMM GRANULOCYTES NFR BLD AUTO: 0 % (ref 0–0.5)
LYMPHOCYTES # BLD: 0.5 K/UL (ref 0.8–3.5)
LYMPHOCYTES NFR BLD: 7 % (ref 12–49)
MCH RBC QN AUTO: 31.6 PG (ref 26–34)
MCHC RBC AUTO-ENTMCNC: 32.9 G/DL (ref 30–36.5)
MCV RBC AUTO: 96.2 FL (ref 80–99)
MONOCYTES # BLD: 0.3 K/UL (ref 0–1)
MONOCYTES NFR BLD: 4 % (ref 5–13)
NEUTS SEG # BLD: 6.2 K/UL (ref 1.8–8)
NEUTS SEG NFR BLD: 88 % (ref 32–75)
NRBC # BLD: 0 K/UL (ref 0–0.01)
NRBC BLD-RTO: 0 PER 100 WBC
PLATELET # BLD AUTO: 261 K/UL (ref 150–400)
PMV BLD AUTO: 10.4 FL (ref 8.9–12.9)
POTASSIUM SERPL-SCNC: 3.8 MMOL/L (ref 3.5–5.1)
PROT SERPL-MCNC: 6.2 G/DL (ref 6.4–8.2)
RBC # BLD AUTO: 2.88 M/UL (ref 3.8–5.2)
RBC MORPH BLD: ABNORMAL
SODIUM SERPL-SCNC: 138 MMOL/L (ref 136–145)
WBC # BLD AUTO: 7.1 K/UL (ref 3.6–11)

## 2024-12-24 PROCEDURE — 96377 APPLICATON ON-BODY INJECTOR: CPT

## 2024-12-24 PROCEDURE — 80053 COMPREHEN METABOLIC PANEL: CPT

## 2024-12-24 PROCEDURE — 2580000003 HC RX 258: Performed by: INTERNAL MEDICINE

## 2024-12-24 PROCEDURE — 96375 TX/PRO/DX INJ NEW DRUG ADDON: CPT

## 2024-12-24 PROCEDURE — 96361 HYDRATE IV INFUSION ADD-ON: CPT

## 2024-12-24 PROCEDURE — 96413 CHEMO IV INFUSION 1 HR: CPT

## 2024-12-24 PROCEDURE — 6360000002 HC RX W HCPCS: Performed by: INTERNAL MEDICINE

## 2024-12-24 PROCEDURE — 85025 COMPLETE CBC W/AUTO DIFF WBC: CPT

## 2024-12-24 PROCEDURE — 2500000003 HC RX 250 WO HCPCS: Performed by: INTERNAL MEDICINE

## 2024-12-24 RX ORDER — SODIUM CHLORIDE 0.9 % (FLUSH) 0.9 %
5-40 SYRINGE (ML) INJECTION PRN
Status: DISCONTINUED | OUTPATIENT
Start: 2024-12-24 | End: 2024-12-25 | Stop reason: HOSPADM

## 2024-12-24 RX ORDER — 0.9 % SODIUM CHLORIDE 0.9 %
500 INTRAVENOUS SOLUTION INTRAVENOUS ONCE
Status: CANCELLED
Start: 2024-12-24

## 2024-12-24 RX ORDER — DEXAMETHASONE SODIUM PHOSPHATE 10 MG/ML
6 INJECTION INTRAMUSCULAR; INTRAVENOUS ONCE
Status: COMPLETED | OUTPATIENT
Start: 2024-12-24 | End: 2024-12-24

## 2024-12-24 RX ORDER — 0.9 % SODIUM CHLORIDE 0.9 %
500 INTRAVENOUS SOLUTION INTRAVENOUS ONCE
Status: COMPLETED | OUTPATIENT
Start: 2024-12-24 | End: 2024-12-24

## 2024-12-24 RX ORDER — SODIUM CHLORIDE 9 MG/ML
5-250 INJECTION, SOLUTION INTRAVENOUS PRN
Status: DISCONTINUED | OUTPATIENT
Start: 2024-12-24 | End: 2024-12-25 | Stop reason: HOSPADM

## 2024-12-24 RX ORDER — ONDANSETRON 2 MG/ML
8 INJECTION INTRAMUSCULAR; INTRAVENOUS ONCE
Status: COMPLETED | OUTPATIENT
Start: 2024-12-24 | End: 2024-12-24

## 2024-12-24 RX ADMIN — SODIUM CHLORIDE 500 ML: 9 INJECTION, SOLUTION INTRAVENOUS at 10:58

## 2024-12-24 RX ADMIN — DOCETAXEL ANHYDROUS 135 MG: 10 INJECTION, SOLUTION INTRAVENOUS at 12:51

## 2024-12-24 RX ADMIN — DEXAMETHASONE SODIUM PHOSPHATE 6 MG: 10 INJECTION INTRAMUSCULAR; INTRAVENOUS at 11:33

## 2024-12-24 RX ADMIN — ONDANSETRON 8 MG: 2 INJECTION INTRAMUSCULAR; INTRAVENOUS at 11:36

## 2024-12-24 RX ADMIN — SODIUM CHLORIDE, PRESERVATIVE FREE 20 ML: 5 INJECTION INTRAVENOUS at 14:10

## 2024-12-24 RX ADMIN — PEGFILGRASTIM 6 MG: KIT SUBCUTANEOUS at 14:15

## 2024-12-24 ASSESSMENT — PAIN SCALES - WONG BAKER: WONGBAKER_NUMERICALRESPONSE: NO HURT

## 2024-12-24 ASSESSMENT — PAIN SCALES - GENERAL: PAINLEVEL_OUTOF10: 0

## 2024-12-24 NOTE — PROGRESS NOTES
[facility] - Chemotherapy Progress Note    Pt admit to [facility] for C8D1 Docetaxel in stable condition. Assessment completed.  PAC with positive blood return. Labs were drawn and sent for processing.     No new concerns voiced.  Pt reported to assessment nurse that she vomited yesterday, but is doing fine today. She feels a little weak.   Pt requested hydration with treatment today, stating that she \"usually\" gets hydration in between treatments.  Provided was notified of pt request. Order received.     VS  Vitals:    12/24/24 1000 12/24/24 1008 12/24/24 1400   BP:  (!) 124/59 (!) 178/97   Pulse:  (!) 102 76   Resp:  18    Temp:  97 °F (36.1 °C)    SpO2:  95%    Weight: 86 kg (189 lb 11.2 oz)     Height: 1.676 m (5' 6\")           Medications:  Medications Administered         dexAMETHasone (DECADRON) injection 6 mg Admin Date  12/24/2024 Action  Given Dose  6 mg Rate   Route  IntraVENous Documented By  Sakshi Rowan RN        DOCEtaxel (TAXOTERE) 135 mg in sodium chloride 0.9 % 250 mL chemo IVPB Admin Date  12/24/2024 Action  New Bag Dose  135 mg Rate  288.5 mL/hr Route  IntraVENous Documented By  Sakshi Rowan RN        ondansetron (ZOFRAN) injection 8 mg Admin Date  12/24/2024 Action  Given Dose  8 mg Rate   Route  IntraVENous Documented By  Sakshi Rowan RN        pegfilgrastim (NEULASTA) on-body injector 6 mg Admin Date  12/24/2024 Action  Given Dose  6 mg Rate   Route  SubCUTAneous Documented By  Sakshi Rowan RN        sodium chloride 0.9 % bolus 500 mL Admin Date  12/24/2024 Action  New Bag Dose  500 mL Rate  967.7 mL/hr Route  IntraVENous Documented By  Sakshi Rowan RN        sodium chloride flush 0.9 % injection 5-40 mL Admin Date  12/24/2024 Action  Given Dose  20 mL Rate   Route  IntraVENous Documented By  Sakshi Rowan RN          Education provided to patient about Neulasta On Body Injector including: side effects, how/when

## 2025-01-07 RX ORDER — DIPHENHYDRAMINE HYDROCHLORIDE 50 MG/ML
50 INJECTION INTRAMUSCULAR; INTRAVENOUS
Status: CANCELLED | OUTPATIENT
Start: 2025-01-14

## 2025-01-07 RX ORDER — SODIUM CHLORIDE 0.9 % (FLUSH) 0.9 %
5-40 SYRINGE (ML) INJECTION PRN
Status: CANCELLED | OUTPATIENT
Start: 2025-01-14

## 2025-01-07 RX ORDER — ACETAMINOPHEN 325 MG/1
650 TABLET ORAL
Status: CANCELLED | OUTPATIENT
Start: 2025-01-14

## 2025-01-07 RX ORDER — SODIUM CHLORIDE 9 MG/ML
5-250 INJECTION, SOLUTION INTRAVENOUS PRN
Status: CANCELLED | OUTPATIENT
Start: 2025-01-14

## 2025-01-07 RX ORDER — SODIUM CHLORIDE 9 MG/ML
INJECTION, SOLUTION INTRAVENOUS CONTINUOUS
Status: CANCELLED | OUTPATIENT
Start: 2025-01-14

## 2025-01-07 RX ORDER — ALBUTEROL SULFATE 90 UG/1
4 INHALANT RESPIRATORY (INHALATION) PRN
Status: CANCELLED | OUTPATIENT
Start: 2025-01-14

## 2025-01-07 RX ORDER — ONDANSETRON 2 MG/ML
8 INJECTION INTRAMUSCULAR; INTRAVENOUS ONCE
Status: CANCELLED | OUTPATIENT
Start: 2025-01-14 | End: 2025-01-14

## 2025-01-07 RX ORDER — ONDANSETRON 2 MG/ML
8 INJECTION INTRAMUSCULAR; INTRAVENOUS
Status: CANCELLED | OUTPATIENT
Start: 2025-01-14

## 2025-01-07 RX ORDER — HEPARIN 100 UNIT/ML
500 SYRINGE INTRAVENOUS PRN
Status: CANCELLED | OUTPATIENT
Start: 2025-01-14

## 2025-01-07 RX ORDER — DEXAMETHASONE SODIUM PHOSPHATE 10 MG/ML
6 INJECTION INTRAMUSCULAR; INTRAVENOUS ONCE
Status: CANCELLED | OUTPATIENT
Start: 2025-01-14 | End: 2025-01-14

## 2025-01-07 RX ORDER — EPINEPHRINE 1 MG/ML
0.3 INJECTION, SOLUTION INTRAMUSCULAR; SUBCUTANEOUS PRN
Status: CANCELLED | OUTPATIENT
Start: 2025-01-14

## 2025-01-07 RX ORDER — HYDROCORTISONE SODIUM SUCCINATE 100 MG/2ML
100 INJECTION INTRAMUSCULAR; INTRAVENOUS
Status: CANCELLED | OUTPATIENT
Start: 2025-01-14

## 2025-01-07 RX ORDER — FAMOTIDINE 10 MG/ML
20 INJECTION, SOLUTION INTRAVENOUS
Status: CANCELLED | OUTPATIENT
Start: 2025-01-14

## 2025-01-10 DIAGNOSIS — G89.3 CANCER ASSOCIATED PAIN: Primary | ICD-10-CM

## 2025-01-10 RX ORDER — OXYCODONE HYDROCHLORIDE 5 MG/1
5 TABLET ORAL EVERY 4 HOURS PRN
Qty: 42 TABLET | Refills: 0 | Status: SHIPPED | OUTPATIENT
Start: 2025-01-10 | End: 2025-01-17

## 2025-01-10 NOTE — TELEPHONE ENCOUNTER
Palliative Medicine Clinic   [place]: [phone] ([phone])    Patient Name: Elise Tabares  YOB: 1945    Medication Refill Request    Patient is scheduled for follow up:  [x]  YES  []   NO  Next [facility] Visit: 3/12/2025    PDMP reviewed:  [x] YES   []  System down / Unable  []  NO- Patient fills out of state    Medication:oxyCODONE (ROXICODONE) 5 MG   Dose and directions:Take 1 tablet by mouth every 4 hours   Number dispensed:28  Date filled (PDMP or Pharmacy):12/4/2024  # left:        Appropriate for refill:  [x]  YES  []  Early Request - Requires MD/NP Review      Other pertinent information for prescriber:

## 2025-01-10 NOTE — TELEPHONE ENCOUNTER
Palliative Medicine Clinic   [place]: [phone] ([phone])    Patient Name: Elise Tabares  YOB: 1945    Medication Refill Request Triage    Requested by:    [x]  Patient  []  Support person:      Last Pall Med Clinic Visit:  Visit date not found    Next Pall Med Clinic Visit: 03/12/2025     Preferred Pharmacy: [facility]  Backup Pharmacy:  *    Medications requested:  OxyContin     Is patient completely out?  [x]   YES  []   NO  If NO, how many pills does patient have left?  __    Other pertinent information shared:

## 2025-01-14 ENCOUNTER — HOSPITAL ENCOUNTER (OUTPATIENT)
Facility: HOSPITAL | Age: 80
Setting detail: INFUSION SERIES
Discharge: HOME | End: 2025-01-14
Payer: MEDICARE

## 2025-01-14 ENCOUNTER — OFFICE VISIT (OUTPATIENT)
Age: 80
End: 2025-01-14
Payer: MEDICARE

## 2025-01-14 VITALS
SYSTOLIC BLOOD PRESSURE: 135 MMHG | TEMPERATURE: 97.3 F | HEART RATE: 106 BPM | OXYGEN SATURATION: 96 % | DIASTOLIC BLOOD PRESSURE: 74 MMHG | RESPIRATION RATE: 18 BRPM

## 2025-01-14 VITALS
DIASTOLIC BLOOD PRESSURE: 63 MMHG | SYSTOLIC BLOOD PRESSURE: 96 MMHG | WEIGHT: 190 LBS | TEMPERATURE: 97.8 F | HEIGHT: 66 IN | BODY MASS INDEX: 30.53 KG/M2 | RESPIRATION RATE: 16 BRPM | OXYGEN SATURATION: 97 % | HEART RATE: 111 BPM

## 2025-01-14 DIAGNOSIS — C34.92 SQUAMOUS CELL CARCINOMA OF LUNG, LEFT (HCC): Primary | ICD-10-CM

## 2025-01-14 DIAGNOSIS — C34.92 MALIGNANT NEOPLASM OF UNSPECIFIED PART OF LEFT BRONCHUS OR LUNG (HCC): ICD-10-CM

## 2025-01-14 DIAGNOSIS — E86.1 HYPOTENSION DUE TO HYPOVOLEMIA: ICD-10-CM

## 2025-01-14 DIAGNOSIS — T45.1X5A CHEMOTHERAPY-INDUCED PERIPHERAL NEUROPATHY (HCC): ICD-10-CM

## 2025-01-14 DIAGNOSIS — Z51.11 ENCOUNTER FOR ANTINEOPLASTIC CHEMOTHERAPY: ICD-10-CM

## 2025-01-14 DIAGNOSIS — T45.1X5A ANTINEOPLASTIC CHEMOTHERAPY INDUCED ANEMIA: ICD-10-CM

## 2025-01-14 DIAGNOSIS — D64.81 ANTINEOPLASTIC CHEMOTHERAPY INDUCED ANEMIA: ICD-10-CM

## 2025-01-14 DIAGNOSIS — C34.92 MALIGNANT NEOPLASM OF UNSPECIFIED PART OF LEFT BRONCHUS OR LUNG (HCC): Primary | ICD-10-CM

## 2025-01-14 DIAGNOSIS — G62.0 CHEMOTHERAPY-INDUCED PERIPHERAL NEUROPATHY (HCC): ICD-10-CM

## 2025-01-14 DIAGNOSIS — N18.31 STAGE 3A CHRONIC KIDNEY DISEASE (HCC): ICD-10-CM

## 2025-01-14 LAB
ALBUMIN SERPL-MCNC: 3 G/DL (ref 3.5–5)
ALBUMIN/GLOB SERPL: 1.1 (ref 1.1–2.2)
ALP SERPL-CCNC: 70 U/L (ref 45–117)
ALT SERPL-CCNC: 12 U/L (ref 12–78)
ANION GAP SERPL CALC-SCNC: 6 MMOL/L (ref 2–12)
AST SERPL-CCNC: 15 U/L (ref 15–37)
BASOPHILS # BLD: 0.03 K/UL (ref 0–0.1)
BASOPHILS NFR BLD: 0.4 % (ref 0–1)
BILIRUB SERPL-MCNC: 1 MG/DL (ref 0.2–1)
BUN SERPL-MCNC: 23 MG/DL (ref 6–20)
BUN/CREAT SERPL: 18 (ref 12–20)
CALCIUM SERPL-MCNC: 8 MG/DL (ref 8.5–10.1)
CHLORIDE SERPL-SCNC: 108 MMOL/L (ref 97–108)
CO2 SERPL-SCNC: 24 MMOL/L (ref 21–32)
CREAT SERPL-MCNC: 1.31 MG/DL (ref 0.55–1.02)
DIFFERENTIAL METHOD BLD: ABNORMAL
EOSINOPHIL # BLD: 0.01 K/UL (ref 0–0.4)
EOSINOPHIL NFR BLD: 0.1 % (ref 0–7)
ERYTHROCYTE [DISTWIDTH] IN BLOOD BY AUTOMATED COUNT: 16.4 % (ref 11.5–14.5)
GLOBULIN SER CALC-MCNC: 2.8 G/DL (ref 2–4)
GLUCOSE SERPL-MCNC: 220 MG/DL (ref 65–100)
HCT VFR BLD AUTO: 26.8 % (ref 35–47)
HGB BLD-MCNC: 8.4 G/DL (ref 11.5–16)
IMM GRANULOCYTES # BLD AUTO: 0.04 K/UL (ref 0–0.04)
IMM GRANULOCYTES NFR BLD AUTO: 0.5 % (ref 0–0.5)
LYMPHOCYTES # BLD: 0.58 K/UL (ref 0.8–3.5)
LYMPHOCYTES NFR BLD: 6.8 % (ref 12–49)
MCH RBC QN AUTO: 30.1 PG (ref 26–34)
MCHC RBC AUTO-ENTMCNC: 31.3 G/DL (ref 30–36.5)
MCV RBC AUTO: 96.1 FL (ref 80–99)
MONOCYTES # BLD: 0.41 K/UL (ref 0–1)
MONOCYTES NFR BLD: 4.8 % (ref 5–13)
NEUTS SEG # BLD: 7.43 K/UL (ref 1.8–8)
NEUTS SEG NFR BLD: 87.4 % (ref 32–75)
NRBC # BLD: 0 K/UL (ref 0–0.01)
NRBC BLD-RTO: 0 PER 100 WBC
PLATELET # BLD AUTO: 212 K/UL (ref 150–400)
PMV BLD AUTO: 10.5 FL (ref 8.9–12.9)
POTASSIUM SERPL-SCNC: 4.3 MMOL/L (ref 3.5–5.1)
PROT SERPL-MCNC: 5.8 G/DL (ref 6.4–8.2)
RBC # BLD AUTO: 2.79 M/UL (ref 3.8–5.2)
RBC MORPH BLD: ABNORMAL
RBC MORPH BLD: ABNORMAL
SODIUM SERPL-SCNC: 138 MMOL/L (ref 136–145)
WBC # BLD AUTO: 8.5 K/UL (ref 3.6–11)

## 2025-01-14 PROCEDURE — 3078F DIAST BP <80 MM HG: CPT | Performed by: INTERNAL MEDICINE

## 2025-01-14 PROCEDURE — G2211 COMPLEX E/M VISIT ADD ON: HCPCS | Performed by: INTERNAL MEDICINE

## 2025-01-14 PROCEDURE — 99215 OFFICE O/P EST HI 40 MIN: CPT | Performed by: INTERNAL MEDICINE

## 2025-01-14 PROCEDURE — 1159F MED LIST DOCD IN RCRD: CPT | Performed by: INTERNAL MEDICINE

## 2025-01-14 PROCEDURE — G8417 CALC BMI ABV UP PARAM F/U: HCPCS | Performed by: INTERNAL MEDICINE

## 2025-01-14 PROCEDURE — G8399 PT W/DXA RESULTS DOCUMENT: HCPCS | Performed by: INTERNAL MEDICINE

## 2025-01-14 PROCEDURE — 1036F TOBACCO NON-USER: CPT | Performed by: INTERNAL MEDICINE

## 2025-01-14 PROCEDURE — 1123F ACP DISCUSS/DSCN MKR DOCD: CPT | Performed by: INTERNAL MEDICINE

## 2025-01-14 PROCEDURE — 85025 COMPLETE CBC W/AUTO DIFF WBC: CPT

## 2025-01-14 PROCEDURE — 96360 HYDRATION IV INFUSION INIT: CPT

## 2025-01-14 PROCEDURE — G8427 DOCREV CUR MEDS BY ELIG CLIN: HCPCS | Performed by: INTERNAL MEDICINE

## 2025-01-14 PROCEDURE — 1090F PRES/ABSN URINE INCON ASSESS: CPT | Performed by: INTERNAL MEDICINE

## 2025-01-14 PROCEDURE — 3074F SYST BP LT 130 MM HG: CPT | Performed by: INTERNAL MEDICINE

## 2025-01-14 PROCEDURE — 1126F AMNT PAIN NOTED NONE PRSNT: CPT | Performed by: INTERNAL MEDICINE

## 2025-01-14 PROCEDURE — 1160F RVW MEDS BY RX/DR IN RCRD: CPT | Performed by: INTERNAL MEDICINE

## 2025-01-14 PROCEDURE — 80053 COMPREHEN METABOLIC PANEL: CPT

## 2025-01-14 PROCEDURE — 2580000003 HC RX 258: Performed by: NURSE PRACTITIONER

## 2025-01-14 RX ORDER — EPINEPHRINE 1 MG/ML
0.3 INJECTION, SOLUTION, CONCENTRATE INTRAVENOUS PRN
OUTPATIENT
Start: 2025-01-14

## 2025-01-14 RX ORDER — ALBUTEROL SULFATE 90 UG/1
4 INHALANT RESPIRATORY (INHALATION) PRN
OUTPATIENT
Start: 2025-01-14

## 2025-01-14 RX ORDER — 0.9 % SODIUM CHLORIDE 0.9 %
1000 INTRAVENOUS SOLUTION INTRAVENOUS ONCE
Status: COMPLETED | OUTPATIENT
Start: 2025-01-14 | End: 2025-01-14

## 2025-01-14 RX ORDER — HEPARIN SODIUM (PORCINE) LOCK FLUSH IV SOLN 100 UNIT/ML 100 UNIT/ML
500 SOLUTION INTRAVENOUS PRN
OUTPATIENT
Start: 2025-01-14

## 2025-01-14 RX ORDER — SODIUM CHLORIDE 9 MG/ML
25 INJECTION, SOLUTION INTRAVENOUS PRN
OUTPATIENT
Start: 2025-01-14

## 2025-01-14 RX ORDER — HYDROCORTISONE SODIUM SUCCINATE 100 MG/2ML
100 INJECTION INTRAMUSCULAR; INTRAVENOUS
OUTPATIENT
Start: 2025-01-14

## 2025-01-14 RX ORDER — ONDANSETRON 2 MG/ML
8 INJECTION INTRAMUSCULAR; INTRAVENOUS
OUTPATIENT
Start: 2025-01-14

## 2025-01-14 RX ORDER — DIPHENHYDRAMINE HYDROCHLORIDE 50 MG/ML
50 INJECTION INTRAMUSCULAR; INTRAVENOUS
OUTPATIENT
Start: 2025-01-14

## 2025-01-14 RX ORDER — SODIUM CHLORIDE 0.9 % (FLUSH) 0.9 %
5-40 SYRINGE (ML) INJECTION PRN
OUTPATIENT
Start: 2025-01-14

## 2025-01-14 RX ORDER — ACETAMINOPHEN 325 MG/1
650 TABLET ORAL
OUTPATIENT
Start: 2025-01-14

## 2025-01-14 RX ORDER — FAMOTIDINE 10 MG/ML
20 INJECTION, SOLUTION INTRAVENOUS
OUTPATIENT
Start: 2025-01-14

## 2025-01-14 RX ORDER — 0.9 % SODIUM CHLORIDE 0.9 %
500 INTRAVENOUS SOLUTION INTRAVENOUS ONCE
Status: DISCONTINUED | OUTPATIENT
Start: 2025-01-14 | End: 2025-01-14

## 2025-01-14 RX ORDER — SODIUM CHLORIDE 9 MG/ML
INJECTION, SOLUTION INTRAVENOUS CONTINUOUS
OUTPATIENT
Start: 2025-01-14

## 2025-01-14 RX ADMIN — SODIUM CHLORIDE 1000 ML: 9 INJECTION, SOLUTION INTRAVENOUS at 12:23

## 2025-01-14 ASSESSMENT — PAIN SCALES - GENERAL: PAINLEVEL_OUTOF10: 0

## 2025-01-14 ASSESSMENT — PAIN SCALES - WONG BAKER: WONGBAKER_NUMERICALRESPONSE: NO HURT

## 2025-01-14 NOTE — PROGRESS NOTES
[facility] Progress Note    Date: 2025    Name: Elise Tabares    MRN: [id number]         : 1945    Ms. Tabares arrived ambulatory and in no distress for Hydration.  Assessment was completed, no acute issues at this time, no new complaints voiced.  right chest wall port accessed without difficulty, labs drawn & sent for processing. Patient proceeded to appointment with Dr. Park's team.       Ms. Tabares's vitals were reviewed.  Vitals:    25 1000 25 1315   BP: (!) 102/57 135/74   Pulse: (!) 111 (!) 106   Resp: 18    Temp: 97.3 °F (36.3 °C)    TempSrc: Temporal    SpO2: 97% 96%        Lab results were obtained and reviewed.  Recent Results (from the past 12 hour(s))   Comprehensive metabolic panel    Collection Time: 25 10:21 AM   Result Value Ref Range    Sodium 138 136 - 145 mmol/L    Potassium 4.3 3.5 - 5.1 mmol/L    Chloride 108 97 - 108 mmol/L    CO2 24 21 - 32 mmol/L    Anion Gap 6 2 - 12 mmol/L    Glucose 220 (H) 65 - 100 mg/dL    BUN 23 (H) 6 - 20 MG/DL    Creatinine 1.31 (H) 0.55 - 1.02 MG/DL    BUN/Creatinine Ratio 18 12 - 20      Est, Glom Filt Rate 41 (L) >60 ml/min/1.73m2    Calcium 8.0 (L) 8.5 - 10.1 MG/DL    Total Bilirubin 1.0 0.2 - 1.0 MG/DL    ALT 12 12 - 78 U/L    AST 15 15 - 37 U/L    Alk Phosphatase 70 45 - 117 U/L    Total Protein 5.8 (L) 6.4 - 8.2 g/dL    Albumin 3.0 (L) 3.5 - 5.0 g/dL    Globulin 2.8 2.0 - 4.0 g/dL    Albumin/Globulin Ratio 1.1 1.1 - 2.2     CBC With Auto Differential    Collection Time: 25 10:21 AM   Result Value Ref Range    WBC 8.5 3.6 - 11.0 K/uL    RBC 2.79 (L) 3.80 - 5.20 M/uL    Hemoglobin 8.4 (L) 11.5 - 16.0 g/dL    Hematocrit 26.8 (L) 35.0 - 47.0 %    MCV 96.1 80.0 - 99.0 FL    MCH 30.1 26.0 - 34.0 PG    MCHC 31.3 30.0 - 36.5 g/dL    RDW 16.4 (H) 11.5 - 14.5 %    Platelets 212 150 - 400 K/uL    MPV 10.5 8.9 - 12.9 FL    Nucleated RBCs 0.0 0  WBC    nRBC 0.00 0.00 - 0.01 K/uL    Neutrophils % 87.4 (H) 32.0 - 75.0 %

## 2025-01-14 NOTE — PROGRESS NOTES
Elise Tabares is a 79 y.o. female follow up for         1. Have you been to the ER, urgent care clinic since your last visit?  Hospitalized since your last visit?No    2. Have you seen or consulted any other health care providers outside of the [facility] since your last visit?  Include any pap smears or colon screening. Ortho  
daily.     3. 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 Anglin in Endocrinology. On glargine in PM and takes Lyumjev (insulin lispro) 20 units on day of treatment if BG is > 250. Pt states she is not taking Gabapentin anymore. Is on Oxycodone.  -- Following closely with Dr. Anglin.     4. Stage IIIa CKD:  Likely 2/2 DM, HTN, possibly exacerbated by chemotherapy. Crt range 1.2. Followed by Dr. De Leon.   -- Advised 60 oz water daily.     5. CAD / Hypotension with prior h/o HTN:  On ASA, statin. Stent placement in proximal LAD on 10/5/23. On Plavix.    -- Holding BB and Lisinopril for now  -- 1 liter NS today    6. Anemia of chronic disease:  2/2 CKD. B12 and folate normal. S/p Injectafer x 1 in March 2024 in the setting of CKD.    7. R ankle lateral malleolus fracture:  Following with Dr. Mcqueen in Ortho- does not need surgery, but will continue wearing boot.     8. 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     Goals of care:  Disease is now incurable, but is treatable to improve quality and duration of life.    Emotional well being: Pt is coping well with his/her disease and has excellent support.  I appreciate the opportunity to participate in Ms. Elise Tabares's care.    Signed By: Guerline Park MD

## 2025-02-03 DIAGNOSIS — I12.9 RENAL HYPERTENSION: ICD-10-CM

## 2025-02-03 DIAGNOSIS — S82.65XD CLOSED NONDISPLACED FRACTURE OF LATERAL MALLEOLUS OF LEFT FIBULA WITH ROUTINE HEALING: ICD-10-CM

## 2025-02-03 DIAGNOSIS — N18.32 TYPE 2 DIABETES MELLITUS WITH STAGE 3B CHRONIC KIDNEY DISEASE, UNSPECIFIED WHETHER LONG TERM INSULIN USE (HCC): ICD-10-CM

## 2025-02-03 DIAGNOSIS — S82.64XD CLOSED NONDISPLACED FRACTURE OF LATERAL MALLEOLUS OF RIGHT FIBULA WITH ROUTINE HEALING: Primary | ICD-10-CM

## 2025-02-03 DIAGNOSIS — E11.22 TYPE 2 DIABETES MELLITUS WITH STAGE 3B CHRONIC KIDNEY DISEASE, UNSPECIFIED WHETHER LONG TERM INSULIN USE (HCC): ICD-10-CM

## 2025-02-03 PROCEDURE — G0179 MD RECERTIFICATION HHA PT: HCPCS | Performed by: FAMILY MEDICINE

## 2025-02-03 NOTE — PROGRESS NOTES
Reviewed and Signed Home Health Certification/Recertification and Care Plan (see attached)    Elise M Fabishefali  1945     Agency: [facility] Medicare #: [id number]  Medical Record #: [id number]  Provider #: [id number]    SOC Date: 11/7/2024  Certification Period: 1/6/2025-3/6/2025  Last F2F: LOV 7/9/2024; Hospital Discharge 10/19/2024    Services: SN/PT    Gerber Mayo MD  2/3/2025 5:33 PM

## 2025-02-04 ENCOUNTER — TELEPHONE (OUTPATIENT)
Age: 80
End: 2025-02-04

## 2025-02-04 DIAGNOSIS — G89.3 CANCER ASSOCIATED PAIN: Primary | ICD-10-CM

## 2025-02-04 DIAGNOSIS — G62.0 CHEMOTHERAPY-INDUCED NEUROPATHY (HCC): ICD-10-CM

## 2025-02-04 DIAGNOSIS — T45.1X5A CHEMOTHERAPY-INDUCED NEUROPATHY (HCC): ICD-10-CM

## 2025-02-04 RX ORDER — BUPRENORPHINE 5 UG/H
1 PATCH TRANSDERMAL WEEKLY
Qty: 4 PATCH | Refills: 0 | Status: SHIPPED | OUTPATIENT
Start: 2025-02-04 | End: 2025-03-04

## 2025-02-04 NOTE — TELEPHONE ENCOUNTER
Per Dr Street :    She has not been on enough oxycodone for safe transition to fentanyl patch (which would be appropriate if taking oxy 5mg essentially around the clock).  We could try buprenorphine 5mcg/hr patch that she would change every 7 days.  I'll send this in if she is agreeable.     Patient agreeable to new medication

## 2025-02-04 NOTE — TELEPHONE ENCOUNTER
Patient reports she wants to change to Fenatly patch or new FDA approved opioid Juan     Patient reports pain 9/10 today in B/L hands and feet     Out of Oxycodone 5 mg since yesterday   Reports she did not call for refill because she \"wants something else\"    Patient currently Oxycodone 5 mg q4h #42     Please advise

## 2025-02-10 ENCOUNTER — TELEPHONE (OUTPATIENT)
Age: 80
End: 2025-02-10

## 2025-02-10 DIAGNOSIS — G89.3 CANCER ASSOCIATED PAIN: Primary | ICD-10-CM

## 2025-02-10 RX ORDER — OXYCODONE HYDROCHLORIDE 5 MG/1
5 TABLET ORAL EVERY 4 HOURS PRN
Qty: 42 TABLET | Refills: 0 | Status: SHIPPED | OUTPATIENT
Start: 2025-02-10 | End: 2025-02-17

## 2025-02-10 NOTE — TELEPHONE ENCOUNTER
Palliative Medicine  Nursing Note  [phone]  Fax [phone]      Telephone Call  Patient Name: Elise Tabares  YOB: 1945    2/10/2025    Return call placed to patient regarding use of Tylenol while using buprenorphine patch.     Spoke with patient and told her it is okay to use Tylenol 1,000 mg (two 500 mg tablets) every 8 hours as needed for pain per Dr. Street notes. Patient asked if it was okay to also take Oxycodone while on the patch. Nurse explained that the medications work differently in the system and asked patient if she was still using Oxycodone. Patient says she ran out of her Oxycodone approximately two weeks ago.   She reports her pain has consistently remained a level 8/10.    Nurse expressed to patient she would reach out to provider regarding ongoing pain.    Encounter sent to Dr. Street for review and recommendations and pend order for Oxycodone.       Jennifer Mary RN  Palliative Medicine

## 2025-02-10 NOTE — TELEPHONE ENCOUNTER
Patient wants to know if she can take Tylenol while using the pain patch for 7 days?  # [phone]

## 2025-02-11 ENCOUNTER — HOSPITAL ENCOUNTER (OUTPATIENT)
Facility: HOSPITAL | Age: 80
Setting detail: INFUSION SERIES
End: 2025-02-11

## 2025-02-11 NOTE — TELEPHONE ENCOUNTER
Incoming call from Ms. Tabares nurse proceeded to call back and set up follow up appt for tomorrow 2/12 at 2:30 with Dr. Street.

## 2025-02-11 NOTE — TELEPHONE ENCOUNTER
Per request from Dr. Street called and left a message for Ms. Tabares and offered her a visit tomorrow with Dr. Street at 2:30 to address pain and pain management.

## 2025-02-12 ENCOUNTER — TELEMEDICINE (OUTPATIENT)
Age: 80
End: 2025-02-12

## 2025-02-12 DIAGNOSIS — K59.03 CONSTIPATION DUE TO OPIOID THERAPY: ICD-10-CM

## 2025-02-12 DIAGNOSIS — T45.1X5A CHEMOTHERAPY-INDUCED NEUROPATHY (HCC): Primary | ICD-10-CM

## 2025-02-12 DIAGNOSIS — G89.3 CANCER ASSOCIATED PAIN: ICD-10-CM

## 2025-02-12 DIAGNOSIS — T40.2X5A CONSTIPATION DUE TO OPIOID THERAPY: ICD-10-CM

## 2025-02-12 DIAGNOSIS — Z79.4 TYPE 2 DIABETES MELLITUS WITH DIABETIC NEUROPATHY, WITH LONG-TERM CURRENT USE OF INSULIN (HCC): ICD-10-CM

## 2025-02-12 DIAGNOSIS — G62.0 CHEMOTHERAPY-INDUCED NEUROPATHY (HCC): Primary | ICD-10-CM

## 2025-02-12 DIAGNOSIS — E11.40 TYPE 2 DIABETES MELLITUS WITH DIABETIC NEUROPATHY, WITH LONG-TERM CURRENT USE OF INSULIN (HCC): ICD-10-CM

## 2025-02-12 DIAGNOSIS — Z51.5 PALLIATIVE CARE BY SPECIALIST: ICD-10-CM

## 2025-02-12 RX ORDER — PREGABALIN 25 MG/1
CAPSULE ORAL
Qty: 83 CAPSULE | Refills: 0 | Status: SHIPPED | OUTPATIENT
Start: 2025-02-12 | End: 2025-03-19

## 2025-02-12 NOTE — PROGRESS NOTES
Palliative Medicine Outpatient Services  [place]: [phone] ([phone])    Patient Name: Elise Tabares  YOB: 1945    Date of Current Visit: 02/12/2025  Location of Current Visit:    [] [facility] Office  [] [facility] Office  [] [facility] Office  [] Home  [x] Synchronous real-time A/V virtual visit    Elise Tabares, was evaluated through a synchronous (real-time) audio-video encounter. The patient (or guardian if applicable) is aware that this is a billable service, which includes applicable co-pays. This Virtual Visit was conducted with patient's (and/or legal guardian's) consent. Patient identification was verified, and a caregiver was present when appropriate.   The patient was located at Home: [address] [place] [phone]  Provider was located at Facility (Appt Dept): [address] [place] [address]  [address] [place] [address]    Date of Initial Visit: 8/21/2024  Referral from: MADDIE Arredondo  Primary Care Physician: Gerber Mayo MD      SUMMARY:   Elise Tabares is a 79 y.o. year old with a history of Squamous Cell Lung Cancer, CAD, CKD, HTN, DMII, MAX who was referred to Palliative Care clinic by MADDIE Arredondo for symptom management and psychosocial support.    She was initially diagnosed with lung cancer after being hospitalized in March 2023 for syncope.  Work up at that time revealed new cavitary lesion in JANELL with biopsy showing invasive poorly differentiated squamous cell carcinoma.  She undwent sublobar resection of JANELL with Dr. Dom Perez at that time.  Has been through Carbo-[place] and Atezolizumab, now on Docetaxel since 7/23/2024.    Follows with Dr. Park and MADDIE Farias for cancer treatment.  Last visit 12/24/24 for C8 Docetaxel.  Has been on treatment break since that time with discontinuation of docetaxel due to worsening neuropathy.    CT C/A/P 11/27/24 - Stable postsurgical changes left upper lobe. Persistent small left pleural effusion, improved aeration left lower

## 2025-02-12 NOTE — PATIENT INSTRUCTIONS
Dear Elise Tabares ,    It was a pleasure seeing you today.    We will see you again in 4 weeks at your appointment on 3/12/25    If labs or imaging tests have been ordered for you today, please call the office at [phone] 48 hours after completion to obtain the results.        This is the plan we talked about:    # Cancer Pain  - This is suspected to cancer in the lung which is invading into adjacent bone and chest wall.  Also having deep bone pain after Neulasta injections.  - We have started butrans 5mcg/hr patch to change every 7 days to help with this pain  - Let's continue oxycodone IR (short-acting pain med) 5mg every 4 hours as needed for breakthrough pain.  - Please keep a log of how often you are needing pain medication and how well it is working for you.  We will use this to make safe medication changes as well as to add a long-acting pain medication or patch if needed.  You inquire about using a medication patch like fentanyl today, but this would not be safe or indicated to use at present with your very limited opioid pain medication use so far.  - You may use Tylenol 1000 mg every 8 hours as needed for pain.  - We are avoiding NSAIDs given your history of kidney disease.  - Continue bowel regimen as opioid medications (like morphine and oxycodone) will cause constipation  - Please contact clinic when you have about 4-5 days of medication left so we can ensure pharmacy has it in stock, complete prior authorizations required by insurance, and make sure it is filled by your pharmacy before you run out of meds.  - We will periodically check a urine tox screen in accordance with our clinic policy for safe prescribing of opioids.  - You have naloxone (Narcan) at home for use in setting of accidental overdose.    # Neuropathy  - You also have neuropathy pain to your feet, which is suspected due to diabetes, as well as in your hands since starting chemo.  - Gabapentin did not seem to help so we will

## 2025-02-12 NOTE — PROGRESS NOTES
Palliative Medicine Outpatient Clinic  Nurse Check in Note  [phone]    Patient Name: Elise Tabares  YOB: 1945      Date of Visit: 02/12/2025  Visit Location:  [facility] Virtual Visit     Nurse verified patient is located in [place] for today's visit: Yes    Chief patient or family concern today:     Patient's Last Palliative Medicine Clinic Visit Date:  12/4/2024    Have you been to an ER or urgent care center since your last visit?  No  Have you been hospitalized since your last visit? No  Have you seen or consulted any health care providers outside of the [facility] system since your last visit?  No  If Yes, alert PSR to request appropriate records from non-[facility] offices    Medications:  Med reconciliation was performed with:  Patient    Requested refills:  Yes- not pended, clinician review requested    If prescribed an opioid, does patient have access to naloxone at home?:  YES  If No, pend naloxone nasal spray    Function and Symptoms:  Use of assist devices:  None    Palliative Performance Status (PPS):        ESAS:       Constipation?    Last BM:     Advance Care Planning:  Currently listed healthcare agent:      Is there an ACP Note within the past 12 months?  YES  If No, Alert Clinician and/or Social Work      Aury Eric RN

## 2025-02-18 ENCOUNTER — OFFICE VISIT (OUTPATIENT)
Age: 80
End: 2025-02-18
Payer: MEDICARE

## 2025-02-18 ENCOUNTER — TELEPHONE (OUTPATIENT)
Age: 80
End: 2025-02-18

## 2025-02-18 ENCOUNTER — HOSPITAL ENCOUNTER (OUTPATIENT)
Facility: HOSPITAL | Age: 80
Setting detail: INFUSION SERIES
Discharge: HOME | End: 2025-02-18
Payer: MEDICARE

## 2025-02-18 VITALS
RESPIRATION RATE: 16 BRPM | BODY MASS INDEX: 30.67 KG/M2 | DIASTOLIC BLOOD PRESSURE: 77 MMHG | TEMPERATURE: 97.6 F | HEIGHT: 66 IN | OXYGEN SATURATION: 98 % | HEART RATE: 83 BPM | SYSTOLIC BLOOD PRESSURE: 150 MMHG

## 2025-02-18 VITALS
OXYGEN SATURATION: 98 % | DIASTOLIC BLOOD PRESSURE: 74 MMHG | SYSTOLIC BLOOD PRESSURE: 132 MMHG | HEART RATE: 84 BPM | RESPIRATION RATE: 18 BRPM | TEMPERATURE: 97.3 F

## 2025-02-18 DIAGNOSIS — Z09 CHEMOTHERAPY FOLLOW-UP EXAMINATION: ICD-10-CM

## 2025-02-18 DIAGNOSIS — E11.40 TYPE 2 DIABETES MELLITUS WITH DIABETIC NEUROPATHY, WITH LONG-TERM CURRENT USE OF INSULIN (HCC): ICD-10-CM

## 2025-02-18 DIAGNOSIS — Z79.4 TYPE 2 DIABETES MELLITUS WITH DIABETIC NEUROPATHY, WITH LONG-TERM CURRENT USE OF INSULIN (HCC): ICD-10-CM

## 2025-02-18 DIAGNOSIS — N18.31 STAGE 3A CHRONIC KIDNEY DISEASE (HCC): ICD-10-CM

## 2025-02-18 DIAGNOSIS — C34.92 SQUAMOUS CELL CARCINOMA OF LUNG, LEFT (HCC): Primary | ICD-10-CM

## 2025-02-18 DIAGNOSIS — T45.1X5A CHEMOTHERAPY-INDUCED PERIPHERAL NEUROPATHY: ICD-10-CM

## 2025-02-18 DIAGNOSIS — C34.92 MALIGNANT NEOPLASM OF UNSPECIFIED PART OF LEFT BRONCHUS OR LUNG (HCC): Primary | ICD-10-CM

## 2025-02-18 DIAGNOSIS — G62.0 CHEMOTHERAPY-INDUCED PERIPHERAL NEUROPATHY: ICD-10-CM

## 2025-02-18 LAB
ALBUMIN SERPL-MCNC: 3.4 G/DL (ref 3.5–5)
ALBUMIN/GLOB SERPL: 1.1 (ref 1.1–2.2)
ALP SERPL-CCNC: 83 U/L (ref 45–117)
ALT SERPL-CCNC: 17 U/L (ref 12–78)
ANION GAP SERPL CALC-SCNC: 4 MMOL/L (ref 2–12)
AST SERPL-CCNC: 17 U/L (ref 15–37)
BASOPHILS # BLD: 0.01 K/UL (ref 0–0.1)
BASOPHILS NFR BLD: 0.2 % (ref 0–1)
BILIRUB SERPL-MCNC: 0.5 MG/DL (ref 0.2–1)
BUN SERPL-MCNC: 27 MG/DL (ref 6–20)
BUN/CREAT SERPL: 29 (ref 12–20)
CALCIUM SERPL-MCNC: 9.5 MG/DL (ref 8.5–10.1)
CHLORIDE SERPL-SCNC: 110 MMOL/L (ref 97–108)
CO2 SERPL-SCNC: 26 MMOL/L (ref 21–32)
CREAT SERPL-MCNC: 0.93 MG/DL (ref 0.55–1.02)
DIFFERENTIAL METHOD BLD: ABNORMAL
EOSINOPHIL # BLD: 0.09 K/UL (ref 0–0.4)
EOSINOPHIL NFR BLD: 1.7 % (ref 0–7)
ERYTHROCYTE [DISTWIDTH] IN BLOOD BY AUTOMATED COUNT: 13.1 % (ref 11.5–14.5)
GLOBULIN SER CALC-MCNC: 3.2 G/DL (ref 2–4)
GLUCOSE SERPL-MCNC: 90 MG/DL (ref 65–100)
HCT VFR BLD AUTO: 31.9 % (ref 35–47)
HGB BLD-MCNC: 10.3 G/DL (ref 11.5–16)
IMM GRANULOCYTES # BLD AUTO: 0.01 K/UL (ref 0–0.04)
IMM GRANULOCYTES NFR BLD AUTO: 0.2 % (ref 0–0.5)
LYMPHOCYTES # BLD: 0.59 K/UL (ref 0.8–3.5)
LYMPHOCYTES NFR BLD: 11 % (ref 12–49)
MCH RBC QN AUTO: 30.6 PG (ref 26–34)
MCHC RBC AUTO-ENTMCNC: 32.3 G/DL (ref 30–36.5)
MCV RBC AUTO: 94.7 FL (ref 80–99)
MONOCYTES # BLD: 0.26 K/UL (ref 0–1)
MONOCYTES NFR BLD: 4.8 % (ref 5–13)
NEUTS SEG # BLD: 4.43 K/UL (ref 1.8–8)
NEUTS SEG NFR BLD: 82.1 % (ref 32–75)
NRBC # BLD: 0 K/UL (ref 0–0.01)
NRBC BLD-RTO: 0 PER 100 WBC
PLATELET # BLD AUTO: 180 K/UL (ref 150–400)
PMV BLD AUTO: 10.1 FL (ref 8.9–12.9)
POTASSIUM SERPL-SCNC: 4.2 MMOL/L (ref 3.5–5.1)
PROT SERPL-MCNC: 6.6 G/DL (ref 6.4–8.2)
RBC # BLD AUTO: 3.37 M/UL (ref 3.8–5.2)
RBC MORPH BLD: ABNORMAL
SODIUM SERPL-SCNC: 140 MMOL/L (ref 136–145)
WBC # BLD AUTO: 5.4 K/UL (ref 3.6–11)

## 2025-02-18 PROCEDURE — G8399 PT W/DXA RESULTS DOCUMENT: HCPCS | Performed by: NURSE PRACTITIONER

## 2025-02-18 PROCEDURE — 3077F SYST BP >= 140 MM HG: CPT | Performed by: NURSE PRACTITIONER

## 2025-02-18 PROCEDURE — 1125F AMNT PAIN NOTED PAIN PRSNT: CPT | Performed by: NURSE PRACTITIONER

## 2025-02-18 PROCEDURE — G2211 COMPLEX E/M VISIT ADD ON: HCPCS | Performed by: NURSE PRACTITIONER

## 2025-02-18 PROCEDURE — 85025 COMPLETE CBC W/AUTO DIFF WBC: CPT

## 2025-02-18 PROCEDURE — 1123F ACP DISCUSS/DSCN MKR DOCD: CPT | Performed by: NURSE PRACTITIONER

## 2025-02-18 PROCEDURE — 99215 OFFICE O/P EST HI 40 MIN: CPT | Performed by: NURSE PRACTITIONER

## 2025-02-18 PROCEDURE — G8427 DOCREV CUR MEDS BY ELIG CLIN: HCPCS | Performed by: NURSE PRACTITIONER

## 2025-02-18 PROCEDURE — 1090F PRES/ABSN URINE INCON ASSESS: CPT | Performed by: NURSE PRACTITIONER

## 2025-02-18 PROCEDURE — 3078F DIAST BP <80 MM HG: CPT | Performed by: NURSE PRACTITIONER

## 2025-02-18 PROCEDURE — 1160F RVW MEDS BY RX/DR IN RCRD: CPT | Performed by: NURSE PRACTITIONER

## 2025-02-18 PROCEDURE — 2500000003 HC RX 250 WO HCPCS: Performed by: INTERNAL MEDICINE

## 2025-02-18 PROCEDURE — 80053 COMPREHEN METABOLIC PANEL: CPT

## 2025-02-18 PROCEDURE — G8417 CALC BMI ABV UP PARAM F/U: HCPCS | Performed by: NURSE PRACTITIONER

## 2025-02-18 PROCEDURE — 1159F MED LIST DOCD IN RCRD: CPT | Performed by: NURSE PRACTITIONER

## 2025-02-18 PROCEDURE — 36591 DRAW BLOOD OFF VENOUS DEVICE: CPT

## 2025-02-18 PROCEDURE — 1036F TOBACCO NON-USER: CPT | Performed by: NURSE PRACTITIONER

## 2025-02-18 RX ORDER — SODIUM CHLORIDE 9 MG/ML
INJECTION, SOLUTION INTRAVENOUS CONTINUOUS
OUTPATIENT
Start: 2025-03-25

## 2025-02-18 RX ORDER — ACETAMINOPHEN 325 MG/1
650 TABLET ORAL
OUTPATIENT
Start: 2025-03-25

## 2025-02-18 RX ORDER — SODIUM CHLORIDE 0.9 % (FLUSH) 0.9 %
5-40 SYRINGE (ML) INJECTION PRN
Status: DISCONTINUED | OUTPATIENT
Start: 2025-02-18 | End: 2025-02-19 | Stop reason: HOSPADM

## 2025-02-18 RX ORDER — SODIUM CHLORIDE 0.9 % (FLUSH) 0.9 %
5-40 SYRINGE (ML) INJECTION PRN
OUTPATIENT
Start: 2025-03-25

## 2025-02-18 RX ORDER — ONDANSETRON 2 MG/ML
8 INJECTION INTRAMUSCULAR; INTRAVENOUS
OUTPATIENT
Start: 2025-03-25

## 2025-02-18 RX ORDER — DIPHENHYDRAMINE HYDROCHLORIDE 50 MG/ML
50 INJECTION INTRAMUSCULAR; INTRAVENOUS
OUTPATIENT
Start: 2025-03-25

## 2025-02-18 RX ORDER — SODIUM CHLORIDE 9 MG/ML
25 INJECTION, SOLUTION INTRAVENOUS PRN
OUTPATIENT
Start: 2025-03-25

## 2025-02-18 RX ORDER — ALBUTEROL SULFATE 90 UG/1
4 INHALANT RESPIRATORY (INHALATION) PRN
OUTPATIENT
Start: 2025-03-25

## 2025-02-18 RX ORDER — FAMOTIDINE 10 MG/ML
20 INJECTION, SOLUTION INTRAVENOUS
OUTPATIENT
Start: 2025-03-25

## 2025-02-18 RX ORDER — HYDROCORTISONE SODIUM SUCCINATE 100 MG/2ML
100 INJECTION INTRAMUSCULAR; INTRAVENOUS
OUTPATIENT
Start: 2025-03-25

## 2025-02-18 RX ORDER — EPINEPHRINE 1 MG/ML
0.3 INJECTION, SOLUTION INTRAMUSCULAR; SUBCUTANEOUS PRN
OUTPATIENT
Start: 2025-03-25

## 2025-02-18 RX ORDER — HEPARIN 100 UNIT/ML
500 SYRINGE INTRAVENOUS PRN
OUTPATIENT
Start: 2025-03-25

## 2025-02-18 RX ADMIN — SODIUM CHLORIDE, PRESERVATIVE FREE 20 ML: 5 INJECTION INTRAVENOUS at 11:35

## 2025-02-18 ASSESSMENT — PAIN DESCRIPTION - DESCRIPTORS: DESCRIPTORS: ACHING

## 2025-02-18 ASSESSMENT — PAIN DESCRIPTION - LOCATION: LOCATION: HAND;FOOT

## 2025-02-18 ASSESSMENT — PAIN SCALES - GENERAL: PAINLEVEL_OUTOF10: 4

## 2025-02-18 ASSESSMENT — PAIN DESCRIPTION - ORIENTATION: ORIENTATION: LEFT;RIGHT

## 2025-02-18 ASSESSMENT — PAIN DESCRIPTION - PAIN TYPE: TYPE: CHRONIC PAIN

## 2025-02-18 NOTE — PROGRESS NOTES
[facility]  Medical Oncology at [place]  [phone]    Hematology / Oncology Established Visit    Reason for Visit:   Elise Tabaers is a 79 y.o. female who is seen for follow up of lung cancer.     Hematology Oncology Treatment History:     Diagnosis: Squamous cell carcinoma of lung    Stage: IIB [[id number]], now metastatic    Pathology:   3/14/23 Lung, left upper lobe, Core biopsy: Invasive poorly differentiated squamous cell carcinoma.   Comment: Immunohistochemical stains show the malignant cells are positive  for cytokeratin 5/6 and negative for cytokeratin 7, cytokeratin 20 and  TTF-1 supporting the diagnosis.     5/3/23 Left upper lobe wedge resection:  Invasive poorly differentiated squamous cell carcinoma with areas of extensive necrosis.   Tumor size 5.5cm, G3  Visceral pleural invasion present. Vascular invasion present. Parenchymal resection margin negative for tumor. Background lung parenchyma with moderate to severe architectural distortion associated with prominent peribronchiolar metaplasia.  0/6 LN positive [Station 5, 6, 7 x 2, 8, 10]  -PDL1 TPS 2%    -Guardant NGS: TP53 R267G, TP53 S241F. CDKN2A L32fs, MSI-High not detected. No FDA approved medications     Prior Treatment:   1. Sublobar resection of JANELL by Dr. Dom Perez  2. Adjuvant Carbo-[place] x 4 on D1, 8 of q21d cycles, 6/20/23- 8/29/23  3. Atezolizumab [id number] x 1 year, 9/19/23 - 7/2024    Current Treatment:  Docetaxel 75mg/m2 [id number] until disease progression/ toxicity, 7/23/24 - current    History of Present Illness:   Elise Tabares is a 79 y.o. female with CAD, CKD, HTN, DM II, MAX who is referred for evaluation and management of lung cancer. Pt was recently hospitalized in early March 2023 after 2 syncopal episodes at home. She also noted some loose stools and vomiting. She was diagnosed with IVVD and VAIBHAV, treated with IVF. She was found to have a new 27 x 24mm cavitary lesion in JANELL and underwent CT-guided

## 2025-02-18 NOTE — PROGRESS NOTES
Ms. Tabares admitted to [facility] for PF/Labs in a wheelchair due to being in a boot from a broken foot in October, patient in stable condition. Assessment completed. No new concerns voiced.     PAC accessed without difficulty, flushed with NS, and labs drawn per orders and sent for processing. PAC was flushed with NS  per orders and de-accessed.    Vital Signs:  /74   Pulse 84   Temp 97.3 °F (36.3 °C) (Temporal)   Resp 18   LMP  (LMP Unknown)   SpO2 98%       PAC with positive blood return.   Lab Results:  Recent Results (from the past 12 hour(s))   CBC with Auto Differential    Collection Time: 02/18/25 10:44 AM   Result Value Ref Range    WBC 5.4 3.6 - 11.0 K/uL    RBC 3.37 (L) 3.80 - 5.20 M/uL    Hemoglobin 10.3 (L) 11.5 - 16.0 g/dL    Hematocrit 31.9 (L) 35.0 - 47.0 %    MCV 94.7 80.0 - 99.0 FL    MCH 30.6 26.0 - 34.0 PG    MCHC 32.3 30.0 - 36.5 g/dL    RDW 13.1 11.5 - 14.5 %    Platelets 180 150 - 400 K/uL    MPV 10.1 8.9 - 12.9 FL    Nucleated RBCs 0.0 0  WBC    nRBC 0.00 0.00 - 0.01 K/uL    Neutrophils % 82.1 (H) 32.0 - 75.0 %    Lymphocytes % 11.0 (L) 12.0 - 49.0 %    Monocytes % 4.8 (L) 5.0 - 13.0 %    Eosinophils % 1.7 0.0 - 7.0 %    Basophils % 0.2 0.0 - 1.0 %    Immature Granulocytes % 0.2 0.0 - 0.5 %    Neutrophils Absolute 4.43 1.80 - 8.00 K/UL    Lymphocytes Absolute 0.59 (L) 0.80 - 3.50 K/UL    Monocytes Absolute 0.26 0.00 - 1.00 K/UL    Eosinophils Absolute 0.09 0.00 - 0.40 K/UL    Basophils Absolute 0.01 0.00 - 0.10 K/UL    Immature Granulocytes Absolute 0.01 0.00 - 0.04 K/UL    Differential Type SMEAR SCANNED      RBC Comment NORMOCYTIC, NORMOCHROMIC     Comprehensive Metabolic Panel    Collection Time: 02/18/25 10:44 AM   Result Value Ref Range    Sodium 140 136 - 145 mmol/L    Potassium 4.2 3.5 - 5.1 mmol/L    Chloride 110 (H) 97 - 108 mmol/L    CO2 26 21 - 32 mmol/L    Anion Gap 4 2 - 12 mmol/L    Glucose 90 65 - 100 mg/dL    BUN 27 (H) 6 - 20 MG/DL    Creatinine 0.93 0.55 - 1.02

## 2025-02-18 NOTE — PROGRESS NOTES
Elise Tabares is a 79 y.o. female follow up for         1. Have you been to the ER, urgent care clinic since your last visit?  Hospitalized since your last visit?{no    2. Have you seen or consulted any other health care providers outside of the [facility] since your last visit?  Include any pap smears or colon screening. Ortho

## 2025-02-18 NOTE — TELEPHONE ENCOUNTER
Palliative Medicine  Nursing Note  [phone]  Fax [phone]      Telephone Call  Patient Name: Elise Tabares  YOB: 1945/2025    Call placed to Ms Tabares and discussed that it is safe for her to take the pregabalin and oxy IR prn along with the buprenorphine patch to help with nerve pain. Provided some education regarding the difference between pregabalin and gabapentin and that it could take several weeks to see any effects. She verbalized understanding and was appreciative. Reiterated to please call palliative for any questions or concerns.       Aury Eric, ESTELLA  Palliative Medicine

## 2025-02-18 NOTE — TELEPHONE ENCOUNTER
The patient is calling to confirm she can take new medication while wearing Fentanyl Patch.  # [phone]

## 2025-03-05 DIAGNOSIS — G89.3 CANCER ASSOCIATED PAIN: Primary | ICD-10-CM

## 2025-03-05 RX ORDER — OXYCODONE HYDROCHLORIDE 5 MG/1
5 TABLET ORAL EVERY 4 HOURS PRN
Qty: 42 TABLET | Refills: 0 | Status: SHIPPED | OUTPATIENT
Start: 2025-03-05 | End: 2025-03-12

## 2025-03-05 NOTE — TELEPHONE ENCOUNTER
Palliative Medicine Clinic   [place]: [phone]    Patient Name: Elise Tabares  YOB: 1945    Medication Refill Request    Patient is scheduled for follow up:  [x]  YES  []   NO  Next [facility] Med Clinic Visit: 03/12/25    PDMP reviewed:  [x] YES   []  System down / Unable  []  NO- Patient fills out of state    Medication:oxyCODONE (ROXICODONE) 5 MG immediate release tablet   Dose and directions:Take 1 tablet by mouth every 4 hours as needed for Pain for up to 7 days. Max Daily Amount: 30 mg   Number dispensed:42  Date filled (PDMP or Pharmacy):02/10/25  # left:unknown        Appropriate for refill:  [x]  YES  []  Early Request - Requires MD/NP Review      Other pertinent information for prescriber:  The patient wants to confirm ok to continue taking Oxycodone with Pregabalin

## 2025-03-05 NOTE — TELEPHONE ENCOUNTER
Palliative Medicine Clinic   [place]: [phone] ([phone])    Patient Name: Elise Tabares  YOB: 1945    Medication Refill Request Triage    Requested by:    [x]  Patient  []  Support person:      Last Pall Med Clinic Visit:  2/12/2025    Next Pall Med Clinic Visit: 03/12/2025     Preferred Pharmacy: [facility]  Backup Pharmacy:  *    Medications requested:  Oxycodone    Is patient completely out?  [x]   YES  []   NO  If NO, how many pills does patient have left?  __    Other pertinent information shared:  The patient wants to confirm ok to continue taking Oxycodone with Pregabalin

## 2025-03-08 ENCOUNTER — HOSPITAL ENCOUNTER (OUTPATIENT)
Facility: HOSPITAL | Age: 80
Discharge: HOME | End: 2025-03-11
Payer: MEDICARE

## 2025-03-08 DIAGNOSIS — C34.92 SQUAMOUS CELL CARCINOMA OF LUNG, LEFT (HCC): ICD-10-CM

## 2025-03-08 PROCEDURE — 74177 CT ABD & PELVIS W/CONTRAST: CPT

## 2025-03-08 PROCEDURE — 6360000004 HC RX CONTRAST MEDICATION: Performed by: NURSE PRACTITIONER

## 2025-03-08 RX ORDER — IOPAMIDOL 755 MG/ML
100 INJECTION, SOLUTION INTRAVASCULAR
Status: COMPLETED | OUTPATIENT
Start: 2025-03-08 | End: 2025-03-08

## 2025-03-08 RX ADMIN — IOPAMIDOL 95 ML: 755 INJECTION, SOLUTION INTRAVENOUS at 13:12

## 2025-03-11 ENCOUNTER — APPOINTMENT (OUTPATIENT)
Facility: HOSPITAL | Age: 80
End: 2025-03-11
Payer: MEDICARE

## 2025-03-12 ENCOUNTER — TELEMEDICINE (OUTPATIENT)
Age: 80
End: 2025-03-12

## 2025-03-12 DIAGNOSIS — R63.0 POOR APPETITE: ICD-10-CM

## 2025-03-12 DIAGNOSIS — N18.31 CKD STAGE 3A, GFR 45-59 ML/MIN (HCC): ICD-10-CM

## 2025-03-12 DIAGNOSIS — Z79.4 TYPE 2 DIABETES MELLITUS WITH STAGE 3A CHRONIC KIDNEY DISEASE, WITH LONG-TERM CURRENT USE OF INSULIN (HCC): ICD-10-CM

## 2025-03-12 DIAGNOSIS — Z51.5 PALLIATIVE CARE BY SPECIALIST: Primary | ICD-10-CM

## 2025-03-12 DIAGNOSIS — R53.83 OTHER FATIGUE: ICD-10-CM

## 2025-03-12 DIAGNOSIS — S82.64XD CLOSED NONDISPLACED FRACTURE OF LATERAL MALLEOLUS OF RIGHT FIBULA WITH ROUTINE HEALING: Primary | ICD-10-CM

## 2025-03-12 DIAGNOSIS — G89.3 CANCER ASSOCIATED PAIN: ICD-10-CM

## 2025-03-12 DIAGNOSIS — G62.0 CHEMOTHERAPY-INDUCED NEUROPATHY: ICD-10-CM

## 2025-03-12 DIAGNOSIS — T45.1X5A CHEMOTHERAPY-INDUCED NEUROPATHY: ICD-10-CM

## 2025-03-12 DIAGNOSIS — N18.31 TYPE 2 DIABETES MELLITUS WITH STAGE 3A CHRONIC KIDNEY DISEASE, WITH LONG-TERM CURRENT USE OF INSULIN (HCC): ICD-10-CM

## 2025-03-12 DIAGNOSIS — I10 ESSENTIAL (PRIMARY) HYPERTENSION: ICD-10-CM

## 2025-03-12 DIAGNOSIS — R43.2 DYSGEUSIA: ICD-10-CM

## 2025-03-12 DIAGNOSIS — E11.22 TYPE 2 DIABETES MELLITUS WITH STAGE 3A CHRONIC KIDNEY DISEASE, WITH LONG-TERM CURRENT USE OF INSULIN (HCC): ICD-10-CM

## 2025-03-12 PROCEDURE — G0179 MD RECERTIFICATION HHA PT: HCPCS | Performed by: FAMILY MEDICINE

## 2025-03-12 RX ORDER — PREGABALIN 25 MG/1
25 CAPSULE ORAL 3 TIMES DAILY
Qty: 90 CAPSULE | Refills: 0 | Status: SHIPPED | OUTPATIENT
Start: 2025-03-17 | End: 2025-04-16

## 2025-03-12 RX ORDER — OXYCODONE HYDROCHLORIDE 5 MG/1
5 TABLET ORAL EVERY 4 HOURS PRN
Qty: 42 TABLET | Refills: 0 | Status: SHIPPED | OUTPATIENT
Start: 2025-03-17 | End: 2025-03-24

## 2025-03-12 NOTE — PATIENT INSTRUCTIONS
Dear Elise Tabares ,    It was a pleasure seeing you today.    We will see you again in 4 weeks    If labs or imaging tests have been ordered for you today, please call the office at [phone] 48 hours after completion to obtain the results.        This is the plan we talked about:    # Cancer Pain  - This is suspected to cancer in the lung which is invading into adjacent bone and chest wall.  Also having deep bone pain after Neulasta injections.  - discontinued butrans patch as it didn't seem to be helping.  - Continue oxycodone IR (short-acting pain med) 5mg every 4 hours as needed for breakthrough pain; refill sent for 3/17/25  - Please keep a log of how often you are needing pain medication and how well it is working for you.  We will use this to make safe medication changes as well as to add a long-acting pain medication or patch if needed.  You inquire about using a medication patch like fentanyl today, but this would not be safe or indicated to use at present with your very limited opioid pain medication use so far.  - You may use Tylenol 1000 mg every 8 hours as needed for pain.  - We are avoiding NSAIDs given your history of kidney disease.  - Continue bowel regimen as opioid medications (like morphine and oxycodone) will cause constipation  - Please contact clinic when you have about 4-5 days of medication left so we can ensure pharmacy has it in stock, complete prior authorizations required by insurance, and make sure it is filled by your pharmacy before you run out of meds.  - We will periodically check a urine tox screen in accordance with our clinic policy for safe prescribing of opioids.  - You have naloxone (Narcan) at home for use in setting of accidental overdose.    # Neuropathy  - You also have neuropathy pain to your feet, which is suspected due to diabetes, as well as in your hands since starting chemo.  - Continue Lyrica 25mg three times daily which you have been on for about 5 days

## 2025-03-12 NOTE — PROGRESS NOTES
Palliative Medicine Outpatient Clinic  Nurse Check in Note  [phone]    Patient Name: Elise Tabares  YOB: 1945      Date of Visit: 03/12/2025  Visit Location:  [facility] Virtual Visit     Nurse verified patient is located in [place] for today's visit: Yes    Chief patient or family concern today: Wants to talk with Dr. Street about something to help her sleep    Medications:  Med reconciliation was performed with:  Patient    Requested refills:  None    If prescribed an opioid, does patient have access to naloxone at home?:  Yes  If No, pend naloxone nasal spray    Function and Symptoms:  Use of assist devices:  None    Palliative Performance Status (PPS):   Palliative Performance Scale (PPS)  PPS: 50  Patient states that she sits in her recliner most of the day and her activity is very limited.    ESAS:  Modified-Critz Symptom Assessment Scale (ESAS)  Tiredness Score: 3  Drowsiness Score: 4  Depression Score: Not depressed  Pain Score: 8  Anxiety Score: Not anxious  Nausea Score: Not nauseated  Appetite Score: Best appetite  Dyspnea Score: No shortness of breath  Constipation: No  Wellbeing Score: 5    Constipation?  No  Last BM: last night    Advance Care Planning:  Currently listed healthcare agent:  Raquel Stanford, daughter      Is there an ACP Note within the past 12 months?  YES        PABLO TREVIZO RN

## 2025-03-12 NOTE — PROGRESS NOTES
Palliative Medicine Outpatient Services  [place]: [phone] ([phone])    Patient Name: Elise Tabares  YOB: 1945    Date of Current Visit: 03/12/2025  Location of Current Visit:    [] [facility] Office  [] [facility] Office  [] [facility] Office  [] Home  [x] Synchronous real-time A/V virtual visit    Elise Tabares, was evaluated through a synchronous (real-time) audio-video encounter. The patient (or guardian if applicable) is aware that this is a billable service, which includes applicable co-pays. This Virtual Visit was conducted with patient's (and/or legal guardian's) consent. Patient identification was verified, and a caregiver was present when appropriate.   The patient was located at Home: [address] [place] [id number]  Provider was located at Facility (Appt Dept): [address] [place] [address]  [address] [place] [address]    Date of Initial Visit: 8/21/2024  Referral from: MADDIE Arredondo  Primary Care Physician: Gerber Mayo MD      SUMMARY:   Elise Tabares is a 79 y.o. year old with a history of Squamous Cell Lung Cancer, CAD, CKD, HTN, DMII, MAX who was referred to Palliative Care clinic by MADDIE Arredondo for symptom management and psychosocial support.    She was initially diagnosed with lung cancer after being hospitalized in March 2023 for syncope.  Work up at that time revealed new cavitary lesion in JANELL with biopsy showing invasive poorly differentiated squamous cell carcinoma.  She undwent sublobar resection of JANELL with Dr. Dom Perez at that time.  Has been through Carbo-[place] and Atezolizumab, now on Docetaxel since 7/23/2024-12/2024.    Follows with Dr. Park and MADDIE Farias for cancer treatment.  Last visit 2/18/25.  Has been on treatment break with discontinuation of docetaxel due to worsening neuropathy.    Had repeat CT scans 3/8/25, impression pending.  Seeing Onc again next week to review.  CT C/A/P 11/27/24 - Stable postsurgical changes left upper lobe.

## 2025-03-12 NOTE — PROGRESS NOTES
Reviewed and Signed Home Health Certification/Recertification and Care Plan (see attached)    Elise AKASH Fabishefali  1945     Agency: [facility] Medicare #: [id number]  Medical Record #: [id number]  Provider #: [id number]    SOC Date: 11/7/2024  Certification Period: 3/7/2025-5/5/2025  Last F2F: LOV 7/9/2024; Hospital Discharge 10/19/2024    Services: PT    Gerber Mayo MD  3/12/2025 9:06 AM

## 2025-03-18 ENCOUNTER — HOSPITAL ENCOUNTER (OUTPATIENT)
Facility: HOSPITAL | Age: 80
Setting detail: INFUSION SERIES
Discharge: HOME | End: 2025-03-18
Payer: MEDICARE

## 2025-03-18 ENCOUNTER — OFFICE VISIT (OUTPATIENT)
Age: 80
End: 2025-03-18
Payer: MEDICARE

## 2025-03-18 ENCOUNTER — TELEPHONE (OUTPATIENT)
Age: 80
End: 2025-03-18

## 2025-03-18 ENCOUNTER — APPOINTMENT (OUTPATIENT)
Facility: HOSPITAL | Age: 80
End: 2025-03-18
Payer: MEDICARE

## 2025-03-18 VITALS
RESPIRATION RATE: 16 BRPM | HEART RATE: 92 BPM | OXYGEN SATURATION: 95 % | TEMPERATURE: 97.4 F | DIASTOLIC BLOOD PRESSURE: 79 MMHG | SYSTOLIC BLOOD PRESSURE: 179 MMHG

## 2025-03-18 VITALS
OXYGEN SATURATION: 98 % | HEIGHT: 66 IN | TEMPERATURE: 98.6 F | DIASTOLIC BLOOD PRESSURE: 88 MMHG | BODY MASS INDEX: 30.67 KG/M2 | SYSTOLIC BLOOD PRESSURE: 142 MMHG | HEART RATE: 98 BPM

## 2025-03-18 DIAGNOSIS — Z09 CHEMOTHERAPY FOLLOW-UP EXAMINATION: ICD-10-CM

## 2025-03-18 DIAGNOSIS — G62.0 CHEMOTHERAPY-INDUCED PERIPHERAL NEUROPATHY: ICD-10-CM

## 2025-03-18 DIAGNOSIS — R21 RASH: ICD-10-CM

## 2025-03-18 DIAGNOSIS — Z79.4 TYPE 2 DIABETES MELLITUS WITH DIABETIC NEUROPATHY, WITH LONG-TERM CURRENT USE OF INSULIN (HCC): ICD-10-CM

## 2025-03-18 DIAGNOSIS — E11.40 TYPE 2 DIABETES MELLITUS WITH DIABETIC NEUROPATHY, WITH LONG-TERM CURRENT USE OF INSULIN (HCC): ICD-10-CM

## 2025-03-18 DIAGNOSIS — T45.1X5A CHEMOTHERAPY-INDUCED PERIPHERAL NEUROPATHY: ICD-10-CM

## 2025-03-18 DIAGNOSIS — N18.31 STAGE 3A CHRONIC KIDNEY DISEASE (HCC): ICD-10-CM

## 2025-03-18 DIAGNOSIS — C34.92 SQUAMOUS CELL CARCINOMA OF LUNG, LEFT: Primary | ICD-10-CM

## 2025-03-18 DIAGNOSIS — G47.01 INSOMNIA DUE TO MEDICAL CONDITION: ICD-10-CM

## 2025-03-18 DIAGNOSIS — C34.92 MALIGNANT NEOPLASM OF UNSPECIFIED PART OF LEFT BRONCHUS OR LUNG (HCC): ICD-10-CM

## 2025-03-18 LAB
ALBUMIN SERPL-MCNC: 3.4 G/DL (ref 3.5–5)
ALBUMIN/GLOB SERPL: 1.2 (ref 1.1–2.2)
ALP SERPL-CCNC: 94 U/L (ref 45–117)
ALT SERPL-CCNC: 17 U/L (ref 12–78)
ANION GAP SERPL CALC-SCNC: 6 MMOL/L (ref 2–12)
AST SERPL-CCNC: 17 U/L (ref 15–37)
BASOPHILS # BLD: 0.01 K/UL (ref 0–0.1)
BASOPHILS NFR BLD: 0.2 % (ref 0–1)
BILIRUB SERPL-MCNC: 0.6 MG/DL (ref 0.2–1)
BUN SERPL-MCNC: 23 MG/DL (ref 6–20)
BUN/CREAT SERPL: 23 (ref 12–20)
CALCIUM SERPL-MCNC: 8.9 MG/DL (ref 8.5–10.1)
CHLORIDE SERPL-SCNC: 107 MMOL/L (ref 97–108)
CO2 SERPL-SCNC: 27 MMOL/L (ref 21–32)
CREAT SERPL-MCNC: 0.98 MG/DL (ref 0.55–1.02)
DIFFERENTIAL METHOD BLD: ABNORMAL
EOSINOPHIL # BLD: 0.1 K/UL (ref 0–0.4)
EOSINOPHIL NFR BLD: 1.8 % (ref 0–7)
ERYTHROCYTE [DISTWIDTH] IN BLOOD BY AUTOMATED COUNT: 12.4 % (ref 11.5–14.5)
GLOBULIN SER CALC-MCNC: 2.8 G/DL (ref 2–4)
GLUCOSE SERPL-MCNC: 198 MG/DL (ref 65–100)
HCT VFR BLD AUTO: 32.3 % (ref 35–47)
HGB BLD-MCNC: 10.7 G/DL (ref 11.5–16)
IMM GRANULOCYTES # BLD AUTO: 0.02 K/UL (ref 0–0.04)
IMM GRANULOCYTES NFR BLD AUTO: 0.4 % (ref 0–0.5)
LYMPHOCYTES # BLD: 0.56 K/UL (ref 0.8–3.5)
LYMPHOCYTES NFR BLD: 10.2 % (ref 12–49)
MCH RBC QN AUTO: 29.7 PG (ref 26–34)
MCHC RBC AUTO-ENTMCNC: 33.1 G/DL (ref 30–36.5)
MCV RBC AUTO: 89.7 FL (ref 80–99)
MONOCYTES # BLD: 0.23 K/UL (ref 0–1)
MONOCYTES NFR BLD: 4.2 % (ref 5–13)
NEUTS SEG # BLD: 4.58 K/UL (ref 1.8–8)
NEUTS SEG NFR BLD: 83.2 % (ref 32–75)
NRBC # BLD: 0 K/UL (ref 0–0.01)
NRBC BLD-RTO: 0 PER 100 WBC
PLATELET # BLD AUTO: 181 K/UL (ref 150–400)
PMV BLD AUTO: 11.1 FL (ref 8.9–12.9)
POTASSIUM SERPL-SCNC: 3.7 MMOL/L (ref 3.5–5.1)
PROT SERPL-MCNC: 6.2 G/DL (ref 6.4–8.2)
RBC # BLD AUTO: 3.6 M/UL (ref 3.8–5.2)
RBC MORPH BLD: ABNORMAL
SODIUM SERPL-SCNC: 140 MMOL/L (ref 136–145)
WBC # BLD AUTO: 5.5 K/UL (ref 3.6–11)

## 2025-03-18 PROCEDURE — 85025 COMPLETE CBC W/AUTO DIFF WBC: CPT

## 2025-03-18 PROCEDURE — 1036F TOBACCO NON-USER: CPT | Performed by: INTERNAL MEDICINE

## 2025-03-18 PROCEDURE — 99214 OFFICE O/P EST MOD 30 MIN: CPT | Performed by: INTERNAL MEDICINE

## 2025-03-18 PROCEDURE — 36591 DRAW BLOOD OFF VENOUS DEVICE: CPT

## 2025-03-18 PROCEDURE — 80053 COMPREHEN METABOLIC PANEL: CPT

## 2025-03-18 RX ORDER — INSULIN DEGLUDEC 200 U/ML
INJECTION, SOLUTION SUBCUTANEOUS
COMMUNITY
Start: 2025-01-30

## 2025-03-18 RX ORDER — FAMOTIDINE 10 MG/ML
20 INJECTION, SOLUTION INTRAVENOUS
OUTPATIENT
Start: 2025-03-25

## 2025-03-18 RX ORDER — SODIUM CHLORIDE 9 MG/ML
INJECTION, SOLUTION INTRAVENOUS CONTINUOUS
OUTPATIENT
Start: 2025-03-25

## 2025-03-18 RX ORDER — LORAZEPAM 1 MG/1
1 TABLET ORAL NIGHTLY PRN
Qty: 12 TABLET | Refills: 0 | Status: SHIPPED | OUTPATIENT
Start: 2025-03-18 | End: 2025-04-17

## 2025-03-18 RX ORDER — ACETAMINOPHEN 325 MG/1
650 TABLET ORAL
OUTPATIENT
Start: 2025-03-25

## 2025-03-18 RX ORDER — ONDANSETRON 2 MG/ML
8 INJECTION INTRAMUSCULAR; INTRAVENOUS
OUTPATIENT
Start: 2025-03-25

## 2025-03-18 RX ORDER — ALBUTEROL SULFATE 90 UG/1
4 INHALANT RESPIRATORY (INHALATION) PRN
OUTPATIENT
Start: 2025-03-25

## 2025-03-18 RX ORDER — DIPHENHYDRAMINE HYDROCHLORIDE 50 MG/ML
50 INJECTION, SOLUTION INTRAMUSCULAR; INTRAVENOUS
OUTPATIENT
Start: 2025-03-25

## 2025-03-18 RX ORDER — HYDROCORTISONE SODIUM SUCCINATE 100 MG/2ML
100 INJECTION INTRAMUSCULAR; INTRAVENOUS
OUTPATIENT
Start: 2025-03-25

## 2025-03-18 RX ORDER — SODIUM CHLORIDE 9 MG/ML
25 INJECTION, SOLUTION INTRAVENOUS PRN
OUTPATIENT
Start: 2025-03-25

## 2025-03-18 RX ORDER — EPINEPHRINE 1 MG/ML
0.3 INJECTION, SOLUTION INTRAMUSCULAR; SUBCUTANEOUS PRN
OUTPATIENT
Start: 2025-03-25

## 2025-03-18 RX ORDER — SODIUM CHLORIDE 0.9 % (FLUSH) 0.9 %
5-40 SYRINGE (ML) INJECTION PRN
OUTPATIENT
Start: 2025-03-25

## 2025-03-18 RX ORDER — TRIAMCINOLONE ACETONIDE 1 MG/G
OINTMENT TOPICAL 2 TIMES DAILY
Qty: 30 G | Refills: 1 | Status: SHIPPED | OUTPATIENT
Start: 2025-03-18 | End: 2025-03-25

## 2025-03-18 RX ORDER — HEPARIN 100 UNIT/ML
500 SYRINGE INTRAVENOUS PRN
OUTPATIENT
Start: 2025-03-25

## 2025-03-18 NOTE — PROGRESS NOTES
Outpatient Infusion Center Progress Note        Date: 25    Name: Elise Tabares    MRN: [id number]         : 1945    MD: Guerline Park MD       Ms. Tabares admitted to [facility] for Routine Port Flush/ Labs via wheelchair in stable condition. Assessment completed. No new concerns voiced.  Right port a cath accessed using 0.75\" mays, blood return obtained and port flushed without difficulty.  Labs drawn and sent for processing.          Vitals:    25 1039   BP: (!) 179/79   Pulse: 92   Resp: 16   Temp: 97.4 °F (36.3 °C)   TempSrc: Temporal   SpO2: 95%     Patient denied any chest pain, vision changes, headaches, dyspnea, etc. she was instructed to seek medical attention if the BP does not come down and/or symptoms arise. Patient verbalized understanding.       Pt tolerated treatment well. Port maintained positive blood return throughout treatment, flushed with positive blood return at conclusion and de-accessed per protocol. D/c home via wheelchair in no distress. Patient is aware of next appointment on 25 @ 0900 at the [place] location.    ** Patient proceed to MD appointment.     Future Appointments:  Future Appointments   Date Time Provider Department Center   2025  9:30 AM Vinod Street MD [facility] BS AMB   2025  9:00 AM SS LAB CHAIR 1 [facility]   2025  9:30 AM Guerline Park MD [facility] BS AMB

## 2025-03-18 NOTE — TELEPHONE ENCOUNTER
[facility] Outpatient Palliative Medicine Office  Nursing Note  [phone]  Fax [phone]     Name:  Elise Tabares  YOB: 1945     Returned call to patient.  She is inquiring about her oxycodone IR 5mg and Pregabalin 25mg prescriptions.  Per chart, they were both sent to [facility], [id number] [place] [address] in [place] yesterday 3/17/25 by Dr. Street.  Per chart, prior auths for both of those meds are approved through 2/13/26.  This nurse advised patient to call [facility] to see if they are ready for pick-up.    Bianka Garzon RN, Gerontological Nursing-BC, [facility]  Palliative Medicine  [phone]

## 2025-03-18 NOTE — TELEPHONE ENCOUNTER
Patient is calling to speak with the nurse. Did not say what it was in reference to.  # [phone]

## 2025-03-25 ENCOUNTER — TELEPHONE (OUTPATIENT)
Age: 80
End: 2025-03-25

## 2025-03-25 NOTE — TELEPHONE ENCOUNTER
Patient is calling to speak with the nurse. Did not specify what it was regarding.  # [phone]

## 2025-03-25 NOTE — TELEPHONE ENCOUNTER
Spoke with pt who states [facility] never received rxs for Oxycodone and Lyrica at last weeks visit. Pt states she would like these to be sent today.    Called [facility] and spoke with pharmacy ebony Orozco who states they received both and they were on hold as when they received them it was too soon to fill.  She states the Lyrica will have to be ordered but they should get it in tomorrow. Oxycodone will be filled today.  Pt will be notified when they are ready for .

## 2025-04-01 ENCOUNTER — TELEPHONE (OUTPATIENT)
Age: 80
End: 2025-04-01

## 2025-04-01 NOTE — TELEPHONE ENCOUNTER
Called patient to advise/confirm upcoming vv appt with Dr. Street on 04/09/2025 at 9:30  at virtual.  Spoke with Elise Tabares and confirmed appointment.

## 2025-04-09 DIAGNOSIS — G89.3 CANCER ASSOCIATED PAIN: Primary | ICD-10-CM

## 2025-04-09 RX ORDER — OXYCODONE HYDROCHLORIDE 5 MG/1
5 TABLET ORAL EVERY 4 HOURS PRN
Qty: 60 TABLET | Refills: 0 | Status: SHIPPED | OUTPATIENT
Start: 2025-04-09 | End: 2025-04-09

## 2025-04-09 RX ORDER — OXYCODONE HYDROCHLORIDE 5 MG/1
5 TABLET ORAL EVERY 4 HOURS PRN
Qty: 60 TABLET | Refills: 0 | Status: SHIPPED | OUTPATIENT
Start: 2025-04-09 | End: 2025-04-19

## 2025-04-09 NOTE — TELEPHONE ENCOUNTER
Palliative Medicine Clinic   [place]: [phone])    Patient Name: Elise Tabares  YOB: 1945    Medication Refill Request Triage    Requested by:    [x]  Patient  []  Support person:      Last Pall Med Clinic Visit:  3/12/2025    Next Pall Med Clinic Visit: 04/23/2025     Preferred Pharmacy: [facility]  Backup Pharmacy:  *    Medications requested:  Oxycodone    Is patient completely out?  []   YES  [x]   NO  If NO, how many pills does patient have left?  unclear    Other pertinent information shared:

## 2025-04-09 NOTE — TELEPHONE ENCOUNTER
Palliative Medicine Clinic   [place]: [phone] ([phone])    Patient Name: Elise Tabares  YOB: 1945    Medication Refill Request    Patient is scheduled for follow up:  []  YES  []   NO  Next Pall Med Clinic Visit:     PDMP reviewed:  [] YES   []  System down / Unable  []  NO- Patient fills out of state    Medication:oxyCODONE (ROXICODONE) 5 MG immediate release tablet   Dose and directions:        Take 1 tablet by mouth every 4 hours as needed for Pain for up to 7 days. Max Daily Amount: 30 mg             Number dispensed:42  Date filled (PDMP or Pharmacy):03/25/25  # left:unknown        Appropriate for refill:  [x]  YES  []  Early Request - Requires MD/NP Review      Other pertinent information for prescriber:

## 2025-04-10 ENCOUNTER — TELEPHONE (OUTPATIENT)
Age: 80
End: 2025-04-10

## 2025-04-10 RX ORDER — ISOSORBIDE MONONITRATE 30 MG/1
30 TABLET, EXTENDED RELEASE ORAL DAILY
Qty: 90 TABLET | Refills: 3 | Status: SHIPPED | OUTPATIENT
Start: 2025-04-10

## 2025-04-10 NOTE — TELEPHONE ENCOUNTER
Patient called in regards to medication refill for Isosorbide 30 mg. Pharmacy confirmed. Patient has an upcoming appointment on 4/18/2025.        [facility] phone # [phone]    Patient Phone # [phone]

## 2025-04-10 NOTE — TELEPHONE ENCOUNTER
Refill per VO of Huma Duarte NP  Last appt: 04/18/24    Future Appointments   Date Time Provider Department Center   4/18/2025  4:20 PM Juan Pablo Paris MD CAV[facility] BS AMB   4/23/2025 12:30 PM Vinod Street MD PCSSF BS AMB   6/17/2025  9:00 AM  LAB CHAIR [facility]   6/17/2025  9:30 AM Guerline Park MD ONCSF BS AMB       Requested Prescriptions     Signed Prescriptions Disp Refills    isosorbide mononitrate (IMDUR) 30 MG extended release tablet 90 tablet 3     Sig: Take 1 tablet by mouth daily     Authorizing Provider: HUMA DUARTE     Ordering User: SONIA FERNANDES

## 2025-04-16 ENCOUNTER — TELEPHONE (OUTPATIENT)
Age: 80
End: 2025-04-16

## 2025-04-16 NOTE — TELEPHONE ENCOUNTER
Called patient to advise/confirm upcoming vv appt with Dr. Street on 04/23/2025 at 12:30  at virtual.  Spoke with Elise Tabares and confirmed appointment.

## 2025-04-18 ENCOUNTER — OFFICE VISIT (OUTPATIENT)
Age: 80
End: 2025-04-18
Payer: MEDICARE

## 2025-04-18 VITALS
SYSTOLIC BLOOD PRESSURE: 134 MMHG | HEART RATE: 94 BPM | BODY MASS INDEX: 29.57 KG/M2 | HEIGHT: 66 IN | DIASTOLIC BLOOD PRESSURE: 88 MMHG | WEIGHT: 184 LBS | OXYGEN SATURATION: 95 %

## 2025-04-18 DIAGNOSIS — I10 BENIGN HYPERTENSION: ICD-10-CM

## 2025-04-18 DIAGNOSIS — I25.118 CORONARY ARTERY DISEASE INVOLVING NATIVE CORONARY ARTERY OF NATIVE HEART WITH OTHER FORM OF ANGINA PECTORIS: ICD-10-CM

## 2025-04-18 DIAGNOSIS — E78.2 MIXED HYPERLIPIDEMIA: ICD-10-CM

## 2025-04-18 DIAGNOSIS — R06.09 DOE (DYSPNEA ON EXERTION): Primary | ICD-10-CM

## 2025-04-18 PROCEDURE — G8417 CALC BMI ABV UP PARAM F/U: HCPCS | Performed by: INTERNAL MEDICINE

## 2025-04-18 PROCEDURE — G8428 CUR MEDS NOT DOCUMENT: HCPCS | Performed by: INTERNAL MEDICINE

## 2025-04-18 PROCEDURE — 99214 OFFICE O/P EST MOD 30 MIN: CPT | Performed by: INTERNAL MEDICINE

## 2025-04-18 PROCEDURE — 1123F ACP DISCUSS/DSCN MKR DOCD: CPT | Performed by: INTERNAL MEDICINE

## 2025-04-18 PROCEDURE — 3075F SYST BP GE 130 - 139MM HG: CPT | Performed by: INTERNAL MEDICINE

## 2025-04-18 PROCEDURE — 3079F DIAST BP 80-89 MM HG: CPT | Performed by: INTERNAL MEDICINE

## 2025-04-18 PROCEDURE — 1036F TOBACCO NON-USER: CPT | Performed by: INTERNAL MEDICINE

## 2025-04-18 NOTE — PROGRESS NOTES
Chief Complaint   Patient presents with    Coronary Artery Disease    Other     MYERS, T2DM     Vitals:    04/18/25 1559   BP: 134/88   BP Site: Left Upper Arm   Patient Position: Sitting   BP Cuff Size: Medium Adult   Pulse: 94   SpO2: 95%   Weight: 83.5 kg (184 lb)   Height: 1.676 m (5' 6\")      /88 (BP Site: Left Upper Arm, Patient Position: Sitting, BP Cuff Size: Medium Adult)   Pulse 94   Ht 1.676 m (5' 6\")   Wt 83.5 kg (184 lb)   LMP  (LMP Unknown)   SpO2 95%   BMI 29.70 kg/m²

## 2025-04-18 NOTE — PROGRESS NOTES
Office Follow-up    NAME: Elise Tabares   :  1945  MRM:  [id number]    Date:  2025  Primary Cardiologist:  Dr.Rathi CIARRA JAY MD        Plan:     CAD/LAD stent 2017/RCA  ()/ PCI to LAD : CP resolved after PCI to LAD. Continue ASA, Crestor and Metoprolol.  Has non cardiac/MSK chest pain. Stop Plavix since it is not needed. Continue ASA.   Hypertension: Blood pressure is controlled.  Continue metoprolol and Prinzide.  Dyslipidemia: Last LDL 61 10/23. Goal LDL of 55. Significant leg heaviness on 20mg Crestor, dose reduced to 10mg and added Zetia. Recheck FLP   Diabetes: Most recent hemoglobin A1c is 6.0.  She is being currently treated by Dr. Toni Anglin.   CKD: Cr 1.9 at baseline.   MAX: CPAP.    MYERS- Improved with Imdur.   See Huma Duarte NP in 6 months.          Subjective:     Non anginal pain recently. Has had some reproducible left chest discomfort only while sleeping. Left upper chest wall tender. Denies exertional CP . Has felt increasing MYERS. No edema   ------  Elise Tabares, a 79 y.o.  who presents for followup.  She has known history of coronary disease with PCI to LAD in 2017 and had chest pain, posiitve Nuclear in 2023 therefore underwent LHC and PCI to LAD in Oct 2023. No new CP.       Exam:     Physical Exam:  Visit Vitals  /88 (BP Site: Left Upper Arm, Patient Position: Sitting, BP Cuff Size: Medium Adult)   Pulse 94   Ht 1.676 m (5' 6\")   Wt 83.5 kg (184 lb)   LMP  (LMP Unknown)   SpO2 95%   BMI 29.70 kg/m²     General appearance - alert, well appearing, and in no distress  Mental status - affect appropriate to mood  Eyes - sclera anicteric, moist mucous membranes  Neck - supple, no significant adenopathy  Chest - clear to auscultation, no wheezes, rales or rhonchi, +left upper chest wall tenderness.      Medications:     Current Outpatient Medications   Medication Sig Dispense Refill    oxyCODONE (ROXICODONE) 5 MG immediate release

## 2025-04-23 ENCOUNTER — TELEMEDICINE (OUTPATIENT)
Age: 80
End: 2025-04-23

## 2025-04-23 DIAGNOSIS — T45.1X5A CHEMOTHERAPY-INDUCED NEUROPATHY: ICD-10-CM

## 2025-04-23 DIAGNOSIS — R43.2 DYSGEUSIA: ICD-10-CM

## 2025-04-23 DIAGNOSIS — G89.3 CANCER ASSOCIATED PAIN: Primary | ICD-10-CM

## 2025-04-23 DIAGNOSIS — G62.0 CHEMOTHERAPY-INDUCED NEUROPATHY: ICD-10-CM

## 2025-04-23 DIAGNOSIS — Z51.5 PALLIATIVE CARE BY SPECIALIST: ICD-10-CM

## 2025-04-23 ASSESSMENT — PATIENT HEALTH QUESTIONNAIRE - PHQ9
SUM OF ALL RESPONSES TO PHQ QUESTIONS 1-9: 0
SUM OF ALL RESPONSES TO PHQ QUESTIONS 1-9: 0
2. FEELING DOWN, DEPRESSED OR HOPELESS: NOT AT ALL
1. LITTLE INTEREST OR PLEASURE IN DOING THINGS: NOT AT ALL
SUM OF ALL RESPONSES TO PHQ QUESTIONS 1-9: 0
SUM OF ALL RESPONSES TO PHQ QUESTIONS 1-9: 0

## 2025-04-23 NOTE — PROGRESS NOTES
Palliative Medicine Outpatient Clinic  Nurse Check in Note  [phone]    Patient Name: Elise Tabares  YOB: 1945      Date of Visit: 04/23/2025  Visit Location:  [facility] Virtual Visit     Nurse verified patient is located in [place] for today's visit: Yes    Chief patient or family concern today: follow up     Patient's Last Palliative Medicine Clinic Visit Date:  3/12/2025    Have you been to an ER or urgent care center since your last visit?  No  Have you been hospitalized since your last visit? No  Have you seen or consulted any health care providers outside of the [facility] system since your last visit?  No  If Yes, alert PSR to request appropriate records from non-[facility] offices    Medications:  Med reconciliation was performed with:  Patient    Requested refills:  Yes- not pended, clinician review requested    If prescribed an opioid, does patient have access to naloxone at home?:  YES  If No, pend naloxone nasal spray    Function and Symptoms:  Use of assist devices:  None    Palliative Performance Status (PPS):   Palliative Performance Scale (PPS)  PPS: 70    ESAS:  [place] Symptom Assessment Scale (ESAS)  Tiredness Score: 3  Drowsiness Score: Not drowsy  Depression Score: Not depressed  Pain Score: 7  Anxiety Score: Not anxious  Nausea Score: Not nauseated  Appetite Score: Best appetite  Dyspnea Score: No shortness of breath  Constipation: No  Wellbeing Score: 4    Constipation?  No  Last BM: 04/22/25    Advance Care Planning:  Currently listed healthcare agent:      Is there an ACP Note within the past 12 months?  YES  If No, Alert Clinician and/or Social Work      Jaqui Rodarte LPN

## 2025-04-23 NOTE — PROGRESS NOTES
Palliative Medicine Outpatient Services  [place]: [phone] ([phone])    Patient Name: Elise Tabares  YOB: 1945    Date of Current Visit: 04/23/2025  Location of Current Visit:    [] [facility] Office  [] [facility] Office  [] [facility] Office  [] Home  [x] Synchronous real-time A/V virtual visit    Elise Tabares, was evaluated through a synchronous (real-time) audio-video encounter. The patient (or guardian if applicable) is aware that this is a billable service, which includes applicable co-pays. This Virtual Visit was conducted with patient's (and/or legal guardian's) consent. Patient identification was verified, and a caregiver was present when appropriate.   The patient was located at Home: [address] [place] [id number]  Provider was located at Facility (Appt Dept): [address] [place] [address]  [address] [place] [address]    Date of Initial Visit: 8/21/2024  Referral from: MADDIE Arredondo  Primary Care Physician: Gerber Mayo MD      SUMMARY:   Elise Tabares is a 79 y.o. year old with a history of Squamous Cell Lung Cancer, CAD, CKD, HTN, DMII, MAX who was referred to Palliative Care clinic by MADDIE Arredondo for symptom management and psychosocial support.    She was initially diagnosed with lung cancer after being hospitalized in March 2023 for syncope.  Work up at that time revealed new cavitary lesion in JANELL with biopsy showing invasive poorly differentiated squamous cell carcinoma.  She undwent sublobar resection of JANELL with Dr. Dom Perez at that time.  Has been through Carbo-Foster and Atezolizumab, now on Docetaxel since 7/23/2024-12/2024.    Follows with Dr. Park and MADDIE Farias for cancer treatment.  Last visit 3/18/25.  Has been on treatment break with discontinuation of docetaxel due to worsening neuropathy.    Had repeat CT scans 3/8/25 showing stable disease.   MRI Brain 8/1/24 without evidence of intracranial metastatic disease.    Following with Cardiology,

## 2025-04-23 NOTE — PATIENT INSTRUCTIONS
Dear Elise Tabares ,    It was a pleasure seeing you today.    We will see you again in 3 months    If labs or imaging tests have been ordered for you today, please call the office at [phone] 48 hours after completion to obtain the results.        This is the plan we talked about:    # Cancer Pain  - This is suspected to cancer in the lung which is invading into adjacent bone and chest wall.  Also having deep bone pain after Neulasta injections.  - discontinued butrans patch as it didn't seem to be helping.  - Continue oxycodone IR (short-acting pain med) 5mg every 4 hours as needed for breakthrough pain; if pain not tolerable after 1 hour then can take an extra 5mg tablet.  - Keep a log of how often you are needing pain medication and how well it is working for you.  We will use this to make safe medication changes as well as to add a long-acting pain medication or patch if needed.  Previously inquired about using a medication patch like fentanyl, but this would not be safe or indicated to use at present with very limited opioid pain medication use so far.  - May use Tylenol 1000 mg every 8 hours as needed for pain.  - Avoiding NSAIDs given your history of kidney disease.  - Continue bowel regimen as opioid medications (like morphine and oxycodone) will cause constipation  - Please contact clinic when you have about 4-5 days of medication left so we can ensure pharmacy has it in stock, complete prior authorizations required by insurance, and make sure it is filled by your pharmacy before you run out of meds.  - We will periodically check a urine tox screen in accordance with our clinic policy for safe prescribing of opioids.  - You have naloxone (Narcan) at home for use in setting of accidental overdose.    # Neuropathy  - You also have neuropathy pain to your feet, which is suspected due to diabetes, as well as in your hands since starting chemo.  - Increase Lyrica to 25mg in the morning, 25mg in the

## 2025-04-28 DIAGNOSIS — G62.0 CHEMOTHERAPY-INDUCED NEUROPATHY: ICD-10-CM

## 2025-04-28 DIAGNOSIS — C34.92 SQUAMOUS CELL CARCINOMA OF LUNG, LEFT (HCC): Primary | ICD-10-CM

## 2025-04-28 DIAGNOSIS — T45.1X5A CHEMOTHERAPY-INDUCED NEUROPATHY: ICD-10-CM

## 2025-04-28 RX ORDER — PREGABALIN 25 MG/1
25 CAPSULE ORAL SEE ADMIN INSTRUCTIONS
Qty: 120 CAPSULE | Refills: 1 | Status: SHIPPED | OUTPATIENT
Start: 2025-04-28 | End: 2025-05-28

## 2025-04-28 RX ORDER — OXYCODONE HYDROCHLORIDE 5 MG/1
5 TABLET ORAL EVERY 4 HOURS PRN
Qty: 60 TABLET | Refills: 0 | Status: SHIPPED | OUTPATIENT
Start: 2025-04-28 | End: 2025-05-08

## 2025-04-28 NOTE — TELEPHONE ENCOUNTER
Palliative Medicine Clinic   [place]: [phone] ([phone])    Patient Name: Elise Tabares  YOB: 1945    Medication Refill Request Triage    Requested by:    [x]  Patient  []  Support person:      Last Pall Med Clinic Visit:  4/23/2025    Next Pall Med Clinic Visit: 07/30/2025     Preferred Pharmacy: [facility] [place]   Backup Pharmacy:  *    Medications requested:  Oxycodone    Is patient completely out?  []   YES  [x]   NO  If NO, how many pills does patient have left?  unknown    Other pertinent information shared: Patient states two scripts were to be called in to [facility]. Oxycodone and she can not remember the name of the other one.

## 2025-04-28 NOTE — TELEPHONE ENCOUNTER
Palliative Medicine Clinic   [place]: [phone] ([phone])    Patient Name: Elise Tabares  YOB: 1945    Medication Refill Request    Patient is scheduled for follow up:  [x]  YES  []   NO  Next [facility] Med Clinic Visit: 07/30/25    PDMP reviewed:  [x] YES   []  System down / Unable  []  NO- Patient fills out of state    Medication:oxyCODONE (ROXICODONE) 5 MG immediate release tablet   Dose and directions:Take 1 tablet by mouth every 4 hours as needed for Pain for up to 10 days. Max Daily Amount: 30 mg   Number dispensed:60  Date filled (PDMP or Pharmacy):04/09/25  # left:unknown    Medication:pregabalin (LYRICA) 25 MG capsule   Dose and directions: Take 1 capsule by mouth 3 times daily for 30 days. Max Daily Amount: 75 mg   Number dispensed:90  Date filled (PDMP or Pharmacy):03/26/25  #left:unknown    Appropriate for refill:  [x]  YES  []  Early Request - Requires MD/NP Review      Other pertinent information for prescriber:  Please review as I added these in based off last OV note.

## 2025-05-01 ENCOUNTER — TELEPHONE (OUTPATIENT)
Age: 80
End: 2025-05-01

## 2025-05-01 NOTE — TELEPHONE ENCOUNTER
05/01/25 1:28 PM Return call placed to patient. Verified patient ID x 2. Patient voiced complaints of worsening mid, left-sided back pain under rib cage. Patient states pain is near surgery site. Denies area feeling hot to touch but reports swelling in this area. Limited range of motion of left arm due to pain. Pain sometimes radiates through left breast. Pain also awakens patient from sleep. Patient denies any urinary symptoms such as dysuria, fevers, and urinary frequency. She has been taking Tylenol 500 mg three times daily, Oxycodone 5 mg every 4-6 hours, and Lidocaine patches. She states that Lidocaine will provide some relief; however, does not feel that Oxycodone helps and inquiring if dose can be adjusted or if patient should attempt alternate medication. Patient also currently out of Oxycodone. Per chart review, advised that palliative medication sent refill to pharmacy on 04/28. Advised that patient contact palliative medicine to further discuss pain medication adjustments since they have been monitoring and managing this. Will update Dinora PERKINS of patient's symptoms and call back with further recommendations. Patient voiced understanding.     3:23 PM Called patient and advised of recommendations per Dinora PERKINS. Encouraged patient to call office back or request that scheduling contact office should there be any questions about scheduling CT for this month rather than next. Patient voiced understanding and confirmed having central scheduling phone number. She mentioned also needing to schedule bone density test as well. No further questions or concerns at this time.

## 2025-05-01 NOTE — TELEPHONE ENCOUNTER
Pt called in stating she's experiencing terrible back pain on the left side. Pt stated she's unable to sleep. Pt wanted to know if she needs to schedule an appt with her PCP or Dr. Park. Please advise      # [phone]

## 2025-05-01 NOTE — TELEPHONE ENCOUNTER
Patient called with worsening back pain.     Prior imaging shows posterior chest wall mass.     Given worsening symptoms while on treatment holiday, I recommend repeating CT scan now.

## 2025-05-06 ENCOUNTER — HOSPITAL ENCOUNTER (OUTPATIENT)
Facility: HOSPITAL | Age: 80
Discharge: HOME | End: 2025-05-09
Attending: INTERNAL MEDICINE
Payer: MEDICARE

## 2025-05-06 DIAGNOSIS — C34.92 SQUAMOUS CELL CARCINOMA OF LUNG, LEFT (HCC): ICD-10-CM

## 2025-05-06 PROCEDURE — 6360000004 HC RX CONTRAST MEDICATION: Performed by: INTERNAL MEDICINE

## 2025-05-06 PROCEDURE — 74177 CT ABD & PELVIS W/CONTRAST: CPT

## 2025-05-06 RX ORDER — IOPAMIDOL 755 MG/ML
100 INJECTION, SOLUTION INTRAVASCULAR
Status: COMPLETED | OUTPATIENT
Start: 2025-05-06 | End: 2025-05-06

## 2025-05-06 RX ADMIN — IOPAMIDOL 100 ML: 755 INJECTION, SOLUTION INTRAVENOUS at 17:43

## 2025-05-12 ENCOUNTER — TELEPHONE (OUTPATIENT)
Age: 80
End: 2025-05-12

## 2025-05-12 DIAGNOSIS — G89.3 CANCER RELATED PAIN: ICD-10-CM

## 2025-05-12 DIAGNOSIS — C34.92 SQUAMOUS CELL CARCINOMA OF LUNG, LEFT (HCC): Primary | ICD-10-CM

## 2025-05-12 DIAGNOSIS — R22.2 MASS OF CHEST WALL, LEFT: ICD-10-CM

## 2025-05-12 NOTE — TELEPHONE ENCOUNTER
Patient called and stated that she would liek a call back to discuss her CT results or if she needs to come in for an earlier appt to receive the results.     # [phone]

## 2025-05-13 ENCOUNTER — TRANSCRIBE ORDERS (OUTPATIENT)
Facility: HOSPITAL | Age: 80
End: 2025-05-13

## 2025-05-13 DIAGNOSIS — M81.0 OSTEOPOROSIS, POSTMENOPAUSAL: Primary | ICD-10-CM

## 2025-05-13 NOTE — TELEPHONE ENCOUNTER
[facility] [place] [facility] [place] [place]  [phone]    05/13 She reports posterior left mid back pain under ribs and report some swelling to that area. And some left arm pain as well. Ongoing for a few weeks, unable to lay on left side due to pain.     CT report shows stable disease. Will call radiology in am to review imaging for concern of swelling to left posterior chest and prior chest wall mass.     05/14 1440 Call placed to radiology file room. Brief review of imaging with radiologist confirms that left posterior chest wall mass is larger since March imaging. Radiologist will request Dr. Singh review imaging and addend his report.      Call placed to patient to notify of above. Per radiology the mass is in the area of the latissimus dorsi muscle which connects from arm to back so that could be why is having pain with moving her arm as well.  I will discuss findings with Dr. Park and once we have the addended report we can determine if radiation to that area would be helpful or if she needs to restart systemic therapy or both.      She has been taking Oxycodone 5mg tabs every 6 hours for pain and Tylenol but these are not helpful.  She states she has previously taking Morphine pills and that helped in the past.  Discussed that I will send in Morphine IR 15mg #56 for her to take 1-2 tabs q6h PRN to see if this helps more.     Advised that our office will be in touch in the next few days with a plan.   Understanding voiced and no additional questions.

## 2025-05-14 ENCOUNTER — PATIENT MESSAGE (OUTPATIENT)
Age: 80
End: 2025-05-14

## 2025-05-14 DIAGNOSIS — G89.3 CANCER RELATED PAIN: ICD-10-CM

## 2025-05-14 DIAGNOSIS — C34.92 SQUAMOUS CELL CARCINOMA OF LUNG, LEFT (HCC): Primary | ICD-10-CM

## 2025-05-14 DIAGNOSIS — R22.2 MASS OF CHEST WALL, LEFT: ICD-10-CM

## 2025-05-14 RX ORDER — MORPHINE SULFATE 15 MG/1
7.5-15 TABLET ORAL EVERY 6 HOURS PRN
Qty: 56 TABLET | Refills: 0 | Status: SHIPPED | OUTPATIENT
Start: 2025-05-14 | End: 2025-05-28

## 2025-05-14 NOTE — TELEPHONE ENCOUNTER
05/14/25 4:19 PM Attempted to call patient via home number listed. No answer, left message requesting return call. Provided office phone number as well.      4:24 PM Received return call from patient. Verified patient ID x 2. Advised of recommendations per Dr. Park. Patient voiced understanding and agreeable to plan. She confirmed having contact number for central scheduling. Encouraged patient to contact office if any questions or if she encounters any barriers when attempting to schedule biopsy. Also reviewed process for scheduling biopsy, including form that central scheduling submits to radiology department for approval.

## 2025-05-15 ENCOUNTER — TELEPHONE (OUTPATIENT)
Age: 80
End: 2025-05-15

## 2025-05-15 NOTE — TELEPHONE ENCOUNTER
05/15/25 1:54 PM Call placed to patient's daughter, Raquel, per patient's request. Verified patient ID x 2. Raquel requesting clarification as she thought that recent CT was stable and next step was for patient to pursue MRI. Per Dinora PERKINS note, discussed that CT had showed stable disease but upon speaking with radiologist, it was confirmed that left chest wall mass had gotten bigger. Explained that Dr. Park had advised areas in lung had improved but chest wall mass has been there. Advised provider has been monitoring this area but recent CT showed that mass had increased in size. Therefore, Dr. Park recommended a biopsy to confirm that this is the same disease process and obtain more definitive information. Raquel voiced understanding. She inquired about when to anticipate receiving results. Advised that the biopsy takes about 1 week to result. Patient currently scheduled to return to clinic 06/17. Will keep as scheduled for now unless otherwise instructed. Advised that Dr. Park prefers to discuss all results in person at office visit. Unsure of timeframe that biopsy will be scheduled, but biopsy form has been faxed to department. Reassured Raquel that if any results are time sensitive or urgent, then they will be notified sooner. She voiced understanding. No further questions or concerns at this time.

## 2025-05-19 ENCOUNTER — TELEPHONE (OUTPATIENT)
Age: 80
End: 2025-05-19

## 2025-05-19 DIAGNOSIS — G89.3 CANCER RELATED PAIN: ICD-10-CM

## 2025-05-19 DIAGNOSIS — C34.92 SQUAMOUS CELL CARCINOMA OF LUNG, LEFT (HCC): ICD-10-CM

## 2025-05-19 DIAGNOSIS — R22.2 MASS OF CHEST WALL, LEFT: ICD-10-CM

## 2025-05-19 RX ORDER — MORPHINE SULFATE 15 MG/1
7.5-15 TABLET ORAL EVERY 6 HOURS PRN
Qty: 56 TABLET | Refills: 0 | Status: SHIPPED | OUTPATIENT
Start: 2025-05-19 | End: 2025-06-02

## 2025-05-19 NOTE — TELEPHONE ENCOUNTER
Patient called and stated that her pharmacy doesn't have the morphine medication that was prescribed to her. She requested that it be sent to another pharmacy. Requested a call back.     # [phone]

## 2025-05-19 NOTE — TELEPHONE ENCOUNTER
05/19/25 12:19 PM Return call placed to patient. Verified patient ID x 2. She states that [facility] in [facility] had ordered her Morphine but medication has not arrived yet. Patient states she was advised that the [facility] location on [address] has this in stock and requested if prescription can be escribed there. Will update Dinora NP of above. Patient also mentioned that biopsy is still not scheduled yet. Per chart review, biopsy form sent to department on 05/15, but no updates available as of yet. Will continue to monitor peripherally and notify Dinora. Patient voiced understanding.

## 2025-05-28 DIAGNOSIS — C34.92 SQUAMOUS CELL CARCINOMA OF LUNG, LEFT (HCC): Primary | ICD-10-CM

## 2025-05-28 RX ORDER — OXYCODONE HYDROCHLORIDE 5 MG/1
5 TABLET ORAL EVERY 4 HOURS PRN
Qty: 60 TABLET | Refills: 0 | Status: SHIPPED | OUTPATIENT
Start: 2025-05-28 | End: 2025-06-07

## 2025-05-28 NOTE — TELEPHONE ENCOUNTER
Palliative Medicine Clinic   [place]: [phone] ([phone])    Patient Name: Elise Tabares  YOB: 1945    Medication Refill Request Triage    Requested by:    [x]  Patient  []  Support person:      Last Pall Med Clinic Visit:  4/23/2025    Next Pall Med Clinic Visit: 07/30/2025     Preferred Pharmacy: [facility] [place]  Backup Pharmacy:  *    Medications requested:  Oxycodone     Is patient completely out?  []   YES  [x]   NO  If NO, how many pills does patient have left?  __    Other pertinent information shared:

## 2025-05-28 NOTE — TELEPHONE ENCOUNTER
Palliative Medicine Clinic   [place]: [phone] ([phone])    Patient Name: Elise Tabares  YOB: 1945    Medication Refill Request    Patient is scheduled for follow up:  [x]  YES  []   NO  Next [facility] Med Clinic Visit: 07/30/25    PDMP reviewed:  [x] YES   []  System down / Unable  []  NO- Patient fills out of state    Medication:oxyCODONE (ROXICODONE) 5 MG immediate release tablet    Dose and directions:Take 1 tablet by mouth every 4 hours as needed for Pain for up to 10 days. Max Daily Amount: 30 mg   Number dispensed:60  Date filled (PDMP or Pharmacy):04/28/25  # left:unknown        Appropriate for refill:  [x]  YES  []  Early Request - Requires MD/NP Review      Other pertinent information for prescriber:  Pt got Morphine IR #56 on 05/19/25 by Dinora Hyman

## 2025-06-03 ENCOUNTER — HOSPITAL ENCOUNTER (OUTPATIENT)
Facility: HOSPITAL | Age: 80
Discharge: HOME | End: 2025-06-06
Attending: INTERNAL MEDICINE
Payer: MEDICARE

## 2025-06-03 VITALS
DIASTOLIC BLOOD PRESSURE: 57 MMHG | RESPIRATION RATE: 13 BRPM | HEART RATE: 88 BPM | OXYGEN SATURATION: 100 % | SYSTOLIC BLOOD PRESSURE: 103 MMHG

## 2025-06-03 DIAGNOSIS — C34.92 SQUAMOUS CELL CARCINOMA OF LUNG, LEFT (HCC): ICD-10-CM

## 2025-06-03 DIAGNOSIS — G89.3 CANCER RELATED PAIN: ICD-10-CM

## 2025-06-03 DIAGNOSIS — R22.2 MASS OF CHEST WALL, LEFT: ICD-10-CM

## 2025-06-03 PROCEDURE — 88305 TISSUE EXAM BY PATHOLOGIST: CPT

## 2025-06-03 PROCEDURE — 88342 IMHCHEM/IMCYTCHM 1ST ANTB: CPT

## 2025-06-03 PROCEDURE — 88333 PATH CONSLTJ SURG CYTO XM 1: CPT

## 2025-06-03 PROCEDURE — 99153 MOD SED SAME PHYS/QHP EA: CPT

## 2025-06-03 PROCEDURE — 99152 MOD SED SAME PHYS/QHP 5/>YRS: CPT

## 2025-06-03 PROCEDURE — 6360000002 HC RX W HCPCS: Performed by: STUDENT IN AN ORGANIZED HEALTH CARE EDUCATION/TRAINING PROGRAM

## 2025-06-03 PROCEDURE — 88341 IMHCHEM/IMCYTCHM EA ADD ANTB: CPT

## 2025-06-03 PROCEDURE — 2709999900 CT GUIDED NEEDLE PLACEMENT

## 2025-06-03 RX ORDER — MIDAZOLAM HYDROCHLORIDE 1 MG/ML
5 INJECTION, SOLUTION INTRAMUSCULAR; INTRAVENOUS PRN
Status: DISCONTINUED | OUTPATIENT
Start: 2025-06-03 | End: 2025-06-07 | Stop reason: HOSPADM

## 2025-06-03 RX ORDER — FENTANYL CITRATE 50 UG/ML
100 INJECTION, SOLUTION INTRAMUSCULAR; INTRAVENOUS PRN
Refills: 0 | Status: DISCONTINUED | OUTPATIENT
Start: 2025-06-03 | End: 2025-06-07 | Stop reason: HOSPADM

## 2025-06-03 RX ADMIN — FENTANYL CITRATE 25 MCG: 0.05 INJECTION, SOLUTION INTRAMUSCULAR; INTRAVENOUS at 11:04

## 2025-06-03 RX ADMIN — FENTANYL CITRATE 25 MCG: 0.05 INJECTION, SOLUTION INTRAMUSCULAR; INTRAVENOUS at 10:32

## 2025-06-03 RX ADMIN — MIDAZOLAM 1 MG: 1 INJECTION INTRAMUSCULAR; INTRAVENOUS at 10:32

## 2025-06-03 RX ADMIN — MIDAZOLAM 1 MG: 1 INJECTION INTRAMUSCULAR; INTRAVENOUS at 11:04

## 2025-06-03 NOTE — PROCEDURES
Brief Postoperative Note    Elise Tabares  YOB: 1945  [id number]    Pre-operative Diagnosis: Left posterior chest wall mass, JANELL SCC    Post-operative Diagnosis: Same    Procedure: Left posterior chest wall mass biopsy     Anesthesia: Local and Moderate Sedation    Surgeons/Assistants: Rosette Whatley MD    Estimated Blood Loss: Minimal     Complications: None    Specimens: Was Obtained: Five 18 gauge core samples     Findings: CT-guided biopsy of the left posterior chest wall mass. Five 18 gauge cores obtained.     Electronically signed by Rosette Whatley MD on 6/3/2025 at 11:20 AM

## 2025-06-03 NOTE — PROGRESS NOTES
0915: Patient to radiology holding from waiting area at for CT guided chest wall mass . Patient is accompanied by family member who can be reached at [phone].   Verified patient ID, allergies, and NPO status. Patient is AOx4. Lung and heart sounds auscultated, clear bilaterally.  Patient is sinus rhythm on the cardiac monitor.  Patient is afebrile, and pain is 0 out of 10 on the numeric scale. Patient tolerated PIV placed to right AC with positive blood return. Patient confirms she has been off aspirin x1 week.    1015: Rosette Whatley MD to bedside to obtain procedural consent. ASA and airway assessed by provider. Opportunity for questions provided, patient verbalized understanding. Sedation plans discussed with provider.      1025: Patient transported to CT for procedure. Positioned prone on CT table and placed on monitor with oxygen by nasal cannula. Staff members present: Radha Nelson RN, Rosette Whatley MD, Joao Botello RTR, Marquis Parada Cyto Tech    Time out performed at 1104  Procedure start time 1105    Patient monitored throughout the procedure, to include ETCO2, cardiac, blood pressure, O2 saturation.  Vitals remained stable throughout the procedure. 5 core biopsy samples provided to pathology in room. Gauze and tegaderm dressing applied to left posterior chest.    Patient received a total of 50 mcg fentanyl and 2 mg versed intra-procedure  Procedure end time 1120    1130: Returned to radiology holding. Patient tolerating PO, pain is 0 out of 10 on the numeric scale. Family member updated with plan of care. Provided patient with verbal and printed discharge instructions, patient verbalized understanding and will follow up with ordering provider for results. PIV removed, posterior chest dressing is clean/dry/intact at time of discharge. Patient discharged by wheelchair to care of family member at 1210.

## 2025-06-03 NOTE — H&P
Radiology History and Physical    Patient: Elise Tabares 79 y.o. adult       Chief Complaint: JANELL SCC    History of Present Illness: Ms. Tabares is a 79 y.o. woman with SCC of the JANELL with an enlarging left posterior chest wall mass for which she presents for biopsy.     History:    Past Medical History:   Diagnosis Date    Arthritis     Lower back, also has T-11 compression fracture    CAD (coronary artery disease)     Cerebral artery occlusion with cerebral infarction (HCC)     Evident on head CT, Chronic Rt lacunar infarcts    Chronic back pain 2010    Chronic kidney disease (CKD)     Stage 3    COPD (chronic obstructive pulmonary disease) ([facility])     DM type 2 (diabetes mellitus, type 2) ([facility]) 2010    HTN (hypertension) 2010    Hyperlipidemia     Lung cancer ([facility]) 2023    Lt upper lobe squamous cell    MI (myocardial infarction) ([facility])     s/p PCI    Obesity (BMI 30-39.9)     Obstructive sleep apnea (adult) (pediatric) 2014    Thyroid mass     Tibia fracture 10/2024     Family History   Problem Relation Age of Onset    Hypertension Mother          58yo    Coronary Art Dis Mother     Diabetes Sister     Breast Cancer Sister     Diabetes Sister     Lung Cancer Sister     Diabetes Sister     Diabetes Sister      Social History     Socioeconomic History    Marital status: Single     Spouse name: Not on file    Number of children: 3    Years of education: 12    Highest education level: Not on file   Occupational History    Not on file   Tobacco Use    Smoking status: Former     Current packs/day: 0.00     Types: Cigarettes     Quit date: 2004     Years since quittin.4     Passive exposure: Past    Smokeless tobacco: Never   Vaping Use    Vaping status: Never Used   Substance and Sexual Activity    Alcohol use: No    Drug use: No    Sexual activity: Not Currently   Other Topics Concern    Not on file   Social History Narrative    Not on file     Social Drivers of

## 2025-06-10 ENCOUNTER — HOSPITAL ENCOUNTER (OUTPATIENT)
Facility: HOSPITAL | Age: 80
Discharge: HOME | End: 2025-06-13
Payer: MEDICARE

## 2025-06-10 ENCOUNTER — HOSPITAL ENCOUNTER (OUTPATIENT)
Facility: HOSPITAL | Age: 80
Discharge: HOME | End: 2025-06-13
Attending: INTERNAL MEDICINE
Payer: MEDICARE

## 2025-06-10 VITALS — WEIGHT: 183 LBS | HEIGHT: 66 IN | BODY MASS INDEX: 29.41 KG/M2

## 2025-06-10 DIAGNOSIS — S82.64XK CLOSED NONDISPLACED FRACTURE OF LATERAL MALLEOLUS OF RIGHT FIBULA WITH NONUNION, SUBSEQUENT ENCOUNTER: ICD-10-CM

## 2025-06-10 DIAGNOSIS — M81.0 OSTEOPOROSIS, POSTMENOPAUSAL: ICD-10-CM

## 2025-06-10 PROCEDURE — 77080 DXA BONE DENSITY AXIAL: CPT

## 2025-06-10 PROCEDURE — 73700 CT LOWER EXTREMITY W/O DYE: CPT

## 2025-06-17 ENCOUNTER — TELEPHONE (OUTPATIENT)
Age: 80
End: 2025-06-17

## 2025-06-17 ENCOUNTER — OFFICE VISIT (OUTPATIENT)
Age: 80
End: 2025-06-17
Payer: MEDICARE

## 2025-06-17 ENCOUNTER — HOSPITAL ENCOUNTER (OUTPATIENT)
Facility: HOSPITAL | Age: 80
Setting detail: INFUSION SERIES
Discharge: HOME | End: 2025-06-17
Payer: MEDICARE

## 2025-06-17 VITALS
TEMPERATURE: 97 F | DIASTOLIC BLOOD PRESSURE: 72 MMHG | OXYGEN SATURATION: 95 % | RESPIRATION RATE: 18 BRPM | SYSTOLIC BLOOD PRESSURE: 114 MMHG | HEART RATE: 97 BPM

## 2025-06-17 VITALS
HEIGHT: 66 IN | TEMPERATURE: 98 F | SYSTOLIC BLOOD PRESSURE: 126 MMHG | BODY MASS INDEX: 28.61 KG/M2 | WEIGHT: 178 LBS | DIASTOLIC BLOOD PRESSURE: 76 MMHG | OXYGEN SATURATION: 97 % | RESPIRATION RATE: 16 BRPM | HEART RATE: 95 BPM

## 2025-06-17 DIAGNOSIS — C34.92 MALIGNANT NEOPLASM OF UNSPECIFIED PART OF LEFT BRONCHUS OR LUNG (HCC): Primary | ICD-10-CM

## 2025-06-17 DIAGNOSIS — C34.92 SQUAMOUS CELL CARCINOMA OF LUNG, LEFT (HCC): ICD-10-CM

## 2025-06-17 DIAGNOSIS — E11.40 TYPE 2 DIABETES MELLITUS WITH DIABETIC NEUROPATHY, WITH LONG-TERM CURRENT USE OF INSULIN (HCC): ICD-10-CM

## 2025-06-17 DIAGNOSIS — C34.92 SQUAMOUS CELL CARCINOMA OF LUNG, LEFT (HCC): Primary | ICD-10-CM

## 2025-06-17 DIAGNOSIS — R22.2 MASS OF CHEST WALL, LEFT: ICD-10-CM

## 2025-06-17 DIAGNOSIS — G89.3 CANCER RELATED PAIN: ICD-10-CM

## 2025-06-17 DIAGNOSIS — Z79.4 TYPE 2 DIABETES MELLITUS WITH DIABETIC NEUROPATHY, WITH LONG-TERM CURRENT USE OF INSULIN (HCC): ICD-10-CM

## 2025-06-17 DIAGNOSIS — N18.31 STAGE 3A CHRONIC KIDNEY DISEASE (HCC): ICD-10-CM

## 2025-06-17 LAB
ALBUMIN SERPL-MCNC: 3.4 G/DL (ref 3.5–5)
ALBUMIN/GLOB SERPL: 0.9 (ref 1.1–2.2)
ALP SERPL-CCNC: 113 U/L (ref 45–117)
ALT SERPL-CCNC: 10 U/L (ref 12–78)
ANION GAP SERPL CALC-SCNC: 9 MMOL/L (ref 2–12)
AST SERPL-CCNC: 12 U/L (ref 15–37)
BASOPHILS # BLD: 0.01 K/UL (ref 0–0.1)
BASOPHILS NFR BLD: 0.1 % (ref 0–1)
BILIRUB SERPL-MCNC: 0.6 MG/DL (ref 0.2–1)
BUN SERPL-MCNC: 18 MG/DL (ref 6–20)
BUN/CREAT SERPL: 15 (ref 12–20)
CALCIUM SERPL-MCNC: 9.2 MG/DL (ref 8.5–10.1)
CHLORIDE SERPL-SCNC: 102 MMOL/L (ref 97–108)
CO2 SERPL-SCNC: 25 MMOL/L (ref 21–32)
CREAT SERPL-MCNC: 1.18 MG/DL (ref 0.55–1.02)
DIFFERENTIAL METHOD BLD: ABNORMAL
EOSINOPHIL # BLD: 0.06 K/UL (ref 0–0.4)
EOSINOPHIL NFR BLD: 0.6 % (ref 0–7)
ERYTHROCYTE [DISTWIDTH] IN BLOOD BY AUTOMATED COUNT: 13.7 % (ref 11.5–14.5)
GLOBULIN SER CALC-MCNC: 3.8 G/DL (ref 2–4)
GLUCOSE SERPL-MCNC: 305 MG/DL (ref 65–100)
HCT VFR BLD AUTO: 34.2 % (ref 35–47)
HGB BLD-MCNC: 11.4 G/DL (ref 11.5–16)
IMM GRANULOCYTES # BLD AUTO: 0.02 K/UL (ref 0–0.04)
IMM GRANULOCYTES NFR BLD AUTO: 0.2 % (ref 0–0.5)
LYMPHOCYTES # BLD: 0.86 K/UL (ref 0.8–3.5)
LYMPHOCYTES NFR BLD: 9.1 % (ref 12–49)
MCH RBC QN AUTO: 28.5 PG (ref 26–34)
MCHC RBC AUTO-ENTMCNC: 33.3 G/DL (ref 30–36.5)
MCV RBC AUTO: 85.5 FL (ref 80–99)
MONOCYTES # BLD: 0.32 K/UL (ref 0–1)
MONOCYTES NFR BLD: 3.4 % (ref 5–13)
NEUTS SEG # BLD: 8.13 K/UL (ref 1.8–8)
NEUTS SEG NFR BLD: 86.6 % (ref 32–75)
NRBC # BLD: 0 K/UL (ref 0–0.01)
NRBC BLD-RTO: 0 PER 100 WBC
PLATELET # BLD AUTO: 277 K/UL (ref 150–400)
PMV BLD AUTO: 10.4 FL (ref 8.9–12.9)
POTASSIUM SERPL-SCNC: 3.9 MMOL/L (ref 3.5–5.1)
PROT SERPL-MCNC: 7.2 G/DL (ref 6.4–8.2)
RBC # BLD AUTO: 4 M/UL (ref 3.8–5.2)
SODIUM SERPL-SCNC: 136 MMOL/L (ref 136–145)
WBC # BLD AUTO: 9.4 K/UL (ref 3.6–11)

## 2025-06-17 PROCEDURE — 3078F DIAST BP <80 MM HG: CPT | Performed by: INTERNAL MEDICINE

## 2025-06-17 PROCEDURE — G8417 CALC BMI ABV UP PARAM F/U: HCPCS | Performed by: INTERNAL MEDICINE

## 2025-06-17 PROCEDURE — 3074F SYST BP LT 130 MM HG: CPT | Performed by: INTERNAL MEDICINE

## 2025-06-17 PROCEDURE — 1159F MED LIST DOCD IN RCRD: CPT | Performed by: INTERNAL MEDICINE

## 2025-06-17 PROCEDURE — 99214 OFFICE O/P EST MOD 30 MIN: CPT | Performed by: INTERNAL MEDICINE

## 2025-06-17 PROCEDURE — 1123F ACP DISCUSS/DSCN MKR DOCD: CPT | Performed by: INTERNAL MEDICINE

## 2025-06-17 PROCEDURE — G8427 DOCREV CUR MEDS BY ELIG CLIN: HCPCS | Performed by: INTERNAL MEDICINE

## 2025-06-17 PROCEDURE — G2211 COMPLEX E/M VISIT ADD ON: HCPCS | Performed by: INTERNAL MEDICINE

## 2025-06-17 PROCEDURE — 1125F AMNT PAIN NOTED PAIN PRSNT: CPT | Performed by: INTERNAL MEDICINE

## 2025-06-17 PROCEDURE — 85025 COMPLETE CBC W/AUTO DIFF WBC: CPT

## 2025-06-17 PROCEDURE — 36591 DRAW BLOOD OFF VENOUS DEVICE: CPT

## 2025-06-17 PROCEDURE — 80053 COMPREHEN METABOLIC PANEL: CPT

## 2025-06-17 PROCEDURE — 1036F TOBACCO NON-USER: CPT | Performed by: INTERNAL MEDICINE

## 2025-06-17 PROCEDURE — 2500000003 HC RX 250 WO HCPCS: Performed by: INTERNAL MEDICINE

## 2025-06-17 RX ORDER — DIPHENHYDRAMINE HYDROCHLORIDE 50 MG/ML
50 INJECTION, SOLUTION INTRAMUSCULAR; INTRAVENOUS
OUTPATIENT
Start: 2025-09-07

## 2025-06-17 RX ORDER — FAMOTIDINE 10 MG/ML
20 INJECTION, SOLUTION INTRAVENOUS
OUTPATIENT
Start: 2025-09-07

## 2025-06-17 RX ORDER — HYDROCORTISONE SODIUM SUCCINATE 100 MG/2ML
100 INJECTION INTRAMUSCULAR; INTRAVENOUS
OUTPATIENT
Start: 2025-09-07

## 2025-06-17 RX ORDER — HEPARIN 100 UNIT/ML
500 SYRINGE INTRAVENOUS PRN
OUTPATIENT
Start: 2025-09-07

## 2025-06-17 RX ORDER — MORPHINE SULFATE 15 MG/1
15 TABLET ORAL EVERY 6 HOURS PRN
Qty: 28 TABLET | Refills: 0 | Status: SHIPPED | OUTPATIENT
Start: 2025-06-17 | End: 2025-06-18 | Stop reason: SDUPTHER

## 2025-06-17 RX ORDER — MORPHINE SULFATE 15 MG/1
15 TABLET ORAL EVERY 6 HOURS PRN
COMMUNITY
End: 2025-06-17 | Stop reason: SDUPTHER

## 2025-06-17 RX ORDER — ONDANSETRON 2 MG/ML
8 INJECTION INTRAMUSCULAR; INTRAVENOUS
OUTPATIENT
Start: 2025-09-07

## 2025-06-17 RX ORDER — SODIUM CHLORIDE 0.9 % (FLUSH) 0.9 %
5-40 SYRINGE (ML) INJECTION PRN
Status: DISCONTINUED | OUTPATIENT
Start: 2025-06-17 | End: 2025-06-18 | Stop reason: HOSPADM

## 2025-06-17 RX ORDER — SODIUM CHLORIDE 9 MG/ML
25 INJECTION, SOLUTION INTRAVENOUS PRN
OUTPATIENT
Start: 2025-09-07

## 2025-06-17 RX ORDER — SODIUM CHLORIDE 0.9 % (FLUSH) 0.9 %
5-40 SYRINGE (ML) INJECTION PRN
OUTPATIENT
Start: 2025-09-07

## 2025-06-17 RX ORDER — ACETAMINOPHEN 325 MG/1
650 TABLET ORAL
OUTPATIENT
Start: 2025-09-07

## 2025-06-17 RX ORDER — LIDOCAINE AND PRILOCAINE 25; 25 MG/G; MG/G
CREAM TOPICAL
Qty: 1 EACH | Refills: 3 | Status: SHIPPED | OUTPATIENT
Start: 2025-06-17

## 2025-06-17 RX ORDER — EPINEPHRINE 1 MG/ML
0.3 INJECTION, SOLUTION INTRAMUSCULAR; SUBCUTANEOUS PRN
OUTPATIENT
Start: 2025-09-07

## 2025-06-17 RX ORDER — ALBUTEROL SULFATE 90 UG/1
4 INHALANT RESPIRATORY (INHALATION) PRN
OUTPATIENT
Start: 2025-09-07

## 2025-06-17 RX ORDER — SODIUM CHLORIDE 9 MG/ML
INJECTION, SOLUTION INTRAVENOUS CONTINUOUS
OUTPATIENT
Start: 2025-09-07

## 2025-06-17 RX ADMIN — SODIUM CHLORIDE, PRESERVATIVE FREE 20 ML: 5 INJECTION INTRAVENOUS at 09:14

## 2025-06-17 ASSESSMENT — PAIN DESCRIPTION - ORIENTATION: ORIENTATION: LEFT

## 2025-06-17 ASSESSMENT — PAIN DESCRIPTION - LOCATION: LOCATION: CHEST

## 2025-06-17 ASSESSMENT — PAIN DESCRIPTION - PAIN TYPE: TYPE: ACUTE PAIN

## 2025-06-17 ASSESSMENT — PAIN DESCRIPTION - DESCRIPTORS: DESCRIPTORS: BURNING

## 2025-06-17 ASSESSMENT — PAIN SCALES - GENERAL: PAINLEVEL_OUTOF10: 3

## 2025-06-17 NOTE — PROGRESS NOTES
Ms. Tabares [facility] ambulatory in stable condition. Assessment completed. No new concerns voiced.     PAC accessed without difficulty, flushed with NS, and labs drawn per orders and sent for processing. PAC was flushed with NS per orders and de-accessed.    Vital Signs:  /72   Pulse 97   Temp 97 °F (36.1 °C) (Temporal)   Resp 18   LMP  (LMP Unknown)   SpO2 95%     PAC with positive blood return.   Lab Results:  Recent Results (from the past 12 hours)   Comprehensive Metabolic Panel    Collection Time: 06/17/25  9:08 AM   Result Value Ref Range    Sodium 136 136 - 145 mmol/L    Potassium 3.9 3.5 - 5.1 mmol/L    Chloride 102 97 - 108 mmol/L    CO2 25 21 - 32 mmol/L    Anion Gap 9 2 - 12 mmol/L    Glucose 305 (H) 65 - 100 mg/dL    BUN 18 6 - 20 MG/DL    Creatinine 1.18 (H) 0.55 - 1.02 MG/DL    BUN/Creatinine Ratio 15 12 - 20      Est, Glom Filt Rate 47 (L) >60 ml/min/1.73m2    Calcium 9.2 8.5 - 10.1 MG/DL    Total Bilirubin 0.6 0.2 - 1.0 MG/DL    ALT 10 (L) 12 - 78 U/L    AST 12 (L) 15 - 37 U/L    Alk Phosphatase 113 45 - 117 U/L    Total Protein 7.2 6.4 - 8.2 g/dL    Albumin 3.4 (L) 3.5 - 5.0 g/dL    Globulin 3.8 2.0 - 4.0 g/dL    Albumin/Globulin Ratio 0.9 (L) 1.1 - 2.2     CBC with Auto Differential    Collection Time: 06/17/25  9:08 AM   Result Value Ref Range    WBC 9.4 3.6 - 11.0 K/uL    RBC 4.00 3.80 - 5.20 M/uL    Hemoglobin 11.4 (L) 11.5 - 16.0 g/dL    Hematocrit 34.2 (L) 35.0 - 47.0 %    MCV 85.5 80.0 - 99.0 FL    MCH 28.5 26.0 - 34.0 PG    MCHC 33.3 30.0 - 36.5 g/dL    RDW 13.7 11.5 - 14.5 %    Platelets 277 150 - 400 K/uL    MPV 10.4 8.9 - 12.9 FL    Nucleated RBCs 0.0 0  WBC    nRBC 0.00 0.00 - 0.01 K/uL    Neutrophils % 86.6 (H) 32.0 - 75.0 %    Lymphocytes % 9.1 (L) 12.0 - 49.0 %    Monocytes % 3.4 (L) 5.0 - 13.0 %    Eosinophils % 0.6 0.0 - 7.0 %    Basophils % 0.1 0.0 - 1.0 %    Immature Granulocytes % 0.2 0.0 - 0.5 %    Neutrophils Absolute 8.13 (H) 1.80 - 8.00

## 2025-06-17 NOTE — PROGRESS NOTES
Elise Dunlapshefali is a 79 y.o. adult follow up for         1. Have you been to the ER, urgent care clinic since your last visit?  Hospitalized since your last visit?{no    2. Have you seen or consulted any other health care providers outside of the [facility] since your last visit?  Include any pap smears or colon screening. no  
being: Pt is coping well with his/her disease and has excellent support.  I appreciate the opportunity to participate in Ms. Elise Tabares's care.      Signed By: Guerline Park MD

## 2025-06-17 NOTE — TELEPHONE ENCOUNTER
Patient called and stated that the [facility] pharmacy on [address] doesn't have the morphine medication but the [facility] pharmacy at [facility] does. Pt requested for the medication to be sent there instead. Requested a call back.     CB# [phone]

## 2025-06-18 ENCOUNTER — TELEPHONE (OUTPATIENT)
Age: 80
End: 2025-06-18

## 2025-06-18 RX ORDER — MORPHINE SULFATE 15 MG/1
15 TABLET ORAL EVERY 6 HOURS PRN
Qty: 28 TABLET | Refills: 0 | Status: SHIPPED | OUTPATIENT
Start: 2025-06-18 | End: 2025-07-02

## 2025-06-18 NOTE — TELEPHONE ENCOUNTER
06/18/25 10:42 AM Blood specimen collected in [facility] on 06/17/2025 for Guardant testing. Paper requisition completed, to be sent with specimen via [employer]. Copy of requisition to be scanned to patient's chart as well. Scheduled [employer] -- # [id number]. Will continue to monitor.

## 2025-06-18 NOTE — TELEPHONE ENCOUNTER
06/18/25 10:27 AM Per case management assistant, PA not needed for Morphine. Pharmacy is filling--should be ready around noon and they will alert patient.

## 2025-06-22 PROBLEM — N18.32 CHRONIC KIDNEY DISEASE, STAGE 3B (HCC): Status: ACTIVE | Noted: 2025-06-22

## 2025-06-23 ENCOUNTER — OFFICE VISIT (OUTPATIENT)
Age: 80
End: 2025-06-23
Payer: MEDICARE

## 2025-06-23 VITALS
RESPIRATION RATE: 18 BRPM | WEIGHT: 180 LBS | SYSTOLIC BLOOD PRESSURE: 133 MMHG | BODY MASS INDEX: 28.93 KG/M2 | OXYGEN SATURATION: 94 % | HEART RATE: 99 BPM | HEIGHT: 66 IN | DIASTOLIC BLOOD PRESSURE: 81 MMHG | TEMPERATURE: 98 F

## 2025-06-23 DIAGNOSIS — J43.2 CENTRILOBULAR EMPHYSEMA (HCC): ICD-10-CM

## 2025-06-23 DIAGNOSIS — N18.32 CHRONIC KIDNEY DISEASE, STAGE 3B (HCC): ICD-10-CM

## 2025-06-23 DIAGNOSIS — F33.1 MAJOR DEPRESSIVE DISORDER, RECURRENT, MODERATE (HCC): ICD-10-CM

## 2025-06-23 DIAGNOSIS — H61.23 IMPACTED CERUMEN, BILATERAL: ICD-10-CM

## 2025-06-23 DIAGNOSIS — Z79.4 TYPE 2 DIABETES MELLITUS WITH DIABETIC NEUROPATHY, WITH LONG-TERM CURRENT USE OF INSULIN (HCC): Chronic | ICD-10-CM

## 2025-06-23 DIAGNOSIS — S82.64XK CLOSED NONDISPLACED FRACTURE OF LATERAL MALLEOLUS OF RIGHT FIBULA WITH NONUNION, SUBSEQUENT ENCOUNTER: ICD-10-CM

## 2025-06-23 DIAGNOSIS — E11.40 TYPE 2 DIABETES MELLITUS WITH DIABETIC NEUROPATHY, WITH LONG-TERM CURRENT USE OF INSULIN (HCC): Chronic | ICD-10-CM

## 2025-06-23 DIAGNOSIS — E11.22 TYPE 2 DIABETES MELLITUS WITH STAGE 3A CHRONIC KIDNEY DISEASE, WITH LONG-TERM CURRENT USE OF INSULIN (HCC): Primary | ICD-10-CM

## 2025-06-23 DIAGNOSIS — C34.92 SQUAMOUS CELL CARCINOMA OF LUNG, LEFT (HCC): ICD-10-CM

## 2025-06-23 DIAGNOSIS — Z79.4 TYPE 2 DIABETES MELLITUS WITH STAGE 3A CHRONIC KIDNEY DISEASE, WITH LONG-TERM CURRENT USE OF INSULIN (HCC): Primary | ICD-10-CM

## 2025-06-23 DIAGNOSIS — N18.31 TYPE 2 DIABETES MELLITUS WITH STAGE 3A CHRONIC KIDNEY DISEASE, WITH LONG-TERM CURRENT USE OF INSULIN (HCC): Primary | ICD-10-CM

## 2025-06-23 DIAGNOSIS — C79.51 METASTATIC CANCER TO BONE (HCC): ICD-10-CM

## 2025-06-23 DIAGNOSIS — G62.9 NEUROPATHY: ICD-10-CM

## 2025-06-23 PROCEDURE — 3075F SYST BP GE 130 - 139MM HG: CPT | Performed by: FAMILY MEDICINE

## 2025-06-23 PROCEDURE — 1123F ACP DISCUSS/DSCN MKR DOCD: CPT | Performed by: FAMILY MEDICINE

## 2025-06-23 PROCEDURE — 3023F SPIROM DOC REV: CPT | Performed by: FAMILY MEDICINE

## 2025-06-23 PROCEDURE — 99214 OFFICE O/P EST MOD 30 MIN: CPT | Performed by: FAMILY MEDICINE

## 2025-06-23 PROCEDURE — 3079F DIAST BP 80-89 MM HG: CPT | Performed by: FAMILY MEDICINE

## 2025-06-23 PROCEDURE — G8427 DOCREV CUR MEDS BY ELIG CLIN: HCPCS | Performed by: FAMILY MEDICINE

## 2025-06-23 PROCEDURE — 1036F TOBACCO NON-USER: CPT | Performed by: FAMILY MEDICINE

## 2025-06-23 PROCEDURE — G8417 CALC BMI ABV UP PARAM F/U: HCPCS | Performed by: FAMILY MEDICINE

## 2025-06-23 RX ORDER — LISINOPRIL 2.5 MG/1
2.5 TABLET ORAL DAILY
COMMUNITY

## 2025-06-23 RX ORDER — ERGOCALCIFEROL 1.25 MG/1
50000 CAPSULE, LIQUID FILLED ORAL
COMMUNITY
Start: 2025-04-03

## 2025-06-23 ASSESSMENT — PATIENT HEALTH QUESTIONNAIRE - PHQ9
SUM OF ALL RESPONSES TO PHQ QUESTIONS 1-9: 0
SUM OF ALL RESPONSES TO PHQ QUESTIONS 1-9: 0
1. LITTLE INTEREST OR PLEASURE IN DOING THINGS: NOT AT ALL
SUM OF ALL RESPONSES TO PHQ QUESTIONS 1-9: 0
2. FEELING DOWN, DEPRESSED OR HOPELESS: NOT AT ALL
SUM OF ALL RESPONSES TO PHQ QUESTIONS 1-9: 0

## 2025-06-23 NOTE — PROGRESS NOTES
Eliseernesto Tabares  80 y.o. adult  : 1945  [address] [facility] [address]  [id number]   [facility]: Progress Note  Gerber Mayo MD       Encounter Date: 2025    Chief Complaint   Patient presents with    Annual Exam     Pt reports for regular check up        Subjective   History of Present Illness  The patient presents for evaluation of back pain, neuropathy, ankle fracture, and cerumen impaction.    She has been undergoing chemotherapy for over a year and is scheduled to resume next month. A recent CT scan revealed changes in her condition, prompting the need for radiation therapy. She has been experiencing severe back pain, which she suspects may be due to cancer in her bones. Despite attempts at palliative care, her bone pain persists. She is under the care of Dr. Park, an oncologist, who has prescribed oxycodone and pregabalin, but these medications have not provided significant relief. Her sleep is disrupted due to her inability to lie on her back, necessitating the use of a recliner.     She has been experiencing numbness since 2024, which she attributes to neuropathy. The numbness is so severe that it interferes with her ability to hold objects.    She has a history of fracture and is currently using a bone stimulator for 3 hours daily. A CT scan was performed last week, but the results are pending. She is unable to walk without the aid of a boot.    She has a history of ear wax buildup and typically undergoes annual cleanings. However, due to her current numbness, she is unable to perform this task at home.    She reports no issues with her hearing or vision. She experiences daily bowel movements and maintains a diet rich in fruits and vegetables. She has designated her daughter as her healthcare proxy and has discussed her wishes with her. She has also prepared a living will.    She has been taken off metoprolol and is now on lisinopril, which she gets

## 2025-06-23 NOTE — PROGRESS NOTES
Identified pt with two pt identifiers(name and ). Reviewed record in preparation for visit and have obtained necessary documentation.  Chief Complaint   Patient presents with    Annual Exam     Pt reports for regular check up        Health Maintenance Due   Topic    A1C test (Diabetic or Prediabetic)     Diabetic Alb to Cr ratio (uACR) test     Hepatitis C screen     Shingles vaccine (1 of 2)    Respiratory Syncytial Virus (RSV) Pregnant or age 60 yrs+ (1 - 1-dose 75+ series)    Pneumococcal 50+ years Vaccine (2 of 2 - PCV)    Annual Wellness Visit (Medicare)     Diabetic foot exam     Diabetic retinal exam     COVID-19 Vaccine ( season)    Lipids        Vitals:    25 1059   BP: 133/81   BP Site: Left Upper Arm   Patient Position: Sitting   BP Cuff Size: Medium Adult   Pulse: 99   Resp: 18   Temp: 98 °F (36.7 °C)   TempSrc: Oral   SpO2: 94%   Weight: 81.6 kg (180 lb)   Height: 1.676 m (5' 6\")         \"Have you been to the ER, urgent care clinic since your last visit?  Hospitalized since your last visit?\"    NO    “Have you seen or consulted any other health care providers outside of [facility] since your last visit?”    NO            Click Here for Release of Records Request     This patient is accompanied in the office by her self.  I have received verbal consent from Elise Tabares to discuss any/all medical information while they are present in the room.

## 2025-06-23 NOTE — ASSESSMENT & PLAN NOTE
Monitored by specialist- no acute findings meriting change in the plan    Diabetic Medications       Insulin       TRESIBA FLEXTOUCH 200 UNIT/ML SOPN INJECT 54 TO 60 UNITS SUBCUTANEOUSLY ONCE DAILY AS DIRECTED       Incretin Mimetic Agents       Semaglutide,0.25 or 0.5MG/DOS, 2 MG/1.5ML SOPN Inject 10 mg into the skin every 7 days

## 2025-06-27 ENCOUNTER — TELEPHONE (OUTPATIENT)
Age: 80
End: 2025-06-27

## 2025-06-27 NOTE — TELEPHONE ENCOUNTER
06/27/25 2:22 PM Return call placed to patient. Verified patient ID x 2. Advised of response per Dinora PERKINS. Patient voiced understanding. No additional questions or concerns at this time.

## 2025-06-27 NOTE — TELEPHONE ENCOUNTER
Patient called and stated that she wanted to know if she should keep her appt on 7/1 or if she should reschedule it until after she sees radiation. Requested a call back.     # [phone]

## 2025-07-01 ENCOUNTER — OFFICE VISIT (OUTPATIENT)
Age: 80
End: 2025-07-01
Payer: MEDICARE

## 2025-07-01 VITALS
DIASTOLIC BLOOD PRESSURE: 93 MMHG | HEIGHT: 66 IN | SYSTOLIC BLOOD PRESSURE: 171 MMHG | BODY MASS INDEX: 28.93 KG/M2 | WEIGHT: 180 LBS | OXYGEN SATURATION: 96 % | HEART RATE: 114 BPM | RESPIRATION RATE: 18 BRPM | TEMPERATURE: 97.7 F

## 2025-07-01 DIAGNOSIS — Z79.4 TYPE 2 DIABETES MELLITUS WITH DIABETIC NEUROPATHY, WITH LONG-TERM CURRENT USE OF INSULIN (HCC): ICD-10-CM

## 2025-07-01 DIAGNOSIS — E11.40 TYPE 2 DIABETES MELLITUS WITH DIABETIC NEUROPATHY, WITH LONG-TERM CURRENT USE OF INSULIN (HCC): ICD-10-CM

## 2025-07-01 DIAGNOSIS — C34.92 SQUAMOUS CELL CARCINOMA OF LUNG, LEFT (HCC): Primary | ICD-10-CM

## 2025-07-01 DIAGNOSIS — N18.31 STAGE 3A CHRONIC KIDNEY DISEASE (HCC): ICD-10-CM

## 2025-07-01 DIAGNOSIS — G89.3 CANCER RELATED PAIN: ICD-10-CM

## 2025-07-01 DIAGNOSIS — Z51.11 CHEMOTHERAPY MANAGEMENT, ENCOUNTER FOR: ICD-10-CM

## 2025-07-01 DIAGNOSIS — R22.2 MASS OF CHEST WALL, LEFT: ICD-10-CM

## 2025-07-01 PROCEDURE — 99215 OFFICE O/P EST HI 40 MIN: CPT | Performed by: NURSE PRACTITIONER

## 2025-07-01 RX ORDER — PROCHLORPERAZINE MALEATE 5 MG/1
5 TABLET ORAL EVERY 6 HOURS PRN
Qty: 60 TABLET | Refills: 2 | Status: SHIPPED | OUTPATIENT
Start: 2025-07-01

## 2025-07-01 RX ORDER — MORPHINE SULFATE 15 MG/1
15 TABLET ORAL EVERY 4 HOURS PRN
Qty: 80 TABLET | Refills: 0 | Status: SHIPPED | OUTPATIENT
Start: 2025-07-01 | End: 2025-07-03 | Stop reason: SDUPTHER

## 2025-07-01 RX ORDER — ONDANSETRON 8 MG/1
8 TABLET, FILM COATED ORAL EVERY 8 HOURS PRN
Qty: 60 TABLET | Refills: 3 | Status: SHIPPED | OUTPATIENT
Start: 2025-07-01

## 2025-07-01 NOTE — PROGRESS NOTES
Elise Dunlapshefali is a 79 y.o. adult follow up for         1. Have you been to the ER, urgent care clinic since your last visit?  Hospitalized since your last visit?{no    2. Have you seen or consulted any other health care providers outside of the [facility] since your last visit?  Include any pap smears or colon screening. no  
Oxycodone.  -- Following closely with Dr. Anglin.     5. Stage IIIa CKD:  Likely 2/2 DM, HTN, possibly exacerbated by chemotherapy. Crt range 1.2. Followed by Dr. De Leon.   -- Advised 60 oz water daily.     6. CAD / Hypotension with prior h/o HTN:  On ASA, statin. Stent placement in proximal LAD on 10/5/23. On Plavix.      7. Anemia of chronic disease:  2/2 CKD. B12 and folate normal. S/p Injectafer x 1 in March 2024 in the setting of CKD.    8. R ankle lateral malleolus fracture:  Following with Dr. Mcqueen in Ortho- does not need surgery, now has brace.    9. 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     Goals of care:  Disease is now incurable, but is treatable to improve quality and duration of life.    Emotional well being: Pt is coping well with his/her disease and has excellent support.  I appreciate the opportunity to participate in Ms. Elise Tabares's care.      Signed By: SHANIQUE Sin - NP

## 2025-07-03 ENCOUNTER — HOSPITAL ENCOUNTER (OUTPATIENT)
Facility: HOSPITAL | Age: 80
Discharge: HOME | End: 2025-07-06

## 2025-07-03 ENCOUNTER — CLINICAL DOCUMENTATION (OUTPATIENT)
Facility: HOSPITAL | Age: 80
End: 2025-07-03

## 2025-07-03 ENCOUNTER — TELEPHONE (OUTPATIENT)
Age: 80
End: 2025-07-03

## 2025-07-03 VITALS — WEIGHT: 180 LBS | BODY MASS INDEX: 28.93 KG/M2 | HEIGHT: 66 IN

## 2025-07-03 DIAGNOSIS — C34.92 SQUAMOUS CELL CARCINOMA OF LUNG, LEFT (HCC): ICD-10-CM

## 2025-07-03 DIAGNOSIS — C34.92 SQUAMOUS CELL CARCINOMA OF LUNG, LEFT (HCC): Primary | ICD-10-CM

## 2025-07-03 DIAGNOSIS — G89.3 CANCER RELATED PAIN: ICD-10-CM

## 2025-07-03 DIAGNOSIS — C34.12 MALIGNANT NEOPLASM OF UPPER LOBE OF LEFT LUNG (HCC): Primary | ICD-10-CM

## 2025-07-03 RX ORDER — ACETAMINOPHEN 325 MG/1
650 TABLET ORAL
OUTPATIENT
Start: 2025-08-05

## 2025-07-03 RX ORDER — SODIUM CHLORIDE 0.9 % (FLUSH) 0.9 %
5-40 SYRINGE (ML) INJECTION PRN
OUTPATIENT
Start: 2025-07-29

## 2025-07-03 RX ORDER — ONDANSETRON 2 MG/ML
8 INJECTION INTRAMUSCULAR; INTRAVENOUS
OUTPATIENT
Start: 2025-08-05

## 2025-07-03 RX ORDER — HYDROCORTISONE SODIUM SUCCINATE 100 MG/2ML
100 INJECTION INTRAMUSCULAR; INTRAVENOUS
OUTPATIENT
Start: 2025-08-05

## 2025-07-03 RX ORDER — FAMOTIDINE 10 MG/ML
20 INJECTION, SOLUTION INTRAVENOUS
OUTPATIENT
Start: 2025-08-05

## 2025-07-03 RX ORDER — ONDANSETRON 2 MG/ML
8 INJECTION INTRAMUSCULAR; INTRAVENOUS
OUTPATIENT
Start: 2025-07-29

## 2025-07-03 RX ORDER — SODIUM CHLORIDE 9 MG/ML
5-250 INJECTION, SOLUTION INTRAVENOUS PRN
OUTPATIENT
Start: 2025-07-29

## 2025-07-03 RX ORDER — MEPERIDINE HYDROCHLORIDE 50 MG/ML
12.5 INJECTION INTRAMUSCULAR; INTRAVENOUS; SUBCUTANEOUS PRN
OUTPATIENT
Start: 2025-08-05

## 2025-07-03 RX ORDER — EPINEPHRINE 1 MG/ML
0.3 INJECTION, SOLUTION, CONCENTRATE INTRAVENOUS PRN
OUTPATIENT
Start: 2025-08-05

## 2025-07-03 RX ORDER — ONDANSETRON 2 MG/ML
8 INJECTION INTRAMUSCULAR; INTRAVENOUS ONCE
OUTPATIENT
Start: 2025-08-05 | End: 2025-08-05

## 2025-07-03 RX ORDER — HEPARIN SODIUM (PORCINE) LOCK FLUSH IV SOLN 100 UNIT/ML 100 UNIT/ML
500 SOLUTION INTRAVENOUS PRN
OUTPATIENT
Start: 2025-07-29

## 2025-07-03 RX ORDER — ALBUTEROL SULFATE 90 UG/1
4 INHALANT RESPIRATORY (INHALATION) PRN
OUTPATIENT
Start: 2025-07-29

## 2025-07-03 RX ORDER — DIPHENHYDRAMINE HYDROCHLORIDE 50 MG/ML
50 INJECTION, SOLUTION INTRAMUSCULAR; INTRAVENOUS
OUTPATIENT
Start: 2025-07-29

## 2025-07-03 RX ORDER — SODIUM CHLORIDE 9 MG/ML
INJECTION, SOLUTION INTRAVENOUS PRN
OUTPATIENT
Start: 2025-08-05

## 2025-07-03 RX ORDER — HEPARIN SODIUM (PORCINE) LOCK FLUSH IV SOLN 100 UNIT/ML 100 UNIT/ML
500 SOLUTION INTRAVENOUS PRN
OUTPATIENT
Start: 2025-08-05

## 2025-07-03 RX ORDER — MEPERIDINE HYDROCHLORIDE 50 MG/ML
12.5 INJECTION INTRAMUSCULAR; INTRAVENOUS; SUBCUTANEOUS PRN
OUTPATIENT
Start: 2025-07-29

## 2025-07-03 RX ORDER — ALBUTEROL SULFATE 90 UG/1
4 INHALANT RESPIRATORY (INHALATION) PRN
OUTPATIENT
Start: 2025-08-05

## 2025-07-03 RX ORDER — SODIUM CHLORIDE 0.9 % (FLUSH) 0.9 %
5-40 SYRINGE (ML) INJECTION PRN
OUTPATIENT
Start: 2025-08-05

## 2025-07-03 RX ORDER — HYDROCORTISONE SODIUM SUCCINATE 100 MG/2ML
100 INJECTION INTRAMUSCULAR; INTRAVENOUS
OUTPATIENT
Start: 2025-07-29

## 2025-07-03 RX ORDER — FAMOTIDINE 10 MG/ML
20 INJECTION, SOLUTION INTRAVENOUS
OUTPATIENT
Start: 2025-07-29

## 2025-07-03 RX ORDER — SODIUM CHLORIDE 9 MG/ML
5-250 INJECTION, SOLUTION INTRAVENOUS PRN
OUTPATIENT
Start: 2025-08-05

## 2025-07-03 RX ORDER — SODIUM CHLORIDE 9 MG/ML
INJECTION, SOLUTION INTRAVENOUS PRN
OUTPATIENT
Start: 2025-07-29

## 2025-07-03 RX ORDER — EPINEPHRINE 1 MG/ML
0.3 INJECTION, SOLUTION, CONCENTRATE INTRAVENOUS PRN
OUTPATIENT
Start: 2025-07-29

## 2025-07-03 RX ORDER — MORPHINE SULFATE 15 MG/1
15 TABLET ORAL EVERY 4 HOURS PRN
Qty: 60 TABLET | Refills: 0 | Status: SHIPPED | OUTPATIENT
Start: 2025-07-03 | End: 2025-07-17

## 2025-07-03 RX ORDER — ACETAMINOPHEN 325 MG/1
650 TABLET ORAL
OUTPATIENT
Start: 2025-07-29

## 2025-07-03 RX ORDER — ONDANSETRON 2 MG/ML
8 INJECTION INTRAMUSCULAR; INTRAVENOUS ONCE
OUTPATIENT
Start: 2025-07-29 | End: 2025-07-29

## 2025-07-03 RX ORDER — DIPHENHYDRAMINE HYDROCHLORIDE 50 MG/ML
50 INJECTION, SOLUTION INTRAMUSCULAR; INTRAVENOUS
OUTPATIENT
Start: 2025-08-05

## 2025-07-03 ASSESSMENT — PAIN SCALES - GENERAL: PAINLEVEL_OUTOF10: 10

## 2025-07-03 NOTE — PROGRESS NOTES
Education was provided to the patient in the form of the following printed ADDISON booklet or booklets: Radiation Therapy for Lung Cancer    Total time spent on education with patient: minutes: 5-10 minutes    
the left posterior chest wall mass as it is likely causing radicular pain by virtue of nerve compression. This will at least provide her relief and also reduce her pain medication burden. Radiation would take place as 20Gy in 5fx directed at the mass.    I discussed the logistics, benefits, and risks (side effect profile) of radiation therapy directed at the thorax which includes but is not limited to fatigue, skin changes (erythema, itching, desquamation), inflammation of the esophagus, heart, and/or lungs, hair loss in the treatment field, and decrease in blood counts. Long term effects include but are not limited to fibrosis to the heart and/or lungs, permanent hair loss, residual skin changes, increased risk of rib fracture, and secondary malignancy. Given the lower dose of radiation I expect her to tolerate it well and have minimal side effects.    After discussion with Ms. Tabares, we ultimately decided to proceed with radiation therapy, therefore written consent was obtained in our department. Our tentative plan is to proceed with a CT simulation scan at next available after which I will begin treatment planning.    In conclusion, after thorough discussion and shared decision making with the patient, we have formulated the following treatment plan:   - Would recommend palliative RT to the left posterior chest wall mass. Consent obtained  - CT simulation at next available    Thank you for the opportunity to participate in the care of Ms. Tabares. Please feel free to contact me with any questions or concerns.    Rubin Beaver MD  [facility]  [facility]  [id number] [place] [id number]  P: [phone]  [facility]  [address] [place] [address]  P: [phone]  [facility]  [address] [place] [address], [address], [place] [address]  P: [phone]    Time and Counseling: I spent a total of 61 minutes in care of this

## 2025-07-07 ENCOUNTER — TELEPHONE (OUTPATIENT)
Facility: HOSPITAL | Age: 80
End: 2025-07-07

## 2025-07-08 ENCOUNTER — HOSPITAL ENCOUNTER (OUTPATIENT)
Facility: HOSPITAL | Age: 80
Discharge: HOME | End: 2025-07-11
Attending: STUDENT IN AN ORGANIZED HEALTH CARE EDUCATION/TRAINING PROGRAM

## 2025-07-08 RX ORDER — EZETIMIBE 10 MG/1
10 TABLET ORAL DAILY
Qty: 90 TABLET | Refills: 1 | Status: SHIPPED | OUTPATIENT
Start: 2025-07-08

## 2025-07-09 ENCOUNTER — TELEPHONE (OUTPATIENT)
Age: 80
End: 2025-07-09

## 2025-07-09 DIAGNOSIS — C34.92 SQUAMOUS CELL CARCINOMA OF LUNG, LEFT (HCC): ICD-10-CM

## 2025-07-09 DIAGNOSIS — G89.3 CANCER RELATED PAIN: ICD-10-CM

## 2025-07-09 RX ORDER — MORPHINE SULFATE 15 MG/1
15 TABLET ORAL EVERY 4 HOURS PRN
Qty: 60 TABLET | Refills: 0 | Status: SHIPPED | OUTPATIENT
Start: 2025-07-09 | End: 2025-07-23

## 2025-07-09 NOTE — TELEPHONE ENCOUNTER
Pt called in requesting a prescription refill on Morphine. Pt stated she spoke with [facility]. They informed that they do not have the medication. Please advise     CB# [phone]

## 2025-07-09 NOTE — TELEPHONE ENCOUNTER
07/09/25 12:23 PM Return call placed to patient. Verified patient ID x 2. Patient requesting refill of Morphine. She reports having 3 tablets remaining due to partial fill per telephone encounter on 07/03. Per chart review, advised that Dinora PERKINS had sent additional prescription to [facility] pharmacy on 07/03. Advised this nurse would follow up with pharmacy and update patient thereafter. Patient voiced understanding. She also shared that she is scheduled to start radiation on Monday.      12:26 PM Spoke with Pam at [facility] pharmacy. She advised that they received prescription but do not have this medication in stock. Called [facility] pharmacy in [facility], spoke with Emmy. She advised that they do not have medication in stock either. She further advised that once they receive prescription, she can attempt to order medication but this generally takes a couple days to receive medication.      12:50 PM Discussed above with Dinora PERKINS. Will call patient to notify her of above and offer to call one additional pharmacy. Placed call to patient--she requested that this nurse call [facility] on [address]. Spoke with Demetria at [facility] who confirmed that they do have Morphine dose in stock. Dinora PERKINS to send in prescription. Will also notify patient. No further questions or concerns at this time.

## 2025-07-14 ENCOUNTER — HOSPITAL ENCOUNTER (OUTPATIENT)
Facility: HOSPITAL | Age: 80
Discharge: HOME | End: 2025-07-17
Attending: STUDENT IN AN ORGANIZED HEALTH CARE EDUCATION/TRAINING PROGRAM

## 2025-07-14 DIAGNOSIS — C34.12 MALIGNANT NEOPLASM OF UPPER LOBE OF LEFT LUNG (HCC): Primary | ICD-10-CM

## 2025-07-14 LAB
RAD ONC ARIA COURSE FIRST TREATMENT DATE: NORMAL
RAD ONC ARIA COURSE ID: NORMAL
RAD ONC ARIA COURSE INTENT: NORMAL
RAD ONC ARIA COURSE LAST TREATMENT DATE: NORMAL
RAD ONC ARIA COURSE SESSION NUMBER: 1
RAD ONC ARIA COURSE START DATE: NORMAL
RAD ONC ARIA COURSE TREATMENT ELAPSED DAYS: 0
RAD ONC ARIA PLAN FRACTIONS TREATED TO DATE: 1
RAD ONC ARIA PLAN ID: NORMAL
RAD ONC ARIA PLAN PRESCRIBED DOSE PER FRACTION: 4 GY
RAD ONC ARIA PLAN PRIMARY REFERENCE POINT: NORMAL
RAD ONC ARIA PLAN TOTAL FRACTIONS PRESCRIBED: 5
RAD ONC ARIA PLAN TOTAL PRESCRIBED DOSE: 2000 CGY
RAD ONC ARIA REFERENCE POINT DOSAGE GIVEN TO DATE: 4 GY
RAD ONC ARIA REFERENCE POINT DOSAGE GIVEN TO DATE: 4.1 GY
RAD ONC ARIA REFERENCE POINT ID: NORMAL
RAD ONC ARIA REFERENCE POINT ID: NORMAL
RAD ONC ARIA REFERENCE POINT SESSION DOSAGE GIVEN: 4 GY
RAD ONC ARIA REFERENCE POINT SESSION DOSAGE GIVEN: 4.1 GY

## 2025-07-14 ASSESSMENT — PATIENT HEALTH QUESTIONNAIRE - PHQ9
SUM OF ALL RESPONSES TO PHQ QUESTIONS 1-9: 0
SUM OF ALL RESPONSES TO PHQ QUESTIONS 1-9: 0
1. LITTLE INTEREST OR PLEASURE IN DOING THINGS: NOT AT ALL
2. FEELING DOWN, DEPRESSED OR HOPELESS: NOT AT ALL
SUM OF ALL RESPONSES TO PHQ QUESTIONS 1-9: 0
SUM OF ALL RESPONSES TO PHQ QUESTIONS 1-9: 0

## 2025-07-14 ASSESSMENT — PAIN DESCRIPTION - DESCRIPTORS: DESCRIPTORS: ACHING

## 2025-07-14 ASSESSMENT — PAIN DESCRIPTION - ORIENTATION: ORIENTATION: LEFT

## 2025-07-14 ASSESSMENT — PAIN DESCRIPTION - LOCATION: LOCATION: CHEST

## 2025-07-14 ASSESSMENT — PAIN SCALES - GENERAL: PAINLEVEL_OUTOF10: 8

## 2025-07-14 ASSESSMENT — PAIN DESCRIPTION - FREQUENCY: FREQUENCY: INTERMITTENT

## 2025-07-14 NOTE — PROGRESS NOTES
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  Oncologic History   Ms. Tabares is a 80 y.o. adult initially seen to assess the overall management and role of radiation for her above diagnosis.    05/03/2023: She was initially diagnosed with a [id number] poorly-differentiated squamous cell carcinoma of the left upper lobe s/p JANELL wedge resection with Dr. Dom Perez with pathology showing a 5.5cm grade III lesion with visceral pleural and vascular invasion invasion. Negative margins, 0/6 positive lymph nodes (5, 6, 7, 8, and 10 sampled). NGS without targetable mutation  08/29/2023: Completed adjuvant carboplatin/gemcitabine x 4 cycles  09/19/2023 to 07/02/2024: Adjuvant atezolizumab, stopped due to disease progression  06/14/2024: CT chest showed interval progression of metastatic diseaes in the chest including multiple new/enlarged left pulmonary nodules and left pleural thickening in addition to a left chest wall mass  07/23/2024 to 12/24/2024: Docetaxel  03/08/2025: CT CAP showed no evidence of recurrent/metastatic disease in the chest, abdomen, or pelvis  05/06/2025: CT CAP showed an increase in size of the left posterior chest wall mass to 5.5cm (was 3.5cm in March 2025 and 2.3cm in November 2024)  06/03/2025: Biopsy of the left posterior chest wall mass showed PDL1 0% squamous cell carcinoma    She was recommended a course of radiation directed at the painful left chest wall mass (20Gy in 5fx)    Interval History   Ms. Tabares was seen today for her weekly on-treatment

## 2025-07-15 ENCOUNTER — HOSPITAL ENCOUNTER (OUTPATIENT)
Facility: HOSPITAL | Age: 80
Discharge: HOME | End: 2025-07-18
Attending: STUDENT IN AN ORGANIZED HEALTH CARE EDUCATION/TRAINING PROGRAM

## 2025-07-15 LAB
RAD ONC ARIA COURSE FIRST TREATMENT DATE: NORMAL
RAD ONC ARIA COURSE ID: NORMAL
RAD ONC ARIA COURSE INTENT: NORMAL
RAD ONC ARIA COURSE LAST TREATMENT DATE: NORMAL
RAD ONC ARIA COURSE SESSION NUMBER: 2
RAD ONC ARIA COURSE START DATE: NORMAL
RAD ONC ARIA COURSE TREATMENT ELAPSED DAYS: 1
RAD ONC ARIA PLAN FRACTIONS TREATED TO DATE: 2
RAD ONC ARIA PLAN ID: NORMAL
RAD ONC ARIA PLAN PRESCRIBED DOSE PER FRACTION: 4 GY
RAD ONC ARIA PLAN PRIMARY REFERENCE POINT: NORMAL
RAD ONC ARIA PLAN TOTAL FRACTIONS PRESCRIBED: 5
RAD ONC ARIA PLAN TOTAL PRESCRIBED DOSE: 2000 CGY
RAD ONC ARIA REFERENCE POINT DOSAGE GIVEN TO DATE: 8 GY
RAD ONC ARIA REFERENCE POINT DOSAGE GIVEN TO DATE: 8.21 GY
RAD ONC ARIA REFERENCE POINT ID: NORMAL
RAD ONC ARIA REFERENCE POINT ID: NORMAL
RAD ONC ARIA REFERENCE POINT SESSION DOSAGE GIVEN: 4 GY
RAD ONC ARIA REFERENCE POINT SESSION DOSAGE GIVEN: 4.1 GY

## 2025-07-15 NOTE — TELEPHONE ENCOUNTER
[facility]   Psychosocial Assessment      Location: Radiation Oncology at [facility]  Patient: Elise Tabares (1945)    Reason for Assessment: Initial Assessment    Advance Care Planning: ACP conversation deferred at this time    Sources of Information: Patient, Medical Record    Mental Status: Alert, Oriented to Person, Oriented to Place, Oriented to Time, Oriented to Situation    Relationship Status: Single    Living Circumstances: Family/Significant Other in Household    Employment Status: Retired    Transportation Resources: Family/Friends    Barriers to Learning: No Barriers Identified    Financial/Legal Concerns: No Concerns Identified    Rastafarian/Spiritual/Existential: Patient has spiritual or Moravian beliefs, Patient is involved in a spiritual, stacie, or Moravian community    Support System: Strong Support: The patient has a reliable, consistent, and engaged network of family, friends, or community resources that effectively meet their emotional, social, and practical needs.  Patient's Primary Support Person/Group: daughter, Raquel     History of Mental Health / Substance Use Disorders: No  Reviewed patient's mental health/substance use history; no concerns identified at this time    Coping with Illness: Coping Well           Concerns/Barriers to Care: n/a    Narrative: Outreach call placed to introduce social work role and support resources. Patient with a distress thermometer rating of 6. Patient noted she is well supported with practical and emotional needs. She has a large family that provides ample support. She lives with her daughter who will assist with transport to appointments.    She denies any barriers to care or needs for supportive resources at this time. She reports her main concern is pain. Patient reports she is actively coping with diagnosis. She has a strong stacie and is actively involved with her Buddhism community.     Reviewed support resources with patient

## 2025-07-15 NOTE — PROGRESS NOTES
NCCN Distress Thermometer    Radiation Oncology at [facility]    Date Screening Completed: 7/3/25    Screening Declined:  [] Yes    Number that best describes how much distress you've experienced in the past week, including today?  0 [] - No distress 1 []      2 []      3 []      4 []       5 []       6 [x]      7 []      8 []      9 []       10 [] - Extreme distress    PROBLEM LIST  Have you had concerns about any of the items below in the past week, including today?      Physical Concerns Practical Concerns   [x] Pain [] Taking care of myself    [x] Sleep [] Taking care of others    [x] Fatigue [] Safety   [] Tobacco use  [] Work   [] Substance use  [] School   [] Memory or concentration [] Housing/Utilities   [] Sexual health [] Finances   [] Changes in eating  [] Insurance   [] Loss or change of physical abilities  [] Transportation    []    Emotional Concerns [] Having enough food   [] Worry or anxiety [] Access to medicine   [] Sadness or depression [] Treatment decisions   [] Loss of interest or enjoyment     [] Grief or loss  Spiritual or Evangelical Concerns   [] Fear [] Sense of meaning or purpose   [] Loneliness  [] Changes in stacie or beliefs   [] Anger [] Death, dying, or afterlife   [] Changes in appearance [] Conflict between beliefs and cancer treatments    [] Feelings of worthlessness or being a burden [] Relationship with the sacred    [] Ritual or dietary needs    Social Concerns     [] Relationship with spouse or partner     [] Relationship with children    [] Relationship with family members     [] Relationship with friends or coworkers     [] Communication with health care team     [] Ability to have children     [] Prejudice or discrimination        Other Concerns:

## 2025-07-16 ENCOUNTER — HOSPITAL ENCOUNTER (OUTPATIENT)
Facility: HOSPITAL | Age: 80
Discharge: HOME | End: 2025-07-19
Attending: STUDENT IN AN ORGANIZED HEALTH CARE EDUCATION/TRAINING PROGRAM

## 2025-07-16 LAB
RAD ONC ARIA COURSE FIRST TREATMENT DATE: NORMAL
RAD ONC ARIA COURSE ID: NORMAL
RAD ONC ARIA COURSE INTENT: NORMAL
RAD ONC ARIA COURSE LAST TREATMENT DATE: NORMAL
RAD ONC ARIA COURSE SESSION NUMBER: 3
RAD ONC ARIA COURSE START DATE: NORMAL
RAD ONC ARIA COURSE TREATMENT ELAPSED DAYS: 2
RAD ONC ARIA PLAN FRACTIONS TREATED TO DATE: 3
RAD ONC ARIA PLAN ID: NORMAL
RAD ONC ARIA PLAN PRESCRIBED DOSE PER FRACTION: 4 GY
RAD ONC ARIA PLAN PRIMARY REFERENCE POINT: NORMAL
RAD ONC ARIA PLAN TOTAL FRACTIONS PRESCRIBED: 5
RAD ONC ARIA PLAN TOTAL PRESCRIBED DOSE: 2000 CGY
RAD ONC ARIA REFERENCE POINT DOSAGE GIVEN TO DATE: 12 GY
RAD ONC ARIA REFERENCE POINT DOSAGE GIVEN TO DATE: 12.31 GY
RAD ONC ARIA REFERENCE POINT ID: NORMAL
RAD ONC ARIA REFERENCE POINT ID: NORMAL
RAD ONC ARIA REFERENCE POINT SESSION DOSAGE GIVEN: 4 GY
RAD ONC ARIA REFERENCE POINT SESSION DOSAGE GIVEN: 4.1 GY

## 2025-07-17 ENCOUNTER — HOSPITAL ENCOUNTER (OUTPATIENT)
Facility: HOSPITAL | Age: 80
Discharge: HOME | End: 2025-07-20
Attending: STUDENT IN AN ORGANIZED HEALTH CARE EDUCATION/TRAINING PROGRAM

## 2025-07-17 LAB
RAD ONC ARIA COURSE FIRST TREATMENT DATE: NORMAL
RAD ONC ARIA COURSE ID: NORMAL
RAD ONC ARIA COURSE INTENT: NORMAL
RAD ONC ARIA COURSE LAST TREATMENT DATE: NORMAL
RAD ONC ARIA COURSE SESSION NUMBER: 4
RAD ONC ARIA COURSE START DATE: NORMAL
RAD ONC ARIA COURSE TREATMENT ELAPSED DAYS: 3
RAD ONC ARIA PLAN FRACTIONS TREATED TO DATE: 4
RAD ONC ARIA PLAN ID: NORMAL
RAD ONC ARIA PLAN PRESCRIBED DOSE PER FRACTION: 4 GY
RAD ONC ARIA PLAN PRIMARY REFERENCE POINT: NORMAL
RAD ONC ARIA PLAN TOTAL FRACTIONS PRESCRIBED: 5
RAD ONC ARIA PLAN TOTAL PRESCRIBED DOSE: 2000 CGY
RAD ONC ARIA REFERENCE POINT DOSAGE GIVEN TO DATE: 16 GY
RAD ONC ARIA REFERENCE POINT DOSAGE GIVEN TO DATE: 16.42 GY
RAD ONC ARIA REFERENCE POINT ID: NORMAL
RAD ONC ARIA REFERENCE POINT ID: NORMAL
RAD ONC ARIA REFERENCE POINT SESSION DOSAGE GIVEN: 4 GY
RAD ONC ARIA REFERENCE POINT SESSION DOSAGE GIVEN: 4.1 GY

## 2025-07-18 ENCOUNTER — HOSPITAL ENCOUNTER (OUTPATIENT)
Facility: HOSPITAL | Age: 80
Discharge: HOME | End: 2025-07-21
Attending: STUDENT IN AN ORGANIZED HEALTH CARE EDUCATION/TRAINING PROGRAM

## 2025-07-18 ENCOUNTER — CLINICAL DOCUMENTATION (OUTPATIENT)
Facility: HOSPITAL | Age: 80
End: 2025-07-18

## 2025-07-18 LAB
RAD ONC ARIA COURSE FIRST TREATMENT DATE: NORMAL
RAD ONC ARIA COURSE ID: NORMAL
RAD ONC ARIA COURSE INTENT: NORMAL
RAD ONC ARIA COURSE LAST TREATMENT DATE: NORMAL
RAD ONC ARIA COURSE SESSION NUMBER: 5
RAD ONC ARIA COURSE START DATE: NORMAL
RAD ONC ARIA COURSE TREATMENT ELAPSED DAYS: 4
RAD ONC ARIA PLAN FRACTIONS TREATED TO DATE: 5
RAD ONC ARIA PLAN ID: NORMAL
RAD ONC ARIA PLAN PRESCRIBED DOSE PER FRACTION: 4 GY
RAD ONC ARIA PLAN PRIMARY REFERENCE POINT: NORMAL
RAD ONC ARIA PLAN TOTAL FRACTIONS PRESCRIBED: 5
RAD ONC ARIA PLAN TOTAL PRESCRIBED DOSE: 2000 CGY
RAD ONC ARIA REFERENCE POINT DOSAGE GIVEN TO DATE: 20 GY
RAD ONC ARIA REFERENCE POINT DOSAGE GIVEN TO DATE: 20.52 GY
RAD ONC ARIA REFERENCE POINT ID: NORMAL
RAD ONC ARIA REFERENCE POINT ID: NORMAL
RAD ONC ARIA REFERENCE POINT SESSION DOSAGE GIVEN: 4 GY
RAD ONC ARIA REFERENCE POINT SESSION DOSAGE GIVEN: 4.1 GY

## 2025-07-18 NOTE — PROGRESS NOTES
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  Oncologic History   Ms. Tabares is a 80 y.o. adult initially seen in consultation to assess the overall management and role of radiation for her above diagnosis.    05/03/2023: She was initially diagnosed with a [id number] poorly-differentiated squamous cell carcinoma of the left upper lobe s/p JANELL wedge resection with Dr. Dom Perez with pathology showing a 5.5cm grade III lesion with visceral pleural and vascular invasion invasion. Negative margins, 0/6 positive lymph nodes (5, 6, 7, 8, and 10 sampled). NGS without targetable mutation  08/29/2023: Completed adjuvant carboplatin/gemcitabine x 4 cycles  09/19/2023 to 07/02/2024: Adjuvant atezolizumab, stopped due to disease progression  06/14/2024: CT chest showed interval progression of metastatic diseaes in the chest including multiple new/enlarged left pulmonary nodules and left pleural thickening in addition to a left chest wall mass  07/23/2024 to 12/24/2024: Docetaxel  03/08/2025: CT CAP showed no evidence of recurrent/metastatic disease in the chest, abdomen, or pelvis  05/06/2025: CT CAP showed an increase in size of the left posterior chest wall mass to 5.5cm (was 3.5cm in March 2025 and 2.3cm in November 2024)  06/03/2025: Biopsy of the left posterior chest wall mass showed PDL1 0% squamous cell carcinoma     She was recommended a course of radiation directed at the painful left chest wall mass (20Gy in 5fx)    Treatment Details:   Treatment Site: Left posterior chest wall

## 2025-07-21 ENCOUNTER — TELEPHONE (OUTPATIENT)
Age: 80
End: 2025-07-21

## 2025-07-21 DIAGNOSIS — C34.92 SQUAMOUS CELL CARCINOMA OF LUNG, LEFT (HCC): ICD-10-CM

## 2025-07-21 DIAGNOSIS — G89.3 CANCER RELATED PAIN: ICD-10-CM

## 2025-07-21 RX ORDER — MORPHINE SULFATE 15 MG/1
15 TABLET ORAL EVERY 4 HOURS PRN
Qty: 60 TABLET | Refills: 0 | Status: SHIPPED | OUTPATIENT
Start: 2025-07-21 | End: 2025-08-04

## 2025-07-21 NOTE — TELEPHONE ENCOUNTER
07/21/25 2:47 PM Call placed to patient. Verified patient ID x 2. Patient requesting refill of Morphine to [facility] on [address]. Patient states pain is letting up a little, now taking Morphine every 6 hours and has 12 tablets remaining. Patient also inquiring if okay for her to take Zara, digestive supplement, to help with bloating and softening stool. She states she had read this medication is similar to Miralax but more gentle. Patient states she is having regular bowel movements but sometimes straining. Advised this nurse would defer to Dinora PERKINS and call patient back. She voiced understanding.     07/22/25 10:26 AM Called patient. Advised of response per Dinora PERKINS. Patient voiced understanding. While on phone, she states left chest wall mass is still swollen and painful despite radiation treatment. Patient finds that Morphine helps to alleviate pain. Will update Dr. Park of above but also encouraged that patient keep radiation office updated as well. She voiced understanding and states she was advised that swelling could persist for 1-2 weeks. No further questions or concerns at this time.

## 2025-07-21 NOTE — TELEPHONE ENCOUNTER
Patient called and stated that she wanted to know if it was okay for her to take a medication called dain to help with her bloating. Pt also stated that she needs a refill on her morphine medication.     # [phone]

## 2025-07-22 ENCOUNTER — TELEPHONE (OUTPATIENT)
Age: 80
End: 2025-07-22

## 2025-07-22 NOTE — TELEPHONE ENCOUNTER
Called patient to advise/confirm upcoming appt with Dr. Street on 07/30/2025 at 1:30  at [facility].  Spoke with Elise Tabares and confirmed appointment.

## 2025-07-29 ENCOUNTER — OFFICE VISIT (OUTPATIENT)
Age: 80
End: 2025-07-29
Payer: MEDICARE

## 2025-07-29 ENCOUNTER — HOSPITAL ENCOUNTER (OUTPATIENT)
Facility: HOSPITAL | Age: 80
Setting detail: INFUSION SERIES
Discharge: HOME | End: 2025-07-29
Payer: MEDICARE

## 2025-07-29 VITALS
WEIGHT: 175 LBS | DIASTOLIC BLOOD PRESSURE: 78 MMHG | BODY MASS INDEX: 28.12 KG/M2 | HEART RATE: 98 BPM | OXYGEN SATURATION: 95 % | TEMPERATURE: 97.3 F | HEIGHT: 66 IN | SYSTOLIC BLOOD PRESSURE: 122 MMHG | RESPIRATION RATE: 18 BRPM

## 2025-07-29 VITALS
RESPIRATION RATE: 18 BRPM | OXYGEN SATURATION: 95 % | SYSTOLIC BLOOD PRESSURE: 175 MMHG | BODY MASS INDEX: 28.34 KG/M2 | TEMPERATURE: 97.1 F | WEIGHT: 175.6 LBS | DIASTOLIC BLOOD PRESSURE: 81 MMHG | HEART RATE: 101 BPM

## 2025-07-29 DIAGNOSIS — R11.2 NAUSEA AND VOMITING, UNSPECIFIED VOMITING TYPE: ICD-10-CM

## 2025-07-29 DIAGNOSIS — E11.40 TYPE 2 DIABETES MELLITUS WITH DIABETIC NEUROPATHY, WITH LONG-TERM CURRENT USE OF INSULIN (HCC): ICD-10-CM

## 2025-07-29 DIAGNOSIS — Z51.11 CHEMOTHERAPY MANAGEMENT, ENCOUNTER FOR: ICD-10-CM

## 2025-07-29 DIAGNOSIS — N18.31 STAGE 3A CHRONIC KIDNEY DISEASE (HCC): ICD-10-CM

## 2025-07-29 DIAGNOSIS — G89.3 CANCER RELATED PAIN: ICD-10-CM

## 2025-07-29 DIAGNOSIS — C34.92 SQUAMOUS CELL CARCINOMA OF LUNG, LEFT (HCC): ICD-10-CM

## 2025-07-29 DIAGNOSIS — C34.82 MALIGNANT NEOPLASM OF OVERLAPPING SITES OF LEFT LUNG (HCC): Primary | ICD-10-CM

## 2025-07-29 DIAGNOSIS — R22.2 MASS OF CHEST WALL, LEFT: ICD-10-CM

## 2025-07-29 DIAGNOSIS — C34.92 SQUAMOUS CELL CARCINOMA OF LUNG, LEFT (HCC): Primary | ICD-10-CM

## 2025-07-29 DIAGNOSIS — Z79.4 TYPE 2 DIABETES MELLITUS WITH DIABETIC NEUROPATHY, WITH LONG-TERM CURRENT USE OF INSULIN (HCC): ICD-10-CM

## 2025-07-29 LAB
ALBUMIN SERPL-MCNC: 3 G/DL (ref 3.5–5.2)
ALBUMIN/GLOB SERPL: 0.7 (ref 1.1–2.2)
ALP SERPL-CCNC: 100 U/L (ref 35–104)
ALT SERPL-CCNC: <5 U/L (ref 10–35)
ANION GAP SERPL CALC-SCNC: 14 MMOL/L (ref 2–14)
AST SERPL-CCNC: 17 U/L (ref 10–35)
BASOPHILS # BLD: 0.01 K/UL (ref 0–0.1)
BASOPHILS NFR BLD: 0.1 % (ref 0–1)
BILIRUB SERPL-MCNC: 0.4 MG/DL (ref 0–1.2)
BUN SERPL-MCNC: 19 MG/DL (ref 8–23)
BUN/CREAT SERPL: 21 (ref 12–20)
CALCIUM SERPL-MCNC: 9.7 MG/DL (ref 8.8–10.2)
CHLORIDE SERPL-SCNC: 97 MMOL/L (ref 98–107)
CO2 SERPL-SCNC: 25 MMOL/L (ref 20–29)
CREAT SERPL-MCNC: 0.91 MG/DL (ref 0.6–1)
DIFFERENTIAL METHOD BLD: ABNORMAL
EOSINOPHIL # BLD: 0.02 K/UL (ref 0–0.4)
EOSINOPHIL NFR BLD: 0.2 % (ref 0–7)
ERYTHROCYTE [DISTWIDTH] IN BLOOD BY AUTOMATED COUNT: 13.2 % (ref 11.5–14.5)
GLOBULIN SER CALC-MCNC: 4.5 G/DL (ref 2–4)
GLUCOSE SERPL-MCNC: 193 MG/DL (ref 65–100)
HCT VFR BLD AUTO: 32 % (ref 35–47)
HGB BLD-MCNC: 10.3 G/DL (ref 11.5–16)
IMM GRANULOCYTES # BLD AUTO: 0.04 K/UL (ref 0–0.04)
IMM GRANULOCYTES NFR BLD AUTO: 0.4 % (ref 0–0.5)
LYMPHOCYTES # BLD: 0.21 K/UL (ref 0.8–3.5)
LYMPHOCYTES NFR BLD: 2.1 % (ref 12–49)
MCH RBC QN AUTO: 27.8 PG (ref 26–34)
MCHC RBC AUTO-ENTMCNC: 32.2 G/DL (ref 30–36.5)
MCV RBC AUTO: 86.5 FL (ref 80–99)
MONOCYTES # BLD: 0.43 K/UL (ref 0–1)
MONOCYTES NFR BLD: 4.4 % (ref 5–13)
NEUTS SEG # BLD: 9.09 K/UL (ref 1.8–8)
NEUTS SEG NFR BLD: 92.8 % (ref 32–75)
NRBC # BLD: 0 K/UL (ref 0–0.01)
NRBC BLD-RTO: 0 PER 100 WBC
PLATELET # BLD AUTO: 377 K/UL (ref 150–400)
PMV BLD AUTO: 9.8 FL (ref 8.9–12.9)
POTASSIUM SERPL-SCNC: 4.4 MMOL/L (ref 3.5–5.1)
PROT SERPL-MCNC: 7.5 G/DL (ref 6.4–8.3)
RBC # BLD AUTO: 3.7 M/UL (ref 3.8–5.2)
RBC MORPH BLD: ABNORMAL
SODIUM SERPL-SCNC: 136 MMOL/L (ref 136–145)
WBC # BLD AUTO: 9.8 K/UL (ref 3.6–11)

## 2025-07-29 PROCEDURE — G8417 CALC BMI ABV UP PARAM F/U: HCPCS | Performed by: INTERNAL MEDICINE

## 2025-07-29 PROCEDURE — 99215 OFFICE O/P EST HI 40 MIN: CPT | Performed by: INTERNAL MEDICINE

## 2025-07-29 PROCEDURE — 96375 TX/PRO/DX INJ NEW DRUG ADDON: CPT

## 2025-07-29 PROCEDURE — 85025 COMPLETE CBC W/AUTO DIFF WBC: CPT

## 2025-07-29 PROCEDURE — 1123F ACP DISCUSS/DSCN MKR DOCD: CPT | Performed by: INTERNAL MEDICINE

## 2025-07-29 PROCEDURE — 96360 HYDRATION IV INFUSION INIT: CPT

## 2025-07-29 PROCEDURE — 1159F MED LIST DOCD IN RCRD: CPT | Performed by: INTERNAL MEDICINE

## 2025-07-29 PROCEDURE — 96409 CHEMO IV PUSH SNGL DRUG: CPT

## 2025-07-29 PROCEDURE — 2580000003 HC RX 258: Performed by: INTERNAL MEDICINE

## 2025-07-29 PROCEDURE — 3074F SYST BP LT 130 MM HG: CPT | Performed by: INTERNAL MEDICINE

## 2025-07-29 PROCEDURE — 1125F AMNT PAIN NOTED PAIN PRSNT: CPT | Performed by: INTERNAL MEDICINE

## 2025-07-29 PROCEDURE — 6360000002 HC RX W HCPCS: Performed by: INTERNAL MEDICINE

## 2025-07-29 PROCEDURE — G8427 DOCREV CUR MEDS BY ELIG CLIN: HCPCS | Performed by: INTERNAL MEDICINE

## 2025-07-29 PROCEDURE — 1036F TOBACCO NON-USER: CPT | Performed by: INTERNAL MEDICINE

## 2025-07-29 PROCEDURE — G2211 COMPLEX E/M VISIT ADD ON: HCPCS | Performed by: INTERNAL MEDICINE

## 2025-07-29 PROCEDURE — 3078F DIAST BP <80 MM HG: CPT | Performed by: INTERNAL MEDICINE

## 2025-07-29 PROCEDURE — 80053 COMPREHEN METABOLIC PANEL: CPT

## 2025-07-29 RX ORDER — SODIUM CHLORIDE 9 MG/ML
INJECTION, SOLUTION INTRAVENOUS ONCE
Status: CANCELLED
Start: 2025-07-29 | End: 2025-07-29

## 2025-07-29 RX ORDER — SODIUM CHLORIDE 9 MG/ML
5-250 INJECTION, SOLUTION INTRAVENOUS PRN
Status: CANCELLED | OUTPATIENT
Start: 2025-07-29

## 2025-07-29 RX ORDER — SODIUM CHLORIDE 9 MG/ML
INJECTION, SOLUTION INTRAVENOUS ONCE
Status: COMPLETED | OUTPATIENT
Start: 2025-07-29 | End: 2025-07-29

## 2025-07-29 RX ORDER — SODIUM CHLORIDE 0.9 % (FLUSH) 0.9 %
5-40 SYRINGE (ML) INJECTION PRN
Status: DISCONTINUED | OUTPATIENT
Start: 2025-07-29 | End: 2025-07-30 | Stop reason: HOSPADM

## 2025-07-29 RX ORDER — ONDANSETRON 2 MG/ML
8 INJECTION INTRAMUSCULAR; INTRAVENOUS ONCE
Status: COMPLETED | OUTPATIENT
Start: 2025-07-29 | End: 2025-07-29

## 2025-07-29 RX ORDER — SODIUM CHLORIDE 9 MG/ML
5-250 INJECTION, SOLUTION INTRAVENOUS PRN
Status: DISCONTINUED | OUTPATIENT
Start: 2025-07-29 | End: 2025-07-30 | Stop reason: HOSPADM

## 2025-07-29 RX ADMIN — SODIUM CHLORIDE: 0.9 INJECTION, SOLUTION INTRAVENOUS at 11:41

## 2025-07-29 RX ADMIN — ONDANSETRON 8 MG: 2 INJECTION, SOLUTION INTRAMUSCULAR; INTRAVENOUS at 11:41

## 2025-07-29 RX ADMIN — GEMCITABINE 1950 MG: 38 INJECTION, SOLUTION INTRAVENOUS at 12:54

## 2025-07-29 RX ADMIN — SODIUM CHLORIDE 140 ML/HR: 9 INJECTION, SOLUTION INTRAVENOUS at 11:39

## 2025-07-29 ASSESSMENT — PAIN SCALES - GENERAL: PAINLEVEL_OUTOF10: 10

## 2025-07-29 ASSESSMENT — PAIN DESCRIPTION - ORIENTATION: ORIENTATION: LEFT

## 2025-07-29 ASSESSMENT — PAIN DESCRIPTION - DESCRIPTORS: DESCRIPTORS: ACHING

## 2025-07-29 ASSESSMENT — PAIN DESCRIPTION - LOCATION: LOCATION: BACK;CHEST

## 2025-07-29 NOTE — PROGRESS NOTES
Elise Tabares is a 80 y.o. adult follow up for         1. Have you been to the ER, urgent care clinic since your last visit?  Hospitalized since your last visit?{no    2. Have you seen or consulted any other health care providers outside of the [facility] since your last visit?  Include any pap smears or colon screening. no

## 2025-07-29 NOTE — PROGRESS NOTES
[facility] [place]  Medical Oncology at [place]  [phone]    Hematology / Oncology Established Visit    Reason for Visit:   Elise Tabares is a 80 y.o. adult who is seen for follow up of lung cancer.     Hematology Oncology Treatment History:     Diagnosis: Squamous cell carcinoma of lung    Stage: IIB [[id number]], now metastatic    Pathology:   3/14/23 Lung, left upper lobe, Core biopsy: Invasive poorly differentiated squamous cell carcinoma.   Comment: Immunohistochemical stains show the malignant cells are positive  for cytokeratin 5/6 and negative for cytokeratin 7, cytokeratin 20 and  TTF-1 supporting the diagnosis.     5/3/23 Left upper lobe wedge resection:  Invasive poorly differentiated squamous cell carcinoma with areas of extensive necrosis.   Tumor size 5.5cm, G3  Visceral pleural invasion present. Vascular invasion present. Parenchymal resection margin negative for tumor. Background lung parenchyma with moderate to severe architectural distortion associated with prominent peribronchiolar metaplasia.  0/6 LN positive [Station 5, 6, 7 x 2, 8, 10]  -PDL1 TPS 2%    -Guardant NGS: TP53 R267G, TP53 S241F. CDKN2A L32fs, MSI-High not detected. No FDA approved medications     6/3/25 Left posterior chest wall, core biopsy: Squamous cell carcinoma.   Comment:  Patient's history of squamous cell carcinoma is noted.   Immunohistochemical studies show positive staining for p63 and p40.   Staining is negative for TTF-1 and Napsin.   Overall, the cytomorphology and staining pattern are compatible with squamous cell carcinoma.   PD-L1 is pending and will be reported in an addendum.     Prior Treatment:   1. Sublobar resection of JANELL by Dr. Dom Perez  2. Adjuvant Carbo-Cecil x 4 on D1, 8 of q21d cycles, 6/20/23- 8/29/23  3. Atezolizumab [id number] x 1 year, 9/19/23 - 7/2024  4. Docetaxel 75mg/m2 [id number] x 8 cycles, 7/23/24 - 12/24/24  5. Pallative RT to left chest wall, 5 fractions, 7/14/25 -

## 2025-07-29 NOTE — PROGRESS NOTES
[facility]patient [facility]fusion [facility] - Chemotherapy Progress Note        Date: 2025    Name: Elise Tabares    MRN: [id number]         : 1945    Ms. Tabares arrived in a wheelchair and in no distress for C1D1  of   Gemzar Regimen.  Assessment was completed, no acute issues at this time, patient reported recent N/V. Right chest wall port accessed without difficulty, labs drawn & sent for processing. Patient proceeded to appointment with Dr. Park's team.       Ms. Tabares's vitals were reviewed.  Vitals:    25 0945 25 1345   BP: 113/72 (!) 175/81   Pulse: (!) 106 (!) 101   Resp: 18    Temp: 97.1 °F (36.2 °C)    TempSrc: Temporal    SpO2: 95%    Weight: 79.7 kg (175 lb 9.6 oz)         Lab results were obtained and reviewed, within parameters for treatment.   Recent Results (from the past 12 hours)   Comprehensive metabolic panel    Collection Time: 25 10:03 AM   Result Value Ref Range    Sodium 136 136 - 145 mmol/L    Potassium 4.4 3.5 - 5.1 mmol/L    Chloride 97 (L) 98 - 107 mmol/L    CO2 25 20 - 29 mmol/L    Anion Gap 14 2 - 14 mmol/L    Glucose 193 (H) 65 - 100 mg/dL    BUN 19 8 - 23 MG/DL    Creatinine 0.91 0.60 - 1.00 MG/DL    BUN/Creatinine Ratio 21 (H) 12 - 20      Est, Glom Filt Rate 64 (L) >90 ml/min/1.73m2    Calcium 9.7 8.8 - 10.2 MG/DL    Total Bilirubin 0.4 0.0 - 1.2 MG/DL    ALT <5 (L) 10 - 35 U/L    AST 17 10 - 35 U/L    Alk Phosphatase 100 35 - 104 U/L    Total Protein 7.5 6.4 - 8.3 g/dL    Albumin 3.0 (L) 3.5 - 5.2 g/dL    Globulin 4.5 (H) 2.0 - 4.0 g/dL    Albumin/Globulin Ratio 0.7 (L) 1.1 - 2.2     CBC With Auto Differential    Collection Time: 25 10:03 AM   Result Value Ref Range    WBC 9.8 3.6 - 11.0 K/uL    RBC 3.70 (L) 3.80 - 5.20 M/uL    Hemoglobin 10.3 (L) 11.5 - 16.0 g/dL    Hematocrit 32.0 (L) 35.0 - 47.0 %    MCV 86.5 80.0 - 99.0 FL    MCH 27.8 26.0 - 34.0 PG    MCHC 32.2 30.0 - 36.5 g/dL    RDW 13.2 11.5 - 14.5 %    Platelets 377 150 - 400 K/uL    MPV 9.8 8.9 -

## 2025-07-30 ENCOUNTER — TELEMEDICINE (OUTPATIENT)
Age: 80
End: 2025-07-30

## 2025-07-30 DIAGNOSIS — R63.0 POOR APPETITE: ICD-10-CM

## 2025-07-30 DIAGNOSIS — G89.3 CANCER RELATED PAIN: ICD-10-CM

## 2025-07-30 DIAGNOSIS — R11.0 NAUSEA: ICD-10-CM

## 2025-07-30 DIAGNOSIS — C34.92 SQUAMOUS CELL CARCINOMA OF LUNG, LEFT (HCC): ICD-10-CM

## 2025-07-30 DIAGNOSIS — K59.03 CONSTIPATION DUE TO OPIOID THERAPY: ICD-10-CM

## 2025-07-30 DIAGNOSIS — T40.2X5A CONSTIPATION DUE TO OPIOID THERAPY: ICD-10-CM

## 2025-07-30 DIAGNOSIS — Z51.5 PALLIATIVE CARE BY SPECIALIST: Primary | ICD-10-CM

## 2025-07-30 RX ORDER — PROCHLORPERAZINE MALEATE 5 MG/1
10 TABLET ORAL EVERY 6 HOURS PRN
Qty: 60 TABLET | Refills: 2 | Status: SHIPPED
Start: 2025-07-30

## 2025-07-30 RX ORDER — MORPHINE SULFATE 15 MG/1
30 TABLET ORAL EVERY 4 HOURS PRN
Qty: 120 TABLET | Refills: 0 | Status: SHIPPED | OUTPATIENT
Start: 2025-07-30 | End: 2025-08-09

## 2025-07-30 NOTE — PATIENT INSTRUCTIONS
Dear Elise Tabares ,    It was a pleasure seeing you today.    We will see you again in 8 weeks    If labs or imaging tests have been ordered for you today, please call the office at [phone] 48 hours after completion to obtain the results.        This is the plan we talked about:    # Cancer Pain  - This is suspected to cancer in the lung which is invading into adjacent bone and chest wall as well as left posterior chest wall. Reviewed that radiation should help pain overall but pain might be worse even for a few weeks afterwards.  - Start short course of steroids with dexamethasone 2mg PO twice daily x 3 days then 2mg PO daily x 3 days.  No contraindications from Oncology perspective.  Has high glucose in past but better controlled recently with very occasional numbers above 200, none in 300s.  - Continue Morphine IR (short-acting medication) 30mg every 4 hours as needed for pain.  - Call clinic in 1 week to let us know how you are doing.  - Keep a log of how often you are needing pain medication and how well it is working for you.  We will use this to make safe medication changes as well as to add a long-acting pain medication or patch if needed.    - discontinued butrans patch as it didn't seem to be helping.  - May use Tylenol 1000 mg every 8 hours as needed for pain.  - Avoiding NSAIDs given your history of kidney disease.  - Continue bowel regimen as opioid medications (like morphine and oxycodone) will cause constipation  - Please contact clinic when you have about 4-5 days of medication left so we can ensure pharmacy has it in stock, complete prior authorizations required by insurance, and make sure it is filled by your pharmacy before you run out of meds.  - We will periodically check a urine tox screen in accordance with our clinic policy for safe prescribing of opioids.  - You have naloxone (Narcan) at home for use in setting of accidental overdose.    # Neuropathy  - You also have neuropathy  [facility] Initial Evaluation  Subjective:  Patient is a 71 year old female presenting with rehab right knee hpi. The history is provided by the patient. No  was used.   Gemma Cowart is a 71 year old female with a right knee condition.  Condition occurred with:  A fall/slip.  Condition occurred: at home.  This is a new condition  Pt fell and twisted her knee stepping onto a scale around Feb 8, 2018.  She also fractured a rib left side. Xrays negative for fracture left knee. Chief complaint currently is decreased mobility, decreased strength, pain and limping. She has 14 steps to get into her home. She feels better with rest and worse with walking and weight bearing. Past history of left patellar fracture. .    Patient reports pain:  Anterior, medial and posterior.  Radiates to:  Lower leg and low back.  Pain is described as sharp and aching and is intermittent and reported as 4/10.  Associated symptoms:  Edema and loss of motion/stiffness. Pain is the same all the time.  Symptoms are exacerbated by weight bearing, ascending stairs, descending stairs, standing, walking and activity and relieved by rest.  Since onset symptoms are unchanged.  Special tests:  X-ray (negative for fracture).        Pertinent medical history includes:  Osteoarthritis, history of fractures, cancer, diabetes, overweight, heart problems and smoking.      Current medications:  Heparin/coumadin and other (anxiety).  Current occupation is Retired.            Red flags:  Calf pain, swelling, warmth and pain at rest/night.                        Objective:    Gait:    Gait Type:  Antalgic   Assistive Devices:  None  Deviations:  Knee:  Knee extension decr R                                                      Knee Evaluation:  ROM:    AROM      Extension:  Left: 0    Right:  7  Flexion: Left: 130    Right: 110        Strength:     Extension:  Left: 5/5   Pain:      Right: 4-/5    Pain:+  Flexion:  Left:  5/5   Pain:      Right: 4+ and 5-/5    Pain:-        Ligament Testing:          Valgus 30:  Right:  Trace          Palpation:      Right knee tenderness present at:  Medial Joint Line; Semitendinosus and Patellar Medial  Edema:  Edema of the knee: medial.            General     ROS    Assessment/Plan:    Patient is a 71 year old female with right side knee complaints.    Patient has the following significant findings with corresponding treatment plan.                Diagnosis 1:  Right knee pain  Pain -  hot/cold therapy, self management, education and home program  Decreased ROM/flexibility - manual therapy, therapeutic exercise, therapeutic activity and home program  Decreased strength - therapeutic exercise, therapeutic activities and home program  Edema - cold therapy and self management/home program  Impaired gait - gait training and home program  Decreased function - therapeutic activities and home program    Therapy Evaluation Codes:   1) History comprised of:   Personal factors that impact the plan of care:      None.    Comorbidity factors that impact the plan of care are:      Cancer, Diabetes, Osteoarthritis, Overweight and Smoking.     Medications impacting care: Heparin/coumadin and anti-anxiety.  2) Examination of Body Systems comprised of:   Body structures and functions that impact the plan of care:      Knee.   Activity limitations that impact the plan of care are:      Bathing, Bending, Cooking, Dressing, Squatting/kneeling, Stairs, Standing and Walking.  3) Clinical presentation characteristics are:   Stable/Uncomplicated.  4) Decision-Making    Low complexity using standardized patient assessment instrument and/or measureable assessment of functional outcome.  Cumulative Therapy Evaluation is: Low complexity.    Previous and current functional limitations:  (See Goal Flow Sheet for this information)    Short term and Long term goals: (See Goal Flow Sheet for this information)     Communication  ability:  Patient appears to be able to clearly communicate and understand verbal and written communication and follow directions correctly.  Treatment Explanation - The following has been discussed with the patient:   RX ordered/plan of care  Anticipated outcomes  Possible risks and side effects  This patient would benefit from PT intervention to resume normal activities.   Rehab potential is excellent.    Frequency:  1 X week, once daily  Duration:  for 10 weeks  Discharge Plan:  Achieve all LTG.  Independent in home treatment program.  Reach maximal therapeutic benefit.    Please refer to the daily flowsheet for treatment today, total treatment time and time spent performing 1:1 timed codes.

## 2025-07-30 NOTE — PROGRESS NOTES
Palliative Medicine Outpatient Services  [place]: [phone]    Patient Name: Elise Tabares  YOB: 1945    Date of Current Visit: 07/30/2025  Location of Current Visit:    [] [facility] Office  [] [facility] Office  [] [facility] Office  [] Home  [x] Synchronous real-time A/V virtual visit    Elise Tabares, was evaluated through a synchronous (real-time) audio-video encounter. The patient (or guardian if applicable) is aware that this is a billable service, which includes applicable co-pays. This Virtual Visit was conducted with patient's (and/or legal guardian's) consent. Patient identification was verified, and a caregiver was present when appropriate.   The patient was located at Home: [address] [place] [id number]  Provider was located at Facility (Appt Dept): [address] [place] [address]  [address] [place] [id number]    Date of Initial Visit: 8/21/2024  Referral from: MADDIE Arredondo  Primary Care Physician: Renzo Flores MD      SUMMARY:   Elise Tabares is a 80 y.o. year old with a history of Squamous Cell Lung Cancer, CAD, CKD, HTN, DMII, MAX who was referred to Palliative Care clinic by MADDIE Arredondo for symptom management and psychosocial support.    She was initially diagnosed with lung cancer after being hospitalized in March 2023 for syncope.  Work up at that time revealed new cavitary lesion in JANELL with biopsy showing invasive poorly differentiated squamous cell carcinoma.  She undwent sublobar resection of JANELL with Dr. Dom Perez at that time.  Has been through Carbo-Radford and Atezolizumab, now on Docetaxel since 7/23/2024-12/2024.    Follows with Dr. Park and MADDIE Farias for cancer treatment.  Last visit 7/29/25.  Has been on treatment break with discontinuation of docetaxel due to worsening neuropathy.    Had CT scans 3/8/25 showing stable disease but repeat 5/2025 with progression confirmed by biopsyof left posterior chest wall mass.  Has been started on

## 2025-07-30 NOTE — PROGRESS NOTES
Palliative Medicine Outpatient Clinic  Nurse Check in Note  [phone]    Patient Name: Elise Tabares  YOB: 1945      Date of Visit: 07/30/2025  Visit Location:  [facility] Virtual Visit     Nurse verified patient is located in [place] for today's visit: Yes    Chief patient or family concern today: \"I had chemo yesterday and I am having a lot of nausea today\"    Patient's Last Palliative Medicine Clinic Visit Date:  4/23/2025    Have you been to an ER or urgent care center since your last visit?  No  Have you been hospitalized since your last visit? No  Have you seen or consulted any health care providers outside of the [facility] system since your last visit?  No  If Yes, alert PSR to request appropriate records from non-[facility] offices    Medications:  Med reconciliation was performed with:  Patient    Requested refills:  Yes- not pended, clinician review requested    If prescribed an opioid, does patient have access to naloxone at home?:  YES  If No, pend naloxone nasal spray    Function and Symptoms:  Use of assist devices:  Walker    Palliative Performance Status (PPS): 60       ESAS:  see Doc flowsheet       Constipation?  Yes  Last BM: 7/28/25    Advance Care Planning:  Currently listed healthcare agent:    Primary Decision Maker: StanfordRaquel subramanian - Child - [phone]    Is there an ACP Note within the past 12 months?  YES        PABLO TREVIZO RN

## 2025-08-01 ENCOUNTER — PATIENT MESSAGE (OUTPATIENT)
Age: 80
End: 2025-08-01

## 2025-08-01 ENCOUNTER — TELEPHONE (OUTPATIENT)
Age: 80
End: 2025-08-01

## 2025-08-01 RX ORDER — DEXAMETHASONE 2 MG/1
TABLET ORAL
Qty: 9 TABLET | Refills: 0 | Status: SHIPPED | OUTPATIENT
Start: 2025-08-01 | End: 2025-08-07

## 2025-08-01 NOTE — TELEPHONE ENCOUNTER
[facility] Outpatient Palliative Medicine Office  Nursing Note  [phone]  Fax [phone]     Name:  Elise Tabares  YOB: 1945     Returned call to patient regarding her medications.  She is asking about the steroid prescription that Dr. Street ordered at her virtual visit on 7/30/25.  Per chart, a dexamethasone prescription was sent to [facility] on [address] this morning by Dr. Street.      Bianka Garzon, RN, Gerontological Nursing-[facility]  Palliative Medicine  [phone]

## 2025-08-05 ENCOUNTER — TELEPHONE (OUTPATIENT)
Age: 80
End: 2025-08-05

## 2025-08-05 ENCOUNTER — HOSPITAL ENCOUNTER (OUTPATIENT)
Facility: HOSPITAL | Age: 80
Setting detail: INFUSION SERIES
Discharge: HOME | End: 2025-08-05
Payer: MEDICARE

## 2025-08-05 VITALS
BODY MASS INDEX: 27.16 KG/M2 | DIASTOLIC BLOOD PRESSURE: 87 MMHG | HEART RATE: 109 BPM | TEMPERATURE: 97.5 F | HEIGHT: 66 IN | OXYGEN SATURATION: 96 % | WEIGHT: 169 LBS | SYSTOLIC BLOOD PRESSURE: 181 MMHG

## 2025-08-05 DIAGNOSIS — C34.92 SQUAMOUS CELL CARCINOMA OF LUNG, LEFT (HCC): Primary | ICD-10-CM

## 2025-08-05 LAB
BASOPHILS # BLD: 0 K/UL (ref 0–0.1)
BASOPHILS NFR BLD: 0 % (ref 0–1)
DIFFERENTIAL METHOD BLD: ABNORMAL
EOSINOPHIL # BLD: 0 K/UL (ref 0–0.4)
EOSINOPHIL NFR BLD: 0 % (ref 0–7)
ERYTHROCYTE [DISTWIDTH] IN BLOOD BY AUTOMATED COUNT: 12.8 % (ref 11.5–14.5)
HCT VFR BLD AUTO: 33.4 % (ref 35–47)
HGB BLD-MCNC: 11 G/DL (ref 11.5–16)
IMM GRANULOCYTES # BLD AUTO: 0 K/UL
IMM GRANULOCYTES NFR BLD AUTO: 0 %
LYMPHOCYTES # BLD: 0.63 K/UL (ref 0.8–3.5)
LYMPHOCYTES NFR BLD: 9 % (ref 12–49)
MCH RBC QN AUTO: 27.6 PG (ref 26–34)
MCHC RBC AUTO-ENTMCNC: 32.9 G/DL (ref 30–36.5)
MCV RBC AUTO: 83.7 FL (ref 80–99)
MONOCYTES # BLD: 0.56 K/UL (ref 0–1)
MONOCYTES NFR BLD: 8 % (ref 5–13)
NEUTS SEG # BLD: 5.81 K/UL (ref 1.8–8)
NEUTS SEG NFR BLD: 83 % (ref 32–75)
NRBC # BLD: 0 K/UL (ref 0–0.01)
NRBC BLD-RTO: 0 PER 100 WBC
PLATELET # BLD AUTO: 248 K/UL (ref 150–400)
PMV BLD AUTO: 9.7 FL (ref 8.9–12.9)
RBC # BLD AUTO: 3.99 M/UL (ref 3.8–5.2)
RBC MORPH BLD: ABNORMAL
WBC # BLD AUTO: 7 K/UL (ref 3.6–11)

## 2025-08-05 PROCEDURE — 85025 COMPLETE CBC W/AUTO DIFF WBC: CPT

## 2025-08-05 PROCEDURE — 96413 CHEMO IV INFUSION 1 HR: CPT

## 2025-08-05 PROCEDURE — 6360000002 HC RX W HCPCS: Performed by: INTERNAL MEDICINE

## 2025-08-05 PROCEDURE — 2500000003 HC RX 250 WO HCPCS: Performed by: INTERNAL MEDICINE

## 2025-08-05 PROCEDURE — 2580000003 HC RX 258: Performed by: NURSE PRACTITIONER

## 2025-08-05 PROCEDURE — 96360 HYDRATION IV INFUSION INIT: CPT

## 2025-08-05 PROCEDURE — 2580000003 HC RX 258: Performed by: INTERNAL MEDICINE

## 2025-08-05 PROCEDURE — 96375 TX/PRO/DX INJ NEW DRUG ADDON: CPT

## 2025-08-05 RX ORDER — 0.9 % SODIUM CHLORIDE 0.9 %
1000 INTRAVENOUS SOLUTION INTRAVENOUS ONCE
Status: COMPLETED | OUTPATIENT
Start: 2025-08-05 | End: 2025-08-05

## 2025-08-05 RX ORDER — SODIUM CHLORIDE 0.9 % (FLUSH) 0.9 %
5-40 SYRINGE (ML) INJECTION PRN
Status: DISCONTINUED | OUTPATIENT
Start: 2025-08-05 | End: 2025-08-06 | Stop reason: HOSPADM

## 2025-08-05 RX ORDER — ONDANSETRON 2 MG/ML
8 INJECTION INTRAMUSCULAR; INTRAVENOUS ONCE
Status: COMPLETED | OUTPATIENT
Start: 2025-08-05 | End: 2025-08-05

## 2025-08-05 RX ORDER — SODIUM CHLORIDE 9 MG/ML
5-250 INJECTION, SOLUTION INTRAVENOUS PRN
Status: DISCONTINUED | OUTPATIENT
Start: 2025-08-05 | End: 2025-08-06 | Stop reason: HOSPADM

## 2025-08-05 RX ADMIN — GEMCITABINE HYDROCHLORIDE 1950 MG: 2 INJECTION, SOLUTION INTRAVENOUS at 14:32

## 2025-08-05 RX ADMIN — SODIUM CHLORIDE, PRESERVATIVE FREE 20 ML: 5 INJECTION INTRAVENOUS at 16:54

## 2025-08-05 RX ADMIN — SODIUM CHLORIDE 1000 ML: 0.9 INJECTION, SOLUTION INTRAVENOUS at 13:09

## 2025-08-05 RX ADMIN — ONDANSETRON 8 MG: 2 INJECTION, SOLUTION INTRAMUSCULAR; INTRAVENOUS at 13:30

## 2025-08-05 ASSESSMENT — PAIN SCALES - GENERAL: PAINLEVEL_OUTOF10: 0

## 2025-08-06 ENCOUNTER — APPOINTMENT (OUTPATIENT)
Facility: HOSPITAL | Age: 80
DRG: 682 | End: 2025-08-06
Payer: MEDICARE

## 2025-08-06 ENCOUNTER — HOSPITAL ENCOUNTER (INPATIENT)
Facility: HOSPITAL | Age: 80
LOS: 13 days | Discharge: NURSING, REHAB OR LONG-TERM CARE | DRG: 682 | End: 2025-08-19
Attending: EMERGENCY MEDICINE | Admitting: FAMILY MEDICINE
Payer: MEDICARE

## 2025-08-06 ENCOUNTER — TELEPHONE (OUTPATIENT)
Age: 80
End: 2025-08-06

## 2025-08-06 DIAGNOSIS — C34.92 SQUAMOUS CELL CARCINOMA OF LUNG, LEFT (HCC): ICD-10-CM

## 2025-08-06 DIAGNOSIS — G89.3 CANCER ASSOCIATED PAIN: ICD-10-CM

## 2025-08-06 DIAGNOSIS — C34.90 MALIGNANT NEOPLASM OF LUNG, UNSPECIFIED LATERALITY, UNSPECIFIED PART OF LUNG (HCC): ICD-10-CM

## 2025-08-06 DIAGNOSIS — M54.9 CHRONIC BACK PAIN, UNSPECIFIED BACK LOCATION, UNSPECIFIED BACK PAIN LATERALITY: ICD-10-CM

## 2025-08-06 DIAGNOSIS — Z51.5 PALLIATIVE CARE BY SPECIALIST: ICD-10-CM

## 2025-08-06 DIAGNOSIS — R11.2 NAUSEA AND VOMITING, UNSPECIFIED VOMITING TYPE: Primary | ICD-10-CM

## 2025-08-06 DIAGNOSIS — N17.9 ACUTE KIDNEY INJURY: ICD-10-CM

## 2025-08-06 DIAGNOSIS — R63.0 POOR APPETITE: ICD-10-CM

## 2025-08-06 DIAGNOSIS — E86.0 DEHYDRATION: ICD-10-CM

## 2025-08-06 DIAGNOSIS — G89.29 CHRONIC BACK PAIN, UNSPECIFIED BACK LOCATION, UNSPECIFIED BACK PAIN LATERALITY: ICD-10-CM

## 2025-08-06 DIAGNOSIS — G89.3 CHRONIC NEOPLASM-RELATED PAIN: ICD-10-CM

## 2025-08-06 DIAGNOSIS — R79.89 ELEVATED TROPONIN: ICD-10-CM

## 2025-08-06 LAB
ALBUMIN SERPL-MCNC: 3.1 G/DL (ref 3.5–5.2)
ALBUMIN/GLOB SERPL: 0.6 (ref 1.1–2.2)
ALP SERPL-CCNC: 111 U/L (ref 35–104)
ALT SERPL-CCNC: 14 U/L (ref 10–35)
ANION GAP SERPL CALC-SCNC: 16 MMOL/L (ref 2–14)
AST SERPL-CCNC: 27 U/L (ref 10–35)
BASOPHILS # BLD: 0 K/UL (ref 0–0.1)
BASOPHILS NFR BLD: 0 % (ref 0–1)
BILIRUB SERPL-MCNC: 0.7 MG/DL (ref 0–1.2)
BUN SERPL-MCNC: 39 MG/DL (ref 8–23)
BUN/CREAT SERPL: 31 (ref 12–20)
CALCIUM SERPL-MCNC: 10.2 MG/DL (ref 8.8–10.2)
CHLORIDE SERPL-SCNC: 96 MMOL/L (ref 98–107)
CO2 SERPL-SCNC: 28 MMOL/L (ref 20–29)
CREAT SERPL-MCNC: 1.26 MG/DL (ref 0.6–1)
DIFFERENTIAL METHOD BLD: ABNORMAL
EOSINOPHIL # BLD: 0 K/UL (ref 0–0.4)
EOSINOPHIL NFR BLD: 0 % (ref 0–7)
ERYTHROCYTE [DISTWIDTH] IN BLOOD BY AUTOMATED COUNT: 13.1 % (ref 11.5–14.5)
GLOBULIN SER CALC-MCNC: 5.1 G/DL (ref 2–4)
GLUCOSE BLD STRIP.AUTO-MCNC: 121 MG/DL (ref 65–117)
GLUCOSE SERPL-MCNC: 204 MG/DL (ref 65–100)
HCT VFR BLD AUTO: 36.7 % (ref 35–47)
HGB BLD-MCNC: 12.3 G/DL (ref 11.5–16)
IMM GRANULOCYTES # BLD AUTO: 0.06 K/UL (ref 0–0.04)
IMM GRANULOCYTES NFR BLD AUTO: 0.5 % (ref 0–0.5)
LYMPHOCYTES # BLD: 0.15 K/UL (ref 0.8–3.5)
LYMPHOCYTES NFR BLD: 1.3 % (ref 12–49)
MAGNESIUM SERPL-MCNC: 1.6 MG/DL (ref 1.6–2.4)
MCH RBC QN AUTO: 28.1 PG (ref 26–34)
MCHC RBC AUTO-ENTMCNC: 33.5 G/DL (ref 30–36.5)
MCV RBC AUTO: 84 FL (ref 80–99)
MONOCYTES # BLD: 0.29 K/UL (ref 0–1)
MONOCYTES NFR BLD: 2.4 % (ref 5–13)
NEUTS SEG # BLD: 11.4 K/UL (ref 1.8–8)
NEUTS SEG NFR BLD: 95.8 % (ref 32–75)
NRBC # BLD: 0 K/UL (ref 0–0.01)
NRBC BLD-RTO: 0 PER 100 WBC
PLATELET # BLD AUTO: 212 K/UL (ref 150–400)
PMV BLD AUTO: 10.6 FL (ref 8.9–12.9)
POTASSIUM SERPL-SCNC: 4.9 MMOL/L (ref 3.5–5.1)
PROT SERPL-MCNC: 8.2 G/DL (ref 6.4–8.3)
RBC # BLD AUTO: 4.37 M/UL (ref 3.8–5.2)
RBC MORPH BLD: ABNORMAL
SERVICE CMNT-IMP: ABNORMAL
SODIUM SERPL-SCNC: 140 MMOL/L (ref 136–145)
TROPONIN T SERPL HS-MCNC: 38.7 NG/L (ref 0–14)
TROPONIN T SERPL HS-MCNC: 44.3 NG/L (ref 0–14)
WBC # BLD AUTO: 11.9 K/UL (ref 3.6–11)

## 2025-08-06 PROCEDURE — 74177 CT ABD & PELVIS W/CONTRAST: CPT

## 2025-08-06 PROCEDURE — 96375 TX/PRO/DX INJ NEW DRUG ADDON: CPT

## 2025-08-06 PROCEDURE — 2580000003 HC RX 258: Performed by: EMERGENCY MEDICINE

## 2025-08-06 PROCEDURE — 2060000000 HC ICU INTERMEDIATE R&B

## 2025-08-06 PROCEDURE — 84484 ASSAY OF TROPONIN QUANT: CPT

## 2025-08-06 PROCEDURE — 96376 TX/PRO/DX INJ SAME DRUG ADON: CPT

## 2025-08-06 PROCEDURE — 36415 COLL VENOUS BLD VENIPUNCTURE: CPT

## 2025-08-06 PROCEDURE — 6360000004 HC RX CONTRAST MEDICATION: Performed by: FAMILY MEDICINE

## 2025-08-06 PROCEDURE — 96374 THER/PROPH/DIAG INJ IV PUSH: CPT

## 2025-08-06 PROCEDURE — 85025 COMPLETE CBC W/AUTO DIFF WBC: CPT

## 2025-08-06 PROCEDURE — 2580000003 HC RX 258

## 2025-08-06 PROCEDURE — 99285 EMERGENCY DEPT VISIT HI MDM: CPT

## 2025-08-06 PROCEDURE — 6370000000 HC RX 637 (ALT 250 FOR IP)

## 2025-08-06 PROCEDURE — 6360000002 HC RX W HCPCS

## 2025-08-06 PROCEDURE — 6360000004 HC RX CONTRAST MEDICATION: Performed by: EMERGENCY MEDICINE

## 2025-08-06 PROCEDURE — 82962 GLUCOSE BLOOD TEST: CPT

## 2025-08-06 PROCEDURE — 6360000002 HC RX W HCPCS: Performed by: EMERGENCY MEDICINE

## 2025-08-06 PROCEDURE — 2500000003 HC RX 250 WO HCPCS

## 2025-08-06 PROCEDURE — 93005 ELECTROCARDIOGRAM TRACING: CPT | Performed by: FAMILY MEDICINE

## 2025-08-06 PROCEDURE — 83735 ASSAY OF MAGNESIUM: CPT

## 2025-08-06 PROCEDURE — 94761 N-INVAS EAR/PLS OXIMETRY MLT: CPT

## 2025-08-06 PROCEDURE — 71275 CT ANGIOGRAPHY CHEST: CPT

## 2025-08-06 PROCEDURE — 80053 COMPREHEN METABOLIC PANEL: CPT

## 2025-08-06 RX ORDER — LISINOPRIL 5 MG/1
2.5 TABLET ORAL DAILY
Status: DISCONTINUED | OUTPATIENT
Start: 2025-08-06 | End: 2025-08-08

## 2025-08-06 RX ORDER — HYDROMORPHONE HYDROCHLORIDE 1 MG/ML
1 INJECTION, SOLUTION INTRAMUSCULAR; INTRAVENOUS; SUBCUTANEOUS EVERY 4 HOURS PRN
Status: DISCONTINUED | OUTPATIENT
Start: 2025-08-06 | End: 2025-08-19 | Stop reason: HOSPADM

## 2025-08-06 RX ORDER — 0.9 % SODIUM CHLORIDE 0.9 %
1000 INTRAVENOUS SOLUTION INTRAVENOUS ONCE
Status: COMPLETED | OUTPATIENT
Start: 2025-08-06 | End: 2025-08-06

## 2025-08-06 RX ORDER — INSULIN LISPRO 100 [IU]/ML
0-8 INJECTION, SOLUTION INTRAVENOUS; SUBCUTANEOUS
Status: DISCONTINUED | OUTPATIENT
Start: 2025-08-06 | End: 2025-08-19 | Stop reason: HOSPADM

## 2025-08-06 RX ORDER — ACETAMINOPHEN 325 MG/1
650 TABLET ORAL EVERY 6 HOURS PRN
Status: DISCONTINUED | OUTPATIENT
Start: 2025-08-06 | End: 2025-08-19 | Stop reason: HOSPADM

## 2025-08-06 RX ORDER — ALBUTEROL SULFATE 90 UG/1
2 INHALANT RESPIRATORY (INHALATION) EVERY 6 HOURS PRN
Status: DISCONTINUED | OUTPATIENT
Start: 2025-08-06 | End: 2025-08-06

## 2025-08-06 RX ORDER — ONDANSETRON 2 MG/ML
4 INJECTION INTRAMUSCULAR; INTRAVENOUS ONCE
Status: COMPLETED | OUTPATIENT
Start: 2025-08-06 | End: 2025-08-06

## 2025-08-06 RX ORDER — IOPAMIDOL 755 MG/ML
100 INJECTION, SOLUTION INTRAVASCULAR
Status: COMPLETED | OUTPATIENT
Start: 2025-08-06 | End: 2025-08-06

## 2025-08-06 RX ORDER — ALBUTEROL SULFATE 0.83 MG/ML
2.5 SOLUTION RESPIRATORY (INHALATION) EVERY 6 HOURS PRN
Status: DISCONTINUED | OUTPATIENT
Start: 2025-08-06 | End: 2025-08-19 | Stop reason: HOSPADM

## 2025-08-06 RX ORDER — DEXTROSE MONOHYDRATE 100 MG/ML
INJECTION, SOLUTION INTRAVENOUS CONTINUOUS PRN
Status: DISCONTINUED | OUTPATIENT
Start: 2025-08-06 | End: 2025-08-19 | Stop reason: HOSPADM

## 2025-08-06 RX ORDER — AMLODIPINE BESYLATE 5 MG/1
10 TABLET ORAL ONCE
Status: COMPLETED | OUTPATIENT
Start: 2025-08-06 | End: 2025-08-06

## 2025-08-06 RX ORDER — PROCHLORPERAZINE MALEATE 5 MG/1
10 TABLET ORAL EVERY 6 HOURS PRN
Status: DISCONTINUED | OUTPATIENT
Start: 2025-08-06 | End: 2025-08-07

## 2025-08-06 RX ORDER — ASPIRIN 81 MG/1
81 TABLET, CHEWABLE ORAL DAILY
Status: DISCONTINUED | OUTPATIENT
Start: 2025-08-06 | End: 2025-08-19 | Stop reason: HOSPADM

## 2025-08-06 RX ORDER — ROSUVASTATIN CALCIUM 10 MG/1
10 TABLET, COATED ORAL NIGHTLY
Status: DISCONTINUED | OUTPATIENT
Start: 2025-08-06 | End: 2025-08-19 | Stop reason: HOSPADM

## 2025-08-06 RX ORDER — ACETAMINOPHEN 650 MG/1
650 SUPPOSITORY RECTAL EVERY 6 HOURS PRN
Status: DISCONTINUED | OUTPATIENT
Start: 2025-08-06 | End: 2025-08-19 | Stop reason: HOSPADM

## 2025-08-06 RX ORDER — POLYETHYLENE GLYCOL 3350 17 G/17G
17 POWDER, FOR SOLUTION ORAL DAILY PRN
Status: DISCONTINUED | OUTPATIENT
Start: 2025-08-06 | End: 2025-08-07

## 2025-08-06 RX ORDER — SODIUM CHLORIDE 0.9 % (FLUSH) 0.9 %
5-40 SYRINGE (ML) INJECTION EVERY 12 HOURS SCHEDULED
Status: DISCONTINUED | OUTPATIENT
Start: 2025-08-06 | End: 2025-08-19 | Stop reason: HOSPADM

## 2025-08-06 RX ORDER — ENOXAPARIN SODIUM 100 MG/ML
40 INJECTION SUBCUTANEOUS DAILY
Status: DISCONTINUED | OUTPATIENT
Start: 2025-08-06 | End: 2025-08-09

## 2025-08-06 RX ORDER — SODIUM CHLORIDE 0.9 % (FLUSH) 0.9 %
5-40 SYRINGE (ML) INJECTION PRN
Status: DISCONTINUED | OUTPATIENT
Start: 2025-08-06 | End: 2025-08-19 | Stop reason: HOSPADM

## 2025-08-06 RX ORDER — SODIUM CHLORIDE, SODIUM LACTATE, POTASSIUM CHLORIDE, AND CALCIUM CHLORIDE .6; .31; .03; .02 G/100ML; G/100ML; G/100ML; G/100ML
1000 INJECTION, SOLUTION INTRAVENOUS ONCE
Status: COMPLETED | OUTPATIENT
Start: 2025-08-06 | End: 2025-08-06

## 2025-08-06 RX ORDER — LABETALOL 200 MG/1
200 TABLET, FILM COATED ORAL EVERY 12 HOURS SCHEDULED
Status: DISCONTINUED | OUTPATIENT
Start: 2025-08-06 | End: 2025-08-07

## 2025-08-06 RX ORDER — ISOSORBIDE MONONITRATE 30 MG/1
30 TABLET, EXTENDED RELEASE ORAL DAILY
Status: DISCONTINUED | OUTPATIENT
Start: 2025-08-06 | End: 2025-08-19 | Stop reason: HOSPADM

## 2025-08-06 RX ORDER — MORPHINE SULFATE 15 MG/1
30 TABLET ORAL EVERY 4 HOURS PRN
Refills: 0 | Status: DISCONTINUED | OUTPATIENT
Start: 2025-08-06 | End: 2025-08-11

## 2025-08-06 RX ADMIN — SODIUM CHLORIDE 1000 ML: 0.9 INJECTION, SOLUTION INTRAVENOUS at 12:54

## 2025-08-06 RX ADMIN — ASPIRIN 81 MG: 81 TABLET, CHEWABLE ORAL at 21:10

## 2025-08-06 RX ADMIN — HYDROMORPHONE HYDROCHLORIDE 1 MG: 1 INJECTION, SOLUTION INTRAMUSCULAR; INTRAVENOUS; SUBCUTANEOUS at 23:49

## 2025-08-06 RX ADMIN — SODIUM CHLORIDE, PRESERVATIVE FREE 10 ML: 5 INJECTION INTRAVENOUS at 21:40

## 2025-08-06 RX ADMIN — IOPAMIDOL 100 ML: 755 INJECTION, SOLUTION INTRAVENOUS at 19:39

## 2025-08-06 RX ADMIN — ENOXAPARIN SODIUM 40 MG: 100 INJECTION SUBCUTANEOUS at 20:29

## 2025-08-06 RX ADMIN — IOPAMIDOL 100 ML: 755 INJECTION, SOLUTION INTRAVENOUS at 14:57

## 2025-08-06 RX ADMIN — SODIUM CHLORIDE, SODIUM LACTATE, POTASSIUM CHLORIDE, AND CALCIUM CHLORIDE 1000 ML: .6; .31; .03; .02 INJECTION, SOLUTION INTRAVENOUS at 19:59

## 2025-08-06 RX ADMIN — LABETALOL HYDROCHLORIDE 200 MG: 200 TABLET, FILM COATED ORAL at 23:10

## 2025-08-06 RX ADMIN — ROSUVASTATIN CALCIUM 10 MG: 10 TABLET, FILM COATED ORAL at 20:28

## 2025-08-06 RX ADMIN — AMLODIPINE BESYLATE 10 MG: 5 TABLET ORAL at 21:10

## 2025-08-06 RX ADMIN — HYDROMORPHONE HYDROCHLORIDE 0.5 MG: 1 INJECTION, SOLUTION INTRAMUSCULAR; INTRAVENOUS; SUBCUTANEOUS at 16:59

## 2025-08-06 RX ADMIN — LISINOPRIL 2.5 MG: 5 TABLET ORAL at 20:28

## 2025-08-06 RX ADMIN — ONDANSETRON 4 MG: 2 INJECTION, SOLUTION INTRAMUSCULAR; INTRAVENOUS at 12:51

## 2025-08-06 RX ADMIN — HYDROMORPHONE HYDROCHLORIDE 0.5 MG: 1 INJECTION, SOLUTION INTRAMUSCULAR; INTRAVENOUS; SUBCUTANEOUS at 12:54

## 2025-08-06 ASSESSMENT — PAIN SCALES - GENERAL
PAINLEVEL_OUTOF10: 10
PAINLEVEL_OUTOF10: 10
PAINLEVEL_OUTOF10: 6
PAINLEVEL_OUTOF10: 7

## 2025-08-06 ASSESSMENT — PAIN - FUNCTIONAL ASSESSMENT
PAIN_FUNCTIONAL_ASSESSMENT: 0-10
PAIN_FUNCTIONAL_ASSESSMENT: ACTIVITIES ARE NOT PREVENTED

## 2025-08-06 ASSESSMENT — PAIN DESCRIPTION - DESCRIPTORS: DESCRIPTORS: ACHING;THROBBING

## 2025-08-06 ASSESSMENT — PAIN DESCRIPTION - LOCATION: LOCATION: ABDOMEN

## 2025-08-06 ASSESSMENT — PAIN DESCRIPTION - ORIENTATION: ORIENTATION: MID;UPPER;RIGHT;LEFT

## 2025-08-07 ENCOUNTER — APPOINTMENT (OUTPATIENT)
Facility: HOSPITAL | Age: 80
DRG: 682 | End: 2025-08-07
Payer: MEDICARE

## 2025-08-07 PROBLEM — I16.0 HYPERTENSIVE URGENCY: Status: ACTIVE | Noted: 2025-08-07

## 2025-08-07 PROBLEM — G89.3 CHRONIC NEOPLASM-RELATED PAIN: Status: ACTIVE | Noted: 2025-08-07

## 2025-08-07 PROBLEM — I31.31 MALIGNANT PERICARDIAL EFFUSION (HCC): Status: ACTIVE | Noted: 2025-08-07

## 2025-08-07 PROBLEM — C34.92 PRIMARY MALIGNANT NEOPLASM OF LEFT LUNG METASTATIC TO OTHER SITE (HCC): Status: ACTIVE | Noted: 2025-08-07

## 2025-08-07 PROBLEM — D64.81 ANTINEOPLASTIC CHEMOTHERAPY INDUCED ANEMIA: Status: ACTIVE | Noted: 2025-08-07

## 2025-08-07 PROBLEM — T45.1X5A ANTINEOPLASTIC CHEMOTHERAPY INDUCED ANEMIA: Status: ACTIVE | Noted: 2025-08-07

## 2025-08-07 PROBLEM — E86.0 DEHYDRATION: Status: ACTIVE | Noted: 2025-08-07

## 2025-08-07 LAB
ALBUMIN SERPL-MCNC: 2.7 G/DL (ref 3.5–5.2)
ALBUMIN/GLOB SERPL: 0.8 (ref 1.1–2.2)
ALP SERPL-CCNC: 95 U/L (ref 35–104)
ALT SERPL-CCNC: 15 U/L (ref 10–35)
ANION GAP SERPL CALC-SCNC: 14 MMOL/L (ref 2–14)
APPEARANCE FLD: CLEAR
AST SERPL-CCNC: 22 U/L (ref 10–35)
BASOPHILS # BLD: 0.01 K/UL (ref 0–0.1)
BASOPHILS NFR BLD: 0.1 % (ref 0–1)
BILIRUB SERPL-MCNC: 0.6 MG/DL (ref 0–1.2)
BODY FLD TYPE: NORMAL
BUN SERPL-MCNC: 32 MG/DL (ref 8–23)
BUN/CREAT SERPL: 29 (ref 12–20)
CALCIUM SERPL-MCNC: 8.6 MG/DL (ref 8.8–10.2)
CHLORIDE SERPL-SCNC: 98 MMOL/L (ref 98–107)
CO2 SERPL-SCNC: 28 MMOL/L (ref 20–29)
COLOR FLD: YELLOW
CREAT SERPL-MCNC: 1.1 MG/DL (ref 0.6–1)
DIFFERENTIAL METHOD BLD: ABNORMAL
EKG ATRIAL RATE: 115 BPM
EKG DIAGNOSIS: NORMAL
EKG P AXIS: 40 DEGREES
EKG P-R INTERVAL: 148 MS
EKG Q-T INTERVAL: 370 MS
EKG QRS DURATION: 84 MS
EKG QTC CALCULATION (BAZETT): 511 MS
EKG R AXIS: -26 DEGREES
EKG T AXIS: 81 DEGREES
EKG VENTRICULAR RATE: 115 BPM
EOSINOPHIL # BLD: 0 K/UL (ref 0–0.4)
EOSINOPHIL NFR BLD: 0 % (ref 0–7)
EOSINOPHIL NFR FLD MANUAL: 2 %
ERYTHROCYTE [DISTWIDTH] IN BLOOD BY AUTOMATED COUNT: 13 % (ref 11.5–14.5)
EST. AVERAGE GLUCOSE BLD GHB EST-MCNC: 206 MG/DL
GLOBULIN SER CALC-MCNC: 3.4 G/DL (ref 2–4)
GLUCOSE BLD STRIP.AUTO-MCNC: 105 MG/DL (ref 65–117)
GLUCOSE BLD STRIP.AUTO-MCNC: 134 MG/DL (ref 65–117)
GLUCOSE BLD STRIP.AUTO-MCNC: 152 MG/DL (ref 65–117)
GLUCOSE BLD STRIP.AUTO-MCNC: 99 MG/DL (ref 65–117)
GLUCOSE SERPL-MCNC: 101 MG/DL (ref 65–100)
HBA1C MFR BLD: 8.8 % (ref 4–5.6)
HCT VFR BLD AUTO: 33 % (ref 35–47)
HGB BLD-MCNC: 11 G/DL (ref 11.5–16)
IMM GRANULOCYTES # BLD AUTO: 0.04 K/UL (ref 0–0.04)
IMM GRANULOCYTES NFR BLD AUTO: 0.4 % (ref 0–0.5)
LACTATE SERPL-SCNC: 1.3 MMOL/L (ref 0.5–2)
LDH FLD L TO P-CCNC: 127 U/L
LYMPHOCYTES # BLD: 0.11 K/UL (ref 0.8–3.5)
LYMPHOCYTES NFR BLD: 1 % (ref 12–49)
LYMPHOCYTES NFR FLD: 59 %
MCH RBC QN AUTO: 28.1 PG (ref 26–34)
MCHC RBC AUTO-ENTMCNC: 33.3 G/DL (ref 30–36.5)
MCV RBC AUTO: 84.4 FL (ref 80–99)
MONOCYTES # BLD: 0.14 K/UL (ref 0–1)
MONOCYTES NFR BLD: 1.3 % (ref 5–13)
MONOS+MACROS NFR FLD: 27 %
NEUTROPHILS NFR FLD: 12 %
NEUTS SEG # BLD: 10.8 K/UL (ref 1.8–8)
NEUTS SEG NFR BLD: 97.2 % (ref 32–75)
NRBC # BLD: 0 K/UL (ref 0–0.01)
NRBC BLD-RTO: 0 PER 100 WBC
NUC CELL # FLD: 208 /CU MM
PH FLD: 7.6
PLATELET # BLD AUTO: 129 K/UL (ref 150–400)
PMV BLD AUTO: 9.3 FL (ref 8.9–12.9)
POTASSIUM SERPL-SCNC: 3.3 MMOL/L (ref 3.5–5.1)
PROT FLD-MCNC: 4.4 G/DL
PROT SERPL-MCNC: 6.1 G/DL (ref 6.4–8.3)
RBC # BLD AUTO: 3.91 M/UL (ref 3.8–5.2)
RBC # FLD: >100 /CU MM
RBC MORPH BLD: ABNORMAL
SERVICE CMNT-IMP: ABNORMAL
SERVICE CMNT-IMP: ABNORMAL
SERVICE CMNT-IMP: NORMAL
SERVICE CMNT-IMP: NORMAL
SODIUM SERPL-SCNC: 140 MMOL/L (ref 136–145)
SPECIMEN SOURCE FLD: ABNORMAL
SPECIMEN SOURCE FLD: NORMAL
SPECIMEN SOURCE FLD: NORMAL
WBC # BLD AUTO: 11.1 K/UL (ref 3.6–11)

## 2025-08-07 PROCEDURE — 80053 COMPREHEN METABOLIC PANEL: CPT

## 2025-08-07 PROCEDURE — 87070 CULTURE OTHR SPECIMN AEROBIC: CPT

## 2025-08-07 PROCEDURE — 83036 HEMOGLOBIN GLYCOSYLATED A1C: CPT

## 2025-08-07 PROCEDURE — 99223 1ST HOSP IP/OBS HIGH 75: CPT | Performed by: INTERNAL MEDICINE

## 2025-08-07 PROCEDURE — 2060000000 HC ICU INTERMEDIATE R&B

## 2025-08-07 PROCEDURE — 87205 SMEAR GRAM STAIN: CPT

## 2025-08-07 PROCEDURE — 99223 1ST HOSP IP/OBS HIGH 75: CPT | Performed by: FAMILY MEDICINE

## 2025-08-07 PROCEDURE — 94761 N-INVAS EAR/PLS OXIMETRY MLT: CPT

## 2025-08-07 PROCEDURE — 6360000002 HC RX W HCPCS

## 2025-08-07 PROCEDURE — 88305 TISSUE EXAM BY PATHOLOGIST: CPT

## 2025-08-07 PROCEDURE — 71250 CT THORAX DX C-: CPT

## 2025-08-07 PROCEDURE — 82962 GLUCOSE BLOOD TEST: CPT

## 2025-08-07 PROCEDURE — 88112 CYTOPATH CELL ENHANCE TECH: CPT

## 2025-08-07 PROCEDURE — C1729 CATH, DRAINAGE: HCPCS

## 2025-08-07 PROCEDURE — 85025 COMPLETE CBC W/AUTO DIFF WBC: CPT

## 2025-08-07 PROCEDURE — 93010 ELECTROCARDIOGRAM REPORT: CPT | Performed by: INTERNAL MEDICINE

## 2025-08-07 PROCEDURE — 2500000003 HC RX 250 WO HCPCS

## 2025-08-07 PROCEDURE — 89050 BODY FLUID CELL COUNT: CPT

## 2025-08-07 PROCEDURE — 84157 ASSAY OF PROTEIN OTHER: CPT

## 2025-08-07 PROCEDURE — 6370000000 HC RX 637 (ALT 250 FOR IP)

## 2025-08-07 PROCEDURE — 83615 LACTATE (LD) (LDH) ENZYME: CPT

## 2025-08-07 PROCEDURE — 0W9B3ZZ DRAINAGE OF LEFT PLEURAL CAVITY, PERCUTANEOUS APPROACH: ICD-10-PCS

## 2025-08-07 PROCEDURE — 83986 ASSAY PH BODY FLUID NOS: CPT

## 2025-08-07 PROCEDURE — 83605 ASSAY OF LACTIC ACID: CPT

## 2025-08-07 PROCEDURE — 71045 X-RAY EXAM CHEST 1 VIEW: CPT

## 2025-08-07 RX ORDER — POLYETHYLENE GLYCOL 3350 17 G/17G
17 POWDER, FOR SOLUTION ORAL DAILY
Status: DISCONTINUED | OUTPATIENT
Start: 2025-08-07 | End: 2025-08-19 | Stop reason: HOSPADM

## 2025-08-07 RX ORDER — PROCHLORPERAZINE EDISYLATE 5 MG/ML
10 INJECTION INTRAMUSCULAR; INTRAVENOUS EVERY 6 HOURS PRN
Status: DISCONTINUED | OUTPATIENT
Start: 2025-08-07 | End: 2025-08-19 | Stop reason: HOSPADM

## 2025-08-07 RX ORDER — POTASSIUM CHLORIDE 750 MG/1
20 TABLET, EXTENDED RELEASE ORAL ONCE
Status: COMPLETED | OUTPATIENT
Start: 2025-08-07 | End: 2025-08-07

## 2025-08-07 RX ORDER — PROCHLORPERAZINE MALEATE 5 MG/1
10 TABLET ORAL EVERY 6 HOURS PRN
Status: DISCONTINUED | OUTPATIENT
Start: 2025-08-07 | End: 2025-08-19 | Stop reason: HOSPADM

## 2025-08-07 RX ORDER — POTASSIUM CHLORIDE 750 MG/1
40 TABLET, EXTENDED RELEASE ORAL ONCE
Status: COMPLETED | OUTPATIENT
Start: 2025-08-07 | End: 2025-08-07

## 2025-08-07 RX ORDER — LIDOCAINE HYDROCHLORIDE 10 MG/ML
10 INJECTION, SOLUTION EPIDURAL; INFILTRATION; INTRACAUDAL; PERINEURAL ONCE
Status: COMPLETED | OUTPATIENT
Start: 2025-08-07 | End: 2025-08-07

## 2025-08-07 RX ORDER — AMLODIPINE BESYLATE 5 MG/1
10 TABLET ORAL NIGHTLY
Status: DISCONTINUED | OUTPATIENT
Start: 2025-08-07 | End: 2025-08-19 | Stop reason: HOSPADM

## 2025-08-07 RX ADMIN — ASPIRIN 81 MG: 81 TABLET, CHEWABLE ORAL at 10:11

## 2025-08-07 RX ADMIN — PROCHLORPERAZINE EDISYLATE 10 MG: 5 INJECTION INTRAMUSCULAR; INTRAVENOUS at 13:27

## 2025-08-07 RX ADMIN — POTASSIUM CHLORIDE 40 MEQ: 750 TABLET, FILM COATED, EXTENDED RELEASE ORAL at 06:36

## 2025-08-07 RX ADMIN — HYDROMORPHONE HYDROCHLORIDE 1 MG: 1 INJECTION, SOLUTION INTRAMUSCULAR; INTRAVENOUS; SUBCUTANEOUS at 10:11

## 2025-08-07 RX ADMIN — POLYETHYLENE GLYCOL 3350 17 G: 17 POWDER, FOR SOLUTION ORAL at 10:20

## 2025-08-07 RX ADMIN — POTASSIUM CHLORIDE 20 MEQ: 750 TABLET, FILM COATED, EXTENDED RELEASE ORAL at 05:46

## 2025-08-07 RX ADMIN — SODIUM CHLORIDE, PRESERVATIVE FREE 10 ML: 5 INJECTION INTRAVENOUS at 10:12

## 2025-08-07 RX ADMIN — ENOXAPARIN SODIUM 40 MG: 100 INJECTION SUBCUTANEOUS at 21:10

## 2025-08-07 RX ADMIN — LIDOCAINE HYDROCHLORIDE ANHYDROUS 10 ML: 10 INJECTION, SOLUTION INFILTRATION at 09:50

## 2025-08-07 RX ADMIN — PROCHLORPERAZINE EDISYLATE 10 MG: 5 INJECTION INTRAMUSCULAR; INTRAVENOUS at 07:00

## 2025-08-07 RX ADMIN — LISINOPRIL 2.5 MG: 5 TABLET ORAL at 10:11

## 2025-08-07 RX ADMIN — AMLODIPINE BESYLATE 10 MG: 5 TABLET ORAL at 21:09

## 2025-08-07 RX ADMIN — HYDROMORPHONE HYDROCHLORIDE 0.5 MG: 1 INJECTION, SOLUTION INTRAMUSCULAR; INTRAVENOUS; SUBCUTANEOUS at 06:12

## 2025-08-07 RX ADMIN — ROSUVASTATIN CALCIUM 10 MG: 10 TABLET, FILM COATED ORAL at 21:09

## 2025-08-07 RX ADMIN — HYDROMORPHONE HYDROCHLORIDE 1 MG: 1 INJECTION, SOLUTION INTRAMUSCULAR; INTRAVENOUS; SUBCUTANEOUS at 17:38

## 2025-08-07 RX ADMIN — SODIUM CHLORIDE, PRESERVATIVE FREE 10 ML: 5 INJECTION INTRAVENOUS at 21:12

## 2025-08-07 RX ADMIN — HYDROMORPHONE HYDROCHLORIDE 1 MG: 1 INJECTION, SOLUTION INTRAMUSCULAR; INTRAVENOUS; SUBCUTANEOUS at 21:11

## 2025-08-07 ASSESSMENT — PAIN DESCRIPTION - LOCATION
LOCATION: CHEST
LOCATION: ABDOMEN
LOCATION: CHEST

## 2025-08-07 ASSESSMENT — PAIN SCALES - GENERAL
PAINLEVEL_OUTOF10: 10
PAINLEVEL_OUTOF10: 8
PAINLEVEL_OUTOF10: 6

## 2025-08-07 ASSESSMENT — PAIN DESCRIPTION - ORIENTATION
ORIENTATION: LEFT;OUTER
ORIENTATION: RIGHT
ORIENTATION: OUTER

## 2025-08-07 ASSESSMENT — PAIN DESCRIPTION - DESCRIPTORS
DESCRIPTORS: ACHING
DESCRIPTORS: ACHING

## 2025-08-08 PROBLEM — N17.9 ACUTE KIDNEY INJURY: Status: ACTIVE | Noted: 2023-03-11

## 2025-08-08 LAB
ALBUMIN SERPL-MCNC: 2.7 G/DL (ref 3.5–5.2)
ALBUMIN/GLOB SERPL: 0.8 (ref 1.1–2.2)
ALP SERPL-CCNC: 90 U/L (ref 35–104)
ALT SERPL-CCNC: 18 U/L (ref 10–35)
ANION GAP SERPL CALC-SCNC: 13 MMOL/L (ref 2–14)
APPEARANCE UR: CLEAR
AST SERPL-CCNC: 23 U/L (ref 10–35)
BACTERIA URNS QL MICRO: NEGATIVE /HPF
BASOPHILS # BLD: 0.01 K/UL (ref 0–0.1)
BASOPHILS NFR BLD: 0.1 % (ref 0–1)
BILIRUB SERPL-MCNC: 0.7 MG/DL (ref 0–1.2)
BILIRUB UR QL: NEGATIVE
BUN SERPL-MCNC: 48 MG/DL (ref 8–23)
BUN/CREAT SERPL: 28 (ref 12–20)
CALCIUM SERPL-MCNC: 8.6 MG/DL (ref 8.8–10.2)
CHLORIDE SERPL-SCNC: 98 MMOL/L (ref 98–107)
CO2 SERPL-SCNC: 27 MMOL/L (ref 20–29)
COLOR UR: ABNORMAL
CREAT SERPL-MCNC: 1.75 MG/DL (ref 0.6–1)
CYTOLOGY-NON GYN: NORMAL
DIFFERENTIAL METHOD BLD: ABNORMAL
EOSINOPHIL # BLD: 0.01 K/UL (ref 0–0.4)
EOSINOPHIL NFR BLD: 0.1 % (ref 0–7)
EPITH CASTS URNS QL MICRO: ABNORMAL /LPF
ERYTHROCYTE [DISTWIDTH] IN BLOOD BY AUTOMATED COUNT: 13.3 % (ref 11.5–14.5)
GLOBULIN SER CALC-MCNC: 3.2 G/DL (ref 2–4)
GLUCOSE BLD STRIP.AUTO-MCNC: 144 MG/DL (ref 65–117)
GLUCOSE BLD STRIP.AUTO-MCNC: 176 MG/DL (ref 65–117)
GLUCOSE BLD STRIP.AUTO-MCNC: 208 MG/DL (ref 65–117)
GLUCOSE BLD STRIP.AUTO-MCNC: 243 MG/DL (ref 65–117)
GLUCOSE BLD STRIP.AUTO-MCNC: 365 MG/DL (ref 65–117)
GLUCOSE SERPL-MCNC: 152 MG/DL (ref 65–100)
GLUCOSE UR STRIP.AUTO-MCNC: NEGATIVE MG/DL
HCT VFR BLD AUTO: 29.7 % (ref 35–47)
HGB BLD-MCNC: 9.7 G/DL (ref 11.5–16)
HGB UR QL STRIP: NEGATIVE
HYALINE CASTS URNS QL MICRO: ABNORMAL /LPF (ref 0–2)
IMM GRANULOCYTES # BLD AUTO: 0.03 K/UL (ref 0–0.04)
IMM GRANULOCYTES NFR BLD AUTO: 0.3 % (ref 0–0.5)
KETONES UR QL STRIP.AUTO: NEGATIVE MG/DL
LEUKOCYTE ESTERASE UR QL STRIP.AUTO: NEGATIVE
LYMPHOCYTES # BLD: 0.18 K/UL (ref 0.8–3.5)
LYMPHOCYTES NFR BLD: 1.9 % (ref 12–49)
MCH RBC QN AUTO: 27.7 PG (ref 26–34)
MCHC RBC AUTO-ENTMCNC: 32.7 G/DL (ref 30–36.5)
MCV RBC AUTO: 84.9 FL (ref 80–99)
MONOCYTES # BLD: 0.02 K/UL (ref 0–1)
MONOCYTES NFR BLD: 0.2 % (ref 5–13)
NEUTS SEG # BLD: 9.45 K/UL (ref 1.8–8)
NEUTS SEG NFR BLD: 97.4 % (ref 32–75)
NITRITE UR QL STRIP.AUTO: NEGATIVE
NRBC # BLD: 0 K/UL (ref 0–0.01)
NRBC BLD-RTO: 0 PER 100 WBC
PH UR STRIP: 5 (ref 5–8)
PLATELET # BLD AUTO: 104 K/UL (ref 150–400)
PMV BLD AUTO: 10 FL (ref 8.9–12.9)
POTASSIUM SERPL-SCNC: 4.3 MMOL/L (ref 3.5–5.1)
PROT SERPL-MCNC: 5.9 G/DL (ref 6.4–8.3)
PROT UR STRIP-MCNC: 100 MG/DL
RBC # BLD AUTO: 3.5 M/UL (ref 3.8–5.2)
RBC #/AREA URNS HPF: ABNORMAL /HPF (ref 0–5)
RBC MORPH BLD: ABNORMAL
SERVICE CMNT-IMP: ABNORMAL
SODIUM SERPL-SCNC: 138 MMOL/L (ref 136–145)
SP GR UR REFRACTOMETRY: 1.02 (ref 1–1.03)
URINE CULTURE IF INDICATED: ABNORMAL
UROBILINOGEN UR QL STRIP.AUTO: 0.2 EU/DL (ref 0.2–1)
WBC # BLD AUTO: 9.7 K/UL (ref 3.6–11)
WBC URNS QL MICRO: ABNORMAL /HPF (ref 0–4)

## 2025-08-08 PROCEDURE — 2500000003 HC RX 250 WO HCPCS

## 2025-08-08 PROCEDURE — 6360000002 HC RX W HCPCS

## 2025-08-08 PROCEDURE — 94761 N-INVAS EAR/PLS OXIMETRY MLT: CPT

## 2025-08-08 PROCEDURE — 99232 SBSQ HOSP IP/OBS MODERATE 35: CPT | Performed by: INTERNAL MEDICINE

## 2025-08-08 PROCEDURE — 99233 SBSQ HOSP IP/OBS HIGH 50: CPT | Performed by: FAMILY MEDICINE

## 2025-08-08 PROCEDURE — 81001 URINALYSIS AUTO W/SCOPE: CPT

## 2025-08-08 PROCEDURE — 94660 CPAP INITIATION&MGMT: CPT

## 2025-08-08 PROCEDURE — 80053 COMPREHEN METABOLIC PANEL: CPT

## 2025-08-08 PROCEDURE — 85025 COMPLETE CBC W/AUTO DIFF WBC: CPT

## 2025-08-08 PROCEDURE — 51798 US URINE CAPACITY MEASURE: CPT

## 2025-08-08 PROCEDURE — 6370000000 HC RX 637 (ALT 250 FOR IP)

## 2025-08-08 PROCEDURE — 99222 1ST HOSP IP/OBS MODERATE 55: CPT | Performed by: STUDENT IN AN ORGANIZED HEALTH CARE EDUCATION/TRAINING PROGRAM

## 2025-08-08 PROCEDURE — 2580000003 HC RX 258

## 2025-08-08 PROCEDURE — 82962 GLUCOSE BLOOD TEST: CPT

## 2025-08-08 PROCEDURE — 2060000000 HC ICU INTERMEDIATE R&B

## 2025-08-08 RX ORDER — SCOPOLAMINE 1 MG/3D
1 PATCH, EXTENDED RELEASE TRANSDERMAL
Status: DISCONTINUED | OUTPATIENT
Start: 2025-08-08 | End: 2025-08-14

## 2025-08-08 RX ORDER — INSULIN GLARGINE 100 [IU]/ML
35 INJECTION, SOLUTION SUBCUTANEOUS DAILY
Status: DISCONTINUED | OUTPATIENT
Start: 2025-08-08 | End: 2025-08-09

## 2025-08-08 RX ORDER — ONDANSETRON 2 MG/ML
4 INJECTION INTRAMUSCULAR; INTRAVENOUS EVERY 6 HOURS PRN
Status: DISCONTINUED | OUTPATIENT
Start: 2025-08-08 | End: 2025-08-19 | Stop reason: HOSPADM

## 2025-08-08 RX ORDER — SODIUM CHLORIDE, SODIUM LACTATE, POTASSIUM CHLORIDE, CALCIUM CHLORIDE 600; 310; 30; 20 MG/100ML; MG/100ML; MG/100ML; MG/100ML
INJECTION, SOLUTION INTRAVENOUS CONTINUOUS
Status: DISCONTINUED | OUTPATIENT
Start: 2025-08-08 | End: 2025-08-10

## 2025-08-08 RX ADMIN — LISINOPRIL 2.5 MG: 5 TABLET ORAL at 08:40

## 2025-08-08 RX ADMIN — PROCHLORPERAZINE EDISYLATE 10 MG: 5 INJECTION INTRAMUSCULAR; INTRAVENOUS at 08:54

## 2025-08-08 RX ADMIN — HYDROMORPHONE HYDROCHLORIDE 1 MG: 1 INJECTION, SOLUTION INTRAMUSCULAR; INTRAVENOUS; SUBCUTANEOUS at 06:13

## 2025-08-08 RX ADMIN — FOLIC ACID: 5 INJECTION, SOLUTION INTRAMUSCULAR; INTRAVENOUS; SUBCUTANEOUS at 12:18

## 2025-08-08 RX ADMIN — INSULIN LISPRO 2 UNITS: 100 INJECTION, SOLUTION INTRAVENOUS; SUBCUTANEOUS at 08:47

## 2025-08-08 RX ADMIN — POLYETHYLENE GLYCOL 3350 17 G: 17 POWDER, FOR SOLUTION ORAL at 08:41

## 2025-08-08 RX ADMIN — HYDROMORPHONE HYDROCHLORIDE 1 MG: 1 INJECTION, SOLUTION INTRAMUSCULAR; INTRAVENOUS; SUBCUTANEOUS at 10:56

## 2025-08-08 RX ADMIN — SODIUM CHLORIDE, PRESERVATIVE FREE 10 ML: 5 INJECTION INTRAVENOUS at 21:45

## 2025-08-08 RX ADMIN — HYDROMORPHONE HYDROCHLORIDE 1 MG: 1 INJECTION, SOLUTION INTRAMUSCULAR; INTRAVENOUS; SUBCUTANEOUS at 21:43

## 2025-08-08 RX ADMIN — INSULIN GLARGINE 35 UNITS: 100 INJECTION, SOLUTION SUBCUTANEOUS at 14:28

## 2025-08-08 RX ADMIN — ENOXAPARIN SODIUM 40 MG: 100 INJECTION SUBCUTANEOUS at 21:42

## 2025-08-08 RX ADMIN — ROSUVASTATIN CALCIUM 10 MG: 10 TABLET, FILM COATED ORAL at 21:45

## 2025-08-08 RX ADMIN — INSULIN LISPRO 8 UNITS: 100 INJECTION, SOLUTION INTRAVENOUS; SUBCUTANEOUS at 12:15

## 2025-08-08 RX ADMIN — HYDROMORPHONE HYDROCHLORIDE 1 MG: 1 INJECTION, SOLUTION INTRAMUSCULAR; INTRAVENOUS; SUBCUTANEOUS at 00:41

## 2025-08-08 RX ADMIN — AMLODIPINE BESYLATE 10 MG: 5 TABLET ORAL at 21:42

## 2025-08-08 RX ADMIN — SODIUM CHLORIDE, PRESERVATIVE FREE 10 ML: 5 INJECTION INTRAVENOUS at 08:40

## 2025-08-08 RX ADMIN — ASPIRIN 81 MG: 81 TABLET, CHEWABLE ORAL at 08:41

## 2025-08-08 ASSESSMENT — PAIN DESCRIPTION - ORIENTATION
ORIENTATION: LEFT
ORIENTATION: RIGHT
ORIENTATION: LEFT

## 2025-08-08 ASSESSMENT — PAIN DESCRIPTION - LOCATION
LOCATION: ABDOMEN
LOCATION: ARM;ABDOMEN

## 2025-08-08 ASSESSMENT — PAIN SCALES - GENERAL
PAINLEVEL_OUTOF10: 7
PAINLEVEL_OUTOF10: 3
PAINLEVEL_OUTOF10: 7
PAINLEVEL_OUTOF10: 5
PAINLEVEL_OUTOF10: 8
PAINLEVEL_OUTOF10: 7

## 2025-08-08 ASSESSMENT — PAIN DESCRIPTION - DESCRIPTORS
DESCRIPTORS: ACHING
DESCRIPTORS: ACHING;DISCOMFORT
DESCRIPTORS: ACHING

## 2025-08-08 ASSESSMENT — PAIN - FUNCTIONAL ASSESSMENT: PAIN_FUNCTIONAL_ASSESSMENT: 0-10

## 2025-08-09 ENCOUNTER — APPOINTMENT (OUTPATIENT)
Facility: HOSPITAL | Age: 80
DRG: 682 | End: 2025-08-09
Payer: MEDICARE

## 2025-08-09 PROBLEM — I48.91 ATRIAL FIBRILLATION (HCC): Status: ACTIVE | Noted: 2025-08-09

## 2025-08-09 LAB
ALBUMIN SERPL-MCNC: 2.6 G/DL (ref 3.5–5.2)
ALBUMIN/GLOB SERPL: 0.9 (ref 1.1–2.2)
ALP SERPL-CCNC: 87 U/L (ref 35–104)
ALT SERPL-CCNC: 15 U/L (ref 10–35)
ANION GAP SERPL CALC-SCNC: 14 MMOL/L (ref 2–14)
AST SERPL-CCNC: 18 U/L (ref 10–35)
BASOPHILS # BLD: 0.01 K/UL (ref 0–0.1)
BASOPHILS NFR BLD: 0.1 % (ref 0–1)
BILIRUB SERPL-MCNC: 0.6 MG/DL (ref 0–1.2)
BUN SERPL-MCNC: 54 MG/DL (ref 8–23)
BUN/CREAT SERPL: 29 (ref 12–20)
CALCIUM SERPL-MCNC: 8.2 MG/DL (ref 8.8–10.2)
CHLORIDE SERPL-SCNC: 98 MMOL/L (ref 98–107)
CO2 SERPL-SCNC: 25 MMOL/L (ref 20–29)
CREAT SERPL-MCNC: 1.88 MG/DL (ref 0.6–1)
DIFFERENTIAL METHOD BLD: ABNORMAL
EOSINOPHIL # BLD: 0.02 K/UL (ref 0–0.4)
EOSINOPHIL NFR BLD: 0.2 % (ref 0–7)
ERYTHROCYTE [DISTWIDTH] IN BLOOD BY AUTOMATED COUNT: 13.2 % (ref 11.5–14.5)
GLOBULIN SER CALC-MCNC: 3 G/DL (ref 2–4)
GLUCOSE BLD STRIP.AUTO-MCNC: 100 MG/DL (ref 65–117)
GLUCOSE BLD STRIP.AUTO-MCNC: 103 MG/DL (ref 65–117)
GLUCOSE BLD STRIP.AUTO-MCNC: 123 MG/DL (ref 65–117)
GLUCOSE BLD STRIP.AUTO-MCNC: 138 MG/DL (ref 65–117)
GLUCOSE BLD STRIP.AUTO-MCNC: 51 MG/DL (ref 65–117)
GLUCOSE BLD STRIP.AUTO-MCNC: 53 MG/DL (ref 65–117)
GLUCOSE BLD STRIP.AUTO-MCNC: 60 MG/DL (ref 65–117)
GLUCOSE SERPL-MCNC: 63 MG/DL (ref 65–100)
HCT VFR BLD AUTO: 26.3 % (ref 35–47)
HGB BLD-MCNC: 8.7 G/DL (ref 11.5–16)
IMM GRANULOCYTES # BLD AUTO: 0.04 K/UL (ref 0–0.04)
IMM GRANULOCYTES NFR BLD AUTO: 0.4 % (ref 0–0.5)
LYMPHOCYTES # BLD: 0.2 K/UL (ref 0.8–3.5)
LYMPHOCYTES NFR BLD: 1.8 % (ref 12–49)
MCH RBC QN AUTO: 27.7 PG (ref 26–34)
MCHC RBC AUTO-ENTMCNC: 33.1 G/DL (ref 30–36.5)
MCV RBC AUTO: 83.8 FL (ref 80–99)
MONOCYTES # BLD: 0.02 K/UL (ref 0–1)
MONOCYTES NFR BLD: 0.2 % (ref 5–13)
NEUTS SEG # BLD: 10.91 K/UL (ref 1.8–8)
NEUTS SEG NFR BLD: 97.3 % (ref 32–75)
NRBC # BLD: 0 K/UL (ref 0–0.01)
NRBC BLD-RTO: 0 PER 100 WBC
PLATELET # BLD AUTO: 99 K/UL (ref 150–400)
PMV BLD AUTO: 10.6 FL (ref 8.9–12.9)
POTASSIUM SERPL-SCNC: 3.6 MMOL/L (ref 3.5–5.1)
PROT SERPL-MCNC: 5.6 G/DL (ref 6.4–8.3)
RBC # BLD AUTO: 3.14 M/UL (ref 3.8–5.2)
RBC MORPH BLD: ABNORMAL
SERVICE CMNT-IMP: ABNORMAL
SERVICE CMNT-IMP: NORMAL
SERVICE CMNT-IMP: NORMAL
SODIUM SERPL-SCNC: 136 MMOL/L (ref 136–145)
WBC # BLD AUTO: 11.2 K/UL (ref 3.6–11)

## 2025-08-09 PROCEDURE — 80053 COMPREHEN METABOLIC PANEL: CPT

## 2025-08-09 PROCEDURE — 93005 ELECTROCARDIOGRAM TRACING: CPT | Performed by: EMERGENCY MEDICINE

## 2025-08-09 PROCEDURE — 6360000002 HC RX W HCPCS

## 2025-08-09 PROCEDURE — 6370000000 HC RX 637 (ALT 250 FOR IP)

## 2025-08-09 PROCEDURE — 51701 INSERT BLADDER CATHETER: CPT

## 2025-08-09 PROCEDURE — 2500000003 HC RX 250 WO HCPCS

## 2025-08-09 PROCEDURE — 99233 SBSQ HOSP IP/OBS HIGH 50: CPT | Performed by: FAMILY MEDICINE

## 2025-08-09 PROCEDURE — 6360000002 HC RX W HCPCS: Performed by: FAMILY MEDICINE

## 2025-08-09 PROCEDURE — 51798 US URINE CAPACITY MEASURE: CPT

## 2025-08-09 PROCEDURE — 2580000003 HC RX 258: Performed by: INTERNAL MEDICINE

## 2025-08-09 PROCEDURE — 85025 COMPLETE CBC W/AUTO DIFF WBC: CPT

## 2025-08-09 PROCEDURE — 2060000000 HC ICU INTERMEDIATE R&B

## 2025-08-09 PROCEDURE — 94761 N-INVAS EAR/PLS OXIMETRY MLT: CPT

## 2025-08-09 PROCEDURE — 2580000003 HC RX 258

## 2025-08-09 PROCEDURE — 76775 US EXAM ABDO BACK WALL LIM: CPT

## 2025-08-09 PROCEDURE — 82962 GLUCOSE BLOOD TEST: CPT

## 2025-08-09 RX ORDER — INSULIN GLARGINE 100 [IU]/ML
15 INJECTION, SOLUTION SUBCUTANEOUS DAILY
Status: DISCONTINUED | OUTPATIENT
Start: 2025-08-10 | End: 2025-08-16

## 2025-08-09 RX ORDER — INSULIN GLARGINE 100 [IU]/ML
30 INJECTION, SOLUTION SUBCUTANEOUS DAILY
Status: DISCONTINUED | OUTPATIENT
Start: 2025-08-09 | End: 2025-08-09

## 2025-08-09 RX ORDER — ENOXAPARIN SODIUM 100 MG/ML
30 INJECTION SUBCUTANEOUS DAILY
Status: DISCONTINUED | OUTPATIENT
Start: 2025-08-09 | End: 2025-08-17

## 2025-08-09 RX ADMIN — SODIUM CHLORIDE, PRESERVATIVE FREE 10 ML: 5 INJECTION INTRAVENOUS at 21:32

## 2025-08-09 RX ADMIN — ASPIRIN 81 MG: 81 TABLET, CHEWABLE ORAL at 08:39

## 2025-08-09 RX ADMIN — POLYETHYLENE GLYCOL 3350 17 G: 17 POWDER, FOR SOLUTION ORAL at 08:40

## 2025-08-09 RX ADMIN — ROSUVASTATIN CALCIUM 10 MG: 10 TABLET, FILM COATED ORAL at 21:32

## 2025-08-09 RX ADMIN — DEXTROSE 125 ML: 10 SOLUTION INTRAVENOUS at 17:27

## 2025-08-09 RX ADMIN — Medication 16 G: at 17:02

## 2025-08-09 RX ADMIN — SODIUM CHLORIDE, SODIUM LACTATE, POTASSIUM CHLORIDE, AND CALCIUM CHLORIDE: .6; .31; .03; .02 INJECTION, SOLUTION INTRAVENOUS at 12:53

## 2025-08-09 RX ADMIN — INSULIN GLARGINE 30 UNITS: 100 INJECTION, SOLUTION SUBCUTANEOUS at 11:38

## 2025-08-09 RX ADMIN — HYDROMORPHONE HYDROCHLORIDE 1 MG: 1 INJECTION, SOLUTION INTRAMUSCULAR; INTRAVENOUS; SUBCUTANEOUS at 01:44

## 2025-08-09 RX ADMIN — SODIUM CHLORIDE, PRESERVATIVE FREE 10 ML: 5 INJECTION INTRAVENOUS at 08:39

## 2025-08-09 RX ADMIN — HYDROMORPHONE HYDROCHLORIDE 0.5 MG: 1 INJECTION, SOLUTION INTRAMUSCULAR; INTRAVENOUS; SUBCUTANEOUS at 18:02

## 2025-08-09 RX ADMIN — SODIUM CHLORIDE, SODIUM LACTATE, POTASSIUM CHLORIDE, AND CALCIUM CHLORIDE: .6; .31; .03; .02 INJECTION, SOLUTION INTRAVENOUS at 01:51

## 2025-08-09 RX ADMIN — SODIUM CHLORIDE, SODIUM LACTATE, POTASSIUM CHLORIDE, AND CALCIUM CHLORIDE: .6; .31; .03; .02 INJECTION, SOLUTION INTRAVENOUS at 22:59

## 2025-08-09 RX ADMIN — AMLODIPINE BESYLATE 10 MG: 5 TABLET ORAL at 21:32

## 2025-08-09 RX ADMIN — HYDROMORPHONE HYDROCHLORIDE 1 MG: 1 INJECTION, SOLUTION INTRAMUSCULAR; INTRAVENOUS; SUBCUTANEOUS at 22:46

## 2025-08-09 RX ADMIN — ENOXAPARIN SODIUM 30 MG: 100 INJECTION SUBCUTANEOUS at 21:32

## 2025-08-09 ASSESSMENT — PAIN SCALES - GENERAL
PAINLEVEL_OUTOF10: 9
PAINLEVEL_OUTOF10: 0
PAINLEVEL_OUTOF10: 0
PAINLEVEL_OUTOF10: 7
PAINLEVEL_OUTOF10: 0
PAINLEVEL_OUTOF10: 8
PAINLEVEL_OUTOF10: 7
PAINLEVEL_OUTOF10: 9

## 2025-08-09 ASSESSMENT — PAIN - FUNCTIONAL ASSESSMENT
PAIN_FUNCTIONAL_ASSESSMENT: 0-10

## 2025-08-09 ASSESSMENT — PAIN DESCRIPTION - ORIENTATION
ORIENTATION: LEFT

## 2025-08-09 ASSESSMENT — PAIN DESCRIPTION - LOCATION
LOCATION: ABDOMEN
LOCATION: ABDOMEN;RIB CAGE
LOCATION: ABDOMEN

## 2025-08-09 ASSESSMENT — PAIN DESCRIPTION - DESCRIPTORS
DESCRIPTORS: ACHING
DESCRIPTORS: ACHING

## 2025-08-10 LAB
ALBUMIN SERPL-MCNC: 2.4 G/DL (ref 3.5–5.2)
ALBUMIN/GLOB SERPL: 0.7 (ref 1.1–2.2)
ALP SERPL-CCNC: 94 U/L (ref 35–104)
ALT SERPL-CCNC: 17 U/L (ref 10–35)
ANION GAP SERPL CALC-SCNC: 12 MMOL/L (ref 2–14)
AST SERPL-CCNC: 24 U/L (ref 10–35)
BASOPHILS # BLD: 0 K/UL (ref 0–0.1)
BASOPHILS NFR BLD: 0 % (ref 0–1)
BILIRUB SERPL-MCNC: 0.6 MG/DL (ref 0–1.2)
BUN SERPL-MCNC: 31 MG/DL (ref 8–23)
BUN/CREAT SERPL: 32 (ref 12–20)
CALCIUM SERPL-MCNC: 8.7 MG/DL (ref 8.8–10.2)
CHLORIDE SERPL-SCNC: 95 MMOL/L (ref 98–107)
CO2 SERPL-SCNC: 26 MMOL/L (ref 20–29)
CREAT SERPL-MCNC: 0.98 MG/DL (ref 0.6–1)
DIFFERENTIAL METHOD BLD: ABNORMAL
EKG DIAGNOSIS: NORMAL
EKG Q-T INTERVAL: 358 MS
EKG QRS DURATION: 86 MS
EKG QTC CALCULATION (BAZETT): 528 MS
EKG R AXIS: -24 DEGREES
EKG T AXIS: 52 DEGREES
EKG VENTRICULAR RATE: 131 BPM
EOSINOPHIL # BLD: 0.02 K/UL (ref 0–0.4)
EOSINOPHIL NFR BLD: 0.4 % (ref 0–7)
ERYTHROCYTE [DISTWIDTH] IN BLOOD BY AUTOMATED COUNT: 13 % (ref 11.5–14.5)
GLOBULIN SER CALC-MCNC: 3.6 G/DL (ref 2–4)
GLUCOSE BLD STRIP.AUTO-MCNC: 124 MG/DL (ref 65–117)
GLUCOSE BLD STRIP.AUTO-MCNC: 169 MG/DL (ref 65–117)
GLUCOSE BLD STRIP.AUTO-MCNC: 381 MG/DL (ref 65–117)
GLUCOSE BLD STRIP.AUTO-MCNC: 71 MG/DL (ref 65–117)
GLUCOSE SERPL-MCNC: 50 MG/DL (ref 65–100)
HCT VFR BLD AUTO: 25.3 % (ref 35–47)
HGB BLD-MCNC: 8.5 G/DL (ref 11.5–16)
IMM GRANULOCYTES # BLD AUTO: 0.04 K/UL (ref 0–0.04)
IMM GRANULOCYTES NFR BLD AUTO: 0.8 % (ref 0–0.5)
LYMPHOCYTES # BLD: 0.14 K/UL (ref 0.8–3.5)
LYMPHOCYTES NFR BLD: 2.7 % (ref 12–49)
MCH RBC QN AUTO: 27.9 PG (ref 26–34)
MCHC RBC AUTO-ENTMCNC: 33.6 G/DL (ref 30–36.5)
MCV RBC AUTO: 83 FL (ref 80–99)
MONOCYTES # BLD: 0.02 K/UL (ref 0–1)
MONOCYTES NFR BLD: 0.4 % (ref 5–13)
NEUTS SEG # BLD: 4.98 K/UL (ref 1.8–8)
NEUTS SEG NFR BLD: 95.7 % (ref 32–75)
NRBC # BLD: 0 K/UL (ref 0–0.01)
NRBC BLD-RTO: 0 PER 100 WBC
PLATELET # BLD AUTO: 69 K/UL (ref 150–400)
PMV BLD AUTO: 9.6 FL (ref 8.9–12.9)
POTASSIUM SERPL-SCNC: 3.2 MMOL/L (ref 3.5–5.1)
PROT SERPL-MCNC: 6 G/DL (ref 6.4–8.3)
RBC # BLD AUTO: 3.05 M/UL (ref 3.8–5.2)
RBC MORPH BLD: ABNORMAL
SERVICE CMNT-IMP: ABNORMAL
SERVICE CMNT-IMP: NORMAL
SODIUM SERPL-SCNC: 133 MMOL/L (ref 136–145)
WBC # BLD AUTO: 5.2 K/UL (ref 3.6–11)

## 2025-08-10 PROCEDURE — 6360000002 HC RX W HCPCS

## 2025-08-10 PROCEDURE — 82962 GLUCOSE BLOOD TEST: CPT

## 2025-08-10 PROCEDURE — 94761 N-INVAS EAR/PLS OXIMETRY MLT: CPT

## 2025-08-10 PROCEDURE — 2500000003 HC RX 250 WO HCPCS

## 2025-08-10 PROCEDURE — 80053 COMPREHEN METABOLIC PANEL: CPT

## 2025-08-10 PROCEDURE — 99233 SBSQ HOSP IP/OBS HIGH 50: CPT | Performed by: FAMILY MEDICINE

## 2025-08-10 PROCEDURE — 6370000000 HC RX 637 (ALT 250 FOR IP)

## 2025-08-10 PROCEDURE — 2580000003 HC RX 258

## 2025-08-10 PROCEDURE — 36415 COLL VENOUS BLD VENIPUNCTURE: CPT

## 2025-08-10 PROCEDURE — 2060000000 HC ICU INTERMEDIATE R&B

## 2025-08-10 PROCEDURE — 85025 COMPLETE CBC W/AUTO DIFF WBC: CPT

## 2025-08-10 PROCEDURE — 93010 ELECTROCARDIOGRAM REPORT: CPT | Performed by: INTERNAL MEDICINE

## 2025-08-10 PROCEDURE — 6360000002 HC RX W HCPCS: Performed by: FAMILY MEDICINE

## 2025-08-10 PROCEDURE — 99223 1ST HOSP IP/OBS HIGH 75: CPT | Performed by: INTERNAL MEDICINE

## 2025-08-10 RX ORDER — METOPROLOL TARTRATE 25 MG/1
25 TABLET, FILM COATED ORAL 2 TIMES DAILY
Status: DISCONTINUED | OUTPATIENT
Start: 2025-08-10 | End: 2025-08-18

## 2025-08-10 RX ORDER — DEXTROSE MONOHYDRATE AND SODIUM CHLORIDE 5; .45 G/100ML; G/100ML
INJECTION, SOLUTION INTRAVENOUS CONTINUOUS
Status: DISCONTINUED | OUTPATIENT
Start: 2025-08-10 | End: 2025-08-11

## 2025-08-10 RX ORDER — MIRTAZAPINE 15 MG/1
15 TABLET, FILM COATED ORAL NIGHTLY
Status: DISCONTINUED | OUTPATIENT
Start: 2025-08-10 | End: 2025-08-19 | Stop reason: HOSPADM

## 2025-08-10 RX ORDER — MAGNESIUM SULFATE HEPTAHYDRATE 40 MG/ML
2000 INJECTION, SOLUTION INTRAVENOUS ONCE
Status: COMPLETED | OUTPATIENT
Start: 2025-08-10 | End: 2025-08-10

## 2025-08-10 RX ORDER — POTASSIUM CHLORIDE 750 MG/1
40 TABLET, EXTENDED RELEASE ORAL EVERY 4 HOURS
Status: COMPLETED | OUTPATIENT
Start: 2025-08-10 | End: 2025-08-10

## 2025-08-10 RX ADMIN — Medication 16 G: at 06:53

## 2025-08-10 RX ADMIN — HYDROMORPHONE HYDROCHLORIDE 0.5 MG: 1 INJECTION, SOLUTION INTRAMUSCULAR; INTRAVENOUS; SUBCUTANEOUS at 08:29

## 2025-08-10 RX ADMIN — AMLODIPINE BESYLATE 10 MG: 5 TABLET ORAL at 20:07

## 2025-08-10 RX ADMIN — METOPROLOL TARTRATE 25 MG: 25 TABLET, FILM COATED ORAL at 08:28

## 2025-08-10 RX ADMIN — MIRTAZAPINE 15 MG: 15 TABLET, FILM COATED ORAL at 20:05

## 2025-08-10 RX ADMIN — METOPROLOL TARTRATE 25 MG: 25 TABLET, FILM COATED ORAL at 20:06

## 2025-08-10 RX ADMIN — POTASSIUM CHLORIDE 40 MEQ: 750 TABLET, FILM COATED, EXTENDED RELEASE ORAL at 17:31

## 2025-08-10 RX ADMIN — SODIUM CHLORIDE, PRESERVATIVE FREE 10 ML: 5 INJECTION INTRAVENOUS at 08:31

## 2025-08-10 RX ADMIN — DEXTROSE AND SODIUM CHLORIDE: 5; .45 INJECTION, SOLUTION INTRAVENOUS at 12:58

## 2025-08-10 RX ADMIN — ENOXAPARIN SODIUM 30 MG: 100 INJECTION SUBCUTANEOUS at 21:14

## 2025-08-10 RX ADMIN — INSULIN LISPRO 8 UNITS: 100 INJECTION, SOLUTION INTRAVENOUS; SUBCUTANEOUS at 21:14

## 2025-08-10 RX ADMIN — POLYETHYLENE GLYCOL 3350 17 G: 17 POWDER, FOR SOLUTION ORAL at 08:31

## 2025-08-10 RX ADMIN — POTASSIUM CHLORIDE 40 MEQ: 750 TABLET, FILM COATED, EXTENDED RELEASE ORAL at 12:53

## 2025-08-10 RX ADMIN — ASPIRIN 81 MG: 81 TABLET, CHEWABLE ORAL at 08:28

## 2025-08-10 RX ADMIN — HYDROMORPHONE HYDROCHLORIDE 1 MG: 1 INJECTION, SOLUTION INTRAMUSCULAR; INTRAVENOUS; SUBCUTANEOUS at 18:25

## 2025-08-10 RX ADMIN — SODIUM CHLORIDE, PRESERVATIVE FREE 10 ML: 5 INJECTION INTRAVENOUS at 20:07

## 2025-08-10 RX ADMIN — MAGNESIUM SULFATE HEPTAHYDRATE 2000 MG: 40 INJECTION, SOLUTION INTRAVENOUS at 13:02

## 2025-08-10 RX ADMIN — ROSUVASTATIN CALCIUM 10 MG: 10 TABLET, FILM COATED ORAL at 20:05

## 2025-08-10 ASSESSMENT — PAIN DESCRIPTION - DESCRIPTORS
DESCRIPTORS: ACHING
DESCRIPTORS: ACHING

## 2025-08-10 ASSESSMENT — PAIN SCALES - GENERAL
PAINLEVEL_OUTOF10: 0
PAINLEVEL_OUTOF10: 8
PAINLEVEL_OUTOF10: 0
PAINLEVEL_OUTOF10: 6

## 2025-08-10 ASSESSMENT — PAIN - FUNCTIONAL ASSESSMENT
PAIN_FUNCTIONAL_ASSESSMENT: 0-10
PAIN_FUNCTIONAL_ASSESSMENT: ACTIVITIES ARE NOT PREVENTED
PAIN_FUNCTIONAL_ASSESSMENT: 0-10
PAIN_FUNCTIONAL_ASSESSMENT: 0-10

## 2025-08-10 ASSESSMENT — PAIN DESCRIPTION - LOCATION
LOCATION: BACK
LOCATION: BACK

## 2025-08-10 ASSESSMENT — PAIN DESCRIPTION - ORIENTATION
ORIENTATION: LEFT
ORIENTATION: LEFT

## 2025-08-11 LAB
ALBUMIN SERPL-MCNC: 2.3 G/DL (ref 3.5–5.2)
ALBUMIN/GLOB SERPL: 0.6 (ref 1.1–2.2)
ALP SERPL-CCNC: 90 U/L (ref 35–104)
ALT SERPL-CCNC: 28 U/L (ref 10–35)
ANION GAP SERPL CALC-SCNC: 10 MMOL/L (ref 2–14)
AST SERPL-CCNC: 35 U/L (ref 10–35)
BACTERIA SPEC CULT: NORMAL
BASOPHILS # BLD: 0 K/UL (ref 0–0.1)
BASOPHILS NFR BLD: 0 % (ref 0–1)
BILIRUB SERPL-MCNC: 0.6 MG/DL (ref 0–1.2)
BUN SERPL-MCNC: 20 MG/DL (ref 8–23)
BUN/CREAT SERPL: 22 (ref 12–20)
CALCIUM SERPL-MCNC: 8.2 MG/DL (ref 8.8–10.2)
CHLORIDE SERPL-SCNC: 97 MMOL/L (ref 98–107)
CO2 SERPL-SCNC: 25 MMOL/L (ref 20–29)
CREAT SERPL-MCNC: 0.93 MG/DL (ref 0.6–1)
DIFFERENTIAL METHOD BLD: ABNORMAL
EKG ATRIAL RATE: 108 BPM
EKG DIAGNOSIS: NORMAL
EKG P AXIS: 38 DEGREES
EKG P-R INTERVAL: 136 MS
EKG Q-T INTERVAL: 352 MS
EKG QRS DURATION: 82 MS
EKG QTC CALCULATION (BAZETT): 471 MS
EKG R AXIS: -25 DEGREES
EKG T AXIS: 63 DEGREES
EKG VENTRICULAR RATE: 108 BPM
EOSINOPHIL # BLD: 0.06 K/UL (ref 0–0.4)
EOSINOPHIL NFR BLD: 2 % (ref 0–7)
ERYTHROCYTE [DISTWIDTH] IN BLOOD BY AUTOMATED COUNT: 12.8 % (ref 11.5–14.5)
GLOBULIN SER CALC-MCNC: 3.8 G/DL (ref 2–4)
GLUCOSE BLD STRIP.AUTO-MCNC: 116 MG/DL (ref 65–117)
GLUCOSE BLD STRIP.AUTO-MCNC: 131 MG/DL (ref 65–117)
GLUCOSE BLD STRIP.AUTO-MCNC: 204 MG/DL (ref 65–117)
GLUCOSE BLD STRIP.AUTO-MCNC: 278 MG/DL (ref 65–117)
GLUCOSE BLD STRIP.AUTO-MCNC: 285 MG/DL (ref 65–117)
GLUCOSE SERPL-MCNC: 202 MG/DL (ref 65–100)
GRAM STN SPEC: NORMAL
HCT VFR BLD AUTO: 26.3 % (ref 35–47)
HGB BLD-MCNC: 8.7 G/DL (ref 11.5–16)
IMM GRANULOCYTES # BLD AUTO: 0 K/UL
IMM GRANULOCYTES NFR BLD AUTO: 0 %
LYMPHOCYTES # BLD: 0.06 K/UL (ref 0.8–3.5)
LYMPHOCYTES NFR BLD: 2 % (ref 12–49)
MCH RBC QN AUTO: 27.9 PG (ref 26–34)
MCHC RBC AUTO-ENTMCNC: 33.1 G/DL (ref 30–36.5)
MCV RBC AUTO: 84.3 FL (ref 80–99)
MONOCYTES # BLD: 0 K/UL (ref 0–1)
MONOCYTES NFR BLD: 0 % (ref 5–13)
NEUTS SEG # BLD: 2.68 K/UL (ref 1.8–8)
NEUTS SEG NFR BLD: 96 % (ref 32–75)
NRBC # BLD: 0 K/UL (ref 0–0.01)
NRBC BLD-RTO: 0 PER 100 WBC
PLATELET # BLD AUTO: 54 K/UL (ref 150–400)
PMV BLD AUTO: 10.6 FL (ref 8.9–12.9)
POTASSIUM SERPL-SCNC: 4.1 MMOL/L (ref 3.5–5.1)
PROT SERPL-MCNC: 6.2 G/DL (ref 6.4–8.3)
RBC # BLD AUTO: 3.12 M/UL (ref 3.8–5.2)
RBC MORPH BLD: ABNORMAL
SERVICE CMNT-IMP: ABNORMAL
SERVICE CMNT-IMP: NORMAL
SERVICE CMNT-IMP: NORMAL
SODIUM SERPL-SCNC: 131 MMOL/L (ref 136–145)
WBC # BLD AUTO: 2.8 K/UL (ref 3.6–11)

## 2025-08-11 PROCEDURE — 2580000003 HC RX 258

## 2025-08-11 PROCEDURE — 99233 SBSQ HOSP IP/OBS HIGH 50: CPT | Performed by: FAMILY MEDICINE

## 2025-08-11 PROCEDURE — 2060000000 HC ICU INTERMEDIATE R&B

## 2025-08-11 PROCEDURE — 2500000003 HC RX 250 WO HCPCS

## 2025-08-11 PROCEDURE — 94761 N-INVAS EAR/PLS OXIMETRY MLT: CPT

## 2025-08-11 PROCEDURE — 6370000000 HC RX 637 (ALT 250 FOR IP)

## 2025-08-11 PROCEDURE — 82962 GLUCOSE BLOOD TEST: CPT

## 2025-08-11 PROCEDURE — 6360000002 HC RX W HCPCS

## 2025-08-11 PROCEDURE — 85025 COMPLETE CBC W/AUTO DIFF WBC: CPT

## 2025-08-11 PROCEDURE — 80053 COMPREHEN METABOLIC PANEL: CPT

## 2025-08-11 PROCEDURE — 6360000002 HC RX W HCPCS: Performed by: FAMILY MEDICINE

## 2025-08-11 PROCEDURE — 93010 ELECTROCARDIOGRAM REPORT: CPT | Performed by: SPECIALIST

## 2025-08-11 PROCEDURE — 99232 SBSQ HOSP IP/OBS MODERATE 35: CPT | Performed by: STUDENT IN AN ORGANIZED HEALTH CARE EDUCATION/TRAINING PROGRAM

## 2025-08-11 PROCEDURE — 6370000000 HC RX 637 (ALT 250 FOR IP): Performed by: FAMILY MEDICINE

## 2025-08-11 PROCEDURE — 99232 SBSQ HOSP IP/OBS MODERATE 35: CPT | Performed by: INTERNAL MEDICINE

## 2025-08-11 RX ORDER — MORPHINE SULFATE 15 MG/1
22.5 TABLET ORAL EVERY 4 HOURS PRN
Refills: 0 | Status: DISCONTINUED | OUTPATIENT
Start: 2025-08-11 | End: 2025-08-19 | Stop reason: HOSPADM

## 2025-08-11 RX ORDER — SODIUM CHLORIDE, SODIUM LACTATE, POTASSIUM CHLORIDE, CALCIUM CHLORIDE 600; 310; 30; 20 MG/100ML; MG/100ML; MG/100ML; MG/100ML
INJECTION, SOLUTION INTRAVENOUS CONTINUOUS
Status: DISCONTINUED | OUTPATIENT
Start: 2025-08-11 | End: 2025-08-11

## 2025-08-11 RX ORDER — INSULIN DEGLUDEC 200 U/ML
20 INJECTION, SOLUTION SUBCUTANEOUS DAILY
Qty: 3 ML | Refills: 0 | Status: CANCELLED | OUTPATIENT
Start: 2025-08-11

## 2025-08-11 RX ORDER — MORPHINE SULFATE 15 MG/1
22.5 TABLET ORAL EVERY 4 HOURS PRN
Qty: 270 TABLET | Refills: 0 | Status: CANCELLED | OUTPATIENT
Start: 2025-08-11 | End: 2025-09-10

## 2025-08-11 RX ORDER — AMLODIPINE BESYLATE 10 MG/1
10 TABLET ORAL NIGHTLY
Qty: 30 TABLET | Refills: 3 | Status: CANCELLED | OUTPATIENT
Start: 2025-08-11

## 2025-08-11 RX ORDER — METOPROLOL TARTRATE 25 MG/1
25 TABLET, FILM COATED ORAL 2 TIMES DAILY
Qty: 60 TABLET | Refills: 3 | Status: CANCELLED | OUTPATIENT
Start: 2025-08-11

## 2025-08-11 RX ORDER — CASTOR OIL AND BALSAM, PERU 788; 87 MG/G; MG/G
OINTMENT TOPICAL 2 TIMES DAILY
Status: DISCONTINUED | OUTPATIENT
Start: 2025-08-11 | End: 2025-08-19 | Stop reason: HOSPADM

## 2025-08-11 RX ORDER — POLYETHYLENE GLYCOL 3350 17 G/17G
17 POWDER, FOR SOLUTION ORAL DAILY PRN
Qty: 30 PACKET | Refills: 0 | Status: CANCELLED | OUTPATIENT
Start: 2025-08-11 | End: 2025-09-10

## 2025-08-11 RX ADMIN — INSULIN LISPRO 2 UNITS: 100 INJECTION, SOLUTION INTRAVENOUS; SUBCUTANEOUS at 09:47

## 2025-08-11 RX ADMIN — HYDROMORPHONE HYDROCHLORIDE 1 MG: 1 INJECTION, SOLUTION INTRAMUSCULAR; INTRAVENOUS; SUBCUTANEOUS at 17:43

## 2025-08-11 RX ADMIN — SODIUM CHLORIDE, SODIUM LACTATE, POTASSIUM CHLORIDE, AND CALCIUM CHLORIDE: .6; .31; .03; .02 INJECTION, SOLUTION INTRAVENOUS at 10:00

## 2025-08-11 RX ADMIN — ROSUVASTATIN CALCIUM 10 MG: 10 TABLET, FILM COATED ORAL at 21:01

## 2025-08-11 RX ADMIN — HYDROMORPHONE HYDROCHLORIDE 1 MG: 1 INJECTION, SOLUTION INTRAMUSCULAR; INTRAVENOUS; SUBCUTANEOUS at 06:47

## 2025-08-11 RX ADMIN — DEXTROSE AND SODIUM CHLORIDE: 5; .45 INJECTION, SOLUTION INTRAVENOUS at 02:42

## 2025-08-11 RX ADMIN — SODIUM CHLORIDE, PRESERVATIVE FREE 10 ML: 5 INJECTION INTRAVENOUS at 22:02

## 2025-08-11 RX ADMIN — ENOXAPARIN SODIUM 30 MG: 100 INJECTION SUBCUTANEOUS at 21:01

## 2025-08-11 RX ADMIN — Medication: at 14:50

## 2025-08-11 RX ADMIN — SODIUM CHLORIDE, PRESERVATIVE FREE 10 ML: 5 INJECTION INTRAVENOUS at 21:00

## 2025-08-11 RX ADMIN — HYDROMORPHONE HYDROCHLORIDE 1 MG: 1 INJECTION, SOLUTION INTRAMUSCULAR; INTRAVENOUS; SUBCUTANEOUS at 22:01

## 2025-08-11 RX ADMIN — SODIUM CHLORIDE, PRESERVATIVE FREE 10 ML: 5 INJECTION INTRAVENOUS at 09:50

## 2025-08-11 RX ADMIN — INSULIN GLARGINE 15 UNITS: 100 INJECTION, SOLUTION SUBCUTANEOUS at 09:48

## 2025-08-11 RX ADMIN — AMLODIPINE BESYLATE 10 MG: 5 TABLET ORAL at 21:04

## 2025-08-11 RX ADMIN — INSULIN LISPRO 2 UNITS: 100 INJECTION, SOLUTION INTRAVENOUS; SUBCUTANEOUS at 13:05

## 2025-08-11 RX ADMIN — MIRTAZAPINE 15 MG: 15 TABLET, FILM COATED ORAL at 21:04

## 2025-08-11 RX ADMIN — METOPROLOL TARTRATE 25 MG: 25 TABLET, FILM COATED ORAL at 21:07

## 2025-08-11 RX ADMIN — METOPROLOL TARTRATE 25 MG: 25 TABLET, FILM COATED ORAL at 09:49

## 2025-08-11 RX ADMIN — Medication: at 21:07

## 2025-08-11 RX ADMIN — ASPIRIN 81 MG: 81 TABLET, CHEWABLE ORAL at 09:49

## 2025-08-11 ASSESSMENT — PAIN DESCRIPTION - DESCRIPTORS
DESCRIPTORS: ACHING

## 2025-08-11 ASSESSMENT — PAIN DESCRIPTION - ORIENTATION
ORIENTATION: LEFT
ORIENTATION: LEFT
ORIENTATION: POSTERIOR
ORIENTATION: LEFT

## 2025-08-11 ASSESSMENT — PAIN DESCRIPTION - FREQUENCY: FREQUENCY: CONTINUOUS

## 2025-08-11 ASSESSMENT — PAIN SCALES - GENERAL
PAINLEVEL_OUTOF10: 0
PAINLEVEL_OUTOF10: 4
PAINLEVEL_OUTOF10: 8
PAINLEVEL_OUTOF10: 0
PAINLEVEL_OUTOF10: 0
PAINLEVEL_OUTOF10: 8
PAINLEVEL_OUTOF10: 4
PAINLEVEL_OUTOF10: 5
PAINLEVEL_OUTOF10: 7
PAINLEVEL_OUTOF10: 0

## 2025-08-11 ASSESSMENT — PAIN - FUNCTIONAL ASSESSMENT
PAIN_FUNCTIONAL_ASSESSMENT: 0-10
PAIN_FUNCTIONAL_ASSESSMENT: ACTIVITIES ARE NOT PREVENTED

## 2025-08-11 ASSESSMENT — PAIN DESCRIPTION - ONSET: ONSET: ON-GOING

## 2025-08-11 ASSESSMENT — PAIN DESCRIPTION - LOCATION
LOCATION: BACK

## 2025-08-11 ASSESSMENT — PAIN DESCRIPTION - PAIN TYPE: TYPE: CHRONIC PAIN

## 2025-08-12 ENCOUNTER — APPOINTMENT (OUTPATIENT)
Facility: HOSPITAL | Age: 80
DRG: 682 | End: 2025-08-12
Payer: MEDICARE

## 2025-08-12 LAB
ALBUMIN SERPL-MCNC: 2.3 G/DL (ref 3.5–5.2)
ALBUMIN/GLOB SERPL: 0.8 (ref 1.1–2.2)
ALP SERPL-CCNC: 93 U/L (ref 35–104)
ALT SERPL-CCNC: 37 U/L (ref 10–35)
ANION GAP SERPL CALC-SCNC: 10 MMOL/L (ref 2–14)
APPEARANCE UR: ABNORMAL
APPEARANCE UR: ABNORMAL
AST SERPL-CCNC: 38 U/L (ref 10–35)
BACTERIA URNS QL MICRO: ABNORMAL /HPF
BACTERIA URNS QL MICRO: ABNORMAL /HPF
BASOPHILS # BLD: 0 K/UL (ref 0–0.1)
BASOPHILS NFR BLD: 0 % (ref 0–1)
BILIRUB SERPL-MCNC: 0.6 MG/DL (ref 0–1.2)
BILIRUB UR QL: NEGATIVE
BILIRUB UR QL: NEGATIVE
BUN SERPL-MCNC: 14 MG/DL (ref 8–23)
BUN/CREAT SERPL: 15 (ref 12–20)
CALCIUM SERPL-MCNC: 7.7 MG/DL (ref 8.8–10.2)
CHLORIDE SERPL-SCNC: 98 MMOL/L (ref 98–107)
CO2 SERPL-SCNC: 24 MMOL/L (ref 20–29)
COLOR UR: ABNORMAL
COLOR UR: ABNORMAL
CREAT SERPL-MCNC: 0.91 MG/DL (ref 0.6–1)
DIFFERENTIAL METHOD BLD: ABNORMAL
EOSINOPHIL # BLD: 0.1 K/UL (ref 0–0.4)
EOSINOPHIL NFR BLD: 3 % (ref 0–7)
EPITH CASTS URNS QL MICRO: ABNORMAL /LPF
EPITH CASTS URNS QL MICRO: ABNORMAL /LPF
ERYTHROCYTE [DISTWIDTH] IN BLOOD BY AUTOMATED COUNT: 12.6 % (ref 11.5–14.5)
GLOBULIN SER CALC-MCNC: 3.1 G/DL (ref 2–4)
GLUCOSE BLD STRIP.AUTO-MCNC: 116 MG/DL (ref 65–117)
GLUCOSE BLD STRIP.AUTO-MCNC: 63 MG/DL (ref 65–117)
GLUCOSE BLD STRIP.AUTO-MCNC: 64 MG/DL (ref 65–117)
GLUCOSE BLD STRIP.AUTO-MCNC: 86 MG/DL (ref 65–117)
GLUCOSE BLD STRIP.AUTO-MCNC: 86 MG/DL (ref 65–117)
GLUCOSE BLD STRIP.AUTO-MCNC: 87 MG/DL (ref 65–117)
GLUCOSE BLD STRIP.AUTO-MCNC: 94 MG/DL (ref 65–117)
GLUCOSE SERPL-MCNC: 127 MG/DL (ref 65–100)
GLUCOSE UR STRIP.AUTO-MCNC: NEGATIVE MG/DL
GLUCOSE UR STRIP.AUTO-MCNC: NEGATIVE MG/DL
HCT VFR BLD AUTO: 24.6 % (ref 35–47)
HGB BLD-MCNC: 8.2 G/DL (ref 11.5–16)
HGB UR QL STRIP: ABNORMAL
HGB UR QL STRIP: ABNORMAL
HYALINE CASTS URNS QL MICRO: ABNORMAL /LPF (ref 0–2)
IMM GRANULOCYTES # BLD AUTO: 0 K/UL (ref 0–0.04)
IMM GRANULOCYTES NFR BLD AUTO: 0 % (ref 0–0.5)
KETONES UR QL STRIP.AUTO: ABNORMAL MG/DL
KETONES UR QL STRIP.AUTO: NEGATIVE MG/DL
LEUKOCYTE ESTERASE UR QL STRIP.AUTO: ABNORMAL
LEUKOCYTE ESTERASE UR QL STRIP.AUTO: ABNORMAL
LYMPHOCYTES # BLD: 0.16 K/UL (ref 0.8–3.5)
LYMPHOCYTES NFR BLD: 5 % (ref 12–49)
MCH RBC QN AUTO: 27.7 PG (ref 26–34)
MCHC RBC AUTO-ENTMCNC: 33.3 G/DL (ref 30–36.5)
MCV RBC AUTO: 83.1 FL (ref 80–99)
MONOCYTES # BLD: 0.06 K/UL (ref 0–1)
MONOCYTES NFR BLD: 2 % (ref 5–13)
NEUTS BAND NFR BLD MANUAL: 7 %
NEUTS SEG # BLD: 2.88 K/UL (ref 1.8–8)
NEUTS SEG NFR BLD: 83 % (ref 32–75)
NITRITE UR QL STRIP.AUTO: POSITIVE
NITRITE UR QL STRIP.AUTO: POSITIVE
NRBC # BLD: 0 K/UL (ref 0–0.01)
NRBC BLD-RTO: 0 PER 100 WBC
PH UR STRIP: 7.5 (ref 5–8)
PH UR STRIP: 7.5 (ref 5–8)
PLATELET # BLD AUTO: 41 K/UL (ref 150–400)
PLATELET COMMENT: ABNORMAL
PMV BLD AUTO: 10.7 FL (ref 8.9–12.9)
POTASSIUM SERPL-SCNC: 4.1 MMOL/L (ref 3.5–5.1)
PROT SERPL-MCNC: 5.4 G/DL (ref 6.4–8.3)
PROT UR STRIP-MCNC: 100 MG/DL
PROT UR STRIP-MCNC: 100 MG/DL
RBC # BLD AUTO: 2.96 M/UL (ref 3.8–5.2)
RBC #/AREA URNS HPF: ABNORMAL /HPF (ref 0–5)
RBC #/AREA URNS HPF: ABNORMAL /HPF (ref 0–5)
RBC MORPH BLD: ABNORMAL
SERVICE CMNT-IMP: ABNORMAL
SERVICE CMNT-IMP: ABNORMAL
SERVICE CMNT-IMP: NORMAL
SODIUM SERPL-SCNC: 132 MMOL/L (ref 136–145)
SP GR UR REFRACTOMETRY: 1.01 (ref 1–1.03)
SP GR UR REFRACTOMETRY: 1.01 (ref 1–1.03)
URINE CULTURE IF INDICATED: ABNORMAL
UROBILINOGEN UR QL STRIP.AUTO: 1 EU/DL (ref 0.2–1)
UROBILINOGEN UR QL STRIP.AUTO: 2 EU/DL (ref 0.2–1)
WBC # BLD AUTO: 3.2 K/UL (ref 3.6–11)
WBC MORPH BLD: ABNORMAL
WBC URNS QL MICRO: >100 /HPF (ref 0–4)
WBC URNS QL MICRO: >100 /HPF (ref 0–4)

## 2025-08-12 PROCEDURE — 71045 X-RAY EXAM CHEST 1 VIEW: CPT

## 2025-08-12 PROCEDURE — 6360000002 HC RX W HCPCS

## 2025-08-12 PROCEDURE — 87086 URINE CULTURE/COLONY COUNT: CPT

## 2025-08-12 PROCEDURE — 6370000000 HC RX 637 (ALT 250 FOR IP)

## 2025-08-12 PROCEDURE — 87088 URINE BACTERIA CULTURE: CPT

## 2025-08-12 PROCEDURE — 99232 SBSQ HOSP IP/OBS MODERATE 35: CPT | Performed by: INTERNAL MEDICINE

## 2025-08-12 PROCEDURE — 83935 ASSAY OF URINE OSMOLALITY: CPT

## 2025-08-12 PROCEDURE — 80053 COMPREHEN METABOLIC PANEL: CPT

## 2025-08-12 PROCEDURE — 36415 COLL VENOUS BLD VENIPUNCTURE: CPT

## 2025-08-12 PROCEDURE — 97116 GAIT TRAINING THERAPY: CPT

## 2025-08-12 PROCEDURE — 2580000003 HC RX 258

## 2025-08-12 PROCEDURE — 99232 SBSQ HOSP IP/OBS MODERATE 35: CPT | Performed by: STUDENT IN AN ORGANIZED HEALTH CARE EDUCATION/TRAINING PROGRAM

## 2025-08-12 PROCEDURE — 2500000003 HC RX 250 WO HCPCS

## 2025-08-12 PROCEDURE — 99232 SBSQ HOSP IP/OBS MODERATE 35: CPT

## 2025-08-12 PROCEDURE — 2060000000 HC ICU INTERMEDIATE R&B

## 2025-08-12 PROCEDURE — 81001 URINALYSIS AUTO W/SCOPE: CPT

## 2025-08-12 PROCEDURE — 87186 SC STD MICRODIL/AGAR DIL: CPT

## 2025-08-12 PROCEDURE — 94761 N-INVAS EAR/PLS OXIMETRY MLT: CPT

## 2025-08-12 PROCEDURE — 82962 GLUCOSE BLOOD TEST: CPT

## 2025-08-12 PROCEDURE — 85025 COMPLETE CBC W/AUTO DIFF WBC: CPT

## 2025-08-12 PROCEDURE — 97161 PT EVAL LOW COMPLEX 20 MIN: CPT

## 2025-08-12 PROCEDURE — 84300 ASSAY OF URINE SODIUM: CPT

## 2025-08-12 RX ORDER — FLUCONAZOLE 100 MG/1
150 TABLET ORAL ONCE
Status: COMPLETED | OUTPATIENT
Start: 2025-08-12 | End: 2025-08-12

## 2025-08-12 RX ADMIN — SODIUM CHLORIDE, PRESERVATIVE FREE 10 ML: 5 INJECTION INTRAVENOUS at 08:49

## 2025-08-12 RX ADMIN — HYDROMORPHONE HYDROCHLORIDE 0.5 MG: 1 INJECTION, SOLUTION INTRAMUSCULAR; INTRAVENOUS; SUBCUTANEOUS at 12:44

## 2025-08-12 RX ADMIN — SODIUM CHLORIDE, PRESERVATIVE FREE 10 ML: 5 INJECTION INTRAVENOUS at 04:11

## 2025-08-12 RX ADMIN — FLUCONAZOLE 150 MG: 100 TABLET ORAL at 12:44

## 2025-08-12 RX ADMIN — HYDROMORPHONE HYDROCHLORIDE 1 MG: 1 INJECTION, SOLUTION INTRAMUSCULAR; INTRAVENOUS; SUBCUTANEOUS at 21:33

## 2025-08-12 RX ADMIN — METOPROLOL TARTRATE 25 MG: 25 TABLET, FILM COATED ORAL at 21:32

## 2025-08-12 RX ADMIN — AMLODIPINE BESYLATE 10 MG: 5 TABLET ORAL at 21:33

## 2025-08-12 RX ADMIN — ROSUVASTATIN CALCIUM 10 MG: 10 TABLET, FILM COATED ORAL at 21:33

## 2025-08-12 RX ADMIN — HYDROMORPHONE HYDROCHLORIDE 1 MG: 1 INJECTION, SOLUTION INTRAMUSCULAR; INTRAVENOUS; SUBCUTANEOUS at 04:09

## 2025-08-12 RX ADMIN — CEFEPIME 2000 MG: 2 INJECTION, POWDER, FOR SOLUTION INTRAVENOUS at 21:44

## 2025-08-12 RX ADMIN — Medication: at 08:50

## 2025-08-12 RX ADMIN — CEFEPIME 1000 MG: 1 INJECTION, POWDER, FOR SOLUTION INTRAMUSCULAR; INTRAVENOUS at 09:00

## 2025-08-12 RX ADMIN — METOPROLOL TARTRATE 25 MG: 25 TABLET, FILM COATED ORAL at 08:49

## 2025-08-12 RX ADMIN — SODIUM CHLORIDE, PRESERVATIVE FREE 10 ML: 5 INJECTION INTRAVENOUS at 21:46

## 2025-08-12 RX ADMIN — Medication 16 G: at 14:11

## 2025-08-12 RX ADMIN — INSULIN GLARGINE 15 UNITS: 100 INJECTION, SOLUTION SUBCUTANEOUS at 08:48

## 2025-08-12 ASSESSMENT — PAIN SCALES - GENERAL
PAINLEVEL_OUTOF10: 0
PAINLEVEL_OUTOF10: 0
PAINLEVEL_OUTOF10: 8
PAINLEVEL_OUTOF10: 0
PAINLEVEL_OUTOF10: 7
PAINLEVEL_OUTOF10: 0
PAINLEVEL_OUTOF10: 0
PAINLEVEL_OUTOF10: 8

## 2025-08-12 ASSESSMENT — PAIN DESCRIPTION - LOCATION
LOCATION: ABDOMEN
LOCATION: BACK
LOCATION: BACK

## 2025-08-12 ASSESSMENT — PAIN - FUNCTIONAL ASSESSMENT
PAIN_FUNCTIONAL_ASSESSMENT: 0-10
PAIN_FUNCTIONAL_ASSESSMENT: ACTIVITIES ARE NOT PREVENTED
PAIN_FUNCTIONAL_ASSESSMENT: 0-10
PAIN_FUNCTIONAL_ASSESSMENT: 0-10
PAIN_FUNCTIONAL_ASSESSMENT: ACTIVITIES ARE NOT PREVENTED

## 2025-08-12 ASSESSMENT — PAIN DESCRIPTION - ORIENTATION
ORIENTATION: RIGHT
ORIENTATION: RIGHT;LEFT
ORIENTATION: POSTERIOR

## 2025-08-12 ASSESSMENT — PAIN DESCRIPTION - DESCRIPTORS
DESCRIPTORS: DISCOMFORT
DESCRIPTORS: ACHING
DESCRIPTORS: ACHING

## 2025-08-12 ASSESSMENT — PAIN DESCRIPTION - FREQUENCY: FREQUENCY: CONTINUOUS

## 2025-08-12 ASSESSMENT — PAIN DESCRIPTION - PAIN TYPE: TYPE: CHRONIC PAIN

## 2025-08-12 ASSESSMENT — PAIN DESCRIPTION - ONSET: ONSET: ON-GOING

## 2025-08-13 LAB
ALBUMIN SERPL-MCNC: 2.3 G/DL (ref 3.5–5.2)
ALBUMIN/GLOB SERPL: 0.6 (ref 1.1–2.2)
ALP SERPL-CCNC: 97 U/L (ref 35–104)
ALT SERPL-CCNC: 57 U/L (ref 10–35)
ANION GAP SERPL CALC-SCNC: 10 MMOL/L (ref 2–14)
APTT PPP: 36.2 SEC (ref 22.1–31)
AST SERPL-CCNC: 64 U/L (ref 10–35)
BASOPHILS # BLD: 0 K/UL (ref 0–0.1)
BASOPHILS NFR BLD: 0 % (ref 0–1)
BILIRUB SERPL-MCNC: 0.5 MG/DL (ref 0–1.2)
BUN SERPL-MCNC: 13 MG/DL (ref 8–23)
BUN/CREAT SERPL: 14 (ref 12–20)
CALCIUM SERPL-MCNC: 8.4 MG/DL (ref 8.8–10.2)
CHLORIDE SERPL-SCNC: 99 MMOL/L (ref 98–107)
CO2 SERPL-SCNC: 26 MMOL/L (ref 20–29)
CREAT SERPL-MCNC: 0.94 MG/DL (ref 0.6–1)
D DIMER PPP FEU-MCNC: 6.59 MG/L FEU (ref 0–0.65)
DIFFERENTIAL METHOD BLD: ABNORMAL
EOSINOPHIL # BLD: 0.03 K/UL (ref 0–0.4)
EOSINOPHIL NFR BLD: 1 % (ref 0–7)
ERYTHROCYTE [DISTWIDTH] IN BLOOD BY AUTOMATED COUNT: 12.6 % (ref 11.5–14.5)
FIBRINOGEN PPP-MCNC: >800 MG/DL (ref 200–475)
GLOBULIN SER CALC-MCNC: 3.9 G/DL (ref 2–4)
GLUCOSE BLD STRIP.AUTO-MCNC: 165 MG/DL (ref 65–117)
GLUCOSE BLD STRIP.AUTO-MCNC: 225 MG/DL (ref 65–117)
GLUCOSE BLD STRIP.AUTO-MCNC: 255 MG/DL (ref 65–117)
GLUCOSE BLD STRIP.AUTO-MCNC: 92 MG/DL (ref 65–117)
GLUCOSE SERPL-MCNC: 82 MG/DL (ref 65–100)
HCT VFR BLD AUTO: 26.2 % (ref 35–47)
HGB BLD-MCNC: 8.6 G/DL (ref 11.5–16)
IMM GRANULOCYTES # BLD AUTO: 0 K/UL
IMM GRANULOCYTES NFR BLD AUTO: 0 %
INR PPP: 1.1 (ref 0.9–1.1)
LYMPHOCYTES # BLD: 0.24 K/UL (ref 0.8–3.5)
LYMPHOCYTES NFR BLD: 7 % (ref 12–49)
MCH RBC QN AUTO: 27.5 PG (ref 26–34)
MCHC RBC AUTO-ENTMCNC: 32.8 G/DL (ref 30–36.5)
MCV RBC AUTO: 83.7 FL (ref 80–99)
MONOCYTES # BLD: 0.24 K/UL (ref 0–1)
MONOCYTES NFR BLD: 7 % (ref 5–13)
NEUTS SEG # BLD: 2.89 K/UL (ref 1.8–8)
NEUTS SEG NFR BLD: 85 % (ref 32–75)
NRBC # BLD: 0 K/UL (ref 0–0.01)
NRBC BLD-RTO: 0 PER 100 WBC
OSMOLALITY UR: 235 MOSM/KG H2O
PERIPHERAL SMEAR, MD REVIEW: NORMAL
PLATELET # BLD AUTO: 33 K/UL (ref 150–400)
PMV BLD AUTO: 11.7 FL (ref 8.9–12.9)
POTASSIUM SERPL-SCNC: 4.4 MMOL/L (ref 3.5–5.1)
PROT SERPL-MCNC: 6.1 G/DL (ref 6.4–8.3)
PROTHROMBIN TIME: 11.7 SEC (ref 9.2–11.2)
RBC # BLD AUTO: 3.13 M/UL (ref 3.8–5.2)
RBC MORPH BLD: ABNORMAL
SERVICE CMNT-IMP: ABNORMAL
SERVICE CMNT-IMP: NORMAL
SODIUM SERPL-SCNC: 134 MMOL/L (ref 136–145)
SODIUM UR-SCNC: 32 MMOL/L
THERAPEUTIC RANGE: ABNORMAL SECS (ref 58–77)
WBC # BLD AUTO: 3.4 K/UL (ref 3.6–11)

## 2025-08-13 PROCEDURE — 85025 COMPLETE CBC W/AUTO DIFF WBC: CPT

## 2025-08-13 PROCEDURE — 6370000000 HC RX 637 (ALT 250 FOR IP)

## 2025-08-13 PROCEDURE — 82962 GLUCOSE BLOOD TEST: CPT

## 2025-08-13 PROCEDURE — 36415 COLL VENOUS BLD VENIPUNCTURE: CPT

## 2025-08-13 PROCEDURE — 85730 THROMBOPLASTIN TIME PARTIAL: CPT

## 2025-08-13 PROCEDURE — 6370000000 HC RX 637 (ALT 250 FOR IP): Performed by: STUDENT IN AN ORGANIZED HEALTH CARE EDUCATION/TRAINING PROGRAM

## 2025-08-13 PROCEDURE — 51702 INSERT TEMP BLADDER CATH: CPT

## 2025-08-13 PROCEDURE — 85610 PROTHROMBIN TIME: CPT

## 2025-08-13 PROCEDURE — 6360000002 HC RX W HCPCS

## 2025-08-13 PROCEDURE — 2060000000 HC ICU INTERMEDIATE R&B

## 2025-08-13 PROCEDURE — 99232 SBSQ HOSP IP/OBS MODERATE 35: CPT

## 2025-08-13 PROCEDURE — 2500000003 HC RX 250 WO HCPCS

## 2025-08-13 PROCEDURE — 94761 N-INVAS EAR/PLS OXIMETRY MLT: CPT

## 2025-08-13 PROCEDURE — 85384 FIBRINOGEN ACTIVITY: CPT

## 2025-08-13 PROCEDURE — 80053 COMPREHEN METABOLIC PANEL: CPT

## 2025-08-13 PROCEDURE — 97116 GAIT TRAINING THERAPY: CPT

## 2025-08-13 PROCEDURE — 2580000003 HC RX 258

## 2025-08-13 PROCEDURE — 97165 OT EVAL LOW COMPLEX 30 MIN: CPT

## 2025-08-13 PROCEDURE — 97535 SELF CARE MNGMENT TRAINING: CPT

## 2025-08-13 PROCEDURE — 97530 THERAPEUTIC ACTIVITIES: CPT

## 2025-08-13 PROCEDURE — 51798 US URINE CAPACITY MEASURE: CPT

## 2025-08-13 PROCEDURE — 85379 FIBRIN DEGRADATION QUANT: CPT

## 2025-08-13 RX ORDER — SODIUM CHLORIDE, SODIUM LACTATE, POTASSIUM CHLORIDE, CALCIUM CHLORIDE 600; 310; 30; 20 MG/100ML; MG/100ML; MG/100ML; MG/100ML
INJECTION, SOLUTION INTRAVENOUS CONTINUOUS
Status: DISCONTINUED | OUTPATIENT
Start: 2025-08-13 | End: 2025-08-15

## 2025-08-13 RX ADMIN — SODIUM CHLORIDE, SODIUM LACTATE, POTASSIUM CHLORIDE, AND CALCIUM CHLORIDE: .6; .31; .03; .02 INJECTION, SOLUTION INTRAVENOUS at 22:05

## 2025-08-13 RX ADMIN — HYDROMORPHONE HYDROCHLORIDE 0.5 MG: 1 INJECTION, SOLUTION INTRAMUSCULAR; INTRAVENOUS; SUBCUTANEOUS at 04:01

## 2025-08-13 RX ADMIN — Medication: at 04:04

## 2025-08-13 RX ADMIN — Medication: at 13:54

## 2025-08-13 RX ADMIN — PIPERACILLIN AND TAZOBACTAM 3375 MG: 3; .375 INJECTION, POWDER, LYOPHILIZED, FOR SOLUTION INTRAVENOUS at 15:41

## 2025-08-13 RX ADMIN — ROSUVASTATIN CALCIUM 10 MG: 10 TABLET, FILM COATED ORAL at 21:33

## 2025-08-13 RX ADMIN — MORPHINE SULFATE 22.5 MG: 15 TABLET ORAL at 09:42

## 2025-08-13 RX ADMIN — INSULIN LISPRO 2 UNITS: 100 INJECTION, SOLUTION INTRAVENOUS; SUBCUTANEOUS at 21:38

## 2025-08-13 RX ADMIN — PIPERACILLIN AND TAZOBACTAM 4500 MG: 4; .5 INJECTION, POWDER, FOR SOLUTION INTRAVENOUS at 09:08

## 2025-08-13 RX ADMIN — PIPERACILLIN AND TAZOBACTAM 3375 MG: 3; .375 INJECTION, POWDER, LYOPHILIZED, FOR SOLUTION INTRAVENOUS at 23:37

## 2025-08-13 RX ADMIN — SODIUM CHLORIDE, PRESERVATIVE FREE 10 ML: 5 INJECTION INTRAVENOUS at 21:41

## 2025-08-13 RX ADMIN — METOPROLOL TARTRATE 25 MG: 25 TABLET, FILM COATED ORAL at 08:58

## 2025-08-13 RX ADMIN — MORPHINE SULFATE 22.5 MG: 15 TABLET ORAL at 21:34

## 2025-08-13 RX ADMIN — SODIUM CHLORIDE, PRESERVATIVE FREE 10 ML: 5 INJECTION INTRAVENOUS at 09:11

## 2025-08-13 RX ADMIN — MIRTAZAPINE 15 MG: 15 TABLET, FILM COATED ORAL at 21:33

## 2025-08-13 RX ADMIN — INSULIN LISPRO 4 UNITS: 100 INJECTION, SOLUTION INTRAVENOUS; SUBCUTANEOUS at 17:59

## 2025-08-13 ASSESSMENT — PAIN DESCRIPTION - DESCRIPTORS
DESCRIPTORS: ACHING
DESCRIPTORS: ACHING

## 2025-08-13 ASSESSMENT — PAIN SCALES - GENERAL
PAINLEVEL_OUTOF10: 8
PAINLEVEL_OUTOF10: 5
PAINLEVEL_OUTOF10: 0
PAINLEVEL_OUTOF10: 0
PAINLEVEL_OUTOF10: 8
PAINLEVEL_OUTOF10: 5
PAINLEVEL_OUTOF10: 0

## 2025-08-13 ASSESSMENT — PAIN - FUNCTIONAL ASSESSMENT
PAIN_FUNCTIONAL_ASSESSMENT: 0-10

## 2025-08-13 ASSESSMENT — PAIN DESCRIPTION - LOCATION
LOCATION: ABDOMEN
LOCATION: CHEST

## 2025-08-13 ASSESSMENT — PAIN DESCRIPTION - ORIENTATION
ORIENTATION: RIGHT
ORIENTATION: MID

## 2025-08-14 LAB
ALBUMIN SERPL-MCNC: 2 G/DL (ref 3.5–5.2)
ALBUMIN/GLOB SERPL: 0.6 (ref 1.1–2.2)
ALP SERPL-CCNC: 82 U/L (ref 35–104)
ALT SERPL-CCNC: 42 U/L (ref 10–35)
ANION GAP SERPL CALC-SCNC: 10 MMOL/L (ref 2–14)
AST SERPL-CCNC: 37 U/L (ref 10–35)
BACTERIA SPEC CULT: ABNORMAL
BASOPHILS # BLD: 0 K/UL (ref 0–0.1)
BASOPHILS NFR BLD: 0 % (ref 0–1)
BILIRUB SERPL-MCNC: 0.4 MG/DL (ref 0–1.2)
BUN SERPL-MCNC: 20 MG/DL (ref 8–23)
BUN/CREAT SERPL: 17 (ref 12–20)
CALCIUM SERPL-MCNC: 7.7 MG/DL (ref 8.8–10.2)
CC UR VC: ABNORMAL
CHLORIDE SERPL-SCNC: 98 MMOL/L (ref 98–107)
CO2 SERPL-SCNC: 24 MMOL/L (ref 20–29)
CREAT SERPL-MCNC: 1.19 MG/DL (ref 0.6–1)
DIFFERENTIAL METHOD BLD: ABNORMAL
EOSINOPHIL # BLD: 0 K/UL (ref 0–0.4)
EOSINOPHIL NFR BLD: 0 % (ref 0–7)
ERYTHROCYTE [DISTWIDTH] IN BLOOD BY AUTOMATED COUNT: 12.7 % (ref 11.5–14.5)
FERRITIN SERPL-MCNC: 775 NG/ML (ref 13–252)
GLOBULIN SER CALC-MCNC: 3.4 G/DL (ref 2–4)
GLUCOSE BLD STRIP.AUTO-MCNC: 172 MG/DL (ref 65–117)
GLUCOSE BLD STRIP.AUTO-MCNC: 182 MG/DL (ref 65–117)
GLUCOSE BLD STRIP.AUTO-MCNC: 186 MG/DL (ref 65–117)
GLUCOSE BLD STRIP.AUTO-MCNC: 274 MG/DL (ref 65–117)
GLUCOSE SERPL-MCNC: 162 MG/DL (ref 65–100)
HAPTOGLOB SERPL-MCNC: 306 MG/DL (ref 30–200)
HCT VFR BLD AUTO: 21.3 % (ref 35–47)
HCT VFR BLD AUTO: 24.7 % (ref 35–47)
HGB BLD-MCNC: 7.2 G/DL (ref 11.5–16)
HGB BLD-MCNC: 8.1 G/DL (ref 11.5–16)
IMM GRANULOCYTES # BLD AUTO: 0 K/UL (ref 0–0.04)
IMM GRANULOCYTES NFR BLD AUTO: 0 % (ref 0–0.5)
IRON SATN MFR SERPL: 12 %
IRON SERPL-MCNC: 15 UG/DL (ref 37–145)
LDH SERPL L TO P-CCNC: 154 U/L (ref 135–246)
LYMPHOCYTES # BLD: 0.26 K/UL (ref 0.8–3.5)
LYMPHOCYTES NFR BLD: 8 % (ref 12–49)
MCH RBC QN AUTO: 27.8 PG (ref 26–34)
MCHC RBC AUTO-ENTMCNC: 33.8 G/DL (ref 30–36.5)
MCV RBC AUTO: 82.2 FL (ref 80–99)
MONOCYTES # BLD: 0.06 K/UL (ref 0–1)
MONOCYTES NFR BLD: 2 % (ref 5–13)
NEUTS SEG # BLD: 2.88 K/UL (ref 1.8–8)
NEUTS SEG NFR BLD: 90 % (ref 32–75)
NRBC # BLD: 0 K/UL (ref 0–0.01)
NRBC BLD-RTO: 0 PER 100 WBC
PLATELET # BLD AUTO: 29 K/UL (ref 150–400)
PLATELET COMMENT: ABNORMAL
PMV BLD AUTO: 12.1 FL (ref 8.9–12.9)
POTASSIUM SERPL-SCNC: 3.8 MMOL/L (ref 3.5–5.1)
PROT SERPL-MCNC: 5.4 G/DL (ref 6.4–8.3)
RBC # BLD AUTO: 2.59 M/UL (ref 3.8–5.2)
RBC MORPH BLD: ABNORMAL
RETICS # AUTO: 0.02 M/UL (ref 0.02–0.08)
RETICS/RBC NFR AUTO: 0.9 % (ref 0.7–2.1)
SERVICE CMNT-IMP: ABNORMAL
SODIUM SERPL-SCNC: 132 MMOL/L (ref 136–145)
TIBC SERPL-MCNC: 129 UG/DL (ref 250–450)
UIBC SERPL-MCNC: 114 UG/DL (ref 112–347)
WBC # BLD AUTO: 3.2 K/UL (ref 3.6–11)

## 2025-08-14 PROCEDURE — 83540 ASSAY OF IRON: CPT

## 2025-08-14 PROCEDURE — 94761 N-INVAS EAR/PLS OXIMETRY MLT: CPT

## 2025-08-14 PROCEDURE — 6370000000 HC RX 637 (ALT 250 FOR IP)

## 2025-08-14 PROCEDURE — 97530 THERAPEUTIC ACTIVITIES: CPT

## 2025-08-14 PROCEDURE — 99233 SBSQ HOSP IP/OBS HIGH 50: CPT | Performed by: STUDENT IN AN ORGANIZED HEALTH CARE EDUCATION/TRAINING PROGRAM

## 2025-08-14 PROCEDURE — 82962 GLUCOSE BLOOD TEST: CPT

## 2025-08-14 PROCEDURE — 51702 INSERT TEMP BLADDER CATH: CPT

## 2025-08-14 PROCEDURE — 99232 SBSQ HOSP IP/OBS MODERATE 35: CPT | Performed by: NURSE PRACTITIONER

## 2025-08-14 PROCEDURE — 36410 VNPNXR 3YR/> PHY/QHP DX/THER: CPT

## 2025-08-14 PROCEDURE — 36415 COLL VENOUS BLD VENIPUNCTURE: CPT

## 2025-08-14 PROCEDURE — 85025 COMPLETE CBC W/AUTO DIFF WBC: CPT

## 2025-08-14 PROCEDURE — 6360000002 HC RX W HCPCS

## 2025-08-14 PROCEDURE — 83615 LACTATE (LD) (LDH) ENZYME: CPT

## 2025-08-14 PROCEDURE — 85018 HEMOGLOBIN: CPT

## 2025-08-14 PROCEDURE — 85045 AUTOMATED RETICULOCYTE COUNT: CPT

## 2025-08-14 PROCEDURE — 99232 SBSQ HOSP IP/OBS MODERATE 35: CPT

## 2025-08-14 PROCEDURE — 2500000003 HC RX 250 WO HCPCS

## 2025-08-14 PROCEDURE — 83550 IRON BINDING TEST: CPT

## 2025-08-14 PROCEDURE — 05HC33Z INSERTION OF INFUSION DEVICE INTO LEFT BASILIC VEIN, PERCUTANEOUS APPROACH: ICD-10-PCS

## 2025-08-14 PROCEDURE — 83010 ASSAY OF HAPTOGLOBIN QUANT: CPT

## 2025-08-14 PROCEDURE — 2580000003 HC RX 258

## 2025-08-14 PROCEDURE — 6370000000 HC RX 637 (ALT 250 FOR IP): Performed by: STUDENT IN AN ORGANIZED HEALTH CARE EDUCATION/TRAINING PROGRAM

## 2025-08-14 PROCEDURE — 2060000000 HC ICU INTERMEDIATE R&B

## 2025-08-14 PROCEDURE — 80053 COMPREHEN METABOLIC PANEL: CPT

## 2025-08-14 PROCEDURE — 82728 ASSAY OF FERRITIN: CPT

## 2025-08-14 PROCEDURE — 85014 HEMATOCRIT: CPT

## 2025-08-14 RX ORDER — SODIUM CHLORIDE 0.9 % (FLUSH) 0.9 %
5-40 SYRINGE (ML) INJECTION PRN
Status: DISCONTINUED | OUTPATIENT
Start: 2025-08-14 | End: 2025-08-19 | Stop reason: HOSPADM

## 2025-08-14 RX ORDER — LIDOCAINE HYDROCHLORIDE 10 MG/ML
50 INJECTION, SOLUTION EPIDURAL; INFILTRATION; INTRACAUDAL; PERINEURAL ONCE
Status: DISCONTINUED | OUTPATIENT
Start: 2025-08-14 | End: 2025-08-19 | Stop reason: HOSPADM

## 2025-08-14 RX ORDER — SODIUM CHLORIDE 0.9 % (FLUSH) 0.9 %
5-40 SYRINGE (ML) INJECTION EVERY 12 HOURS SCHEDULED
Status: DISCONTINUED | OUTPATIENT
Start: 2025-08-14 | End: 2025-08-19 | Stop reason: HOSPADM

## 2025-08-14 RX ORDER — SODIUM CHLORIDE 9 MG/ML
INJECTION, SOLUTION INTRAVENOUS PRN
Status: DISCONTINUED | OUTPATIENT
Start: 2025-08-14 | End: 2025-08-19 | Stop reason: HOSPADM

## 2025-08-14 RX ADMIN — Medication: at 09:17

## 2025-08-14 RX ADMIN — PIPERACILLIN AND TAZOBACTAM 3375 MG: 3; .375 INJECTION, POWDER, LYOPHILIZED, FOR SOLUTION INTRAVENOUS at 15:37

## 2025-08-14 RX ADMIN — MIRTAZAPINE 15 MG: 15 TABLET, FILM COATED ORAL at 20:30

## 2025-08-14 RX ADMIN — SODIUM CHLORIDE, SODIUM LACTATE, POTASSIUM CHLORIDE, AND CALCIUM CHLORIDE: .6; .31; .03; .02 INJECTION, SOLUTION INTRAVENOUS at 09:16

## 2025-08-14 RX ADMIN — MORPHINE SULFATE 22.5 MG: 15 TABLET ORAL at 20:29

## 2025-08-14 RX ADMIN — INSULIN LISPRO 2 UNITS: 100 INJECTION, SOLUTION INTRAVENOUS; SUBCUTANEOUS at 12:55

## 2025-08-14 RX ADMIN — INSULIN LISPRO 2 UNITS: 100 INJECTION, SOLUTION INTRAVENOUS; SUBCUTANEOUS at 09:17

## 2025-08-14 RX ADMIN — ROSUVASTATIN CALCIUM 10 MG: 10 TABLET, FILM COATED ORAL at 20:31

## 2025-08-14 RX ADMIN — INSULIN LISPRO 4 UNITS: 100 INJECTION, SOLUTION INTRAVENOUS; SUBCUTANEOUS at 17:15

## 2025-08-14 RX ADMIN — MORPHINE SULFATE 22.5 MG: 15 TABLET ORAL at 15:44

## 2025-08-14 RX ADMIN — PIPERACILLIN AND TAZOBACTAM 3375 MG: 3; .375 INJECTION, POWDER, LYOPHILIZED, FOR SOLUTION INTRAVENOUS at 06:57

## 2025-08-14 RX ADMIN — SODIUM CHLORIDE, PRESERVATIVE FREE 10 ML: 5 INJECTION INTRAVENOUS at 09:17

## 2025-08-14 RX ADMIN — METOPROLOL TARTRATE 25 MG: 25 TABLET, FILM COATED ORAL at 20:31

## 2025-08-14 RX ADMIN — PIPERACILLIN AND TAZOBACTAM 3375 MG: 3; .375 INJECTION, POWDER, LYOPHILIZED, FOR SOLUTION INTRAVENOUS at 23:54

## 2025-08-14 ASSESSMENT — PAIN DESCRIPTION - LOCATION
LOCATION: OTHER (COMMENT)
LOCATION: ABDOMEN;ARM

## 2025-08-14 ASSESSMENT — PAIN - FUNCTIONAL ASSESSMENT
PAIN_FUNCTIONAL_ASSESSMENT: 0-10
PAIN_FUNCTIONAL_ASSESSMENT: 0-10

## 2025-08-14 ASSESSMENT — PAIN DESCRIPTION - DESCRIPTORS
DESCRIPTORS: ACHING
DESCRIPTORS: ACHING

## 2025-08-14 ASSESSMENT — PAIN SCALES - GENERAL
PAINLEVEL_OUTOF10: 7
PAINLEVEL_OUTOF10: 0
PAINLEVEL_OUTOF10: 7
PAINLEVEL_OUTOF10: 9
PAINLEVEL_OUTOF10: 0

## 2025-08-14 ASSESSMENT — PAIN DESCRIPTION - ORIENTATION
ORIENTATION: LEFT
ORIENTATION: LEFT

## 2025-08-15 ENCOUNTER — APPOINTMENT (OUTPATIENT)
Facility: HOSPITAL | Age: 80
DRG: 682 | End: 2025-08-15
Payer: MEDICARE

## 2025-08-15 PROBLEM — Z71.89 DNR (DO NOT RESUSCITATE) DISCUSSION: Status: ACTIVE | Noted: 2017-04-04

## 2025-08-15 PROBLEM — K59.00 CONSTIPATION: Status: ACTIVE | Noted: 2025-08-15

## 2025-08-15 PROBLEM — R06.02 SHORTNESS OF BREATH: Status: ACTIVE | Noted: 2025-08-15

## 2025-08-15 PROBLEM — Z51.5 PALLIATIVE CARE BY SPECIALIST: Status: ACTIVE | Noted: 2025-08-15

## 2025-08-15 PROBLEM — Z71.89 GOALS OF CARE, COUNSELING/DISCUSSION: Status: ACTIVE | Noted: 2025-08-15

## 2025-08-15 PROBLEM — G89.3 CANCER ASSOCIATED PAIN: Status: ACTIVE | Noted: 2025-08-15

## 2025-08-15 PROBLEM — Z71.89 ADVANCED CARE PLANNING/COUNSELING DISCUSSION: Status: ACTIVE | Noted: 2025-08-15

## 2025-08-15 LAB
ALBUMIN SERPL-MCNC: 2 G/DL (ref 3.5–5.2)
ALBUMIN/GLOB SERPL: 0.5 (ref 1.1–2.2)
ALP SERPL-CCNC: 83 U/L (ref 35–104)
ALT SERPL-CCNC: 47 U/L (ref 10–35)
ANION GAP SERPL CALC-SCNC: 10 MMOL/L (ref 2–14)
AST SERPL-CCNC: 46 U/L (ref 10–35)
BASOPHILS # BLD: 0 K/UL (ref 0–0.1)
BASOPHILS NFR BLD: 0 % (ref 0–1)
BILIRUB SERPL-MCNC: 0.3 MG/DL (ref 0–1.2)
BUN SERPL-MCNC: 19 MG/DL (ref 8–23)
BUN/CREAT SERPL: 16 (ref 12–20)
CALCIUM SERPL-MCNC: 7.8 MG/DL (ref 8.8–10.2)
CHLORIDE SERPL-SCNC: 99 MMOL/L (ref 98–107)
CO2 SERPL-SCNC: 24 MMOL/L (ref 20–29)
CREAT SERPL-MCNC: 1.16 MG/DL (ref 0.6–1)
DIFFERENTIAL METHOD BLD: ABNORMAL
EOSINOPHIL # BLD: 0.03 K/UL (ref 0–0.4)
EOSINOPHIL NFR BLD: 1 % (ref 0–7)
ERYTHROCYTE [DISTWIDTH] IN BLOOD BY AUTOMATED COUNT: 12.7 % (ref 11.5–14.5)
GLOBULIN SER CALC-MCNC: 3.6 G/DL (ref 2–4)
GLUCOSE BLD STRIP.AUTO-MCNC: 178 MG/DL (ref 65–117)
GLUCOSE BLD STRIP.AUTO-MCNC: 195 MG/DL (ref 65–117)
GLUCOSE BLD STRIP.AUTO-MCNC: 221 MG/DL (ref 65–117)
GLUCOSE BLD STRIP.AUTO-MCNC: 325 MG/DL (ref 65–117)
GLUCOSE SERPL-MCNC: 204 MG/DL (ref 65–100)
HCT VFR BLD AUTO: 21.9 % (ref 35–47)
HCT VFR BLD AUTO: 22.4 % (ref 35–47)
HGB BLD-MCNC: 7.1 G/DL (ref 11.5–16)
HGB BLD-MCNC: 7.4 G/DL (ref 11.5–16)
IMM GRANULOCYTES # BLD AUTO: 0 K/UL (ref 0–0.04)
IMM GRANULOCYTES NFR BLD AUTO: 0 % (ref 0–0.5)
LYMPHOCYTES # BLD: 0.26 K/UL (ref 0.8–3.5)
LYMPHOCYTES NFR BLD: 8 % (ref 12–49)
MCH RBC QN AUTO: 28.4 PG (ref 26–34)
MCHC RBC AUTO-ENTMCNC: 32.4 G/DL (ref 30–36.5)
MCV RBC AUTO: 87.6 FL (ref 80–99)
METAMYELOCYTES NFR BLD MANUAL: 1 %
MONOCYTES # BLD: 0.03 K/UL (ref 0–1)
MONOCYTES NFR BLD: 1 % (ref 5–13)
NEUTS BAND NFR BLD MANUAL: 1 %
NEUTS SEG # BLD: 2.94 K/UL (ref 1.8–8)
NEUTS SEG NFR BLD: 88 % (ref 32–75)
NRBC # BLD: 0 K/UL (ref 0–0.01)
NRBC BLD-RTO: 0 PER 100 WBC
PLATELET # BLD AUTO: 58 K/UL (ref 150–400)
PLATELET COMMENT: ABNORMAL
PMV BLD AUTO: 10.7 FL (ref 8.9–12.9)
POTASSIUM SERPL-SCNC: 4.2 MMOL/L (ref 3.5–5.1)
PROT SERPL-MCNC: 5.5 G/DL (ref 6.4–8.3)
RBC # BLD AUTO: 2.5 M/UL (ref 3.8–5.2)
RBC MORPH BLD: ABNORMAL
SERVICE CMNT-IMP: ABNORMAL
SODIUM SERPL-SCNC: 133 MMOL/L (ref 136–145)
WBC # BLD AUTO: 3.3 K/UL (ref 3.6–11)

## 2025-08-15 PROCEDURE — 85025 COMPLETE CBC W/AUTO DIFF WBC: CPT

## 2025-08-15 PROCEDURE — 99233 SBSQ HOSP IP/OBS HIGH 50: CPT | Performed by: FAMILY MEDICINE

## 2025-08-15 PROCEDURE — 99497 ADVNCD CARE PLAN 30 MIN: CPT | Performed by: STUDENT IN AN ORGANIZED HEALTH CARE EDUCATION/TRAINING PROGRAM

## 2025-08-15 PROCEDURE — 2580000003 HC RX 258

## 2025-08-15 PROCEDURE — 82962 GLUCOSE BLOOD TEST: CPT

## 2025-08-15 PROCEDURE — 6370000000 HC RX 637 (ALT 250 FOR IP): Performed by: STUDENT IN AN ORGANIZED HEALTH CARE EDUCATION/TRAINING PROGRAM

## 2025-08-15 PROCEDURE — 97530 THERAPEUTIC ACTIVITIES: CPT

## 2025-08-15 PROCEDURE — 36415 COLL VENOUS BLD VENIPUNCTURE: CPT

## 2025-08-15 PROCEDURE — 6360000002 HC RX W HCPCS

## 2025-08-15 PROCEDURE — 80053 COMPREHEN METABOLIC PANEL: CPT

## 2025-08-15 PROCEDURE — 1100000000 HC RM PRIVATE

## 2025-08-15 PROCEDURE — 97116 GAIT TRAINING THERAPY: CPT

## 2025-08-15 PROCEDURE — 94761 N-INVAS EAR/PLS OXIMETRY MLT: CPT

## 2025-08-15 PROCEDURE — 85014 HEMATOCRIT: CPT

## 2025-08-15 PROCEDURE — 99233 SBSQ HOSP IP/OBS HIGH 50: CPT | Performed by: NURSE PRACTITIONER

## 2025-08-15 PROCEDURE — 2500000003 HC RX 250 WO HCPCS

## 2025-08-15 PROCEDURE — 6370000000 HC RX 637 (ALT 250 FOR IP)

## 2025-08-15 PROCEDURE — 76604 US EXAM CHEST: CPT

## 2025-08-15 PROCEDURE — 85018 HEMOGLOBIN: CPT

## 2025-08-15 PROCEDURE — 99233 SBSQ HOSP IP/OBS HIGH 50: CPT | Performed by: STUDENT IN AN ORGANIZED HEALTH CARE EDUCATION/TRAINING PROGRAM

## 2025-08-15 PROCEDURE — 51702 INSERT TEMP BLADDER CATH: CPT

## 2025-08-15 RX ADMIN — SODIUM CHLORIDE, SODIUM LACTATE, POTASSIUM CHLORIDE, AND CALCIUM CHLORIDE: .6; .31; .03; .02 INJECTION, SOLUTION INTRAVENOUS at 02:51

## 2025-08-15 RX ADMIN — MORPHINE SULFATE 22.5 MG: 15 TABLET ORAL at 03:24

## 2025-08-15 RX ADMIN — Medication: at 08:49

## 2025-08-15 RX ADMIN — METOPROLOL TARTRATE 25 MG: 25 TABLET, FILM COATED ORAL at 08:59

## 2025-08-15 RX ADMIN — MIRTAZAPINE 15 MG: 15 TABLET, FILM COATED ORAL at 20:57

## 2025-08-15 RX ADMIN — PIPERACILLIN AND TAZOBACTAM 3375 MG: 3; .375 INJECTION, POWDER, LYOPHILIZED, FOR SOLUTION INTRAVENOUS at 06:24

## 2025-08-15 RX ADMIN — ACETAMINOPHEN 650 MG: 325 TABLET ORAL at 14:54

## 2025-08-15 RX ADMIN — ROSUVASTATIN CALCIUM 10 MG: 10 TABLET, FILM COATED ORAL at 20:56

## 2025-08-15 RX ADMIN — SODIUM CHLORIDE, PRESERVATIVE FREE 10 ML: 5 INJECTION INTRAVENOUS at 21:00

## 2025-08-15 RX ADMIN — INSULIN LISPRO 6 UNITS: 100 INJECTION, SOLUTION INTRAVENOUS; SUBCUTANEOUS at 16:53

## 2025-08-15 RX ADMIN — SODIUM CHLORIDE, PRESERVATIVE FREE 10 ML: 5 INJECTION INTRAVENOUS at 22:02

## 2025-08-15 RX ADMIN — INSULIN LISPRO 6 UNITS: 100 INJECTION, SOLUTION INTRAVENOUS; SUBCUTANEOUS at 21:40

## 2025-08-15 RX ADMIN — Medication: at 21:00

## 2025-08-15 RX ADMIN — SODIUM CHLORIDE: 0.9 INJECTION, SOLUTION INTRAVENOUS at 06:21

## 2025-08-15 RX ADMIN — METOPROLOL TARTRATE 25 MG: 25 TABLET, FILM COATED ORAL at 20:56

## 2025-08-15 RX ADMIN — SODIUM CHLORIDE, PRESERVATIVE FREE 10 ML: 5 INJECTION INTRAVENOUS at 08:49

## 2025-08-15 RX ADMIN — INSULIN LISPRO 2 UNITS: 100 INJECTION, SOLUTION INTRAVENOUS; SUBCUTANEOUS at 08:49

## 2025-08-15 ASSESSMENT — PAIN SCALES - GENERAL
PAINLEVEL_OUTOF10: 0
PAINLEVEL_OUTOF10: 7
PAINLEVEL_OUTOF10: 3
PAINLEVEL_OUTOF10: 0

## 2025-08-15 ASSESSMENT — PAIN DESCRIPTION - DESCRIPTORS
DESCRIPTORS: DULL
DESCRIPTORS: ACHING

## 2025-08-15 ASSESSMENT — PAIN DESCRIPTION - LOCATION
LOCATION: CHEST;ABDOMEN
LOCATION: ANKLE

## 2025-08-15 ASSESSMENT — PAIN - FUNCTIONAL ASSESSMENT
PAIN_FUNCTIONAL_ASSESSMENT: 0-10
PAIN_FUNCTIONAL_ASSESSMENT: 0-10
PAIN_FUNCTIONAL_ASSESSMENT: ACTIVITIES ARE NOT PREVENTED

## 2025-08-15 ASSESSMENT — PAIN DESCRIPTION - ORIENTATION
ORIENTATION: LEFT
ORIENTATION: RIGHT

## 2025-08-16 LAB
ALBUMIN SERPL-MCNC: 2.2 G/DL (ref 3.5–5.2)
ALBUMIN/GLOB SERPL: 0.7 (ref 1.1–2.2)
ALP SERPL-CCNC: 97 U/L (ref 35–104)
ALT SERPL-CCNC: 43 U/L (ref 10–35)
ANION GAP SERPL CALC-SCNC: 12 MMOL/L (ref 2–14)
AST SERPL-CCNC: 33 U/L (ref 10–35)
BASOPHILS # BLD: 0 K/UL (ref 0–0.1)
BASOPHILS NFR BLD: 0 % (ref 0–1)
BILIRUB SERPL-MCNC: 0.4 MG/DL (ref 0–1.2)
BUN SERPL-MCNC: 20 MG/DL (ref 8–23)
BUN/CREAT SERPL: 19 (ref 12–20)
CALCIUM SERPL-MCNC: 8.1 MG/DL (ref 8.8–10.2)
CHLORIDE SERPL-SCNC: 98 MMOL/L (ref 98–107)
CO2 SERPL-SCNC: 24 MMOL/L (ref 20–29)
CREAT SERPL-MCNC: 1.02 MG/DL (ref 0.6–1)
DIFFERENTIAL METHOD BLD: ABNORMAL
EOSINOPHIL # BLD: 0.09 K/UL (ref 0–0.4)
EOSINOPHIL NFR BLD: 2 % (ref 0–7)
ERYTHROCYTE [DISTWIDTH] IN BLOOD BY AUTOMATED COUNT: 13 % (ref 11.5–14.5)
GLOBULIN SER CALC-MCNC: 3.2 G/DL (ref 2–4)
GLUCOSE BLD STRIP.AUTO-MCNC: 175 MG/DL (ref 65–117)
GLUCOSE BLD STRIP.AUTO-MCNC: 214 MG/DL (ref 65–117)
GLUCOSE BLD STRIP.AUTO-MCNC: 241 MG/DL (ref 65–117)
GLUCOSE BLD STRIP.AUTO-MCNC: 245 MG/DL (ref 65–117)
GLUCOSE BLD STRIP.AUTO-MCNC: 283 MG/DL (ref 65–117)
GLUCOSE SERPL-MCNC: 118 MG/DL (ref 65–100)
HCT VFR BLD AUTO: 22.9 % (ref 35–47)
HGB BLD-MCNC: 7.6 G/DL (ref 11.5–16)
IMM GRANULOCYTES # BLD AUTO: 0 K/UL (ref 0–0.04)
IMM GRANULOCYTES NFR BLD AUTO: 0 % (ref 0–0.5)
LYMPHOCYTES # BLD: 0.09 K/UL (ref 0.8–3.5)
LYMPHOCYTES NFR BLD: 2 % (ref 12–49)
MCH RBC QN AUTO: 28.4 PG (ref 26–34)
MCHC RBC AUTO-ENTMCNC: 33.2 G/DL (ref 30–36.5)
MCV RBC AUTO: 85.4 FL (ref 80–99)
MONOCYTES # BLD: 0.23 K/UL (ref 0–1)
MONOCYTES NFR BLD: 5 % (ref 5–13)
NEUTS BAND NFR BLD MANUAL: 3 %
NEUTS SEG # BLD: 4.09 K/UL (ref 1.8–8)
NEUTS SEG NFR BLD: 88 % (ref 32–75)
NRBC # BLD: 0 K/UL (ref 0–0.01)
NRBC BLD-RTO: 0 PER 100 WBC
PLATELET # BLD AUTO: 119 K/UL (ref 150–400)
PMV BLD AUTO: 11.3 FL (ref 8.9–12.9)
POTASSIUM SERPL-SCNC: 4.1 MMOL/L (ref 3.5–5.1)
PROT SERPL-MCNC: 5.4 G/DL (ref 6.4–8.3)
RBC # BLD AUTO: 2.68 M/UL (ref 3.8–5.2)
RBC MORPH BLD: ABNORMAL
SERVICE CMNT-IMP: ABNORMAL
SODIUM SERPL-SCNC: 135 MMOL/L (ref 136–145)
WBC # BLD AUTO: 4.5 K/UL (ref 3.6–11)

## 2025-08-16 PROCEDURE — 85025 COMPLETE CBC W/AUTO DIFF WBC: CPT

## 2025-08-16 PROCEDURE — 1100000000 HC RM PRIVATE

## 2025-08-16 PROCEDURE — 82962 GLUCOSE BLOOD TEST: CPT

## 2025-08-16 PROCEDURE — 6370000000 HC RX 637 (ALT 250 FOR IP)

## 2025-08-16 PROCEDURE — 99232 SBSQ HOSP IP/OBS MODERATE 35: CPT | Performed by: FAMILY MEDICINE

## 2025-08-16 PROCEDURE — 6360000002 HC RX W HCPCS

## 2025-08-16 PROCEDURE — 2500000003 HC RX 250 WO HCPCS

## 2025-08-16 PROCEDURE — 94761 N-INVAS EAR/PLS OXIMETRY MLT: CPT

## 2025-08-16 PROCEDURE — 36415 COLL VENOUS BLD VENIPUNCTURE: CPT

## 2025-08-16 PROCEDURE — 80053 COMPREHEN METABOLIC PANEL: CPT

## 2025-08-16 RX ORDER — MEGESTROL ACETATE 20 MG/1
20 TABLET ORAL
Status: DISCONTINUED | OUTPATIENT
Start: 2025-08-16 | End: 2025-08-19 | Stop reason: HOSPADM

## 2025-08-16 RX ORDER — INSULIN GLARGINE 100 [IU]/ML
7 INJECTION, SOLUTION SUBCUTANEOUS NIGHTLY
Status: DISCONTINUED | OUTPATIENT
Start: 2025-08-16 | End: 2025-08-18

## 2025-08-16 RX ADMIN — ENOXAPARIN SODIUM 30 MG: 100 INJECTION SUBCUTANEOUS at 21:26

## 2025-08-16 RX ADMIN — SODIUM CHLORIDE, PRESERVATIVE FREE 10 ML: 5 INJECTION INTRAVENOUS at 08:46

## 2025-08-16 RX ADMIN — POLYETHYLENE GLYCOL 3350 17 G: 17 POWDER, FOR SOLUTION ORAL at 08:47

## 2025-08-16 RX ADMIN — MIRTAZAPINE 15 MG: 15 TABLET, FILM COATED ORAL at 21:27

## 2025-08-16 RX ADMIN — INSULIN LISPRO 2 UNITS: 100 INJECTION, SOLUTION INTRAVENOUS; SUBCUTANEOUS at 21:51

## 2025-08-16 RX ADMIN — ACETAMINOPHEN 650 MG: 325 TABLET ORAL at 16:35

## 2025-08-16 RX ADMIN — INSULIN GLARGINE 7 UNITS: 100 INJECTION, SOLUTION SUBCUTANEOUS at 21:26

## 2025-08-16 RX ADMIN — INSULIN LISPRO 4 UNITS: 100 INJECTION, SOLUTION INTRAVENOUS; SUBCUTANEOUS at 15:15

## 2025-08-16 RX ADMIN — METOPROLOL TARTRATE 25 MG: 25 TABLET, FILM COATED ORAL at 21:26

## 2025-08-16 RX ADMIN — Medication: at 08:47

## 2025-08-16 RX ADMIN — ROSUVASTATIN CALCIUM 10 MG: 10 TABLET, FILM COATED ORAL at 21:26

## 2025-08-16 RX ADMIN — SODIUM CHLORIDE, PRESERVATIVE FREE 10 ML: 5 INJECTION INTRAVENOUS at 21:33

## 2025-08-16 RX ADMIN — METOPROLOL TARTRATE 25 MG: 25 TABLET, FILM COATED ORAL at 08:43

## 2025-08-16 RX ADMIN — SODIUM CHLORIDE, PRESERVATIVE FREE 10 ML: 5 INJECTION INTRAVENOUS at 21:32

## 2025-08-16 RX ADMIN — SODIUM CHLORIDE, PRESERVATIVE FREE 10 ML: 5 INJECTION INTRAVENOUS at 08:44

## 2025-08-16 ASSESSMENT — PAIN SCALES - GENERAL
PAINLEVEL_OUTOF10: 2
PAINLEVEL_OUTOF10: 0

## 2025-08-16 ASSESSMENT — PAIN DESCRIPTION - DESCRIPTORS: DESCRIPTORS: DULL

## 2025-08-16 ASSESSMENT — PAIN DESCRIPTION - LOCATION: LOCATION: HEAD

## 2025-08-16 ASSESSMENT — PAIN - FUNCTIONAL ASSESSMENT
PAIN_FUNCTIONAL_ASSESSMENT: 0-10
PAIN_FUNCTIONAL_ASSESSMENT: ACTIVITIES ARE NOT PREVENTED

## 2025-08-16 ASSESSMENT — PAIN DESCRIPTION - ORIENTATION: ORIENTATION: ANTERIOR

## 2025-08-17 LAB
ALBUMIN SERPL-MCNC: 2.3 G/DL (ref 3.5–5.2)
ALBUMIN/GLOB SERPL: 0.7 (ref 1.1–2.2)
ALP SERPL-CCNC: 111 U/L (ref 35–104)
ALT SERPL-CCNC: 55 U/L (ref 10–35)
ANION GAP SERPL CALC-SCNC: 11 MMOL/L (ref 2–14)
AST SERPL-CCNC: 45 U/L (ref 10–35)
BASOPHILS # BLD: 0 K/UL (ref 0–0.1)
BASOPHILS NFR BLD: 0 % (ref 0–1)
BILIRUB SERPL-MCNC: 0.5 MG/DL (ref 0–1.2)
BUN SERPL-MCNC: 9 MG/DL (ref 8–23)
BUN/CREAT SERPL: 13 (ref 12–20)
CALCIUM SERPL-MCNC: 8 MG/DL (ref 8.8–10.2)
CHLORIDE SERPL-SCNC: 96 MMOL/L (ref 98–107)
CO2 SERPL-SCNC: 26 MMOL/L (ref 20–29)
CREAT SERPL-MCNC: 0.69 MG/DL (ref 0.6–1)
DIFFERENTIAL METHOD BLD: ABNORMAL
EOSINOPHIL # BLD: 0 K/UL (ref 0–0.4)
EOSINOPHIL NFR BLD: 0 % (ref 0–7)
ERYTHROCYTE [DISTWIDTH] IN BLOOD BY AUTOMATED COUNT: 12.9 % (ref 11.5–14.5)
GLOBULIN SER CALC-MCNC: 3.3 G/DL (ref 2–4)
GLUCOSE BLD STRIP.AUTO-MCNC: 169 MG/DL (ref 65–117)
GLUCOSE BLD STRIP.AUTO-MCNC: 185 MG/DL (ref 65–117)
GLUCOSE BLD STRIP.AUTO-MCNC: 197 MG/DL (ref 65–117)
GLUCOSE BLD STRIP.AUTO-MCNC: 213 MG/DL (ref 65–117)
GLUCOSE SERPL-MCNC: 173 MG/DL (ref 65–100)
HCT VFR BLD AUTO: 24 % (ref 35–47)
HGB BLD-MCNC: 8 G/DL (ref 11.5–16)
IMM GRANULOCYTES # BLD AUTO: 0 K/UL
IMM GRANULOCYTES NFR BLD AUTO: 0 %
LYMPHOCYTES # BLD: 0.2 K/UL (ref 0.8–3.5)
LYMPHOCYTES NFR BLD: 4 % (ref 12–49)
MCH RBC QN AUTO: 28 PG (ref 26–34)
MCHC RBC AUTO-ENTMCNC: 33.3 G/DL (ref 30–36.5)
MCV RBC AUTO: 83.9 FL (ref 80–99)
MONOCYTES # BLD: 0.25 K/UL (ref 0–1)
MONOCYTES NFR BLD: 5 % (ref 5–13)
NEUTS SEG # BLD: 4.45 K/UL (ref 1.8–8)
NEUTS SEG NFR BLD: 91 % (ref 32–75)
NRBC # BLD: 0 K/UL (ref 0–0.01)
NRBC BLD-RTO: 0 PER 100 WBC
PLATELET # BLD AUTO: 247 K/UL (ref 150–400)
PMV BLD AUTO: 10.3 FL (ref 8.9–12.9)
POTASSIUM SERPL-SCNC: 3.8 MMOL/L (ref 3.5–5.1)
PROT SERPL-MCNC: 5.6 G/DL (ref 6.4–8.3)
RBC # BLD AUTO: 2.86 M/UL (ref 3.8–5.2)
RBC MORPH BLD: ABNORMAL
SERVICE CMNT-IMP: ABNORMAL
SODIUM SERPL-SCNC: 133 MMOL/L (ref 136–145)
WBC # BLD AUTO: 4.9 K/UL (ref 3.6–11)

## 2025-08-17 PROCEDURE — 6370000000 HC RX 637 (ALT 250 FOR IP)

## 2025-08-17 PROCEDURE — 6370000000 HC RX 637 (ALT 250 FOR IP): Performed by: STUDENT IN AN ORGANIZED HEALTH CARE EDUCATION/TRAINING PROGRAM

## 2025-08-17 PROCEDURE — 6370000000 HC RX 637 (ALT 250 FOR IP): Performed by: FAMILY MEDICINE

## 2025-08-17 PROCEDURE — 85025 COMPLETE CBC W/AUTO DIFF WBC: CPT

## 2025-08-17 PROCEDURE — 80053 COMPREHEN METABOLIC PANEL: CPT

## 2025-08-17 PROCEDURE — 94761 N-INVAS EAR/PLS OXIMETRY MLT: CPT

## 2025-08-17 PROCEDURE — 2500000003 HC RX 250 WO HCPCS

## 2025-08-17 PROCEDURE — 6360000002 HC RX W HCPCS: Performed by: FAMILY MEDICINE

## 2025-08-17 PROCEDURE — 82962 GLUCOSE BLOOD TEST: CPT

## 2025-08-17 PROCEDURE — 36415 COLL VENOUS BLD VENIPUNCTURE: CPT

## 2025-08-17 PROCEDURE — 1100000000 HC RM PRIVATE

## 2025-08-17 PROCEDURE — 99232 SBSQ HOSP IP/OBS MODERATE 35: CPT | Performed by: FAMILY MEDICINE

## 2025-08-17 RX ORDER — ENOXAPARIN SODIUM 100 MG/ML
40 INJECTION SUBCUTANEOUS DAILY
Status: DISCONTINUED | OUTPATIENT
Start: 2025-08-17 | End: 2025-08-19 | Stop reason: HOSPADM

## 2025-08-17 RX ADMIN — Medication: at 21:28

## 2025-08-17 RX ADMIN — MORPHINE SULFATE 22.5 MG: 15 TABLET ORAL at 01:07

## 2025-08-17 RX ADMIN — INSULIN GLARGINE 7 UNITS: 100 INJECTION, SOLUTION SUBCUTANEOUS at 21:30

## 2025-08-17 RX ADMIN — ENOXAPARIN SODIUM 40 MG: 100 INJECTION SUBCUTANEOUS at 21:29

## 2025-08-17 RX ADMIN — INSULIN LISPRO 2 UNITS: 100 INJECTION, SOLUTION INTRAVENOUS; SUBCUTANEOUS at 21:30

## 2025-08-17 RX ADMIN — Medication: at 11:59

## 2025-08-17 RX ADMIN — INSULIN LISPRO 2 UNITS: 100 INJECTION, SOLUTION INTRAVENOUS; SUBCUTANEOUS at 08:26

## 2025-08-17 RX ADMIN — ASPIRIN 81 MG: 81 TABLET, CHEWABLE ORAL at 08:25

## 2025-08-17 RX ADMIN — INSULIN LISPRO 2 UNITS: 100 INJECTION, SOLUTION INTRAVENOUS; SUBCUTANEOUS at 16:45

## 2025-08-17 RX ADMIN — SODIUM CHLORIDE, PRESERVATIVE FREE 10 ML: 5 INJECTION INTRAVENOUS at 21:30

## 2025-08-17 RX ADMIN — SODIUM CHLORIDE, PRESERVATIVE FREE 10 ML: 5 INJECTION INTRAVENOUS at 08:26

## 2025-08-17 RX ADMIN — MIRTAZAPINE 15 MG: 15 TABLET, FILM COATED ORAL at 21:29

## 2025-08-17 RX ADMIN — METOPROLOL TARTRATE 25 MG: 25 TABLET, FILM COATED ORAL at 21:29

## 2025-08-17 RX ADMIN — METOPROLOL TARTRATE 25 MG: 25 TABLET, FILM COATED ORAL at 08:25

## 2025-08-17 RX ADMIN — POLYETHYLENE GLYCOL 3350 17 G: 17 POWDER, FOR SOLUTION ORAL at 08:26

## 2025-08-17 RX ADMIN — SODIUM CHLORIDE, PRESERVATIVE FREE 10 ML: 5 INJECTION INTRAVENOUS at 21:33

## 2025-08-17 RX ADMIN — MEGESTROL ACETATE 20 MG: 20 TABLET ORAL at 16:46

## 2025-08-17 RX ADMIN — ROSUVASTATIN CALCIUM 10 MG: 10 TABLET, FILM COATED ORAL at 21:33

## 2025-08-17 ASSESSMENT — PAIN - FUNCTIONAL ASSESSMENT
PAIN_FUNCTIONAL_ASSESSMENT: 0-10
PAIN_FUNCTIONAL_ASSESSMENT: 0-10

## 2025-08-17 ASSESSMENT — PAIN SCALES - GENERAL
PAINLEVEL_OUTOF10: 3
PAINLEVEL_OUTOF10: 3
PAINLEVEL_OUTOF10: 7

## 2025-08-18 ENCOUNTER — APPOINTMENT (OUTPATIENT)
Facility: HOSPITAL | Age: 80
DRG: 682 | End: 2025-08-18
Payer: MEDICARE

## 2025-08-18 LAB
ALBUMIN SERPL-MCNC: 2.4 G/DL (ref 3.5–5.2)
ALBUMIN/GLOB SERPL: 0.7 (ref 1.1–2.2)
ALP SERPL-CCNC: 106 U/L (ref 35–104)
ALT SERPL-CCNC: 54 U/L (ref 10–35)
ANION GAP SERPL CALC-SCNC: 14 MMOL/L (ref 2–14)
AST SERPL-CCNC: 45 U/L (ref 10–35)
BASOPHILS # BLD: 0 K/UL (ref 0–0.1)
BASOPHILS NFR BLD: 0 % (ref 0–1)
BILIRUB SERPL-MCNC: 0.4 MG/DL (ref 0–1.2)
BUN SERPL-MCNC: 9 MG/DL (ref 8–23)
BUN/CREAT SERPL: 11 (ref 12–20)
CALCIUM SERPL-MCNC: 8.4 MG/DL (ref 8.8–10.2)
CHLORIDE SERPL-SCNC: 96 MMOL/L (ref 98–107)
CO2 SERPL-SCNC: 24 MMOL/L (ref 20–29)
CREAT SERPL-MCNC: 0.81 MG/DL (ref 0.6–1)
DIFFERENTIAL METHOD BLD: ABNORMAL
EOSINOPHIL # BLD: 0.06 K/UL (ref 0–0.4)
EOSINOPHIL NFR BLD: 1 % (ref 0–7)
ERYTHROCYTE [DISTWIDTH] IN BLOOD BY AUTOMATED COUNT: 13 % (ref 11.5–14.5)
GLOBULIN SER CALC-MCNC: 3.4 G/DL (ref 2–4)
GLUCOSE BLD STRIP.AUTO-MCNC: 190 MG/DL (ref 65–117)
GLUCOSE BLD STRIP.AUTO-MCNC: 207 MG/DL (ref 65–117)
GLUCOSE BLD STRIP.AUTO-MCNC: 216 MG/DL (ref 65–117)
GLUCOSE BLD STRIP.AUTO-MCNC: 227 MG/DL (ref 65–117)
GLUCOSE SERPL-MCNC: 203 MG/DL (ref 65–100)
HCT VFR BLD AUTO: 25.8 % (ref 35–47)
HGB BLD-MCNC: 8.6 G/DL (ref 11.5–16)
IMM GRANULOCYTES # BLD AUTO: 0 K/UL
IMM GRANULOCYTES NFR BLD AUTO: 0 %
LYMPHOCYTES # BLD: 0.19 K/UL (ref 0.8–3.5)
LYMPHOCYTES NFR BLD: 3 % (ref 12–49)
MCH RBC QN AUTO: 27.5 PG (ref 26–34)
MCHC RBC AUTO-ENTMCNC: 33.3 G/DL (ref 30–36.5)
MCV RBC AUTO: 82.4 FL (ref 80–99)
MONOCYTES # BLD: 0.32 K/UL (ref 0–1)
MONOCYTES NFR BLD: 5 % (ref 5–13)
MYELOCYTES NFR BLD MANUAL: 1 %
NEUTS BAND NFR BLD MANUAL: 1 % (ref 0–6)
NEUTS SEG # BLD: 5.67 K/UL (ref 1.8–8)
NEUTS SEG NFR BLD: 89 % (ref 32–75)
NRBC # BLD: 0 K/UL (ref 0–0.01)
NRBC BLD-RTO: 0 PER 100 WBC
PLATELET # BLD AUTO: 379 K/UL (ref 150–400)
PMV BLD AUTO: 9.8 FL (ref 8.9–12.9)
POTASSIUM SERPL-SCNC: 3.8 MMOL/L (ref 3.5–5.1)
PROT SERPL-MCNC: 5.8 G/DL (ref 6.4–8.3)
RBC # BLD AUTO: 3.13 M/UL (ref 3.8–5.2)
RBC MORPH BLD: ABNORMAL
SERVICE CMNT-IMP: ABNORMAL
SODIUM SERPL-SCNC: 133 MMOL/L (ref 136–145)
WBC # BLD AUTO: 6.3 K/UL (ref 3.6–11)

## 2025-08-18 PROCEDURE — 36415 COLL VENOUS BLD VENIPUNCTURE: CPT

## 2025-08-18 PROCEDURE — 2500000003 HC RX 250 WO HCPCS

## 2025-08-18 PROCEDURE — 85025 COMPLETE CBC W/AUTO DIFF WBC: CPT

## 2025-08-18 PROCEDURE — 6370000000 HC RX 637 (ALT 250 FOR IP): Performed by: STUDENT IN AN ORGANIZED HEALTH CARE EDUCATION/TRAINING PROGRAM

## 2025-08-18 PROCEDURE — 6370000000 HC RX 637 (ALT 250 FOR IP)

## 2025-08-18 PROCEDURE — 99232 SBSQ HOSP IP/OBS MODERATE 35: CPT | Performed by: INTERNAL MEDICINE

## 2025-08-18 PROCEDURE — 97535 SELF CARE MNGMENT TRAINING: CPT

## 2025-08-18 PROCEDURE — 6360000002 HC RX W HCPCS: Performed by: FAMILY MEDICINE

## 2025-08-18 PROCEDURE — 94761 N-INVAS EAR/PLS OXIMETRY MLT: CPT

## 2025-08-18 PROCEDURE — 99232 SBSQ HOSP IP/OBS MODERATE 35: CPT | Performed by: FAMILY MEDICINE

## 2025-08-18 PROCEDURE — 51798 US URINE CAPACITY MEASURE: CPT

## 2025-08-18 PROCEDURE — 82962 GLUCOSE BLOOD TEST: CPT

## 2025-08-18 PROCEDURE — 80053 COMPREHEN METABOLIC PANEL: CPT

## 2025-08-18 PROCEDURE — 1100000000 HC RM PRIVATE

## 2025-08-18 PROCEDURE — 6360000004 HC RX CONTRAST MEDICATION

## 2025-08-18 PROCEDURE — 76705 ECHO EXAM OF ABDOMEN: CPT

## 2025-08-18 PROCEDURE — 71275 CT ANGIOGRAPHY CHEST: CPT

## 2025-08-18 RX ORDER — INSULIN GLARGINE 100 [IU]/ML
10 INJECTION, SOLUTION SUBCUTANEOUS NIGHTLY
Status: DISCONTINUED | OUTPATIENT
Start: 2025-08-18 | End: 2025-08-19

## 2025-08-18 RX ORDER — METOPROLOL TARTRATE 50 MG
50 TABLET ORAL 2 TIMES DAILY
Status: DISCONTINUED | OUTPATIENT
Start: 2025-08-18 | End: 2025-08-19 | Stop reason: HOSPADM

## 2025-08-18 RX ORDER — MORPHINE SULFATE 15 MG/1
22.5 TABLET ORAL EVERY 4 HOURS PRN
Qty: 180 TABLET | Refills: 0 | Status: CANCELLED | OUTPATIENT
Start: 2025-08-18 | End: 2025-09-17

## 2025-08-18 RX ORDER — MORPHINE SULFATE 15 MG/1
22.5 TABLET ORAL EVERY 4 HOURS PRN
Qty: 18 TABLET | Refills: 0 | Status: CANCELLED | OUTPATIENT
Start: 2025-08-18 | End: 2025-08-21

## 2025-08-18 RX ORDER — IOPAMIDOL 755 MG/ML
100 INJECTION, SOLUTION INTRAVASCULAR
Status: COMPLETED | OUTPATIENT
Start: 2025-08-18 | End: 2025-08-18

## 2025-08-18 RX ORDER — CASTOR OIL AND BALSAM, PERU 788; 87 MG/G; MG/G
OINTMENT TOPICAL 2 TIMES DAILY
Qty: 1 EACH | Refills: 0 | Status: CANCELLED
Start: 2025-08-18 | End: 2025-09-17

## 2025-08-18 RX ORDER — POTASSIUM CHLORIDE 750 MG/1
20 TABLET, EXTENDED RELEASE ORAL ONCE
Status: COMPLETED | OUTPATIENT
Start: 2025-08-18 | End: 2025-08-18

## 2025-08-18 RX ORDER — INSULIN DEGLUDEC 200 U/ML
10 INJECTION, SOLUTION SUBCUTANEOUS NIGHTLY
Qty: 1.5 ML | Refills: 0 | Status: CANCELLED
Start: 2025-08-18

## 2025-08-18 RX ORDER — METOPROLOL TARTRATE 50 MG
50 TABLET ORAL 2 TIMES DAILY
Qty: 60 TABLET | Refills: 0 | Status: CANCELLED
Start: 2025-08-18

## 2025-08-18 RX ADMIN — Medication: at 20:39

## 2025-08-18 RX ADMIN — IOPAMIDOL 80 ML: 755 INJECTION, SOLUTION INTRAVENOUS at 22:50

## 2025-08-18 RX ADMIN — POLYETHYLENE GLYCOL 3350 17 G: 17 POWDER, FOR SOLUTION ORAL at 08:40

## 2025-08-18 RX ADMIN — ENOXAPARIN SODIUM 40 MG: 100 INJECTION SUBCUTANEOUS at 20:39

## 2025-08-18 RX ADMIN — INSULIN LISPRO 2 UNITS: 100 INJECTION, SOLUTION INTRAVENOUS; SUBCUTANEOUS at 20:40

## 2025-08-18 RX ADMIN — MIRTAZAPINE 15 MG: 15 TABLET, FILM COATED ORAL at 20:39

## 2025-08-18 RX ADMIN — INSULIN GLARGINE 10 UNITS: 100 INJECTION, SOLUTION SUBCUTANEOUS at 20:40

## 2025-08-18 RX ADMIN — ASPIRIN 81 MG: 81 TABLET, CHEWABLE ORAL at 08:41

## 2025-08-18 RX ADMIN — INSULIN LISPRO 2 UNITS: 100 INJECTION, SOLUTION INTRAVENOUS; SUBCUTANEOUS at 16:34

## 2025-08-18 RX ADMIN — INSULIN LISPRO 2 UNITS: 100 INJECTION, SOLUTION INTRAVENOUS; SUBCUTANEOUS at 11:35

## 2025-08-18 RX ADMIN — SODIUM CHLORIDE, PRESERVATIVE FREE 10 ML: 5 INJECTION INTRAVENOUS at 08:40

## 2025-08-18 RX ADMIN — MEGESTROL ACETATE 20 MG: 20 TABLET ORAL at 16:34

## 2025-08-18 RX ADMIN — INSULIN LISPRO 2 UNITS: 100 INJECTION, SOLUTION INTRAVENOUS; SUBCUTANEOUS at 08:40

## 2025-08-18 RX ADMIN — Medication: at 08:40

## 2025-08-18 RX ADMIN — SODIUM CHLORIDE, PRESERVATIVE FREE 20 ML: 5 INJECTION INTRAVENOUS at 20:41

## 2025-08-18 RX ADMIN — METOPROLOL TARTRATE 25 MG: 25 TABLET, FILM COATED ORAL at 08:41

## 2025-08-18 RX ADMIN — MORPHINE SULFATE 22.5 MG: 15 TABLET ORAL at 12:49

## 2025-08-18 RX ADMIN — METOPROLOL TARTRATE 50 MG: 50 TABLET, FILM COATED ORAL at 20:39

## 2025-08-18 RX ADMIN — ROSUVASTATIN CALCIUM 10 MG: 10 TABLET, FILM COATED ORAL at 20:39

## 2025-08-18 RX ADMIN — POTASSIUM CHLORIDE 20 MEQ: 750 TABLET, FILM COATED, EXTENDED RELEASE ORAL at 06:14

## 2025-08-18 ASSESSMENT — PAIN SCALES - GENERAL
PAINLEVEL_OUTOF10: 0
PAINLEVEL_OUTOF10: 8

## 2025-08-18 ASSESSMENT — PAIN DESCRIPTION - ORIENTATION: ORIENTATION: LEFT

## 2025-08-18 ASSESSMENT — PAIN - FUNCTIONAL ASSESSMENT: PAIN_FUNCTIONAL_ASSESSMENT: 0-10

## 2025-08-18 ASSESSMENT — PAIN DESCRIPTION - DESCRIPTORS: DESCRIPTORS: ACHING

## 2025-08-18 ASSESSMENT — PAIN DESCRIPTION - LOCATION: LOCATION: ABDOMEN

## 2025-08-19 VITALS
SYSTOLIC BLOOD PRESSURE: 117 MMHG | BODY MASS INDEX: 27.37 KG/M2 | WEIGHT: 170.3 LBS | DIASTOLIC BLOOD PRESSURE: 60 MMHG | TEMPERATURE: 98.2 F | HEART RATE: 79 BPM | RESPIRATION RATE: 14 BRPM | OXYGEN SATURATION: 97 % | HEIGHT: 66 IN

## 2025-08-19 LAB
ALBUMIN SERPL-MCNC: 2.2 G/DL (ref 3.5–5.2)
ALBUMIN/GLOB SERPL: 0.7 (ref 1.1–2.2)
ALP SERPL-CCNC: 95 U/L (ref 35–104)
ALT SERPL-CCNC: 44 U/L (ref 10–35)
ANION GAP SERPL CALC-SCNC: 11 MMOL/L (ref 2–14)
AST SERPL-CCNC: 33 U/L (ref 10–35)
BASOPHILS # BLD: 0 K/UL (ref 0–0.1)
BASOPHILS NFR BLD: 0 % (ref 0–1)
BILIRUB SERPL-MCNC: 0.3 MG/DL (ref 0–1.2)
BUN SERPL-MCNC: 12 MG/DL (ref 8–23)
BUN/CREAT SERPL: 14 (ref 12–20)
CALCIUM SERPL-MCNC: 8.3 MG/DL (ref 8.8–10.2)
CHLORIDE SERPL-SCNC: 97 MMOL/L (ref 98–107)
CO2 SERPL-SCNC: 26 MMOL/L (ref 20–29)
CREAT SERPL-MCNC: 0.85 MG/DL (ref 0.6–1)
DIFFERENTIAL METHOD BLD: ABNORMAL
EOSINOPHIL # BLD: 0.05 K/UL (ref 0–0.4)
EOSINOPHIL NFR BLD: 1 % (ref 0–7)
ERYTHROCYTE [DISTWIDTH] IN BLOOD BY AUTOMATED COUNT: 13.2 % (ref 11.5–14.5)
GLOBULIN SER CALC-MCNC: 3.2 G/DL (ref 2–4)
GLUCOSE BLD STRIP.AUTO-MCNC: 122 MG/DL (ref 65–117)
GLUCOSE BLD STRIP.AUTO-MCNC: 183 MG/DL (ref 65–117)
GLUCOSE SERPL-MCNC: 125 MG/DL (ref 65–100)
HCT VFR BLD AUTO: 24.1 % (ref 35–47)
HGB BLD-MCNC: 7.9 G/DL (ref 11.5–16)
IMM GRANULOCYTES # BLD AUTO: 0 K/UL (ref 0–0.04)
IMM GRANULOCYTES NFR BLD AUTO: 0 % (ref 0–0.5)
LYMPHOCYTES # BLD: 0.27 K/UL (ref 0.8–3.5)
LYMPHOCYTES NFR BLD: 6 % (ref 12–49)
MCH RBC QN AUTO: 27.1 PG (ref 26–34)
MCHC RBC AUTO-ENTMCNC: 32.8 G/DL (ref 30–36.5)
MCV RBC AUTO: 82.5 FL (ref 80–99)
MONOCYTES # BLD: 0.36 K/UL (ref 0–1)
MONOCYTES NFR BLD: 8 % (ref 5–13)
MYELOCYTES NFR BLD MANUAL: 2 %
NEUTS SEG # BLD: 3.74 K/UL (ref 1.8–8)
NEUTS SEG NFR BLD: 83 % (ref 32–75)
NRBC # BLD: 0 K/UL (ref 0–0.01)
NRBC BLD-RTO: 0 PER 100 WBC
PLATELET # BLD AUTO: 451 K/UL (ref 150–400)
PMV BLD AUTO: 9.9 FL (ref 8.9–12.9)
POTASSIUM SERPL-SCNC: 4.4 MMOL/L (ref 3.5–5.1)
PROT SERPL-MCNC: 5.3 G/DL (ref 6.4–8.3)
RBC # BLD AUTO: 2.92 M/UL (ref 3.8–5.2)
RBC MORPH BLD: ABNORMAL
SERVICE CMNT-IMP: ABNORMAL
SERVICE CMNT-IMP: ABNORMAL
SODIUM SERPL-SCNC: 134 MMOL/L (ref 136–145)
WBC # BLD AUTO: 4.5 K/UL (ref 3.6–11)

## 2025-08-19 PROCEDURE — 6370000000 HC RX 637 (ALT 250 FOR IP)

## 2025-08-19 PROCEDURE — 99238 HOSP IP/OBS DSCHRG MGMT 30/<: CPT | Performed by: FAMILY MEDICINE

## 2025-08-19 PROCEDURE — 80053 COMPREHEN METABOLIC PANEL: CPT

## 2025-08-19 PROCEDURE — 6370000000 HC RX 637 (ALT 250 FOR IP): Performed by: STUDENT IN AN ORGANIZED HEALTH CARE EDUCATION/TRAINING PROGRAM

## 2025-08-19 PROCEDURE — 97530 THERAPEUTIC ACTIVITIES: CPT

## 2025-08-19 PROCEDURE — 97116 GAIT TRAINING THERAPY: CPT

## 2025-08-19 PROCEDURE — 2500000003 HC RX 250 WO HCPCS

## 2025-08-19 PROCEDURE — 85025 COMPLETE CBC W/AUTO DIFF WBC: CPT

## 2025-08-19 PROCEDURE — 36415 COLL VENOUS BLD VENIPUNCTURE: CPT

## 2025-08-19 PROCEDURE — 51798 US URINE CAPACITY MEASURE: CPT

## 2025-08-19 PROCEDURE — 82962 GLUCOSE BLOOD TEST: CPT

## 2025-08-19 PROCEDURE — 99232 SBSQ HOSP IP/OBS MODERATE 35: CPT | Performed by: STUDENT IN AN ORGANIZED HEALTH CARE EDUCATION/TRAINING PROGRAM

## 2025-08-19 RX ORDER — MEGESTROL ACETATE 20 MG/1
20 TABLET ORAL
Qty: 90 TABLET | Refills: 0 | Status: SHIPPED | OUTPATIENT
Start: 2025-08-19

## 2025-08-19 RX ORDER — POLYETHYLENE GLYCOL 3350 17 G/17G
17 POWDER, FOR SOLUTION ORAL DAILY PRN
Qty: 30 PACKET | Refills: 0 | Status: SHIPPED
Start: 2025-08-19 | End: 2025-09-18

## 2025-08-19 RX ORDER — POLYETHYLENE GLYCOL 3350 17 G/17G
17 POWDER, FOR SOLUTION ORAL DAILY
Qty: 30 PACKET | Refills: 0 | Status: CANCELLED
Start: 2025-08-19 | End: 2025-09-18

## 2025-08-19 RX ORDER — METOPROLOL TARTRATE 50 MG
50 TABLET ORAL 2 TIMES DAILY
Qty: 60 TABLET | Refills: 0 | Status: SHIPPED
Start: 2025-08-19

## 2025-08-19 RX ORDER — MORPHINE SULFATE 15 MG/1
22.5 TABLET ORAL EVERY 4 HOURS PRN
Qty: 135 TABLET | Refills: 0 | Status: SHIPPED | OUTPATIENT
Start: 2025-08-19 | End: 2025-09-03

## 2025-08-19 RX ORDER — INSULIN GLARGINE 100 [IU]/ML
14 INJECTION, SOLUTION SUBCUTANEOUS NIGHTLY
Status: DISCONTINUED | OUTPATIENT
Start: 2025-08-19 | End: 2025-08-19 | Stop reason: HOSPADM

## 2025-08-19 RX ORDER — MIRTAZAPINE 15 MG/1
15 TABLET, FILM COATED ORAL NIGHTLY
Qty: 90 TABLET | Refills: 1 | Status: SHIPPED | OUTPATIENT
Start: 2025-08-19

## 2025-08-19 RX ORDER — INSULIN DEGLUDEC 200 U/ML
10 INJECTION, SOLUTION SUBCUTANEOUS NIGHTLY
Qty: 1.5 ML | Refills: 0 | Status: SHIPPED
Start: 2025-08-19

## 2025-08-19 RX ORDER — CASTOR OIL AND BALSAM, PERU 788; 87 MG/G; MG/G
OINTMENT TOPICAL 2 TIMES DAILY
Qty: 1 EACH | Refills: 0 | Status: SHIPPED
Start: 2025-08-19

## 2025-08-19 RX ORDER — MORPHINE SULFATE 15 MG/1
15 TABLET ORAL EVERY 4 HOURS PRN
Qty: 18 TABLET | Refills: 0 | Status: SHIPPED | OUTPATIENT
Start: 2025-08-19 | End: 2025-08-22

## 2025-08-19 RX ADMIN — MORPHINE SULFATE 22.5 MG: 15 TABLET ORAL at 01:19

## 2025-08-19 RX ADMIN — SODIUM CHLORIDE, PRESERVATIVE FREE 10 ML: 5 INJECTION INTRAVENOUS at 08:33

## 2025-08-19 RX ADMIN — ASPIRIN 81 MG: 81 TABLET, CHEWABLE ORAL at 08:34

## 2025-08-19 RX ADMIN — Medication: at 08:33

## 2025-08-19 RX ADMIN — METOPROLOL TARTRATE 50 MG: 50 TABLET, FILM COATED ORAL at 08:34

## 2025-08-19 RX ADMIN — INSULIN LISPRO 2 UNITS: 100 INJECTION, SOLUTION INTRAVENOUS; SUBCUTANEOUS at 11:46

## 2025-08-19 ASSESSMENT — PAIN DESCRIPTION - LOCATION: LOCATION: BACK

## 2025-08-19 ASSESSMENT — PAIN DESCRIPTION - DESCRIPTORS: DESCRIPTORS: ACHING

## 2025-08-19 ASSESSMENT — PAIN DESCRIPTION - ORIENTATION: ORIENTATION: LEFT

## 2025-08-19 ASSESSMENT — PAIN SCALES - GENERAL
PAINLEVEL_OUTOF10: 8
PAINLEVEL_OUTOF10: 2
PAINLEVEL_OUTOF10: 1

## 2025-08-19 ASSESSMENT — PAIN - FUNCTIONAL ASSESSMENT
PAIN_FUNCTIONAL_ASSESSMENT: 0-10
PAIN_FUNCTIONAL_ASSESSMENT: PREVENTS OR INTERFERES SOME ACTIVE ACTIVITIES AND ADLS

## 2025-08-20 ENCOUNTER — TELEPHONE (OUTPATIENT)
Age: 80
End: 2025-08-20

## 2025-08-22 ENCOUNTER — TELEPHONE (OUTPATIENT)
Age: 80
End: 2025-08-22

## 2025-08-26 ENCOUNTER — TELEPHONE (OUTPATIENT)
Age: 80
End: 2025-08-26

## 2025-08-27 ENCOUNTER — TELEPHONE (OUTPATIENT)
Age: 80
End: 2025-08-27

## (undated) DEVICE — TUBING PRSS MON L12IN PVC RIG NONEXPANDING M TO FEM CONN

## (undated) DEVICE — NC TREK CORONARY DILATATION CATHETER 3.0 MM X 15 MM / RAPID-EXCHANGE: Brand: NC TREK

## (undated) DEVICE — R2P DESTINATION SLENDER GUIDING SHEATH: Brand: R2P DESTINATION SLENDER

## (undated) DEVICE — Device: Brand: EAGLE EYE PLATINUM RX DIGITAL IVUS CATHETER

## (undated) DEVICE — ANGIOGRAPHIC CATHETER: Brand: IMPULSE™

## (undated) DEVICE — CATHETER DIAG 5FR L100CM LUMN ID0.047IN JR4 CRV 0 SIDE H

## (undated) DEVICE — HEART CATH-SFMC: Brand: MEDLINE INDUSTRIES, INC.

## (undated) DEVICE — GUIDEWIRE VASC L180CM DIA0.035IN 3MM PTFE J TIP EXCHG FIX

## (undated) DEVICE — NC TREK CORONARY DILATATION CATHETER 2.5 MM X 12 MM / RAPID-EXCHANGE: Brand: NC TREK

## (undated) DEVICE — VALVE HEMSTAT 8FR INNR LUMN CRSS SLT SEAL DSGN WATCHDOG

## (undated) DEVICE — GLIDESHEATH SLENDER STAINLESS STEEL KIT: Brand: GLIDESHEATH SLENDER

## (undated) DEVICE — ARGO BAGZ FLUID MANAGEMENT SYSTEM: Brand: ARGO BAGZ FLUID MANAGEMENT SYSTEM

## (undated) DEVICE — Device: Brand: ASAHI SION BLUE

## (undated) DEVICE — DEVICE INFL 20ML 30ATM DGT FLD DISPNS SYR W ACCESSPLUS BLU

## (undated) DEVICE — CATH LITHOPLSTY 3X12MM SHOCKWAVE

## (undated) DEVICE — TR BAND RADIAL ARTERY COMPRESSION DEVICE: Brand: TR BAND

## (undated) DEVICE — CATHETER GUID 6FR L100CM DIA0.071IN NYL SHFT EBU3.5